# Patient Record
Sex: MALE | Race: WHITE | NOT HISPANIC OR LATINO | Employment: OTHER | URBAN - METROPOLITAN AREA
[De-identification: names, ages, dates, MRNs, and addresses within clinical notes are randomized per-mention and may not be internally consistent; named-entity substitution may affect disease eponyms.]

---

## 2017-03-13 ENCOUNTER — OFFICE VISIT (OUTPATIENT)
Dept: CARDIOLOGY | Facility: CLINIC | Age: 72
End: 2017-03-13

## 2017-03-13 VITALS
BODY MASS INDEX: 30.8 KG/M2 | HEART RATE: 58 BPM | HEIGHT: 74 IN | DIASTOLIC BLOOD PRESSURE: 80 MMHG | OXYGEN SATURATION: 99 % | WEIGHT: 240 LBS | SYSTOLIC BLOOD PRESSURE: 144 MMHG

## 2017-03-13 DIAGNOSIS — I48.0 PAROXYSMAL ATRIAL FIBRILLATION (HCC): Primary | ICD-10-CM

## 2017-03-13 DIAGNOSIS — I45.2 BIFASCICULAR BLOCK: ICD-10-CM

## 2017-03-13 PROCEDURE — 99213 OFFICE O/P EST LOW 20 MIN: CPT | Performed by: PHYSICIAN ASSISTANT

## 2017-03-13 PROCEDURE — 93000 ELECTROCARDIOGRAM COMPLETE: CPT | Performed by: PHYSICIAN ASSISTANT

## 2017-03-13 RX ORDER — VALSARTAN 320 MG/1
320 TABLET ORAL DAILY
Refills: 11 | COMMUNITY
Start: 2017-01-22 | End: 2020-06-22 | Stop reason: HOSPADM

## 2017-03-13 NOTE — PROGRESS NOTES
Date of Office Visit: 2017  Encounter Provider: ELISABET Tena  Place of Service: [facility] CARDIOLOGY  Patient Name: Mark Aguirre Jr.  :1945    Chief Complaint   Patient presents with   • Atrial Fibrillation     1 year follow up   :     HPI: Mark Aguirre Jr. is a 71 y.o. male , new to me, who presents today for follow-up.  Old records a been obtained and reviewed by me.  He is a patient of Dr. Michael's with a past medical history significant for paroxysmal atrial fibrillation, atrial flutter, and a bifascicular block.  He is status post atrial flutter and atrial fibrillation ablation.  He was last seen in our office on 3/2/2016 by Dr. Michael.  At that visit, he was doing well.  Dr. Michael noted that the patient had lost a significant amount of weight through the HMR program.  No changes were made to his medical regimen.  He remains on anticoagulation for stroke prevention.  He is here today for his annual visit.   Over the past year, he's been doing well.  He did gain back some weight, and he is going to be starting the R diet plan again soon.  He goes to [place] several times a year and climbs mountains and [place] without any problem whatsoever.  Over the past 3-4 weeks, he has had an increased amount of stress.  He has a grandson who is in the special forces with the [employer] and is about ready to be deployed for the second time.  He is worried about him, as well as some of his other grandchildren.  He has had increased palpitations and skipped heartbeats over the past 3 weeks.  According to the patient, he did have a 24-hour Holter monitor at his primary care doctor's office, which supposedly did not show any atrial fibrillation or atrial flutter.  According to the patient, he was having PVCs.  I am going to obtain these records.  He denies any chest pain, shortness of breath, edema, dizziness, or syncope.      Past Medical History   Diagnosis Date   • Atrial flutter    •  Bifascicular block    • Hyperlipidemia    • Hypertension    • PAF (paroxysmal atrial fibrillation)        Past Surgical History   Procedure Laterality Date   • Cardiac ablation     • Hernia repair         Social History     Social History   • Marital status:      Spouse name: N/A   • Number of children: N/A   • Years of education: N/A     Occupational History   • Not on file.     Social History Main Topics   • Smoking status: Never Smoker   • Smokeless tobacco: Current User      Comment: OCCASIONAL DIP   • Alcohol use No   • Drug use: No   • Sexual activity: Defer     Other Topics Concern   • Not on file     Social History Narrative       Family History   Problem Relation Age of Onset   • Breast cancer Mother    • Colon cancer Father    • Diabetes Brother    • No Known Problems Maternal Grandmother    • No Known Problems Maternal Grandfather    • No Known Problems Paternal Grandmother    • Stroke Paternal Grandfather    • Heart disease Brother        Review of Systems   Constitution: Negative for chills, fever, malaise/fatigue, weight gain and weight loss.   HENT: Negative for ear pain, headaches, hearing loss, nosebleeds and sore throat.    Eyes: Negative for double vision, pain and visual disturbance.   Cardiovascular: Positive for palpitations. Negative for chest pain, dyspnea on exertion, irregular heartbeat, leg swelling, near-syncope, orthopnea, paroxysmal nocturnal dyspnea and syncope.   Respiratory: Negative for cough, shortness of breath, sleep disturbances due to breathing, snoring and wheezing.    Endocrine: Negative for cold intolerance, heat intolerance and polyuria.   Skin: Negative for itching and rash.   Musculoskeletal: Negative for joint pain, joint swelling and myalgias.   Gastrointestinal: Negative for abdominal pain, diarrhea, melena, nausea and vomiting.   Genitourinary: Negative for frequency, hematuria and hesitancy.   Neurological: Negative for excessive daytime sleepiness,  "light-headedness, numbness, paresthesias and seizures.   Psychiatric/Behavioral: Negative for altered mental status and depression.   Allergic/Immunologic: Negative.    All other systems reviewed and are negative.      No Known Allergies      Current Outpatient Prescriptions:   •  metoprolol tartrate (LOPRESSOR) 25 MG tablet, TAKE 1/2 TABLET TWICE DAILY, Disp: 180 tablet, Rfl: 3  •  PRADAXA 150 MG capsu, TAKE 1 CAPSULE BY MOUTH TWICE DAILY, Disp: 60 capsule, Rfl: 5  •  valsartan (DIOVAN) 320 MG tablet, TK 1 T PO QD ., Disp: , Rfl: 11  •  zolpidem (AMBIEN) 5 MG tablet, Take 2.5 mg by mouth at night as needed., Disp: , Rfl:      Objective:     Vitals:    03/13/17 0910 03/13/17 0923   BP: 144/86 144/80   BP Location: Right arm Left arm   Pulse: 58    SpO2: 99%    Weight: 240 lb (109 kg)    Height: 74\" (188 cm)      Body mass index is 30.81 kg/(m^2).    PHYSICAL EXAM:    Physical Exam   Constitutional: He is oriented to person, place, and time. He appears well-developed and well-nourished. No distress.   HENT:   Head: Normocephalic and atraumatic.   Eyes: Pupils are equal, round, and reactive to light.   Neck: No JVD present. No thyromegaly present.   Cardiovascular: Normal rate, normal heart sounds and intact distal pulses.  An irregular rhythm present.   No murmur heard.  Pulmonary/Chest: Effort normal and breath sounds normal. No respiratory distress.   Abdominal: Soft. Bowel sounds are normal. He exhibits no distension. There is no splenomegaly or hepatomegaly. There is no tenderness.   Musculoskeletal: Normal range of motion. He exhibits no edema.   Neurological: He is alert and oriented to person, place, and time.   Skin: Skin is warm and dry. He is not diaphoretic. No erythema.   Psychiatric: He has a normal mood and affect. His behavior is normal. Judgment normal.         ECG 12 Lead  Date/Time: 3/13/2017 9:28 AM  Performed by: NARCISA WEBER.  Authorized by: NARCISA WEBER   Comparison: compared with " previous ECG from 3/2/2016  Similar to previous ECG  Rhythm: sinus rhythm  BPM: 58  Conduction: right bundle branch block and LAFB  Clinical impression: abnormal ECG  Comments: Indication: Paroxysmal atrial fibrillation.              Assessment:       Diagnosis Plan   1. Paroxysmal atrial fibrillation  ECG 12 Lead   2. Bifascicular block  ECG 12 Lead     Orders Placed This Encounter   Procedures   • ECG 12 Lead     This order was created via procedure documentation          Plan:       1.  Atrial Fibrillation and Atrial Flutter  Assessment  • The patient has paroxysmal atrial fibrillation  • This is non-valvular in etiology  • The patient's CHADS2-VASc score is 2  • A [id number]-VTVk score of 2 or more is considered a high risk for a thromboembolic event  • Dabigatran prescribed    Plan  • Attempt to maintain sinus rhythm  • Continue dabigatran for antithrombotic therapy, bleeding issues discussed  • Continue beta blocker for rhythm control  • Continue beta blocker for rate control    Subjective - Objective  • The patient had atrial fibrillation ablation   • Overall, he's doing well.  I think that he is probably having PVCs.  I did feel his pulse, and he does have some skipped beats.  His ECG today shows a sinus rhythm with an unchanged bifascicular block.  I think that the increased stress at home is probably causing him to have some palpitations.  He is anticoagulated for stroke prevention and rate controlled.  An echo to make any changes to his medical regimen today, I will obtain the Holter monitor results from his primary care physician's office.     2.  Bifascicular block.  This is unchanged.  Continue current medical regimen.    As always, it has been a pleasure to participate in your patient's care.      Sincerely,         Geeta Alas PA-C

## 2017-03-21 RX ORDER — ATENOLOL 100 MG/1
TABLET ORAL
Qty: 60 CAPSULE | Refills: 11 | Status: SHIPPED | OUTPATIENT
Start: 2017-03-21 | End: 2018-03-14 | Stop reason: SDUPTHER

## 2017-07-12 ENCOUNTER — HOSPITAL ENCOUNTER (OUTPATIENT)
Dept: SLEEP MEDICINE | Facility: HOSPITAL | Age: 72
Discharge: HOME | End: 2017-07-12
Attending: INTERNAL MEDICINE

## 2017-07-12 PROCEDURE — 99213 OFFICE O/P EST LOW 20 MIN: CPT | Performed by: INTERNAL MEDICINE

## 2017-07-23 PROBLEM — G47.33 OSA ON CPAP: Status: ACTIVE | Noted: 2017-07-23

## 2017-07-23 PROBLEM — Z99.89 OSA ON CPAP: Status: ACTIVE | Noted: 2017-07-23

## 2017-07-23 NOTE — PROGRESS NOTES
Follow Up Sleep Disorders Center Note       Patient Care Team:  Luis Staples MD as PCP - Internal Medicine  Rian Johns MD as Consulting Physician (Sleep Medicine)    Chief Complaint:  STEPHENIE     Interval History:   The patient was last seen by me in July 2016.  He is stable unchanged and he states he is doing amazing.  He goes to bed at 9:30 PM and awakens at 6 AM.  He sleeps all night.  R[place]ott Sleepiness Scale is normal at 1.    Review of Systems:  Recorded on the Sleep Questionnaire.  Unremarkable .    Social History:  He does not smoke cigarettes.  He chews tobacco.  No alcohol.  2 cups of coffee a day.    Allergies:  No known medical allergies.     Medication Review:  Reviewed.      Vital Signs:  Height 74.5 inches and weight 235 pounds and he is borderline obese with a body mass index of 30.    Physical Exam:    Constitutional:  Well developed white male and appears in no apparent distress.  Awake & oriented times 3.  Normal mood with normal recent and remote memory and normal judgement.  Eyes:  Conjunctivae normal.  Oropharynx:  moist mucous membranes without exudate and a large tongue with scalloped margins and a normal uvula and narrow posterior pharyngeal opening and class III MP airway.      Results Review:  DME is [employer] and he uses nasal pillows.  No up-to-date downloads available at this time.       Impression:   Obstructive sleep apnea previously adequately treated with CPAP +13 and previously demonstrated good compliance and usage and he has no complaints of hypersomnolence.      Plan:  Good sleep hygiene measures should be maintained.  Weight loss would be beneficial in this patient who is obese by BMI.    The patient will continue CPAP as he is doing.  If possible, he will bring in his SD card.  A new prescription will be sent to his DME for all needed supplies.    The patient will call for any problems and will follow up in one year.      Rian Johns MD  07/23/17  8:03  AM

## 2017-12-19 ENCOUNTER — PREP FOR SURGERY (OUTPATIENT)
Dept: OTHER | Facility: HOSPITAL | Age: 72
End: 2017-12-19

## 2017-12-19 DIAGNOSIS — Z80.0 FAMILY HISTORY OF COLON CANCER: ICD-10-CM

## 2017-12-19 DIAGNOSIS — Z86.010 HISTORY OF COLON POLYPS: Primary | ICD-10-CM

## 2017-12-20 PROBLEM — Z80.0 FAMILY HISTORY OF COLON CANCER: Status: ACTIVE | Noted: 2017-12-20

## 2017-12-20 PROBLEM — Z86.0100 HISTORY OF COLON POLYPS: Status: ACTIVE | Noted: 2017-12-20

## 2017-12-20 PROBLEM — Z86.010 HISTORY OF COLON POLYPS: Status: ACTIVE | Noted: 2017-12-20

## 2018-02-21 ENCOUNTER — ANESTHESIA EVENT (OUTPATIENT)
Dept: GASTROENTEROLOGY | Facility: HOSPITAL | Age: 73
End: 2018-02-21

## 2018-02-21 ENCOUNTER — ANESTHESIA (OUTPATIENT)
Dept: GASTROENTEROLOGY | Facility: HOSPITAL | Age: 73
End: 2018-02-21

## 2018-02-21 ENCOUNTER — HOSPITAL ENCOUNTER (OUTPATIENT)
Facility: HOSPITAL | Age: 73
Setting detail: HOSPITAL OUTPATIENT SURGERY
Discharge: HOME | End: 2018-02-21
Attending: SURGERY | Admitting: SURGERY

## 2018-02-21 VITALS
RESPIRATION RATE: 12 BRPM | HEIGHT: 73 IN | SYSTOLIC BLOOD PRESSURE: 126 MMHG | BODY MASS INDEX: 30.82 KG/M2 | TEMPERATURE: 97.8 F | WEIGHT: 232.56 LBS | HEART RATE: 58 BPM | DIASTOLIC BLOOD PRESSURE: 76 MMHG | OXYGEN SATURATION: 99 %

## 2018-02-21 DIAGNOSIS — Z86.010 HISTORY OF COLON POLYPS: ICD-10-CM

## 2018-02-21 DIAGNOSIS — Z80.0 FAMILY HISTORY OF COLON CANCER: ICD-10-CM

## 2018-02-21 PROCEDURE — 88305 TISSUE EXAM BY PATHOLOGIST: CPT | Performed by: SURGERY

## 2018-02-21 PROCEDURE — 25010000002 PROPOFOL 10 MG/ML EMULSION: Performed by: ANESTHESIOLOGY

## 2018-02-21 PROCEDURE — 45380 COLONOSCOPY AND BIOPSY: CPT | Performed by: SURGERY

## 2018-02-21 RX ORDER — PROPOFOL 10 MG/ML
VIAL (ML) INTRAVENOUS AS NEEDED
Status: DISCONTINUED | OUTPATIENT
Start: 2018-02-21 | End: 2018-02-21 | Stop reason: SURG

## 2018-02-21 RX ORDER — SODIUM CHLORIDE, SODIUM LACTATE, POTASSIUM CHLORIDE, CALCIUM CHLORIDE 600; 310; 30; 20 MG/100ML; MG/100ML; MG/100ML; MG/100ML
1000 INJECTION, SOLUTION INTRAVENOUS CONTINUOUS PRN
Status: DISCONTINUED | OUTPATIENT
Start: 2018-02-21 | End: 2018-02-21 | Stop reason: HOSPADM

## 2018-02-21 RX ORDER — HYDROCHLOROTHIAZIDE 25 MG/1
25 TABLET ORAL DAILY
COMMUNITY
End: 2019-08-28

## 2018-02-21 RX ORDER — LIDOCAINE HYDROCHLORIDE 20 MG/ML
INJECTION, SOLUTION INFILTRATION; PERINEURAL AS NEEDED
Status: DISCONTINUED | OUTPATIENT
Start: 2018-02-21 | End: 2018-02-21 | Stop reason: SURG

## 2018-02-21 RX ORDER — PROPOFOL 10 MG/ML
VIAL (ML) INTRAVENOUS CONTINUOUS PRN
Status: DISCONTINUED | OUTPATIENT
Start: 2018-02-21 | End: 2018-02-21 | Stop reason: SURG

## 2018-02-21 RX ADMIN — PROPOFOL 300 MCG/KG/MIN: 10 INJECTION, EMULSION INTRAVENOUS at 10:41

## 2018-02-21 RX ADMIN — SODIUM CHLORIDE, POTASSIUM CHLORIDE, SODIUM LACTATE AND CALCIUM CHLORIDE: 600; 310; 30; 20 INJECTION, SOLUTION INTRAVENOUS at 10:36

## 2018-02-21 RX ADMIN — SODIUM CHLORIDE, POTASSIUM CHLORIDE, SODIUM LACTATE AND CALCIUM CHLORIDE 1000 ML: 600; 310; 30; 20 INJECTION, SOLUTION INTRAVENOUS at 10:21

## 2018-02-21 RX ADMIN — LIDOCAINE HYDROCHLORIDE 60 MG: 20 INJECTION, SOLUTION INFILTRATION; PERINEURAL at 10:41

## 2018-02-21 RX ADMIN — PROPOFOL 150 MG: 10 INJECTION, EMULSION INTRAVENOUS at 10:41

## 2018-02-21 NOTE — ANESTHESIA POSTPROCEDURE EVALUATION
Patient: Mark Aguirre Jr.    Procedure Summary     Date Anesthesia Start Anesthesia Stop Room / Location    02/21/18 1036 1100  [facility] /  [facility]       Procedure Diagnosis Surgeon Provider    COLONOSCOPY INTO CECUM WITH COLD BX POLYPECTOMY  (N/A ) Family history of colon cancer; History of colon polyps  (Family history of colon cancer [Z80.0]; History of colon polyps [Z86.010]) MD Rodrigue Andrew MD          Anesthesia Type: MAC  Last vitals  BP   115/63 (02/21/18 1058)   Temp   37 °C (98.6 °F) (02/21/18 1011)   Pulse   70 (02/21/18 1058)   Resp   14 (02/21/18 1058)     SpO2   94 % (02/21/18 1058)     Post Anesthesia Care and Evaluation    Patient location during evaluation: PHASE II  Patient participation: complete - patient participated  Level of consciousness: awake and alert  Pain management: adequate  Airway patency: patent  Anesthetic complications: No anesthetic complications  PONV Status: none  Cardiovascular status: acceptable  Respiratory status: acceptable  Hydration status: acceptable

## 2018-02-21 NOTE — OP NOTE
PREOPERATIVE DIAGNOSIS:  High-risk screening secondary to personal history of colon polyps and family history of colon cancer in father    POSTOPERATIVE DIAGNOSIS AND FINDINGS:  Small ascending colon polyp    PROCEDURE:  Colonoscopy to cecum with cold biopsy removal of ascending colon polyp    SURGEON:  Ross Stearns MD    ANESTHESIA:  MAC    SPECIMEN(S):  Polyp (s)    DESCRIPTION:  In decubitus position digital rectal exam was normal. Colonoscope inserted under direct visualization of lumen to cecum confirmed by visualization of ileocecal valve and appendiceal orifice.    Scope slowly withdrawn circumferentially examining all mucosal surfaces.    Quality of bowel preparation good.    The only mucosal abnormality was a small ascending colon polyp removed completely with the cold biopsy forceps, good hemostasis at the site.    Tolerated well.    RECOMMENDATION FOR FUTURE SURVEILLANCE:  To be determined based on polyp pathology and issued as separate report    Ross Stearns M.D.

## 2018-02-21 NOTE — PLAN OF CARE
Problem: Patient Care Overview (Adult)  Goal: Plan of Care Review  Outcome: Ongoing (interventions implemented as appropriate)   02/21/18 1003   Coping/Psychosocial Response Interventions   Plan Of Care Reviewed With patient     Goal: Adult Individualization and Mutuality  Outcome: Ongoing (interventions implemented as appropriate)   02/21/18 1003   Individualization   Patient Specific Preferences none     Goal: Discharge Needs Assessment  Outcome: Ongoing (interventions implemented as appropriate)   02/21/18 1003   Discharge Needs Assessment   Concerns To Be Addressed no discharge needs identified   Living Environment   Transportation Available family or friend will provide       Problem: GI Endoscopy (Adult)  Goal: Signs and Symptoms of Listed Potential Problems Will be Absent or Manageable (GI Endoscopy)  Outcome: Ongoing (interventions implemented as appropriate)   02/21/18 1003   GI Endoscopy   Problems Assessed (GI Endoscopy) pain   Problems Present (GI Endoscopy) none

## 2018-02-21 NOTE — H&P
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

## 2018-02-21 NOTE — ANESTHESIA PREPROCEDURE EVALUATION
Anesthesia Evaluation     Patient summary reviewed and Nursing notes reviewed                Airway   Mallampati: II  TM distance: >3 FB  Neck ROM: full  no difficulty expected  Dental - normal exam     Pulmonary - normal exam   (+) sleep apnea on CPAP,   Cardiovascular     Rhythm: regular  Rate: abnormal    (+) hypertension, dysrhythmias Atrial Fib, hyperlipidemia    ROS comment: Hx of PAF, s/p ablation  PE comment: SB/rate 55    Neuro/Psych- negative ROS  GI/Hepatic/Renal/Endo    (+) obesity,      Musculoskeletal (-) negative ROS    Abdominal  - normal exam   Substance History - negative use     OB/GYN negative ob/gyn ROS         Other                      Anesthesia Plan    ASA 3     MAC     intravenous induction   Anesthetic plan and risks discussed with patient.

## 2018-02-22 LAB
CYTO UR: NORMAL
LAB AP CASE REPORT: NORMAL
Lab: NORMAL
PATH REPORT.FINAL DX SPEC: NORMAL
PATH REPORT.GROSS SPEC: NORMAL

## 2018-02-26 ENCOUNTER — DOCUMENTATION (OUTPATIENT)
Dept: SURGERY | Facility: CLINIC | Age: 73
End: 2018-02-26

## 2018-02-26 NOTE — PROGRESS NOTES
Colonoscopy performed secondary to personal history of colon polyps and family history of colon cancer in father on 2/21/2018 demonstrated a small ascending colon polyp.    Pathology: Tubulovillous adenoma    Recommendation: Based on type of polyp 3 year surveillance recommended

## 2018-03-02 ENCOUNTER — TELEPHONE (OUTPATIENT)
Dept: SURGERY | Facility: CLINIC | Age: 73
End: 2018-03-02

## 2018-03-02 NOTE — TELEPHONE ENCOUNTER
----- Message from Ross Stearns MD sent at 2/26/2018  7:31 AM EST -----  Please let him know that he had a single benign polyp.  Based on the type of polyp, his personal history of polyps, and his family history of colon cancer, 3 year surveillance recommended-put in computer for reminder

## 2018-03-15 RX ORDER — ATENOLOL 100 MG/1
TABLET ORAL
Qty: 60 CAPSULE | Refills: 0 | Status: SHIPPED | OUTPATIENT
Start: 2018-03-15 | End: 2018-04-16 | Stop reason: SDUPTHER

## 2018-04-16 RX ORDER — ATENOLOL 100 MG/1
TABLET ORAL
Qty: 60 CAPSULE | Refills: 0 | Status: SHIPPED | OUTPATIENT
Start: 2018-04-16 | End: 2018-05-15 | Stop reason: SDUPTHER

## 2018-04-23 ENCOUNTER — OFFICE VISIT (OUTPATIENT)
Dept: CARDIOLOGY | Facility: CLINIC | Age: 73
End: 2018-04-23

## 2018-04-23 VITALS
SYSTOLIC BLOOD PRESSURE: 142 MMHG | HEIGHT: 74 IN | DIASTOLIC BLOOD PRESSURE: 68 MMHG | BODY MASS INDEX: 31.18 KG/M2 | WEIGHT: 243 LBS | HEART RATE: 66 BPM

## 2018-04-23 DIAGNOSIS — I48.0 PAROXYSMAL ATRIAL FIBRILLATION (HCC): Primary | ICD-10-CM

## 2018-04-23 DIAGNOSIS — I45.2 BIFASCICULAR BLOCK: ICD-10-CM

## 2018-04-23 PROCEDURE — 99213 OFFICE O/P EST LOW 20 MIN: CPT | Performed by: INTERNAL MEDICINE

## 2018-04-23 PROCEDURE — 93000 ELECTROCARDIOGRAM COMPLETE: CPT | Performed by: INTERNAL MEDICINE

## 2018-04-23 NOTE — PROGRESS NOTES
Subjective:     Encounter Date:04/23/2018      Patient ID: Mark Aguirre Jr. is a 72 y.o. male.    Chief Complaint: PAF, bifascicular block    History of Present Illness    Dear Dr. Staples:    I had the pleasure of seeing the patient in cardiac follow-up today.  As you well know, he is a tess, 72-year-old man with history of intermittent atrial fibrillation and atrial flutter with bifascicular block.  He has done really well recently.  He had two ablations and, since that time, he has rare palpitations.  He remains on dabigatran for anticoagulation.      Over the past year, he reports no difficulties.  He has cut back on his travel.  [place]  He is very happy with how things are going.        Review of Systems   All other systems reviewed and are negative.        ECG 12 Lead  Date/Time: 4/23/2018 12:57 PM  Performed by: LESTER AGUILAR  Authorized by: LESTER AGUILAR   Comparison: compared with previous ECG   Similar to previous ECG  Rhythm: sinus rhythm  BPM: 66  Conduction: right bundle branch block and LAFB               Objective:     Physical Exam   Constitutional: He is oriented to person, place, and time. He appears well-developed and well-nourished.   HENT:   Head: Normocephalic and atraumatic.   Neck: Normal range of motion. Neck supple.   Cardiovascular: Normal rate, regular rhythm and normal heart sounds.    Pulmonary/Chest: Effort normal and breath sounds normal.   Abdominal: Soft. Bowel sounds are normal.   Musculoskeletal: Normal range of motion.   Neurological: He is alert and oriented to person, place, and time.   Skin: Skin is warm and dry.   Psychiatric: He has a normal mood and affect. His behavior is normal. Thought content normal.   Vitals reviewed.      Lab Review:       Assessment:          Diagnosis Plan   1. Paroxysmal atrial fibrillation     2. Bifascicular block            Plan:       It was a pleasure to see your patient in cardiac follow-up today.  He is  doing very well from the cardiac standpoint without any recurrent palpitations.  He remains anticoagulated for stroke prevention.  I have made no changes at this time.  He will see me again in one year or sooner if symptoms warrant.    Atrial Fibrillation and Atrial Flutter  Assessment  • The patient has paroxysmal atrial fibrillation and paroxysmal atrial flutter  • This is non-valvular in etiology  • The patient's CHADS2-VASc score is 2  • A TFD5LS2-WUKo score of 2 or more is considered a high risk for a thromboembolic event  • Dabigatran prescribed    Plan  • Attempt to maintain sinus rhythm  • Continue dabigatran for antithrombotic therapy, bleeding issues discussed  • Continue beta blocker for rate control    Subjective - Objective  • The patient had atrial fibrillation ablation

## 2018-05-16 RX ORDER — ATENOLOL 100 MG/1
TABLET ORAL
Qty: 180 CAPSULE | Refills: 3 | Status: SHIPPED | OUTPATIENT
Start: 2018-05-16 | End: 2019-05-09 | Stop reason: SDUPTHER

## 2018-08-02 ENCOUNTER — OFFICE VISIT (OUTPATIENT)
Dept: SLEEP MEDICINE | Facility: HOSPITAL | Age: 73
End: 2018-08-02
Attending: INTERNAL MEDICINE

## 2018-08-02 DIAGNOSIS — G47.33 OSA ON CPAP: Primary | ICD-10-CM

## 2018-08-02 DIAGNOSIS — G47.14 HYPERSOMNIA DUE TO MEDICAL CONDITION: ICD-10-CM

## 2018-08-02 DIAGNOSIS — Z99.89 OSA ON CPAP: Primary | ICD-10-CM

## 2018-08-02 PROCEDURE — G0463 HOSPITAL OUTPT CLINIC VISIT: HCPCS

## 2018-08-02 PROCEDURE — 99213 OFFICE O/P EST LOW 20 MIN: CPT | Performed by: INTERNAL MEDICINE

## 2018-08-02 NOTE — PROGRESS NOTES
Follow Up Sleep Disorders Center Note     Chief Complaint:  STEPHENIE     Primary Care Physician: Luis Staples MD    Interval History:   The patient was last seen by me in July 2017.  He is doing well without new complaints.  He goes to bed at 9:30 PM awakens at 6 AM.  Atlas Sleepiness Scale is normal.    Review of Systems:  Recorded on the Sleep Questionnaire.  Unremarkable .    Social History:  2 caffeinated beverages a day  Social History     Social History   • Marital status:      Social History Main Topics   • Smoking status: Never Smoker   • Smokeless tobacco: Current User      Comment: OCCASIONAL DIP   • Alcohol use No   • Drug use: No   • Sexual activity: Defer     Other Topics Concern   • Not on file       Allergies:  Patient has no known allergies.     Medication Review:  Reviewed.      Vital Signs:  Height 74.5 inches, weight 230 pounds and BMI obese at 30.    Physical Exam:    Constitutional:  Well developed white male and appears in no apparent distress.  Awake & oriented times 3.  Normal mood with normal recent and remote memory and normal judgement.  Eyes:  Conjunctivae normal.  Oropharynx:  moist mucous membranes without exudate and a large tongue with scalloped margins and a normal uvula and a narrow posterior pharyngeal opening and class III MP airway      Results Review:  DME is [employer] and he uses a nasal interface.  Downloads between February 1 and July 30, 2018 compliance 99%.  Average usage is 7 hours and 34 minutes.  Average AHI is normal with a borderline leak.  Average AutoCPAP pressure is 13.8 and his auto CPAP is 12-15.       Impression:   STEPHENIE adequately treated with auto titrating CPAP with good compliance and usage and no complaints of hypersomnolence.      Plan:  Good sleep hygiene measures should be maintained.  Weight loss would be beneficial in this patient who is obese by BMI.  The patient is benefiting from the treatment being provided.     Patient will continue auto  CPAP    The patient will call for any problems and will follow up in one year.      Rian Johns MD  Sleep Medicine  08/04/18  11:09 AM

## 2018-08-04 PROBLEM — G47.14 HYPERSOMNIA DUE TO MEDICAL CONDITION: Status: ACTIVE | Noted: 2018-08-04

## 2019-05-01 ENCOUNTER — OFFICE VISIT (OUTPATIENT)
Dept: CARDIOLOGY | Facility: CLINIC | Age: 74
End: 2019-05-01

## 2019-05-01 VITALS
HEART RATE: 55 BPM | SYSTOLIC BLOOD PRESSURE: 142 MMHG | WEIGHT: 234 LBS | HEIGHT: 74 IN | BODY MASS INDEX: 30.03 KG/M2 | DIASTOLIC BLOOD PRESSURE: 70 MMHG

## 2019-05-01 DIAGNOSIS — I45.2 BIFASCICULAR BLOCK: ICD-10-CM

## 2019-05-01 DIAGNOSIS — I48.0 PAROXYSMAL ATRIAL FIBRILLATION (HCC): Primary | ICD-10-CM

## 2019-05-01 PROCEDURE — 99213 OFFICE O/P EST LOW 20 MIN: CPT | Performed by: INTERNAL MEDICINE

## 2019-05-01 PROCEDURE — 93000 ELECTROCARDIOGRAM COMPLETE: CPT | Performed by: INTERNAL MEDICINE

## 2019-05-01 NOTE — PROGRESS NOTES
Subjective:     Encounter Date:05/01/2019      Patient ID: Mark Aguirre Jr. is a 73 y.o. male.    Chief Complaint: AF, bifascicular block    History of Present Illness    Dear Dr. Staples,    I had the pleasure of seeing the patient in cardiac followup today. As you well know, he is a tess 73-year-old man with history of intermittent atrial fibrillation and atrial flutter. He has bifascicular block. He has had 2 ablations and feels great. He no longer has palpitations. He remains on dabigatran for anticoagulation.    Since I have last seen him, he reports no complaints. He works on a farm. He has 10 grandchildren that he follows. He has mild hypertension that is well controlled on medication.         Review of Systems   All other systems reviewed and are negative.        ECG 12 Lead  Date/Time: 5/1/2019 8:46 AM  Performed by: Kevin Michael MD  Authorized by: Kevin Michael MD   Comparison: compared with previous ECG   Similar to previous ECG  Rhythm: sinus rhythm  BPM: 55  Conduction: right bundle branch block and left anterior fascicular block               Objective:     Physical Exam   Constitutional: He is oriented to person, place, and time. He appears well-developed and well-nourished.   HENT:   Head: Normocephalic and atraumatic.   Neck: Normal range of motion. Neck supple.   Cardiovascular: Normal rate, regular rhythm and normal heart sounds.   Pulmonary/Chest: Effort normal and breath sounds normal.   Abdominal: Soft. Bowel sounds are normal.   Musculoskeletal: Normal range of motion.   Neurological: He is alert and oriented to person, place, and time.   Skin: Skin is warm and dry.   Psychiatric: He has a normal mood and affect. His behavior is normal. Thought content normal.   Vitals reviewed.      Lab Review:       Assessment:          Diagnosis Plan   1. Paroxysmal atrial fibrillation (CMS/HCC)     2. Bifascicular block            Plan:           It was a pleasure to see your patient in cardiac  followup today. He is doing very well from the cardiac standpoint without any complaints. His palpitations have resolved after ablation. I have recommended that he continue his current medical regimen. He will follow up with Dr. Armando in 3 months or sooner if symptoms warrant.     Atrial Fibrillation and Atrial Flutter  Assessment  • The patient has paroxysmal atrial fibrillation and paroxysmal atrial flutter  • This is non-valvular in etiology  • The patient's CHADS2-VASc score is 2  • A DTD5IK2-PGUz score of 2 or more is considered a high risk for a thromboembolic event  • Dabigatran prescribed    Plan  • Attempt to maintain sinus rhythm  • Continue dabigatran for antithrombotic therapy, bleeding issues discussed  • Continue beta blocker for rate control    Subjective - Objective  • The patient had atrial fibrillation ablation

## 2019-05-10 RX ORDER — ATENOLOL 100 MG/1
TABLET ORAL
Qty: 180 CAPSULE | Refills: 0 | Status: SHIPPED | OUTPATIENT
Start: 2019-05-10 | End: 2019-07-05 | Stop reason: SDUPTHER

## 2019-06-04 ENCOUNTER — TELEPHONE (OUTPATIENT)
Dept: CARDIOLOGY | Facility: CLINIC | Age: 74
End: 2019-06-04

## 2019-06-04 NOTE — TELEPHONE ENCOUNTER
06/04/19  4:10 PM  Mark Aguirre Jr.  1945  Home Phone [phone]   Mobile [phone]       Mark Aguirre Jr. is a patient of Dr. Michael. Dr. Wilber Carrillo is calling wanting to s/w Dr. Michael re: this pt.    C: [phone]    Tomeka Patino RN

## 2019-06-07 NOTE — TELEPHONE ENCOUNTER
Dr. Carrillo' office called back and want to know if this pt can be off the Pradaxa 3 days prior to surgery and 2 days after. Surgery is Thursday, 6/13/19.    Please advise. Their office number is [phone].    Tomeka Patino RN

## 2019-06-10 NOTE — TELEPHONE ENCOUNTER
6/10/19  I called Dr. Low's ofc spoke with Elayne and gave her these instructions.  She repeated them and will forward to the surgery scheduler, Sandhya./milton    Addendum:  I also called patient to give him these instructions...he states he already took this medication today and will be calling the surgeon to cancel this because he is going out of town instead.  So patient will not be stopping the pradaxa nor will he be having this surgery at this time.  /milton

## 2019-07-08 RX ORDER — ATENOLOL 100 MG/1
TABLET ORAL
Qty: 180 CAPSULE | Refills: 0 | Status: SHIPPED | OUTPATIENT
Start: 2019-07-08 | End: 2019-11-05 | Stop reason: SDUPTHER

## 2019-07-31 ENCOUNTER — OFFICE VISIT (OUTPATIENT)
Dept: SLEEP MEDICINE | Facility: HOSPITAL | Age: 74
End: 2019-07-31
Attending: INTERNAL MEDICINE

## 2019-07-31 VITALS — HEIGHT: 74 IN | BODY MASS INDEX: 30.62 KG/M2 | WEIGHT: 238.6 LBS

## 2019-07-31 DIAGNOSIS — G47.33 OSA ON CPAP: Primary | ICD-10-CM

## 2019-07-31 DIAGNOSIS — Z99.89 OSA ON CPAP: Primary | ICD-10-CM

## 2019-07-31 PROCEDURE — 99213 OFFICE O/P EST LOW 20 MIN: CPT | Performed by: INTERNAL MEDICINE

## 2019-07-31 PROCEDURE — G0463 HOSPITAL OUTPT CLINIC VISIT: HCPCS

## 2019-08-07 ENCOUNTER — TELEPHONE (OUTPATIENT)
Dept: CARDIOLOGY | Facility: CLINIC | Age: 74
End: 2019-08-07

## 2019-08-07 NOTE — TELEPHONE ENCOUNTER
Please call this patient for an appt. With Dr. Lindquist  You can add him on for 8/28/19 at 9:20  Let patient no we are working him in per Dr. Lindquist

## 2019-08-28 ENCOUNTER — OFFICE VISIT (OUTPATIENT)
Dept: CARDIOLOGY | Facility: CLINIC | Age: 74
End: 2019-08-28

## 2019-08-28 VITALS
HEIGHT: 74 IN | HEART RATE: 57 BPM | BODY MASS INDEX: 30.16 KG/M2 | WEIGHT: 235 LBS | DIASTOLIC BLOOD PRESSURE: 92 MMHG | SYSTOLIC BLOOD PRESSURE: 152 MMHG

## 2019-08-28 DIAGNOSIS — I45.2 BIFASCICULAR BLOCK: Primary | ICD-10-CM

## 2019-08-28 DIAGNOSIS — Z99.89 OSA ON CPAP: ICD-10-CM

## 2019-08-28 DIAGNOSIS — G47.33 OSA ON CPAP: ICD-10-CM

## 2019-08-28 DIAGNOSIS — I10 ESSENTIAL HYPERTENSION: ICD-10-CM

## 2019-08-28 DIAGNOSIS — I48.0 PAROXYSMAL ATRIAL FIBRILLATION (HCC): ICD-10-CM

## 2019-08-28 PROCEDURE — 93000 ELECTROCARDIOGRAM COMPLETE: CPT | Performed by: INTERNAL MEDICINE

## 2019-08-28 PROCEDURE — 99214 OFFICE O/P EST MOD 30 MIN: CPT | Performed by: INTERNAL MEDICINE

## 2019-08-28 RX ORDER — CHLORTHALIDONE 25 MG/1
25 TABLET ORAL DAILY
Qty: 90 TABLET | Refills: 3 | Status: SHIPPED | OUTPATIENT
Start: 2019-08-28 | End: 2020-08-31

## 2019-08-28 NOTE — PROGRESS NOTES
Date of Office Visit: 2019  Encounter Provider: Claus Lindquist MD  Place of Service: [facility] CARDIOLOGY  Patient Name: Mark Aguirre Jr.  :1945  [id number]    Chief Complaint   Patient presents with   • Atrial Fibrillation   :     HPI: Mark Aguirre Jr. is a 73 y.o. male this is a former patient of my partners [employer] to see us.  He has had a history of paroxysmal A. fib and has had 2 A. fib ablations in the past with Dr. Hemant Armando.  He has been doing well ever since then but has been maintained on chronic anticoagulation.  This summer couple times in the heat he had some palpitations did not quite feel good was not sure if he was having A. fib or not but otherwise has been doing well [place] he is a very active param goes elk hunting and goose hunting and shoots anything he says.  He otherwise does not have chest pain PND orthopnea edema syncope.  He does not have diabetes he is never had a stroke he is never had vascular disease he does have sleep apnea that he wears a mask for    Past Medical History:   Diagnosis Date   • Atrial flutter (CMS/HCC)    • Bifascicular block    • Hyperlipidemia    • Hypertension    • PAF (paroxysmal atrial fibrillation) (CMS/HCC)        Past Surgical History:   Procedure Laterality Date   • CARDIAC ABLATION     • COLONOSCOPY N/A 2018    Procedure: COLONOSCOPY INTO CECUM WITH COLD BX POLYPECTOMY ;  Surgeon: Ross Stearns MD;  Location: [facility];  Service:    • HERNIA REPAIR         Social History     Socioeconomic History   • Marital status:      Spouse name: Not on file   • Number of children: Not on file   • Years of education: Not on file   • Highest education level: Not on file   Tobacco Use   • Smoking status: Never Smoker   • Smokeless tobacco: Current User   • Tobacco comment: OCCASIONAL DIP   Substance and Sexual Activity   • Alcohol use: No   • Drug use: No   • Sexual activity: Defer  "      Family History   Problem Relation Age of Onset   • Breast cancer Mother    • Colon cancer Father    • Diabetes Brother    • No Known Problems Maternal Grandmother    • No Known Problems Maternal Grandfather    • No Known Problems Paternal Grandmother    • Stroke Paternal Grandfather    • Heart disease Brother        Review of Systems   Constitution: Negative for decreased appetite, fever, malaise/fatigue and weight loss.   HENT: Negative for nosebleeds.    Eyes: Negative for double vision.   Cardiovascular: Negative for chest pain, claudication, cyanosis, dyspnea on exertion, irregular heartbeat, leg swelling, near-syncope, orthopnea, palpitations, paroxysmal nocturnal dyspnea and syncope.   Respiratory: Negative for cough, hemoptysis and shortness of breath.    Hematologic/Lymphatic: Negative for bleeding problem.   Skin: Negative for rash.   Musculoskeletal: Negative for falls and myalgias.   Gastrointestinal: Negative for hematochezia, jaundice, melena, nausea and vomiting.   Genitourinary: Negative for hematuria.   Neurological: Negative for dizziness and seizures.   Psychiatric/Behavioral: Negative for altered mental status and memory loss.       No Known Allergies      Current Outpatient Medications:   •  metoprolol tartrate (LOPRESSOR) 25 MG tablet, TAKE 1/2 TABLET BY MOUTH TWICE DAILY, Disp: 180 tablet, Rfl: 2  •  PRADAXA 150 MG capsu, TAKE 1 CAPSULE BY MOUTH TWICE DAILY, Disp: 180 capsule, Rfl: 0  •  valsartan (DIOVAN) 320 MG tablet, TK 1 T PO QD ., Disp: , Rfl: 11  •  zolpidem (AMBIEN) 5 MG tablet, Take 2.5 mg by mouth at night as needed., Disp: , Rfl:   •  chlorthalidone (HYGROTON) 25 MG tablet, Take 1 tablet by mouth Daily., Disp: 90 tablet, Rfl: 3      Objective:     Vitals:    08/28/19 0857   BP: 152/92   Pulse: 57   Weight: 107 kg (235 lb)   Height: 188 cm (74\")     Body mass index is 30.17 kg/m².    Physical Exam   Constitutional: He is oriented to person, place, and time. He appears " well-developed and well-nourished.   HENT:   Head: Normocephalic.   Eyes: No scleral icterus.   Neck: No JVD present. No thyromegaly present.   Cardiovascular: Normal rate, regular rhythm and normal heart sounds. Exam reveals no gallop and no friction rub.   No murmur heard.  Pulmonary/Chest: Effort normal and breath sounds normal. He has no wheezes. He has no rales.   Abdominal: Soft. There is no hepatosplenomegaly. There is no tenderness.   Musculoskeletal: Normal range of motion. He exhibits no edema.   Lymphadenopathy:     He has no cervical adenopathy.   Neurological: He is alert and oriented to person, place, and time.   Skin: Skin is warm and dry. No rash noted.   Psychiatric: He has a normal mood and affect.         ECG 12 Lead  Date/Time: 8/28/2019 9:35 AM  Performed by: Claus Lindquist MD  Authorized by: Claus Lindquist MD   Comparison: compared with previous ECG   Similar to previous ECG  Rhythm: sinus rhythm  Conduction: right bundle branch block and left anterior fascicular block    Clinical impression: abnormal EKG             Assessment:       Diagnosis Plan   1. Bifascicular block  Adult Transthoracic Echo Complete W/ Cont if Necessary Per Protocol    Holter Monitor - 24 Hour   2. Paroxysmal atrial fibrillation (CMS/HCC)  Adult Transthoracic Echo Complete W/ Cont if Necessary Per Protocol    Holter Monitor - 24 Hour   3. STEPHENIE on auto CPAP  Adult Transthoracic Echo Complete W/ Cont if Necessary Per Protocol    Holter Monitor - 24 Hour   4. Essential hypertension  Adult Transthoracic Echo Complete W/ Cont if Necessary Per Protocol    Holter Monitor - 24 Hour          Plan:       In general I think he probably is doing okay he has an abnormal ECG with bifascicular block which is unchanged he does have a history of paroxysmal A. fib he has been anticoagulated he has a chads 2 Vascor of 2.  I think that I would like to get a Holter on him and make sure he is not having periods of A. fib and I also think  an echo is warranted with his abnormal ECG hypertension and history of A. fib I do not see that there is been any testing done since the beginning of the electronic medical record.  I am also going to change his hydrochlorothiazide to chlorthalidone which I think is a better antihypertensive for him if although that is okay I will have him come back and see us in a year I discussed this with his primary care physician also    As always, it has been a pleasure to participate in your patient's care.      Sincerely,       Claus Lindquist MD

## 2019-09-17 ENCOUNTER — HOSPITAL ENCOUNTER (OUTPATIENT)
Dept: CARDIOLOGY | Facility: HOSPITAL | Age: 74
Discharge: HOME | End: 2019-09-17
Admitting: INTERNAL MEDICINE

## 2019-09-17 VITALS
SYSTOLIC BLOOD PRESSURE: 136 MMHG | BODY MASS INDEX: 30.16 KG/M2 | HEIGHT: 74 IN | DIASTOLIC BLOOD PRESSURE: 68 MMHG | HEART RATE: 63 BPM | WEIGHT: 235 LBS

## 2019-09-17 DIAGNOSIS — I10 ESSENTIAL HYPERTENSION: ICD-10-CM

## 2019-09-17 DIAGNOSIS — I45.2 BIFASCICULAR BLOCK: ICD-10-CM

## 2019-09-17 DIAGNOSIS — G47.33 OSA ON CPAP: ICD-10-CM

## 2019-09-17 DIAGNOSIS — Z99.89 OSA ON CPAP: ICD-10-CM

## 2019-09-17 DIAGNOSIS — I48.0 PAROXYSMAL ATRIAL FIBRILLATION (HCC): ICD-10-CM

## 2019-09-17 LAB
ASCENDING AORTA: 3.1 CM
BH CV ECHO MEAS - ACS: 2.3 CM
BH CV ECHO MEAS - AI DEC SLOPE: 241.5 CM/SEC^2
BH CV ECHO MEAS - AI MAX PG: 77 MMHG
BH CV ECHO MEAS - AI MAX VEL: 438.7 CM/SEC
BH CV ECHO MEAS - AI P1/2T: 532 MSEC
BH CV ECHO MEAS - AO MAX PG (FULL): 22.6 MMHG
BH CV ECHO MEAS - AO MAX PG: 27.2 MMHG
BH CV ECHO MEAS - AO MEAN PG (FULL): 11.6 MMHG
BH CV ECHO MEAS - AO MEAN PG: 14.3 MMHG
BH CV ECHO MEAS - AO ROOT AREA (BSA CORRECTED): 1.5
BH CV ECHO MEAS - AO ROOT AREA: 9.7 CM^2
BH CV ECHO MEAS - AO ROOT DIAM: 3.5 CM
BH CV ECHO MEAS - AO V2 MAX: 260.6 CM/SEC
BH CV ECHO MEAS - AO V2 MEAN: 172.4 CM/SEC
BH CV ECHO MEAS - AO V2 VTI: 49.2 CM
BH CV ECHO MEAS - AVA(I,A): 2 CM^2
BH CV ECHO MEAS - AVA(I,D): 2 CM^2
BH CV ECHO MEAS - AVA(V,A): 1.7 CM^2
BH CV ECHO MEAS - AVA(V,D): 1.7 CM^2
BH CV ECHO MEAS - BSA(HAYCOCK): 2.4 M^2
BH CV ECHO MEAS - BSA: 2.3 M^2
BH CV ECHO MEAS - BZI_BMI: 29.9 KILOGRAMS/M^2
BH CV ECHO MEAS - BZI_METRIC_HEIGHT: 188 CM
BH CV ECHO MEAS - BZI_METRIC_WEIGHT: 105.7 KG
BH CV ECHO MEAS - EDV(MOD-SP2): 136 ML
BH CV ECHO MEAS - EDV(MOD-SP4): 144 ML
BH CV ECHO MEAS - EDV(TEICH): 162.4 ML
BH CV ECHO MEAS - EF(CUBED): 83.1 %
BH CV ECHO MEAS - EF(MOD-BP): 66 %
BH CV ECHO MEAS - EF(MOD-SP2): 64.7 %
BH CV ECHO MEAS - EF(MOD-SP4): 67.4 %
BH CV ECHO MEAS - EF(TEICH): 75.3 %
BH CV ECHO MEAS - ESV(MOD-SP2): 48 ML
BH CV ECHO MEAS - ESV(MOD-SP4): 47 ML
BH CV ECHO MEAS - ESV(TEICH): 40.1 ML
BH CV ECHO MEAS - FS: 44.7 %
BH CV ECHO MEAS - IVS/LVPW: 1.1
BH CV ECHO MEAS - IVSD: 1.1 CM
BH CV ECHO MEAS - LAT PEAK E' VEL: 10 CM/SEC
BH CV ECHO MEAS - LV DIASTOLIC VOL/BSA (35-75): 62.1 ML/M^2
BH CV ECHO MEAS - LV MASS(C)D: 259 GRAMS
BH CV ECHO MEAS - LV MASS(C)DI: 111.7 GRAMS/M^2
BH CV ECHO MEAS - LV MAX PG: 4.6 MMHG
BH CV ECHO MEAS - LV MEAN PG: 2.7 MMHG
BH CV ECHO MEAS - LV SYSTOLIC VOL/BSA (12-30): 20.3 ML/M^2
BH CV ECHO MEAS - LV V1 MAX: 107.2 CM/SEC
BH CV ECHO MEAS - LV V1 MEAN: 77 CM/SEC
BH CV ECHO MEAS - LV V1 VTI: 23.9 CM
BH CV ECHO MEAS - LVIDD: 5.7 CM
BH CV ECHO MEAS - LVIDS: 3.2 CM
BH CV ECHO MEAS - LVLD AP2: 8.1 CM
BH CV ECHO MEAS - LVLD AP4: 8.6 CM
BH CV ECHO MEAS - LVLS AP2: 6.7 CM
BH CV ECHO MEAS - LVLS AP4: 6.9 CM
BH CV ECHO MEAS - LVOT AREA (M): 4.2 CM^2
BH CV ECHO MEAS - LVOT AREA: 4 CM^2
BH CV ECHO MEAS - LVOT DIAM: 2.3 CM
BH CV ECHO MEAS - LVPWD: 1.1 CM
BH CV ECHO MEAS - MED PEAK E' VEL: 8 CM/SEC
BH CV ECHO MEAS - MV A DUR: 0.11 SEC
BH CV ECHO MEAS - MV A MAX VEL: 79.9 CM/SEC
BH CV ECHO MEAS - MV DEC SLOPE: 405.4 CM/SEC^2
BH CV ECHO MEAS - MV DEC TIME: 0.21 SEC
BH CV ECHO MEAS - MV E MAX VEL: 89.2 CM/SEC
BH CV ECHO MEAS - MV E/A: 1.1
BH CV ECHO MEAS - MV MAX PG: 3.8 MMHG
BH CV ECHO MEAS - MV MEAN PG: 1.9 MMHG
BH CV ECHO MEAS - MV P1/2T MAX VEL: 102.8 CM/SEC
BH CV ECHO MEAS - MV P1/2T: 74.3 MSEC
BH CV ECHO MEAS - MV V2 MAX: 97.9 CM/SEC
BH CV ECHO MEAS - MV V2 MEAN: 64.7 CM/SEC
BH CV ECHO MEAS - MV V2 VTI: 38.9 CM
BH CV ECHO MEAS - MVA P1/2T LCG: 2.1 CM^2
BH CV ECHO MEAS - MVA(P1/2T): 3 CM^2
BH CV ECHO MEAS - MVA(VTI): 2.5 CM^2
BH CV ECHO MEAS - PA MAX PG (FULL): 4.6 MMHG
BH CV ECHO MEAS - PA MAX PG: 7.5 MMHG
BH CV ECHO MEAS - PA V2 MAX: 137.1 CM/SEC
BH CV ECHO MEAS - PULM A REVS DUR: 0.11 SEC
BH CV ECHO MEAS - PULM A REVS VEL: 31.7 CM/SEC
BH CV ECHO MEAS - PULM DIAS VEL: 66.3 CM/SEC
BH CV ECHO MEAS - PULM S/D: 0.68
BH CV ECHO MEAS - PULM SYS VEL: 45 CM/SEC
BH CV ECHO MEAS - PVA(V,A): 3.9 CM^2
BH CV ECHO MEAS - PVA(V,D): 3.9 CM^2
BH CV ECHO MEAS - QP/QS: 1.1
BH CV ECHO MEAS - RV MAX PG: 2.9 MMHG
BH CV ECHO MEAS - RV MEAN PG: 1.5 MMHG
BH CV ECHO MEAS - RV V1 MAX: 85.9 CM/SEC
BH CV ECHO MEAS - RV V1 MEAN: 53.6 CM/SEC
BH CV ECHO MEAS - RV V1 VTI: 16.2 CM
BH CV ECHO MEAS - RVOT AREA: 6.3 CM^2
BH CV ECHO MEAS - RVOT DIAM: 2.8 CM
BH CV ECHO MEAS - SI(AO): 205.2 ML/M^2
BH CV ECHO MEAS - SI(CUBED): 67.6 ML/M^2
BH CV ECHO MEAS - SI(LVOT): 41.4 ML/M^2
BH CV ECHO MEAS - SI(MOD-SP2): 37.9 ML/M^2
BH CV ECHO MEAS - SI(MOD-SP4): 41.8 ML/M^2
BH CV ECHO MEAS - SI(TEICH): 52.7 ML/M^2
BH CV ECHO MEAS - SV(AO): 475.8 ML
BH CV ECHO MEAS - SV(CUBED): 156.8 ML
BH CV ECHO MEAS - SV(LVOT): 95.9 ML
BH CV ECHO MEAS - SV(MOD-SP2): 88 ML
BH CV ECHO MEAS - SV(MOD-SP4): 97 ML
BH CV ECHO MEAS - SV(RVOT): 102.1 ML
BH CV ECHO MEAS - SV(TEICH): 122.3 ML
BH CV ECHO MEAS - TAPSE (>1.6): 2.3 CM2
BH CV ECHO MEAS - TR MAX VEL: 260.6 CM/SEC
BH CV ECHO MEASUREMENTS AVERAGE E/E' RATIO: 9.91
BH CV XLRA - RV BASE: 5.6 CM
BH CV XLRA - TDI S': 19 CM/SEC
LEFT ATRIUM VOLUME INDEX: 37 ML/M2
MAXIMAL PREDICTED HEART RATE: 144 BPM
SINUS: 3 CM
STJ: 3.2 CM
STRESS TARGET HR: 122 BPM

## 2019-09-17 PROCEDURE — 25010000002 PERFLUTREN (DEFINITY) 8.476 MG IN SODIUM CHLORIDE 0.9 % 10 ML INJECTION: Performed by: INTERNAL MEDICINE

## 2019-09-17 PROCEDURE — 93306 TTE W/DOPPLER COMPLETE: CPT | Performed by: INTERNAL MEDICINE

## 2019-09-17 PROCEDURE — 93306 TTE W/DOPPLER COMPLETE: CPT

## 2019-09-17 RX ADMIN — PERFLUTREN 1.5 ML: 6.52 INJECTION, SUSPENSION INTRAVENOUS at 13:46

## 2019-09-18 ENCOUNTER — TELEPHONE (OUTPATIENT)
Dept: CARDIOLOGY | Facility: CLINIC | Age: 74
End: 2019-09-18

## 2019-09-18 NOTE — TELEPHONE ENCOUNTER
I left a message for the patient to call me.  Dr. Lindquist checked an echocardiogram because of a history of atrial fibrillation.  He has moderate AI and has been asymptomatic.  We will just watch this for now.

## 2019-09-23 NOTE — TELEPHONE ENCOUNTER
I finally spoke with the patient and gave him the report of his echocardiogram.  His 24-hour Holter monitor is still pending.  I asked him to call the office by the end of the week if he has not heard from us about the results of the Holter.

## 2019-11-05 RX ORDER — ATENOLOL 100 MG/1
TABLET ORAL
Qty: 180 CAPSULE | Refills: 3 | Status: SHIPPED | OUTPATIENT
Start: 2019-11-05 | End: 2020-06-22 | Stop reason: HOSPADM

## 2020-01-16 ENCOUNTER — OFFICE VISIT (OUTPATIENT)
Dept: ORTHOPEDIC SURGERY | Facility: CLINIC | Age: 75
End: 2020-01-16

## 2020-01-16 VITALS — HEIGHT: 74 IN | BODY MASS INDEX: 29.77 KG/M2 | WEIGHT: 232 LBS

## 2020-01-16 DIAGNOSIS — M25.551 HIP PAIN, RIGHT: Primary | ICD-10-CM

## 2020-01-16 PROCEDURE — 73502 X-RAY EXAM HIP UNI 2-3 VIEWS: CPT | Performed by: ORTHOPAEDIC SURGERY

## 2020-01-16 PROCEDURE — 99204 OFFICE O/P NEW MOD 45 MIN: CPT | Performed by: ORTHOPAEDIC SURGERY

## 2020-01-16 RX ORDER — CEFAZOLIN SODIUM 2 G/100ML
2 INJECTION, SOLUTION INTRAVENOUS ONCE
Status: CANCELLED | OUTPATIENT
Start: 2020-04-10 | End: 2020-01-16

## 2020-01-16 RX ORDER — CITALOPRAM 20 MG/1
20 TABLET ORAL EVERY MORNING
COMMUNITY
Start: 2020-01-15

## 2020-01-16 RX ORDER — PREGABALIN 75 MG/1
150 CAPSULE ORAL ONCE
Status: CANCELLED | OUTPATIENT
Start: 2020-04-10 | End: 2020-01-16

## 2020-01-16 RX ORDER — MELOXICAM 15 MG/1
15 TABLET ORAL ONCE
Status: CANCELLED | OUTPATIENT
Start: 2020-04-10 | End: 2020-01-16

## 2020-01-16 NOTE — PROGRESS NOTES
"Patient: Mark Aguirre Jr.  YOB: 1945 74 y.o. male  Medical Record Number: [id number]    Chief Complaints:   Chief Complaint   Patient presents with   • Right Hip - Establish Care, Pain       History of Present Illness:Mark Aguirre Jr. is a 74 y.o. male who presents with right hip pain he has a stabbing aching pain within the groin and buttock it has worsened progressively over the last 4 months.  Pain now limits his basic activities of daily living.  He takes Pradaxa so he cannot take anti-inflammatories.    Allergies: No Known Allergies    Medications:   Current Outpatient Medications   Medication Sig Dispense Refill   • chlorthalidone (HYGROTON) 25 MG tablet Take 1 tablet by mouth Daily. 90 tablet 3   • metoprolol tartrate (LOPRESSOR) 25 MG tablet TAKE 1/2 TABLET BY MOUTH TWICE DAILY 180 tablet 2   • PRADAXA 150 MG capsu TAKE 1 CAPSULE BY MOUTH TWICE DAILY 180 capsule 3   • valsartan (DIOVAN) 320 MG tablet TK 1 T PO QD .  11   • zolpidem (AMBIEN) 5 MG tablet Take 2.5 mg by mouth at night as needed.     • citalopram (CeleXA) 20 MG tablet        No current facility-administered medications for this visit.          The following portions of the patient's history were reviewed and updated as appropriate: allergies, current medications, past family history, past medical history, past social history, past surgical history and problem list.    Review of Systems:   A 14 point review of systems was performed. All systems negative except pertinent positives/negative listed in HPI above    Physical Exam:   Vitals:    01/16/20 0828   Weight: 105 kg (232 lb)   Height: 188 cm (74\")       General: A and O x 3, ASA, NAD    SCLERA:    Normal    DENTITION:   Normal  Hip:  right    LEG ALIGNMENT:     Neutral        LEG LENGTH DISCREPANCY   :    none    GAIT:     Antalgic    SKIN:     No abnormality    RANGE OF MOTION:     Limited by joint irritability    STRENGTH:     Limited by joint irratibility    DISTAL PULSES:  "   Paplable    DISTAL SENSATION :   Intact    LYMPHATICS:     No   lymphadenopathy    OTHER:          +   Stinchfeld test      -    log roll      -   Tenderness to palpation trochanteric bursa        Radiology:  Xrays 2views (AP bilateral hips, and lateral of the hip) ordered and reviewed for evaluation of hip pain  demonstrating  advanced, end-satge osteoarthritis with bone on bone articluation, periarticular osteophytes, and subchondral cysts. There are no previous films for comparision.    Assessment/Plan: right hip advanced OA.  Continuation of conservative management vs. ARACELY discussed.  The patient wishes to proceed with total hip replacement.  At this point the patient has failed the full gamut of conservative treatment and stating complete understanding of the risks/benefits/ anternatives wishes to proceed with surgical treatment.    Risk and benefits of surgery were reviewed.  Including, but not limited to, blood clots, anesthesia risk, infection, leg length discrepancy, fracture, skin/leg numbness, failure of the implant, need for future surgeries, continued pain, hematoma, need for transfusion, and death, among others.  The patient understands and wishes to proceed.     The spectrum of treatment options were discussed with the patient in detail including both the nonoperative and operative treatment modalities and their respective risks and benefits.  After thorough discussion, the patient has elected to undergo surgical treatment.  The details of the surgical procedure were explained including the location of probable incisions and a description of the likely implants to be used.  Models and diagrams were used as educational resources. The patient understands the likely convalescence after surgery, as well as the rehabilitation required.  We thoroughly discussed the risks, benefits, and alternatives to surgery.  The risks include but are not limited to the risk of infection, joint stiffness, blood clots  (including DVT and/or pulmonary embolus along with the risk of death), neurologic and/or vascular injury, fracture, dislocation, nonunion, malunion, need for further surgery including hardware failure requiring revision, and continued pain.  It was explained that if tissue has been repaired or reconstructed, there is also a chance of failure which may require further management.  Following the completion of the discussion, the patient expressed understanding of this planned course of care, all their questions were answered and consent will be obtained preoperatively.    Operative Plan: Posterior approach Total Hip Replacement an overnight staywith home health rehab          Travis Hamlin MD  1/16/2020

## 2020-01-23 PROBLEM — M25.551 HIP PAIN, RIGHT: Status: ACTIVE | Noted: 2020-01-23

## 2020-03-10 ENCOUNTER — TELEPHONE (OUTPATIENT)
Dept: ORTHOPEDIC SURGERY | Facility: CLINIC | Age: 75
End: 2020-03-10

## 2020-03-10 ENCOUNTER — APPOINTMENT (OUTPATIENT)
Dept: PREADMISSION TESTING | Facility: HOSPITAL | Age: 75
End: 2020-03-10

## 2020-03-10 DIAGNOSIS — M25.551 HIP PAIN, RIGHT: ICD-10-CM

## 2020-03-10 LAB
ANION GAP SERPL CALCULATED.3IONS-SCNC: 12.7 MMOL/L (ref 5–15)
BACTERIA UR QL AUTO: NORMAL /HPF
BILIRUB UR QL STRIP: NEGATIVE
BUN BLD-MCNC: 15 MG/DL (ref 8–23)
BUN/CREAT SERPL: 15.6 (ref 7–25)
CALCIUM SPEC-SCNC: 8.9 MG/DL (ref 8.6–10.5)
CHLORIDE SERPL-SCNC: 100 MMOL/L (ref 98–107)
CLARITY UR: CLEAR
CO2 SERPL-SCNC: 27.3 MMOL/L (ref 22–29)
COLOR UR: YELLOW
CREAT BLD-MCNC: 0.96 MG/DL (ref 0.76–1.27)
DEPRECATED RDW RBC AUTO: 42.3 FL (ref 37–54)
ERYTHROCYTE [DISTWIDTH] IN BLOOD BY AUTOMATED COUNT: 13.1 % (ref 12.3–15.4)
GFR SERPL CREATININE-BSD FRML MDRD: 76 ML/MIN/1.73
GLUCOSE BLD-MCNC: 97 MG/DL (ref 65–99)
GLUCOSE UR STRIP-MCNC: NEGATIVE MG/DL
HCT VFR BLD AUTO: 42.2 % (ref 37.5–51)
HGB BLD-MCNC: 14.3 G/DL (ref 13–17.7)
HGB UR QL STRIP.AUTO: NEGATIVE
HYALINE CASTS UR QL AUTO: NORMAL /LPF
KETONES UR QL STRIP: NEGATIVE
LEUKOCYTE ESTERASE UR QL STRIP.AUTO: ABNORMAL
MCH RBC QN AUTO: 30 PG (ref 26.6–33)
MCHC RBC AUTO-ENTMCNC: 33.9 G/DL (ref 31.5–35.7)
MCV RBC AUTO: 88.5 FL (ref 79–97)
NITRITE UR QL STRIP: NEGATIVE
PH UR STRIP.AUTO: 7 [PH] (ref 5–8)
PLATELET # BLD AUTO: 148 10*3/MM3 (ref 140–450)
PMV BLD AUTO: 10.2 FL (ref 6–12)
POTASSIUM BLD-SCNC: 4.2 MMOL/L (ref 3.5–5.2)
PROT UR QL STRIP: NEGATIVE
RBC # BLD AUTO: 4.77 10*6/MM3 (ref 4.14–5.8)
RBC # UR: NORMAL /HPF
REF LAB TEST METHOD: NORMAL
SODIUM BLD-SCNC: 140 MMOL/L (ref 136–145)
SP GR UR STRIP: 1.02 (ref 1–1.03)
SQUAMOUS #/AREA URNS HPF: NORMAL /HPF
UROBILINOGEN UR QL STRIP: ABNORMAL
WBC NRBC COR # BLD: 7.86 10*3/MM3 (ref 3.4–10.8)
WBC UR QL AUTO: NORMAL /HPF

## 2020-03-10 PROCEDURE — 85027 COMPLETE CBC AUTOMATED: CPT | Performed by: ORTHOPAEDIC SURGERY

## 2020-03-10 PROCEDURE — 81001 URINALYSIS AUTO W/SCOPE: CPT | Performed by: ORTHOPAEDIC SURGERY

## 2020-03-10 PROCEDURE — 93010 ELECTROCARDIOGRAM REPORT: CPT | Performed by: INTERNAL MEDICINE

## 2020-03-10 PROCEDURE — 36415 COLL VENOUS BLD VENIPUNCTURE: CPT

## 2020-03-10 PROCEDURE — 93005 ELECTROCARDIOGRAM TRACING: CPT

## 2020-03-10 PROCEDURE — 80048 BASIC METABOLIC PNL TOTAL CA: CPT | Performed by: ORTHOPAEDIC SURGERY

## 2020-03-10 ASSESSMENT — HOOS JR
HOOS JR SCORE: 9
HOOS JR SCORE: 61.815

## 2020-03-10 NOTE — DISCHARGE INSTRUCTIONS
Take the following medications the morning of surgery:    metoprolol    General Instructions:  • Do not eat solid food after midnight the night before surgery.  • You may drink clear liquids day of surgery but must stop at least one hour before your hospital arrival time.  • It is beneficial for you to have a clear drink that contains carbohydrates the day of surgery.  We suggest a 12 to 20 ounce bottle of Gatorade or Powerade for non-diabetic patients or a 12 to 20 ounce bottle of G2 or Powerade Zero for diabetic patients. (Pediatric patients, are not advised to drink a 12 to 20 ounce carbohydrate drink)    Clear liquids are liquids you can see through.  Nothing red in color.     Plain water                               Sports drinks  Sodas                                   Gelatin (Jell-O)  Fruit juices without pulp such as white grape juice and apple juice  Popsicles that contain no fruit or yogurt  Tea or coffee (no cream or milk added)  Gatorade / Powerade  G2 / Powerade Zero    • Infants may have breast milk up to four hours before surgery.  • Infants drinking formula may drink formula up to six hours before surgery.   • Patients who avoid smoking, chewing tobacco and alcohol for 4 weeks prior to surgery have a reduced risk of post-operative complications.  Quit smoking as many days before surgery as you can.  • Do not smoke, use chewing tobacco or drink alcohol the day of surgery.   • If applicable bring your C-PAP/ BI-PAP machine.  • Bring any papers given to you in the doctor’s office.  • Wear clean comfortable clothes.  • Do not wear contact lenses, false eyelashes or make-up.  Bring a case for your glasses.   • Bring crutches or walker if applicable.  • Remove all piercings.  Leave jewelry and any other valuables at home.  • Hair extensions with metal clips must be removed prior to surgery.  • The Pre-Admission Testing nurse will instruct you to bring medications if unable to obtain an accurate list in  Pre-Admission Testing.        If you were given a blood bank ID arm band remember to bring it with you the day of surgery.    Preventing a Surgical Site Infection:  • For 2 to 3 days before surgery, avoid shaving with a razor because the razor can irritate skin and make it easier to develop an infection.    • Any areas of open skin can increase the risk of a post-operative wound infection by allowing bacteria to enter and travel throughout the body.  Notify your surgeon if you have any skin wounds / rashes even if it is not near the expected surgical site.  The area will need assessed to determine if surgery should be delayed until it is healed.  • The night prior to surgery shower using a fresh bar of anti-bacterial soap (such as Dial) and clean washcloth.  Sleep in a clean bed with clean clothing.  Do not allow pets to sleep with you.  • Shower on the morning of surgery using a fresh bar of anti-bacterial soap (such as Dial) and clean washcloth.  Dry with a clean towel and dress in clean clothing.  • Ask your surgeon if you will be receiving antibiotics prior to surgery.  • Make sure you, your family, and all healthcare providers clean their hands with soap and water or an alcohol based hand  before caring for you or your wound.    Day of surgery:4/10/2020   MD office to tell pt when to arrive for surgery  Your arrival time is approximately two hours before your scheduled surgery time.  Upon arrival, a Pre-op nurse and Anesthesiologist will review your health history, obtain vital signs, and answer questions you may have.  The only belongings needed at this time will be a list of your home medications and if applicable your C-PAP/BI-PAP machine.  If you are staying overnight your family can leave the rest of your belongings in the car and bring them to your room later.  A Pre-op nurse will start an IV and you may receive medication in preparation for surgery, including something to help you relax.  Your  family will be able to see you in the Pre-op area.  Two visitors at a time will be allowed in the Pre-op room.  While you are in surgery your family should notify the waiting room  if they leave the waiting room area and provide a contact phone number.    Please be aware that surgery does come with discomfort.  We want to make every effort to control your discomfort so please discuss any uncontrolled symptoms with your nurse.   Your doctor will most likely have prescribed pain medications.      If you are going home after surgery you will receive individualized written care instructions before being discharged.  A responsible adult must drive you to and from the hospital on the day of your surgery and stay with you for 24 hours.    If you are staying overnight following surgery, you will be transported to your hospital room following the recovery period.  [facility] has all private rooms.    If you have any questions please call Pre-Admission Testing at [phone].  Deductibles and co-payments are collected on the day of service. Please be prepared to pay the required co-pay, deductible or deposit on the day of service as defined by your plan.CHLORHEXIDINE CLOTH INSTRUCTIONS  The morning of surgery follow these instructions using the Chlorhexidine cloths you've been given.  These steps reduce bacteria on the body.  Do not use the cloths near your eyes, ears mouth, genitalia or on open wounds.  Throw the cloths away after use but do not try to flush them down a toilet.      • Open and remove one cloth at a time from the package.    • Leave the cloth unfolded and begin the bathing.  • Massage the skin with the cloths using gentle pressure to remove bacteria.  Do not scrub harshly.   • Follow the steps below with one 2% CHG cloth per area (6 total cloths).  • One cloth for neck, shoulders and chest.  • One cloth for both arms, hands, fingers and underarms (do underarms last).  • One cloth  for the abdomen followed by groin.  • One cloth for right leg and foot including between the toes.  • One cloth for left leg and foot including between the toes.  • The last cloth is to be used for the back of the neck, back and buttocks.    Allow the CHG to air dry 3 minutes on the skin which will give it time to work and decrease the chance of irritation.  The skin may feel sticky until it is dry.  Do not rinse with water or any other liquid or you will lose the beneficial effects of the CHG.  If mild skin irritation occurs, do rinse the skin to remove the CHG.  Report this to the nurse at time of admission.  Do not apply lotions, creams, ointments, deodorants or perfumes after using the clothes. Dress in clean clothes before coming to the hospital.    BACTROBAN NASAL OINTMENT  There are many germs normally in your nose. Bactroban is an ointment that will help reduce these germs. Please follow these instructions for Bactroban use:      __1__The day before surgery in the morning  Date_4/9/2020_______    __2__The day before surgery in the evening              Date_4/9/2020_______    _3___The day of surgery in the morning    Date_4/10/2020_______    **Squirt ½ package of Bactroban Ointment onto a cotton applicator and apply to inside of 1st nostril.  Squirt the remaining Bactroban and apply to the inside of the other nostril.    PERIDEX- ORAL:  Use only if your surgeon has ordered  Use the night before and morning of surgery - Swish, gargle, and spit - do not swallow.

## 2020-03-10 NOTE — TELEPHONE ENCOUNTER
I spoke with the patient, he is attempted to contact Dr. Lindquist about his Pradaxa stop date.  He will try to call them again tomorrow and also go to their office to see if he can get an answer.  If not he will let me know and we can try having AMB call

## 2020-03-11 ENCOUNTER — TELEPHONE (OUTPATIENT)
Dept: CARDIOLOGY | Facility: CLINIC | Age: 75
End: 2020-03-11

## 2020-03-11 NOTE — TELEPHONE ENCOUNTER
The pt left a voice mail on 3/10 wanting to know when he should stop his pradaxa he is having surgery on 4/10/20 the surgeon is dr michael [email or web address]. [email or web address]

## 2020-05-13 ENCOUNTER — PREP FOR SURGERY (OUTPATIENT)
Dept: OTHER | Facility: HOSPITAL | Age: 75
End: 2020-05-13

## 2020-05-26 ENCOUNTER — TELEPHONE (OUTPATIENT)
Dept: ORTHOPEDIC SURGERY | Facility: CLINIC | Age: 75
End: 2020-05-26

## 2020-05-26 ENCOUNTER — PREP FOR SURGERY (OUTPATIENT)
Dept: OTHER | Facility: HOSPITAL | Age: 75
End: 2020-05-26

## 2020-06-03 ENCOUNTER — APPOINTMENT (OUTPATIENT)
Dept: PREADMISSION TESTING | Facility: HOSPITAL | Age: 75
End: 2020-06-03

## 2020-06-03 VITALS
WEIGHT: 236 LBS | TEMPERATURE: 98.4 F | DIASTOLIC BLOOD PRESSURE: 70 MMHG | HEIGHT: 74 IN | SYSTOLIC BLOOD PRESSURE: 122 MMHG | BODY MASS INDEX: 30.29 KG/M2 | RESPIRATION RATE: 16 BRPM | OXYGEN SATURATION: 97 %

## 2020-06-03 LAB
ANION GAP SERPL CALCULATED.3IONS-SCNC: 10 MMOL/L (ref 5–15)
BUN BLD-MCNC: 21 MG/DL (ref 8–23)
BUN/CREAT SERPL: 24.4 (ref 7–25)
CALCIUM SPEC-SCNC: 8.7 MG/DL (ref 8.6–10.5)
CHLORIDE SERPL-SCNC: 101 MMOL/L (ref 98–107)
CO2 SERPL-SCNC: 28 MMOL/L (ref 22–29)
CREAT BLD-MCNC: 0.86 MG/DL (ref 0.76–1.27)
DEPRECATED RDW RBC AUTO: 41.9 FL (ref 37–54)
ERYTHROCYTE [DISTWIDTH] IN BLOOD BY AUTOMATED COUNT: 12.8 % (ref 12.3–15.4)
GFR SERPL CREATININE-BSD FRML MDRD: 86 ML/MIN/1.73
GLUCOSE BLD-MCNC: 91 MG/DL (ref 65–99)
HCT VFR BLD AUTO: 41.3 % (ref 37.5–51)
HGB BLD-MCNC: 14 G/DL (ref 13–17.7)
MCH RBC QN AUTO: 30.2 PG (ref 26.6–33)
MCHC RBC AUTO-ENTMCNC: 33.9 G/DL (ref 31.5–35.7)
MCV RBC AUTO: 89.2 FL (ref 79–97)
PLATELET # BLD AUTO: 153 10*3/MM3 (ref 140–450)
PMV BLD AUTO: 10.2 FL (ref 6–12)
POTASSIUM BLD-SCNC: 3.6 MMOL/L (ref 3.5–5.2)
RBC # BLD AUTO: 4.63 10*6/MM3 (ref 4.14–5.8)
SODIUM BLD-SCNC: 139 MMOL/L (ref 136–145)
WBC NRBC COR # BLD: 6.22 10*3/MM3 (ref 3.4–10.8)

## 2020-06-03 PROCEDURE — U0004 COV-19 TEST NON-CDC HGH THRU: HCPCS | Performed by: NURSE PRACTITIONER

## 2020-06-03 PROCEDURE — 80048 BASIC METABOLIC PNL TOTAL CA: CPT | Performed by: ORTHOPAEDIC SURGERY

## 2020-06-03 PROCEDURE — C9803 HOPD COVID-19 SPEC COLLECT: HCPCS

## 2020-06-03 PROCEDURE — 36415 COLL VENOUS BLD VENIPUNCTURE: CPT

## 2020-06-03 PROCEDURE — 85027 COMPLETE CBC AUTOMATED: CPT | Performed by: ORTHOPAEDIC SURGERY

## 2020-06-03 ASSESSMENT — HOOS JR
HOOS JR SCORE: 52.965
HOOS JR SCORE: 12

## 2020-06-03 NOTE — DISCHARGE INSTRUCTIONS
Take the following medications the morning of surgery: METOPROLOL, CITALOPRAM        General Instructions:  • Do not eat solid food after midnight the night before surgery.  • You may drink clear liquids day of surgery but must stop at least one hour before your hospital arrival time.  • It is beneficial for you to have a clear drink that contains carbohydrates the day of surgery.  We suggest a 12 to 20 ounce bottle of Gatorade or Powerade for non-diabetic patients or a 12 to 20 ounce bottle of G2 or Powerade Zero for diabetic patients.    Clear liquids are liquids you can see through.  Nothing red in color.     Plain water                               Sports drinks  Sodas                                   Gelatin (Jell-O)  Fruit juices without pulp such as white grape juice and apple juice  Popsicles that contain no fruit or yogurt  Tea or coffee (no cream or milk added)  Gatorade / Powerade  G2 / Powerade Zero    • If applicable bring your C-PAP/ BI-PAP machine.  • Bring any papers given to you in the doctor’s office.  • Wear clean comfortable clothes.  • Do not wear contact lenses, false eyelashes or make-up.  Bring a case for your glasses.   • Bring crutches or walker if applicable.  • Remove all piercings.  Leave jewelry and any other valuables at home.  • The Pre-Admission Testing nurse will instruct you to bring medications if unable to obtain an accurate list in Pre-Admission Testing.            Preventing a Surgical Site Infection:  • For 2 to 3 days before surgery, avoid shaving with a razor because the razor can irritate skin and make it easier to develop an infection.    • Any areas of open skin can increase the risk of a post-operative wound infection by allowing bacteria to enter and travel throughout the body.  Notify your surgeon if you have any skin wounds / rashes even if it is not near the expected surgical site.  The area will need assessed to determine if surgery should be delayed until it is  healed.  • The night prior to surgery shower using a fresh bar of anti-bacterial soap (such as Dial) and clean washcloth.  Sleep in a clean bed with clean clothing.  Do not allow pets to sleep with you.  • Shower on the morning of surgery using a fresh bar of anti-bacterial soap (such as Dial) and clean washcloth.  Dry with a clean towel and dress in clean clothing.  • Ask your surgeon if you will be receiving antibiotics prior to surgery.  • Make sure you, your family, and all healthcare providers clean their hands with soap and water or an alcohol based hand  before caring for you or your wound.    Day of surgery: 6/5/2020. MAIN OR. ARRIVAL TIME 8 AM  Your arrival time is approximately two hours before your scheduled surgery time.  Upon arrival, a Pre-op nurse and Anesthesiologist will review your health history, obtain vital signs, and answer questions you may have.  The only belongings needed at this time will be a list of your home medications and if applicable your C-PAP/BI-PAP machine.  If you are staying overnight your family can leave the rest of your belongings in the car and bring them to your room later.  A Pre-op nurse will start an IV and you may receive medication in preparation for surgery, including something to help you relax.  Your family will be able to see you in the Pre-op area.  Two visitors at a time will be allowed in the Pre-op room.  While you are in surgery your family should notify the waiting room  if they leave the waiting room area and provide a contact phone number.    Please be aware that surgery does come with discomfort.  We want to make every effort to control your discomfort so please discuss any uncontrolled symptoms with your nurse.   Your doctor will most likely have prescribed pain medications.          If you are staying overnight following surgery, you will be transported to your hospital room following the recovery period.  [facility]  has all private rooms.    If you have any questions please call Pre-Admission Testing at [phone].  Deductibles and co-payments are collected on the day of service. Please be prepared to pay the required co-pay, deductible or deposit on the day of service as defined by your plan.    CHLORHEXIDINE CLOTH INSTRUCTIONS  The morning of surgery follow these instructions using the Chlorhexidine cloths you've been given.  These steps reduce bacteria on the body.  Do not use the cloths near your eyes, ears mouth, genitalia or on open wounds.  Throw the cloths away after use but do not try to flush them down a toilet.      • Open and remove one cloth at a time from the package.    • Leave the cloth unfolded and begin the bathing.  • Massage the skin with the cloths using gentle pressure to remove bacteria.  Do not scrub harshly.   • Follow the steps below with one 2% CHG cloth per area (6 total cloths).  • One cloth for neck, shoulders and chest.  • One cloth for both arms, hands, fingers and underarms (do underarms last).  • One cloth for the abdomen followed by groin.  • One cloth for right leg and foot including between the toes.  • One cloth for left leg and foot including between the toes.  • The last cloth is to be used for the back of the neck, back and buttocks.    Allow the CHG to air dry 3 minutes on the skin which will give it time to work and decrease the chance of irritation.  The skin may feel sticky until it is dry.  Do not rinse with water or any other liquid or you will lose the beneficial effects of the CHG.  If mild skin irritation occurs, do rinse the skin to remove the CHG.  Report this to the nurse at time of admission.  Do not apply lotions, creams, ointments, deodorants or perfumes after using the clothes. Dress in clean clothes before coming to the hospital.    BACTROBAN NASAL OINTMENT  There are many germs normally in your nose. Bactroban is an ointment that will help reduce these germs. Please  follow these instructions for Bactroban use:      _1___The day before surgery in the morning  Date__6/4______    _2___The day before surgery in the evening              Date__6/4______    _3___The day of surgery in the morning    Date__6/5______    **Squirt ½ package of Bactroban Ointment onto a cotton applicator and apply to inside of 1st nostril.  Squirt the remaining Bactroban and apply to the inside of the other nostril.        Self-Quarantine Prior to Surgery    • Stay at home. Do not leave your home after you have been given the Covid test for your upcoming surgery.   • Wash your hands often with soap and water for at least 20 seconds especially after you have been in a public place, or after blowing your nose, coughing, or sneezing.  • If soap and water are not readily available, use a hand  that contains at least 60% alcohol. Cover all surfaces of your hands and rub them together until they feel dry.  • Avoid touching your eyes, nose, and mouth with unwashed hands.  • Take everyday precautions to keep space between yourself and others (stay 6 feet away, which is about two arm lengths).  Remember that some people without symptoms may be able to spread virus.  • You should stay in a specific room away from others if possible.  Stay at least 6 feet away from others in the home if you cannot have a dedicated room to yourself. Do not have visitors.   • Wear a mask around others in your home if you are unable to stay in a separate room or 6 feet apart. If you are unable to wear a mask, have your family member wear a mask if they must be within 6 feet of you.   • Do not snuggle with your pet. While the CDC says there is no evidence that pets can spread COVID-19 or be infected from humans, it is probably best to avoid “petting, snuggling, being kissed or licked and sharing food (during self-quarantine)”, according to the CDC.   • Do not share anything - Have your own utensils, drinking glass, dishes, towels  and bedding.   • Do not share a bathroom with others, if possible.   • Clean and disinfect frequently touched services daily. This includes tables, doorknobs, light switches, countertops, handles, desks, phones, keyboards, toilets, faucets, and sinks.  • Do not travel prior to surgery.    Call your surgeon immediately if you experience any of the following symptoms:  • Sore Throat      • Shortness of Breath or difficulty breathing  • Cough  • Chills  • Body soreness or muscle pain  • Headache  • Fever  • New loss of taste or smell  • Do not arrive for your surgery ill.  Your procedure will need to be rescheduled to another time.  You will need to call your physician before the day of surgery to avoid any unnecessary exposure to hospital staff as well as other patients.

## 2020-06-04 ENCOUNTER — OFFICE VISIT (OUTPATIENT)
Dept: ORTHOPEDIC SURGERY | Facility: CLINIC | Age: 75
End: 2020-06-04

## 2020-06-04 VITALS — WEIGHT: 235.8 LBS | BODY MASS INDEX: 30.26 KG/M2 | HEIGHT: 74 IN | TEMPERATURE: 98 F

## 2020-06-04 DIAGNOSIS — M25.551 HIP PAIN, RIGHT: Primary | ICD-10-CM

## 2020-06-04 LAB
REF LAB TEST METHOD: NORMAL
SARS-COV-2 RNA RESP QL NAA+PROBE: NOT DETECTED

## 2020-06-04 PROCEDURE — 99024 POSTOP FOLLOW-UP VISIT: CPT | Performed by: NURSE PRACTITIONER

## 2020-06-04 NOTE — H&P (VIEW-ONLY)
Patient: Mark Aguirre Jr.    Date of Admission: 6/5/2020    YOB: 1945    Medical Record Number: [id number]    Admitting Physician: Dr. Travis Hamlin    Reason for Admission: End Stage Right Hip OA    History of Present Illness: 74 y.o. male presents with severe end stage hip osteoarthritis which has not been responsive to the full compliment of conservative measures. Despite conservative attempts, there is still severe, constant activity limiting hip pain. Given the severity of the pain, the patient has elected to proceed with hip replacement.    Allergies: No Known Allergies      Current Medications:  Home Medications:    Current Outpatient Medications on File Prior to Visit   Medication Sig   • CHLORHEXIDINE GLUCONATE CLOTH EX Apply  topically. AS DIRECTED   • chlorthalidone (HYGROTON) 25 MG tablet Take 1 tablet by mouth Daily.   • citalopram (CeleXA) 20 MG tablet Take 20 mg by mouth Every Morning.   • metoprolol tartrate (LOPRESSOR) 25 MG tablet Take 0.5 tablets by mouth 2 (Two) Times a Day.   • mupirocin (BACTROBAN) 2 % nasal ointment 1 application into the nostril(s) as directed by provider 2 (Two) Times a Day. USE AS DIRECTED PREOP    • PRADAXA 150 MG capsu TAKE 1 CAPSULE BY MOUTH TWICE DAILY (Patient taking differently: Take 150 mg by mouth 2 (Two) Times a Day. TO HOLD 3 DAYS PRIOR TO SURGERY PER DR MCCOLLUM)   • valsartan (DIOVAN) 320 MG tablet Take 320 mg by mouth Daily.   • zolpidem (AMBIEN) 5 MG tablet Take 5-10 mg by mouth At Night As Needed for Sleep.     No current facility-administered medications on file prior to visit.      PRN Meds:.    PMH:  Past Medical History:   Diagnosis Date   • Anticoagulated     PRADAXA FOR A FIB TO HELD 3 DAYS PRIOR   • Arthritis     OSTEO. RIGHT HIP   • Atrial flutter (CMS/HCC)    • Bifascicular block    • Cataract     LEFT AND RIGHT   • Hypertension    • Insomnia    • Knee pain    • PAF (paroxysmal atrial fibrillation) (CMS/HCC)    • Right hip pain    • Sleep  "apnea     cpap        PSURGH:  Past Surgical History:   Procedure Laterality Date   • CARDIAC ABLATION     • CARDIAC CATHETERIZATION     • COLONOSCOPY N/A 2/21/2018    Procedure: COLONOSCOPY INTO CECUM WITH COLD BX POLYPECTOMY ;  Surgeon: Ross Stearns MD;  Location: [facility] ENDOSCOPY;  Service:    • HERNIA REPAIR      INGUINAL HERNIA? SIDE       SocialHx:  Social History     Occupational History   • Not on file   Tobacco Use   • Smoking status: Never Smoker   • Smokeless tobacco: Current User     Types: Chew   • Tobacco comment: 2 weekly chew   Substance and Sexual Activity   • Alcohol use: Never     Frequency: Never   • Drug use: Never   • Sexual activity: Not on file      Social History     Social History Narrative   • Not on file       FamHx:  Family History   Problem Relation Age of Onset   • Breast cancer Mother    • Colon cancer Father    • Diabetes Brother    • No Known Problems Maternal Grandmother    • No Known Problems Maternal Grandfather    • No Known Problems Paternal Grandmother    • Stroke Paternal Grandfather    • Heart disease Brother    • Malig Hyperthermia Neg Hx          Review of Systems:   A 14 point review of systems was performed, pertinent positives discussed above, all other systems are negative    Physical Exam: 74 y.o. male  Vital Signs :   Vitals:    06/04/20 1103   Temp: 98 °F (36.7 °C)   Weight: 107 kg (235 lb 12.8 oz)   Height: 186.7 cm (73.5\")   PainSc:   6     General: Alert and Oriented x 3, No acute distress.  Psych: mood and affect appropriate; recent and remote memory intact  Eyes: conjunctiva clear; pupils equally round and reactive, sclera nonicteric  CV: no peripheral edema  Resp: normal respiratory effort  Skin: no rashes or wounds; normal turgor  Musculosketetal; pain with hip range of motion. Positive stinchfeld test. No trochanteric  Tenderness.  Vascular: palpable distal pulses    Labs:    Appointment on 06/03/2020   Component Date Value Ref Range Status   • Glucose " 06/03/2020 91  65 - 99 mg/dL Final   • BUN 06/03/2020 21  8 - 23 mg/dL Final   • Creatinine 06/03/2020 0.86  0.76 - 1.27 mg/dL Final   • Sodium 06/03/2020 139  136 - 145 mmol/L Final   • Potassium 06/03/2020 3.6  3.5 - 5.2 mmol/L Final   • Chloride 06/03/2020 101  98 - 107 mmol/L Final   • CO2 06/03/2020 28.0  22.0 - 29.0 mmol/L Final   • Calcium 06/03/2020 8.7  8.6 - 10.5 mg/dL Final   • eGFR Non African Amer 06/03/2020 86  >60 mL/min/1.73 Final   • BUN/Creatinine Ratio 06/03/2020 24.4  7.0 - 25.0 Final   • Anion Gap 06/03/2020 10.0  5.0 - 15.0 mmol/L Final   • WBC 06/03/2020 6.22  3.40 - 10.80 10*3/mm3 Final   • RBC 06/03/2020 4.63  4.14 - 5.80 10*6/mm3 Final   • Hemoglobin 06/03/2020 14.0  13.0 - 17.7 g/dL Final   • Hematocrit 06/03/2020 41.3  37.5 - 51.0 % Final   • MCV 06/03/2020 89.2  79.0 - 97.0 fL Final   • MCH 06/03/2020 30.2  26.6 - 33.0 pg Final   • MCHC 06/03/2020 33.9  31.5 - 35.7 g/dL Final   • RDW 06/03/2020 12.8  12.3 - 15.4 % Final   • RDW-SD 06/03/2020 41.9  37.0 - 54.0 fl Final   • MPV 06/03/2020 10.2  6.0 - 12.0 fL Final   • Platelets 06/03/2020 153  140 - 450 10*3/mm3 Final       Xrays:  Xrays AP pelvis and a lateral of the Right hip were reviewed demonstrating  End stage hip OA with bone on bone articulation, subchondral cysts and periarticular osteophytes.    Assessment:  End-stage Right hip osteoarthritis. Conservative measures have failed.      Plan:  The plan is to proceed with Right Total Hip Replacement. The patient voiced understanding of the risks, benefits, and alternative forms of treatment that were discussed with Dr Hamlin at the time of scheduling.  Patient is planning on going home with home health next day.  We will resume his Pradaxa postoperatively    Bety Savage, APRN  6/4/2020

## 2020-06-04 NOTE — H&P
Patient: Mark Aguirre Jr.    Date of Admission: 6/5/2020    YOB: 1945    Medical Record Number: [id number]    Admitting Physician: Dr. Travis Hamlin    Reason for Admission: End Stage Right Hip OA    History of Present Illness: 74 y.o. male presents with severe end stage hip osteoarthritis which has not been responsive to the full compliment of conservative measures. Despite conservative attempts, there is still severe, constant activity limiting hip pain. Given the severity of the pain, the patient has elected to proceed with hip replacement.    Allergies: No Known Allergies      Current Medications:  Home Medications:    Current Outpatient Medications on File Prior to Visit   Medication Sig   • CHLORHEXIDINE GLUCONATE CLOTH EX Apply  topically. AS DIRECTED   • chlorthalidone (HYGROTON) 25 MG tablet Take 1 tablet by mouth Daily.   • citalopram (CeleXA) 20 MG tablet Take 20 mg by mouth Every Morning.   • metoprolol tartrate (LOPRESSOR) 25 MG tablet Take 0.5 tablets by mouth 2 (Two) Times a Day.   • mupirocin (BACTROBAN) 2 % nasal ointment 1 application into the nostril(s) as directed by provider 2 (Two) Times a Day. USE AS DIRECTED PREOP    • PRADAXA 150 MG capsu TAKE 1 CAPSULE BY MOUTH TWICE DAILY (Patient taking differently: Take 150 mg by mouth 2 (Two) Times a Day. TO HOLD 3 DAYS PRIOR TO SURGERY PER DR MCCOLLUM)   • valsartan (DIOVAN) 320 MG tablet Take 320 mg by mouth Daily.   • zolpidem (AMBIEN) 5 MG tablet Take 5-10 mg by mouth At Night As Needed for Sleep.     No current facility-administered medications on file prior to visit.      PRN Meds:.    PMH:  Past Medical History:   Diagnosis Date   • Anticoagulated     PRADAXA FOR A FIB TO HELD 3 DAYS PRIOR   • Arthritis     OSTEO. RIGHT HIP   • Atrial flutter (CMS/HCC)    • Bifascicular block    • Cataract     LEFT AND RIGHT   • Hypertension    • Insomnia    • Knee pain    • PAF (paroxysmal atrial fibrillation) (CMS/HCC)    • Right hip pain    • Sleep  "apnea     cpap        PSURGH:  Past Surgical History:   Procedure Laterality Date   • CARDIAC ABLATION     • CARDIAC CATHETERIZATION     • COLONOSCOPY N/A 2/21/2018    Procedure: COLONOSCOPY INTO CECUM WITH COLD BX POLYPECTOMY ;  Surgeon: Ross Stearns MD;  Location: [facility] ENDOSCOPY;  Service:    • HERNIA REPAIR      INGUINAL HERNIA? SIDE       SocialHx:  Social History     Occupational History   • Not on file   Tobacco Use   • Smoking status: Never Smoker   • Smokeless tobacco: Current User     Types: Chew   • Tobacco comment: 2 weekly chew   Substance and Sexual Activity   • Alcohol use: Never     Frequency: Never   • Drug use: Never   • Sexual activity: Not on file      Social History     Social History Narrative   • Not on file       FamHx:  Family History   Problem Relation Age of Onset   • Breast cancer Mother    • Colon cancer Father    • Diabetes Brother    • No Known Problems Maternal Grandmother    • No Known Problems Maternal Grandfather    • No Known Problems Paternal Grandmother    • Stroke Paternal Grandfather    • Heart disease Brother    • Malig Hyperthermia Neg Hx          Review of Systems:   A 14 point review of systems was performed, pertinent positives discussed above, all other systems are negative    Physical Exam: 74 y.o. male  Vital Signs :   Vitals:    06/04/20 1103   Temp: 98 °F (36.7 °C)   Weight: 107 kg (235 lb 12.8 oz)   Height: 186.7 cm (73.5\")   PainSc:   6     General: Alert and Oriented x 3, No acute distress.  Psych: mood and affect appropriate; recent and remote memory intact  Eyes: conjunctiva clear; pupils equally round and reactive, sclera nonicteric  CV: no peripheral edema  Resp: normal respiratory effort  Skin: no rashes or wounds; normal turgor  Musculosketetal; pain with hip range of motion. Positive stinchfeld test. No trochanteric  Tenderness.  Vascular: palpable distal pulses    Labs:    Appointment on 06/03/2020   Component Date Value Ref Range Status   • Glucose " 06/03/2020 91  65 - 99 mg/dL Final   • BUN 06/03/2020 21  8 - 23 mg/dL Final   • Creatinine 06/03/2020 0.86  0.76 - 1.27 mg/dL Final   • Sodium 06/03/2020 139  136 - 145 mmol/L Final   • Potassium 06/03/2020 3.6  3.5 - 5.2 mmol/L Final   • Chloride 06/03/2020 101  98 - 107 mmol/L Final   • CO2 06/03/2020 28.0  22.0 - 29.0 mmol/L Final   • Calcium 06/03/2020 8.7  8.6 - 10.5 mg/dL Final   • eGFR Non African Amer 06/03/2020 86  >60 mL/min/1.73 Final   • BUN/Creatinine Ratio 06/03/2020 24.4  7.0 - 25.0 Final   • Anion Gap 06/03/2020 10.0  5.0 - 15.0 mmol/L Final   • WBC 06/03/2020 6.22  3.40 - 10.80 10*3/mm3 Final   • RBC 06/03/2020 4.63  4.14 - 5.80 10*6/mm3 Final   • Hemoglobin 06/03/2020 14.0  13.0 - 17.7 g/dL Final   • Hematocrit 06/03/2020 41.3  37.5 - 51.0 % Final   • MCV 06/03/2020 89.2  79.0 - 97.0 fL Final   • MCH 06/03/2020 30.2  26.6 - 33.0 pg Final   • MCHC 06/03/2020 33.9  31.5 - 35.7 g/dL Final   • RDW 06/03/2020 12.8  12.3 - 15.4 % Final   • RDW-SD 06/03/2020 41.9  37.0 - 54.0 fl Final   • MPV 06/03/2020 10.2  6.0 - 12.0 fL Final   • Platelets 06/03/2020 153  140 - 450 10*3/mm3 Final       Xrays:  Xrays AP pelvis and a lateral of the Right hip were reviewed demonstrating  End stage hip OA with bone on bone articulation, subchondral cysts and periarticular osteophytes.    Assessment:  End-stage Right hip osteoarthritis. Conservative measures have failed.      Plan:  The plan is to proceed with Right Total Hip Replacement. The patient voiced understanding of the risks, benefits, and alternative forms of treatment that were discussed with Dr Hamlin at the time of scheduling.  Patient is planning on going home with home health next day.  We will resume his Pradaxa postoperatively    Bety Savage, APRN  6/4/2020

## 2020-06-05 ENCOUNTER — HOSPITAL ENCOUNTER (OUTPATIENT)
Facility: HOSPITAL | Age: 75
Discharge: HOME HEALTH | End: 2020-06-06
Attending: ORTHOPAEDIC SURGERY | Admitting: ORTHOPAEDIC SURGERY

## 2020-06-05 ENCOUNTER — ANESTHESIA EVENT (OUTPATIENT)
Dept: PERIOP | Facility: HOSPITAL | Age: 75
End: 2020-06-05

## 2020-06-05 ENCOUNTER — APPOINTMENT (OUTPATIENT)
Dept: GENERAL RADIOLOGY | Facility: HOSPITAL | Age: 75
End: 2020-06-05

## 2020-06-05 ENCOUNTER — ANESTHESIA (OUTPATIENT)
Dept: PERIOP | Facility: HOSPITAL | Age: 75
End: 2020-06-05

## 2020-06-05 DIAGNOSIS — M25.551 HIP PAIN, RIGHT: ICD-10-CM

## 2020-06-05 DIAGNOSIS — M16.11 PRIMARY OSTEOARTHRITIS OF RIGHT HIP: Primary | ICD-10-CM

## 2020-06-05 PROBLEM — M16.9 OA (OSTEOARTHRITIS) OF HIP: Status: ACTIVE | Noted: 2020-06-05

## 2020-06-05 LAB
ABO GROUP BLD: NORMAL
BLD GP AB SCN SERPL QL: NEGATIVE
RH BLD: NEGATIVE
T&S EXPIRATION DATE: NORMAL

## 2020-06-05 PROCEDURE — 25010000003 CEFAZOLIN IN DEXTROSE 2-4 GM/100ML-% SOLUTION: Performed by: NURSE PRACTITIONER

## 2020-06-05 PROCEDURE — C1776 JOINT DEVICE (IMPLANTABLE): HCPCS | Performed by: ORTHOPAEDIC SURGERY

## 2020-06-05 PROCEDURE — 86900 BLOOD TYPING SEROLOGIC ABO: CPT | Performed by: ORTHOPAEDIC SURGERY

## 2020-06-05 PROCEDURE — 25010000002 ONDANSETRON PER 1 MG: Performed by: REGISTERED NURSE

## 2020-06-05 PROCEDURE — 25010000002 VANCOMYCIN 10 G RECONSTITUTED SOLUTION: Performed by: ORTHOPAEDIC SURGERY

## 2020-06-05 PROCEDURE — 27130 TOTAL HIP ARTHROPLASTY: CPT | Performed by: ORTHOPAEDIC SURGERY

## 2020-06-05 PROCEDURE — 63710000001 POLYETHYLENE GLYCOL PACK: Performed by: NURSE PRACTITIONER

## 2020-06-05 PROCEDURE — A9270 NON-COVERED ITEM OR SERVICE: HCPCS | Performed by: NURSE PRACTITIONER

## 2020-06-05 PROCEDURE — G0378 HOSPITAL OBSERVATION PER HR: HCPCS

## 2020-06-05 PROCEDURE — 93005 ELECTROCARDIOGRAM TRACING: CPT | Performed by: REGISTERED NURSE

## 2020-06-05 PROCEDURE — 25010000002 ROPIVACAINE PER 1 MG: Performed by: ORTHOPAEDIC SURGERY

## 2020-06-05 PROCEDURE — 63710000001 MASTISOL LIQUID: Performed by: ORTHOPAEDIC SURGERY

## 2020-06-05 PROCEDURE — 25010000002 MORPHINE (PF) 10 MG/ML SOLUTION 1 ML CARTRIDGE: Performed by: ORTHOPAEDIC SURGERY

## 2020-06-05 PROCEDURE — 25010000002 FENTANYL CITRATE (PF) 100 MCG/2ML SOLUTION: Performed by: REGISTERED NURSE

## 2020-06-05 PROCEDURE — 86901 BLOOD TYPING SEROLOGIC RH(D): CPT | Performed by: ORTHOPAEDIC SURGERY

## 2020-06-05 PROCEDURE — 25010000002 DEXAMETHASONE PER 1 MG: Performed by: REGISTERED NURSE

## 2020-06-05 PROCEDURE — 73501 X-RAY EXAM HIP UNI 1 VIEW: CPT

## 2020-06-05 PROCEDURE — 25010000002 PROMETHAZINE PER 50 MG: Performed by: REGISTERED NURSE

## 2020-06-05 PROCEDURE — 97110 THERAPEUTIC EXERCISES: CPT

## 2020-06-05 PROCEDURE — 25010000002 PROPOFOL 10 MG/ML EMULSION: Performed by: REGISTERED NURSE

## 2020-06-05 PROCEDURE — 25010000002 PHENYLEPHRINE PER 1 ML: Performed by: REGISTERED NURSE

## 2020-06-05 PROCEDURE — 27130 TOTAL HIP ARTHROPLASTY: CPT | Performed by: NURSE PRACTITIONER

## 2020-06-05 PROCEDURE — 25010000002 HYDROMORPHONE PER 4 MG: Performed by: REGISTERED NURSE

## 2020-06-05 PROCEDURE — 25010000003 EPINEPHRINE 30 MG/30ML SOLUTION 30 ML VIAL: Performed by: ORTHOPAEDIC SURGERY

## 2020-06-05 PROCEDURE — 86850 RBC ANTIBODY SCREEN: CPT | Performed by: ORTHOPAEDIC SURGERY

## 2020-06-05 PROCEDURE — 25010000002 KETOROLAC TROMETHAMINE PER 15 MG: Performed by: ORTHOPAEDIC SURGERY

## 2020-06-05 PROCEDURE — 93010 ELECTROCARDIOGRAM REPORT: CPT | Performed by: INTERNAL MEDICINE

## 2020-06-05 PROCEDURE — 25010000002 NEOSTIGMINE PER 0.5 MG: Performed by: REGISTERED NURSE

## 2020-06-05 PROCEDURE — 97161 PT EVAL LOW COMPLEX 20 MIN: CPT

## 2020-06-05 PROCEDURE — 63710000001 HYDROCODONE-ACETAMINOPHEN 7.5-325 MG TABLET: Performed by: NURSE PRACTITIONER

## 2020-06-05 PROCEDURE — 63710000001 VALSARTAN 320 MG TABLET: Performed by: NURSE PRACTITIONER

## 2020-06-05 PROCEDURE — 63710000001 CHLORTHALIDONE 25 MG TABLET: Performed by: NURSE PRACTITIONER

## 2020-06-05 PROCEDURE — 63710000001 MUPIROCIN 2 % OINTMENT: Performed by: NURSE PRACTITIONER

## 2020-06-05 PROCEDURE — A9270 NON-COVERED ITEM OR SERVICE: HCPCS | Performed by: ORTHOPAEDIC SURGERY

## 2020-06-05 PROCEDURE — 63710000001 METOPROLOL TARTRATE 25 MG TABLET: Performed by: NURSE PRACTITIONER

## 2020-06-05 PROCEDURE — 25010000003 CEFAZOLIN IN DEXTROSE 2-4 GM/100ML-% SOLUTION: Performed by: ORTHOPAEDIC SURGERY

## 2020-06-05 DEVICE — REFLECTION SPHERICAL HEAD SCREW 35MM
Type: IMPLANTABLE DEVICE | Site: ACETABULUM | Status: FUNCTIONAL
Brand: REFLECTION

## 2020-06-05 DEVICE — POLARSTEM LATERAL NON-CEMENTED                                    WITH TI/HA 6
Type: IMPLANTABLE DEVICE | Site: HIP | Status: FUNCTIONAL
Brand: POLARSTEM

## 2020-06-05 DEVICE — DEV CONTRL TISS STRATAFIX SYMM PDS PLUS VIL CT-1 60CM: Type: IMPLANTABLE DEVICE | Site: HIP | Status: FUNCTIONAL

## 2020-06-05 DEVICE — IMPLANTABLE DEVICE: Type: IMPLANTABLE DEVICE | Site: ACETABULUM | Status: FUNCTIONAL

## 2020-06-05 DEVICE — DEV CONTRL TISS STRATAFIXSPIRALMNCRYL PLSPS2 REV3/0 45CM: Type: IMPLANTABLE DEVICE | Site: HIP | Status: FUNCTIONAL

## 2020-06-05 DEVICE — R3 3 HOLE ACETABULAR SHELL 62MM
Type: IMPLANTABLE DEVICE | Site: ACETABULUM | Status: FUNCTIONAL
Brand: R3 ACETABULAR

## 2020-06-05 DEVICE — REFLECTION SPHERICAL HEAD SCREW 30MM
Type: IMPLANTABLE DEVICE | Site: ACETABULUM | Status: FUNCTIONAL
Brand: REFLECTION

## 2020-06-05 DEVICE — OXINIUM FEMORAL HEAD 12/14 TAPER                                    36 MM +0
Type: IMPLANTABLE DEVICE | Site: HIP | Status: FUNCTIONAL
Brand: OXINIUM

## 2020-06-05 DEVICE — R3 20 DEGREE XLPE ACETABULAR LINER                                    36MM ID X OD 62MM
Type: IMPLANTABLE DEVICE | Site: ACETABULUM | Status: FUNCTIONAL
Brand: R3

## 2020-06-05 RX ORDER — OXYCODONE AND ACETAMINOPHEN 7.5; 325 MG/1; MG/1
1 TABLET ORAL ONCE AS NEEDED
Status: DISCONTINUED | OUTPATIENT
Start: 2020-06-05 | End: 2020-06-05 | Stop reason: HOSPADM

## 2020-06-05 RX ORDER — SODIUM CHLORIDE 0.9 % (FLUSH) 0.9 %
3-10 SYRINGE (ML) INJECTION AS NEEDED
Status: DISCONTINUED | OUTPATIENT
Start: 2020-06-05 | End: 2020-06-05 | Stop reason: HOSPADM

## 2020-06-05 RX ORDER — MAGNESIUM HYDROXIDE 1200 MG/15ML
LIQUID ORAL AS NEEDED
Status: DISCONTINUED | OUTPATIENT
Start: 2020-06-05 | End: 2020-06-05 | Stop reason: HOSPADM

## 2020-06-05 RX ORDER — PROMETHAZINE HYDROCHLORIDE 25 MG/1
25 SUPPOSITORY RECTAL ONCE AS NEEDED
Status: DISCONTINUED | OUTPATIENT
Start: 2020-06-05 | End: 2020-06-05 | Stop reason: HOSPADM

## 2020-06-05 RX ORDER — MIDAZOLAM HYDROCHLORIDE 1 MG/ML
1 INJECTION INTRAMUSCULAR; INTRAVENOUS
Status: DISCONTINUED | OUTPATIENT
Start: 2020-06-05 | End: 2020-06-05 | Stop reason: HOSPADM

## 2020-06-05 RX ORDER — LIDOCAINE HYDROCHLORIDE 20 MG/ML
INJECTION, SOLUTION INFILTRATION; PERINEURAL AS NEEDED
Status: DISCONTINUED | OUTPATIENT
Start: 2020-06-05 | End: 2020-06-05 | Stop reason: SURG

## 2020-06-05 RX ORDER — ONDANSETRON 4 MG/1
4 TABLET, FILM COATED ORAL EVERY 6 HOURS PRN
Status: DISCONTINUED | OUTPATIENT
Start: 2020-06-05 | End: 2020-06-06 | Stop reason: HOSPADM

## 2020-06-05 RX ORDER — GLYCOPYRROLATE 0.2 MG/ML
INJECTION INTRAMUSCULAR; INTRAVENOUS AS NEEDED
Status: DISCONTINUED | OUTPATIENT
Start: 2020-06-05 | End: 2020-06-05 | Stop reason: SURG

## 2020-06-05 RX ORDER — ONDANSETRON 2 MG/ML
4 INJECTION INTRAMUSCULAR; INTRAVENOUS ONCE AS NEEDED
Status: COMPLETED | OUTPATIENT
Start: 2020-06-05 | End: 2020-06-05

## 2020-06-05 RX ORDER — PREGABALIN 75 MG/1
150 CAPSULE ORAL ONCE
Status: DISCONTINUED | OUTPATIENT
Start: 2020-06-05 | End: 2020-06-05 | Stop reason: HOSPADM

## 2020-06-05 RX ORDER — CEFAZOLIN SODIUM 2 G/100ML
2 INJECTION, SOLUTION INTRAVENOUS ONCE
Status: COMPLETED | OUTPATIENT
Start: 2020-06-05 | End: 2020-06-05

## 2020-06-05 RX ORDER — CHLORTHALIDONE 25 MG/1
25 TABLET ORAL DAILY
Status: DISCONTINUED | OUTPATIENT
Start: 2020-06-05 | End: 2020-06-06 | Stop reason: HOSPADM

## 2020-06-05 RX ORDER — ACETAMINOPHEN 325 MG/1
650 TABLET ORAL ONCE AS NEEDED
Status: DISCONTINUED | OUTPATIENT
Start: 2020-06-05 | End: 2020-06-05 | Stop reason: HOSPADM

## 2020-06-05 RX ORDER — CITALOPRAM 20 MG/1
20 TABLET ORAL EVERY MORNING
Status: DISCONTINUED | OUTPATIENT
Start: 2020-06-06 | End: 2020-06-06 | Stop reason: HOSPADM

## 2020-06-05 RX ORDER — ACETAMINOPHEN 10 MG/ML
1000 INJECTION, SOLUTION INTRAVENOUS ONCE
Status: COMPLETED | OUTPATIENT
Start: 2020-06-05 | End: 2020-06-05

## 2020-06-05 RX ORDER — FENTANYL CITRATE 50 UG/ML
INJECTION, SOLUTION INTRAMUSCULAR; INTRAVENOUS AS NEEDED
Status: DISCONTINUED | OUTPATIENT
Start: 2020-06-05 | End: 2020-06-05 | Stop reason: SURG

## 2020-06-05 RX ORDER — ONDANSETRON 2 MG/ML
INJECTION INTRAMUSCULAR; INTRAVENOUS AS NEEDED
Status: DISCONTINUED | OUTPATIENT
Start: 2020-06-05 | End: 2020-06-05 | Stop reason: SURG

## 2020-06-05 RX ORDER — HYDROCODONE BITARTRATE AND ACETAMINOPHEN 7.5; 325 MG/1; MG/1
1 TABLET ORAL EVERY 4 HOURS PRN
Status: DISCONTINUED | OUTPATIENT
Start: 2020-06-05 | End: 2020-06-06 | Stop reason: HOSPADM

## 2020-06-05 RX ORDER — SODIUM CHLORIDE 0.9 % (FLUSH) 0.9 %
3 SYRINGE (ML) INJECTION EVERY 12 HOURS SCHEDULED
Status: DISCONTINUED | OUTPATIENT
Start: 2020-06-05 | End: 2020-06-05 | Stop reason: HOSPADM

## 2020-06-05 RX ORDER — CEFAZOLIN SODIUM 2 G/100ML
2 INJECTION, SOLUTION INTRAVENOUS EVERY 8 HOURS
Status: COMPLETED | OUTPATIENT
Start: 2020-06-05 | End: 2020-06-06

## 2020-06-05 RX ORDER — PROMETHAZINE HYDROCHLORIDE 25 MG/1
25 TABLET ORAL ONCE AS NEEDED
Status: DISCONTINUED | OUTPATIENT
Start: 2020-06-05 | End: 2020-06-05 | Stop reason: HOSPADM

## 2020-06-05 RX ORDER — PROPOFOL 10 MG/ML
VIAL (ML) INTRAVENOUS AS NEEDED
Status: DISCONTINUED | OUTPATIENT
Start: 2020-06-05 | End: 2020-06-05 | Stop reason: SURG

## 2020-06-05 RX ORDER — ONDANSETRON 2 MG/ML
4 INJECTION INTRAMUSCULAR; INTRAVENOUS EVERY 6 HOURS PRN
Status: DISCONTINUED | OUTPATIENT
Start: 2020-06-05 | End: 2020-06-06 | Stop reason: HOSPADM

## 2020-06-05 RX ORDER — WOUND DRESSING ADHESIVE - LIQUID
LIQUID MISCELLANEOUS AS NEEDED
Status: DISCONTINUED | OUTPATIENT
Start: 2020-06-05 | End: 2020-06-05 | Stop reason: HOSPADM

## 2020-06-05 RX ORDER — DEXAMETHASONE SODIUM PHOSPHATE 10 MG/ML
INJECTION INTRAMUSCULAR; INTRAVENOUS AS NEEDED
Status: DISCONTINUED | OUTPATIENT
Start: 2020-06-05 | End: 2020-06-05 | Stop reason: SURG

## 2020-06-05 RX ORDER — HYDROCODONE BITARTRATE AND ACETAMINOPHEN 7.5; 325 MG/1; MG/1
1 TABLET ORAL ONCE AS NEEDED
Status: DISCONTINUED | OUTPATIENT
Start: 2020-06-05 | End: 2020-06-05 | Stop reason: HOSPADM

## 2020-06-05 RX ORDER — ROCURONIUM BROMIDE 10 MG/ML
INJECTION, SOLUTION INTRAVENOUS AS NEEDED
Status: DISCONTINUED | OUTPATIENT
Start: 2020-06-05 | End: 2020-06-05 | Stop reason: SURG

## 2020-06-05 RX ORDER — EPHEDRINE SULFATE 50 MG/ML
5 INJECTION, SOLUTION INTRAVENOUS ONCE AS NEEDED
Status: DISCONTINUED | OUTPATIENT
Start: 2020-06-05 | End: 2020-06-05 | Stop reason: HOSPADM

## 2020-06-05 RX ORDER — DABIGATRAN ETEXILATE 150 MG/1
150 CAPSULE ORAL 2 TIMES DAILY
Status: DISCONTINUED | OUTPATIENT
Start: 2020-06-05 | End: 2020-06-06 | Stop reason: HOSPADM

## 2020-06-05 RX ORDER — PROMETHAZINE HYDROCHLORIDE 25 MG/ML
12.5 INJECTION, SOLUTION INTRAMUSCULAR; INTRAVENOUS ONCE AS NEEDED
Status: DISCONTINUED | OUTPATIENT
Start: 2020-06-05 | End: 2020-06-05 | Stop reason: HOSPADM

## 2020-06-05 RX ORDER — NALOXONE HCL 0.4 MG/ML
0.2 VIAL (ML) INJECTION AS NEEDED
Status: DISCONTINUED | OUTPATIENT
Start: 2020-06-05 | End: 2020-06-05 | Stop reason: HOSPADM

## 2020-06-05 RX ORDER — HYDROMORPHONE HYDROCHLORIDE 1 MG/ML
0.5 INJECTION, SOLUTION INTRAMUSCULAR; INTRAVENOUS; SUBCUTANEOUS
Status: DISCONTINUED | OUTPATIENT
Start: 2020-06-05 | End: 2020-06-05 | Stop reason: HOSPADM

## 2020-06-05 RX ORDER — MELOXICAM 15 MG/1
15 TABLET ORAL ONCE
Status: DISCONTINUED | OUTPATIENT
Start: 2020-06-05 | End: 2020-06-05 | Stop reason: HOSPADM

## 2020-06-05 RX ORDER — DIPHENHYDRAMINE HCL 25 MG
25 CAPSULE ORAL
Status: DISCONTINUED | OUTPATIENT
Start: 2020-06-05 | End: 2020-06-05 | Stop reason: HOSPADM

## 2020-06-05 RX ORDER — ACETAMINOPHEN 325 MG/1
325 TABLET ORAL EVERY 4 HOURS PRN
Status: DISCONTINUED | OUTPATIENT
Start: 2020-06-05 | End: 2020-06-06 | Stop reason: HOSPADM

## 2020-06-05 RX ORDER — FLUMAZENIL 0.1 MG/ML
0.2 INJECTION INTRAVENOUS AS NEEDED
Status: DISCONTINUED | OUTPATIENT
Start: 2020-06-05 | End: 2020-06-05 | Stop reason: HOSPADM

## 2020-06-05 RX ORDER — LABETALOL HYDROCHLORIDE 5 MG/ML
5 INJECTION, SOLUTION INTRAVENOUS
Status: DISCONTINUED | OUTPATIENT
Start: 2020-06-05 | End: 2020-06-05 | Stop reason: HOSPADM

## 2020-06-05 RX ORDER — HYDRALAZINE HYDROCHLORIDE 20 MG/ML
5 INJECTION INTRAMUSCULAR; INTRAVENOUS
Status: DISCONTINUED | OUTPATIENT
Start: 2020-06-05 | End: 2020-06-05 | Stop reason: HOSPADM

## 2020-06-05 RX ORDER — FAMOTIDINE 10 MG/ML
20 INJECTION, SOLUTION INTRAVENOUS ONCE
Status: DISCONTINUED | OUTPATIENT
Start: 2020-06-05 | End: 2020-06-05 | Stop reason: HOSPADM

## 2020-06-05 RX ORDER — HYDROCODONE BITARTRATE AND ACETAMINOPHEN 7.5; 325 MG/1; MG/1
2 TABLET ORAL EVERY 4 HOURS PRN
Status: DISCONTINUED | OUTPATIENT
Start: 2020-06-05 | End: 2020-06-06 | Stop reason: HOSPADM

## 2020-06-05 RX ORDER — MIDAZOLAM HYDROCHLORIDE 1 MG/ML
2 INJECTION INTRAMUSCULAR; INTRAVENOUS
Status: DISCONTINUED | OUTPATIENT
Start: 2020-06-05 | End: 2020-06-05 | Stop reason: HOSPADM

## 2020-06-05 RX ORDER — DIPHENHYDRAMINE HYDROCHLORIDE 50 MG/ML
12.5 INJECTION INTRAMUSCULAR; INTRAVENOUS
Status: DISCONTINUED | OUTPATIENT
Start: 2020-06-05 | End: 2020-06-05 | Stop reason: HOSPADM

## 2020-06-05 RX ORDER — VALSARTAN 320 MG/1
320 TABLET ORAL DAILY
Status: DISCONTINUED | OUTPATIENT
Start: 2020-06-05 | End: 2020-06-06 | Stop reason: HOSPADM

## 2020-06-05 RX ORDER — SODIUM CHLORIDE, SODIUM LACTATE, POTASSIUM CHLORIDE, CALCIUM CHLORIDE 600; 310; 30; 20 MG/100ML; MG/100ML; MG/100ML; MG/100ML
9 INJECTION, SOLUTION INTRAVENOUS CONTINUOUS
Status: DISCONTINUED | OUTPATIENT
Start: 2020-06-05 | End: 2020-06-05 | Stop reason: HOSPADM

## 2020-06-05 RX ORDER — TRANEXAMIC ACID 100 MG/ML
INJECTION, SOLUTION INTRAVENOUS AS NEEDED
Status: DISCONTINUED | OUTPATIENT
Start: 2020-06-05 | End: 2020-06-05 | Stop reason: SURG

## 2020-06-05 RX ORDER — HYDROMORPHONE HCL 110MG/55ML
PATIENT CONTROLLED ANALGESIA SYRINGE INTRAVENOUS AS NEEDED
Status: DISCONTINUED | OUTPATIENT
Start: 2020-06-05 | End: 2020-06-05 | Stop reason: SURG

## 2020-06-05 RX ORDER — PANTOPRAZOLE SODIUM 40 MG/1
40 TABLET, DELAYED RELEASE ORAL EVERY MORNING
Status: DISCONTINUED | OUTPATIENT
Start: 2020-06-06 | End: 2020-06-06 | Stop reason: HOSPADM

## 2020-06-05 RX ORDER — PROMETHAZINE HYDROCHLORIDE 25 MG/ML
6.25 INJECTION, SOLUTION INTRAMUSCULAR; INTRAVENOUS
Status: DISCONTINUED | OUTPATIENT
Start: 2020-06-05 | End: 2020-06-05 | Stop reason: HOSPADM

## 2020-06-05 RX ORDER — FENTANYL CITRATE 50 UG/ML
50 INJECTION, SOLUTION INTRAMUSCULAR; INTRAVENOUS
Status: DISCONTINUED | OUTPATIENT
Start: 2020-06-05 | End: 2020-06-05 | Stop reason: HOSPADM

## 2020-06-05 RX ADMIN — FENTANYL CITRATE 50 MCG: 50 INJECTION INTRAMUSCULAR; INTRAVENOUS at 10:25

## 2020-06-05 RX ADMIN — GLYCOPYRROLATE 0.5 MG: 0.2 INJECTION INTRAMUSCULAR; INTRAVENOUS at 11:45

## 2020-06-05 RX ADMIN — CHLORTHALIDONE 25 MG: 25 TABLET ORAL at 18:13

## 2020-06-05 RX ADMIN — HYDROMORPHONE HYDROCHLORIDE 0.5 MG: 2 INJECTION, SOLUTION INTRAMUSCULAR; INTRAVENOUS; SUBCUTANEOUS at 10:49

## 2020-06-05 RX ADMIN — ONDANSETRON 4 MG: 2 INJECTION INTRAMUSCULAR; INTRAVENOUS at 12:10

## 2020-06-05 RX ADMIN — VANCOMYCIN HYDROCHLORIDE 1500 MG: 10 INJECTION, POWDER, LYOPHILIZED, FOR SOLUTION INTRAVENOUS at 09:24

## 2020-06-05 RX ADMIN — CEFAZOLIN SODIUM 2 G: 2 INJECTION, SOLUTION INTRAVENOUS at 10:10

## 2020-06-05 RX ADMIN — HYDROCODONE BITARTRATE AND ACETAMINOPHEN 2 TABLET: 7.5; 325 TABLET ORAL at 18:22

## 2020-06-05 RX ADMIN — VALSARTAN 320 MG: 320 TABLET ORAL at 18:13

## 2020-06-05 RX ADMIN — LIDOCAINE HYDROCHLORIDE 100 MG: 20 INJECTION, SOLUTION INFILTRATION; PERINEURAL at 10:13

## 2020-06-05 RX ADMIN — SODIUM CHLORIDE, POTASSIUM CHLORIDE, SODIUM LACTATE AND CALCIUM CHLORIDE 9 ML/HR: 600; 310; 30; 20 INJECTION, SOLUTION INTRAVENOUS at 09:24

## 2020-06-05 RX ADMIN — PROPOFOL 180 MG: 10 INJECTION, EMULSION INTRAVENOUS at 10:13

## 2020-06-05 RX ADMIN — METOPROLOL TARTRATE 12.5 MG: 25 TABLET ORAL at 20:48

## 2020-06-05 RX ADMIN — ONDANSETRON HYDROCHLORIDE 4 MG: 2 SOLUTION INTRAMUSCULAR; INTRAVENOUS at 11:45

## 2020-06-05 RX ADMIN — ROCURONIUM BROMIDE 50 MG: 10 INJECTION, SOLUTION INTRAVENOUS at 10:13

## 2020-06-05 RX ADMIN — TRANEXAMIC ACID 1000 MG: 100 INJECTION, SOLUTION INTRAVENOUS at 10:25

## 2020-06-05 RX ADMIN — ACETAMINOPHEN 1000 MG: 10 INJECTION, SOLUTION INTRAVENOUS at 10:22

## 2020-06-05 RX ADMIN — PHENYLEPHRINE HYDROCHLORIDE 150 MCG: 10 INJECTION INTRAVENOUS at 10:58

## 2020-06-05 RX ADMIN — NEOSTIGMINE METHYLSULFATE 3 MG: 1 INJECTION INTRAMUSCULAR; INTRAVENOUS; SUBCUTANEOUS at 11:45

## 2020-06-05 RX ADMIN — DEXAMETHASONE SODIUM PHOSPHATE 10 MG: 10 INJECTION INTRAMUSCULAR; INTRAVENOUS at 10:30

## 2020-06-05 RX ADMIN — FENTANYL CITRATE 50 MCG: 50 INJECTION INTRAMUSCULAR; INTRAVENOUS at 10:13

## 2020-06-05 RX ADMIN — MUPIROCIN 1 APPLICATION: 20 OINTMENT TOPICAL at 20:48

## 2020-06-05 RX ADMIN — CEFAZOLIN SODIUM 2 G: 2 INJECTION, SOLUTION INTRAVENOUS at 18:14

## 2020-06-05 RX ADMIN — PROMETHAZINE HYDROCHLORIDE 12.5 MG: 25 INJECTION INTRAMUSCULAR; INTRAVENOUS at 12:26

## 2020-06-05 RX ADMIN — POLYETHYLENE GLYCOL 3350 17 G: 17 POWDER, FOR SOLUTION ORAL at 20:48

## 2020-06-05 NOTE — PLAN OF CARE
Problem: Patient Care Overview  Goal: Plan of Care Review  Flowsheets (Taken 6/5/2020 1017)  Plan of Care Reviewed With: patient  Outcome Summary: Pt. presents with typical post op impairments related to THR surgery that include decreased ROM, strength, and overall balance.  Pt. will benefit from skilled inpt. P.T. to address his functional deficits and to assist pt. in regaining his maximum level of independence with functional mobility.   Patient was wearing a face mask during this therapy encounter. Therapist used appropriate personal protective equipment including mask and gloves.  Mask used was standard procedure mask. Appropriate PPE was worn during the entire therapy session. Hand hygiene was completed before and after therapy session. Patient is not in enhanced droplet precautions.

## 2020-06-05 NOTE — ANESTHESIA PREPROCEDURE EVALUATION
Anesthesia Evaluation     no history of anesthetic complications:  NPO Solid Status: > 8 hours  NPO Liquid Status: > 2 hours           Airway   Mallampati: I  Neck ROM: full  no difficulty expected  Dental - normal exam     Pulmonary - normal exam   (+) sleep apnea on CPAP,   (-) COPD, asthmaSmoker: smokeless tobacco only.    PE comment: nonlabored  Cardiovascular - normal exam    Rhythm: regular  Rate: normal    (+) hypertension, dysrhythmias (s/p ablation for a-fib; Bifasicular Block) Atrial Fib, Atrial Flutter,   (-) valvular problems/murmurs, past MI, CAD, angina      Neuro/Psych- negative ROS  (-) seizures, TIA, CVA  GI/Hepatic/Renal/Endo    (+) obesity,     (-) GERD, liver disease, no renal disease, diabetes, no thyroid disorder    Musculoskeletal     (+) arthralgias,   Abdominal    Substance History      OB/GYN          Other   arthritis, blood dyscrasia (on pradaxa--stopped 3days),                     Anesthesia Plan    ASA 3     general     intravenous induction     Anesthetic plan, all risks, benefits, and alternatives have been provided, discussed and informed consent has been obtained with: patient.

## 2020-06-05 NOTE — THERAPY EVALUATION
Patient Name: Mark Aguirre Jr.  : 1945    MRN: [id number]                              Today's Date: 2020       Admit Date: 2020    Visit Dx:     ICD-10-CM ICD-9-CM   1. Primary osteoarthritis of right hip M16.11 715.15   2. Hip pain, right M25.551 719.45     Patient Active Problem List   Diagnosis   • Atrial fibrillation (CMS/HCC)   • Bifascicular block   • STEPHENIE on auto CPAP   • Family history of colon cancer   • History of colon polyps   • Essential hypertension   • Hip pain, right   • OA (osteoarthritis) of hip     Past Medical History:   Diagnosis Date   • Anticoagulated     PRADAXA FOR A FIB TO HELD 3 DAYS PRIOR   • Arthritis     OSTEO. RIGHT HIP   • Atrial flutter (CMS/HCC)    • Bifascicular block    • Cataract     LEFT AND RIGHT   • Hypertension    • Insomnia    • Knee pain    • PAF (paroxysmal atrial fibrillation) (CMS/HCC)    • Right hip pain    • Sleep apnea     cpap     Past Surgical History:   Procedure Laterality Date   • CARDIAC ABLATION     • CARDIAC CATHETERIZATION     • COLONOSCOPY N/A 2018    Procedure: COLONOSCOPY INTO CECUM WITH COLD BX POLYPECTOMY ;  Surgeon: Ross Stearns MD;  Location: [facility] ENDOSCOPY;  Service:    • HERNIA REPAIR      INGUINAL HERNIA? SIDE     General Information     Row Name 20 1626          PT Evaluation Time/Intention    Document Type  evaluation Pt is s/p Right THR  -MS     Mode of Treatment  physical therapy;individual therapy  -MS     Row Name 20 1624          General Information    Patient Profile Reviewed?  yes  -MS     Prior Level of Function  independent:  -MS     Existing Precautions/Restrictions  (S) fall Right THR posterior precautions; Exit alarm   -MS     Barriers to Rehab  none identified  -MS     Row Name 20 1622          Cognitive Assessment/Intervention- PT/OT    Orientation Status (Cognition)  oriented x 3  -MS     Row Name 20 1627          Safety Issues, Functional Mobility    Comment, Safety  Issues/Impairments (Mobility)  Gait belt used for safety.   -MS       User Key  (r) = Recorded By, (t) = Taken By, (c) = Cosigned By    Initials Name Provider Type    Everett Heredia PT Physical Therapist        Mobility     Row Name 06/05/20 1627          Bed Mobility Assessment/Treatment    Bed Mobility Assessment/Treatment  supine-sit;sit-supine  -MS     Supine-Sit [place] (Bed Mobility)  minimum assist (75% patient effort);2 person assist  -MS     Row Name 06/05/20 1627          Sit-Stand Transfer    Sit-Stand [place] (Transfers)  minimum assist (75% patient effort);2 person assist  -MS     Assistive Device (Sit-Stand Transfers)  walker, front-wheeled  -MS     Row Name 06/05/20 1627          Gait/Stairs Assessment/Training    [place] Level (Gait)  contact guard;2 person assist;1 person to manage equipment  -MS     Assistive Device (Gait)  walker, front-wheeled  -MS     Distance in Feet (Gait)  6 feet  -MS     Pattern (Gait)  step-to  -MS     Deviations/Abnormal Patterns (Gait)  miki decreased;antalgic  -MS     Bilateral Gait Deviations  forward flexed posture  -MS     Comment (Gait/Stairs)  Pt. limited by grogginess and fatigue this PM.  Verbal/tactile cues for posture correction and for proper gait sequencing with use of the Rwx.   -MS       User Key  (r) = Recorded By, (t) = Taken By, (c) = Cosigned By    Initials Name Provider Type    Everett Heredia PT Physical Therapist        Obj/Interventions     Row Name 06/05/20 1628          General ROM    GENERAL ROM COMMENTS  BUE/LLE (WFL's)  -MS     Row Name 06/05/20 1628          MMT (Manual Muscle Testing)    General MMT Comments  BUE/LLE (4-/5)  -MS     Row Name 06/05/20 1628          Therapeutic Exercise    Comment (Therapeutic Exercise)  Right THR ther. ex. program x 10 reps completed with assist.   -MS       User Key  (r) = Recorded By, (t) = Taken By, (c) = Cosigned By    Initials Name Provider Type    Everett Heredia, PT  Physical Therapist        Goals/Plan     Row Name 06/05/20 1629          Bed Mobility Goal 1 (PT)    Activity/Assistive Device (Bed Mobility Goal 1, PT)  bed mobility activities, all  -MS     [place] Level/Cues Needed (Bed Mobility Goal 1, PT)  independent  -MS     Time Frame (Bed Mobility Goal 1, PT)  long term goal (LTG);3 days  -MS     Row Name 06/05/20 1629          Transfer Goal 1 (PT)    Activity/Assistive Device (Transfer Goal 1, PT)  transfers, all;walker, rolling  -MS     [place] Level/Cues Needed (Transfer Goal 1, PT)  independent  -MS     Time Frame (Transfer Goal 1, PT)  long term goal (LTG);2 - 3 days  -MS     Row Name 06/05/20 1629          Gait Training Goal 1 (PT)    Activity/Assistive Device (Gait Training Goal 1, PT)  gait (walking locomotion);walker, rolling  -MS     [place] Level (Gait Training Goal 1, PT)  independent  -MS     Distance (Gait Goal 1, PT)  100 feet  -MS     Time Frame (Gait Training Goal 1, PT)  long term goal (LTG);2 - 3 days  -MS     Row Name 06/05/20 1629          Stairs Goal 1 (PT)    Activity/Assistive Device (Stairs Goal 1, PT)  stairs, all skills  -MS     [place] Level/Cues Needed (Stairs Goal 1, PT)  contact guard assist  -MS     Number of Stairs (Stairs Goal 1, PT)  3, with handrail  -MS     Time Frame (Stairs Goal 1, PT)  long term goal (LTG);2 days  -MS       User Key  (r) = Recorded By, (t) = Taken By, (c) = Cosigned By    Initials Name Provider Type    MS Everett Vidal L, PT Physical Therapist        Clinical Impression     Row Name 06/05/20 1628          Pain Assessment    Additional Documentation  Pain Scale: Numbers Pre/Post-Treatment (Group)  -MS     Row Name 06/05/20 1628          Pain Scale: Numbers Pre/Post-Treatment    Pain Scale: Numbers, Pretreatment  5/10  -MS     Pain Scale: Numbers, Post-Treatment  4/10  -MS     Pain Location - Side  Right  -MS     Pain Location  hip  -MS     Pain Intervention(s)  Medication (See  MAR);Elevated;Rest;Repositioned;Cold applied  -MS     Row Name 06/05/20 1628          Plan of Care Review    Plan of Care Reviewed With  patient  -MS     Row Name 06/05/20 1628          Physical Therapy Clinical Impression    Criteria for Skilled Interventions Met (PT Clinical Impression)  treatment indicated  -MS     Rehab Potential (PT Clinical Summary)  good, to achieve stated therapy goals  -MS     Row Name 06/05/20 1628          Positioning and Restraints    Pre-Treatment Position  in bed  -MS     Post Treatment Position  chair  -MS     In Chair  notified nsg;reclined;sitting;call light within reach;encouraged to call for assist;exit alarm on;with family/caregiver All lines intact. Ice pack to Right hip.   -MS       User Key  (r) = Recorded By, (t) = Taken By, (c) = Cosigned By    Initials Name Provider Type    Everett Heredia, PT Physical Therapist        Outcome Measures     Row Name 06/05/20 1631          How much help from another person do you currently need...    Turning from your back to your side while in flat bed without using bedrails?  3  -MS     Moving from lying on back to sitting on the side of a flat bed without bedrails?  2  -MS     Moving to and from a bed to a chair (including a wheelchair)?  3  -MS     Standing up from a chair using your arms (e.g., wheelchair, bedside chair)?  3  -MS     Climbing 3-5 steps with a railing?  2  -MS     To walk in hospital room?  3  -MS     AM-PAC 6 Clicks Score (PT)  16  -MS     Row Name 06/05/20 1631          Functional Assessment    Outcome Measure Options  AM-PAC 6 Clicks Basic Mobility (PT)  -MS       User Key  (r) = Recorded By, (t) = Taken By, (c) = Cosigned By    Initials Name Provider Type    Everett Heredia, PT Physical Therapist        Physical Therapy Education                 Title: PT OT SLP Therapies (Done)     Topic: Physical Therapy (Done)     Point: Mobility training (Done)     Description:   Instruct learner(s) on safety and  technique for assisting patient out of bed, chair or wheelchair.  Instruct in the proper use of assistive devices, such as walker, crutches, cane or brace.              Patient Friendly Description:   It's important to get you on your feet again, but we need to do so in a way that is safe for you. Falling has serious consequences, and your personal safety is the most important thing of all.        When it's time to get out of bed, one of us or a family member will sit next to you on the bed to give you support.     If your doctor or nurse tells you to use a walker, crutches, a cane, or a brace, be sure you use it every time you get out of bed, even if you think you don't need it.    Learning Progress Summary           Patient Acceptance, E,D, VU,NR by MS at 6/5/2020 1631                   Point: Home exercise program (Done)     Description:   Instruct learner(s) on appropriate technique for monitoring, assisting and/or progressing patient with therapeutic exercises and activities.              Learning Progress Summary           Patient Acceptance, E,D, VU,NR by MS at 6/5/2020 1631                   Point: Body mechanics (Done)     Description:   Instruct learner(s) on proper positioning and spine alignment for patient and/or caregiver during mobility tasks and/or exercises.              Learning Progress Summary           Patient Acceptance, E,D, VU,NR by MS at 6/5/2020 1631                   Point: Precautions (Done)     Description:   Instruct learner(s) on prescribed precautions during mobility and gait tasks              Learning Progress Summary           Patient Acceptance, E,D, VU,NR by MS at 6/5/2020 1631                               User Key     Initials Effective Dates Name Provider Type Discipline    MS 04/03/18 -  Everett Vidal, PT Physical Therapist PT              PT Recommendation and Plan  Planned Therapy Interventions (PT Eval): balance training, bed mobility training, gait training, home  exercise program, patient/family education, postural re-education, transfer training, strengthening, ROM (range of motion), stair training  Outcome Summary/Treatment Plan (PT)  Anticipated Equipment Needs at Discharge (PT): (Ordered pt. a Rwx for home use. )  Anticipated Discharge Disposition (PT): home with assist, home with home health  Plan of Care Reviewed With: patient  Outcome Summary: Pt. presents with typical post op impairments related to THR surgery that include decreased ROM, strength, and overall balance.  Pt. will benefit from skilled inpt. P.T. to address his functional deficits and to assist pt. in regaining his maximum level of independence with functional mobility.     Time Calculation:   PT Charges     Row Name 06/05/20 1633             Time Calculation    Start Time  1512  -MS      Stop Time  1530  -MS      Time Calculation (min)  18 min  -MS      PT Received On  06/05/20  -MS      PT - Next Appointment  06/06/20  -MS      PT Goal Re-Cert Due Date  06/07/20  -MS         Time Calculation- PT    Total Timed Code Minutes- PT  15 minute(s)  -MS        User Key  (r) = Recorded By, (t) = Taken By, (c) = Cosigned By    Initials Name Provider Type    Everett Heredia, PT Physical Therapist        Therapy Charges for Today     Code Description Service Date Service Provider Modifiers Qty    82712270874 HC PT EVAL LOW COMPLEXITY 1 6/5/2020 Everett Vidal, PT GP 1    66431121296 HC PT THER PROC EA 15 MIN 6/5/2020 Everett Vidal, PT GP 1    22394292478 HC PT THER SUPP EA 15 MIN 6/5/2020 Everett Vidal, PT GP 1          PT G-Codes  Outcome Measure Options: AM-PAC 6 Clicks Basic Mobility (PT)  AM-PAC 6 Clicks Score (PT): 16    Everett Vidal PT  6/5/2020

## 2020-06-05 NOTE — PROGRESS NOTES
Discharge Planning Assessment  [facility]     Patient Name: Mark Aguirre Jr.  MRN: [id number]  Today's Date: 6/5/2020    Admit Date: 6/5/2020    Discharge Needs Assessment     Row Name 06/05/20 1525       Living Environment    Lives With  spouse    Name(s) of Who Lives With Patient  wife, Alexandra    Current Living Arrangements  home/apartment/condo    Primary Care Provided by  self    Provides Primary Care For  no one    Family Caregiver if Needed  spouse    Family Caregiver Names  wife, Alexandra    Quality of Family Relationships  helpful;involved;supportive       Resource/Environmental Concerns    Resource/Environmental Concerns  none        Discharge Plan     Row Name 06/05/20 1522       Plan    Provided Post Acute Provider List?  N/A    N/A Provider List Comment  would like to use [facility]     Patient/Family in Agreement with Plan  yes    Plan Comments  Attempted to speak with patient, but he was sleeping soundly.  Spoke with wife at bedside.  Facesheet, PCP and pharmacy verified.  Anson lives in a 2 story house with 3 steps to enter, but everything needed is on the main level.  He does not use any DME and is normally IADLS.  Discussed HH with wife and she would like [facility] .  Referral sent via EPIC.  CCP will follow. Gabrielle Venegas RN    Row Name 06/05/20 1513       Plan    Plan  home with [facility]         Destination      Coordination has not been started for this encounter.      Durable Medical Equipment      Coordination has not been started for this encounter.      Dialysis/Infusion      Coordination has not been started for this encounter.      Home Medical Care      Service Provider Request Status Selected Services Address Phone Number Fax Number    [facility] [place] Pending - Request Sent N/A [phone] [address] [phone]      Therapy      Coordination has not been started for this encounter.      Community Resources       Coordination has not been started for this encounter.          Demographic Summary     Row Name 06/05/20 1525       General Information    Admission Type  inpatient    Arrived From  emergency department    Referral Source  admission list    Reason for Consult  discharge planning    Preferred Language  English        Functional Status     Row Name 06/05/20 1525       Functional Status    Usual Activity Tolerance  good    Current Activity Tolerance  moderate       Functional Status, IADL    Medications  independent    Meal Preparation  independent    Housekeeping  independent    Laundry  independent    Shopping  independent       Mental Status    General Appearance WDL  WDL       Mental Status Summary    Recent Changes in Mental Status/Cognitive Functioning  no changes        Psychosocial    No documentation.       Abuse/Neglect    No documentation.       Legal    No documentation.       Substance Abuse    No documentation.       Patient Forms    No documentation.           Gabrielle Venegas RN

## 2020-06-05 NOTE — PROGRESS NOTES
Discharge Planning Assessment  [facility]     Patient Name: Mark Aguirre Jr.  MRN: [id number]  Today's Date: 6/5/2020    Admit Date: 6/5/2020    Discharge Needs Assessment    No documentation.       Discharge Plan     Row Name 06/05/20 1522       Plan    Provided Post Acute Provider List?  N/A    N/A Provider List Comment  would like to use [facility]     Patient/Family in Agreement with Plan  yes    Plan Comments  Attempted to speak with patient, but he was sleeping soundly.  Spoke with wife at bedside.  Facesheet, PCP and pharmacy verified.  Anson lives in a 2 story house with 3 steps to enter, but everything needed is on the main level.  He does not use any DME and is normally IADLS.  Discussed HH with wife and she would like [facility] .  Referral sent via EPIC.  CCP will follow. Gabrielle Venegas RN    Row Name 06/05/20 4153       Plan    Plan  home with [facility]         Destination      Coordination has not been started for this encounter.      Durable Medical Equipment      Coordination has not been started for this encounter.      Dialysis/Infusion      Coordination has not been started for this encounter.      Home Medical Care      Service Provider Request Status Selected Services Address Phone Number Fax Number    [facility] Pending - Request Sent N/A [id number] [address] [phone]      Therapy      Coordination has not been started for this encounter.      Community Resources      Coordination has not been started for this encounter.          Demographic Summary    No documentation.       Functional Status    No documentation.       Psychosocial    No documentation.       Abuse/Neglect    No documentation.       Legal    No documentation.       Substance Abuse    No documentation.       Patient Forms    No documentation.           Gabrielle Venegas RN

## 2020-06-05 NOTE — OP NOTE
Name: Mark Aguirre Jr.  YOB: 1945    DATE OF SURGERY: 6/5/2020    PREOPERATIVE DIAGNOSIS: Right hip end-stage osteoarthritis    POSTOPERATIVE DIAGNOSIS: Right hip end-stage osteoarthritis    PROCEDURE PERFORMED: Right  total hip replacement    SURGEON: Travis Hamlin M.D.    ASSISTANT: LIN ALTAMIRANO    IMPLANTS:  Implant Name Type Inv. Item Serial No.  Lot No. LRB No. Used   SUT CONTRL TISS STRATAFIX SYMM PDS PLUS CORINA CT-1 60CM - [id number] Implant SUT CONTRL TISS STRATAFIX SYMM PDS PLUS CORINA CT-1 60CM  [employer]  DIV OF  AND J [id number] Right 1   SUT CONTRL TISS STRATAFIXSPIRALMNCRYL PLSPS2 REV3/0 45CM - [id number] Implant SUT CONTRL TISS STRATAFIXSPIRALMNCRYL PLSPS2 REV3/0 45CM  [employer]  DIV OF  AND  [id number] Right 1   SHLL ACET R3 3HL STD 62MM - [id number] Implant SHLL ACET R3 3HL STD 62MM  [employer] [id number] Right 1   LINER ACET R3 XLPE 20D [id number] - [id number] Implant LINER ACET R3 XLPE 20D [id number]  [employer] [id number] Right 1   SCRW SPH HD REFLECTION 6.5X30MM - [id number] Implant SCRW SPH HD REFLECTION 6.5X30MM  [employer] [id number] Right 1   SCRW SPH HD REFLECTION 6.5X35MM - [id number] Implant SCRW SPH HD REFLECTION 6.5X35MM  [employer] [id number] Right 1   STEM FEM/HIP POLARSTEM CMTLESS LAT CCD 126D SZ6 - [id number] Implant STEM FEM/HIP POLARSTEM CMTLESS LAT CCD 126D SZ6  [employer] [id number] Right 1   HD FEM OXINIUM TPR 12 14 36MM PLS0 - [id number] Implant HD FEM OXINIUM TPR 12 14 36MM PLS0  [employer] [id number] Right 1       Estimated Blood Loss: 200cc  Specimens : none  Complications: none    DESCRIPTION OF PROCEDURE:    The patient was taken to the operating room and placed in the supine position. A sequential compression device was carefully placed on the non-operative leg. Preoperative antibiotics were administered. Surgical time out was performed. After adequate induction of anesthesia the patient was then transferred onto the  table and  positioned appropriately in the lateral decubitus position. The hip was then prepped and draped in the usual sterile fashion.    A posterior lateral surgical incision was then made.  The gluteus morales fascia was then divided the gluteus morales muscle was then bluntly dissected.  A Charnley self-retaining retractor was then placed.  A posterior capsulotomy was then performed.  The superior limb was divided in line with the piriformis tendon.  The hip was then dislocated.  There were end-stage arthritic findings.  The femoral neck osteotomy was performed according to the level dictated by the template.  The acetabulum was exposed with standard retractors.  The labrum and pulvinar were then excised.  The cup was then medialized with the starting acetabular reamer to the medial wall.  I then progressively reamed up to the appropriate size and 45° of abduction and 20° of anteversion. Line to line reaming was performed. At this point the bone was nicely prepared there was excellent bleeding bone. We then impacted the acetabular component in 45 of abduction and 20 of anteversion the cup was stable.  Per routine we augmented the fixation with 2 screws in the posterior and superior quadrant  The final liner was then placed and it locked in nicely.  At this point we injected the hip with anesthetic cocktail solution.  We then turned our attention to the femur.   Standard retractors were placed to expose the femur.  The box osteotome was used to create a starting point.  The canal finder was then used to sound the canal.  The lateralizing reamer was then used to lateralize into the greater trochanter.  We progressively reamed and broached until the broach was very solid to axial and rotational stresses.  At this point we placed the trial neck and head hip was very stable to flexion and internal rotation as well as extension and external rotation.  The leg lengths were measured to be equal.  We then removed the trial  components,copiously irrigated the hip, and then impacted the final stem.  At this point we then trialed and chose the final head. The head was placed on a clean dry taper.  The leg lengths were again measured to be equal.  At this point the hip was copiously irrigated with pulse lavage and the capsule was then reapproximated with #1 PDS suture, and the remainder of the hip was closed in multiple layers in standard fashion..  There was excellent hemostasis. We placed a one-eighth inch Hemovac drain.  Sterile dressing were applied. At the end of the case, the sponge and needle counts were reported as being correct. There were no known complications. The patient was then transported to the recovery room.      Travis Hamlin M.D.  6/5/2020

## 2020-06-05 NOTE — ANESTHESIA POSTPROCEDURE EVALUATION
"Patient: Mark Aguirre Jr.    Procedure Summary     Date:  06/05/20 Room / Location:  [facility] OR [address] [place] [address] MAIN OR    Anesthesia Start:  1005 Anesthesia Stop:  1206    Procedure:  TOTAL HIP ARTHROPLASTY (Right Hip) Diagnosis:       Hip pain, right      (Hip pain, right [M25.551])    Surgeon:  Travis Hamlin MD Provider:  Kurtis Sanabria MD    Anesthesia Type:  general ASA Status:  3          Anesthesia Type: general    Vitals  Vitals Value Taken Time   /65 6/5/2020  1:00 PM   Temp 36.6 °C (97.8 °F) 6/5/2020 12:03 PM   Pulse 52 6/5/2020  1:13 PM   Resp 12 6/5/2020  1:00 PM   SpO2 100 % 6/5/2020  1:13 PM   Vitals shown include unvalidated device data.        Post Anesthesia Care and Evaluation    Patient location during evaluation: bedside  Patient participation: complete - patient participated  Level of consciousness: awake and alert  Pain score: 0  Pain management: adequate  Airway patency: patent  Anesthetic complications: No anesthetic complications    Cardiovascular status: acceptable  Respiratory status: acceptable  Hydration status: acceptable    Comments: /65   Pulse 53   Temp 36.6 °C (97.8 °F) (Oral)   Resp 12   Ht 188 cm (74\")   Wt 108 kg (238 lb 8.6 oz)   SpO2 99%   BMI 30.63 kg/m²       "

## 2020-06-05 NOTE — PLAN OF CARE
Problem: Patient Care Overview  Goal: Plan of Care Review  Outcome: Ongoing (interventions implemented as appropriate)  Flowsheets (Taken 6/5/2020 8270)  Outcome Summary: PT is POD0 Posterior Right total knee with AVELINA, VSS, afebrile, CPAP at bedside, pain controlled with po pain medication,WBAT, up to chair and ambulating to BR, voiding well, anticipate DC home tomorrow.

## 2020-06-05 NOTE — ANESTHESIA PROCEDURE NOTES
Airway  Urgency: elective    Date/Time: 6/5/2020 10:17 AM  Airway not difficult    General Information and Staff    Patient location during procedure: OR  CRNA: Annette Tatum CRNA    Indications and Patient Condition  Indications for airway management: airway protection    Preoxygenated: yes  MILS maintained throughout  Mask difficulty assessment: 2 - vent by mask + OA or adjuvant +/- NMBA    Final Airway Details  Final airway type: endotracheal airway      Successful airway: ETT  Cuffed: yes   Successful intubation technique: video laryngoscopy  Facilitating devices/methods: cricoid pressure  Endotracheal tube insertion site: oral  Blade: CMAC  Blade size: D  ETT size (mm): 7.5  Cormack-Lehane Classification: grade IIa - partial view of glottis  Placement verified by: chest auscultation and capnometry   Measured from: lips  ETT/EBT  to lips (cm): 22  Number of attempts at approach: 1  Assessment: lips, teeth, and gum same as pre-op and atraumatic intubation    Additional Comments  Atraumatic insertion

## 2020-06-06 VITALS
OXYGEN SATURATION: 98 % | TEMPERATURE: 97.7 F | HEIGHT: 74 IN | RESPIRATION RATE: 16 BRPM | WEIGHT: 238.54 LBS | BODY MASS INDEX: 30.61 KG/M2 | SYSTOLIC BLOOD PRESSURE: 123 MMHG | HEART RATE: 61 BPM | DIASTOLIC BLOOD PRESSURE: 58 MMHG

## 2020-06-06 LAB
HCT VFR BLD AUTO: 35.1 % (ref 37.5–51)
HGB BLD-MCNC: 12 G/DL (ref 13–17.7)

## 2020-06-06 PROCEDURE — 63710000001 POLYETHYLENE GLYCOL PACK: Performed by: NURSE PRACTITIONER

## 2020-06-06 PROCEDURE — 85018 HEMOGLOBIN: CPT | Performed by: NURSE PRACTITIONER

## 2020-06-06 PROCEDURE — A9270 NON-COVERED ITEM OR SERVICE: HCPCS | Performed by: NURSE PRACTITIONER

## 2020-06-06 PROCEDURE — 97116 GAIT TRAINING THERAPY: CPT | Performed by: PHYSICAL THERAPIST

## 2020-06-06 PROCEDURE — 63710000001 METOPROLOL TARTRATE 25 MG TABLET: Performed by: NURSE PRACTITIONER

## 2020-06-06 PROCEDURE — 63710000001 PANTOPRAZOLE 40 MG TABLET DELAYED-RELEASE: Performed by: NURSE PRACTITIONER

## 2020-06-06 PROCEDURE — G0378 HOSPITAL OBSERVATION PER HR: HCPCS

## 2020-06-06 PROCEDURE — 63710000001 HYDROCODONE-ACETAMINOPHEN 7.5-325 MG TABLET: Performed by: NURSE PRACTITIONER

## 2020-06-06 PROCEDURE — 63710000001 VALSARTAN 320 MG TABLET: Performed by: NURSE PRACTITIONER

## 2020-06-06 PROCEDURE — 63710000001 CHLORTHALIDONE 25 MG TABLET: Performed by: NURSE PRACTITIONER

## 2020-06-06 PROCEDURE — 25010000003 CEFAZOLIN IN DEXTROSE 2-4 GM/100ML-% SOLUTION: Performed by: NURSE PRACTITIONER

## 2020-06-06 PROCEDURE — 63710000001 CITALOPRAM 20 MG TABLET: Performed by: NURSE PRACTITIONER

## 2020-06-06 PROCEDURE — 63710000001 MUPIROCIN 2 % OINTMENT: Performed by: NURSE PRACTITIONER

## 2020-06-06 PROCEDURE — 97530 THERAPEUTIC ACTIVITIES: CPT | Performed by: PHYSICAL THERAPIST

## 2020-06-06 PROCEDURE — 85014 HEMATOCRIT: CPT | Performed by: NURSE PRACTITIONER

## 2020-06-06 RX ORDER — PANTOPRAZOLE SODIUM 40 MG/1
40 TABLET, DELAYED RELEASE ORAL EVERY MORNING
Qty: 14 TABLET | Refills: 0 | Status: ON HOLD | OUTPATIENT
Start: 2020-06-07 | End: 2020-06-17

## 2020-06-06 RX ORDER — POLYETHYLENE GLYCOL 3350 17 G/17G
17 POWDER, FOR SOLUTION ORAL 2 TIMES DAILY
Qty: 27 PACKET | Refills: 0 | Status: ON HOLD | OUTPATIENT
Start: 2020-06-06 | End: 2020-06-17

## 2020-06-06 RX ORDER — HYDROCODONE BITARTRATE AND ACETAMINOPHEN 7.5; 325 MG/1; MG/1
TABLET ORAL
Qty: 40 TABLET | Refills: 0 | Status: ON HOLD | OUTPATIENT
Start: 2020-06-06 | End: 2020-06-17

## 2020-06-06 RX ADMIN — PANTOPRAZOLE SODIUM 40 MG: 40 TABLET, DELAYED RELEASE ORAL at 06:35

## 2020-06-06 RX ADMIN — METOPROLOL TARTRATE 12.5 MG: 25 TABLET ORAL at 08:43

## 2020-06-06 RX ADMIN — CEFAZOLIN SODIUM 2 G: 2 INJECTION, SOLUTION INTRAVENOUS at 02:36

## 2020-06-06 RX ADMIN — CITALOPRAM 20 MG: 20 TABLET, FILM COATED ORAL at 06:35

## 2020-06-06 RX ADMIN — MUPIROCIN 1 APPLICATION: 20 OINTMENT TOPICAL at 08:44

## 2020-06-06 RX ADMIN — CHLORTHALIDONE 25 MG: 25 TABLET ORAL at 08:43

## 2020-06-06 RX ADMIN — VALSARTAN 320 MG: 320 TABLET ORAL at 08:43

## 2020-06-06 RX ADMIN — POLYETHYLENE GLYCOL 3350 17 G: 17 POWDER, FOR SOLUTION ORAL at 08:44

## 2020-06-06 RX ADMIN — HYDROCODONE BITARTRATE AND ACETAMINOPHEN 1 TABLET: 7.5; 325 TABLET ORAL at 04:02

## 2020-06-06 NOTE — PLAN OF CARE
Problem: Patient Care Overview  Goal: Plan of Care Review  Outcome: Ongoing (interventions implemented as appropriate)  Flowsheets  Taken 6/6/2020 1182  Progress: improving  Outcome Summary: Pt is POD#1 of Right total hip with AVELINA (indicator light flashing green). VSS. CPAP in room, pain managed with PO meds. Voiding function intact. Ambulates with assist x 1 and walker. Plan is to d/c home today if stable. Will continue to monitor.  Taken 6/5/2020 2048  Plan of Care Reviewed With: patient

## 2020-06-06 NOTE — THERAPY DISCHARGE NOTE
Patient Name: Mark Aguirre Jr.  : 1945    MRN: [id number]                              Today's Date: 2020       Admit Date: 2020    Visit Dx:     ICD-10-CM ICD-9-CM   1. Primary osteoarthritis of right hip M16.11 715.15   2. Hip pain, right M25.551 719.45     Patient Active Problem List   Diagnosis   • Atrial fibrillation (CMS/HCC)   • Bifascicular block   • STEPHENIE on auto CPAP   • Family history of colon cancer   • History of colon polyps   • Essential hypertension   • Hip pain, right   • OA (osteoarthritis) of hip     Past Medical History:   Diagnosis Date   • Anticoagulated     PRADAXA FOR A FIB TO HELD 3 DAYS PRIOR   • Arthritis     OSTEO. RIGHT HIP   • Atrial flutter (CMS/HCC)    • Bifascicular block    • Cataract     LEFT AND RIGHT   • Hypertension    • Insomnia    • Knee pain    • PAF (paroxysmal atrial fibrillation) (CMS/HCC)    • Right hip pain    • Sleep apnea     cpap     Past Surgical History:   Procedure Laterality Date   • CARDIAC ABLATION     • CARDIAC CATHETERIZATION     • COLONOSCOPY N/A 2018    Procedure: COLONOSCOPY INTO CECUM WITH COLD BX POLYPECTOMY ;  Surgeon: Ross Stearns MD;  Location: [facility] ENDOSCOPY;  Service:    • HERNIA REPAIR      INGUINAL HERNIA? SIDE     General Information     Row Name 20 1330          PT Evaluation Time/Intention    Document Type  discharge treatment;therapy note (daily note)  -CF     Mode of Treatment  physical therapy  -CF     Row Name 20 133          General Information    Patient Profile Reviewed?  yes  -CF     Row Name 20 1331          Relationship/Environment    Lives With  spouse  -CF     Row Name 20 9435          Resource/Environmental Concerns    Current Living Arrangements  home/apartment/condo  -CF     Row Name 20 1335          Home Main Entrance    Number of Stairs, Main Entrance  three  -CF     Stair Railings, Main Entrance  railings safe and in good condition  -CF     Row Name 20 2252           Cognitive Assessment/Intervention- PT/OT    Orientation Status (Cognition)  oriented x 4  -CF     Row Name 06/06/20 1335          Safety Issues, Functional Mobility    Impairments Affecting Function (Mobility)  pain;range of motion (ROM);strength;endurance/activity tolerance  -CF       User Key  (r) = Recorded By, (t) = Taken By, (c) = Cosigned By    Initials Name Provider Type    CF John Nash, PT Physical Therapist        Mobility     Row Name 06/06/20 1335          Sit-Stand Transfer    Sit-Stand [place] (Transfers)  conditional independence  -CF     Assistive Device (Sit-Stand Transfers)  walker, front-wheeled  -CF     Row Name 06/06/20 1335          Gait/Stairs Assessment/Training    Gait/Stairs Assessment/Training  gait/ambulation assistive device  -CF     [place] Level (Gait)  conditional independence  -CF     Assistive Device (Gait)  walker, front-wheeled  -CF     Distance in Feet (Gait)  500  -CF     Deviations/Abnormal Patterns (Gait)  right sided deviations  -CF     Right Sided Gait Deviations  weight shift ability decreased;heel strike decreased  -CF     Negotiation (Stairs)  stairs assistive device  -CF     [place] Level (Stairs)  supervision  -CF     Handrail Location (Stairs)  left side (ascending);right side (ascending)  -CF     Number of Steps (Stairs)  4  -CF     Ascending Technique (Stairs)  step-to-step  -CF     Descending Technique (Stairs)  step-to-step  -CF     Row Name 06/06/20 0551          Mobility Assessment/Intervention    Extremity Weight-bearing Status  right lower extremity  -CF     Right Lower Extremity (Weight-bearing Status)  weight-bearing as tolerated (WBAT)  -CF       User Key  (r) = Recorded By, (t) = Taken By, (c) = Cosigned By    Initials Name Provider Type    CF John Nash PT Physical Therapist        Obj/Interventions     Row Name 06/06/20 133          Therapeutic Exercise    Lower Extremity (Therapeutic Exercise)  gluteal sets;quad sets,  right;heel slides, right  -CF     Lower Extremity Range of Motion (Therapeutic Exercise)  hip abduction/adduction, right;ankle dorsiflexion/plantar flexion, right  -CF     Exercise Type (Therapeutic Exercise)  AAROM (active assistive range of motion);AROM (active range of motion)  -     Position (Therapeutic Exercise)  seated  -     Sets/Reps (Therapeutic Exercise)  10  -     Row Name 06/06/20 1336          Static Sitting Balance    Level of [place] (Unsupported Sitting, Static Balance)  independent  -     Row Name 06/06/20 1336          Dynamic Sitting Balance    Level of [place], Reaches Outside Midline (Sitting, Dynamic Balance)  independent  -[facility] Name 06/06/20 1336          Static Standing Balance    Level of [place] (Supported Standing, Static Balance)  conditional independence  -     Assistive Device Utilized (Supported Standing, Static Balance)  walker, rolling  -[facility] Name 06/06/20 1336          Dynamic Standing Balance    Level of [place], Reaches Outside Midline (Standing, Dynamic Balance)  conditional independence  -     Assistive Device Utilized (Supported Standing, Dynamic Balance)  walker, rolling  -[facility] Name 06/06/20 1336          Sensory Assessment/Intervention    Sensory General Assessment  no sensation deficits identified  -       User Key  (r) = Recorded By, (t) = Taken By, (c) = Cosigned By    Initials Name Provider Type     John Nash, PT Physical Therapist        Goals/Plan     [facility] Name 06/06/20 1338          Transfer Goal 1 (PT)    Activity/Assistive Device (Transfer Goal 1, PT)  sit-to-stand/stand-to-sit  -     [place] Level/Cues Needed (Transfer Goal 1, PT)  conditional independence  -CF     Progress/Outcome (Transfer Goal 1, PT)  goal met  -[facility] Name 06/06/20 1338          Gait Training Goal 1 (PT)    Activity/Assistive Device (Gait Training Goal 1, PT)  assistive device use;gait (walking locomotion);walker, rolling   -CF     [place] Level (Gait Training Goal 1, PT)  conditional independence  -CF     Distance (Gait Goal 1, PT)  500  -CF     Progress/Outcome (Gait Training Goal 1, PT)  goal met  -CF     Row Name 06/06/20 8398          Stairs Goal 1 (PT)    Activity/Assistive Device (Stairs Goal 1, PT)  ascending stairs;descending stairs  -CF     [place] Level/Cues Needed (Stairs Goal 1, PT)  supervision required  -CF     Number of Stairs (Stairs Goal 1, PT)  4  -CF     Progress/Outcome (Stairs Goal 1, PT)  goal met  -CF       User Key  (r) = Recorded By, (t) = Taken By, (c) = Cosigned By    Initials Name Provider Type    CF John Nash, PT Physical Therapist        Clinical Impression     Row Name 06/06/20 5707          Pain Assessment    Additional Documentation  Pain Scale: Numbers Pre/Post-Treatment (Group)  -CF     Row Name 06/06/20 1757          Pain Scale: Numbers Pre/Post-Treatment    Pain Scale: Numbers, Pretreatment  4/10  -CF     Pain Scale: Numbers, Post-Treatment  4/10  -CF     Pain Location - Side  Right  -CF     Pain Location  hip  -CF     Pain Intervention(s)  Elevated;Repositioned  -CF     Row Name 06/06/20 9799          Plan of Care Review    Plan of Care Reviewed With  patient;spouse  -CF     Progress  improving  -CF     Outcome Summary  Patient performing all mobility well with minimal to no assist, using rolling walker for safety in gait and tolerated stair climbing with appropriate handrail positioning. Appears safe for discharge home.  -CF     Row Name 06/06/20 6819          Physical Therapy Clinical Impression    Rehab Potential (PT Clinical Summary)  good, to achieve stated therapy goals  -CF     Row Name 06/06/20 1155          Positioning and Restraints    Pre-Treatment Position  sitting in chair/recliner  -CF     Post Treatment Position  chair  -CF     In Chair  sitting;call light within reach;encouraged to call for assist;with family/caregiver  -CF       User Key  (r) = Recorded By, (t)  = Taken By, (c) = Cosigned By    Initials Name Provider Type    CF John Nash, PT Physical Therapist        Outcome Measures     Row Name 06/06/20 1338          How much help from another person do you currently need...    Turning from your back to your side while in flat bed without using bedrails?  3  -CF     Moving from lying on back to sitting on the side of a flat bed without bedrails?  3  -CF     Moving to and from a bed to a chair (including a wheelchair)?  3  -CF     Standing up from a chair using your arms (e.g., wheelchair, bedside chair)?  4  -CF     Climbing 3-5 steps with a railing?  3  -CF     To walk in hospital room?  3  -CF     AM-PAC 6 Clicks Score (PT)  19  -CF     Row Name 06/06/20 8791          Functional Assessment    Outcome Measure Options  AM-PAC 6 Clicks Basic Mobility (PT)  -CF       User Key  (r) = Recorded By, (t) = Taken By, (c) = Cosigned By    Initials Name Provider Type     John Nash, PT Physical Therapist        Physical Therapy Education                 Title: PT OT SLP Therapies (Done)     Topic: Physical Therapy (Done)     Point: Mobility training (Done)     Description:   Instruct learner(s) on safety and technique for assisting patient out of bed, chair or wheelchair.  Instruct in the proper use of assistive devices, such as walker, crutches, cane or brace.              Patient Friendly Description:   It's important to get you on your feet again, but we need to do so in a way that is safe for you. Falling has serious consequences, and your personal safety is the most important thing of all.        When it's time to get out of bed, one of us or a family member will sit next to you on the bed to give you support.     If your doctor or nurse tells you to use a walker, crutches, a cane, or a brace, be sure you use it every time you get out of bed, even if you think you don't need it.    Learning Progress Summary           Patient PRESLEY Ferreira,LEOLA, TRACI,DIMPLE by  at  6/6/2020 1339    Acceptance, E,D, VU,NR by MS at 6/5/2020 1631   Significant Other Eager, E,TB, DU,VU by CF at 6/6/2020 1339                   Point: Home exercise program (Done)     Description:   Instruct learner(s) on appropriate technique for monitoring, assisting and/or progressing patient with therapeutic exercises and activities.              Learning Progress Summary           Patient Eager, E,TB, DU,VU by CF at 6/6/2020 1339    Acceptance, E,D, VU,NR by MS at 6/5/2020 1631   Significant Other Eager, E,TB, DU,VU by CF at 6/6/2020 1339                   Point: Body mechanics (Done)     Description:   Instruct learner(s) on proper positioning and spine alignment for patient and/or caregiver during mobility tasks and/or exercises.              Learning Progress Summary           Patient Eager, E,TB, DU,VU by  at 6/6/2020 1339    Acceptance, E,D, VU,NR by MS at 6/5/2020 1631   Significant Other Eager, E,TB, DU,VU by CF at 6/6/2020 1339                   Point: Precautions (Done)     Description:   Instruct learner(s) on prescribed precautions during mobility and gait tasks              Learning Progress Summary           Patient Eager, E,TB, DU,VU by  at 6/6/2020 1339    Acceptance, E,D, VU,NR by MS at 6/5/2020 1631   Significant Other Eager, E,TB, DU,VU by CF at 6/6/2020 1339                               User Key     Initials Effective Dates Name Provider Type Discipline     12/05/19 -  John Nash, PT Physical Therapist PT    MS 04/03/18 -  Everett Vidal PT Physical Therapist PT              PT Recommendation and Plan     Outcome Summary/Treatment Plan (PT)  Anticipated Discharge Disposition (PT): home with assist, home with home health  Plan of Care Reviewed With: patient, spouse  Progress: improving  Outcome Summary: Patient performing all mobility well with minimal to no assist, using rolling walker for safety in gait and tolerated stair climbing with appropriate handrail positioning.  Appears safe for discharge home.     Time Calculation:   PT Charges     Row Name 06/06/20 1340             Time Calculation    Start Time  1055  -CF      Stop Time  1115  -CF      Time Calculation (min)  20 min  -CF         Time Calculation- PT    Total Timed Code Minutes- PT  20 minute(s)  -CF        User Key  (r) = Recorded By, (t) = Taken By, (c) = Cosigned By    Initials Name Provider Type    CF John Nash, NINA Physical Therapist        Therapy Charges for Today     Code Description Service Date Service Provider Modifiers Qty    57498894380 HC GAIT TRAINING EA 15 MIN 6/6/2020 John Nash, PT GP 1    09107214654 HC PT THERAPEUTIC ACT EA 15 MIN 6/6/2020 John Nash, PT GP 1          PT G-Codes  Outcome Measure Options: AM-PAC 6 Clicks Basic Mobility (PT)  AM-PAC 6 Clicks Score (PT): 19    PT Discharge Summary  Anticipated Discharge Disposition (PT): home with assist, home with home health  Reason for Discharge: All goals achieved, Discharge from facility, Per MD order  Discharge Destination: Home with assist, Home with home health    John Nash, PT  6/6/2020

## 2020-06-06 NOTE — PLAN OF CARE
Patient performed all therapy with minimal to no assist. Requires rolling walker for safe ambulation, used handrails for safe stairs training. Appears safe to return home.

## 2020-06-06 NOTE — DISCHARGE SUMMARY
Patient Name: Mark Aguirre Jr.  Patient YOB: 1945    Date of Admission:  6/5/2020  Date of Discharge:  6/6/2020  Discharge Diagnosis: TOTAL HIP ARTHROPLASTY  Presenting Problem/History of Present Illness: Hip pain, right [M25.551]  OA (osteoarthritis) of hip [M16.9]  Hip pain, right [M25.551]  Admitting Physician: Dr Travis Hamlin  Consults:   Consults     No orders found for last 30 day(s).          DETAILS OF HOSPITAL STAY:  Patient is a 74 y.o. male was admitted to the floor following the above procedure and underwent an uncomplicated hospital stay.  Patient did well with physical therapy and was ambulating well without problems.  On the day of discharge the wound was clean, dry and intact and calf was soft and non tender and Homans sign was negative.  Patient was tolerating  without problems.  Patient will be discharged home.    Condition on Discharge:  Stable    Vital Signs  Temp:  [96 °F (35.6 °C)-98.6 °F (37 °C)] 97.8 °F (36.6 °C)  Heart Rate:  [52-68] 59  Resp:  [12-18] 16  BP: (102-147)/(55-79) 115/61    LABS:   Admission on 06/05/2020   Component Date Value Ref Range Status   • ABO Type 06/05/2020 O   Final   • RH type 06/05/2020 Negative   Final   • Antibody Screen 06/05/2020 Negative   Final   • T&S Expiration Date 06/05/2020 6/8/2020 11:59:59 PM   Final   • Hemoglobin 06/06/2020 12.0* 13.0 - 17.7 g/dL Final   • Hematocrit 06/06/2020 35.1* 37.5 - 51.0 % Final       No results found.        Discharge Medications     Discharge Medications      New Medications      Instructions Start Date   HYDROcodone-acetaminophen 7.5-325 MG per tablet  Commonly known as:  NORCO   1-2 po q 4-6 hr prn pain      pantoprazole 40 MG EC tablet  Commonly known as:  PROTONIX   40 mg, Oral, Every Morning   Start Date:  June 7, 2020     polyethylene glycol 17 g packet  Commonly known as:  MIRALAX   17 g, Oral, 2 Times Daily         Changes to Medications      Instructions Start Date   Pradaxa 150 MG capsu  Generic  drug:  dabigatran etexilate  What changed:    · how much to take  · additional instructions   TAKE 1 CAPSULE BY MOUTH TWICE DAILY         Continue These Medications      Instructions Start Date   chlorthalidone 25 MG tablet  Commonly known as:  HYGROTON   25 mg, Oral, Daily      citalopram 20 MG tablet  Commonly known as:  CeleXA   20 mg, Oral, Every Morning      metoprolol tartrate 25 MG tablet  Commonly known as:  LOPRESSOR   12.5 mg, Oral, 2 Times Daily      valsartan 320 MG tablet  Commonly known as:  DIOVAN   320 mg, Oral, Daily      zolpidem 5 MG tablet  Commonly known as:  AMBIEN   5-10 mg, Oral, Nightly PRN         Stop These Medications    CHLORHEXIDINE GLUCONATE CLOTH EX     mupirocin 2 % nasal ointment  Commonly known as:  BACTROBAN            Activity at Discharge:     Discharge Instructions:   1)  Patient is to continue with physical therapy exercises daily and continue working with the physical therapist as ordered.  2)  Follow NO hip precautions.   3)  Patient may weight bear as tolerated.   4)  Apply ice regularly. You may ice for long periods of time as long as ice is not directly on the skin. Patient instructed on frequent calf pumping exercises.  Patient also instructed on incentive spirometer during hospitalization and encouraged to continue to use at home regularly.   5)  The dressing should be left in place. If waterproof dressing is intact the patient may shower immediately following discharge. If the dressing becomes disloged or saturated it should be changed. Please refer to the AVELINA information sheet if you have any questions about the dressing.  If you have a home health nurse or therapist they can be contacted to assist with dressing change or repair. You may also call the [facility] dressing hotline for questions related to the dressing ([phone]). If there still other problems or questions related to the dressing despite these measures then you can contact Noreen at our office  [phone].   6)  Follow up appointment in 2 weeks - patient to call the office at [phone] to schedule. 7)  Patient will be discharged on Pradaxa to start Sunday.    Complete Discharge Diagnosis:    Patient Active Problem List   Diagnosis   • Atrial fibrillation (CMS/HCC)   • Bifascicular block   • STEPHENIE on auto CPAP   • Family history of colon cancer   • History of colon polyps   • Essential hypertension   • Hip pain, right   • OA (osteoarthritis) of hip           Follow-up Appointments  Future Appointments   Date Time Provider Department Center   7/30/2020  8:30 AM Rian Johns MD [facility] None   9/2/2020 10:20 AM Claus Lindquist MD [facility] None     Additional Instructions for the Follow-ups that You Need to Schedule     Referral to home health   As directed      Face to Face Visit Date:  6/6/2020    Follow-up provider for Plan of Care?:  I will be treating the patient on an ongoing basis.  Please send me the Plan of Care for signature.    Follow-up provider:  ROSY HAMLIN [[id number]]    Reason/Clinical Findings:  post op total hip    Describe mobility limitations that make leaving home difficult:  pain, swelling, decreased strength an mobility, gait abnormality, difficulty ambualting without assistive devices    Nursing/Therapeutic Services Requested:  Physicial Therapy                    Rosy Hamlin MD  06/06/20  08:23

## 2020-06-08 ENCOUNTER — TELEPHONE (OUTPATIENT)
Dept: ORTHOPEDIC SURGERY | Facility: CLINIC | Age: 75
End: 2020-06-08

## 2020-06-08 NOTE — TELEPHONE ENCOUNTER
Call returned to the patient's wife.  His aaron dressing continues to burns.  More than likely he does not have a good seal.  I have given her instructions on the check all the connections to make sure there are good and tight but also have to try taping the outer edge of the dressing to see if that could seal the bandage if it is due to an air leak.  If the battery pack continues to buzz have recommended that they go ahead and just turn it off and remove it.  Have advised her that as long as his dressing remains dry and intact he is to leave that on until he is seen in the office for follow-up.  Have left it open for them to call if they have any other questions or concerns

## 2020-06-08 NOTE — TELEPHONE ENCOUNTER
S/P RIGHT ARACELY BY RBB 6/5/20  Wife called stating the Maria Dolores dressing is buzzing. Please advise.

## 2020-06-15 ENCOUNTER — HOSPITAL ENCOUNTER (INPATIENT)
Facility: HOSPITAL | Age: 75
LOS: 6 days | Discharge: HOME HEALTH | End: 2020-06-22
Attending: EMERGENCY MEDICINE | Admitting: INTERNAL MEDICINE

## 2020-06-15 DIAGNOSIS — D62 ACUTE BLOOD LOSS ANEMIA: ICD-10-CM

## 2020-06-15 DIAGNOSIS — N17.9 ACUTE RENAL FAILURE, UNSPECIFIED ACUTE RENAL FAILURE TYPE (HCC): Primary | ICD-10-CM

## 2020-06-15 DIAGNOSIS — E87.1 HYPONATREMIA: ICD-10-CM

## 2020-06-15 DIAGNOSIS — R53.1 GENERALIZED WEAKNESS: ICD-10-CM

## 2020-06-15 DIAGNOSIS — E87.5 HYPERKALEMIA: ICD-10-CM

## 2020-06-15 LAB
ABO GROUP BLD: NORMAL
ALBUMIN SERPL-MCNC: 3.5 G/DL (ref 3.5–5.2)
ALBUMIN/GLOB SERPL: 1.7 G/DL
ALP SERPL-CCNC: 53 U/L (ref 39–117)
ALT SERPL W P-5'-P-CCNC: 7 U/L (ref 1–41)
ANION GAP SERPL CALCULATED.3IONS-SCNC: 15 MMOL/L (ref 5–15)
APTT PPP: 72.3 SECONDS (ref 22.7–35.4)
AST SERPL-CCNC: 12 U/L (ref 1–40)
BACTERIA UR QL AUTO: ABNORMAL /HPF
BASOPHILS # BLD AUTO: 0.03 10*3/MM3 (ref 0–0.2)
BASOPHILS NFR BLD AUTO: 0.3 % (ref 0–1.5)
BILIRUB SERPL-MCNC: 0.7 MG/DL (ref 0.2–1.2)
BILIRUB UR QL STRIP: NEGATIVE
BLD GP AB SCN SERPL QL: NEGATIVE
BUN BLD-MCNC: 101 MG/DL (ref 8–23)
BUN/CREAT SERPL: 8.3 (ref 7–25)
CALCIUM SPEC-SCNC: 7.9 MG/DL (ref 8.6–10.5)
CHLORIDE SERPL-SCNC: 86 MMOL/L (ref 98–107)
CLARITY UR: CLEAR
CO2 SERPL-SCNC: 22 MMOL/L (ref 22–29)
COLOR UR: YELLOW
CREAT BLD-MCNC: 12.22 MG/DL (ref 0.76–1.27)
DEPRECATED RDW RBC AUTO: 43.3 FL (ref 37–54)
EOSINOPHIL # BLD AUTO: 0.05 10*3/MM3 (ref 0–0.4)
EOSINOPHIL NFR BLD AUTO: 0.5 % (ref 0.3–6.2)
ERYTHROCYTE [DISTWIDTH] IN BLOOD BY AUTOMATED COUNT: 13.5 % (ref 12.3–15.4)
GFR SERPL CREATININE-BSD FRML MDRD: 4 ML/MIN/1.73
GFR SERPL CREATININE-BSD FRML MDRD: ABNORMAL ML/MIN/{1.73_M2}
GLOBULIN UR ELPH-MCNC: 2.1 GM/DL
GLUCOSE BLD-MCNC: 106 MG/DL (ref 65–99)
GLUCOSE UR STRIP-MCNC: NEGATIVE MG/DL
HCT VFR BLD AUTO: 29.8 % (ref 37.5–51)
HGB BLD-MCNC: 10.1 G/DL (ref 13–17.7)
HGB UR QL STRIP.AUTO: NEGATIVE
HYALINE CASTS UR QL AUTO: ABNORMAL /LPF
IMM GRANULOCYTES # BLD AUTO: 0.1 10*3/MM3 (ref 0–0.05)
IMM GRANULOCYTES NFR BLD AUTO: 1 % (ref 0–0.5)
INR PPP: 1.74 (ref 0.9–1.1)
KETONES UR QL STRIP: NEGATIVE
LEUKOCYTE ESTERASE UR QL STRIP.AUTO: ABNORMAL
LYMPHOCYTES # BLD AUTO: 0.68 10*3/MM3 (ref 0.7–3.1)
LYMPHOCYTES NFR BLD AUTO: 6.9 % (ref 19.6–45.3)
MCH RBC QN AUTO: 30 PG (ref 26.6–33)
MCHC RBC AUTO-ENTMCNC: 33.9 G/DL (ref 31.5–35.7)
MCV RBC AUTO: 88.4 FL (ref 79–97)
MONOCYTES # BLD AUTO: 0.87 10*3/MM3 (ref 0.1–0.9)
MONOCYTES NFR BLD AUTO: 8.8 % (ref 5–12)
NEUTROPHILS # BLD AUTO: 8.14 10*3/MM3 (ref 1.7–7)
NEUTROPHILS NFR BLD AUTO: 82.5 % (ref 42.7–76)
NITRITE UR QL STRIP: NEGATIVE
NRBC BLD AUTO-RTO: 0 /100 WBC (ref 0–0.2)
PH UR STRIP.AUTO: 5.5 [PH] (ref 5–8)
PLATELET # BLD AUTO: 236 10*3/MM3 (ref 140–450)
PMV BLD AUTO: 9.4 FL (ref 6–12)
POTASSIUM BLD-SCNC: 6.3 MMOL/L (ref 3.5–5.2)
PROT SERPL-MCNC: 5.6 G/DL (ref 6–8.5)
PROT UR QL STRIP: NEGATIVE
PROTHROMBIN TIME: 20 SECONDS (ref 11.7–14.2)
RBC # BLD AUTO: 3.37 10*6/MM3 (ref 4.14–5.8)
RBC # UR: ABNORMAL /HPF
REF LAB TEST METHOD: ABNORMAL
RH BLD: NEGATIVE
SODIUM BLD-SCNC: 123 MMOL/L (ref 136–145)
SP GR UR STRIP: 1.01 (ref 1–1.03)
SQUAMOUS #/AREA URNS HPF: ABNORMAL /HPF
T&S EXPIRATION DATE: NORMAL
TROPONIN T SERPL-MCNC: <0.01 NG/ML (ref 0–0.03)
UROBILINOGEN UR QL STRIP: ABNORMAL
WBC NRBC COR # BLD: 9.87 10*3/MM3 (ref 3.4–10.8)
WBC UR QL AUTO: ABNORMAL /HPF

## 2020-06-15 PROCEDURE — 85730 THROMBOPLASTIN TIME PARTIAL: CPT | Performed by: EMERGENCY MEDICINE

## 2020-06-15 PROCEDURE — 86901 BLOOD TYPING SEROLOGIC RH(D): CPT | Performed by: EMERGENCY MEDICINE

## 2020-06-15 PROCEDURE — 93010 ELECTROCARDIOGRAM REPORT: CPT | Performed by: INTERNAL MEDICINE

## 2020-06-15 PROCEDURE — 86900 BLOOD TYPING SEROLOGIC ABO: CPT | Performed by: EMERGENCY MEDICINE

## 2020-06-15 PROCEDURE — 85610 PROTHROMBIN TIME: CPT | Performed by: EMERGENCY MEDICINE

## 2020-06-15 PROCEDURE — 81001 URINALYSIS AUTO W/SCOPE: CPT | Performed by: EMERGENCY MEDICINE

## 2020-06-15 PROCEDURE — 85025 COMPLETE CBC W/AUTO DIFF WBC: CPT | Performed by: EMERGENCY MEDICINE

## 2020-06-15 PROCEDURE — 99285 EMERGENCY DEPT VISIT HI MDM: CPT

## 2020-06-15 PROCEDURE — 94799 UNLISTED PULMONARY SVC/PX: CPT

## 2020-06-15 PROCEDURE — 84484 ASSAY OF TROPONIN QUANT: CPT | Performed by: EMERGENCY MEDICINE

## 2020-06-15 PROCEDURE — 86850 RBC ANTIBODY SCREEN: CPT | Performed by: EMERGENCY MEDICINE

## 2020-06-15 PROCEDURE — 94640 AIRWAY INHALATION TREATMENT: CPT

## 2020-06-15 PROCEDURE — 80053 COMPREHEN METABOLIC PANEL: CPT | Performed by: EMERGENCY MEDICINE

## 2020-06-15 PROCEDURE — 93005 ELECTROCARDIOGRAM TRACING: CPT | Performed by: EMERGENCY MEDICINE

## 2020-06-15 PROCEDURE — 51702 INSERT TEMP BLADDER CATH: CPT

## 2020-06-15 PROCEDURE — 63710000001 INSULIN REGULAR HUMAN PER 5 UNITS: Performed by: EMERGENCY MEDICINE

## 2020-06-15 RX ORDER — SODIUM POLYSTYRENE SULFONATE 15 G/60ML
15 SUSPENSION ORAL; RECTAL ONCE
Status: COMPLETED | OUTPATIENT
Start: 2020-06-15 | End: 2020-06-15

## 2020-06-15 RX ORDER — SODIUM CHLORIDE 0.9 % (FLUSH) 0.9 %
10 SYRINGE (ML) INJECTION AS NEEDED
Status: DISCONTINUED | OUTPATIENT
Start: 2020-06-15 | End: 2020-06-22 | Stop reason: HOSPADM

## 2020-06-15 RX ORDER — DEXTROSE MONOHYDRATE 25 G/50ML
25 INJECTION, SOLUTION INTRAVENOUS ONCE
Status: COMPLETED | OUTPATIENT
Start: 2020-06-15 | End: 2020-06-15

## 2020-06-15 RX ADMIN — INSULIN HUMAN 10 UNITS: 100 INJECTION, SOLUTION PARENTERAL at 21:40

## 2020-06-15 RX ADMIN — DEXTROSE MONOHYDRATE 25 G: 25 INJECTION, SOLUTION INTRAVENOUS at 21:38

## 2020-06-15 RX ADMIN — ALBUTEROL SULFATE 5 MG: 2.5 SOLUTION RESPIRATORY (INHALATION) at 22:09

## 2020-06-15 RX ADMIN — SODIUM POLYSTYRENE SULFONATE 15 G: 15 SUSPENSION ORAL; RECTAL at 21:41

## 2020-06-15 RX ADMIN — SODIUM CHLORIDE 1000 ML: 9 INJECTION, SOLUTION INTRAVENOUS at 21:41

## 2020-06-16 ENCOUNTER — APPOINTMENT (OUTPATIENT)
Dept: ULTRASOUND IMAGING | Facility: HOSPITAL | Age: 75
End: 2020-06-16

## 2020-06-16 LAB
ANION GAP SERPL CALCULATED.3IONS-SCNC: 14.2 MMOL/L (ref 5–15)
BASOPHILS # BLD MANUAL: 0.08 10*3/MM3 (ref 0–0.2)
BASOPHILS NFR BLD AUTO: 1 % (ref 0–1.5)
BUN BLD-MCNC: 77 MG/DL (ref 8–23)
BUN/CREAT SERPL: 10.8 (ref 7–25)
CALCIUM SPEC-SCNC: 8.7 MG/DL (ref 8.6–10.5)
CHLORIDE SERPL-SCNC: 98 MMOL/L (ref 98–107)
CO2 SERPL-SCNC: 23.8 MMOL/L (ref 22–29)
CREAT BLD-MCNC: 7.1 MG/DL (ref 0.76–1.27)
CREAT UR-MCNC: 54 MG/DL
DEPRECATED RDW RBC AUTO: 46.3 FL (ref 37–54)
EOSINOPHIL SPEC QL MICRO: 0 % EOS/100 CELLS (ref 0–0)
ERYTHROCYTE [DISTWIDTH] IN BLOOD BY AUTOMATED COUNT: 13.7 % (ref 12.3–15.4)
GFR SERPL CREATININE-BSD FRML MDRD: 8 ML/MIN/1.73
GFR SERPL CREATININE-BSD FRML MDRD: ABNORMAL ML/MIN/{1.73_M2}
GLUCOSE BLD-MCNC: 113 MG/DL (ref 65–99)
GLUCOSE BLDC GLUCOMTR-MCNC: 108 MG/DL (ref 70–130)
HCT VFR BLD AUTO: 31.1 % (ref 37.5–51)
HGB BLD-MCNC: 10.4 G/DL (ref 13–17.7)
LYMPHOCYTES # BLD MANUAL: 0.4 10*3/MM3 (ref 0.7–3.1)
LYMPHOCYTES NFR BLD MANUAL: 4.1 % (ref 5–12)
LYMPHOCYTES NFR BLD MANUAL: 5.2 % (ref 19.6–45.3)
MAGNESIUM SERPL-MCNC: 2.7 MG/DL (ref 1.6–2.4)
MCH RBC QN AUTO: 30.3 PG (ref 26.6–33)
MCHC RBC AUTO-ENTMCNC: 33.4 G/DL (ref 31.5–35.7)
MCV RBC AUTO: 90.7 FL (ref 79–97)
MONOCYTES # BLD AUTO: 0.31 10*3/MM3 (ref 0.1–0.9)
NEUTROPHILS # BLD AUTO: 6.82 10*3/MM3 (ref 1.7–7)
NEUTROPHILS NFR BLD MANUAL: 89.7 % (ref 42.7–76)
OSMOLALITY UR: 265 MOSM/KG
PHOSPHATE SERPL-MCNC: 5.6 MG/DL (ref 2.5–4.5)
PLAT MORPH BLD: NORMAL
PLATELET # BLD AUTO: 232 10*3/MM3 (ref 140–450)
PMV BLD AUTO: 9.4 FL (ref 6–12)
POTASSIUM BLD-SCNC: 4.7 MMOL/L (ref 3.5–5.2)
PROT UR-MCNC: 5 MG/DL
RBC # BLD AUTO: 3.43 10*6/MM3 (ref 4.14–5.8)
RBC MORPH BLD: NORMAL
SODIUM BLD-SCNC: 136 MMOL/L (ref 136–145)
SODIUM UR-SCNC: 45 MMOL/L
URATE SERPL-MCNC: 10.4 MG/DL (ref 3.4–7)
WBC MORPH BLD: NORMAL
WBC NRBC COR # BLD: 7.6 10*3/MM3 (ref 3.4–10.8)

## 2020-06-16 PROCEDURE — 85007 BL SMEAR W/DIFF WBC COUNT: CPT | Performed by: INTERNAL MEDICINE

## 2020-06-16 PROCEDURE — 97161 PT EVAL LOW COMPLEX 20 MIN: CPT

## 2020-06-16 PROCEDURE — 85027 COMPLETE CBC AUTOMATED: CPT | Performed by: INTERNAL MEDICINE

## 2020-06-16 PROCEDURE — 84300 ASSAY OF URINE SODIUM: CPT | Performed by: INTERNAL MEDICINE

## 2020-06-16 PROCEDURE — 80048 BASIC METABOLIC PNL TOTAL CA: CPT | Performed by: INTERNAL MEDICINE

## 2020-06-16 PROCEDURE — 82962 GLUCOSE BLOOD TEST: CPT

## 2020-06-16 PROCEDURE — 83735 ASSAY OF MAGNESIUM: CPT | Performed by: INTERNAL MEDICINE

## 2020-06-16 PROCEDURE — 25010000002 ENOXAPARIN PER 10 MG: Performed by: INTERNAL MEDICINE

## 2020-06-16 PROCEDURE — 83935 ASSAY OF URINE OSMOLALITY: CPT | Performed by: INTERNAL MEDICINE

## 2020-06-16 PROCEDURE — 82570 ASSAY OF URINE CREATININE: CPT | Performed by: INTERNAL MEDICINE

## 2020-06-16 PROCEDURE — 76775 US EXAM ABDO BACK WALL LIM: CPT

## 2020-06-16 PROCEDURE — 84156 ASSAY OF PROTEIN URINE: CPT | Performed by: INTERNAL MEDICINE

## 2020-06-16 PROCEDURE — 84100 ASSAY OF PHOSPHORUS: CPT | Performed by: INTERNAL MEDICINE

## 2020-06-16 PROCEDURE — 87205 SMEAR GRAM STAIN: CPT | Performed by: INTERNAL MEDICINE

## 2020-06-16 PROCEDURE — 97110 THERAPEUTIC EXERCISES: CPT

## 2020-06-16 PROCEDURE — 84550 ASSAY OF BLOOD/URIC ACID: CPT | Performed by: INTERNAL MEDICINE

## 2020-06-16 RX ORDER — ONDANSETRON 2 MG/ML
4 INJECTION INTRAMUSCULAR; INTRAVENOUS EVERY 6 HOURS PRN
Status: DISCONTINUED | OUTPATIENT
Start: 2020-06-16 | End: 2020-06-22 | Stop reason: HOSPADM

## 2020-06-16 RX ORDER — SODIUM CHLORIDE 0.9 % (FLUSH) 0.9 %
10 SYRINGE (ML) INJECTION EVERY 12 HOURS SCHEDULED
Status: DISCONTINUED | OUTPATIENT
Start: 2020-06-16 | End: 2020-06-22 | Stop reason: HOSPADM

## 2020-06-16 RX ORDER — SODIUM CHLORIDE 0.9 % (FLUSH) 0.9 %
10 SYRINGE (ML) INJECTION AS NEEDED
Status: DISCONTINUED | OUTPATIENT
Start: 2020-06-16 | End: 2020-06-22 | Stop reason: HOSPADM

## 2020-06-16 RX ORDER — PANTOPRAZOLE SODIUM 40 MG/1
40 TABLET, DELAYED RELEASE ORAL EVERY MORNING
Status: DISCONTINUED | OUTPATIENT
Start: 2020-06-16 | End: 2020-06-22 | Stop reason: HOSPADM

## 2020-06-16 RX ORDER — DEXTROSE AND SODIUM CHLORIDE 5; .45 G/100ML; G/100ML
75 INJECTION, SOLUTION INTRAVENOUS CONTINUOUS
Status: DISCONTINUED | OUTPATIENT
Start: 2020-06-16 | End: 2020-06-20

## 2020-06-16 RX ADMIN — DEXTROSE AND SODIUM CHLORIDE 125 ML/HR: 5; 450 INJECTION, SOLUTION INTRAVENOUS at 23:40

## 2020-06-16 RX ADMIN — METOPROLOL TARTRATE 12.5 MG: 25 TABLET ORAL at 20:16

## 2020-06-16 RX ADMIN — PANTOPRAZOLE SODIUM 40 MG: 40 TABLET, DELAYED RELEASE ORAL at 07:04

## 2020-06-16 RX ADMIN — DEXTROSE AND SODIUM CHLORIDE 125 ML/HR: 5; 450 INJECTION, SOLUTION INTRAVENOUS at 14:21

## 2020-06-16 RX ADMIN — METOPROLOL TARTRATE 12.5 MG: 25 TABLET ORAL at 09:41

## 2020-06-16 RX ADMIN — ENOXAPARIN SODIUM 30 MG: 30 INJECTION SUBCUTANEOUS at 09:41

## 2020-06-16 RX ADMIN — SODIUM CHLORIDE, PRESERVATIVE FREE 10 ML: 5 INJECTION INTRAVENOUS at 20:04

## 2020-06-16 RX ADMIN — SODIUM CHLORIDE, PRESERVATIVE FREE 10 ML: 5 INJECTION INTRAVENOUS at 09:42

## 2020-06-17 ENCOUNTER — APPOINTMENT (OUTPATIENT)
Dept: CARDIOLOGY | Facility: HOSPITAL | Age: 75
End: 2020-06-17

## 2020-06-17 ENCOUNTER — APPOINTMENT (OUTPATIENT)
Dept: CT IMAGING | Facility: HOSPITAL | Age: 75
End: 2020-06-17

## 2020-06-17 PROBLEM — F23 BRIEF PSYCHOTIC DISORDER (HCC): Status: ACTIVE | Noted: 2020-06-17

## 2020-06-17 LAB
ALBUMIN SERPL-MCNC: 3.2 G/DL (ref 3.5–5.2)
ANION GAP SERPL CALCULATED.3IONS-SCNC: 11 MMOL/L (ref 5–15)
ANION GAP SERPL CALCULATED.3IONS-SCNC: 6.9 MMOL/L (ref 5–15)
BASOPHILS # BLD AUTO: 0.04 10*3/MM3 (ref 0–0.2)
BASOPHILS NFR BLD AUTO: 0.4 % (ref 0–1.5)
BH CV LOWER VASCULAR LEFT COMMON FEMORAL AUGMENT: NORMAL
BH CV LOWER VASCULAR LEFT COMMON FEMORAL COMPETENT: NORMAL
BH CV LOWER VASCULAR LEFT COMMON FEMORAL COMPRESS: NORMAL
BH CV LOWER VASCULAR LEFT COMMON FEMORAL PHASIC: NORMAL
BH CV LOWER VASCULAR LEFT COMMON FEMORAL SPONT: NORMAL
BH CV LOWER VASCULAR RIGHT COMMON FEMORAL AUGMENT: NORMAL
BH CV LOWER VASCULAR RIGHT COMMON FEMORAL COMPETENT: NORMAL
BH CV LOWER VASCULAR RIGHT COMMON FEMORAL COMPRESS: NORMAL
BH CV LOWER VASCULAR RIGHT COMMON FEMORAL PHASIC: NORMAL
BH CV LOWER VASCULAR RIGHT COMMON FEMORAL SPONT: NORMAL
BH CV LOWER VASCULAR RIGHT DISTAL FEMORAL COMPRESS: NORMAL
BH CV LOWER VASCULAR RIGHT GASTRONEMIUS COMPRESS: NORMAL
BH CV LOWER VASCULAR RIGHT GREATER SAPH AK COMPRESS: NORMAL
BH CV LOWER VASCULAR RIGHT GREATER SAPH BK COMPRESS: NORMAL
BH CV LOWER VASCULAR RIGHT MID FEMORAL AUGMENT: NORMAL
BH CV LOWER VASCULAR RIGHT MID FEMORAL COMPETENT: NORMAL
BH CV LOWER VASCULAR RIGHT MID FEMORAL COMPRESS: NORMAL
BH CV LOWER VASCULAR RIGHT MID FEMORAL PHASIC: NORMAL
BH CV LOWER VASCULAR RIGHT MID FEMORAL SPONT: NORMAL
BH CV LOWER VASCULAR RIGHT PERONEAL COMPRESS: NORMAL
BH CV LOWER VASCULAR RIGHT POPLITEAL AUGMENT: NORMAL
BH CV LOWER VASCULAR RIGHT POPLITEAL COMPETENT: NORMAL
BH CV LOWER VASCULAR RIGHT POPLITEAL COMPRESS: NORMAL
BH CV LOWER VASCULAR RIGHT POPLITEAL PHASIC: NORMAL
BH CV LOWER VASCULAR RIGHT POPLITEAL SPONT: NORMAL
BH CV LOWER VASCULAR RIGHT POSTERIOR TIBIAL COMPRESS: NORMAL
BH CV LOWER VASCULAR RIGHT PROFUNDA FEMORAL COMPRESS: NORMAL
BH CV LOWER VASCULAR RIGHT PROXIMAL FEMORAL COMPRESS: NORMAL
BH CV LOWER VASCULAR RIGHT SAPHENOFEMORAL JUNCTION COMPRESS: NORMAL
BUN BLD-MCNC: 21 MG/DL (ref 8–23)
BUN BLD-MCNC: 30 MG/DL (ref 8–23)
BUN/CREAT SERPL: 17.6 (ref 7–25)
BUN/CREAT SERPL: 19.2 (ref 7–25)
CALCIUM SPEC-SCNC: 8.8 MG/DL (ref 8.6–10.5)
CALCIUM SPEC-SCNC: 9 MG/DL (ref 8.6–10.5)
CHLORIDE SERPL-SCNC: 101 MMOL/L (ref 98–107)
CHLORIDE SERPL-SCNC: 102 MMOL/L (ref 98–107)
CO2 SERPL-SCNC: 28 MMOL/L (ref 22–29)
CO2 SERPL-SCNC: 29.1 MMOL/L (ref 22–29)
CREAT BLD-MCNC: 1.19 MG/DL (ref 0.76–1.27)
CREAT BLD-MCNC: 1.56 MG/DL (ref 0.76–1.27)
DEPRECATED RDW RBC AUTO: 43.6 FL (ref 37–54)
EOSINOPHIL # BLD AUTO: 0.07 10*3/MM3 (ref 0–0.4)
EOSINOPHIL NFR BLD AUTO: 0.8 % (ref 0.3–6.2)
ERYTHROCYTE [DISTWIDTH] IN BLOOD BY AUTOMATED COUNT: 13.2 % (ref 12.3–15.4)
GFR SERPL CREATININE-BSD FRML MDRD: 43 ML/MIN/1.73
GFR SERPL CREATININE-BSD FRML MDRD: 59 ML/MIN/1.73
GLUCOSE BLD-MCNC: 119 MG/DL (ref 65–99)
GLUCOSE BLD-MCNC: 123 MG/DL (ref 65–99)
HCT VFR BLD AUTO: 31.6 % (ref 37.5–51)
HGB BLD-MCNC: 10.5 G/DL (ref 13–17.7)
IMM GRANULOCYTES # BLD AUTO: 0.1 10*3/MM3 (ref 0–0.05)
IMM GRANULOCYTES NFR BLD AUTO: 1.1 % (ref 0–0.5)
LYMPHOCYTES # BLD AUTO: 1.2 10*3/MM3 (ref 0.7–3.1)
LYMPHOCYTES NFR BLD AUTO: 12.9 % (ref 19.6–45.3)
MAGNESIUM SERPL-MCNC: 2.1 MG/DL (ref 1.6–2.4)
MCH RBC QN AUTO: 29.8 PG (ref 26.6–33)
MCHC RBC AUTO-ENTMCNC: 33.2 G/DL (ref 31.5–35.7)
MCV RBC AUTO: 89.8 FL (ref 79–97)
MONOCYTES # BLD AUTO: 0.94 10*3/MM3 (ref 0.1–0.9)
MONOCYTES NFR BLD AUTO: 10.1 % (ref 5–12)
NEUTROPHILS # BLD AUTO: 6.95 10*3/MM3 (ref 1.7–7)
NEUTROPHILS NFR BLD AUTO: 74.7 % (ref 42.7–76)
NRBC BLD AUTO-RTO: 0 /100 WBC (ref 0–0.2)
PHOSPHATE SERPL-MCNC: 2.9 MG/DL (ref 2.5–4.5)
PLATELET # BLD AUTO: 247 10*3/MM3 (ref 140–450)
PMV BLD AUTO: 9.4 FL (ref 6–12)
POTASSIUM BLD-SCNC: 3.9 MMOL/L (ref 3.5–5.2)
POTASSIUM BLD-SCNC: 3.9 MMOL/L (ref 3.5–5.2)
RBC # BLD AUTO: 3.52 10*6/MM3 (ref 4.14–5.8)
SODIUM BLD-SCNC: 138 MMOL/L (ref 136–145)
SODIUM BLD-SCNC: 140 MMOL/L (ref 136–145)
URATE SERPL-MCNC: 7.3 MG/DL (ref 3.4–7)
WBC NRBC COR # BLD: 9.3 10*3/MM3 (ref 3.4–10.8)

## 2020-06-17 PROCEDURE — 93971 EXTREMITY STUDY: CPT

## 2020-06-17 PROCEDURE — 25010000002 LORAZEPAM PER 2 MG

## 2020-06-17 PROCEDURE — 70450 CT HEAD/BRAIN W/O DYE: CPT

## 2020-06-17 PROCEDURE — 80069 RENAL FUNCTION PANEL: CPT | Performed by: INTERNAL MEDICINE

## 2020-06-17 PROCEDURE — 25010000002 ENOXAPARIN PER 10 MG: Performed by: INTERNAL MEDICINE

## 2020-06-17 PROCEDURE — 25010000002 LORAZEPAM PER 2 MG: Performed by: INTERNAL MEDICINE

## 2020-06-17 PROCEDURE — 85025 COMPLETE CBC W/AUTO DIFF WBC: CPT | Performed by: INTERNAL MEDICINE

## 2020-06-17 PROCEDURE — 84550 ASSAY OF BLOOD/URIC ACID: CPT | Performed by: INTERNAL MEDICINE

## 2020-06-17 PROCEDURE — 83735 ASSAY OF MAGNESIUM: CPT | Performed by: INTERNAL MEDICINE

## 2020-06-17 PROCEDURE — 25010000002 HALOPERIDOL LACTATE PER 5 MG: Performed by: INTERNAL MEDICINE

## 2020-06-17 PROCEDURE — 99222 1ST HOSP IP/OBS MODERATE 55: CPT | Performed by: PSYCHIATRY & NEUROLOGY

## 2020-06-17 RX ORDER — LORAZEPAM 2 MG/ML
INJECTION INTRAMUSCULAR
Status: COMPLETED
Start: 2020-06-17 | End: 2020-06-17

## 2020-06-17 RX ORDER — LORAZEPAM 2 MG/ML
0.5 INJECTION INTRAMUSCULAR ONCE
Status: COMPLETED | OUTPATIENT
Start: 2020-06-17 | End: 2020-06-17

## 2020-06-17 RX ORDER — HALOPERIDOL 5 MG/ML
2 INJECTION INTRAMUSCULAR
Status: DISCONTINUED | OUTPATIENT
Start: 2020-06-17 | End: 2020-06-22 | Stop reason: HOSPADM

## 2020-06-17 RX ORDER — HALOPERIDOL 5 MG/ML
5 INJECTION INTRAMUSCULAR ONCE
Status: COMPLETED | OUTPATIENT
Start: 2020-06-17 | End: 2020-06-17

## 2020-06-17 RX ORDER — LORAZEPAM 2 MG/ML
INJECTION INTRAMUSCULAR
Status: DISPENSED
Start: 2020-06-17 | End: 2020-06-17

## 2020-06-17 RX ORDER — OLANZAPINE 5 MG/1
5 TABLET ORAL
Status: DISCONTINUED | OUTPATIENT
Start: 2020-06-17 | End: 2020-06-19

## 2020-06-17 RX ADMIN — DEXTROSE AND SODIUM CHLORIDE 125 ML/HR: 5; 450 INJECTION, SOLUTION INTRAVENOUS at 08:21

## 2020-06-17 RX ADMIN — HALOPERIDOL LACTATE 5 MG: 5 INJECTION, SOLUTION INTRAMUSCULAR at 04:39

## 2020-06-17 RX ADMIN — LORAZEPAM 0.5 MG: 2 INJECTION INTRAMUSCULAR at 03:02

## 2020-06-17 RX ADMIN — OLANZAPINE 5 MG: 5 TABLET ORAL at 17:11

## 2020-06-17 RX ADMIN — LORAZEPAM 0.5 MG: 2 INJECTION INTRAMUSCULAR; INTRAVENOUS at 04:06

## 2020-06-17 RX ADMIN — LORAZEPAM 0.5 MG: 2 INJECTION INTRAMUSCULAR; INTRAVENOUS at 03:02

## 2020-06-17 RX ADMIN — ENOXAPARIN SODIUM 30 MG: 30 INJECTION SUBCUTANEOUS at 09:24

## 2020-06-17 RX ADMIN — METOPROLOL TARTRATE 12.5 MG: 25 TABLET ORAL at 09:24

## 2020-06-17 RX ADMIN — PANTOPRAZOLE SODIUM 40 MG: 40 TABLET, DELAYED RELEASE ORAL at 09:24

## 2020-06-17 RX ADMIN — DEXTROSE AND SODIUM CHLORIDE 125 ML/HR: 5; 450 INJECTION, SOLUTION INTRAVENOUS at 20:36

## 2020-06-17 RX ADMIN — SODIUM CHLORIDE, PRESERVATIVE FREE 10 ML: 5 INJECTION INTRAVENOUS at 09:24

## 2020-06-18 LAB
ANION GAP SERPL CALCULATED.3IONS-SCNC: 10.1 MMOL/L (ref 5–15)
BASOPHILS # BLD AUTO: 0.03 10*3/MM3 (ref 0–0.2)
BASOPHILS NFR BLD AUTO: 0.3 % (ref 0–1.5)
BUN BLD-MCNC: 10 MG/DL (ref 8–23)
BUN/CREAT SERPL: 12 (ref 7–25)
CALCIUM SPEC-SCNC: 9.4 MG/DL (ref 8.6–10.5)
CHLORIDE SERPL-SCNC: 101 MMOL/L (ref 98–107)
CO2 SERPL-SCNC: 26.9 MMOL/L (ref 22–29)
CREAT BLD-MCNC: 0.83 MG/DL (ref 0.76–1.27)
CRP SERPL-MCNC: 4.28 MG/DL (ref 0–0.5)
DEPRECATED RDW RBC AUTO: 43.8 FL (ref 37–54)
EOSINOPHIL # BLD AUTO: 0.26 10*3/MM3 (ref 0–0.4)
EOSINOPHIL NFR BLD AUTO: 2.8 % (ref 0.3–6.2)
ERYTHROCYTE [DISTWIDTH] IN BLOOD BY AUTOMATED COUNT: 13.3 % (ref 12.3–15.4)
GFR SERPL CREATININE-BSD FRML MDRD: 90 ML/MIN/1.73
GLUCOSE BLD-MCNC: 117 MG/DL (ref 65–99)
HCT VFR BLD AUTO: 32.9 % (ref 37.5–51)
HGB BLD-MCNC: 11.1 G/DL (ref 13–17.7)
IMM GRANULOCYTES # BLD AUTO: 0.08 10*3/MM3 (ref 0–0.05)
IMM GRANULOCYTES NFR BLD AUTO: 0.9 % (ref 0–0.5)
LYMPHOCYTES # BLD AUTO: 1.11 10*3/MM3 (ref 0.7–3.1)
LYMPHOCYTES NFR BLD AUTO: 12.2 % (ref 19.6–45.3)
MCH RBC QN AUTO: 30.3 PG (ref 26.6–33)
MCHC RBC AUTO-ENTMCNC: 33.7 G/DL (ref 31.5–35.7)
MCV RBC AUTO: 89.9 FL (ref 79–97)
MONOCYTES # BLD AUTO: 0.6 10*3/MM3 (ref 0.1–0.9)
MONOCYTES NFR BLD AUTO: 6.6 % (ref 5–12)
NEUTROPHILS # BLD AUTO: 7.05 10*3/MM3 (ref 1.7–7)
NEUTROPHILS NFR BLD AUTO: 77.2 % (ref 42.7–76)
NRBC BLD AUTO-RTO: 0 /100 WBC (ref 0–0.2)
PLATELET # BLD AUTO: 274 10*3/MM3 (ref 140–450)
PMV BLD AUTO: 9.4 FL (ref 6–12)
POTASSIUM BLD-SCNC: 3.7 MMOL/L (ref 3.5–5.2)
RBC # BLD AUTO: 3.66 10*6/MM3 (ref 4.14–5.8)
SODIUM BLD-SCNC: 138 MMOL/L (ref 136–145)
WBC NRBC COR # BLD: 9.13 10*3/MM3 (ref 3.4–10.8)

## 2020-06-18 PROCEDURE — 25010000002 ENOXAPARIN PER 10 MG: Performed by: INTERNAL MEDICINE

## 2020-06-18 PROCEDURE — 97110 THERAPEUTIC EXERCISES: CPT

## 2020-06-18 PROCEDURE — 99231 SBSQ HOSP IP/OBS SF/LOW 25: CPT | Performed by: PSYCHIATRY & NEUROLOGY

## 2020-06-18 PROCEDURE — 25010000002 HALOPERIDOL LACTATE PER 5 MG: Performed by: PSYCHIATRY & NEUROLOGY

## 2020-06-18 PROCEDURE — 84443 ASSAY THYROID STIM HORMONE: CPT | Performed by: INTERNAL MEDICINE

## 2020-06-18 PROCEDURE — 99024 POSTOP FOLLOW-UP VISIT: CPT | Performed by: ORTHOPAEDIC SURGERY

## 2020-06-18 PROCEDURE — 97535 SELF CARE MNGMENT TRAINING: CPT

## 2020-06-18 PROCEDURE — 85025 COMPLETE CBC W/AUTO DIFF WBC: CPT | Performed by: INTERNAL MEDICINE

## 2020-06-18 PROCEDURE — 97166 OT EVAL MOD COMPLEX 45 MIN: CPT

## 2020-06-18 PROCEDURE — 80048 BASIC METABOLIC PNL TOTAL CA: CPT | Performed by: INTERNAL MEDICINE

## 2020-06-18 PROCEDURE — 86140 C-REACTIVE PROTEIN: CPT | Performed by: ORTHOPAEDIC SURGERY

## 2020-06-18 RX ORDER — ACETAMINOPHEN 325 MG/1
650 TABLET ORAL EVERY 4 HOURS PRN
Status: DISCONTINUED | OUTPATIENT
Start: 2020-06-18 | End: 2020-06-22 | Stop reason: HOSPADM

## 2020-06-18 RX ADMIN — DEXTROSE AND SODIUM CHLORIDE 125 ML/HR: 5; 450 INJECTION, SOLUTION INTRAVENOUS at 04:10

## 2020-06-18 RX ADMIN — SODIUM CHLORIDE, PRESERVATIVE FREE 10 ML: 5 INJECTION INTRAVENOUS at 09:56

## 2020-06-18 RX ADMIN — HALOPERIDOL LACTATE 2 MG: 5 INJECTION INTRAMUSCULAR at 02:04

## 2020-06-18 RX ADMIN — SODIUM CHLORIDE, PRESERVATIVE FREE 10 ML: 5 INJECTION INTRAVENOUS at 21:04

## 2020-06-18 RX ADMIN — OLANZAPINE 5 MG: 5 TABLET ORAL at 18:09

## 2020-06-18 RX ADMIN — METOPROLOL TARTRATE 12.5 MG: 25 TABLET ORAL at 00:00

## 2020-06-18 RX ADMIN — ENOXAPARIN SODIUM 40 MG: 40 INJECTION SUBCUTANEOUS at 09:55

## 2020-06-18 RX ADMIN — HALOPERIDOL LACTATE 2 MG: 5 INJECTION INTRAMUSCULAR at 04:04

## 2020-06-18 RX ADMIN — METOPROLOL TARTRATE 12.5 MG: 25 TABLET ORAL at 21:03

## 2020-06-18 RX ADMIN — PANTOPRAZOLE SODIUM 40 MG: 40 TABLET, DELAYED RELEASE ORAL at 08:00

## 2020-06-18 RX ADMIN — ACETAMINOPHEN 650 MG: 325 TABLET, FILM COATED ORAL at 10:05

## 2020-06-18 RX ADMIN — METOPROLOL TARTRATE 12.5 MG: 25 TABLET ORAL at 09:55

## 2020-06-19 LAB
ALBUMIN SERPL-MCNC: 3.5 G/DL (ref 3.5–5.2)
ANION GAP SERPL CALCULATED.3IONS-SCNC: 10.2 MMOL/L (ref 5–15)
BUN BLD-MCNC: 12 MG/DL (ref 8–23)
BUN/CREAT SERPL: 16.4 (ref 7–25)
CALCIUM SPEC-SCNC: 9.3 MG/DL (ref 8.6–10.5)
CHLORIDE SERPL-SCNC: 103 MMOL/L (ref 98–107)
CO2 SERPL-SCNC: 24.8 MMOL/L (ref 22–29)
CREAT BLD-MCNC: 0.73 MG/DL (ref 0.76–1.27)
GFR SERPL CREATININE-BSD FRML MDRD: 104 ML/MIN/1.73
GLUCOSE BLD-MCNC: 117 MG/DL (ref 65–99)
MAGNESIUM SERPL-MCNC: 1.7 MG/DL (ref 1.6–2.4)
PHOSPHATE SERPL-MCNC: 2.3 MG/DL (ref 2.5–4.5)
POTASSIUM BLD-SCNC: 3.6 MMOL/L (ref 3.5–5.2)
SODIUM BLD-SCNC: 138 MMOL/L (ref 136–145)
TSH SERPL DL<=0.05 MIU/L-ACNC: 3.45 UIU/ML (ref 0.27–4.2)
URATE SERPL-MCNC: 6 MG/DL (ref 3.4–7)
VIT B12 BLD-MCNC: 300 PG/ML (ref 211–946)

## 2020-06-19 PROCEDURE — 25010000002 ENOXAPARIN PER 10 MG: Performed by: INTERNAL MEDICINE

## 2020-06-19 PROCEDURE — 80069 RENAL FUNCTION PANEL: CPT | Performed by: INTERNAL MEDICINE

## 2020-06-19 PROCEDURE — 83735 ASSAY OF MAGNESIUM: CPT | Performed by: INTERNAL MEDICINE

## 2020-06-19 PROCEDURE — 99231 SBSQ HOSP IP/OBS SF/LOW 25: CPT | Performed by: PSYCHIATRY & NEUROLOGY

## 2020-06-19 PROCEDURE — 82607 VITAMIN B-12: CPT | Performed by: INTERNAL MEDICINE

## 2020-06-19 PROCEDURE — 25010000002 HALOPERIDOL LACTATE PER 5 MG: Performed by: PSYCHIATRY & NEUROLOGY

## 2020-06-19 PROCEDURE — 84550 ASSAY OF BLOOD/URIC ACID: CPT | Performed by: INTERNAL MEDICINE

## 2020-06-19 PROCEDURE — 97110 THERAPEUTIC EXERCISES: CPT

## 2020-06-19 PROCEDURE — 97116 GAIT TRAINING THERAPY: CPT

## 2020-06-19 RX ORDER — AMLODIPINE BESYLATE 5 MG/1
5 TABLET ORAL
Status: DISCONTINUED | OUTPATIENT
Start: 2020-06-19 | End: 2020-06-22 | Stop reason: HOSPADM

## 2020-06-19 RX ORDER — CHLORPROMAZINE HYDROCHLORIDE 25 MG/ML
50 INJECTION INTRAMUSCULAR EVERY 4 HOURS PRN
Status: DISCONTINUED | OUTPATIENT
Start: 2020-06-19 | End: 2020-06-22 | Stop reason: HOSPADM

## 2020-06-19 RX ADMIN — METOPROLOL TARTRATE 12.5 MG: 25 TABLET ORAL at 08:17

## 2020-06-19 RX ADMIN — PANTOPRAZOLE SODIUM 40 MG: 40 TABLET, DELAYED RELEASE ORAL at 08:16

## 2020-06-19 RX ADMIN — HALOPERIDOL LACTATE 2 MG: 5 INJECTION INTRAMUSCULAR at 04:44

## 2020-06-19 RX ADMIN — ENOXAPARIN SODIUM 40 MG: 40 INJECTION SUBCUTANEOUS at 08:16

## 2020-06-19 RX ADMIN — METOPROLOL TARTRATE 12.5 MG: 25 TABLET ORAL at 22:06

## 2020-06-19 RX ADMIN — DEXTROSE AND SODIUM CHLORIDE 75 ML/HR: 5; 450 INJECTION, SOLUTION INTRAVENOUS at 04:58

## 2020-06-19 RX ADMIN — DEXTROSE AND SODIUM CHLORIDE 75 ML/HR: 5; 450 INJECTION, SOLUTION INTRAVENOUS at 22:06

## 2020-06-20 LAB
ANION GAP SERPL CALCULATED.3IONS-SCNC: 10 MMOL/L (ref 5–15)
BASOPHILS # BLD AUTO: 0.06 10*3/MM3 (ref 0–0.2)
BASOPHILS NFR BLD AUTO: 0.5 % (ref 0–1.5)
BUN BLD-MCNC: 15 MG/DL (ref 8–23)
BUN/CREAT SERPL: 17.6 (ref 7–25)
CALCIUM SPEC-SCNC: 9.2 MG/DL (ref 8.6–10.5)
CHLORIDE SERPL-SCNC: 101 MMOL/L (ref 98–107)
CO2 SERPL-SCNC: 27 MMOL/L (ref 22–29)
CREAT BLD-MCNC: 0.85 MG/DL (ref 0.76–1.27)
DEPRECATED RDW RBC AUTO: 42.7 FL (ref 37–54)
EOSINOPHIL # BLD AUTO: 0.28 10*3/MM3 (ref 0–0.4)
EOSINOPHIL NFR BLD AUTO: 2.1 % (ref 0.3–6.2)
ERYTHROCYTE [DISTWIDTH] IN BLOOD BY AUTOMATED COUNT: 13 % (ref 12.3–15.4)
GFR SERPL CREATININE-BSD FRML MDRD: 88 ML/MIN/1.73
GLUCOSE BLD-MCNC: 106 MG/DL (ref 65–99)
HCT VFR BLD AUTO: 33.4 % (ref 37.5–51)
HGB BLD-MCNC: 11.3 G/DL (ref 13–17.7)
IMM GRANULOCYTES # BLD AUTO: 0.14 10*3/MM3 (ref 0–0.05)
IMM GRANULOCYTES NFR BLD AUTO: 1.1 % (ref 0–0.5)
LYMPHOCYTES # BLD AUTO: 1.18 10*3/MM3 (ref 0.7–3.1)
LYMPHOCYTES NFR BLD AUTO: 9 % (ref 19.6–45.3)
MCH RBC QN AUTO: 30.5 PG (ref 26.6–33)
MCHC RBC AUTO-ENTMCNC: 33.8 G/DL (ref 31.5–35.7)
MCV RBC AUTO: 90.3 FL (ref 79–97)
MONOCYTES # BLD AUTO: 0.98 10*3/MM3 (ref 0.1–0.9)
MONOCYTES NFR BLD AUTO: 7.5 % (ref 5–12)
NEUTROPHILS # BLD AUTO: 10.45 10*3/MM3 (ref 1.7–7)
NEUTROPHILS NFR BLD AUTO: 79.8 % (ref 42.7–76)
NRBC BLD AUTO-RTO: 0 /100 WBC (ref 0–0.2)
PLATELET # BLD AUTO: 252 10*3/MM3 (ref 140–450)
PMV BLD AUTO: 9.1 FL (ref 6–12)
POTASSIUM BLD-SCNC: 3.8 MMOL/L (ref 3.5–5.2)
RBC # BLD AUTO: 3.7 10*6/MM3 (ref 4.14–5.8)
SODIUM BLD-SCNC: 138 MMOL/L (ref 136–145)
WBC NRBC COR # BLD: 13.09 10*3/MM3 (ref 3.4–10.8)

## 2020-06-20 PROCEDURE — 99231 SBSQ HOSP IP/OBS SF/LOW 25: CPT | Performed by: PSYCHIATRY & NEUROLOGY

## 2020-06-20 PROCEDURE — 25010000002 ENOXAPARIN PER 10 MG: Performed by: INTERNAL MEDICINE

## 2020-06-20 PROCEDURE — 85025 COMPLETE CBC W/AUTO DIFF WBC: CPT | Performed by: INTERNAL MEDICINE

## 2020-06-20 PROCEDURE — 80048 BASIC METABOLIC PNL TOTAL CA: CPT | Performed by: INTERNAL MEDICINE

## 2020-06-20 PROCEDURE — 97110 THERAPEUTIC EXERCISES: CPT

## 2020-06-20 RX ORDER — TAMSULOSIN HYDROCHLORIDE 0.4 MG/1
0.4 CAPSULE ORAL DAILY
Status: DISCONTINUED | OUTPATIENT
Start: 2020-06-20 | End: 2020-06-22 | Stop reason: HOSPADM

## 2020-06-20 RX ADMIN — METOPROLOL TARTRATE 12.5 MG: 25 TABLET ORAL at 11:27

## 2020-06-20 RX ADMIN — SODIUM CHLORIDE, PRESERVATIVE FREE 10 ML: 5 INJECTION INTRAVENOUS at 21:41

## 2020-06-20 RX ADMIN — AMLODIPINE BESYLATE 5 MG: 5 TABLET ORAL at 11:27

## 2020-06-20 RX ADMIN — METOPROLOL TARTRATE 12.5 MG: 25 TABLET ORAL at 21:38

## 2020-06-20 RX ADMIN — TAMSULOSIN HYDROCHLORIDE 0.4 MG: 0.4 CAPSULE ORAL at 13:17

## 2020-06-20 RX ADMIN — SODIUM CHLORIDE, PRESERVATIVE FREE 10 ML: 5 INJECTION INTRAVENOUS at 11:27

## 2020-06-20 RX ADMIN — PANTOPRAZOLE SODIUM 40 MG: 40 TABLET, DELAYED RELEASE ORAL at 11:27

## 2020-06-20 RX ADMIN — ENOXAPARIN SODIUM 40 MG: 40 INJECTION SUBCUTANEOUS at 11:26

## 2020-06-21 VITALS
HEIGHT: 74 IN | DIASTOLIC BLOOD PRESSURE: 78 MMHG | WEIGHT: 243.7 LBS | BODY MASS INDEX: 31.28 KG/M2 | TEMPERATURE: 97.4 F | HEART RATE: 114 BPM | RESPIRATION RATE: 18 BRPM | SYSTOLIC BLOOD PRESSURE: 164 MMHG | OXYGEN SATURATION: 97 %

## 2020-06-21 LAB
ANION GAP SERPL CALCULATED.3IONS-SCNC: 12 MMOL/L (ref 5–15)
BASOPHILS # BLD AUTO: 0.05 10*3/MM3 (ref 0–0.2)
BASOPHILS NFR BLD AUTO: 0.4 % (ref 0–1.5)
BUN BLD-MCNC: 17 MG/DL (ref 8–23)
BUN/CREAT SERPL: 20 (ref 7–25)
CALCIUM SPEC-SCNC: 8.9 MG/DL (ref 8.6–10.5)
CHLORIDE SERPL-SCNC: 102 MMOL/L (ref 98–107)
CO2 SERPL-SCNC: 25 MMOL/L (ref 22–29)
CREAT BLD-MCNC: 0.85 MG/DL (ref 0.76–1.27)
DEPRECATED RDW RBC AUTO: 42.8 FL (ref 37–54)
EOSINOPHIL # BLD AUTO: 0.28 10*3/MM3 (ref 0–0.4)
EOSINOPHIL NFR BLD AUTO: 2.5 % (ref 0.3–6.2)
ERYTHROCYTE [DISTWIDTH] IN BLOOD BY AUTOMATED COUNT: 13.1 % (ref 12.3–15.4)
GFR SERPL CREATININE-BSD FRML MDRD: 88 ML/MIN/1.73
GLUCOSE BLD-MCNC: 110 MG/DL (ref 65–99)
HCT VFR BLD AUTO: 35.1 % (ref 37.5–51)
HGB BLD-MCNC: 11.9 G/DL (ref 13–17.7)
IMM GRANULOCYTES # BLD AUTO: 0.1 10*3/MM3 (ref 0–0.05)
IMM GRANULOCYTES NFR BLD AUTO: 0.9 % (ref 0–0.5)
LYMPHOCYTES # BLD AUTO: 1.42 10*3/MM3 (ref 0.7–3.1)
LYMPHOCYTES NFR BLD AUTO: 12.7 % (ref 19.6–45.3)
MCH RBC QN AUTO: 30.1 PG (ref 26.6–33)
MCHC RBC AUTO-ENTMCNC: 33.9 G/DL (ref 31.5–35.7)
MCV RBC AUTO: 88.6 FL (ref 79–97)
MONOCYTES # BLD AUTO: 0.75 10*3/MM3 (ref 0.1–0.9)
MONOCYTES NFR BLD AUTO: 6.7 % (ref 5–12)
NEUTROPHILS # BLD AUTO: 8.57 10*3/MM3 (ref 1.7–7)
NEUTROPHILS NFR BLD AUTO: 76.8 % (ref 42.7–76)
NRBC BLD AUTO-RTO: 0 /100 WBC (ref 0–0.2)
PLATELET # BLD AUTO: 268 10*3/MM3 (ref 140–450)
PMV BLD AUTO: 9 FL (ref 6–12)
POTASSIUM BLD-SCNC: 3.4 MMOL/L (ref 3.5–5.2)
RBC # BLD AUTO: 3.96 10*6/MM3 (ref 4.14–5.8)
SODIUM BLD-SCNC: 139 MMOL/L (ref 136–145)
WBC NRBC COR # BLD: 11.17 10*3/MM3 (ref 3.4–10.8)

## 2020-06-21 PROCEDURE — 80048 BASIC METABOLIC PNL TOTAL CA: CPT | Performed by: INTERNAL MEDICINE

## 2020-06-21 PROCEDURE — 25010000002 ENOXAPARIN PER 10 MG: Performed by: INTERNAL MEDICINE

## 2020-06-21 PROCEDURE — 85025 COMPLETE CBC W/AUTO DIFF WBC: CPT | Performed by: INTERNAL MEDICINE

## 2020-06-21 PROCEDURE — 99231 SBSQ HOSP IP/OBS SF/LOW 25: CPT | Performed by: PSYCHIATRY & NEUROLOGY

## 2020-06-21 PROCEDURE — 97110 THERAPEUTIC EXERCISES: CPT

## 2020-06-21 RX ORDER — POTASSIUM CHLORIDE 750 MG/1
40 CAPSULE, EXTENDED RELEASE ORAL ONCE
Status: COMPLETED | OUTPATIENT
Start: 2020-06-21 | End: 2020-06-21

## 2020-06-21 RX ORDER — DABIGATRAN ETEXILATE 150 MG/1
150 CAPSULE ORAL EVERY 12 HOURS SCHEDULED
Status: DISCONTINUED | OUTPATIENT
Start: 2020-06-21 | End: 2020-06-22

## 2020-06-21 RX ADMIN — SODIUM CHLORIDE, PRESERVATIVE FREE 10 ML: 5 INJECTION INTRAVENOUS at 23:28

## 2020-06-21 RX ADMIN — DABIGATRAN ETEXILATE MESYLATE 150 MG: 150 CAPSULE ORAL at 13:21

## 2020-06-21 RX ADMIN — POTASSIUM CHLORIDE 40 MEQ: 10 CAPSULE, COATED, EXTENDED RELEASE ORAL at 10:10

## 2020-06-21 RX ADMIN — ENOXAPARIN SODIUM 40 MG: 40 INJECTION SUBCUTANEOUS at 08:02

## 2020-06-21 RX ADMIN — ACETAMINOPHEN 650 MG: 325 TABLET, FILM COATED ORAL at 11:17

## 2020-06-21 RX ADMIN — SODIUM CHLORIDE, PRESERVATIVE FREE 10 ML: 5 INJECTION INTRAVENOUS at 08:02

## 2020-06-21 RX ADMIN — PANTOPRAZOLE SODIUM 40 MG: 40 TABLET, DELAYED RELEASE ORAL at 06:58

## 2020-06-21 RX ADMIN — AMLODIPINE BESYLATE 5 MG: 5 TABLET ORAL at 08:02

## 2020-06-21 RX ADMIN — TAMSULOSIN HYDROCHLORIDE 0.4 MG: 0.4 CAPSULE ORAL at 08:02

## 2020-06-21 RX ADMIN — METOPROLOL TARTRATE 12.5 MG: 25 TABLET ORAL at 23:27

## 2020-06-21 RX ADMIN — METOPROLOL TARTRATE 12.5 MG: 25 TABLET ORAL at 08:02

## 2020-06-22 ENCOUNTER — TELEPHONE (OUTPATIENT)
Dept: ORTHOPEDIC SURGERY | Facility: CLINIC | Age: 75
End: 2020-06-22

## 2020-06-22 LAB
ANION GAP SERPL CALCULATED.3IONS-SCNC: 9.6 MMOL/L (ref 5–15)
BASOPHILS # BLD AUTO: 0.04 10*3/MM3 (ref 0–0.2)
BASOPHILS NFR BLD AUTO: 0.4 % (ref 0–1.5)
BUN BLD-MCNC: 20 MG/DL (ref 8–23)
BUN/CREAT SERPL: 24.7 (ref 7–25)
CALCIUM SPEC-SCNC: 8.7 MG/DL (ref 8.6–10.5)
CHLORIDE SERPL-SCNC: 102 MMOL/L (ref 98–107)
CO2 SERPL-SCNC: 26.4 MMOL/L (ref 22–29)
CREAT BLD-MCNC: 0.81 MG/DL (ref 0.76–1.27)
DEPRECATED RDW RBC AUTO: 44 FL (ref 37–54)
EOSINOPHIL # BLD AUTO: 0.21 10*3/MM3 (ref 0–0.4)
EOSINOPHIL NFR BLD AUTO: 2.3 % (ref 0.3–6.2)
ERYTHROCYTE [DISTWIDTH] IN BLOOD BY AUTOMATED COUNT: 13.2 % (ref 12.3–15.4)
GFR SERPL CREATININE-BSD FRML MDRD: 93 ML/MIN/1.73
GLUCOSE BLD-MCNC: 99 MG/DL (ref 65–99)
HCT VFR BLD AUTO: 33.4 % (ref 37.5–51)
HGB BLD-MCNC: 11.4 G/DL (ref 13–17.7)
IMM GRANULOCYTES # BLD AUTO: 0.07 10*3/MM3 (ref 0–0.05)
IMM GRANULOCYTES NFR BLD AUTO: 0.8 % (ref 0–0.5)
LYMPHOCYTES # BLD AUTO: 1.13 10*3/MM3 (ref 0.7–3.1)
LYMPHOCYTES NFR BLD AUTO: 12.4 % (ref 19.6–45.3)
MCH RBC QN AUTO: 30.6 PG (ref 26.6–33)
MCHC RBC AUTO-ENTMCNC: 34.1 G/DL (ref 31.5–35.7)
MCV RBC AUTO: 89.5 FL (ref 79–97)
MONOCYTES # BLD AUTO: 0.61 10*3/MM3 (ref 0.1–0.9)
MONOCYTES NFR BLD AUTO: 6.7 % (ref 5–12)
NEUTROPHILS # BLD AUTO: 7.04 10*3/MM3 (ref 1.7–7)
NEUTROPHILS NFR BLD AUTO: 77.4 % (ref 42.7–76)
NRBC BLD AUTO-RTO: 0 /100 WBC (ref 0–0.2)
PLATELET # BLD AUTO: 275 10*3/MM3 (ref 140–450)
PMV BLD AUTO: 9.3 FL (ref 6–12)
POTASSIUM BLD-SCNC: 3.3 MMOL/L (ref 3.5–5.2)
RBC # BLD AUTO: 3.73 10*6/MM3 (ref 4.14–5.8)
SODIUM BLD-SCNC: 138 MMOL/L (ref 136–145)
WBC NRBC COR # BLD: 9.1 10*3/MM3 (ref 3.4–10.8)

## 2020-06-22 PROCEDURE — 85025 COMPLETE CBC W/AUTO DIFF WBC: CPT | Performed by: INTERNAL MEDICINE

## 2020-06-22 PROCEDURE — 97116 GAIT TRAINING THERAPY: CPT

## 2020-06-22 PROCEDURE — 80048 BASIC METABOLIC PNL TOTAL CA: CPT | Performed by: INTERNAL MEDICINE

## 2020-06-22 RX ORDER — PANTOPRAZOLE SODIUM 40 MG/1
40 TABLET, DELAYED RELEASE ORAL EVERY MORNING
Qty: 14 TABLET | Refills: 0 | Status: SHIPPED | OUTPATIENT
Start: 2020-06-22 | End: 2020-07-06

## 2020-06-22 RX ORDER — ACETAMINOPHEN 325 MG/1
650 TABLET ORAL EVERY 4 HOURS PRN
Qty: 30 TABLET | Refills: 0 | Status: SHIPPED | OUTPATIENT
Start: 2020-06-22

## 2020-06-22 RX ORDER — TAMSULOSIN HYDROCHLORIDE 0.4 MG/1
0.4 CAPSULE ORAL DAILY
Qty: 30 CAPSULE | Refills: 3 | Status: SHIPPED | OUTPATIENT
Start: 2020-06-22 | End: 2021-10-06

## 2020-06-22 RX ORDER — AMLODIPINE BESYLATE 5 MG/1
5 TABLET ORAL
Qty: 30 TABLET | Refills: 3 | Status: SHIPPED | OUTPATIENT
Start: 2020-06-22

## 2020-06-22 RX ORDER — CHLORTHALIDONE 25 MG/1
25 TABLET ORAL DAILY
Status: DISCONTINUED | OUTPATIENT
Start: 2020-06-22 | End: 2020-06-22 | Stop reason: HOSPADM

## 2020-06-22 RX ORDER — ASPIRIN 325 MG
325 TABLET, DELAYED RELEASE (ENTERIC COATED) ORAL DAILY
Status: DISCONTINUED | OUTPATIENT
Start: 2020-06-22 | End: 2020-06-22 | Stop reason: HOSPADM

## 2020-06-22 NOTE — TELEPHONE ENCOUNTER
Asking for VERBAL order to resume home health PT and to add nursing for right hip drainage and daily dressing.

## 2020-06-23 ENCOUNTER — READMISSION MANAGEMENT (OUTPATIENT)
Dept: CALL CENTER | Facility: HOSPITAL | Age: 75
End: 2020-06-23

## 2020-06-23 ENCOUNTER — TELEPHONE (OUTPATIENT)
Dept: ORTHOPEDIC SURGERY | Facility: CLINIC | Age: 75
End: 2020-06-23

## 2020-06-23 NOTE — TELEPHONE ENCOUNTER
Patient had RIGHT ARACELY 6/05/20. Vahe - Clinical Manager with [facility] called to get verbal extension orders for skilled nursing to resume care. Vahe can be reached at [phone]. Thanks /srh

## 2020-06-23 NOTE — OUTREACH NOTE
Prep Survey      Responses   [facility] facility patient discharged from?  [place]   Is LACE score < 7 ?  No   Eligibility  Readm Mgmt   Discharge diagnosis  ARF,  urinary retention,  metabolic encephalopathy,  HTN   COVID-19 Test Status  Not tested   Does the patient have one of the following disease processes/diagnoses(primary or secondary)?  Other   Does the patient have Home health ordered?  Yes   [facility]   Is there a DME ordered?  No   Comments regarding appointments  call for apmt   Medication alerts for this patient  see AVS for changes   Prep survey completed?  Yes          Yumi Velasquez RN

## 2020-06-24 ENCOUNTER — READMISSION MANAGEMENT (OUTPATIENT)
Dept: CALL CENTER | Facility: HOSPITAL | Age: 75
End: 2020-06-24

## 2020-06-24 NOTE — OUTREACH NOTE
Medical Week 1 Survey      Responses   [facility] patient discharged from?  [place]   COVID-19 Test Status  Not tested   Does the patient have one of the following disease processes/diagnoses(primary or secondary)?  Other   Is there a successful TCM telephone encounter documented?  No   Week 1 attempt successful?  Yes   Call start time  1007   Call end time  1019   Discharge diagnosis  ARF,  urinary retention,  metabolic encephalopathy,  HTN   Is patient permission given to speak with other caregiver?  Yes   Person spoke with today (if not patient) and relationship  Alexandra, wife   Meds reviewed with patient/caregiver?  Yes   Is the patient having any side effects they believe may be caused by any medication additions or changes?  No   Does the patient have all medications ordered at discharge?  Yes   Is the patient taking all medications as directed (includes completed medication regime)?  Yes   Does the patient have a primary care provider?   Yes   Does the patient have an appointment with their PCP within 7 days of discharge?  Yes   Has the patient kept scheduled appointments due by today?  Yes   Comments  Surgeon 07/07    Urologist 07/09     [facility] PCP, phone visit today   What is the Home health agency?   [facility]   Has [facility] visited the patient within 72 hours of discharge?  Yes   Home health comments  PT came yeaterday. She is not certain if the nurse will be visiting but would like for the nurse to visit. Advised to call HH agency to inquire about nursing services.     What DME was ordered?  Walker, Shower chair, Commode railing   Has all DME been delivered?  Yes   Pulse Ox monitoring  None   Psychosocial issues?  No   Comments  Had hip surgery on June 5.  Has an indwellling catheter.   Did the patient receive a copy of their discharge instructions?  Yes   Nursing interventions  Reviewed instructions with patient   What is the patient's perception of their health status since discharge?  Improving    Is the patient/caregiver able to teach back signs and symptoms related to disease process for when to call PCP?  Yes   Is the patient/caregiver able to teach back the hierarchy of who to call/visit for symptoms/problems? PCP, Specialist, Home health nurse, Urgent Care, ED, 911  Yes   Week 1 call completed?  Yes          Lexie Roberson RN

## 2020-07-07 ENCOUNTER — OFFICE VISIT (OUTPATIENT)
Dept: ORTHOPEDIC SURGERY | Facility: CLINIC | Age: 75
End: 2020-07-07

## 2020-07-07 VITALS — BODY MASS INDEX: 29 KG/M2 | HEIGHT: 74 IN | WEIGHT: 226 LBS

## 2020-07-07 DIAGNOSIS — Z96.641 STATUS POST RIGHT HIP REPLACEMENT: ICD-10-CM

## 2020-07-07 DIAGNOSIS — M25.551 HIP PAIN, RIGHT: Primary | ICD-10-CM

## 2020-07-07 PROCEDURE — 99024 POSTOP FOLLOW-UP VISIT: CPT | Performed by: ORTHOPAEDIC SURGERY

## 2020-07-07 PROCEDURE — 73502 X-RAY EXAM HIP UNI 2-3 VIEWS: CPT | Performed by: ORTHOPAEDIC SURGERY

## 2020-07-08 ENCOUNTER — TELEPHONE (OUTPATIENT)
Dept: ORTHOPEDIC SURGERY | Facility: CLINIC | Age: 75
End: 2020-07-08

## 2020-07-08 NOTE — TELEPHONE ENCOUNTER
Patient had RIGHT ARACELY 6/05/20. Radha called to request verbal orders for one more in-home RIGHT Hip PT visit next week. FYI: patient's low back was sore today 7/08 following not using cane after PO appt with RBB yesterday. Radha would like to do re-assessment at one visit next week. Radha can be reached at [phone]. Thanks /srh

## 2020-07-30 ENCOUNTER — OFFICE VISIT (OUTPATIENT)
Dept: SLEEP MEDICINE | Facility: HOSPITAL | Age: 75
End: 2020-07-30

## 2020-07-30 VITALS — HEIGHT: 74 IN | WEIGHT: 235 LBS | BODY MASS INDEX: 30.16 KG/M2

## 2020-07-30 DIAGNOSIS — Z99.89 OSA ON CPAP: Primary | ICD-10-CM

## 2020-07-30 DIAGNOSIS — G47.33 OSA ON CPAP: Primary | ICD-10-CM

## 2020-07-30 PROCEDURE — G0463 HOSPITAL OUTPT CLINIC VISIT: HCPCS

## 2020-07-30 PROCEDURE — 99213 OFFICE O/P EST LOW 20 MIN: CPT | Performed by: INTERNAL MEDICINE

## 2020-08-21 ENCOUNTER — OFFICE VISIT (OUTPATIENT)
Dept: ORTHOPEDIC SURGERY | Facility: CLINIC | Age: 75
End: 2020-08-21

## 2020-08-21 VITALS — BODY MASS INDEX: 30.16 KG/M2 | WEIGHT: 235 LBS | TEMPERATURE: 98.1 F | HEIGHT: 74 IN

## 2020-08-21 DIAGNOSIS — M25.551 HIP PAIN, RIGHT: Primary | ICD-10-CM

## 2020-08-21 DIAGNOSIS — Z96.641 STATUS POST RIGHT HIP REPLACEMENT: ICD-10-CM

## 2020-08-21 PROCEDURE — 99024 POSTOP FOLLOW-UP VISIT: CPT | Performed by: NURSE PRACTITIONER

## 2020-08-21 PROCEDURE — 73502 X-RAY EXAM HIP UNI 2-3 VIEWS: CPT | Performed by: NURSE PRACTITIONER

## 2020-08-21 RX ORDER — DABIGATRAN ETEXILATE 150 MG/1
150 CAPSULE ORAL 2 TIMES DAILY
COMMUNITY
End: 2020-12-24

## 2020-08-21 NOTE — PROGRESS NOTES
Mark Aguirre Jr. : 1945 MRN: [id number] DATE: 2020    DIAGNOSIS: 8 week follow up right total hip     SUBJECTIVE:Patient returns today for 8 week follow up of right total hip replacement. Patient reports doing well with no unusual complaints. Appears to be progressing appropriately and is off a cane    OBJECTIVE:   Exam:. The incision is healed. No sign of infection. Range of motion is progressing as expected. The calf is soft and nontender with a negative Homans sign. Strength progressing    DIAGNOSTIC STUDIES  Xrays: 2 views of the right hip (AP pelvis and lateral right hip) were ordered and reviewed for evaluation of recent hip replacement. They demonstrate a well positioned, well aligned hip replacement without complicating factors noted. In comparison with previous films there has been interval implant placement.    ASSESSMENT: 8 week status post right hip replacement.    PLAN: 1) Activity as tolerated   2) Continue hip strengthening exercises    3) Follow up 1 year post-op with repeat Xrays of right hip (2views AP Pelvis      and lateral left hip)    Bety Savage, APRN  2020

## 2020-08-31 RX ORDER — CHLORTHALIDONE 25 MG/1
25 TABLET ORAL DAILY
Qty: 90 TABLET | Refills: 1 | Status: SHIPPED | OUTPATIENT
Start: 2020-08-31 | End: 2021-02-22

## 2020-09-02 ENCOUNTER — OFFICE VISIT (OUTPATIENT)
Dept: CARDIOLOGY | Facility: CLINIC | Age: 75
End: 2020-09-02

## 2020-09-02 VITALS
SYSTOLIC BLOOD PRESSURE: 144 MMHG | HEIGHT: 74 IN | HEART RATE: 61 BPM | BODY MASS INDEX: 29.98 KG/M2 | WEIGHT: 233.6 LBS | DIASTOLIC BLOOD PRESSURE: 66 MMHG

## 2020-09-02 DIAGNOSIS — I10 ESSENTIAL HYPERTENSION: Primary | ICD-10-CM

## 2020-09-02 DIAGNOSIS — I45.2 BIFASCICULAR BLOCK: ICD-10-CM

## 2020-09-02 DIAGNOSIS — I48.0 PAROXYSMAL ATRIAL FIBRILLATION (HCC): ICD-10-CM

## 2020-09-02 PROCEDURE — 99214 OFFICE O/P EST MOD 30 MIN: CPT | Performed by: INTERNAL MEDICINE

## 2020-09-02 PROCEDURE — 93000 ELECTROCARDIOGRAM COMPLETE: CPT | Performed by: INTERNAL MEDICINE

## 2020-09-02 RX ORDER — ZOLPIDEM TARTRATE 10 MG/1
TABLET ORAL
COMMUNITY
Start: 2020-07-12

## 2020-09-02 NOTE — PROGRESS NOTES
Date of Office Visit: 20  Encounter Provider: Claus Lindquist MD  Place of Service: [facility] CARDIOLOGY  Patient Name: Mark Aguirre Jr.  :1945  [id number]    Chief Complaint   Patient presents with   • BIFASCICULAR BLOCK   • Atrial Fibrillation   :     HPI: Mark Aguirre Jr. is a 75 y.o. male this is a former patient of my partners [employer] to see us.  He has had a history of paroxysmal A. fib and has had 2 A. fib ablations in the past with Dr. Hemant Armando.  He has been doing well ever since then but has been maintained on chronic anticoagulation.  He is a full-time param essentially and hunts anything that is most important thing in his life that he really enjoys.  He is not had any palpitations no PND no orthopnea no edema no syncope.  No chest pain he is never had coronary disease his only real cardiac issue is been A. fib and a little bit of hypertension.  He had hip replaced this summer and it was complicated by profound acute renal failure that then subsequently resolved    Past Medical History:   Diagnosis Date   • Anticoagulated     PRADAXA FOR A FIB TO HELD 3 DAYS PRIOR   • Arthritis     OSTEO. RIGHT HIP   • Atrial flutter (CMS/HCC)    • Bifascicular block    • Cataract     LEFT AND RIGHT   • Hypertension    • Insomnia    • Knee pain    • PAF (paroxysmal atrial fibrillation) (CMS/HCC)    • Right hip pain    • Sleep apnea     cpap       Past Surgical History:   Procedure Laterality Date   • CARDIAC ABLATION     • CARDIAC CATHETERIZATION     • COLONOSCOPY N/A 2018    Procedure: COLONOSCOPY INTO CECUM WITH COLD BX POLYPECTOMY ;  Surgeon: Ross Stearns MD;  Location: [facility] ENDOSCOPY;  Service:    • HERNIA REPAIR      INGUINAL HERNIA? SIDE   • TOTAL HIP ARTHROPLASTY Right 2020    Procedure: TOTAL HIP ARTHROPLASTY;  Surgeon: Travis Hamlin MD;  Location: [facility] OR;  Service: Orthopedics;  Laterality: Right;       Social History     Socioeconomic  History   • Marital status:      Spouse name: Not on file   • Number of children: Not on file   • Years of education: Not on file   • Highest education level: Not on file   Tobacco Use   • Smoking status: Never Smoker   • Smokeless tobacco: Current User     Types: Chew   • Tobacco comment: 2 weekly chew   Substance and Sexual Activity   • Alcohol use: Never     Frequency: Never   • Drug use: Never   • Sexual activity: Defer       Family History   Problem Relation Age of Onset   • Breast cancer Mother    • Colon cancer Father    • Diabetes Brother    • No Known Problems Maternal Grandmother    • No Known Problems Maternal Grandfather    • No Known Problems Paternal Grandmother    • Stroke Paternal Grandfather    • Heart disease Brother    • Malig Hyperthermia Neg Hx        Review of Systems   Constitution: Negative for decreased appetite, fever, malaise/fatigue and weight loss.   HENT: Negative for nosebleeds.    Eyes: Negative for double vision.   Cardiovascular: Negative for chest pain, claudication, cyanosis, dyspnea on exertion, irregular heartbeat, leg swelling, near-syncope, orthopnea, palpitations, paroxysmal nocturnal dyspnea and syncope.   Respiratory: Negative for cough, hemoptysis and shortness of breath.    Hematologic/Lymphatic: Negative for bleeding problem.   Skin: Negative for rash.   Musculoskeletal: Negative for falls and myalgias.   Gastrointestinal: Negative for hematochezia, jaundice, melena, nausea and vomiting.   Genitourinary: Negative for hematuria.   Neurological: Negative for dizziness and seizures.   Psychiatric/Behavioral: Negative for altered mental status and memory loss.       Allergies   Allergen Reactions   • Ativan [Lorazepam] Delirium         Current Outpatient Medications:   •  acetaminophen (TYLENOL) 325 MG tablet, Take 2 tablets by mouth Every 4 (Four) Hours As Needed for Mild Pain ., Disp: 30 tablet, Rfl: 0  •  amLODIPine (NORVASC) 5 MG tablet, Take 1 tablet by mouth  "Daily., Disp: 30 tablet, Rfl: 3  •  chlorthalidone (HYGROTON) 25 MG tablet, TAKE 1 TABLET BY MOUTH DAILY, Disp: 90 tablet, Rfl: 1  •  citalopram (CeleXA) 20 MG tablet, Take 20 mg by mouth Every Morning., Disp: , Rfl:   •  dabigatran etexilate (Pradaxa) 150 MG capsu, Take 150 mg by mouth 2 (Two) Times a Day., Disp: , Rfl:   •  metoprolol tartrate (LOPRESSOR) 25 MG tablet, Take 0.5 tablets by mouth 2 (Two) Times a Day., Disp: 90 tablet, Rfl: 3  •  tamsulosin (FLOMAX) 0.4 MG capsule 24 hr capsule, Take 1 capsule by mouth Daily., Disp: 30 capsule, Rfl: 3  •  zolpidem (AMBIEN) 10 MG tablet, TK 1/2 TO 1 T PO HS, Disp: , Rfl:       Objective:     Vitals:    09/02/20 1413   BP: 144/66   BP Location: Left arm   Pulse: 61   Weight: 106 kg (233 lb 9.6 oz)   Height: 188 cm (74\")     Body mass index is 29.99 kg/m².    Physical Exam   Constitutional: He is oriented to person, place, and time. He appears well-developed and well-nourished.   HENT:   Head: Normocephalic.   Eyes: No scleral icterus.   Neck: No JVD present. No thyromegaly present.   Cardiovascular: Normal rate, regular rhythm and normal heart sounds. Exam reveals no gallop and no friction rub.   No murmur heard.  Pulmonary/Chest: Effort normal and breath sounds normal. He has no wheezes. He has no rales.   Abdominal: Soft. There is no hepatosplenomegaly. There is no tenderness.   Musculoskeletal: Normal range of motion. He exhibits no edema.   Lymphadenopathy:     He has no cervical adenopathy.   Neurological: He is alert and oriented to person, place, and time.   Skin: Skin is warm and dry. No rash noted.   Psychiatric: He has a normal mood and affect.         ECG 12 Lead  Date/Time: 9/2/2020 2:56 PM  Performed by: Claus Lindquist MD  Authorized by: Claus Lindquist MD   Comparison: compared with previous ECG   Similar to previous ECG  Rhythm: sinus rhythm  Conduction: right bundle branch block and left anterior fascicular block    Clinical impression: abnormal " EKG             Assessment:       Diagnosis Plan   1. Essential hypertension     2. Paroxysmal atrial fibrillation (CMS/HCC)     3. Bifascicular block            Plan:       I think in general he is doing well I am not seeing any big changes his ECG is abnormal but unchanged his functioning at a high level without symptoms back in a change to them today him to skin him come back and see us in a year sooner if he has trouble    As always, it has been a pleasure to participate in your patient's care.      Sincerely,       Claus Lindquist MD

## 2020-09-23 ENCOUNTER — TRANSCRIBE ORDERS (OUTPATIENT)
Dept: ADMINISTRATIVE | Facility: HOSPITAL | Age: 75
End: 2020-09-23

## 2020-09-23 ENCOUNTER — LAB REQUISITION (OUTPATIENT)
Dept: LAB | Facility: HOSPITAL | Age: 75
End: 2020-09-23

## 2020-09-23 DIAGNOSIS — Z00.00 ENCOUNTER FOR GENERAL ADULT MEDICAL EXAMINATION WITHOUT ABNORMAL FINDINGS: ICD-10-CM

## 2020-09-23 DIAGNOSIS — N28.1 RENAL CYST: Primary | ICD-10-CM

## 2020-09-23 PROCEDURE — U0004 COV-19 TEST NON-CDC HGH THRU: HCPCS | Performed by: OPHTHALMOLOGY

## 2020-09-24 LAB — SARS-COV-2 RNA RESP QL NAA+PROBE: NOT DETECTED

## 2020-10-14 ENCOUNTER — TELEPHONE (OUTPATIENT)
Dept: ORTHOPEDIC SURGERY | Facility: CLINIC | Age: 75
End: 2020-10-14

## 2020-10-14 NOTE — TELEPHONE ENCOUNTER
Caller: PEG MONTE    Relationship:     Best call back number: [phone]    What form or medical record are you requesting: POC    How would you like to receive the form or medical records (pick-up, mail, fax): FAX  If fax, what is the fax number: [phone]

## 2020-11-09 ENCOUNTER — HOSPITAL ENCOUNTER (OUTPATIENT)
Dept: ULTRASOUND IMAGING | Facility: HOSPITAL | Age: 75
Discharge: HOME | End: 2020-11-09
Admitting: INTERNAL MEDICINE

## 2020-11-09 DIAGNOSIS — N28.1 RENAL CYST: ICD-10-CM

## 2020-11-09 PROCEDURE — 76775 US EXAM ABDO BACK WALL LIM: CPT

## 2020-11-25 ENCOUNTER — TELEPHONE (OUTPATIENT)
Dept: ORTHOPEDIC SURGERY | Facility: CLINIC | Age: 75
End: 2020-11-25

## 2020-11-25 RX ORDER — CEPHALEXIN 500 MG/1
2000 CAPSULE ORAL ONCE
Qty: 20 CAPSULE | Refills: 5 | Status: SHIPPED | OUTPATIENT
Start: 2020-11-25 | End: 2020-11-25

## 2020-11-25 NOTE — TELEPHONE ENCOUNTER
Patient had a total right hip 6/5/20 and has a dental appointment Monday 11/23. Needs medication sent to the pharmacy.

## 2020-12-24 RX ORDER — ATENOLOL 100 MG/1
TABLET ORAL
Qty: 180 CAPSULE | Refills: 2 | Status: SHIPPED | OUTPATIENT
Start: 2020-12-24 | End: 2021-10-13

## 2021-01-07 ENCOUNTER — PREP FOR SURGERY (OUTPATIENT)
Dept: OTHER | Facility: HOSPITAL | Age: 76
End: 2021-01-07

## 2021-01-07 DIAGNOSIS — Z80.0 FAMILY HISTORY OF COLON CANCER: ICD-10-CM

## 2021-01-07 DIAGNOSIS — Z86.010 HISTORY OF COLON POLYPS: ICD-10-CM

## 2021-01-07 DIAGNOSIS — Z83.71 FAMILY HISTORY OF COLONIC POLYPS: Primary | ICD-10-CM

## 2021-01-12 PROBLEM — Z83.719 FAMILY HISTORY OF COLONIC POLYPS: Status: ACTIVE | Noted: 2021-01-12

## 2021-01-12 PROBLEM — Z83.71 FAMILY HISTORY OF COLONIC POLYPS: Status: ACTIVE | Noted: 2021-01-12

## 2021-01-21 ENCOUNTER — TRANSCRIBE ORDERS (OUTPATIENT)
Dept: SLEEP MEDICINE | Facility: HOSPITAL | Age: 76
End: 2021-01-21

## 2021-01-21 DIAGNOSIS — Z01.818 OTHER SPECIFIED PRE-OPERATIVE EXAMINATION: Primary | ICD-10-CM

## 2021-02-01 ENCOUNTER — LAB (OUTPATIENT)
Dept: LAB | Facility: HOSPITAL | Age: 76
End: 2021-02-01

## 2021-02-01 DIAGNOSIS — Z01.818 OTHER SPECIFIED PRE-OPERATIVE EXAMINATION: ICD-10-CM

## 2021-02-01 PROCEDURE — U0004 COV-19 TEST NON-CDC HGH THRU: HCPCS

## 2021-02-01 PROCEDURE — C9803 HOPD COVID-19 SPEC COLLECT: HCPCS

## 2021-02-02 LAB — SARS-COV-2 RNA RESP QL NAA+PROBE: NOT DETECTED

## 2021-02-03 ENCOUNTER — ANESTHESIA EVENT (OUTPATIENT)
Dept: GASTROENTEROLOGY | Facility: HOSPITAL | Age: 76
End: 2021-02-03

## 2021-02-03 ENCOUNTER — HOSPITAL ENCOUNTER (OUTPATIENT)
Facility: HOSPITAL | Age: 76
Setting detail: HOSPITAL OUTPATIENT SURGERY
Discharge: HOME | End: 2021-02-03
Attending: SURGERY | Admitting: SURGERY

## 2021-02-03 ENCOUNTER — ANESTHESIA (OUTPATIENT)
Dept: GASTROENTEROLOGY | Facility: HOSPITAL | Age: 76
End: 2021-02-03

## 2021-02-03 VITALS
HEART RATE: 54 BPM | DIASTOLIC BLOOD PRESSURE: 78 MMHG | WEIGHT: 224 LBS | HEIGHT: 73 IN | BODY MASS INDEX: 29.69 KG/M2 | OXYGEN SATURATION: 97 % | RESPIRATION RATE: 16 BRPM | SYSTOLIC BLOOD PRESSURE: 150 MMHG

## 2021-02-03 DIAGNOSIS — Z86.010 HISTORY OF COLON POLYPS: ICD-10-CM

## 2021-02-03 DIAGNOSIS — Z80.0 FAMILY HISTORY OF COLON CANCER: ICD-10-CM

## 2021-02-03 DIAGNOSIS — Z83.71 FAMILY HISTORY OF COLONIC POLYPS: ICD-10-CM

## 2021-02-03 PROCEDURE — 45385 COLONOSCOPY W/LESION REMOVAL: CPT | Performed by: SURGERY

## 2021-02-03 PROCEDURE — S0260 H&P FOR SURGERY: HCPCS | Performed by: SURGERY

## 2021-02-03 PROCEDURE — 88305 TISSUE EXAM BY PATHOLOGIST: CPT | Performed by: SURGERY

## 2021-02-03 PROCEDURE — 45380 COLONOSCOPY AND BIOPSY: CPT | Performed by: SURGERY

## 2021-02-03 PROCEDURE — 25010000002 PROPOFOL 10 MG/ML EMULSION: Performed by: ANESTHESIOLOGY

## 2021-02-03 PROCEDURE — 25010000002 GLUCAGON (RDNA) PER 1 MG: Performed by: SURGERY

## 2021-02-03 RX ORDER — SODIUM CHLORIDE, SODIUM LACTATE, POTASSIUM CHLORIDE, CALCIUM CHLORIDE 600; 310; 30; 20 MG/100ML; MG/100ML; MG/100ML; MG/100ML
1000 INJECTION, SOLUTION INTRAVENOUS CONTINUOUS
Status: DISCONTINUED | OUTPATIENT
Start: 2021-02-03 | End: 2021-02-03 | Stop reason: HOSPADM

## 2021-02-03 RX ORDER — LIDOCAINE HYDROCHLORIDE 10 MG/ML
0.5 INJECTION, SOLUTION INFILTRATION; PERINEURAL ONCE AS NEEDED
Status: DISCONTINUED | OUTPATIENT
Start: 2021-02-03 | End: 2021-02-03 | Stop reason: HOSPADM

## 2021-02-03 RX ORDER — PROPOFOL 10 MG/ML
VIAL (ML) INTRAVENOUS AS NEEDED
Status: DISCONTINUED | OUTPATIENT
Start: 2021-02-03 | End: 2021-02-03 | Stop reason: SURG

## 2021-02-03 RX ORDER — SODIUM CHLORIDE 0.9 % (FLUSH) 0.9 %
10 SYRINGE (ML) INJECTION AS NEEDED
Status: DISCONTINUED | OUTPATIENT
Start: 2021-02-03 | End: 2021-02-03 | Stop reason: HOSPADM

## 2021-02-03 RX ORDER — LIDOCAINE HYDROCHLORIDE 20 MG/ML
INJECTION, SOLUTION INFILTRATION; PERINEURAL AS NEEDED
Status: DISCONTINUED | OUTPATIENT
Start: 2021-02-03 | End: 2021-02-03 | Stop reason: SURG

## 2021-02-03 RX ADMIN — PROPOFOL 290 MG: 10 INJECTION, EMULSION INTRAVENOUS at 09:13

## 2021-02-03 RX ADMIN — SODIUM CHLORIDE, POTASSIUM CHLORIDE, SODIUM LACTATE AND CALCIUM CHLORIDE 1000 ML: 600; 310; 30; 20 INJECTION, SOLUTION INTRAVENOUS at 08:13

## 2021-02-03 RX ADMIN — LIDOCAINE HYDROCHLORIDE 100 MG: 20 INJECTION, SOLUTION INFILTRATION; PERINEURAL at 09:12

## 2021-02-03 NOTE — OP NOTE
PREOPERATIVE DIAGNOSIS:  · High risk screening secondary to personal history of polyps (tubulovillous adenoma 3 years ago)  · Family history of colon cancer (father)    POSTOPERATIVE DIAGNOSIS AND FINDINGS:  · Small ascending colon polyp  · Subcentimeter transverse colon polyp  · Small descending colon polyp  · Mild sigmoid diverticulosis    CEDURE:  Colonoscopy to cecum with:  · Cold biopsy removal of ascending colon polyp  · Snare polypectomy of transverse colon polyp  · Cold biopsy removal of descending colon polyp    SURGEON:  Ross Stearns MD    ANESTHESIA:  MAC    SPECIMEN(S):  Polyps    DESCRIPTION:  In decubitus position digital rectal exam was normal. Colonoscope inserted under direct visualization of lumen to cecum confirmed by visualization of ileocecal valve and appendiceal orifice.    Scope slowly withdrawn circumferentially examining all mucosal surfaces.    Quality of bowel preparation good.    Small ascending colon polyp was identified and removed with a cold biopsy forceps, good hemostasis at the site.  Subcentimeter transverse colon polyp identified and removed completely with a hot snare and retrieved, good hemostasis at the site.  Small descending colon polyp removed with cold biopsy forceps, good hemostasis at the site.  No other mucosal abnormalities noted.    Mild sigmoid diverticulosis seen.    Tolerated well.    RECOMMENDATION FOR FUTURE SURVEILLANCE:  To be determined based on polyp pathology and issued as separate report    Ross Stearns M.D.

## 2021-02-03 NOTE — ANESTHESIA PREPROCEDURE EVALUATION
Anesthesia Evaluation     Patient summary reviewed and Nursing notes reviewed   no history of anesthetic complications:  NPO Solid Status: > 8 hours  NPO Liquid Status: > 2 hours           Airway   Mallampati: I  Neck ROM: full  no difficulty expected  Dental - normal exam     Pulmonary - normal exam   (+) sleep apnea on CPAP,   (-) COPD, asthmaSmoker: smokeless tobacco only.    PE comment: nonlabored  Cardiovascular - normal exam    Rhythm: regular  Rate: normal    (+) hypertension, dysrhythmias (s/p ablation for a-fib; Bifasicular Block) Atrial Fib, Atrial Flutter,   (-) valvular problems/murmurs, past MI, CAD, angina      Neuro/Psych- negative ROS  (-) seizures, TIA, CVA  GI/Hepatic/Renal/Endo    (+) obesity,     (-) GERD, liver disease, no renal disease, diabetes, no thyroid disorder    Musculoskeletal     (+) arthralgias,   Abdominal    Substance History      OB/GYN          Other   arthritis, blood dyscrasia (on pradaxa--stopped 3days),                       Anesthesia Plan    ASA 3     MAC     intravenous induction     Anesthetic plan, all risks, benefits, and alternatives have been provided, discussed and informed consent has been obtained with: patient.

## 2021-02-03 NOTE — H&P
HPI: Tubulovillous adenoma on last colonoscopy in February 2018.  Also has family history of colon cancer in his father.    PMH, PSH, MEDS AND ALLERGIES reviewed and reconciled with EPIC    PHYSICAL EXAM:  -  Constitutional:  no acute distress  -  Respiratory:  normal inspiratory effort  -  Cardiovascular: regular rate  -  Gastrointestinal: Soft    ASSESSMENT/PLAN:    Colonoscopy    Ross Stearns M.D.

## 2021-02-03 NOTE — ANESTHESIA POSTPROCEDURE EVALUATION
Patient: Mark Aguirre Jr.    Procedure Summary     Date: 02/03/21 [facility] / [facility]    Anesthesia Start: 0910 Anesthesia Stop: 0932    Procedure: COLONOSCOPY TOCECUM WITH COLD BX POLYPECTOMY AND HOT SNARE POLYPECTOMY (N/A ) Diagnosis:       Family history of colonic polyps      Family history of colon cancer      History of colon polyps      (Family history of colonic polyps [Z83.71])      (Family history of colon cancer [Z80.0])      (History of colon polyps [Z86.010])    Surgeon: Ross Stearns MD Provider: Hemant Curry MD    Anesthesia Type: MAC ASA Status: 3          Anesthesia Type: MAC    Vitals  Vitals Value Taken Time   /78 02/03/21 0951   Temp     Pulse 54 02/03/21 0951   Resp 16 02/03/21 0951   SpO2 97 % 02/03/21 0951           Post Anesthesia Care and Evaluation      Comments: Pt. Discharged prior to being evaluated by anesthesia.  Chart is reviewed and no complications are noted.  THIS CASE IS NOT MEDICALLY DIRECTED

## 2021-02-03 NOTE — DISCHARGE INSTRUCTIONS

## 2021-02-04 ENCOUNTER — TELEPHONE (OUTPATIENT)
Dept: SURGERY | Facility: CLINIC | Age: 76
End: 2021-02-04

## 2021-02-04 ENCOUNTER — DOCUMENTATION (OUTPATIENT)
Dept: SURGERY | Facility: CLINIC | Age: 76
End: 2021-02-04

## 2021-02-04 LAB
CYTO UR: NORMAL
LAB AP CASE REPORT: NORMAL
PATH REPORT.FINAL DX SPEC: NORMAL
PATH REPORT.GROSS SPEC: NORMAL

## 2021-02-04 NOTE — TELEPHONE ENCOUNTER
Patient notified of benign polyps and to have colonoscopy in 3 years. I have placed in Epic for Recall. Patient very thankful and voiced understanding.

## 2021-02-04 NOTE — PROGRESS NOTES
ENDOSCOPY FOLLOW UP NOTE    Colonoscopy 2/3/2021    Indication:  Personal history of polyps and family history of colon cancer (father)    Findings (Pathology):  · Small ascending colon polyp (tubular adenoma)  · Subcentimeter transverse colon polyp (tubular adenoma)  · Small descending colon polyp (mixed hyperplastic and tubular adenoma)  · Mild sigmoid diverticulosis    Recommendations:  3-year surveillance    Ross Stearns M.D.

## 2021-02-04 NOTE — TELEPHONE ENCOUNTER
----- Message from Ross Stearns MD sent at 2/4/2021 11:24 AM EST -----  Please let him know that he had 3 benign polyps.  Based on the number and type of polyps, 3-year surveillance is again recommended-put in computer for reminder

## 2021-02-22 RX ORDER — CHLORTHALIDONE 25 MG/1
25 TABLET ORAL DAILY
Qty: 90 TABLET | Refills: 2 | Status: SHIPPED | OUTPATIENT
Start: 2021-02-22 | End: 2021-11-24

## 2021-03-14 NOTE — DISCHARGE PLACEMENT REQUEST
"Harjeet Aguirre JrDieudonne (74 y.o. Male)     Date of Birth Social Security Number Address Home Phone MRN    1945  [address]   [facility] [phone] [id number]    Orthodox Marital Status          Christianity        Admission Date Admission Type Admitting Provider Attending Provider Department, Room/Bed    6/5/20 Elective Travis Hamlin MD Brown, Reid B, MD [address], P784/1    Discharge Date Discharge Disposition Discharge Destination                       Attending Provider:  Travis Hamlin MD    Allergies:  No Known Allergies    Isolation:  None   Infection:  None   Code Status:  CPR    Ht:  188 cm (74\")   Wt:  108 kg (238 lb 8.6 oz)    Admission Cmt:  None   Principal Problem:  Hip pain, right [M25.551] More...                 Active Insurance as of 6/5/2020     Primary Coverage     Payor Plan Insurance Group Employer/Plan Group    MEDICARE MEDICARE A & B      Payor Plan Address Payor Plan Phone Number Payor Plan Fax Number Effective Dates    [address] [phone]  8/1/2008 - None Entered    [facility] [id number]       Subscriber Name Subscriber Birth Date Member ID       HARJEET AGUIRRE JR. 1945 [id number]           Secondary Coverage     Payor Plan Insurance Group Employer/Plan Group    [facility] SUPP [id number]     Payor Plan Address Payor Plan Phone Number Payor Plan Fax Number Effective Dates    [address]   8/1/2008 - None Entered    [facility] [id number]       Subscriber Name Subscriber Birth Date Member ID       HARJEET AGUIRRE JRDieudonne 1945 [id number]                 Emergency Contacts      (Rel.) Home Phone Work Phone Mobile Phone    Alexandra Aguirre (Spouse) [phone] -- [phone]              " Pt admitted for chronic leg pain, swelling and weakness leading to multiple falls in past   Pt had her recent most fall day prior to admission and suffered trauma to her head  Pt has chronic venous ulcers which appear infected with foul smelling discharge  Symptoms likely due to Venous stasis and less likely to be Cardiac in origin      Blood cx NGTD  PT: BRIGIDO  Fall precaution   Venous doppler negative for DVT  On Cefazolin, will dc on Keflex  ID- Dr. Eaton

## 2021-03-19 ENCOUNTER — TRANSCRIBE ORDERS (OUTPATIENT)
Dept: CARDIOLOGY | Facility: CLINIC | Age: 76
End: 2021-03-19

## 2021-03-19 DIAGNOSIS — R00.2 PALPITATIONS: Primary | ICD-10-CM

## 2021-06-24 ENCOUNTER — OFFICE VISIT (OUTPATIENT)
Dept: ORTHOPEDIC SURGERY | Facility: CLINIC | Age: 76
End: 2021-06-24

## 2021-06-24 VITALS — WEIGHT: 231 LBS | RESPIRATION RATE: 18 BRPM | TEMPERATURE: 96.9 F | BODY MASS INDEX: 29.65 KG/M2 | HEIGHT: 74 IN

## 2021-06-24 DIAGNOSIS — M25.551 HIP PAIN, RIGHT: Primary | ICD-10-CM

## 2021-06-24 DIAGNOSIS — Z96.642 STATUS POST LEFT HIP REPLACEMENT: ICD-10-CM

## 2021-06-24 PROCEDURE — 73502 X-RAY EXAM HIP UNI 2-3 VIEWS: CPT | Performed by: ORTHOPAEDIC SURGERY

## 2021-06-24 PROCEDURE — 99212 OFFICE O/P EST SF 10 MIN: CPT | Performed by: ORTHOPAEDIC SURGERY

## 2021-06-24 NOTE — PROGRESS NOTES
"Mark Aguirre Jr. : 1945 MRN: [id number] DATE: 2021    CHIEF COMPLAINT:  Follow up left total hip      SUBJECTIVE:Patient returns today for 1 year follow up of left total hip replacement. Patient reports doing well with no unusual complaints. Denies any limitations due to the hip.    OBJECTIVE:    Temp 96.9 °F (36.1 °C)   Resp 18   Ht 188 cm (74\")   Wt 105 kg (231 lb)   BMI 29.66 kg/m²   Family History   Problem Relation Age of Onset   • Breast cancer Mother    • Colon cancer Father    • Diabetes Brother    • No Known Problems Maternal Grandmother    • No Known Problems Maternal Grandfather    • No Known Problems Paternal Grandmother    • Stroke Paternal Grandfather    • Heart disease Brother    • Malig Hyperthermia Neg Hx      Past Medical History:   Diagnosis Date   • Acute kidney failure (CMS/HCC)     POST-OP TOTAL HIP    • Anticoagulated     PRADAXA FOR A FIB TO HELD 3 DAYS PRIOR   • Arthritis     OSTEO. RIGHT HIP   • Atrial flutter (CMS/HCC)    • Bifascicular block    • Cataract     LEFT AND RIGHT   • History of colon polyps    • Hypertension    • Insomnia    • Knee pain    • PAF (paroxysmal atrial fibrillation) (CMS/HCC)    • Right hip pain    • Sleep apnea     cpap     Past Surgical History:   Procedure Laterality Date   • CARDIAC ABLATION     • CARDIAC CATHETERIZATION     • COLONOSCOPY N/A 2018    Procedure: COLONOSCOPY INTO CECUM WITH COLD BX POLYPECTOMY ;  Surgeon: Ross Stearns MD;  Location: [facility] ENDOSCOPY;  Service:    • COLONOSCOPY N/A 2/3/2021    Procedure: COLONOSCOPY TOCECUM WITH COLD BX POLYPECTOMY AND HOT SNARE POLYPECTOMY;  Surgeon: Ross Stearns MD;  Location: [facility] ENDOSCOPY;  Service: General;  Laterality: N/A;  PERSONAL HX OF POLYPS, FAMILY HX OF COLON CANCER  --POLYPS (X3), DIVERTICULOSIS   • HERNIA REPAIR      INGUINAL HERNIA? SIDE   • TOTAL HIP ARTHROPLASTY Right 2020    Procedure: TOTAL HIP ARTHROPLASTY;  Surgeon: Travis Hamlin MD;  Location[facility];  Service: Orthopedics;  Laterality: Right;     Social History     Socioeconomic History   • Marital status:      Spouse name: Not on file   • Number of children: Not on file   • Years of education: Not on file   • Highest education level: Not on file   Tobacco Use   • Smoking status: Never Smoker   • Smokeless tobacco: Current User     Types: Chew   • Tobacco comment: 2 weekly chew   Vaping Use   • Vaping Use: Never assessed   Substance and Sexual Activity   • Alcohol use: Never   • Drug use: Never   • Sexual activity: Defer       Review of Systems: 14 point review of systems performed pertinent positives and negatives discussed above, all other systems are negative    Exam:. The incision is well healed. Range of motion is good without irritability. The calf is soft and nontender with a negative Homans sign. Alignment is neutral. Leg lengths are equal. Good hip flexion and abduction strength.Walks with nonantalgic gait. Intact to light touch with palpable distal pulses.     DIAGNOSTIC STUDIES  Xrays:Xrays 2 view left hip AP and lateral were ordered and reviewed for follow up of total joint which demonstrate a well positioned ARACELY without complicating factors.  In comparison to previous films there are no changes.    ASSESSMENT:   Follow up left hip replacement. doing well       PLAN:    Continue activities as tolerated  Follow up TRIP Hamlin MD  6/24/2021

## 2021-09-01 ENCOUNTER — OFFICE VISIT (OUTPATIENT)
Dept: SLEEP MEDICINE | Facility: HOSPITAL | Age: 76
End: 2021-09-01

## 2021-09-01 VITALS — WEIGHT: 232 LBS | HEIGHT: 74 IN | BODY MASS INDEX: 29.77 KG/M2

## 2021-09-01 DIAGNOSIS — G47.33 OSA ON CPAP: Primary | ICD-10-CM

## 2021-09-01 DIAGNOSIS — Z99.89 OSA ON CPAP: Primary | ICD-10-CM

## 2021-09-01 PROCEDURE — 99213 OFFICE O/P EST LOW 20 MIN: CPT | Performed by: INTERNAL MEDICINE

## 2021-09-01 PROCEDURE — G0463 HOSPITAL OUTPT CLINIC VISIT: HCPCS

## 2021-09-01 NOTE — PROGRESS NOTES
"Follow Up [facility] Disorders [facility] Note     Chief Complaint:  STEPHENIE     Primary Care Physician: Luis Staples MD    Interval History:   The patient is a 76 y.o. male  who I last saw 7/30/2020 and that note was reviewed.  The patient reports he is stable without new complaints.  He goes to bed at 9:30 PM and awakens at 5:30 AM.    Self-administered [place] Sleepiness Scale test results: 1  0-5 Lower normal daytime sleepiness  6-10 Higher normal daytime sleepiness  11-12 Mild, 13-15 Moderate, & 16-24 Severe excessive daytime sleepiness    Review of Systems:    A complete review of systems was done and all were negative with the exception of the above    Social History:    Social History     Socioeconomic History   • Marital status:      Spouse name: Not on file   • Number of children: Not on file   • Years of education: Not on file   • Highest education level: Not on file   Tobacco Use   • Smoking status: Never Smoker   • Smokeless tobacco: Current User     Types: Chew   • Tobacco comment: 2 weekly chew   Vaping Use   • Vaping Use: Never assessed   Substance and Sexual Activity   • Alcohol use: Never   • Drug use: Never   • Sexual activity: Defer       Allergies:  Ativan [lorazepam]     Medication Review:  Reviewed.      Vital Signs:    Vitals:    09/01/21 0700   Weight: 105 kg (232 lb)   Height: 188 cm (74.02\")     Body mass index is 29.77 kg/m².    Physical Exam:    Constitutional:  Well developed 76 y.o. male that appears in no apparent distress.  Awake & oriented times 3.  Normal mood with normal recent and remote memory and normal judgement.  Eyes:  Conjunctivae normal.  Oropharynx: Previously, moist mucous membranes without exudate and a large tongue with scalloped margins and a normal uvula and a narrowed posterior pharyngeal opening and class III Mallampati airway, patient is wearing a facemask.     Downloaded PAP Data Reviewed For Compliance:  DME is [facility] and he uses a nasal interface.  Downloads " between 6/1 and 8/29/2021 compliance 97%.  Average usage is 7 hours and 20 minutes.  Average AHI is mildly abnormal 11.5 and all subsets are normal and he does have a significant leak.  Average auto CPAP pressure is 14.1 and his auto CPAP is 12-15.    I have reviewed the above results and compared them with the patient's last downloads and reviewed with the patient.    Impression:   Obstructive sleep apnea adequately treated with auto CPAP. The patient appears to be at goal with good compliance and usage. The patient has no complaints of hypersomnolence.    Plan:  Good sleep hygiene measures should be maintained.  Weight loss would be beneficial in this patient who is nearly obese by BMI.      After evaluating the patient and assessing results available, the patient is benefiting from the treatment being provided.     The patient will continue auto CPAP.  After clinical evaluation and review of downloads, I recommend no changes to the patient's pressures.  A new prescription will be sent to the patient's DME.    I answered all of the patient's questions.  The patient will call for any problems and will follow up in 1 year.      Rian Johns MD  Sleep Medicine  09/01/21  08:48 EDT

## 2021-09-24 ENCOUNTER — TRANSCRIBE ORDERS (OUTPATIENT)
Dept: ADMINISTRATIVE | Facility: HOSPITAL | Age: 76
End: 2021-09-24

## 2021-09-24 DIAGNOSIS — R41.3 MEMORY LOSS: Primary | ICD-10-CM

## 2021-09-28 ENCOUNTER — HOSPITAL ENCOUNTER (OUTPATIENT)
Dept: MRI IMAGING | Facility: HOSPITAL | Age: 76
Discharge: HOME | End: 2021-09-28
Admitting: INTERNAL MEDICINE

## 2021-09-28 DIAGNOSIS — R41.3 MEMORY LOSS: ICD-10-CM

## 2021-09-28 PROCEDURE — 70551 MRI BRAIN STEM W/O DYE: CPT

## 2021-10-06 ENCOUNTER — OFFICE VISIT (OUTPATIENT)
Dept: CARDIOLOGY | Facility: CLINIC | Age: 76
End: 2021-10-06

## 2021-10-06 VITALS
HEIGHT: 74 IN | SYSTOLIC BLOOD PRESSURE: 116 MMHG | BODY MASS INDEX: 29.57 KG/M2 | DIASTOLIC BLOOD PRESSURE: 72 MMHG | HEART RATE: 48 BPM | WEIGHT: 230.4 LBS

## 2021-10-06 DIAGNOSIS — I48.0 PAROXYSMAL ATRIAL FIBRILLATION (HCC): Primary | ICD-10-CM

## 2021-10-06 DIAGNOSIS — I10 ESSENTIAL HYPERTENSION: ICD-10-CM

## 2021-10-06 PROCEDURE — 99214 OFFICE O/P EST MOD 30 MIN: CPT | Performed by: NURSE PRACTITIONER

## 2021-10-06 PROCEDURE — 93000 ELECTROCARDIOGRAM COMPLETE: CPT | Performed by: NURSE PRACTITIONER

## 2021-10-06 NOTE — PROGRESS NOTES
Date of Office Visit: 10/06/2021  Encounter Provider: KEISHA Barcenas  Place of Service: [facility] CARDIOLOGY  Patient Name: Mark Aguirre Jr.  :1945    Chief Complaint   Patient presents with   • Hypertension   :     HPI: Mark Aguirre Jr. is a 76 y.o. male who presents today for follow-up.  Old records have been obtained and reviewed by me.  He is a patient of Dr. Lindquist's with a past cardiac history significant for hypertension, RBBB and LAFB, and paroxysmal atrial fibrillation.  He has undergone 2 atrial fibrillation ablations in the past.  He was last seen in the office by Dr. Lindquist in 2020 at which time he was doing well with no complaints of angina or heart failure.  No changes were made to his medical regimen, and he was advised to follow-up in 1 year.   Overall he has been feeling well.  He denies any chest pain, shortness of breath, edema, syncope, bleeding difficulties or melena.  He reports infrequent palpitations that are very short in duration.  He does report lightheadedness on occasion which he attributes to his blood pressure.  [place] in [place] and enjoys hunting game.  He has 12 grandchildren.  He is scheduled for a 2-day physical next week with Dr. Staples.    Past Medical History:   Diagnosis Date   • Acute kidney failure (HCC)     POST-OP TOTAL HIP    • Anticoagulated     PRADAXA FOR A FIB TO HELD 3 DAYS PRIOR   • Arthritis     OSTEO. RIGHT HIP   • Atrial flutter (HCC)    • Bifascicular block    • Cataract     LEFT AND RIGHT   • History of colon polyps    • Hypertension    • Insomnia    • Knee pain    • PAF (paroxysmal atrial fibrillation) (HCC)    • Right hip pain    • Sleep apnea     cpap       Past Surgical History:   Procedure Laterality Date   • CARDIAC ABLATION     • CARDIAC CATHETERIZATION     • COLONOSCOPY N/A 2018    Procedure: COLONOSCOPY INTO CECUM WITH COLD BX POLYPECTOMY ;  Surgeon: Ross Stearns,  MD;  Location: [facility] ENDOSCOPY;  Service:    • COLONOSCOPY N/A 2/3/2021    Procedure: COLONOSCOPY TOCECUM WITH COLD BX POLYPECTOMY AND HOT SNARE POLYPECTOMY;  Surgeon: Ross Stearns MD;  Location: [facility] ENDOSCOPY;  Service: General;  Laterality: N/A;  PERSONAL HX OF POLYPS, FAMILY HX OF COLON CANCER  --POLYPS (X3), DIVERTICULOSIS   • HERNIA REPAIR      INGUINAL HERNIA? SIDE   • TOTAL HIP ARTHROPLASTY Right 6/5/2020    Procedure: TOTAL HIP ARTHROPLASTY;  Surgeon: Travis Hamlin MD;  Location: [facility] MAIN OR;  Service: Orthopedics;  Laterality: Right;       Social History     Socioeconomic History   • Marital status:      Spouse name: Not on file   • Number of children: Not on file   • Years of education: Not on file   • Highest education level: Not on file   Tobacco Use   • Smoking status: Never Smoker   • Smokeless tobacco: Current User     Types: Chew   • Tobacco comment: 2 weekly chew   Vaping Use   • Vaping Use: Never assessed   Substance and Sexual Activity   • Alcohol use: Never   • Drug use: Never   • Sexual activity: Defer       Family History   Problem Relation Age of Onset   • Breast cancer Mother    • Colon cancer Father    • Diabetes Brother    • No Known Problems Maternal Grandmother    • No Known Problems Maternal Grandfather    • No Known Problems Paternal Grandmother    • Stroke Paternal Grandfather    • Heart disease Brother    • Malig Hyperthermia Neg Hx        Review of Systems   Constitutional: Negative.   Cardiovascular: Negative.  Negative for chest pain, dyspnea on exertion, leg swelling, orthopnea, paroxysmal nocturnal dyspnea and syncope.   Respiratory: Negative.    Hematologic/Lymphatic: Negative for bleeding problem.   Musculoskeletal: Negative for falls.   Gastrointestinal: Negative for melena.   Neurological: Positive for light-headedness. Negative for dizziness.       Allergies   Allergen Reactions   • Ativan [Lorazepam] Delirium         Current Outpatient Medications:   •   "acetaminophen (TYLENOL) 325 MG tablet, Take 2 tablets by mouth Every 4 (Four) Hours As Needed for Mild Pain ., Disp: 30 tablet, Rfl: 0  •  amLODIPine (NORVASC) 5 MG tablet, Take 1 tablet by mouth Daily., Disp: 30 tablet, Rfl: 3  •  chlorthalidone (HYGROTON) 25 MG tablet, TAKE 1 TABLET BY MOUTH DAILY, Disp: 90 tablet, Rfl: 2  •  citalopram (CeleXA) 20 MG tablet, Take 20 mg by mouth Every Morning., Disp: , Rfl:   •  metoprolol tartrate (LOPRESSOR) 25 MG tablet, TAKE 1/2 TABLET BY MOUTH TWICE DAILY, Disp: 90 tablet, Rfl: 3  •  Pradaxa 150 MG capsu, TAKE 1 CAPSULE BY MOUTH TWICE DAILY (Patient taking differently: HOLDING FOR SCOPE), Disp: 180 capsule, Rfl: 2  •  zolpidem (AMBIEN) 10 MG tablet, TK 1/2 TO 1 T PO HS, Disp: , Rfl:       Objective:     Vitals:    10/06/21 0850   BP: 116/72   Pulse: (!) 48   Weight: 105 kg (230 lb 6.4 oz)   Height: 188 cm (74\")     Body mass index is 29.58 kg/m².    PHYSICAL EXAM:    Neck:      Vascular: No JVD.   Pulmonary:      Effort: Pulmonary effort is normal.      Breath sounds: Normal breath sounds.   Cardiovascular:      Bradycardia present. Regular rhythm.      Murmurs: There is no murmur.      No gallop. No click. No rub.   Pulses:     Intact distal pulses.           ECG 12 Lead    Date/Time: 10/6/2021 8:59 AM  Performed by: Radha Vidal APRN  Authorized by: Radha Vidal APRN   Comparison: compared with previous ECG from 9/2/2020  Similar to previous ECG  Rhythm: sinus bradycardia  Rate: bradycardic  BPM: 48  Conduction: right bundle branch block and left anterior fascicular block    Clinical impression: abnormal EKG  Comments: Indication: Atrial fibrillation              Assessment:       Diagnosis Plan   1. Paroxysmal atrial fibrillation (HCC)  ECG 12 Lead   2. Essential hypertension       Orders Placed This Encounter   Procedures   • ECG 12 Lead     This order was created via procedure documentation     Order Specific Question:   Release to patient     Answer:   " Immediate          Plan:       1.  Paroxysmal atrial fibrillation.  Status post ablation x 2.  He is anticoagulated on Pradaxa.      2.  Hypertension.  His blood pressure is stable.  Continue current regimen with amlodipine and chlorthalidone.      3.  RBBB/LAFB.  He is rather bradycardic today.  Additionally, he is having intermittent lightheadedness.  Overall, he feels there has not been a significant change in his symptoms.  I feel he may no longer need the metoprolol.  He would like to further discuss this with Dr. Staples.      I think he is doing well overall.  He denies any symptoms of angina or heart failure.  He will follow-up with Dr. Lindquist in 1 year.      As always, it has been a pleasure to participate in your patient's care.      Sincerely,         KEISHA Chopra

## 2021-10-13 RX ORDER — ATENOLOL 100 MG/1
TABLET ORAL
Qty: 180 CAPSULE | Refills: 2 | Status: SHIPPED | OUTPATIENT
Start: 2021-10-13 | End: 2022-07-25

## 2021-10-14 ENCOUNTER — TELEPHONE (OUTPATIENT)
Dept: NEUROLOGY | Facility: CLINIC | Age: 76
End: 2021-10-14

## 2021-10-14 NOTE — TELEPHONE ENCOUNTER
Dr. Staples ([facility]) called wanting to talk to you regarding this patient. This patient sees you on 1/18/22.

## 2021-10-14 NOTE — TELEPHONE ENCOUNTER
Scheduled this patient with Dr. Javier due to a request from Dr. Cabrera.  He is now scheduled for 10/20/2021 at 1:00.  Told patient to arrive at 12:30.  Gave him office information.

## 2021-10-20 ENCOUNTER — OFFICE VISIT (OUTPATIENT)
Dept: NEUROLOGY | Facility: CLINIC | Age: 76
End: 2021-10-20

## 2021-10-20 VITALS
BODY MASS INDEX: 29.71 KG/M2 | DIASTOLIC BLOOD PRESSURE: 70 MMHG | HEART RATE: 88 BPM | SYSTOLIC BLOOD PRESSURE: 116 MMHG | WEIGHT: 231.48 LBS | HEIGHT: 74 IN | RESPIRATION RATE: 18 BRPM

## 2021-10-20 DIAGNOSIS — F07.81 POSTCONCUSSIVE SYNDROME: Primary | ICD-10-CM

## 2021-10-20 DIAGNOSIS — G25.0 ESSENTIAL TREMOR: ICD-10-CM

## 2021-10-20 PROCEDURE — 99204 OFFICE O/P NEW MOD 45 MIN: CPT | Performed by: STUDENT IN AN ORGANIZED HEALTH CARE EDUCATION/TRAINING PROGRAM

## 2021-10-20 NOTE — PROGRESS NOTES
Chief Complaint   Patient presents with   • Memory Loss     Couple of month ago it started    • Headache      Pt states he got hurt a couple of month got his head stuck in a vice    • Tremors     resting tremor ,shuffeling gait        Patient ID: Mark Aguirre Jr. is a 76 y.o. male.    HPI:    The following portions of the patient's history were reviewed and updated as appropriate: allergies, current medications, past family history, past medical history, past social history, past surgical history and problem list.    Review of Systems   Constitutional: Positive for activity change and fatigue. Negative for appetite change and chills.   HENT: Positive for hearing loss. Negative for ear pain, sinus pressure, sinus pain, sneezing, tinnitus and voice change.    Eyes: Negative for photophobia and visual disturbance.   Respiratory: Negative for choking, chest tightness, shortness of breath and wheezing.    Cardiovascular: Positive for palpitations. Negative for chest pain and leg swelling.   Gastrointestinal: Negative for abdominal pain, blood in stool, constipation, diarrhea, nausea and vomiting.   Endocrine: Negative for cold intolerance and heat intolerance.   Genitourinary: Negative for decreased urine volume, difficulty urinating, flank pain, hematuria, scrotal swelling, testicular pain and urgency.   Musculoskeletal: Positive for back pain and gait problem. Negative for joint swelling, neck pain and neck stiffness.   Allergic/Immunologic: Negative for food allergies.   Neurological: Positive for tremors, facial asymmetry, speech difficulty and light-headedness. Negative for dizziness, seizures, syncope, weakness, numbness and headaches.   Psychiatric/Behavioral: Positive for confusion and decreased concentration. Negative for agitation, hallucinations and sleep disturbance. The patient is not nervous/anxious.       Mr. Aguirre is a 76-year-old male with history of tremor and atrial fibrillation who presents today for  evaluation of tremor and memory.  The patient was duck hunting and someone did not see that his head was out of the blind and hit his head part of the hunting blind.  He did not lose consciousness.  But since then he has occasional dizziness, and awareness of the feeling in his head on the top of his head as well the left side of his head.  And finds himself forgetting things at times.  He also notes increased fatigue though this is improved since being taken off of metoprolol.  He notes that he has had a tremor for 2 to 3 months noticing it in both hands worse when he is doing things like reaching for his CPAP mask or doing tasks.  He has had 1 fall related to a running jump onto stairs.  He denies anosmia, and REM sleep behavior.  His brother and his mother had tremor but he is unaware of what their diagnoses may be.  He does not have rigidity.  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  Vitals:    10/20/21 1248   BP: 116/70   Pulse: 88   Resp: 18       Neurologic Exam     Mental Status   Oriented to person, place, and time.   Attention: normal. Concentration: normal.   Speech: speech is normal   Level of consciousness: alert    Cranial Nerves     CN II   Visual fields full to confrontation.   Visual acuity: normal    CN III, IV, VI   Pupils are equal, round, and reactive to light.  Extraocular motions are normal.     CN V   Facial sensation intact.     CN VII   Facial expression full, symmetric.     CN VIII   Hearing: intact    CN IX, X   Palate: symmetric    CN XI   Right trapezius strength: normal  Left trapezius strength: normal    CN XII   Tongue: not atrophic  Fasciculations: absent  Tongue deviation: none    Motor Exam   Muscle bulk: normal  Right arm tone: normal  Left arm tone: normal  Right leg tone: normal  Left leg tone: normal    Strength   Right deltoid: 5/5  Left deltoid: 5/5  Right biceps: 5/5  Left biceps: 5/5  Right  triceps: 5/5  Left triceps: 5/5  Right wrist flexion: 5/5  Left wrist flexion: 5/5  Right wrist extension: 5/5  Left wrist extension: 5/5  Right interossei: 5/5  Left interossei: 5/5  Right iliopsoas: 5/5  Left iliopsoas: 5/5  Right quadriceps: 5/5  Left quadriceps: 5/5  Right hamstrin/5  Left hamstrin/5  Right anterior tibial: 5/5  Left anterior tibial: 5/5  Right gastroc: 5/5  Left gastroc: 5/5    Sensory Exam   Right arm light touch: normal  Left arm light touch: normal  Right leg light touch: normal  Left leg light touch: normal    Gait, Coordination, and Reflexes     Gait  Gait: normal    Coordination   Romberg: negative  Finger to nose coordination: normal  Heel to shin coordination: normal  Tandem walking coordination: normal    Tremor   Intention tremor: present  Action tremor: absent    Reflexes   Right brachioradialis: 2+  Left brachioradialis: 2+  Right biceps: 2+  Left biceps: 2+  Right patellar: 2+  Left patellar: 2+Intention tremor seen in left upper extremity.       Physical Exam  Eyes:      Extraocular Movements: EOM normal.      Pupils: Pupils are equal, round, and reactive to light.   Neurological:      Mental Status: He is oriented to person, place, and time.      Coordination: Finger-Nose-Finger Test, Heel to Shin Test and Romberg Test normal.      Gait: Gait is intact. Tandem walk normal.      Deep Tendon Reflexes:      Reflex Scores:       Bicep reflexes are 2+ on the right side and 2+ on the left side.       Brachioradialis reflexes are 2+ on the right side and 2+ on the left side.       Patellar reflexes are 2+ on the right side and 2+ on the left side.  Psychiatric:         Speech: Speech normal.         Procedures    Assessment/Plan:    The patient appears to have essential tremor along with some symptoms of a postconcussive syndrome, including signs of atypical headache.  I discussed the prognosis with him.  Overall it can progress but it is not a fatal disease in the realm of ALS  for example.    Essential tremor  -Placed Occupational Therapy consult for weighted bracelets  -Noted that there are several treatments for essential tremor but propranolol works in a similar way to metoprolol which patient had recent side effects to and does not want to take.  I also noted primidone as an option though this medication could interact with chlorthalidone, zolpidem, amlodipine and/or citalopram.  Explained that we would start low with 1 of these medications and go up slowly to monitor for side effects.  The patient would prefer to not take additional medications at this point after further discussion but understands that there are several medical options that we could implement for symptomatic control of essential tremor.    Postconcussive syndrome  -Vestibular therapy as patient feels occasional dizziness at times afterwards.  -Noted to patient that a 2-month course of amantadine has been shown in a clinical research study to improve headache in patients that are postconcussion.  I discussed side effects including livedo reticularis, insomnia, and anxiety.  Also noted that there are other options for preventing headaches, but they would be taken long-term and patient does not want to take additional medications at this time.  As such, no medications were prescribed at the end of this visit.    MDM  I evaluated the patient's tremor which was previously undiagnosed and diagnosed it as essential tremor.  I discussed various options for prescription drug management and the decision was made after discussion of benefits and risks to not start any medications.    I spent at least 45 minutes caring for this patient on this date of service. This time includes time spent by me in the following activities as necessary: preparing for the visit, reviewing tests, medical records and previous visits, obtaining and/or reviewing a separately obtained history, performing a medically appropriate exam and/or  evaluation, counseling and educating the patient, family, caregiver, referring and/or communicating with other healthcare professionals, documenting information in the medical record, independently interpreting results and communicating that information with the patient, family, caregiver, and developing a medically appropriate treatment plan with consideration of other conditions, medications, and treatments.     Diagnoses and all orders for this visit:    1. Postconcussive syndrome (Primary)  -     Basic Vestibular Evaluation; Future    2. Essential tremor           Kosta Javier MD

## 2021-11-24 RX ORDER — CHLORTHALIDONE 25 MG/1
25 TABLET ORAL DAILY
Qty: 90 TABLET | Refills: 2 | Status: SHIPPED | OUTPATIENT
Start: 2021-11-24

## 2022-02-17 ENCOUNTER — TELEPHONE (OUTPATIENT)
Dept: ORTHOPEDIC SURGERY | Facility: CLINIC | Age: 77
End: 2022-02-17

## 2022-02-17 NOTE — TELEPHONE ENCOUNTER
Provider: GIANA  Caller: ANA PAULA   Phone Number: [phone]  Reason for Call: PT IS ASKING FOR A MEDICAL CARD/HANDYCAP SO HE CAN GET ACCOMODATION AT THE [facility].  WILL BE IN TOWN TODAY TO

## 2022-02-17 NOTE — TELEPHONE ENCOUNTER
The handicap placard's are done to the department of transportation.  We only fill out the sheets for temporary placard's of 3 months intervals.  This gentleman had surgery over a year and a half ago in June 2020 and his last progress report states that he was doing well.  Should this patient need a long-term placard he will need to get it from his PCP not our office.

## 2022-02-23 ENCOUNTER — OFFICE VISIT (OUTPATIENT)
Dept: NEUROLOGY | Facility: CLINIC | Age: 77
End: 2022-02-23

## 2022-02-23 VITALS
RESPIRATION RATE: 16 BRPM | BODY MASS INDEX: 29.65 KG/M2 | DIASTOLIC BLOOD PRESSURE: 72 MMHG | HEIGHT: 74 IN | WEIGHT: 231 LBS | HEART RATE: 68 BPM | SYSTOLIC BLOOD PRESSURE: 118 MMHG

## 2022-02-23 DIAGNOSIS — G25.0 ESSENTIAL TREMOR: ICD-10-CM

## 2022-02-23 DIAGNOSIS — F07.81 POSTCONCUSSIVE SYNDROME: Primary | ICD-10-CM

## 2022-02-23 PROCEDURE — 99214 OFFICE O/P EST MOD 30 MIN: CPT | Performed by: STUDENT IN AN ORGANIZED HEALTH CARE EDUCATION/TRAINING PROGRAM

## 2022-02-23 RX ORDER — ATORVASTATIN CALCIUM 10 MG/1
10 TABLET, FILM COATED ORAL DAILY
COMMUNITY
Start: 2022-01-04

## 2022-02-23 RX ORDER — TAMSULOSIN HYDROCHLORIDE 0.4 MG/1
1 CAPSULE ORAL DAILY
COMMUNITY
Start: 2021-12-28

## 2022-02-23 RX ORDER — LORAZEPAM 0.5 MG/1
0.5 TABLET ORAL EVERY 6 HOURS PRN
COMMUNITY
Start: 2022-01-17

## 2022-02-23 NOTE — PROGRESS NOTES
Chief Complaint   Patient presents with   • Concussion     Patient is following up fo postconcussion        Patient ID: Mark Aguirre Jr. is a 76 y.o. male.    HPI:    The following portions of the patient's history were reviewed and updated as appropriate: allergies, current medications, past family history, past medical history, past social history, past surgical history and problem list.    Interval history:    Review of Systems   Constitutional: Positive for activity change and fatigue. Negative for chills.   HENT: Negative for ear discharge, nosebleeds, sinus pressure and trouble swallowing.    Eyes: Negative for photophobia and visual disturbance.   Respiratory: Negative for choking and shortness of breath.    Cardiovascular: Negative for leg swelling.   Gastrointestinal: Negative for anal bleeding, constipation and nausea.   Endocrine: Negative for cold intolerance and heat intolerance.   Genitourinary: Negative for difficulty urinating, genital sores, penile swelling and urgency.   Musculoskeletal: Negative for back pain, myalgias and neck stiffness.   Allergic/Immunologic: Negative for food allergies.   Neurological: Positive for tremors and headaches. Negative for dizziness and light-headedness.   Hematological: Bruises/bleeds easily.   Psychiatric/Behavioral: Negative for behavioral problems, confusion and suicidal ideas. The patient is not nervous/anxious.     Patient presents back for evaluation of tremor and post concussive syndrome. Doing well. No dizzy spell since last met. Wake up in the morning hands are tremulous can come back for a few minutes and stop. Head is sore on right occipital area but not interested in prescription medication. Does not drink alcohol. Does not drink much caffeine. Takes lorazepam as needed, refilled once in 3 years.    Vitals:    02/23/22 0941   BP: 118/72   Pulse: 68   Resp: 16       Neurologic Exam     Mental Status   Oriented to person, place, and time.   Attention:  normal. Concentration: normal.   Speech: speech is normal   Level of consciousness: alert    Cranial Nerves     CN II   Visual fields full to confrontation.   Visual acuity: normal    CN III, IV, VI   Pupils are equal, round, and reactive to light.  Extraocular motions are normal.     CN V   Facial sensation intact.     CN VII   Facial expression full, symmetric.     CN VIII   Hearing: intact    CN IX, X   Palate: symmetric    CN XI   Right trapezius strength: normal  Left trapezius strength: normal    CN XII   Tongue: not atrophic  Fasciculations: absent  Tongue deviation: none    Motor Exam   Muscle bulk: normal  Right arm tone: normal  Left arm tone: normal    Strength   Right deltoid: 5/5  Left deltoid: 5/5  Right biceps: 5/5  Left biceps: 5/5  Right triceps: 5/5  Left triceps: 5/5  Right iliopsoas: 5/5  Left iliopsoas: 5/5  Right quadriceps: 5/5  Left quadriceps: 5/5  Right hamstrin/5  Left hamstrin/5  Right anterior tibial: 5/5  Left anterior tibial: 5/5  Right gastroc: 5/5  Left gastroc: 5/5Grip 5/5     Sensory Exam   Right arm light touch: normal  Left arm light touch: normal  Right leg light touch: normal  Left leg light touch: normal    Gait, Coordination, and Reflexes     Gait  Gait: normal    Coordination   Romberg: negative  Finger to nose coordination: normal  Heel to shin coordination: normal  Tandem walking coordination: normal    Tremor   Intention tremor: present  Action tremor: left arm and right arm    Reflexes   Right brachioradialis: 2+  Left brachioradialis: 2+  Right biceps: 2+  Left biceps: 2+  Right patellar: 2+  Left patellar: 2+  Right achilles: 1+  Left achilles: 1+  Right plantar: equivocal  Left plantar: equivocal      Procedures    Assessment/Plan:    Patient with bilateral action tremor and postconcussive syndrome that continues to improve.  He is not requesting any medication and appears stable to improving.  Parkinson's disease is seen in a higher percentage of patients  with essential tremor so will monitor in a year for this.  -discussed importance of gun safety when having action tremor and to not aim near people. Pt endorsed understanding.  -no medications requested by patient.  I spent 30 minutes caring for this patient on this date of service. This time includes time spent by me in the following activities as necessary: preparing for the visit, reviewing tests, medical records and previous visits, obtaining and/or reviewing a separately obtained history, performing a medically appropriate exam and/or evaluation, counseling and educating the patient, referring and/or communicating with other healthcare professionals, documenting information in the medical record, independently interpreting results and communicating that information with the patient, and developing a medically appropriate treatment plan with consideration of other conditions, medications, and treatments.    Return in about 1 year (around 2/23/2023).       There are no diagnoses linked to this encounter.       Kathya Ortiz MA

## 2022-06-21 ENCOUNTER — TELEPHONE (OUTPATIENT)
Dept: NEUROLOGY | Facility: CLINIC | Age: 77
End: 2022-06-21

## 2022-07-25 RX ORDER — ATENOLOL 100 MG/1
TABLET ORAL
Qty: 180 CAPSULE | Refills: 2 | Status: SHIPPED | OUTPATIENT
Start: 2022-07-25

## 2022-08-31 ENCOUNTER — OFFICE VISIT (OUTPATIENT)
Dept: SLEEP MEDICINE | Facility: HOSPITAL | Age: 77
End: 2022-08-31

## 2022-08-31 VITALS — HEIGHT: 74 IN | WEIGHT: 226.4 LBS | OXYGEN SATURATION: 98 % | BODY MASS INDEX: 29.05 KG/M2

## 2022-08-31 DIAGNOSIS — G47.33 OSA ON CPAP: Primary | ICD-10-CM

## 2022-08-31 DIAGNOSIS — Z99.89 OSA ON CPAP: Primary | ICD-10-CM

## 2022-08-31 PROCEDURE — 99213 OFFICE O/P EST LOW 20 MIN: CPT | Performed by: INTERNAL MEDICINE

## 2022-08-31 PROCEDURE — G0463 HOSPITAL OUTPT CLINIC VISIT: HCPCS

## 2022-11-30 ENCOUNTER — OFFICE VISIT (OUTPATIENT)
Dept: SLEEP MEDICINE | Facility: HOSPITAL | Age: 77
End: 2022-11-30

## 2022-11-30 VITALS — OXYGEN SATURATION: 98 % | BODY MASS INDEX: 29.13 KG/M2 | WEIGHT: 227 LBS | HEART RATE: 43 BPM | HEIGHT: 74 IN

## 2022-11-30 DIAGNOSIS — G47.33 OSA ON CPAP: Primary | ICD-10-CM

## 2022-11-30 DIAGNOSIS — Z99.89 OSA ON CPAP: Primary | ICD-10-CM

## 2022-11-30 PROCEDURE — 99213 OFFICE O/P EST LOW 20 MIN: CPT | Performed by: INTERNAL MEDICINE

## 2022-11-30 PROCEDURE — G0463 HOSPITAL OUTPT CLINIC VISIT: HCPCS

## 2022-12-02 ENCOUNTER — OFFICE VISIT (OUTPATIENT)
Dept: CARDIOLOGY | Facility: CLINIC | Age: 77
End: 2022-12-02

## 2022-12-02 VITALS
BODY MASS INDEX: 29.95 KG/M2 | DIASTOLIC BLOOD PRESSURE: 70 MMHG | WEIGHT: 226 LBS | HEART RATE: 56 BPM | SYSTOLIC BLOOD PRESSURE: 140 MMHG | HEIGHT: 73 IN

## 2022-12-02 DIAGNOSIS — I48.0 PAROXYSMAL ATRIAL FIBRILLATION: ICD-10-CM

## 2022-12-02 DIAGNOSIS — I49.3 PVC (PREMATURE VENTRICULAR CONTRACTION): ICD-10-CM

## 2022-12-02 DIAGNOSIS — I35.0 NONRHEUMATIC AORTIC VALVE STENOSIS: ICD-10-CM

## 2022-12-02 DIAGNOSIS — I10 ESSENTIAL HYPERTENSION: Primary | ICD-10-CM

## 2022-12-02 PROCEDURE — 93000 ELECTROCARDIOGRAM COMPLETE: CPT | Performed by: INTERNAL MEDICINE

## 2022-12-02 PROCEDURE — 99214 OFFICE O/P EST MOD 30 MIN: CPT | Performed by: INTERNAL MEDICINE

## 2022-12-02 NOTE — PROGRESS NOTES
Date of Office Visit: 22  Encounter Provider: Claus Lindquist MD  Place of Service: [facility] CARDIOLOGY  Patient Name: Mark Aguirre Jr.  :1945  [id number]    Chief Complaint   Patient presents with   • Atrial Fibrillation   • Hypertension   :     HPI: Mark Aguirre Jr. is a 77 y.o. male is got a history of paroxysmal A. fib he has had an ablation in the past with Dr. Hemant Armando and he is got a little history of aortic sclerosis last echo with Dr. Bre Vernon showed mild to moderate AS.  He has had chronic history of ventricular ectopy that we have looked into a couple of times seems to be benign.  He is a big param hunting elk deer dove and ducks.  He has been doing well hunting wise did really well this year he has not had any chest pain palpitations PND orthopnea edema syncope and in general is getting along well    Past Medical History:   Diagnosis Date   • Acute kidney failure (HCC)     POST-OP TOTAL HIP 2020   • Anticoagulated     PRADAXA FOR A FIB TO HELD 3 DAYS PRIOR   • Arthritis     OSTEO. RIGHT HIP   • Atrial flutter (HCC)    • Bifascicular block    • Cataract     LEFT AND RIGHT   • History of colon polyps    • Hypertension    • Insomnia    • Knee pain    • PAF (paroxysmal atrial fibrillation) (HCC)    • Right hip pain    • Sleep apnea     cpap       Past Surgical History:   Procedure Laterality Date   • CARDIAC ABLATION     • CARDIAC CATHETERIZATION     • COLONOSCOPY N/A 2018    Procedure: COLONOSCOPY INTO CECUM WITH COLD BX POLYPECTOMY ;  Surgeon: Ross Stearns MD;  Location: [facility] ENDOSCOPY;  Service:    • COLONOSCOPY N/A 2/3/2021    Procedure: COLONOSCOPY TOCECUM WITH COLD BX POLYPECTOMY AND HOT SNARE POLYPECTOMY;  Surgeon: Ross Stearns MD;  Location: [facility] ENDOSCOPY;  Service: General;  Laterality: N/A;  PERSONAL HX OF POLYPS, FAMILY HX OF COLON CANCER  --POLYPS (X3), DIVERTICULOSIS   • HERNIA REPAIR      INGUINAL HERNIA? SIDE   • TOTAL  HIP ARTHROPLASTY Right 6/5/2020    Procedure: TOTAL HIP ARTHROPLASTY;  Surgeon: Travis Hamlin MD;  Location: [facility] MAIN OR;  Service: Orthopedics;  Laterality: Right;       Social History     Socioeconomic History   • Marital status:    Tobacco Use   • Smoking status: Never   • Smokeless tobacco: Current     Types: Chew   • Tobacco comments:     2 weekly chew   Substance and Sexual Activity   • Alcohol use: Never   • Drug use: Never   • Sexual activity: Defer       Family History   Problem Relation Age of Onset   • Breast cancer Mother    • Colon cancer Father    • Diabetes Brother    • No Known Problems Maternal Grandmother    • No Known Problems Maternal Grandfather    • No Known Problems Paternal Grandmother    • Stroke Paternal Grandfather    • Heart disease Brother    • Malig Hyperthermia Neg Hx        Review of Systems   Constitutional: Negative for decreased appetite, fever, malaise/fatigue and weight loss.   HENT: Negative for nosebleeds.    Eyes: Negative for double vision.   Cardiovascular: Negative for chest pain, claudication, cyanosis, dyspnea on exertion, irregular heartbeat, leg swelling, near-syncope, orthopnea, palpitations, paroxysmal nocturnal dyspnea and syncope.   Respiratory: Negative for cough, hemoptysis and shortness of breath.    Hematologic/Lymphatic: Negative for bleeding problem.   Skin: Negative for rash.   Musculoskeletal: Negative for falls and myalgias.   Gastrointestinal: Negative for hematochezia, jaundice, melena, nausea and vomiting.   Genitourinary: Negative for hematuria.   Neurological: Negative for dizziness and seizures.   Psychiatric/Behavioral: Negative for altered mental status and memory loss.       Allergies   Allergen Reactions   • Ativan [Lorazepam] Delirium         Current Outpatient Medications:   •  acetaminophen (TYLENOL) 325 MG tablet, Take 2 tablets by mouth Every 4 (Four) Hours As Needed for Mild Pain ., Disp: 30 tablet, Rfl: 0  •  amLODIPine (NORVASC)  "5 MG tablet, Take 1 tablet by mouth Daily., Disp: 30 tablet, Rfl: 3  •  atorvastatin (LIPITOR) 10 MG tablet, Take 10 mg by mouth Daily., Disp: , Rfl:   •  chlorthalidone (HYGROTON) 25 MG tablet, TAKE 1 TABLET BY MOUTH DAILY, Disp: 90 tablet, Rfl: 2  •  citalopram (CeleXA) 20 MG tablet, Take 20 mg by mouth Every Morning., Disp: , Rfl:   •  Cyanocobalamin (VITAMIN B 12 PO), Take  by mouth., Disp: , Rfl:   •  Pradaxa 150 MG capsu, TAKE 1 CAPSULE BY MOUTH TWICE DAILY, Disp: 180 capsule, Rfl: 2  •  tamsulosin (FLOMAX) 0.4 MG capsule 24 hr capsule, Take 1 capsule by mouth Daily., Disp: , Rfl:   •  LORazepam (ATIVAN) 0.5 MG tablet, Take 0.5 mg by mouth Every 6 (Six) Hours As Needed. for anxiety, Disp: , Rfl:   •  zolpidem (AMBIEN) 10 MG tablet, TK 1/2 TO 1 T PO HS, Disp: , Rfl:       Objective:     Vitals:    12/02/22 1003   BP: 140/70   Pulse: 56   Weight: 103 kg (226 lb)   Height: 185.4 cm (73\")     Body mass index is 29.82 kg/m².    Constitutional:       Appearance: Well-developed.   Eyes:      General: No scleral icterus.  HENT:      Head: Normocephalic.   Neck:      Thyroid: No thyromegaly.      Vascular: No JVD.      Lymphadenopathy: No cervical adenopathy.   Pulmonary:      Effort: Pulmonary effort is normal.      Breath sounds: Normal breath sounds. No wheezing. No rales.   Cardiovascular:      Normal rate. Regular rhythm.      Murmurs: There is a grade 1/6 harsh crescendo-decrescendo, early systolic murmur at the URSB, radiating to the neck.      No gallop.   Edema:     Peripheral edema absent.   Abdominal:      Palpations: Abdomen is soft.      Tenderness: There is no abdominal tenderness.   Musculoskeletal: Normal range of motion. Skin:     General: Skin is warm and dry.      Findings: No rash.   Neurological:      Mental Status: Alert and oriented to person, place, and time.           ECG 12 Lead    Date/Time: 12/2/2022 11:11 AM  Performed by: Claus Lindquist MD  Authorized by: Claus Lindquist MD   Rhythm: " sinus rhythm  Ectopy: unifocal PVCs  Conduction: right bundle branch block and left anterior fascicular block    Clinical impression: abnormal EKG             Assessment:       Diagnosis Plan   1. Essential hypertension        2. Paroxysmal atrial fibrillation (HCC)        3. Nonrheumatic aortic valve stenosis        4. PVC (premature ventricular contraction)               Plan:       I feel like he is doing pretty well right now and there is not been a major change so we got a couple of issues were watching with him he has had a history of PVCs in the past we felt that been benign we have not found anything more concerning about that.  He has had paroxysmal A. fib but he has had ablation in the past.  He also probably has early aortic stenosis I called Dr. Bre Vernon and indeed on his last echo that he had there was mild to moderate so I told him we have to kind to start watching that a little bit more closely and when he gets to moderate AS had like to start doing his echoes here in our office    No follow-ups on file.     As always, it has been a pleasure to participate in your patient's care.      Sincerely,       Claus Lindquist MD

## 2023-02-23 ENCOUNTER — OFFICE VISIT (OUTPATIENT)
Dept: NEUROLOGY | Facility: CLINIC | Age: 78
End: 2023-02-23
Payer: MEDICARE

## 2023-02-23 VITALS
WEIGHT: 219 LBS | SYSTOLIC BLOOD PRESSURE: 128 MMHG | OXYGEN SATURATION: 97 % | HEART RATE: 40 BPM | BODY MASS INDEX: 28.11 KG/M2 | DIASTOLIC BLOOD PRESSURE: 72 MMHG | HEIGHT: 74 IN

## 2023-02-23 DIAGNOSIS — G25.0 ESSENTIAL TREMOR: ICD-10-CM

## 2023-02-23 DIAGNOSIS — F07.81 POSTCONCUSSIVE SYNDROME: Primary | ICD-10-CM

## 2023-02-23 PROCEDURE — 99214 OFFICE O/P EST MOD 30 MIN: CPT | Performed by: STUDENT IN AN ORGANIZED HEALTH CARE EDUCATION/TRAINING PROGRAM

## 2023-02-23 NOTE — PROGRESS NOTES
"Chief Complaint   Patient presents with   • postconcussive syndrome       Patient ID: Mark Aguirre Jr. is a 77 y.o. male.    HPI:    The following portions of the patient's history were reviewed and updated as appropriate: allergies, current medications, past family history, past medical history, past social history, past surgical history and problem list.    Interval history:    Review of Systems   Neurological: Negative for dizziness, tremors, seizures, syncope, speech difficulty, weakness, light-headedness, numbness and headaches.   Hematological: Does not bruise/bleed easily.   Psychiatric/Behavioral: Positive for confusion and sleep disturbance. Negative for agitation, behavioral problems, decreased concentration, hallucinations, self-injury and suicidal ideas. The patient is not nervous/anxious.       Patient presents back for evaluation of tremor and post concussive syndrome. Doing well. No dizzy spell since last met. Still has pain sensitivity at top of the head. And mild \"not full blown\" headaches. Has not had a full blown headache. Not like a migraine. Once in a while will get lightheaded but not dizziness. Not interested in prescription medication for headaches. In terms of memory he is doing well but can get names mixed up or forget last name but improves. Living independently without difficulty.  He continues to have a tremor for 2 to 3 minutes in the morning that he does not notice during the day though I see a fairly continuous action tremor.  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  He is not interested in an additional medicine for this at this time.  .Getting off Ambien, not on Ativan.        Vitals:    02/23/23 0746   BP: 128/72   Pulse: (!) 40   SpO2: 97%       Neurologic Exam     Mental Status   Oriented to person, place, and time.   Attention: normal. Concentration: normal.   Speech: speech is normal   Level of consciousness: alert    Cranial Nerves     CN II   Visual fields full to " confrontation.   Visual acuity: normal    CN III, IV, VI   Pupils are equal, round, and reactive to light.  Extraocular motions are normal.     CN V   Facial sensation intact.     CN VII   Facial expression full, symmetric.     CN IX, X   Palate: symmetric    CN XI   Right trapezius strength: normal  Left trapezius strength: normal    CN XII   Tongue: not atrophic  Fasciculations: absent  Tongue deviation: none  Hearing aids in place     Motor Exam   Muscle bulk: normal  Right arm tone: normal  Left arm tone: normal    Strength   Right deltoid: 5/5  Left deltoid: 5/5  Right biceps: 5/5  Left biceps: 5/5  Right triceps: 5/5  Left triceps: 5/5  Right iliopsoas: 5/5  Left iliopsoas: 5/5  Right quadriceps: 5/5  Left quadriceps: 5/5  Right hamstrin/5  Left hamstrin/5  Right anterior tibial: 5/5  Left anterior tibial: 5/5  Right gastroc: 5/5  Left gastroc: 5/5Grip 5/5     Sensory Exam   Right arm light touch: normal  Left arm light touch: normal  Right leg light touch: normal  Left leg light touch: normal    Gait, Coordination, and Reflexes     Gait  Gait: normal    Coordination   Finger to nose coordination: normal  Heel to shin coordination: normal    Tremor   Action tremor: left arm and right arm    Reflexes   Right biceps: 2+  Left biceps: 2+  Right patellar: 2+  Left patellar: 2+      Physical Exam  Eyes:      Extraocular Movements: EOM normal.      Pupils: Pupils are equal, round, and reactive to light.   Neurological:      Mental Status: He is oriented to person, place, and time.      Coordination: Finger-Nose-Finger Test and Heel to Shin Test normal.      Gait: Gait is intact.      Deep Tendon Reflexes:      Reflex Scores:       Bicep reflexes are 2+ on the right side and 2+ on the left side.       Patellar reflexes are 2+ on the right side and 2+ on the left side.  Psychiatric:         Speech: Speech normal.         Procedures    Assessment/Plan:    The patient has essential tremor and postconcussive  syndrome.  In terms of postconcussive syndrome he has no longer any dizziness but he does have this mild headache like sensation that is not a migraine per him and sensitivity on his head.  We discussed a trial of medication for this but he is not interested at this time.  He has an essential tremor which is not bothersome to him and we discussed that there are a few medicines though 1 propranolol medication would not be indicated for him right now as he is having bradycardia.  The patient was in agreement with this plan and we will plan to follow-up in 1 year to look for interval changes and tremor as well as continue to monitor for recovery from postconcussive syndrome.  The patient has tried to get off of controlled substances and this is admirable.  Gabapentin may be an option for tremor, hypersensitivity to pain if there is a nerve pain aspect to his headaches, and the potential for headache prevention though it is not a powerful headache preventative.  However this is a controlled substance and the patient is not interested in additional medicine at this time.  I will continue to monitor.  He nuno an Archimedean spiral today which will be scanned into the chart.    -Pt is not in any discomfort today but he has had several bradycardia vitals with 1 in November and today in February.  He states he will talk to PCP re: bradycardia  Return in about 1 year (around 2/23/2024).       Diagnoses and all orders for this visit:    1. Postconcussive syndrome (Primary)    2. Essential tremor           Kosta Javier MD

## 2023-03-07 DIAGNOSIS — R00.1 BRADYCARDIA, SINUS: Primary | ICD-10-CM

## 2023-03-07 DIAGNOSIS — R53.83 OTHER FATIGUE: ICD-10-CM

## 2023-03-22 LAB
MAXIMAL PREDICTED HEART RATE: 141 BPM
STRESS TARGET HR: 120 BPM

## 2023-04-27 RX ORDER — ATENOLOL 100 MG/1
TABLET ORAL
Qty: 180 CAPSULE | Refills: 2 | Status: SHIPPED | OUTPATIENT
Start: 2023-04-27

## 2023-07-04 PROBLEM — R55 SYNCOPE AND COLLAPSE: Status: ACTIVE | Noted: 2023-07-04

## 2023-07-10 ENCOUNTER — TELEPHONE (OUTPATIENT)
Dept: CARDIOLOGY | Facility: CLINIC | Age: 78
End: 2023-07-10

## 2023-07-10 NOTE — TELEPHONE ENCOUNTER
"  Caller: Mark Aguirre Jr. \"Ernst\"    Relationship: Self    Best call back number: [phone]      PATIENT NEEDS A 1 MONTH HOSPITAL FOLLOW UP (8.6.23) WITH DR. MCCOLLUM/PA  PATIENT IS REQUESTING EARLY MORNING, OKAY TO LEAVE VOICEMAIL WITH APPT INFO  "

## 2023-07-31 ENCOUNTER — OFFICE VISIT (OUTPATIENT)
Dept: CARDIOLOGY | Facility: CLINIC | Age: 78
End: 2023-07-31
Payer: MEDICARE

## 2023-07-31 VITALS
DIASTOLIC BLOOD PRESSURE: 70 MMHG | OXYGEN SATURATION: 98 % | BODY MASS INDEX: 30 KG/M2 | SYSTOLIC BLOOD PRESSURE: 124 MMHG | WEIGHT: 226.4 LBS | HEIGHT: 73 IN | HEART RATE: 66 BPM

## 2023-07-31 DIAGNOSIS — I48.0 PAROXYSMAL ATRIAL FIBRILLATION: Primary | ICD-10-CM

## 2023-07-31 DIAGNOSIS — I10 ESSENTIAL HYPERTENSION: ICD-10-CM

## 2023-07-31 DIAGNOSIS — I35.0 NONRHEUMATIC AORTIC VALVE STENOSIS: ICD-10-CM

## 2023-07-31 PROCEDURE — 99214 OFFICE O/P EST MOD 30 MIN: CPT | Performed by: NURSE PRACTITIONER

## 2023-07-31 PROCEDURE — 1160F RVW MEDS BY RX/DR IN RCRD: CPT | Performed by: NURSE PRACTITIONER

## 2023-07-31 PROCEDURE — 3078F DIAST BP <80 MM HG: CPT | Performed by: NURSE PRACTITIONER

## 2023-07-31 PROCEDURE — 93000 ELECTROCARDIOGRAM COMPLETE: CPT | Performed by: NURSE PRACTITIONER

## 2023-07-31 PROCEDURE — 3074F SYST BP LT 130 MM HG: CPT | Performed by: NURSE PRACTITIONER

## 2023-07-31 PROCEDURE — 1159F MED LIST DOCD IN RCRD: CPT | Performed by: NURSE PRACTITIONER

## 2023-08-11 ENCOUNTER — PATIENT ROUNDING (BHMG ONLY) (OUTPATIENT)
Dept: CARDIOLOGY | Facility: CLINIC | Age: 78
End: 2023-08-11
Payer: MEDICARE

## 2023-08-11 NOTE — PROGRESS NOTES
A My Chart message has been sent to the patient for [place] [facility] [place]

## 2023-11-29 ENCOUNTER — TELEPHONE (OUTPATIENT)
Dept: SLEEP MEDICINE | Facility: HOSPITAL | Age: 78
End: 2023-11-29
Payer: MEDICARE

## 2023-11-29 ENCOUNTER — OFFICE VISIT (OUTPATIENT)
Dept: SLEEP MEDICINE | Facility: HOSPITAL | Age: 78
End: 2023-11-29
Payer: MEDICARE

## 2023-11-29 VITALS — HEART RATE: 59 BPM | WEIGHT: 224.8 LBS | OXYGEN SATURATION: 99 % | HEIGHT: 73 IN | BODY MASS INDEX: 29.79 KG/M2

## 2023-11-29 DIAGNOSIS — G47.33 OSA ON CPAP: Primary | ICD-10-CM

## 2023-11-29 PROCEDURE — 99213 OFFICE O/P EST LOW 20 MIN: CPT | Performed by: INTERNAL MEDICINE

## 2023-11-29 PROCEDURE — 1160F RVW MEDS BY RX/DR IN RCRD: CPT | Performed by: INTERNAL MEDICINE

## 2023-11-29 PROCEDURE — G0463 HOSPITAL OUTPT CLINIC VISIT: HCPCS

## 2023-11-29 PROCEDURE — 1159F MED LIST DOCD IN RCRD: CPT | Performed by: INTERNAL MEDICINE

## 2023-11-29 NOTE — PROGRESS NOTES
"Follow Up Sleep Disorders Center Note     Chief Complaint:  STEPHENIE     Primary Care Physician: Luis Staples MD    Interval History:   The patient is a 78 y.o. male  who I last saw 11/30/2022 and that note was reviewed.  Patient reports he is doing well without new complaints.  Occasionally he does wake up with a dry mouth.  He goes to bed at 8:45 PM gets out of bed at 6 AM.    Review of Systems:    A complete review of systems was done and all were negative with the exception of the above    Social History:    Social History     Socioeconomic History    Marital status:    Tobacco Use    Smoking status: Never    Smokeless tobacco: Current     Types: Chew    Tobacco comments:     2 weekly chew   Substance and Sexual Activity    Alcohol use: Never    Drug use: Never    Sexual activity: Defer       Allergies:  Ativan [lorazepam]     Medication Review: His list was reviewed.      Vital Signs:    Vitals:    11/29/23 0831   Pulse: 59   SpO2: 99%   Weight: 102 kg (224 lb 12.8 oz)   Height: 185.4 cm (73\")     Body mass index is 29.66 kg/m².    Physical Exam:    Constitutional:  Well developed 78 y.o. male that appears in no apparent distress.  Awake & oriented times 3.  Normal mood with normal recent and remote memory and normal judgement.  Eyes:  Conjunctivae normal.  Oropharynx: Previously, moist mucous membranes without exudate and a large tongue with scalloped margins and a normal uvula and a narrow posterior pharyngeal opening and class III Mallampati airway.    Self-administered [place] Sleepiness Scale test results: 0  0-5 Lower normal daytime sleepiness  6-10 Higher normal daytime sleepiness  11-12 Mild, 13-15 Moderate, & 16-24 Severe excessive daytime sleepiness     Downloaded PAP Data Evaluated For Therapeutic Response and Compliance:  DME is [employer] and he uses nasal pillows.  Downloads between 8/30 and 11/27/2023 compliance greater than 95%.  Average usage is 8 hours and 12 minutes.  Average AHI is " normal with a leak.  Average auto CPAP pressure is 12.8 and his ResMed auto CPAP is 12-15    I have reviewed the above results and compared them with the patient's last downloads and reviewed with the patient.    Impression:   Obstructive sleep apnea adequately treated with ResMed auto CPAP. The patient appears to be at goal with good compliance and usage. The patient has no complaints of hypersomnolence.    Plan:  Good sleep hygiene measures should be maintained.  Weight loss would be beneficial in this patient who is overweight by Body mass index is 29.66 kg/m²..      After evaluating the patient and assessing results available, the patient is benefiting from the treatment being provided.     The patient will continue ResMed auto CPAP.  Potential side effects of CPAP therapy reviewed and addressed as needed.  After clinical evaluation and review of downloads, I recommend changes to the patient's pressures.  Based on downloads, auto CPAP will be changed to 12-14.  A new prescription will be sent to the patient's DME.    I answered all of the patient's questions.  The patient will call the [facility] for any problems with side effects of CPAP therapy and will follow up in 1 year.      Rian Johns MD  Sleep Medicine  11/29/23  08:33 EST

## 2023-12-04 ENCOUNTER — OFFICE VISIT (OUTPATIENT)
Dept: CARDIOLOGY | Facility: CLINIC | Age: 78
End: 2023-12-04
Payer: MEDICARE

## 2023-12-04 VITALS
HEIGHT: 73 IN | DIASTOLIC BLOOD PRESSURE: 72 MMHG | BODY MASS INDEX: 29.55 KG/M2 | WEIGHT: 223 LBS | HEART RATE: 84 BPM | SYSTOLIC BLOOD PRESSURE: 128 MMHG

## 2023-12-04 DIAGNOSIS — I10 ESSENTIAL HYPERTENSION: ICD-10-CM

## 2023-12-04 DIAGNOSIS — I35.0 NONRHEUMATIC AORTIC VALVE STENOSIS: ICD-10-CM

## 2023-12-04 DIAGNOSIS — I48.0 PAROXYSMAL ATRIAL FIBRILLATION: Primary | ICD-10-CM

## 2023-12-04 PROCEDURE — 1160F RVW MEDS BY RX/DR IN RCRD: CPT | Performed by: NURSE PRACTITIONER

## 2023-12-04 PROCEDURE — 93000 ELECTROCARDIOGRAM COMPLETE: CPT | Performed by: NURSE PRACTITIONER

## 2023-12-04 PROCEDURE — 3078F DIAST BP <80 MM HG: CPT | Performed by: NURSE PRACTITIONER

## 2023-12-04 PROCEDURE — 99214 OFFICE O/P EST MOD 30 MIN: CPT | Performed by: NURSE PRACTITIONER

## 2023-12-04 PROCEDURE — 1159F MED LIST DOCD IN RCRD: CPT | Performed by: NURSE PRACTITIONER

## 2023-12-04 PROCEDURE — 3074F SYST BP LT 130 MM HG: CPT | Performed by: NURSE PRACTITIONER

## 2023-12-04 NOTE — PROGRESS NOTES
Date of Office Visit: 2023  Encounter Provider: KEISHA Barcenas  Place of Service: [facility] CARDIOLOGY  Patient Name: Mark Aguirre Jr.  :1945    Chief Complaint   Patient presents with    Atrial Fibrillation   :     HPI: Mark Aguirre Jr. is a 78 y.o. male patient of Dr. Lindquist's with hypertension, aortic stenosis, and paroxysmal atrial fibrillation (history of ablation x2).  He has a known right bundle branch block and left anterior fascicular block.     In July, he presented to the ED following a syncopal episode.   Work-up demonstrated atrial fibrillation RVR.  Troponins were mildly elevated but flat.  Echocardiogram demonstrated normal LV function and mild aortic stenosis.  Ultimately, he underwent a successful cardioversion    I last saw him following that hospitalization at which time he was doing well.  He denied any recurrent syncope.  No changes were made to his regimen, and he was advised to follow-up as scheduled.    He has been doing well.  He denies any issues with atrial fibrillation.  He has not had any recurrent syncope.  He denies any chest pain, shortness of breath, edema, dizziness, syncope, bleeding difficulties or melena.  He is struggling with a lot of back and sciatica pain for which he plans undergo dry needling.    Past Medical History:   Diagnosis Date    Acute kidney failure     POST-OP TOTAL HIP 2020    Anticoagulated     PRADAXA FOR A FIB TO HELD 3 DAYS PRIOR    Arthritis     OSTEO. RIGHT HIP    Atrial flutter     Bifascicular block     Cataract     LEFT AND RIGHT    History of colon polyps     Hypertension     Insomnia     Knee pain     PAF (paroxysmal atrial fibrillation)     Right hip pain     Sleep apnea     cpap       Past Surgical History:   Procedure Laterality Date    CARDIAC ABLATION      CARDIAC CATHETERIZATION      COLONOSCOPY N/A 2018    Procedure: COLONOSCOPY INTO CECUM WITH COLD BX POLYPECTOMY ;  Surgeon: Ross  MD Jinny;  Location: [facility] ENDOSCOPY;  Service:     COLONOSCOPY N/A 2/3/2021    Procedure: COLONOSCOPY TOCECUM WITH COLD BX POLYPECTOMY AND HOT SNARE POLYPECTOMY;  Surgeon: Ross Stearns MD;  Location: [facility] ENDOSCOPY;  Service: General;  Laterality: N/A;  PERSONAL HX OF POLYPS, FAMILY HX OF COLON CANCER  --POLYPS (X3), DIVERTICULOSIS    HERNIA REPAIR      INGUINAL HERNIA? SIDE    TOTAL HIP ARTHROPLASTY Right 6/5/2020    Procedure: TOTAL HIP ARTHROPLASTY;  Surgeon: Travis Hamlin MD;  Location: [facility] MAIN OR;  Service: Orthopedics;  Laterality: Right;       Social History     Socioeconomic History    Marital status:    Tobacco Use    Smoking status: Never    Smokeless tobacco: Current     Types: Chew    Tobacco comments:     2 weekly chew   Vaping Use    Vaping Use: Never used   Substance and Sexual Activity    Alcohol use: Never    Drug use: Never    Sexual activity: Defer       Family History   Problem Relation Age of Onset    Breast cancer Mother     Colon cancer Father     Diabetes Brother     No Known Problems Maternal Grandmother     No Known Problems Maternal Grandfather     No Known Problems Paternal Grandmother     Stroke Paternal Grandfather     Heart disease Brother     Malig Hyperthermia Neg Hx        Review of Systems   Constitutional: Negative.   Cardiovascular: Negative.  Negative for chest pain, dyspnea on exertion, leg swelling, orthopnea, paroxysmal nocturnal dyspnea and syncope.   Respiratory: Negative.     Hematologic/Lymphatic: Negative for bleeding problem.   Musculoskeletal:  Positive for back pain. Negative for falls.   Gastrointestinal:  Negative for melena.   Neurological:  Negative for dizziness and light-headedness.       Allergies   Allergen Reactions    Ativan [Lorazepam] Delirium         Current Outpatient Medications:     acetaminophen (TYLENOL) 325 MG tablet, Take 2 tablets by mouth Every 4 (Four) Hours As Needed for Mild Pain ., Disp: 30 tablet, Rfl: 0    amLODIPine  "(NORVASC) 5 MG tablet, Take 1 tablet by mouth Daily., Disp: 30 tablet, Rfl: 3    atorvastatin (LIPITOR) 10 MG tablet, Take 1 tablet by mouth Daily., Disp: , Rfl:     chlorthalidone (HYGROTON) 25 MG tablet, TAKE 1 TABLET BY MOUTH DAILY, Disp: 90 tablet, Rfl: 2    citalopram (CeleXA) 20 MG tablet, Take 1 tablet by mouth Every Morning., Disp: , Rfl:     Cyanocobalamin (VITAMIN B 12 PO), Take  by mouth., Disp: , Rfl:     Pradaxa 150 MG capsu, TAKE 1 CAPSULE BY MOUTH TWICE DAILY, Disp: 180 capsule, Rfl: 2    tamsulosin (FLOMAX) 0.4 MG capsule 24 hr capsule, Take 1 capsule by mouth Daily., Disp: , Rfl:       Objective:     Vitals:    12/04/23 1030   BP: 128/72   Pulse: 84   Weight: 101 kg (223 lb)   Height: 185.4 cm (73\")     Body mass index is 29.42 kg/m².    PHYSICAL EXAM:    Neck:      Vascular: No JVD.   Pulmonary:      Effort: Pulmonary effort is normal.      Breath sounds: Normal breath sounds.   Cardiovascular:      Normal rate. Occasional ectopic beats. Regular rhythm.      Murmurs: There is a harsh midsystolic murmur at the URSB, radiating to the neck.      No gallop.  No click. No rub.   Pulses:     Intact distal pulses.           ECG 12 Lead    Date/Time: 12/4/2023 10:37 AM  Performed by: Radha Vidal APRN    Authorized by: Radha Vidal APRN  Comparison: compared with previous ECG from 7/31/2023  Similar to previous ECG  Rhythm: sinus rhythm  Ectopy: atrial premature contractions  Rate: normal  BPM: 84  Conduction: right bundle branch block and left anterior fascicular block            Assessment:       Diagnosis Plan   1. Paroxysmal atrial fibrillation  ECG 12 Lead      2. Nonrheumatic aortic valve stenosis        3. Essential hypertension          Orders Placed This Encounter   Procedures    ECG 12 Lead     This order was created via procedure documentation     Order Specific Question:   Release to patient     Answer:   Routine Release [[id number]]          Plan:       1.  Paroxysmal atrial " fibrillation.  Status post cardioversion.  He is maintaining sinus rhythm.  He is anticoagulated with Pradaxa.        2.  Aortic stenosis.  Echocardiogram from July demonstrated mild aortic stenosis.        3.  Hypertension.  His blood pressure looks great.  Continue amlodipine and chlorthalidone.      I think he is doing well.  I am not recommending any changes, and he will follow-up with Dr. Lindquist in 6 months.      As always, it has been a pleasure to participate in your patient's care.      Sincerely,         KEISHA Chopra

## 2024-01-08 ENCOUNTER — PREP FOR SURGERY (OUTPATIENT)
Dept: OTHER | Facility: HOSPITAL | Age: 79
End: 2024-01-08
Payer: MEDICARE

## 2024-01-08 DIAGNOSIS — Z86.010 HISTORY OF ADENOMATOUS POLYP OF COLON: ICD-10-CM

## 2024-01-08 DIAGNOSIS — Z80.0 FAMILY HISTORY OF COLON CANCER: Primary | ICD-10-CM

## 2024-01-11 NOTE — TELEPHONE ENCOUNTER
Patient would like for MLL to return his call, would like to speak with her regarding his blood thinner medication.   Diagnostic Uncertainty

## 2024-01-19 PROBLEM — Z86.010 HISTORY OF ADENOMATOUS POLYP OF COLON: Status: ACTIVE | Noted: 2024-01-08

## 2024-01-19 PROBLEM — Z86.0101 HISTORY OF ADENOMATOUS POLYP OF COLON: Status: ACTIVE | Noted: 2024-01-08

## 2024-02-06 RX ORDER — DABIGATRAN ETEXILATE 150 MG/1
150 CAPSULE ORAL 2 TIMES DAILY
Qty: 180 CAPSULE | Refills: 2 | Status: SHIPPED | OUTPATIENT
Start: 2024-02-06

## 2024-02-09 ENCOUNTER — TELEPHONE (OUTPATIENT)
Dept: CARDIOLOGY | Facility: CLINIC | Age: 79
End: 2024-02-09
Payer: MEDICARE

## 2024-02-09 NOTE — TELEPHONE ENCOUNTER
I spoke with him.  He has elected to call his insurance company.  He will let me know what he finds out.  Of note, he has enough Pradaxa for at least the next month.

## 2024-02-09 NOTE — TELEPHONE ENCOUNTER
It says its not under formulary, could be an insurance plan change since its a new year. Needs to try xarelto or warfarin or you can try to do a peer to peer appeal

## 2024-02-09 NOTE — TELEPHONE ENCOUNTER
I left him a voicemail regarding this information. I asked for him to call me back to discuss his options which include either switching him to Xarelto or proceeding with the appeal.

## 2024-02-27 ENCOUNTER — OFFICE VISIT (OUTPATIENT)
Dept: NEUROLOGY | Facility: CLINIC | Age: 79
End: 2024-02-27
Payer: MEDICARE

## 2024-02-27 VITALS
SYSTOLIC BLOOD PRESSURE: 138 MMHG | WEIGHT: 225 LBS | DIASTOLIC BLOOD PRESSURE: 68 MMHG | BODY MASS INDEX: 29.82 KG/M2 | HEIGHT: 73 IN | OXYGEN SATURATION: 99 % | HEART RATE: 76 BPM

## 2024-02-27 DIAGNOSIS — F07.81 POSTCONCUSSIVE SYNDROME: Primary | ICD-10-CM

## 2024-02-27 DIAGNOSIS — G25.0 ESSENTIAL TREMOR: ICD-10-CM

## 2024-02-27 PROCEDURE — 3078F DIAST BP <80 MM HG: CPT | Performed by: STUDENT IN AN ORGANIZED HEALTH CARE EDUCATION/TRAINING PROGRAM

## 2024-02-27 PROCEDURE — 99215 OFFICE O/P EST HI 40 MIN: CPT | Performed by: STUDENT IN AN ORGANIZED HEALTH CARE EDUCATION/TRAINING PROGRAM

## 2024-02-27 PROCEDURE — 3075F SYST BP GE 130 - 139MM HG: CPT | Performed by: STUDENT IN AN ORGANIZED HEALTH CARE EDUCATION/TRAINING PROGRAM

## 2024-02-27 NOTE — PROGRESS NOTES
Chief Complaint   Patient presents with    Tremors       Patient ID: Mark Aguirre Jr. is a 78 y.o. male.    HPI:    The following portions of the patient's history were reviewed and updated as appropriate: allergies, current medications, past family history, past medical history, past social history, past surgical history and problem list.    Interval history:  Ernst presents for follow-up of postconcussive syndrome as well as essential tremor. He states he is doing well but notes headaches are worse. They can start in th right forehead and spread to the left forehead and top of head can be tender. Takes Tylenol which helps.  He is not interested in a medication for him pain or tremor at this time after discussion.  He is migraine prevention switched from Pradaxa to Eliquis.  There are     Tremor is more noticeable. Was able to build a new dock. Most noticed in the right hand and is bothersome when eating; wears camo to try to mask food that spills. No falls. Neg dizziness on ROS.     Review of Systems   Neurological:  Positive for tremors. Negative for dizziness, seizures, syncope, facial asymmetry, speech difficulty, weakness, light-headedness, numbness and headaches.          Vitals:    02/27/24 0957   BP: 138/68   Pulse: 76   SpO2: 99%       Neurologic Exam     Mental Status   Oriented to person, place, and time.   Attention: normal. Concentration: normal.   Speech: speech is normal     Cranial Nerves     CN II   Visual fields full to confrontation.     CN III, IV, VI   Pupils are equal, round, and reactive to light.  Extraocular motions are normal.     CN V   Facial sensation intact.     CN VII   Facial expression full, symmetric.     CN IX, X   Palate: symmetric    CN XI   Right trapezius strength: normal  Left trapezius strength: normal    CN XII   Tongue: not atrophic  Fasciculations: absent  Tongue deviation: none  Hearing aids in place     Motor Exam   Muscle bulk: normal    Strength   Right deltoid:  5/5  Left deltoid: 5/5  Right biceps: 5/5  Left biceps: 5/5  Right triceps: 5/5  Left triceps: 5/5  Right iliopsoas: 5/5  Left iliopsoas: 5/5  Right quadriceps: 5/5  Left quadriceps: 5/5  Right hamstrin/5  Left hamstrin/5  Right anterior tibial: 5/5  Left anterior tibial: 5/5  Right gastroc: 5/5  Left gastroc: 5/5Grip 5/5     Sensory Exam   Right arm light touch: normal  Left arm light touch: normal  Right leg light touch: normal  Left leg light touch: normal    Gait, Coordination, and Reflexes     Gait  Gait: normal    Coordination   Finger to nose coordination: normal  Heel to shin coordination: normal    Tremor   Action tremor: left arm and right arm    Reflexes   Right biceps: 2+  Left biceps: 2+  Right patellar: 2+  Left patellar: 2+      Physical Exam  Eyes:      Extraocular Movements: EOM normal.      Pupils: Pupils are equal, round, and reactive to light.   Neurological:      Mental Status: He is oriented to person, place, and time.      Coordination: Finger-Nose-Finger Test and Heel to Shin Test normal.      Gait: Gait is intact.      Deep Tendon Reflexes:      Reflex Scores:       Bicep reflexes are 2+ on the right side and 2+ on the left side.       Patellar reflexes are 2+ on the right side and 2+ on the left side.  Psychiatric:         Speech: Speech normal.       Procedures    Assessment/Plan:    The patient has essential tremor and postconcussive syndrome.  In terms of postconcussive syndrome he has no longer any dizziness but he does have this mild headache like sensation that is not a migraine per him and sensitivity on his head.  We discussed a trial of medication for this but he is not interested at this time - gabapentin vs transition to venlafaxine from citalopram could happen though latter can increase HR and BP.  He has an essential tremor which is not overly bothersome to him and we discussed that there are a few medicines though 1 propranolol medication would not be indicated for him  right now as he is HR between 50 and 60 at baseline. A PRN dose could be entertained but he does not want new Rx.  He is agreeable to try vitamin B2 400 mg daily to see if that helps with his headaches.  The patient was in agreement with this plan and we will plan to follow-up in 1 year to look for interval changes and tremor as well as continue to monitor for recovery from postconcussive syndrome.  Gabapentin may be an option for tremor, hypersensitivity to pain if there is a nerve pain aspect to his headaches, and the potential for headache prevention though it is not a powerful headache preventative.  However this is a controlled substance and the patient is not interested in additional medicine at this time especially controlled substances.  I will continue to monitor.          Diagnoses and all orders for this visit:    1. Postconcussive syndrome (Primary)    2. Essential tremor         I spent 40 minutes caring for this patient on this date of service. This time includes time spent by me in the following activities as necessary: preparing for the visit, reviewing tests, medical records and previous visits, obtaining and/or reviewing a separately obtained history, performing a medically appropriate exam and/or evaluation, counseling and educating the patient, and/or communicating with other healthcare professionals, documenting information in the medical record, independently interpreting results and communicating that information with the patient, and developing a medically appropriate treatment plan with consideration of other conditions, medications, and treatments.    Kosta Javier MD

## 2024-02-27 NOTE — LETTER
February 27, 2024       No Recipients    Patient: Mark Aguirre Jr.   YOB: 1945   Date of Visit: 2/27/2024     Dear Luis Staples MD:       Thank you for referring Mark Aguirre to me for evaluation. Below are the relevant portions of my assessment and plan of care.    If you have questions, please do not hesitate to call me. I look forward to following Mark along with you.         Sincerely,        Kosta Javier MD        CC:   No Recipients    Kosta Javier MD  02/27/24 1143  Sign when Signing Visit  Chief Complaint   Patient presents with   • Tremors       Patient ID: Mark Aguirre Jr. is a 78 y.o. male.    HPI:    The following portions of the patient's history were reviewed and updated as appropriate: allergies, current medications, past family history, past medical history, past social history, past surgical history and problem list.    Interval history:  Ernst presents for follow-up of postconcussive syndrome as well as essential tremor. He states he is doing well but notes headaches are worse. They can start in th right forehead and spread to the left forehead and top of head can be tender. Takes Tylenol which helps.  He is not interested in a medication for him pain or tremor at this time after discussion.  He is migraine prevention switched from Pradaxa to Eliquis.  There are     Tremor is more noticeable. Was able to build a new dock. Most noticed in the right hand and is bothersome when eating; wears camo to try to mask food that spills. No falls. Neg dizziness on ROS.     Review of Systems   Neurological:  Positive for tremors. Negative for dizziness, seizures, syncope, facial asymmetry, speech difficulty, weakness, light-headedness, numbness and headaches.          Vitals:    02/27/24 0957   BP: 138/68   Pulse: 76   SpO2: 99%       Neurologic Exam     Mental Status   Oriented to person, place, and time.   Attention: normal. Concentration: normal.   Speech: speech is normal     Cranial  Nerves     CN II   Visual fields full to confrontation.     CN III, IV, VI   Pupils are equal, round, and reactive to light.  Extraocular motions are normal.     CN V   Facial sensation intact.     CN VII   Facial expression full, symmetric.     CN IX, X   Palate: symmetric    CN XI   Right trapezius strength: normal  Left trapezius strength: normal    CN XII   Tongue: not atrophic  Fasciculations: absent  Tongue deviation: none  Hearing aids in place     Motor Exam   Muscle bulk: normal    Strength   Right deltoid: 5/5  Left deltoid: 5/5  Right biceps: 5/5  Left biceps: 5/5  Right triceps: 5/5  Left triceps: 5/5  Right iliopsoas: 5/5  Left iliopsoas: 5/5  Right quadriceps: 5/5  Left quadriceps: 5/5  Right hamstrin/5  Left hamstrin/5  Right anterior tibial: 5/5  Left anterior tibial: 5/5  Right gastroc: 5/5  Left gastroc: 5/5Grip 5/5     Sensory Exam   Right arm light touch: normal  Left arm light touch: normal  Right leg light touch: normal  Left leg light touch: normal    Gait, Coordination, and Reflexes     Gait  Gait: normal    Coordination   Finger to nose coordination: normal  Heel to shin coordination: normal    Tremor   Action tremor: left arm and right arm    Reflexes   Right biceps: 2+  Left biceps: 2+  Right patellar: 2+  Left patellar: 2+      Physical Exam  Eyes:      Extraocular Movements: EOM normal.      Pupils: Pupils are equal, round, and reactive to light.   Neurological:      Mental Status: He is oriented to person, place, and time.      Coordination: Finger-Nose-Finger Test and Heel to Shin Test normal.      Gait: Gait is intact.      Deep Tendon Reflexes:      Reflex Scores:       Bicep reflexes are 2+ on the right side and 2+ on the left side.       Patellar reflexes are 2+ on the right side and 2+ on the left side.  Psychiatric:         Speech: Speech normal.       Procedures    Assessment/Plan:    The patient has essential tremor and postconcussive syndrome.  In terms of  postconcussive syndrome he has no longer any dizziness but he does have this mild headache like sensation that is not a migraine per him and sensitivity on his head.  We discussed a trial of medication for this but he is not interested at this time - gabapentin vs transition to venlafaxine from citalopram could happen though latter can increase HR and BP.  He has an essential tremor which is not overly bothersome to him and we discussed that there are a few medicines though 1 propranolol medication would not be indicated for him right now as he is HR between 50 and 60 at baseline. A PRN dose could be entertained but he does not want new Rx.  He is agreeable to try vitamin B2 400 mg daily to see if that helps with his headaches.  The patient was in agreement with this plan and we will plan to follow-up in 1 year to look for interval changes and tremor as well as continue to monitor for recovery from postconcussive syndrome.  Gabapentin may be an option for tremor, hypersensitivity to pain if there is a nerve pain aspect to his headaches, and the potential for headache prevention though it is not a powerful headache preventative.  However this is a controlled substance and the patient is not interested in additional medicine at this time especially controlled substances.  I will continue to monitor.          Diagnoses and all orders for this visit:    1. Postconcussive syndrome (Primary)    2. Essential tremor         I spent 40 minutes caring for this patient on this date of service. This time includes time spent by me in the following activities as necessary: preparing for the visit, reviewing tests, medical records and previous visits, obtaining and/or reviewing a separately obtained history, performing a medically appropriate exam and/or evaluation, counseling and educating the patient, and/or communicating with other healthcare professionals, documenting information in the medical record, independently  interpreting results and communicating that information with the patient, and developing a medically appropriate treatment plan with consideration of other conditions, medications, and treatments.    Kosta Javier MD

## 2024-03-11 ENCOUNTER — TELEPHONE (OUTPATIENT)
Dept: SURGERY | Facility: CLINIC | Age: 79
End: 2024-03-11
Payer: MEDICARE

## 2024-03-18 ENCOUNTER — TELEPHONE (OUTPATIENT)
Dept: SURGERY | Facility: CLINIC | Age: 79
End: 2024-03-18
Payer: MEDICARE

## 2024-03-18 NOTE — TELEPHONE ENCOUNTER
Voicemail left with updated arrival time for Colonoscopy on 3/20. Patient to arrive at 9:00 for 10:30 Colonoscopy. Caterna message also sent.

## 2024-03-20 ENCOUNTER — ANESTHESIA (OUTPATIENT)
Dept: GASTROENTEROLOGY | Facility: HOSPITAL | Age: 79
End: 2024-03-20
Payer: MEDICARE

## 2024-03-20 ENCOUNTER — HOSPITAL ENCOUNTER (OUTPATIENT)
Facility: HOSPITAL | Age: 79
Setting detail: HOSPITAL OUTPATIENT SURGERY
Discharge: HOME | End: 2024-03-20
Attending: SURGERY | Admitting: SURGERY
Payer: MEDICARE

## 2024-03-20 ENCOUNTER — ANESTHESIA EVENT (OUTPATIENT)
Dept: GASTROENTEROLOGY | Facility: HOSPITAL | Age: 79
End: 2024-03-20
Payer: MEDICARE

## 2024-03-20 VITALS
DIASTOLIC BLOOD PRESSURE: 68 MMHG | HEART RATE: 58 BPM | HEIGHT: 73 IN | SYSTOLIC BLOOD PRESSURE: 124 MMHG | BODY MASS INDEX: 29.2 KG/M2 | WEIGHT: 220.3 LBS | RESPIRATION RATE: 16 BRPM | OXYGEN SATURATION: 98 %

## 2024-03-20 DIAGNOSIS — Z86.010 HISTORY OF ADENOMATOUS POLYP OF COLON: ICD-10-CM

## 2024-03-20 DIAGNOSIS — Z80.0 FAMILY HISTORY OF COLON CANCER: ICD-10-CM

## 2024-03-20 PROCEDURE — 88305 TISSUE EXAM BY PATHOLOGIST: CPT | Performed by: SURGERY

## 2024-03-20 PROCEDURE — 45380 COLONOSCOPY AND BIOPSY: CPT | Performed by: SURGERY

## 2024-03-20 PROCEDURE — 25810000003 LACTATED RINGERS PER 1000 ML: Performed by: SURGERY

## 2024-03-20 PROCEDURE — 25010000002 PROPOFOL 10 MG/ML EMULSION

## 2024-03-20 RX ORDER — SODIUM CHLORIDE, SODIUM LACTATE, POTASSIUM CHLORIDE, CALCIUM CHLORIDE 600; 310; 30; 20 MG/100ML; MG/100ML; MG/100ML; MG/100ML
1000 INJECTION, SOLUTION INTRAVENOUS CONTINUOUS
Status: DISCONTINUED | OUTPATIENT
Start: 2024-03-20 | End: 2024-03-20 | Stop reason: HOSPADM

## 2024-03-20 RX ORDER — PROPOFOL 10 MG/ML
VIAL (ML) INTRAVENOUS CONTINUOUS PRN
Status: DISCONTINUED | OUTPATIENT
Start: 2024-03-20 | End: 2024-03-20 | Stop reason: SURG

## 2024-03-20 RX ORDER — LIDOCAINE HYDROCHLORIDE 20 MG/ML
INJECTION, SOLUTION INFILTRATION; PERINEURAL AS NEEDED
Status: DISCONTINUED | OUTPATIENT
Start: 2024-03-20 | End: 2024-03-20 | Stop reason: SURG

## 2024-03-20 RX ADMIN — PROPOFOL 100 MG: 10 INJECTION, EMULSION INTRAVENOUS at 11:02

## 2024-03-20 RX ADMIN — SODIUM CHLORIDE, POTASSIUM CHLORIDE, SODIUM LACTATE AND CALCIUM CHLORIDE 1000 ML: 600; 310; 30; 20 INJECTION, SOLUTION INTRAVENOUS at 10:37

## 2024-03-20 RX ADMIN — LIDOCAINE HYDROCHLORIDE 60 MG: 20 INJECTION, SOLUTION INFILTRATION; PERINEURAL at 11:02

## 2024-03-20 RX ADMIN — PROPOFOL 160 MCG/KG/MIN: 10 INJECTION, EMULSION INTRAVENOUS at 11:03

## 2024-03-20 NOTE — ANESTHESIA POSTPROCEDURE EVALUATION
Patient: Mark Aguirre Jr.    Procedure Summary       Date: 03/20/24 Room / Location: [facility] ENDOSCOPY 1 / [facility] ENDOSCOPY    Anesthesia Start: 1101 Anesthesia Stop: 1124    Procedure: COLONOSCOPY TO CECUM WITH COLD BX POLYPECTOMIES Diagnosis:       Family history of colon cancer      History of adenomatous polyp of colon      (Family history of colon cancer [Z80.0])      (History of adenomatous polyp of colon [Z86.010])    Surgeons: Ross Stearns MD Provider: Quintin Mcfarlane MD    Anesthesia Type: MAC ASA Status: 3            Anesthesia Type: MAC    Vitals  Vitals Value Taken Time   /64 03/20/24 1124   Temp     Pulse 66 03/20/24 1127   Resp 16 03/20/24 1123   SpO2 90 % 03/20/24 1127   Vitals shown include unfiled device data.        Post Anesthesia Care and Evaluation    Patient location during evaluation: PACU  Patient participation: complete - patient participated  Level of consciousness: awake and alert  Pain management: adequate    Airway patency: patent  Anesthetic complications: No anesthetic complications    Cardiovascular status: acceptable  Respiratory status: acceptable  Hydration status: acceptable    Comments: --------------------            03/20/24               1123     --------------------   BP:       116/64     Pulse:      67       Resp:       16       SpO2:      94%      --------------------

## 2024-03-20 NOTE — OP NOTE
PREOPERATIVE DIAGNOSIS:  High risk screening secondary to personal history of adenomatous polyps and family history of colon cancer-father    POSTOPERATIVE DIAGNOSIS AND FINDINGS:  3 small ascending colon polyps with cold biopsy removal of polyps    PROCEDURE:  Colonoscopy to cecum     SURGEON:  Ross Stearns MD    ANESTHESIA:  MAC    SPECIMEN(S):  Polyps    DESCRIPTION:  In decubitus position digital rectal exam was normal. Colonoscope inserted under direct visualization of lumen to cecum confirmed by visualization of ileocecal valve and appendiceal orifice.  Scope was slowly withdrawn circumferentially examining all mucosal surfaces.  Bowel preparation was good.  Mucosal abnormalities were 3 small ascending colon polyps all of which were removed completely with cold biopsy forceps, good hemostasis at each site.  No other mucosal abnormalities were noted.  Tolerated well.    RECOMMENDATION FOR FUTURE SURVEILLANCE:  To be determined based on polyp pathology and issued as separate report    Ross Stearns M.D.

## 2024-03-21 LAB
LAB AP CASE REPORT: NORMAL
PATH REPORT.FINAL DX SPEC: NORMAL
PATH REPORT.GROSS SPEC: NORMAL

## 2024-03-22 ENCOUNTER — DOCUMENTATION (OUTPATIENT)
Dept: SURGERY | Facility: CLINIC | Age: 79
End: 2024-03-22
Payer: MEDICARE

## 2024-03-22 NOTE — PROGRESS NOTES
ENDOSCOPY FOLLOW UP NOTE    Colonoscopy 3/20/2024     Indication:  Personal history of adenomatous polyps and family history of colon cancer-father    Findings:  3 small ascending colon polyps with cold biopsy removal of polyps:  Sessile serrated lesion and tubular adenoma    Recommendations:  3-year surveillance    Ross Stearns M.D.

## 2024-03-22 NOTE — PROGRESS NOTES
Please let him know that he had 3 benign polyps.  Based on the number and type of polyps, 3-year surveillance is advised-put in computer for reminder

## 2024-03-25 ENCOUNTER — TELEPHONE (OUTPATIENT)
Dept: SURGERY | Facility: CLINIC | Age: 79
End: 2024-03-25
Payer: MEDICARE

## 2024-03-25 NOTE — TELEPHONE ENCOUNTER
----- Message from Ross Stearns MD sent at 3/22/2024  9:28 AM EDT -----  Please let him know that he had 3 benign polyps.  Based on the number and type of polyps, 3-year surveillance is advised-put in computer for reminder

## 2024-05-18 NOTE — PROGRESS NOTES
Mark Aguirre Jr. : 1945 MRN: [id number] DATE: 2020    DIAGNOSIS: 4 week follow up right total hip     SUBJECTIVE:Patient returns today for 4 week follow up of right total hip replacement. Patient reports doing well with no unusual complaints. Appears to be progressing appropriately.    OBJECTIVE:   Exam:. The incision is healing appropriately. No sign of infection. Range of motion is progressing as expected. The calf is soft and nontender with a negative Homans sign.    DIAGNOSTIC STUDIES  Xrays: 2 views of the right hip (AP pelvis and lateral right hip) were ordered and reviewed for evaluation of recent hip replacement. They demonstrate a well positioned, well aligned hip replacement without complicating factors noted. In comparison with previous films there has been interval implant placement.    ASSESSMENT: 4 week status post right hip replacement.    PLAN:   1) PT exercises   2) Continue ice PRN   3) WBAT   4) Follow up in 6 weeks with repeat Xrays of right hip (2views)    Travis Hamlin MD  2020   1 Principal Discharge DX:	Nausea and vomiting

## 2024-08-21 ENCOUNTER — OFFICE VISIT (OUTPATIENT)
Dept: CARDIOLOGY | Facility: CLINIC | Age: 79
End: 2024-08-21
Payer: MEDICARE

## 2024-08-21 VITALS
HEIGHT: 73 IN | SYSTOLIC BLOOD PRESSURE: 142 MMHG | WEIGHT: 224 LBS | DIASTOLIC BLOOD PRESSURE: 76 MMHG | HEART RATE: 56 BPM | BODY MASS INDEX: 29.69 KG/M2

## 2024-08-21 DIAGNOSIS — I45.2 BIFASCICULAR BLOCK: ICD-10-CM

## 2024-08-21 DIAGNOSIS — I49.3 PVC (PREMATURE VENTRICULAR CONTRACTION): ICD-10-CM

## 2024-08-21 DIAGNOSIS — I48.0 PAROXYSMAL ATRIAL FIBRILLATION: Primary | ICD-10-CM

## 2024-08-21 PROCEDURE — 93000 ELECTROCARDIOGRAM COMPLETE: CPT | Performed by: INTERNAL MEDICINE

## 2024-08-21 PROCEDURE — 3077F SYST BP >= 140 MM HG: CPT | Performed by: INTERNAL MEDICINE

## 2024-08-21 PROCEDURE — 99214 OFFICE O/P EST MOD 30 MIN: CPT | Performed by: INTERNAL MEDICINE

## 2024-08-21 PROCEDURE — 3078F DIAST BP <80 MM HG: CPT | Performed by: INTERNAL MEDICINE

## 2024-08-21 NOTE — PROGRESS NOTES
Date of Office Visit: 24  Encounter Provider: Claus Lindquist MD  Place of Service: [facility] CARDIOLOGY  Patient Name: Mark Aguirre Jr.  :1945  [id number]    Chief Complaint   Patient presents with    Atrial Fibrillation   :     HPI: Mark Aguirre Jr. is a 78 y.o. male is got a history of paroxysmal A. fib he has had an ablation in the past with Dr. Hemant Armando and he is got a little history of aortic sclerosis last echo with Dr. Bre Vernon showed mild to moderate AS.  He has had chronic history of ventricular ectopy that we have looked into a couple of times seems to be benign.  He is a big param hunting elk deer dove and ducks.  He had an echocardiogram in 2023 with mild aortic stenosis normal LV function.    He is here for follow-up.  He has been doing pretty well has not had any palpitations no bleeding difficulty no PND orthopnea edema no syncope he is not wanting as much as he used to because he just is a little bit more tired than he used to be.        Past Medical History:   Diagnosis Date    Acute kidney failure     POST-OP TOTAL HIP 2020    Anticoagulated     PRADAXA FOR A FIB TO HELD 3 DAYS PRIOR    Arthritis     OSTEO. RIGHT HIP    Atrial flutter     Bifascicular block     Cataract     LEFT AND RIGHT    Depression     History of colon polyps     Hypertension     Insomnia     Knee pain     PAF (paroxysmal atrial fibrillation)     Right hip pain     Sleep apnea     cpap    Slow to wake up after anesthesia        Past Surgical History:   Procedure Laterality Date    CARDIAC ABLATION      CARDIAC CATHETERIZATION      COLONOSCOPY N/A 2018    Procedure: COLONOSCOPY INTO CECUM WITH COLD BX POLYPECTOMY ;  Surgeon: Ross Stearns MD;  Location: [facility] ENDOSCOPY;  Service:     COLONOSCOPY N/A 2/3/2021    Procedure: COLONOSCOPY TOCECUM WITH COLD BX POLYPECTOMY AND HOT SNARE POLYPECTOMY;  Surgeon: Ross Stearns MD;  Location: [facility] ENDOSCOPY;   Service: General;  Laterality: N/A;  PERSONAL HX OF POLYPS, FAMILY HX OF COLON CANCER  --POLYPS (X3), DIVERTICULOSIS    COLONOSCOPY N/A 3/20/2024    Procedure: COLONOSCOPY TO CECUM WITH COLD BX POLYPECTOMIES;  Surgeon: Ross Stearns MD;  Location: [facility] ENDOSCOPY;  Service: General;  Laterality: N/A;  HX POLYPS/POLYPS (3)    HERNIA REPAIR      INGUINAL HERNIA? SIDE    TOTAL HIP ARTHROPLASTY Right 6/5/2020    Procedure: TOTAL HIP ARTHROPLASTY;  Surgeon: Travis Hamlin MD;  Location: [facility] MAIN OR;  Service: Orthopedics;  Laterality: Right;       Social History     Socioeconomic History    Marital status:    Tobacco Use    Smoking status: Never    Smokeless tobacco: Current     Types: Snuff    Tobacco comments:     2 weekly chew   Vaping Use    Vaping status: Never Used   Substance and Sexual Activity    Alcohol use: Never    Drug use: Never    Sexual activity: Defer       Family History   Problem Relation Age of Onset    Breast cancer Mother     Colon cancer Father     Diabetes Brother     No Known Problems Maternal Grandmother     No Known Problems Maternal Grandfather     No Known Problems Paternal Grandmother     Stroke Paternal Grandfather     Heart disease Brother     Malig Hyperthermia Neg Hx        Review of Systems   Constitutional: Negative for decreased appetite, fever, malaise/fatigue and weight loss.   HENT:  Negative for nosebleeds.    Eyes:  Negative for double vision.   Cardiovascular:  Negative for chest pain, claudication, cyanosis, dyspnea on exertion, irregular heartbeat, leg swelling, near-syncope, orthopnea, palpitations, paroxysmal nocturnal dyspnea and syncope.   Respiratory:  Negative for cough, hemoptysis and shortness of breath.    Hematologic/Lymphatic: Negative for bleeding problem.   Skin:  Negative for rash.   Musculoskeletal:  Negative for falls and myalgias.   Gastrointestinal:  Negative for hematochezia, jaundice, melena, nausea and vomiting.   Genitourinary:  Negative  "for hematuria.   Neurological:  Negative for dizziness and seizures.   Psychiatric/Behavioral:  Negative for altered mental status and memory loss.        Allergies   Allergen Reactions    Ativan [Lorazepam] Delirium         Current Outpatient Medications:     amLODIPine (NORVASC) 5 MG tablet, Take 1 tablet by mouth Daily., Disp: 30 tablet, Rfl: 3    apixaban (ELIQUIS) 5 MG tablet tablet, Take 1 tablet by mouth Every 12 (Twelve) Hours., Disp: 180 tablet, Rfl: 3    atorvastatin (LIPITOR) 10 MG tablet, Take 1 tablet by mouth Daily., Disp: , Rfl:     chlorthalidone (HYGROTON) 25 MG tablet, TAKE 1 TABLET BY MOUTH DAILY, Disp: 90 tablet, Rfl: 2    citalopram (CeleXA) 20 MG tablet, Take 1 tablet by mouth Every Morning., Disp: , Rfl:     Cyanocobalamin (VITAMIN B 12 PO), Take  by mouth., Disp: , Rfl:     tamsulosin (FLOMAX) 0.4 MG capsule 24 hr capsule, Take 1 capsule by mouth Daily., Disp: , Rfl:     acetaminophen (TYLENOL) 325 MG tablet, Take 2 tablets by mouth Every 4 (Four) Hours As Needed for Mild Pain . (Patient not taking: Reported on 8/21/2024), Disp: 30 tablet, Rfl: 0      Objective:     Vitals:    08/21/24 1104   BP: 142/76   Pulse: 56   Weight: 102 kg (224 lb)   Height: 185.4 cm (73\")     Body mass index is 29.55 kg/m².    Constitutional:       Appearance: Well-developed.   Eyes:      General: No scleral icterus.  HENT:      Head: Normocephalic.   Neck:      Thyroid: No thyromegaly.      Vascular: No JVD.      Lymphadenopathy: No cervical adenopathy.   Pulmonary:      Effort: Pulmonary effort is normal.      Breath sounds: Normal breath sounds. No wheezing. No rales.   Cardiovascular:      Normal rate. Regular rhythm.      Murmurs: There is a grade 1to 3/6 harsh crescendo-decrescendo midsystolic murmur at the URSB, radiating to the neck.      No gallop.    Edema:     Peripheral edema absent.   Abdominal:      Palpations: Abdomen is soft.      Tenderness: There is no abdominal tenderness.   Musculoskeletal: " Normal range of motion. Skin:     General: Skin is warm and dry.      Findings: No rash.   Neurological:      Mental Status: Alert and oriented to person, place, and time.           ECG 12 Lead    Date/Time: 8/21/2024 11:51 AM  Performed by: Claus Lindquist MD    Authorized by: Claus Lindquist MD  Rhythm: sinus rhythm  Ectopy: atrial premature contractions  Conduction: right bundle branch block and left anterior fascicular block    Clinical impression: abnormal EKG           Assessment:       Diagnosis Plan   1. Paroxysmal atrial fibrillation        2. Bifascicular block        3. PVC (premature ventricular contraction)               Plan:       I think he probably is okay but with his fatigue and his murmur sounds a little bit worse I am going to chest check an echo and make sure he is okay if it gets bad enough he is going to need a TAVR evaluation.  Right now his A-fib is quiescent and he is doing well with that.  Of course he has bifascicular block and so will be at high risk for a pacer after a TAVR if that is what ends up happening.  If his echo is okay I will see him back in a year with an echo    No follow-ups on file.     As always, it has been a pleasure to participate in your patient's care.      Sincerely,       Claus Lindquist MD

## 2024-09-05 ENCOUNTER — HOSPITAL ENCOUNTER (OUTPATIENT)
Dept: CARDIOLOGY | Facility: HOSPITAL | Age: 79
Discharge: HOME | End: 2024-09-05
Admitting: INTERNAL MEDICINE
Payer: MEDICARE

## 2024-09-05 VITALS
HEART RATE: 55 BPM | BODY MASS INDEX: 30.39 KG/M2 | WEIGHT: 229.28 LBS | SYSTOLIC BLOOD PRESSURE: 143 MMHG | DIASTOLIC BLOOD PRESSURE: 62 MMHG | HEIGHT: 73 IN

## 2024-09-05 DIAGNOSIS — I48.0 PAROXYSMAL ATRIAL FIBRILLATION: ICD-10-CM

## 2024-09-05 DIAGNOSIS — I45.2 BIFASCICULAR BLOCK: ICD-10-CM

## 2024-09-05 DIAGNOSIS — I49.3 PVC (PREMATURE VENTRICULAR CONTRACTION): ICD-10-CM

## 2024-09-05 LAB
AORTIC DIMENSIONLESS INDEX: 0.4 (DI)
BH CV ECHO MEAS - AI P1/2T: 616.1 MSEC
BH CV ECHO MEAS - AO MAX PG: 43 MMHG
BH CV ECHO MEAS - AO MEAN PG: 24 MMHG
BH CV ECHO MEAS - AO V2 MAX: 328 CM/SEC
BH CV ECHO MEAS - AO V2 VTI: 73.2 CM
BH CV ECHO MEAS - AVA(I,D): 1.84 CM2
BH CV ECHO MEAS - EDV(CUBED): 185.2 ML
BH CV ECHO MEAS - EDV(MOD-SP2): 191 ML
BH CV ECHO MEAS - EDV(MOD-SP4): 211 ML
BH CV ECHO MEAS - EF(MOD-BP): 48.8 %
BH CV ECHO MEAS - EF(MOD-SP2): 35.6 %
BH CV ECHO MEAS - EF(MOD-SP4): 58.3 %
BH CV ECHO MEAS - ESV(CUBED): 52.5 ML
BH CV ECHO MEAS - ESV(MOD-SP2): 123 ML
BH CV ECHO MEAS - ESV(MOD-SP4): 88 ML
BH CV ECHO MEAS - FS: 34.3 %
BH CV ECHO MEAS - IVS/LVPW: 0.78 CM
BH CV ECHO MEAS - IVSD: 0.7 CM
BH CV ECHO MEAS - LAT PEAK E' VEL: 8.2 CM/SEC
BH CV ECHO MEAS - LV MASS(C)D: 170.2 GRAMS
BH CV ECHO MEAS - LV MAX PG: 5.4 MMHG
BH CV ECHO MEAS - LV MEAN PG: 3 MMHG
BH CV ECHO MEAS - LV V1 MAX: 116 CM/SEC
BH CV ECHO MEAS - LV V1 VTI: 28 CM
BH CV ECHO MEAS - LVIDD: 5.7 CM
BH CV ECHO MEAS - LVIDS: 3.7 CM
BH CV ECHO MEAS - LVOT AREA: 4.8 CM2
BH CV ECHO MEAS - LVOT DIAM: 2.48 CM
BH CV ECHO MEAS - LVPWD: 0.9 CM
BH CV ECHO MEAS - MED PEAK E' VEL: 7.4 CM/SEC
BH CV ECHO MEAS - MR MAX PG: 114 MMHG
BH CV ECHO MEAS - MR MAX VEL: 533.7 CM/SEC
BH CV ECHO MEAS - MR MEAN PG: 65.2 MMHG
BH CV ECHO MEAS - MR MEAN VEL: 372.1 CM/SEC
BH CV ECHO MEAS - MR VTI: 170.3 CM
BH CV ECHO MEAS - MV A MAX VEL: 77 CM/SEC
BH CV ECHO MEAS - MV DEC TIME: 0.19 SEC
BH CV ECHO MEAS - MV E MAX VEL: 116 CM/SEC
BH CV ECHO MEAS - MV E/A: 1.51
BH CV ECHO MEAS - MV MAX PG: 7.3 MMHG
BH CV ECHO MEAS - MV MEAN PG: 2.6 MMHG
BH CV ECHO MEAS - MV V2 VTI: 34.7 CM
BH CV ECHO MEAS - MVA(VTI): 3.9 CM2
BH CV ECHO MEAS - PA ACC TIME: 0.07 SEC
BH CV ECHO MEAS - PA V2 MAX: 84.8 CM/SEC
BH CV ECHO MEAS - QP/QS: 1.07
BH CV ECHO MEAS - RAP SYSTOLE: 15 MMHG
BH CV ECHO MEAS - RV MAX PG: 2.6 MMHG
BH CV ECHO MEAS - RV V1 MAX: 80.2 CM/SEC
BH CV ECHO MEAS - RV V1 VTI: 21.2 CM
BH CV ECHO MEAS - RVOT DIAM: 3 CM
BH CV ECHO MEAS - RVSP: 48 MMHG
BH CV ECHO MEAS - SV(LVOT): 135 ML
BH CV ECHO MEAS - SV(MOD-SP2): 68 ML
BH CV ECHO MEAS - SV(MOD-SP4): 123 ML
BH CV ECHO MEAS - SV(RVOT): 145.1 ML
BH CV ECHO MEAS - TR MAX PG: 33.8 MMHG
BH CV ECHO MEAS - TR MAX VEL: 290.5 CM/SEC
BH CV ECHO MEASUREMENTS AVERAGE E/E' RATIO: 14.87
BH CV XLRA - RV BASE: 4.5 CM
BH CV XLRA - RV LENGTH: 10 CM
BH CV XLRA - RV MID: 3.5 CM
BH CV XLRA - TDI S': 13.6 CM/SEC
LEFT ATRIUM VOLUME INDEX: 39.3 ML/M2
SINUS: 2.8 CM

## 2024-09-05 PROCEDURE — 93306 TTE W/DOPPLER COMPLETE: CPT

## 2024-09-05 PROCEDURE — 25510000001 PERFLUTREN 6.52 MG/ML SUSPENSION 2 ML VIAL: Performed by: INTERNAL MEDICINE

## 2024-09-05 RX ADMIN — SODIUM CHLORIDE 2 ML: 9 INJECTION INTRAMUSCULAR; INTRAVENOUS; SUBCUTANEOUS at 08:07

## 2024-10-07 ENCOUNTER — OFFICE VISIT (OUTPATIENT)
Age: 79
End: 2024-10-07
Payer: MEDICARE

## 2024-10-07 VITALS
HEART RATE: 97 BPM | HEIGHT: 73 IN | BODY MASS INDEX: 30.35 KG/M2 | WEIGHT: 229 LBS | DIASTOLIC BLOOD PRESSURE: 84 MMHG | SYSTOLIC BLOOD PRESSURE: 130 MMHG

## 2024-10-07 DIAGNOSIS — I35.0 NONRHEUMATIC AORTIC VALVE STENOSIS: ICD-10-CM

## 2024-10-07 DIAGNOSIS — I10 ESSENTIAL HYPERTENSION: ICD-10-CM

## 2024-10-07 DIAGNOSIS — I48.19 PERSISTENT ATRIAL FIBRILLATION: Primary | ICD-10-CM

## 2024-10-07 DIAGNOSIS — I45.2 BIFASCICULAR BLOCK: ICD-10-CM

## 2024-10-07 RX ORDER — METOPROLOL TARTRATE 50 MG
50 TABLET ORAL 2 TIMES DAILY
COMMUNITY
Start: 2024-10-01

## 2024-10-07 RX ORDER — ZOLPIDEM TARTRATE 5 MG/1
2.5-5 TABLET ORAL NIGHTLY PRN
COMMUNITY
Start: 2024-08-23

## 2024-10-07 NOTE — PROGRESS NOTES
Date of Office Visit: 10/07/2024  Encounter Provider: Hemant Armando MD  Place of Service: [facility] CARDIOLOGY  Patient Name: Mark Aguirre Jr.  : 1945    Subjective:     Encounter Date:10/07/2024      Patient ID: Mark Aguirre Jr. is a 79 y.o. male who has a cc of  recently persistent AF and hurtado and decreased energy.     The chart says he has had two AF ablations in the past -- no details. It must be before .     He was fine until recently.     No anginal chest pain,     No fainting,  No orthostasis.   No edema.   Exercise tolerance: decreased.     There have been no hospital admission since the last visit.     There have been no bleeding events.       Past Medical History:   Diagnosis Date    Acute kidney failure     POST-OP TOTAL HIP 2020    Anticoagulated     PRADAXA FOR A FIB TO HELD 3 DAYS PRIOR    Arthritis     OSTEO. RIGHT HIP    Atrial flutter     Bifascicular block     Cataract     LEFT AND RIGHT    Depression     History of colon polyps     Hypertension     Insomnia     Knee pain     PAF (paroxysmal atrial fibrillation)     Right hip pain     Sleep apnea     cpap    Slow to wake up after anesthesia        Social History     Socioeconomic History    Marital status:    Tobacco Use    Smoking status: Never    Smokeless tobacco: Current     Types: Snuff    Tobacco comments:     2 weekly chew   Vaping Use    Vaping status: Never Used   Substance and Sexual Activity    Alcohol use: Never    Drug use: Never    Sexual activity: Defer       Family History   Problem Relation Age of Onset    Breast cancer Mother     Colon cancer Father     Diabetes Brother     No Known Problems Maternal Grandmother     No Known Problems Maternal Grandfather     No Known Problems Paternal Grandmother     Stroke Paternal Grandfather     Heart disease Brother     Malig Hyperthermia Neg Hx        Review of Systems   Constitutional: Negative for fever and night sweats.   HENT:  Negative for ear pain  "and stridor.    Eyes:  Negative for discharge and visual halos.   Cardiovascular:  Negative for cyanosis.   Respiratory:  Negative for hemoptysis and sputum production.    Hematologic/Lymphatic: Negative for adenopathy.   Skin:  Negative for nail changes and unusual hair distribution.   Musculoskeletal:  Negative for gout and joint swelling.   Gastrointestinal:  Negative for bowel incontinence and flatus.   Genitourinary:  Negative for dysuria and flank pain.   Neurological:  Negative for seizures and tremors.   Psychiatric/Behavioral:  Negative for altered mental status. The patient is not nervous/anxious.             Objective:     Vitals:    10/07/24 1123   BP: 130/84   Pulse: 97   Weight: 104 kg (229 lb)   Height: 185 cm (72.84\")         Eyes:      General:         Right eye: No discharge.         Left eye: No discharge.   HENT:      Head: Normocephalic and atraumatic.   Neck:      Thyroid: No thyromegaly.      Vascular: No JVD.   Pulmonary:      Effort: Pulmonary effort is normal.      Breath sounds: Normal breath sounds. No rales.   Cardiovascular:      Normal rate. Irregularly irregular rhythm.      No gallop.    Edema:     Peripheral edema absent.   Abdominal:      General: Bowel sounds are normal.      Palpations: Abdomen is soft.      Tenderness: There is no abdominal tenderness.   Musculoskeletal: Normal range of motion.         General: No deformity. Skin:     General: Skin is warm and dry.      Findings: No erythema.   Neurological:      Mental Status: Alert and oriented to person, place, and time.      Motor: Normal muscle tone.   Psychiatric:         Behavior: Behavior normal.         Thought Content: Thought content normal.           ECG 12 Lead    Date/Time: 10/7/2024 11:56 AM  Performed by: Hemant Armando MD    Authorized by: Hemant Armando MD  Comparison: compared with previous ECG   Similar to previous ECG  Rhythm: atrial fibrillation  Conduction: right bundle branch block and left anterior " fascicular block          Lab Review:       Assessment:          Diagnosis Plan   1. Persistent atrial fibrillation        2. Bifascicular block        3. Nonrheumatic aortic valve stenosis        4. Essential hypertension               Plan:     I think he the AF has caused his deterioration in function.     The question is how best to care for it. Two previous PVI procedures. There is probably not much to ablate.     He had CV last summer so CV alone seems like it won't likely hold.     He does have moderate AS     I think we should do dofetilide plus CV. Creat is normal. QT is ok.

## 2024-11-11 ENCOUNTER — HOSPITAL ENCOUNTER (INPATIENT)
Facility: HOSPITAL | Age: 79
LOS: 2 days | Discharge: HOME | End: 2024-11-13
Attending: INTERNAL MEDICINE | Admitting: INTERNAL MEDICINE
Payer: MEDICARE

## 2024-11-11 DIAGNOSIS — I48.19 PERSISTENT ATRIAL FIBRILLATION: Primary | ICD-10-CM

## 2024-11-11 LAB
ALBUMIN SERPL-MCNC: 4 G/DL (ref 3.5–5.2)
ALBUMIN/GLOB SERPL: 1.5 G/DL
ALP SERPL-CCNC: 75 U/L (ref 39–117)
ALT SERPL W P-5'-P-CCNC: 11 U/L (ref 1–41)
ANION GAP SERPL CALCULATED.3IONS-SCNC: 8.8 MMOL/L (ref 5–15)
AST SERPL-CCNC: 11 U/L (ref 1–40)
BILIRUB SERPL-MCNC: 1.6 MG/DL (ref 0–1.2)
BUN SERPL-MCNC: 22 MG/DL (ref 8–23)
BUN/CREAT SERPL: 18.8 (ref 7–25)
CALCIUM SPEC-SCNC: 9.2 MG/DL (ref 8.6–10.5)
CHLORIDE SERPL-SCNC: 106 MMOL/L (ref 98–107)
CO2 SERPL-SCNC: 26.2 MMOL/L (ref 22–29)
CREAT SERPL-MCNC: 1.17 MG/DL (ref 0.76–1.27)
DEPRECATED RDW RBC AUTO: 39.8 FL (ref 37–54)
EGFRCR SERPLBLD CKD-EPI 2021: 62.6 ML/MIN/1.73
ERYTHROCYTE [DISTWIDTH] IN BLOOD BY AUTOMATED COUNT: 12.4 % (ref 12.3–15.4)
GLOBULIN UR ELPH-MCNC: 2.6 GM/DL
GLUCOSE SERPL-MCNC: 143 MG/DL (ref 65–99)
HCT VFR BLD AUTO: 44.9 % (ref 37.5–51)
HGB BLD-MCNC: 14.7 G/DL (ref 13–17.7)
MAGNESIUM SERPL-MCNC: 2.1 MG/DL (ref 1.6–2.4)
MCH RBC QN AUTO: 28.8 PG (ref 26.6–33)
MCHC RBC AUTO-ENTMCNC: 32.7 G/DL (ref 31.5–35.7)
MCV RBC AUTO: 87.9 FL (ref 79–97)
PLATELET # BLD AUTO: 149 10*3/MM3 (ref 140–450)
PMV BLD AUTO: 10.9 FL (ref 6–12)
POTASSIUM SERPL-SCNC: 4.1 MMOL/L (ref 3.5–5.2)
PROT SERPL-MCNC: 6.6 G/DL (ref 6–8.5)
QT INTERVAL: 413 MS
QTC INTERVAL: 559 MS
RBC # BLD AUTO: 5.11 10*6/MM3 (ref 4.14–5.8)
SODIUM SERPL-SCNC: 141 MMOL/L (ref 136–145)
WBC NRBC COR # BLD AUTO: 7.56 10*3/MM3 (ref 3.4–10.8)

## 2024-11-11 PROCEDURE — 85027 COMPLETE CBC AUTOMATED: CPT | Performed by: PHYSICIAN ASSISTANT

## 2024-11-11 PROCEDURE — 83735 ASSAY OF MAGNESIUM: CPT | Performed by: PHYSICIAN ASSISTANT

## 2024-11-11 PROCEDURE — 93005 ELECTROCARDIOGRAM TRACING: CPT | Performed by: PHYSICIAN ASSISTANT

## 2024-11-11 PROCEDURE — 99222 1ST HOSP IP/OBS MODERATE 55: CPT | Performed by: PHYSICIAN ASSISTANT

## 2024-11-11 PROCEDURE — 80053 COMPREHEN METABOLIC PANEL: CPT | Performed by: PHYSICIAN ASSISTANT

## 2024-11-11 PROCEDURE — 93010 ELECTROCARDIOGRAM REPORT: CPT | Performed by: INTERNAL MEDICINE

## 2024-11-11 PROCEDURE — 93005 ELECTROCARDIOGRAM TRACING: CPT | Performed by: INTERNAL MEDICINE

## 2024-11-11 RX ORDER — NITROGLYCERIN 0.4 MG/1
0.4 TABLET SUBLINGUAL
Status: DISCONTINUED | OUTPATIENT
Start: 2024-11-11 | End: 2024-11-13 | Stop reason: HOSPADM

## 2024-11-11 RX ORDER — ACETAMINOPHEN 325 MG/1
650 TABLET ORAL EVERY 4 HOURS PRN
Status: DISCONTINUED | OUTPATIENT
Start: 2024-11-11 | End: 2024-11-13 | Stop reason: HOSPADM

## 2024-11-11 RX ORDER — CHLORTHALIDONE 25 MG/1
25 TABLET ORAL DAILY
Status: DISCONTINUED | OUTPATIENT
Start: 2024-11-12 | End: 2024-11-11

## 2024-11-11 RX ORDER — TAMSULOSIN HYDROCHLORIDE 0.4 MG/1
0.4 CAPSULE ORAL DAILY
Status: DISCONTINUED | OUTPATIENT
Start: 2024-11-12 | End: 2024-11-13 | Stop reason: HOSPADM

## 2024-11-11 RX ORDER — DOFETILIDE 0.5 MG/1
500 CAPSULE ORAL EVERY 12 HOURS
Status: DISCONTINUED | OUTPATIENT
Start: 2024-11-11 | End: 2024-11-12

## 2024-11-11 RX ORDER — ATORVASTATIN CALCIUM 20 MG/1
10 TABLET, FILM COATED ORAL DAILY
Status: DISCONTINUED | OUTPATIENT
Start: 2024-11-12 | End: 2024-11-13 | Stop reason: HOSPADM

## 2024-11-11 RX ORDER — CITALOPRAM HYDROBROMIDE 20 MG/1
20 TABLET ORAL EVERY MORNING
Status: DISCONTINUED | OUTPATIENT
Start: 2024-11-12 | End: 2024-11-13 | Stop reason: HOSPADM

## 2024-11-11 RX ORDER — ZOLPIDEM TARTRATE 5 MG/1
2.5 TABLET ORAL NIGHTLY PRN
Status: DISCONTINUED | OUTPATIENT
Start: 2024-11-11 | End: 2024-11-13 | Stop reason: HOSPADM

## 2024-11-11 RX ORDER — METOPROLOL TARTRATE 25 MG/1
25 TABLET, FILM COATED ORAL EVERY 12 HOURS SCHEDULED
Status: DISCONTINUED | OUTPATIENT
Start: 2024-11-11 | End: 2024-11-12

## 2024-11-11 RX ADMIN — DOFETILIDE 500 MCG: 0.5 CAPSULE ORAL at 17:46

## 2024-11-11 RX ADMIN — APIXABAN 5 MG: 5 TABLET, FILM COATED ORAL at 20:37

## 2024-11-11 RX ADMIN — METOPROLOL TARTRATE 25 MG: 25 TABLET, FILM COATED ORAL at 20:37

## 2024-11-11 NOTE — H&P
Electrophysiology Hospital Consult            Patient Name: Mark Aguirre Jr.  Age/Sex: 79 y.o. male  : 1945  MRN: [id number]    Date of Admission: 2024  Date of Encounter Visit: 24  Encounter Provider: Cameron Denny PA-C  Referring Provider: Hemant Armando MD  Place of Service: [facility] CARDIOLOGY  Electrophysiologist: Dr. Armando    Subjective:   EP Consultation for: Persistent atrial fibrillation    Chief Complaint: A-fib    History of Present Illness:  Mark Aguirre Jr. is a 79 y.o. male who is presenting to the hospital for a routine admission for initiation of dofetilide.    He has been in persistent atrial fibrillation for the last 2 months with the previous cardioversion last year.  He is also had 2 previous PVI's.    He has symptomatic fatigue and decreased exercise tolerance.    On arrival he was seen to be in a rate controlled atrial fibrillation with intermittent aberrant beats.    Past Medical History:  Past Medical History:   Diagnosis Date    Acute kidney failure     POST-OP TOTAL HIP 2020    Anticoagulated     PRADAXA FOR A FIB TO HELD 3 DAYS PRIOR    Arthritis     OSTEO. RIGHT HIP    Atrial flutter     Bifascicular block     Cataract     LEFT AND RIGHT    Depression     History of colon polyps     Hypertension     Insomnia     Knee pain     PAF (paroxysmal atrial fibrillation)     Right hip pain     Sleep apnea     cpap    Slow to wake up after anesthesia        Past Surgical History:   Procedure Laterality Date    CARDIAC ABLATION      CARDIAC CATHETERIZATION      COLONOSCOPY N/A 2018    Procedure: COLONOSCOPY INTO CECUM WITH COLD BX POLYPECTOMY ;  Surgeon: Ross Stearns MD;  Location: [facility] ENDOSCOPY;  Service:     COLONOSCOPY N/A 2/3/2021    Procedure: COLONOSCOPY TOCECUM WITH COLD BX POLYPECTOMY AND HOT SNARE POLYPECTOMY;  Surgeon: Ross Stearns MD;  Location: [facility] ENDOSCOPY;  Service: General;  Laterality: N/A;   PERSONAL HX OF POLYPS, FAMILY HX OF COLON CANCER  --POLYPS (X3), DIVERTICULOSIS    COLONOSCOPY N/A 3/20/2024    Procedure: COLONOSCOPY TO CECUM WITH COLD BX POLYPECTOMIES;  Surgeon: Ross Stearns MD;  Location: [facility] ENDOSCOPY;  Service: General;  Laterality: N/A;  HX POLYPS/POLYPS (3)    HERNIA REPAIR      INGUINAL HERNIA? SIDE    TOTAL HIP ARTHROPLASTY Right 6/5/2020    Procedure: TOTAL HIP ARTHROPLASTY;  Surgeon: Travis Hamlin MD;  Location: [facility] MAIN OR;  Service: Orthopedics;  Laterality: Right;       Home Medications:   Medications Prior to Admission   Medication Sig Dispense Refill Last Dose/Taking    acetaminophen (TYLENOL) 325 MG tablet Take 2 tablets by mouth Every 4 (Four) Hours As Needed for Mild Pain . 30 tablet 0 Past Month    apixaban (ELIQUIS) 5 MG tablet tablet Take 1 tablet by mouth Every 12 (Twelve) Hours. 180 tablet 3 11/11/2024    atorvastatin (LIPITOR) 10 MG tablet Take 1 tablet by mouth Daily.   11/11/2024    chlorthalidone (HYGROTON) 25 MG tablet TAKE 1 TABLET BY MOUTH DAILY 90 tablet 2 11/11/2024    citalopram (CeleXA) 20 MG tablet Take 1 tablet by mouth Every Morning.   11/11/2024    Cyanocobalamin (VITAMIN B 12 PO) Take  by mouth.   Past Month    metoprolol tartrate (LOPRESSOR) 50 MG tablet Take 1 tablet by mouth 2 (Two) Times a Day.   11/11/2024    tamsulosin (FLOMAX) 0.4 MG capsule 24 hr capsule Take 1 capsule by mouth Daily.   11/11/2024    zolpidem (AMBIEN) 5 MG tablet Take 0.5-1 tablets by mouth At Night As Needed. (Patient taking differently: Take 0.5 tablets by mouth At Night As Needed for Sleep.)   11/10/2024       Allergies:  Allergies   Allergen Reactions    Ativan [Lorazepam] Delirium       Past Social History:  Social History     Socioeconomic History    Marital status:    Tobacco Use    Smoking status: Never    Smokeless tobacco: Current     Types: Snuff    Tobacco comments:     2 weekly chew   Vaping Use    Vaping status: Never Used   Substance and Sexual  Activity    Alcohol use: Never    Drug use: Never    Sexual activity: Defer       Past Family History:  Family History   Problem Relation Age of Onset    Breast cancer Mother     Colon cancer Father     Diabetes Brother     No Known Problems Maternal Grandmother     No Known Problems Maternal Grandfather     No Known Problems Paternal Grandmother     Stroke Paternal Grandfather     Heart disease Brother     Mere Hyperthermia Neg Hx        14 point ROS was performed and is negative except as outlined in HPI.     Objective:     Objective:  Vital Signs (last 24 hours)         11/10 0700  11/11 0659 11/11 0700  11/11 1154   Most Recent      Temp (°F)     97.9     97.9 (36.6) 11/11 1053    Heart Rate     101     101 11/11 1053    Resp     16     16 11/11 1053    BP     127/92     127/92 11/11 1053    SpO2 (%)     98     98 11/11 1053          Temp:  [97.9 °F (36.6 °C)] 97.9 °F (36.6 °C)  Heart Rate:  [101] 101  Resp:  [16] 16  BP: (127)/(92) 127/92  There is no height or weight on file to calculate BMI.        Physical Exam:     General Appearance: No acute distress, well developed and well nourished.   Eyes: Conjunctiva and lids: No erythema, swelling, or discharge. Sclera non-icteric.   HENT: Atraumatic, normocephalic. External eyes, ears, and nose normal.   Respiratory: No signs of respiratory distress. Respiration rhythm and depth normal.   Clear to auscultation. No rales, crackles, rhonchi, or wheezing auscultated.   Cardiovascular:  Jugular Venous Pressure: Normal  Heart Rate and Rhythm: Irregular rate and rhythm  Murmurs: No murmurs noted. No rubs, thrills, or gallops.   Arterial Pulses: Posterior tibialis and dorsalis pedis pulses normal.   Lower Extremities: No edema noted.  Gastrointestinal:  Abdomen soft, non-distended, non-tender.  Musculoskeletal: Normal movement of extremities  Skin: Warm and dry.   Psychiatric: Patient alert and oriented to person, place, and time. Speech and behavior appropriate. Normal  mood and affect.    Labs:   Lab Review:       Imaging:   Echo 9/5/2024: EF of 48.8% with grade 2 diastolic dysfunction.  Borderline dilated left ventricle.  Mild to moderate dilated left atrium.  Moderate aortic valve stenosis with aortic valve area of 1.8 cm and mean pressure gradient of 24 mmHg.  RVSP 48 mmHg.  Mild to moderate AR and MR      EKG/Tele: Atrial fibrillation with ventricular rates into the 90s to 100s        I personally viewed and interpreted the patient's EKG/Telemetry tracings.    Assessment/Plan:     Persistent atrial fibrillation with history of 2 previous ablations in the past.  He had a cardioversion last year but is now back in persistent A-fib.  Will plan to initiate dofetilide and have a subsequent cardioversion on 11/12/2024 if he does not convert to sinus rhythm.  Keep n.p.o. after midnight.  Will consult pharmacy to help monitor QT intervals.  Continue apixaban 5 mg twice daily without interruption.      Thank you for allowing me to participate in the care of Mark Aguirre Jr.. Feel free to contact me directly with any further questions or concerns.    Cameron Denny PA-C  [facility]  11/11/24  11:54 EST

## 2024-11-12 ENCOUNTER — APPOINTMENT (OUTPATIENT)
Dept: CARDIOLOGY | Facility: HOSPITAL | Age: 79
End: 2024-11-12
Payer: MEDICARE

## 2024-11-12 LAB
ANION GAP SERPL CALCULATED.3IONS-SCNC: 9 MMOL/L (ref 5–15)
BUN SERPL-MCNC: 20 MG/DL (ref 8–23)
BUN/CREAT SERPL: 21.3 (ref 7–25)
CALCIUM SPEC-SCNC: 8.8 MG/DL (ref 8.6–10.5)
CHLORIDE SERPL-SCNC: 104 MMOL/L (ref 98–107)
CO2 SERPL-SCNC: 27 MMOL/L (ref 22–29)
CREAT SERPL-MCNC: 0.94 MG/DL (ref 0.76–1.27)
EGFRCR SERPLBLD CKD-EPI 2021: 81.4 ML/MIN/1.73
GLUCOSE SERPL-MCNC: 86 MG/DL (ref 65–99)
MAGNESIUM SERPL-MCNC: 2.1 MG/DL (ref 1.6–2.4)
POTASSIUM SERPL-SCNC: 3.6 MMOL/L (ref 3.5–5.2)
POTASSIUM SERPL-SCNC: 4.4 MMOL/L (ref 3.5–5.2)
QT INTERVAL: 426 MS
QTC INTERVAL: 571 MS
SODIUM SERPL-SCNC: 140 MMOL/L (ref 136–145)

## 2024-11-12 PROCEDURE — 93005 ELECTROCARDIOGRAM TRACING: CPT | Performed by: NURSE PRACTITIONER

## 2024-11-12 PROCEDURE — 5A2204Z RESTORATION OF CARDIAC RHYTHM, SINGLE: ICD-10-PCS | Performed by: INTERNAL MEDICINE

## 2024-11-12 PROCEDURE — 80048 BASIC METABOLIC PNL TOTAL CA: CPT | Performed by: INTERNAL MEDICINE

## 2024-11-12 PROCEDURE — 93005 ELECTROCARDIOGRAM TRACING: CPT | Performed by: INTERNAL MEDICINE

## 2024-11-12 PROCEDURE — 92960 CARDIOVERSION ELECTRIC EXT: CPT | Performed by: INTERNAL MEDICINE

## 2024-11-12 PROCEDURE — 92960 CARDIOVERSION ELECTRIC EXT: CPT

## 2024-11-12 PROCEDURE — 63710000001 ONDANSETRON ODT 4 MG TABLET DISPERSIBLE: Performed by: PHYSICIAN ASSISTANT

## 2024-11-12 PROCEDURE — 93010 ELECTROCARDIOGRAM REPORT: CPT | Performed by: INTERNAL MEDICINE

## 2024-11-12 PROCEDURE — 84132 ASSAY OF SERUM POTASSIUM: CPT | Performed by: INTERNAL MEDICINE

## 2024-11-12 PROCEDURE — 83735 ASSAY OF MAGNESIUM: CPT | Performed by: INTERNAL MEDICINE

## 2024-11-12 RX ORDER — POTASSIUM CHLORIDE 750 MG/1
20 TABLET, FILM COATED, EXTENDED RELEASE ORAL ONCE
Status: COMPLETED | OUTPATIENT
Start: 2024-11-12 | End: 2024-11-12

## 2024-11-12 RX ORDER — DOFETILIDE 0.25 MG/1
250 CAPSULE ORAL EVERY 12 HOURS
Status: DISCONTINUED | OUTPATIENT
Start: 2024-11-12 | End: 2024-11-13 | Stop reason: HOSPADM

## 2024-11-12 RX ORDER — ONDANSETRON 4 MG/1
4 TABLET, ORALLY DISINTEGRATING ORAL ONCE
Status: COMPLETED | OUTPATIENT
Start: 2024-11-12 | End: 2024-11-12

## 2024-11-12 RX ADMIN — TAMSULOSIN HYDROCHLORIDE 0.4 MG: 0.4 CAPSULE ORAL at 10:00

## 2024-11-12 RX ADMIN — ATORVASTATIN CALCIUM 10 MG: 20 TABLET, FILM COATED ORAL at 10:00

## 2024-11-12 RX ADMIN — APIXABAN 5 MG: 5 TABLET, FILM COATED ORAL at 21:54

## 2024-11-12 RX ADMIN — DOFETILIDE 250 MCG: 0.25 CAPSULE ORAL at 21:54

## 2024-11-12 RX ADMIN — METOPROLOL TARTRATE 25 MG: 25 TABLET, FILM COATED ORAL at 10:00

## 2024-11-12 RX ADMIN — ONDANSETRON 4 MG: 4 TABLET, ORALLY DISINTEGRATING ORAL at 08:03

## 2024-11-12 RX ADMIN — APIXABAN 5 MG: 5 TABLET, FILM COATED ORAL at 10:00

## 2024-11-12 RX ADMIN — METHOHEXITAL SODIUM 90 MG: 500 INJECTION, POWDER, LYOPHILIZED, FOR SOLUTION INTRAMUSCULAR; INTRAVENOUS; RECTAL at 13:16

## 2024-11-12 RX ADMIN — DOFETILIDE 500 MCG: 0.5 CAPSULE ORAL at 08:03

## 2024-11-12 RX ADMIN — POTASSIUM CHLORIDE 20 MEQ: 750 TABLET, EXTENDED RELEASE ORAL at 08:03

## 2024-11-12 NOTE — CASE MANAGEMENT/SOCIAL WORK
Discharge Planning Assessment  [facility]     Patient Name: Mark Aguirre Jr.  MRN: [id number]  Today's Date: 11/12/2024    Admit Date: 11/11/2024    Plan: Home; F/U with pharmacist to let Pt know the cost of tikosyn at d/c.   Discharge Needs Assessment       Row Name 11/12/24 1623       Living Environment    People in Home spouse    Name(s) of People in Home Alexandra Ensor/wife    Current Living Arrangements home    Potentially Unsafe Housing Conditions none    In the past 12 months has the electric, gas, oil, or water company threatened to shut off services in your home? No    Primary Care Provided by self    Provides Primary Care For pet(s)  2 outside dogs    Family Caregiver if Needed spouse    Family Caregiver Names Wife/Alexandra; 5 adult children and 12 grandchildren    Quality of Family Relationships helpful;involved;supportive    Able to Return to Prior Arrangements yes       Resource/Environmental Concerns    Resource/Environmental Concerns none    Transportation Concerns none       Transportation Needs    In the past 12 months, has lack of transportation kept you from medical appointments or from getting medications? no    In the past 12 months, has lack of transportation kept you from meetings, work, or from getting things needed for daily living? No       Food Insecurity    Within the past 12 months, you worried that your food would run out before you got the money to buy more. Never true       Transition Planning    Patient/Family Anticipates Transition to home with family    Patient/Family Anticipated Services at Transition none    Transportation Anticipated family or friend will provide       Discharge Needs Assessment    Readmission Within the Last 30 Days no previous admission in last 30 days    Equipment Currently Used at Home cpap;scales;bp cuff;pulse ox    Concerns to be Addressed medication    Concerns Comments Admitted for Tikosyn start.  Need to follow up with [facility] Pharmacist to see if Tikosyn  requires a prior authorization from insurance and to confirm patient can afford the cost.    Anticipated Changes Related to Illness none    Equipment Needed After Discharge none    Provided Post Acute Provider List? N/A    Provided Post Acute Provider Quality & Resource List? N/A                   Discharge Plan       Row Name 11/12/24 8546       Plan    Plan Home; F/U with pharmacist to let Pt know the cost of tikosyn at d/c.    Plan Comments CCP spoke with Pt and spouse/Alexandra Aguirre at bedside and introduced self and role.  Pt confirmed information on his face sheet.  Pt stated she is IADL's, retired and drives.  [place] in two story home with three steps to enter.  Pt reports PCP is Luis Staples.  Pt confirmed pharmacy is [facility] [place], [place].  Pt is enrolled in MEDS to BEDS.  Pt has used [facility] in past.  Pt has CPAP (Apparo), BP cuff, scale, pulse oximeter and shower chair if needed.  Pt plans to return home at discharge.  Pt admitted for Tikosyn initiation.  CCP will follow up with the unit Pharmacist to see if Pt insurance will require a prior authorization for medication and to confirm Pt can afford the cost…….Casi OGDEN /.                  Continued Care and Services - Admitted Since 11/11/2024    No active coordination exists for this encounter.          Demographic Summary       Row Name 11/12/24 1622       General Information    Admission Type inpatient    Arrived From home    Required Notices Provided Important Message from Medicare    Referral Source admission list    Reason for Consult discharge planning;medication concerns  cost of tikosyn.  [facility] to follow    Preferred Language English       Contact Information    Permission Granted to Share Info With family/designee                   Functional Status       Row Name 11/12/24 9690       Functional Status    Usual Activity Tolerance good    Current Activity Tolerance good       Assessment of Health Literacy     How often do you have someone help you read hospital materials? Never    Health Literacy Good       Functional Status, IADL    Medications independent    Meal Preparation independent    Housekeeping independent    Laundry independent    Shopping independent       Mental Status    General Appearance WDL WDL       Mental Status Summary    Recent Changes in Mental Status/Cognitive Functioning no changes       Employment/    Employment Status retired                   Psychosocial    No documentation.                  Abuse/Neglect    No documentation.                  Legal    No documentation.                  Substance Abuse    No documentation.                  Patient Forms    No documentation.                     Casi Santos RN

## 2024-11-12 NOTE — PLAN OF CARE
Goal Outcome Evaluation: direct admit, admission complete, med req complete.  Contacted cardiology on-call Johanna Bo to advise of runs of V-Tach, she approved order of Tikosyn, administered per order.  Pt refused non-skid socks, has shoes at bedside.  Wife at bedside, went home for the evening.

## 2024-11-12 NOTE — PLAN OF CARE
Remains in rate controlled atrial fibrillation today.  Will plan on a cardioversion this midday with Dr. Armando.    EKG was reviewed with baseline wide qrs, when corrected qtc interval normal.     Keep NPO

## 2024-11-12 NOTE — PLAN OF CARE
Problem: Adult Inpatient Plan of Care  Goal: Plan of Care Review  Outcome: Progressing  Flowsheets (Taken 11/12/2024 0438)  Progress: no change  Outcome Evaluation: First night of the pt's stay. QTC greatertahn expected and medication on hold.  Plan of Care Reviewed With: patient  Goal: Patient-Specific Goal (Individualized)  Outcome: Progressing  Goal: Absence of Hospital-Acquired Illness or Injury  Outcome: Progressing  Intervention: Identify and Manage Fall Risk  Recent Flowsheet Documentation  Taken 11/12/2024 0400 by uLis Chin RN  Safety Promotion/Fall Prevention:   clutter free environment maintained   lighting adjusted   nonskid shoes/slippers when out of bed   room organization consistent   safety round/check completed  Taken 11/12/2024 0200 by Luis Chin RN  Safety Promotion/Fall Prevention:   clutter free environment maintained   lighting adjusted   nonskid shoes/slippers when out of bed   room organization consistent   safety round/check completed  Taken 11/12/2024 0000 by Luis Chin RN  Safety Promotion/Fall Prevention:   clutter free environment maintained   lighting adjusted   nonskid shoes/slippers when out of bed   room organization consistent   safety round/check completed  Taken 11/11/2024 2200 by Luis Chin RN  Safety Promotion/Fall Prevention:   clutter free environment maintained   lighting adjusted   nonskid shoes/slippers when out of bed   room organization consistent   safety round/check completed  Taken 11/11/2024 2000 by Luis Chin RN  Safety Promotion/Fall Prevention:   clutter free environment maintained   lighting adjusted   nonskid shoes/slippers when out of bed   room organization consistent   safety round/check completed  Goal: Optimal Comfort and Wellbeing  Outcome: Progressing  Goal: Readiness for Transition of Care  Outcome: Progressing     Problem: Comorbidity Management  Goal: Maintenance of Asthma Control  Outcome: Progressing  Goal: Maintenance of Behavioral  There are no preventive care reminders to display for this patient.    Patient is up to date, no discussion needed.             Health Symptom Control  Outcome: Progressing  Goal: Maintenance of COPD Symptom Control  Outcome: Progressing  Goal: Blood Glucose Level Within Target Range  Outcome: Progressing  Goal: Maintenance of Heart Failure Symptom Control  Outcome: Progressing  Goal: Blood Pressure in Desired Range  Outcome: Progressing  Goal: Maintenance of Osteoarthritis Symptom Control  Outcome: Progressing  Intervention: Maintain Osteoarthritis Symptom Control  Recent Flowsheet Documentation  Taken 11/12/2024 0400 by Luis Chin RN  Activity Management: up ad santosh  Taken 11/12/2024 0200 by Luis Chin RN  Activity Management: up ad santosh  Taken 11/12/2024 0000 by Luis Chin RN  Activity Management: up ad santosh  Taken 11/11/2024 2200 by Luis Chin RN  Activity Management: up ad santosh  Taken 11/11/2024 2000 by Luis Chin RN  Activity Management: up ad santosh  Goal: Bariatric Home Regimen Maintained  Outcome: Progressing  Goal: Maintenance of Seizure Control  Outcome: Progressing   Goal Outcome Evaluation:  Plan of Care Reviewed With: patient        Progress: no change  Outcome Evaluation: First night of the pt's stay. QTC greatertahn expected and medication on hold.     No note to add

## 2024-11-13 ENCOUNTER — TELEPHONE (OUTPATIENT)
Dept: CARDIOLOGY | Facility: CLINIC | Age: 79
End: 2024-11-13

## 2024-11-13 VITALS
OXYGEN SATURATION: 98 % | RESPIRATION RATE: 18 BRPM | HEART RATE: 65 BPM | TEMPERATURE: 98 F | DIASTOLIC BLOOD PRESSURE: 67 MMHG | SYSTOLIC BLOOD PRESSURE: 138 MMHG

## 2024-11-13 LAB
QT INTERVAL: 483 MS
QT INTERVAL: 494 MS
QT INTERVAL: 532 MS
QT INTERVAL: 536 MS
QT INTERVAL: 545 MS
QTC INTERVAL: 461 MS
QTC INTERVAL: 525 MS
QTC INTERVAL: 526 MS
QTC INTERVAL: 536 MS
QTC INTERVAL: 641 MS

## 2024-11-13 PROCEDURE — 93005 ELECTROCARDIOGRAM TRACING: CPT | Performed by: NURSE PRACTITIONER

## 2024-11-13 PROCEDURE — 93010 ELECTROCARDIOGRAM REPORT: CPT | Performed by: INTERNAL MEDICINE

## 2024-11-13 PROCEDURE — 99238 HOSP IP/OBS DSCHRG MGMT 30/<: CPT | Performed by: NURSE PRACTITIONER

## 2024-11-13 RX ORDER — ZOLPIDEM TARTRATE 5 MG/1
2.5 TABLET ORAL NIGHTLY PRN
Start: 2024-11-13

## 2024-11-13 RX ORDER — DOFETILIDE 0.25 MG/1
250 CAPSULE ORAL EVERY 12 HOURS
Qty: 30 CAPSULE | Refills: 5 | Status: SHIPPED | OUTPATIENT
Start: 2024-11-13 | End: 2024-11-15

## 2024-11-13 RX ADMIN — CITALOPRAM 20 MG: 20 TABLET, FILM COATED ORAL at 09:00

## 2024-11-13 RX ADMIN — DOFETILIDE 250 MCG: 0.25 CAPSULE ORAL at 09:00

## 2024-11-13 RX ADMIN — TAMSULOSIN HYDROCHLORIDE 0.4 MG: 0.4 CAPSULE ORAL at 09:01

## 2024-11-13 RX ADMIN — ATORVASTATIN CALCIUM 10 MG: 20 TABLET, FILM COATED ORAL at 09:01

## 2024-11-13 RX ADMIN — APIXABAN 5 MG: 5 TABLET, FILM COATED ORAL at 09:01

## 2024-11-13 NOTE — PLAN OF CARE
Goal Outcome Evaluation: Tikosyn given per order from Cameron/Dr. Armando.  Gave one time dose of Zofran per order for nausea, nausea resolved per pt.  Pt taken for cardioversion.  Pt returned to floor in SB w/ frequent PVS's.  Diet advanced.  Wife at bedside post procedure.

## 2024-11-13 NOTE — PLAN OF CARE
Problem: Adult Inpatient Plan of Care  Goal: Plan of Care Review  Outcome: Progressing  Flowsheets  Taken 11/13/2024 0229  Progress: improving  Taken 11/12/2024 0438  Plan of Care Reviewed With: patient  Goal: Patient-Specific Goal (Individualized)  Outcome: Progressing  Goal: Absence of Hospital-Acquired Illness or Injury  Outcome: Progressing  Intervention: Identify and Manage Fall Risk  Recent Flowsheet Documentation  Taken 11/13/2024 0200 by Luis Chin RN  Safety Promotion/Fall Prevention:   clutter free environment maintained   lighting adjusted   nonskid shoes/slippers when out of bed   room organization consistent   safety round/check completed  Taken 11/13/2024 0000 by Luis Chin RN  Safety Promotion/Fall Prevention:   clutter free environment maintained   lighting adjusted   nonskid shoes/slippers when out of bed   room organization consistent   safety round/check completed  Taken 11/12/2024 2200 by Luis Chin RN  Safety Promotion/Fall Prevention:   clutter free environment maintained   lighting adjusted   nonskid shoes/slippers when out of bed   room organization consistent   safety round/check completed  Taken 11/12/2024 2000 by Luis Chin RN  Safety Promotion/Fall Prevention:   clutter free environment maintained   lighting adjusted   nonskid shoes/slippers when out of bed   room organization consistent   safety round/check completed  Goal: Optimal Comfort and Wellbeing  Outcome: Progressing  Goal: Readiness for Transition of Care  Outcome: Progressing     Problem: Comorbidity Management  Goal: Maintenance of Asthma Control  Outcome: Progressing  Goal: Maintenance of Behavioral Health Symptom Control  Outcome: Progressing  Goal: Maintenance of COPD Symptom Control  Outcome: Progressing  Goal: Blood Glucose Level Within Target Range  Outcome: Progressing  Goal: Maintenance of Heart Failure Symptom Control  Outcome: Progressing  Goal: Blood Pressure in Desired Range  Outcome:  Progressing  Goal: Maintenance of Osteoarthritis Symptom Control  Outcome: Progressing  Intervention: Maintain Osteoarthritis Symptom Control  Recent Flowsheet Documentation  Taken 11/13/2024 0200 by Luis hCin RN  Activity Management: up ad santosh  Taken 11/13/2024 0000 by Luis Chin RN  Activity Management: up ad santosh  Taken 11/12/2024 2200 by Luis Chin RN  Activity Management: up ad santosh  Taken 11/12/2024 2000 by Luis Chin RN  Activity Management: up ad santosh  Goal: Bariatric Home Regimen Maintained  Outcome: Progressing  Goal: Maintenance of Seizure Control  Outcome: Progressing     Problem: Fall Injury Risk  Goal: Absence of Fall and Fall-Related Injury  Outcome: Progressing  Intervention: Promote Injury-Free Environment  Recent Flowsheet Documentation  Taken 11/13/2024 0200 by Luis Chin RN  Safety Promotion/Fall Prevention:   clutter free environment maintained   lighting adjusted   nonskid shoes/slippers when out of bed   room organization consistent   safety round/check completed  Taken 11/13/2024 0000 by Luis Chin RN  Safety Promotion/Fall Prevention:   clutter free environment maintained   lighting adjusted   nonskid shoes/slippers when out of bed   room organization consistent   safety round/check completed  Taken 11/12/2024 2200 by Luis Chin RN  Safety Promotion/Fall Prevention:   clutter free environment maintained   lighting adjusted   nonskid shoes/slippers when out of bed   room organization consistent   safety round/check completed  Taken 11/12/2024 2000 by Luis Chin RN  Safety Promotion/Fall Prevention:   clutter free environment maintained   lighting adjusted   nonskid shoes/slippers when out of bed   room organization consistent   safety round/check completed   Goal Outcome Evaluation:  Plan of Care Reviewed With: patient        Progress: improving  Outcome Evaluation: First night of the pt's stay. QTC greatertahn expected and medication on hold.     Heart rhythm can  charitably be called erratic.

## 2024-11-13 NOTE — PROGRESS NOTES
[facility] Clinical Pharmacy Services: Tikosyn (Dofetilide) Education    Mark Aguirre Jr. was initiated on tikosyn for atrial fibrillation. Counseling points included the followin) Explained indication and need for tikosyn for atrial fibrillation, and the testing and labs needed during the initiation phase (EKGs, potassium, magnesium, renal function).  2) Went over dosing and frequency of this medication, stressing importance of taking medication every 12 hours and not missing or doubling up on doses.  3) Discussed any administration, storage, and monitoring instructions with tikosyn.  4) Discussed all important drug interactions, including antibiotics (Bactrim, Levaquin, Cipro, azithromycin), antiemetics (Compazine, Phenergan, Zofran), over-the-counter medications (Benadryl).  5) Explained possible side effects for tikosyn.  6) There is an interaction with citalopram, but he has been monitored in the hospital with this medication. Counseled the patient on not increasing dose unless Dr. Armando is aware.  7) Instructed the patient not to begin or discontinue any medications without informing his physician.    Patient expressed understanding and had no further questions.      Nancy Penaloza, Pharm.D., [facility]   Clinical Staff Pharmacist   Phone Extension #[phone]

## 2024-11-13 NOTE — TELEPHONE ENCOUNTER
"Caller: Mark Aguirre Jr. \"Ernst\"    Relationship to patient: Self    Best call back number: [phone]     Patient is needing: PT RETURNING DR MCCOLLUM'S CALL, NOTHING IN CHART IN REGARDS TO ANYONE REACHING OUT. PLS CALL PT BACK IF ANYONE REACHED OUT.           "

## 2024-11-13 NOTE — DISCHARGE SUMMARY
DISCHARGE NOTE    Patient Name: Mark Aguirre Jr.  Age/Sex: 79 y.o. male  : 1945  MRN: [id number]    Date of Discharge:  2024   Date of Admit: 2024  Encounter Provider: KEISHA Hinojosa  Place of Service: [facility] CARDIOLOGY  Patient Care Team:  Luis Staples MD as PCP - General (Internal Medicine)  Luis Staples MD as PCP - Internal Medicine  Rian Johns MD as Consulting Physician (Sleep Medicine)    Subjective:     Discharge Diagnosis:    Persistent atrial fibrillation      Hospital Course:     79 y.o. male here for routine admission for initiation of dofetilide.     He has been in persistent atrial fibrillation for the last 2 months with the previous cardioversion last year.  He is also had 2 previous PVI's.     He has symptomatic fatigue and decreased exercise tolerance.     On arrival he was seen to be in a rate controlled atrial fibrillation with intermittent aberrant beats.    He was started on dofetilide and underwent elective CV --successful, post his HR was low so metoprolol dc'd and QTc borderline with some ventricular ectopy. Decreased to 250 mcg BID and his QTc is okay, still some ventricular ectopy but had it before and is stable and unchanged.     He is on citalopram which can interact but we have monitored him on the drugs and he is doing fine. I did tell him that his dose could not be increased without talking with us first.     He was also on chlorthalidone, which was stopped on admission, he has done fine off of it, will continue to monitor as outpatient.     DC home today, EKG on Friday about 2 hours after his am dose and appt with us in 1 month.     Vital Signs    Intake/Output Summary (Last 24 hours) at 2024 1451  Last data filed at 2024 1204  Gross per 24 hour   Intake 480 ml   Output --   Net 480 ml        Physical Exam:    General Appearance: No acute distress, well developed and well nourished.   Respiratory: No signs of respiratory distress. Respiration rhythm and depth normal.   Cardiovascular:  Heart Rate and Rhythm: Normal   Lower Extremities: No edema noted.  Musculoskeletal: Normal movement of extremities  Skin: Warm and dry.   Psychiatric: Patient alert and oriented to person, place, and time. Speech and behavior appropriate. Normal mood and affect.    Labs:   Results from last 7 days   Lab Units 11/12/24  1157 11/12/24  0336 11/11/24  1306   SODIUM mmol/L  --  140 141   POTASSIUM mmol/L 4.4 3.6 4.1   CHLORIDE mmol/L  --  104 106   CO2 mmol/L  --  27.0 26.2   BUN mg/dL  --  20 22   CREATININE mg/dL  --  0.94 1.17   GLUCOSE mg/dL  --  86 143*   CALCIUM mg/dL  --  8.8 9.2   AST (SGOT) U/L  --   --  11   ALT (SGPT) U/L  --   --  11         Results from last 7 days   Lab Units 11/11/24  1306   WBC 10*3/mm3 7.56   HEMOGLOBIN g/dL 14.7   HEMATOCRIT % 44.9   PLATELETS 10*3/mm3 149         Results from last 7 days   Lab Units 11/12/24  0336 11/11/24  1306   MAGNESIUM mg/dL 2.1 2.1         Discharge Diet:    Dietary Orders (From admission, onward)       Start     Ordered    11/12/24 1408  Diet: Regular/House; Fluid Consistency: Thin (IDDSI 0)  Diet Effective Now        References:    Diet Order Crosswalk   Question Answer Comment   Diets: Regular/House    Fluid Consistency: Thin (IDDSI 0)        11/12/24 1407                    Activity at Discharge:  as tolerated    Discharge Medications     Discharge Medications        New Medications        Instructions Start Date   dofetilide 250 MCG capsule  Commonly known as: TIKOSYN   250 mcg, Oral, Every 12 Hours             Continue These Medications        Instructions Start Date   acetaminophen 325 MG tablet  Commonly known as: TYLENOL   650 mg, Oral, Every 4 Hours PRN      apixaban 5 MG tablet tablet  Commonly known as: ELIQUIS   5 mg, Oral, Every 12 Hours  Scheduled      atorvastatin 10 MG tablet  Commonly known as: LIPITOR   10 mg, Daily      citalopram 20 MG tablet  Commonly known as: CeleXA   20 mg, Every Morning      tamsulosin 0.4 MG capsule 24 hr capsule  Commonly known as: FLOMAX   1 capsule, Daily      VITAMIN B 12 PO   Take  by mouth.      zolpidem 5 MG tablet  Commonly known as: AMBIEN   2.5 mg, Oral, Nightly PRN             Stop These Medications      chlorthalidone 25 MG tablet  Commonly known as: HYGROTON     metoprolol tartrate 50 MG tablet  Commonly known as: LOPRESSOR              Discharge disposition: home    Follow-up Appointments   Follow-up Information       Cameron Stroud PA-C Follow up in 1 month(s).    Specialties: Physician Assistant, Cardiology  Contact information:  [address] [place] [address]  [phone]               EKG--[facility] Follow up on 11/15/2024.    Why: EKG at around 10 am Friday 11/15  Contact information:  [address] [id number]             Luis Staples MD .    Specialty: Internal Medicine  Why: Appointment made for November 21st at 1:00pm.  Contact information:  [address] [place] [id number]  [phone]                           Future Appointments   Date Time Provider Department Center   11/15/2024 10:00 AM SARA PACK [place] NURSE/ARNOLDO [facility]   11/20/2024  8:45 AM Rian Johns MD [facility] None   2/27/2025 10:00 AM Kosta Javier MD [facility]   8/27/2025 10:20 AM Claus Lindquist MD [facility]         KEISHA Hinojosa  11/13/24  14:51 EST

## 2024-11-15 ENCOUNTER — CLINICAL SUPPORT (OUTPATIENT)
Dept: CARDIOLOGY | Facility: CLINIC | Age: 79
End: 2024-11-15
Payer: MEDICARE

## 2024-11-15 DIAGNOSIS — I48.19 PERSISTENT ATRIAL FIBRILLATION: Primary | ICD-10-CM

## 2024-11-15 PROCEDURE — 93000 ELECTROCARDIOGRAM COMPLETE: CPT | Performed by: NURSE PRACTITIONER

## 2024-11-15 RX ORDER — DOFETILIDE 0.12 MG/1
125 CAPSULE ORAL 2 TIMES DAILY
COMMUNITY

## 2024-11-15 RX ORDER — DOFETILIDE 0.12 MG/1
250 CAPSULE ORAL EVERY 12 HOURS
Qty: 60 CAPSULE | Refills: 1 | Status: SHIPPED | OUTPATIENT
Start: 2024-11-15 | End: 2024-11-15

## 2024-11-15 NOTE — PROGRESS NOTES
Procedure     ECG 12 Lead    Date/Time: 11/15/2024 9:41 AM  Performed by: Kell Blevins APRN    Authorized by: Kell Blevins APRN  Comparison: compared with previous ECG from 11/13/2024  Rhythm: sinus rhythm  Ectopy: unifocal PVCs  Rate: normal  BPM: 78  QRS axis: left  Comments: SR with frequent ectopy, Qtc interval prolonged          PT came to get an EKG check, He has a little SOB, he is felling  little tightness in his chest, no palpitations. He is on dofetilide 250 mcg/d  and eliquis 5 mg twice/d, BP ,80 mmHg  Hr 78        I reviewed EKG and also showed it to Dr. Armando.  We will decrease his dofetilide dosing to 125 mcg twice daily due to ongoing prolonged QTc interval.  Will plan to get another EKG in the office next week.  I sent a new prescription of this to his pharmacy

## 2024-11-20 ENCOUNTER — CLINICAL SUPPORT (OUTPATIENT)
Dept: CARDIOLOGY | Facility: CLINIC | Age: 79
End: 2024-11-20
Payer: MEDICARE

## 2024-11-20 DIAGNOSIS — I48.19 PERSISTENT ATRIAL FIBRILLATION: Primary | ICD-10-CM

## 2024-11-20 PROCEDURE — 93000 ELECTROCARDIOGRAM COMPLETE: CPT | Performed by: PHYSICIAN ASSISTANT

## 2024-11-20 RX ORDER — DIGOXIN 125 MCG
125 TABLET ORAL DAILY
Qty: 30 TABLET | Refills: 11 | Status: SHIPPED | OUTPATIENT
Start: 2024-11-20

## 2024-11-20 NOTE — PROGRESS NOTES
Procedure     ECG 12 Lead    Date/Time: 11/20/2024 10:25 AM  Performed by: Cameron Stroud PA-C    Authorized by: Cameron Stroud PA-C  Comparison: compared with previous ECG from 11/14/2024  Rhythm: atrial fibrillation  Ectopy: unifocal PVCs  Rate: tachycardic  BPM: 119  QRS axis: left    Clinical impression: abnormal EKG  Comments: One PVC, corrected qtc interval with bazetts is 563      The Pt is here to get a EKG, He is felling ok today , no chest pain,  positive palpitation , He feels more tired than usually . He is on dofetilide 125 mg/d , Eliquis 5mg/d    Hr 119      >> Sounds like over weekend he converted back in to AF, Hr was all the sudden in the 100-110 range and he started getting short of air.     Prior to this HR was in the 50s.     He only lasted maybe 5-6 days post CV without going back in to AF.     He took a does of metoprolol last night due to HR being over 150 at one point.     He was accompained by his wife today.     >> EKG on lower dose of dofetilide shows ongoing Qtc prolongation. Will discontinue.     Start back on rate control with digoxin 125 mcg daily. Able to take extra metoprolol if HR over 110 while getting on this medication. I chose this because he had fatigue as his main symptoms prior to this and it could be from the BB.     Will have him call next week with update. If improved we will then check dig levels.

## 2024-12-02 DIAGNOSIS — I48.19 PERSISTENT ATRIAL FIBRILLATION: Primary | ICD-10-CM

## 2024-12-02 RX ORDER — METOPROLOL SUCCINATE 25 MG/1
25 TABLET, EXTENDED RELEASE ORAL DAILY
Qty: 30 TABLET | Refills: 11 | Status: SHIPPED | OUTPATIENT
Start: 2024-12-02

## 2024-12-02 NOTE — PROGRESS NOTES
Called pt, reviewed.     Renal function is stable.       HR has been much better controlled. His dig level is only 0.4 L.       His blood pressure has been high, over 150 will start toprol 25 mg daily.

## 2024-12-13 ENCOUNTER — OFFICE VISIT (OUTPATIENT)
Age: 79
End: 2024-12-13
Payer: MEDICARE

## 2024-12-13 VITALS
DIASTOLIC BLOOD PRESSURE: 82 MMHG | HEIGHT: 73 IN | SYSTOLIC BLOOD PRESSURE: 140 MMHG | WEIGHT: 228 LBS | BODY MASS INDEX: 30.22 KG/M2 | HEART RATE: 106 BPM

## 2024-12-13 DIAGNOSIS — I10 ESSENTIAL HYPERTENSION: ICD-10-CM

## 2024-12-13 DIAGNOSIS — I48.19 PERSISTENT ATRIAL FIBRILLATION: Primary | ICD-10-CM

## 2024-12-13 DIAGNOSIS — G47.33 OSA ON CPAP: ICD-10-CM

## 2024-12-13 DIAGNOSIS — I49.3 PVC (PREMATURE VENTRICULAR CONTRACTION): ICD-10-CM

## 2024-12-13 RX ORDER — CHLORTHALIDONE 25 MG/1
1 TABLET ORAL DAILY
COMMUNITY
Start: 2024-12-04

## 2024-12-13 NOTE — Clinical Note
Patient is going to call your office to get your input on treatment plan for atrial fibrillation. We are recommending a pacemaker and AVN ablation. He failed dofetilide, has long qtc and still symptomatic with rate control. Not candidate for another PVI per Dr. Armando. Thanks!

## 2024-12-13 NOTE — PROGRESS NOTES
ELECTROPHYSIOLOGY   Date of Office Visit: 2024  Patient Name: Mark Aguirre Jr.  : 1945  Encounter Provider: Cameron Denny PA-C  Primary Cardiologist: Claus Lindquist MD  Electrophysiologist: Dr. Armando  CHIEF COMPLAINT / REASON FOR OFFICE VISIT     persistent AFIB and dofetilide therapy  Hospital follow up     HISTORY OF PRESENT ILLNESS     This is a 79 y.o. year old male who presents to [facility] CARDIOLOGY for a for  follow up from their recent inpatient hospitalization.    He has a history of PAF s/p PVI ablation x2. Unknown when the PVI ablations were, over 8 years ago.     He was seen in office on 10/07 by Dr. Armando. The patient had been in AFIB for the past few months and became more fatigued and had decreased exercise tolerance.     He was admitted on 24 for initiation of dofetilide and CV. His metoprolol was stopped due to low HR after the CV. Dofetilide was decreased to 250mcg BID due to borderline QTC prolongation.     ECG on 11/15/2024 showed prolong QTC. His dofetilide was decreased again to 125mcg BID.      He called the office 2024 for an ECG. He was in AFIB and QTC at that time was prolonged 563. Dofetilide was stopped. Digoxin 125mcg qd was started for rate control.     He still had high heart rate and metoprolol was added.      Today the patient reports continued SOA and decrease exercise tolerance.     He has remained SOA since he converted back into AFIB 5-6 days after his CV. He has decrease exercise tolerance. He can only walk 30-45 yards before he becomes more SOA. He also reports lightheadedness. He denies near syncope, syncope, chest pain, palpitations, BLE edema, or orthopnea.     His wife has been checking his BP and HR. His HR has been in the upper 80s-90s. His home BP has been in the 150s, but his PCP recently restarted him on chlorthalidone.     AC on apixaban.     PMHx:  Paroxysmal AFIB s/p ablation x2, aortic stenosis, HTN,  "STEPHENIE on CPAP, PVCs,    PHYSICAL EXAMINATION     Vital Signs:  /82 (BP Location: Left arm, Patient Position: Sitting)   Pulse 106   Ht 185 cm (72.84\")   Wt 103 kg (228 lb)   BMI 30.21 kg/m²   Estimated body mass index is 30.21 kg/m² as calculated from the following:    Height as of this encounter: 185 cm (72.84\").    Weight as of this encounter: 103 kg (228 lb).             Physical Exam  HENT:      Head: Normocephalic and atraumatic.      Right Ear: External ear normal.      Left Ear: External ear normal.      Nose: Nose normal.      Mouth/Throat:      Pharynx: Oropharynx is clear.   Eyes:      Extraocular Movements: Extraocular movements intact.      Pupils: Pupils are equal, round, and reactive to light.   Cardiovascular:      Rate and Rhythm: Tachycardia present. Rhythm irregular.      Pulses: Normal pulses.      Heart sounds: Normal heart sounds.   Pulmonary:      Effort: Pulmonary effort is normal.      Breath sounds: Normal breath sounds.   Abdominal:      Palpations: Abdomen is soft.   Musculoskeletal:         General: Normal range of motion.      Cervical back: Normal range of motion.      Right lower leg: No edema.      Left lower leg: No edema.   Skin:     General: Skin is warm and dry.   Neurological:      Mental Status: He is alert and oriented to person, place, and time. Mental status is at baseline.          Cardiac Testing/Results     Cardiac Testing:   - Echo 09/05/2024: LVEF 48.8%. LA moderate dilation. Moderate AS aortic valve area 1.8cm2. RSVP 48mmHg.     Result Review :  The following data was reviewed by: Cameron Denny PA-C on 12/13/2024:       Lab Results   Component Value Date     11/12/2024     11/11/2024    K 4.4 11/12/2024    K 3.6 11/12/2024     11/12/2024     11/11/2024    CO2 27.0 11/12/2024    CO2 26.2 11/11/2024    BUN 20 11/12/2024    BUN 22 11/11/2024    CREATININE 0.94 11/12/2024    CREATININE 1.17 11/11/2024    EGFRIFNONA 93 06/22/2020    " "EGFRIFNONA 88 06/21/2020    EGFRIFAFRI  06/16/2020      Comment:      <15 Indicative of kidney failure.    EGFRIFAFRI  06/15/2020      Comment:      <15 Indicative of kidney failure.    GLUCOSE 86 11/12/2024    GLUCOSE 143 (H) 11/11/2024    CALCIUM 8.8 11/12/2024    CALCIUM 9.2 11/11/2024    ALBUMIN 4.0 11/11/2024    ALBUMIN 3.8 07/05/2023    AST 11 11/11/2024    AST 10 07/05/2023    ALT 11 11/11/2024    ALT 8 07/05/2023     Lab Results   Component Value Date    WBC 7.56 11/11/2024    WBC 9.12 07/05/2023    HGB 14.7 11/11/2024    HGB 14.5 07/05/2023    HCT 44.9 11/11/2024    HCT 43.7 07/05/2023    MCV 87.9 11/11/2024    MCV 87.9 07/05/2023     11/11/2024     07/05/2023     No results found for: \"PROBNP\", \"BNP\"  Lab Results   Component Value Date    TROPONINT 18 (H) 07/05/2023     Lab Results   Component Value Date    TSH 0.883 07/05/2023    TSH 3.450 06/18/2020                 ECG 12 Lead    Date/Time: 12/13/2024 10:22 AM  Performed by: Cameron Stroud PA-C    Authorized by: Cameron Stroud PA-C  Comparison: compared with previous ECG from 11/20/2024  Similar to previous ECG  Rhythm: atrial fibrillation  Ectopy: multifocal PVCs  Rate: tachycardic  BPM: 106  QRS axis: left  Comments: .                ASSESSMENT & PLAN       Diagnoses and all orders for this visit:    1. Persistent atrial fibrillation s/p PVI ablation x2 in 2013  -ECG in office shows AFIB w/ RVR . He is symptomatic with SOA, lightheadedness, and decrease in exercise tolerance.   -He was tried on dofetilide, but his QTC remained prolonged. Suspect this will be the case on other AAD.   -Not a candidate for another PVI.   -His HR has been better controlled on digoxin and metoprolol, but he remains symptomatic.   -At this point, single chamber PPM and AVN ablation is the best treatment choice for his AFIB. Discussed this with the patient, but he wants to speak to his PCP before he makes a final decision. The " patient will call us at a later date to inform us of his decision.   >> he wants to discuss with with Dr. Staples and we will send today's not for them to review     2. Essential hypertension  -BP today 140/82. Per wife, his BP at home has been running in the 140s-150s. He has been taking metoprolol for this. His PCP restarted him on chlorthalidone last week.  -Continue BP monitoring.     3. PVC (premature ventricular contraction)  -Multifocal PVCs seen on ECG today. He has a history of this. On metoprolol.     4. STEPHENIE on auto CPAP  -Compliant with CPAP mask.          Follow Up:  Return in about 3 months (around 3/13/2025) for Dr. Armando- Routine.  Patient was given instructions and counseling regarding his condition or for health maintenance advice. Please contact office if worsening symptoms or proceed to ER when appropriate.      Cameron Denny PA-C  12/13/24  10:31 EST    MEDICATIONS         Discharge Medications            Accurate as of December 13, 2024 10:31 AM. If you have any questions, ask your nurse or doctor.                Continue These Medications        Instructions Start Date   acetaminophen 325 MG tablet  Commonly known as: TYLENOL   650 mg, Oral, Every 4 Hours PRN      apixaban 5 MG tablet tablet  Commonly known as: ELIQUIS   5 mg, Oral, Every 12 Hours Scheduled      atorvastatin 10 MG tablet  Commonly known as: LIPITOR   10 mg, Daily      chlorthalidone 25 MG tablet  Commonly known as: HYGROTON   1 tablet, Daily      citalopram 20 MG tablet  Commonly known as: CeleXA   20 mg, Every Morning      digoxin 125 MCG tablet  Commonly known as: LANOXIN   125 mcg, Oral, Daily      metoprolol succinate XL 25 MG 24 hr tablet  Commonly known as: TOPROL-XL   25 mg, Oral, Daily      tamsulosin 0.4 MG capsule 24 hr capsule  Commonly known as: FLOMAX   1 capsule, Daily      VITAMIN B 12 PO   Take  by mouth.      zolpidem 5 MG tablet  Commonly known as: AMBIEN   2.5 mg, Oral, Nightly PRN                    **Grisel Disclaimer: This note was dictated using an electronic transcription. The electronic translation of spoken language may permit erroneous, or at times, nonsensical words or phrases to be inadvertently transcribed. Although I have reviewed the note for such errors, some may still exist.

## 2024-12-17 ENCOUNTER — TELEPHONE (OUTPATIENT)
Age: 79
End: 2024-12-17
Payer: MEDICARE

## 2024-12-17 ENCOUNTER — OFFICE VISIT (OUTPATIENT)
Dept: SLEEP MEDICINE | Facility: HOSPITAL | Age: 79
End: 2024-12-17
Payer: MEDICARE

## 2024-12-17 VITALS — OXYGEN SATURATION: 97 % | HEART RATE: 76 BPM | WEIGHT: 226 LBS | BODY MASS INDEX: 29.95 KG/M2 | HEIGHT: 73 IN

## 2024-12-17 DIAGNOSIS — I48.19 PERSISTENT ATRIAL FIBRILLATION: Primary | ICD-10-CM

## 2024-12-17 DIAGNOSIS — G47.33 OSA ON CPAP: Primary | ICD-10-CM

## 2024-12-17 PROCEDURE — G0463 HOSPITAL OUTPT CLINIC VISIT: HCPCS

## 2024-12-17 NOTE — TELEPHONE ENCOUNTER
Patient called back today and would like to go ahead and move forward with device placement.    If agreeable, please place orders.

## 2024-12-17 NOTE — TELEPHONE ENCOUNTER
Sounds good.  I will put in the order for pacemaker and then will need a AV node ablation 2 to 3 weeks later.

## 2024-12-17 NOTE — PROGRESS NOTES
"[facility] Note     Chief Complaint:  STEPHENIE     Primary Care Physician: Luis Staples MD    Interval History:   The patient is a 79 y.o. male who I last saw 11/29/2023 and that note was reviewed.  For the most part, the patient states he is doing well.  He was admitted between 11/11 and 11/13/2024.  He has persistent atrial fibrillation.    He goes to bed at 9 PM and gets out of bed at 5 AM.  He reports problems with his ResMed 11 device making too much noises.  Therefore, he switched back to his ResMed 10 and states he is doing well with that.    Review of Systems:    A complete review of systems was done and all were negative with the exception of some shortness of breath which she relates to his atrial fibrillation    Social History:    Social History     Socioeconomic History    Marital status:    Tobacco Use    Smoking status: Never    Smokeless tobacco: Current     Types: Snuff    Tobacco comments:     2 weekly chew   Vaping Use    Vaping status: Never Used   Substance and Sexual Activity    Alcohol use: Never    Drug use: Never    Sexual activity: Defer       Allergies:  Ativan [lorazepam]     Medication Review:  Reviewed.      Vital Signs:    Vitals:    12/17/24 0739   Pulse: 76   SpO2: 97%   Weight: 103 kg (226 lb)   Height: 185 cm (72.85\")     Body mass index is 29.94 kg/m².    Physical Exam:    Constitutional:  Well developed 79 y.o. male that appears in no apparent distress.  Awake & oriented times 3.  Normal mood with normal recent and remote memory and normal judgement.  Eyes:  Conjunctivae normal.  Oropharynx: Previously, moist mucous membranes without exudate and a narrowed posterior pharyngeal opening and class III Mallampati airway.    Self-administered [place] Sleepiness Scale test results: 6  0-5 Lower normal daytime sleepiness  6-10 Higher normal daytime sleepiness  11-12 Mild, 13-15 Moderate, & 16-24 Severe excessive daytime sleepiness     Downloaded PAP Data " Evaluated For Therapeutic Response and Compliance:  DME is [employer] and the patient uses a nasal pillows.  Downloads between 10/17 and 12/16/2024 compliance greater than 95%.  Average usage is 7 hours and 26 minutes.  Average AHI is is abnormal at 23 and his central apnea index and his obstructive apnea index are normal.  His unknown apnea index is abnormal at 15.9 because he does have a significant leak..  Average auto CPAP pressure is 14.4 and his ResMed auto CPAP is set at 12-15    Downloads between 7/19 and 10/16/2024 compliance greater than 94%.  Average usage is 7 hours and 19 minutes.  Average AHI is is abnormal at 7.6 and his central apnea index and his obstructive apnea index are normal.  His unknown apnea index is borderline abnormal at 5.4 because he does have a significant leak..  Average auto CPAP pressure is 13.2 and his ResMed auto CPAP is set at 12-14    I have reviewed the above results and compared them with the patient's last downloads and reviewed with the patient.    Impression:   Severe obstructive sleep apnea with sleep-related hypoxia by overnight polysomnogram 5/27/2010, weight 163 pounds, adequately treated with ResMed auto CPAP. The patient appears to be at goal with good compliance and usage.  The patient does have a significant leak the patient has no complaints of hypersomnolence.     Plan:  Good sleep hygiene measures should be maintained.  Weight loss would be beneficial in this patient who is nearly obese by Body mass index is 29.94 kg/m².      After evaluating the patient and assessing results available, the patient is benefiting from the treatment being provided.     The patient will continue ResMed auto CPAP.  Potential side effects of not using PAP therapy reviewed and addressed as needed.  After clinical evaluation and review of downloads, I recommend no changes to the patient's pressures.  A new prescription will be sent to the patient's DME.    I answered all of the patient's  questions.  The patient will call the [facility] for any problems.  The patient will be having a procedure related to his atrial fibrillation.  Presently, I am unsure if the abnormalities noted on the patient's downloads are due to his heart or due to the leak.  Once his procedure is completed, I have asked the patient to follow-up with me 1 month later.        Rian Johns MD  Sleep Medicine  12/17/24  07:45 EST

## 2024-12-28 ENCOUNTER — HOSPITAL ENCOUNTER (INPATIENT)
Facility: HOSPITAL | Age: 79
LOS: 24 days | Discharge: PSYCHIATRIC OR SUBSTANCE USE FACILITY | End: 2025-01-23
Attending: STUDENT IN AN ORGANIZED HEALTH CARE EDUCATION/TRAINING PROGRAM | Admitting: INTERNAL MEDICINE
Payer: MEDICARE

## 2024-12-28 ENCOUNTER — APPOINTMENT (OUTPATIENT)
Dept: CT IMAGING | Facility: HOSPITAL | Age: 79
End: 2024-12-28
Payer: MEDICARE

## 2024-12-28 ENCOUNTER — APPOINTMENT (OUTPATIENT)
Dept: GENERAL RADIOLOGY | Facility: HOSPITAL | Age: 79
End: 2024-12-28
Payer: MEDICARE

## 2024-12-28 DIAGNOSIS — R55 SYNCOPE, UNSPECIFIED SYNCOPE TYPE: Primary | ICD-10-CM

## 2024-12-28 DIAGNOSIS — I48.91 ATRIAL FIBRILLATION, UNSPECIFIED TYPE: ICD-10-CM

## 2024-12-28 DIAGNOSIS — R94.31 PROLONGED Q-T INTERVAL ON ECG: ICD-10-CM

## 2024-12-28 DIAGNOSIS — S01.311A LACERATION OF RIGHT EAR, INITIAL ENCOUNTER: ICD-10-CM

## 2024-12-28 LAB
ALBUMIN SERPL-MCNC: 4 G/DL (ref 3.5–5.2)
ALBUMIN/GLOB SERPL: 1.6 G/DL
ALP SERPL-CCNC: 70 U/L (ref 39–117)
ALT SERPL W P-5'-P-CCNC: 12 U/L (ref 1–41)
ANION GAP SERPL CALCULATED.3IONS-SCNC: 9 MMOL/L (ref 5–15)
AST SERPL-CCNC: 14 U/L (ref 1–40)
BACTERIA UR QL AUTO: ABNORMAL /HPF
BASOPHILS # BLD AUTO: 0.03 10*3/MM3 (ref 0–0.2)
BASOPHILS NFR BLD AUTO: 0.3 % (ref 0–1.5)
BILIRUB SERPL-MCNC: 3.1 MG/DL (ref 0–1.2)
BILIRUB UR QL STRIP: NEGATIVE
BUN SERPL-MCNC: 16 MG/DL (ref 8–23)
BUN/CREAT SERPL: 16 (ref 7–25)
CALCIUM SPEC-SCNC: 9.3 MG/DL (ref 8.6–10.5)
CHLORIDE SERPL-SCNC: 101 MMOL/L (ref 98–107)
CLARITY UR: CLEAR
CO2 SERPL-SCNC: 28 MMOL/L (ref 22–29)
COLOR UR: YELLOW
CREAT SERPL-MCNC: 1 MG/DL (ref 0.76–1.27)
DEPRECATED RDW RBC AUTO: 42.1 FL (ref 37–54)
EGFRCR SERPLBLD CKD-EPI 2021: 75.6 ML/MIN/1.73
EOSINOPHIL # BLD AUTO: 0.01 10*3/MM3 (ref 0–0.4)
EOSINOPHIL NFR BLD AUTO: 0.1 % (ref 0.3–6.2)
ERYTHROCYTE [DISTWIDTH] IN BLOOD BY AUTOMATED COUNT: 13 % (ref 12.3–15.4)
GLOBULIN UR ELPH-MCNC: 2.5 GM/DL
GLUCOSE SERPL-MCNC: 109 MG/DL (ref 65–99)
GLUCOSE UR STRIP-MCNC: NEGATIVE MG/DL
HCT VFR BLD AUTO: 45.9 % (ref 37.5–51)
HGB BLD-MCNC: 14.9 G/DL (ref 13–17.7)
HGB UR QL STRIP.AUTO: ABNORMAL
HOLD SPECIMEN: NORMAL
HYALINE CASTS UR QL AUTO: ABNORMAL /LPF
IMM GRANULOCYTES # BLD AUTO: 0.06 10*3/MM3 (ref 0–0.05)
IMM GRANULOCYTES NFR BLD AUTO: 0.5 % (ref 0–0.5)
KETONES UR QL STRIP: NEGATIVE
LEUKOCYTE ESTERASE UR QL STRIP.AUTO: NEGATIVE
LYMPHOCYTES # BLD AUTO: 0.79 10*3/MM3 (ref 0.7–3.1)
LYMPHOCYTES NFR BLD AUTO: 6.8 % (ref 19.6–45.3)
MAGNESIUM SERPL-MCNC: 2.1 MG/DL (ref 1.6–2.4)
MCH RBC QN AUTO: 28.7 PG (ref 26.6–33)
MCHC RBC AUTO-ENTMCNC: 32.5 G/DL (ref 31.5–35.7)
MCV RBC AUTO: 88.3 FL (ref 79–97)
MONOCYTES # BLD AUTO: 1.4 10*3/MM3 (ref 0.1–0.9)
MONOCYTES NFR BLD AUTO: 12 % (ref 5–12)
NEUTROPHILS NFR BLD AUTO: 80.3 % (ref 42.7–76)
NEUTROPHILS NFR BLD AUTO: 9.36 10*3/MM3 (ref 1.7–7)
NITRITE UR QL STRIP: NEGATIVE
NRBC BLD AUTO-RTO: 0 /100 WBC (ref 0–0.2)
PH UR STRIP.AUTO: 6.5 [PH] (ref 5–8)
PLATELET # BLD AUTO: 150 10*3/MM3 (ref 140–450)
PMV BLD AUTO: 11.1 FL (ref 6–12)
POTASSIUM SERPL-SCNC: 3.7 MMOL/L (ref 3.5–5.2)
PROT SERPL-MCNC: 6.5 G/DL (ref 6–8.5)
PROT UR QL STRIP: NEGATIVE
RBC # BLD AUTO: 5.2 10*6/MM3 (ref 4.14–5.8)
RBC # UR STRIP: ABNORMAL /HPF
REF LAB TEST METHOD: ABNORMAL
SODIUM SERPL-SCNC: 138 MMOL/L (ref 136–145)
SP GR UR STRIP: 1.01 (ref 1–1.03)
SQUAMOUS #/AREA URNS HPF: ABNORMAL /HPF
TROPONIN T SERPL HS-MCNC: 18 NG/L
UROBILINOGEN UR QL STRIP: ABNORMAL
WBC # UR STRIP: ABNORMAL /HPF
WBC NRBC COR # BLD AUTO: 11.65 10*3/MM3 (ref 3.4–10.8)
WHOLE BLOOD HOLD COAG: NORMAL

## 2024-12-28 PROCEDURE — 72125 CT NECK SPINE W/O DYE: CPT

## 2024-12-28 PROCEDURE — 85025 COMPLETE CBC W/AUTO DIFF WBC: CPT | Performed by: PHYSICIAN ASSISTANT

## 2024-12-28 PROCEDURE — 83735 ASSAY OF MAGNESIUM: CPT | Performed by: STUDENT IN AN ORGANIZED HEALTH CARE EDUCATION/TRAINING PROGRAM

## 2024-12-28 PROCEDURE — 81001 URINALYSIS AUTO W/SCOPE: CPT | Performed by: PHYSICIAN ASSISTANT

## 2024-12-28 PROCEDURE — 80053 COMPREHEN METABOLIC PANEL: CPT | Performed by: PHYSICIAN ASSISTANT

## 2024-12-28 PROCEDURE — 70450 CT HEAD/BRAIN W/O DYE: CPT

## 2024-12-28 PROCEDURE — 99285 EMERGENCY DEPT VISIT HI MDM: CPT

## 2024-12-28 PROCEDURE — 84484 ASSAY OF TROPONIN QUANT: CPT | Performed by: PHYSICIAN ASSISTANT

## 2024-12-28 PROCEDURE — 0202U NFCT DS 22 TRGT SARS-COV-2: CPT | Performed by: PHYSICIAN ASSISTANT

## 2024-12-28 PROCEDURE — 93010 ELECTROCARDIOGRAM REPORT: CPT | Performed by: INTERNAL MEDICINE

## 2024-12-28 PROCEDURE — 71045 X-RAY EXAM CHEST 1 VIEW: CPT

## 2024-12-28 PROCEDURE — 93005 ELECTROCARDIOGRAM TRACING: CPT | Performed by: PHYSICIAN ASSISTANT

## 2024-12-28 RX ORDER — LIDOCAINE HYDROCHLORIDE AND EPINEPHRINE 10; 10 MG/ML; UG/ML
10 INJECTION, SOLUTION INFILTRATION; PERINEURAL ONCE
Status: COMPLETED | OUTPATIENT
Start: 2024-12-28 | End: 2024-12-29

## 2024-12-28 RX ORDER — SODIUM CHLORIDE 0.9 % (FLUSH) 0.9 %
10 SYRINGE (ML) INJECTION AS NEEDED
Status: DISCONTINUED | OUTPATIENT
Start: 2024-12-28 | End: 2025-01-23 | Stop reason: HOSPADM

## 2024-12-29 ENCOUNTER — APPOINTMENT (OUTPATIENT)
Dept: ULTRASOUND IMAGING | Facility: HOSPITAL | Age: 79
End: 2024-12-29
Payer: MEDICARE

## 2024-12-29 LAB
ALBUMIN SERPL-MCNC: 3.9 G/DL (ref 3.5–5.2)
ALBUMIN/GLOB SERPL: 1.6 G/DL
ALP SERPL-CCNC: 66 U/L (ref 39–117)
ALT SERPL W P-5'-P-CCNC: 11 U/L (ref 1–41)
ANION GAP SERPL CALCULATED.3IONS-SCNC: 7 MMOL/L (ref 5–15)
AST SERPL-CCNC: 12 U/L (ref 1–40)
B PARAPERT DNA SPEC QL NAA+PROBE: NOT DETECTED
B PERT DNA SPEC QL NAA+PROBE: NOT DETECTED
BASOPHILS # BLD AUTO: 0.04 10*3/MM3 (ref 0–0.2)
BASOPHILS NFR BLD AUTO: 0.4 % (ref 0–1.5)
BILIRUB CONJ SERPL-MCNC: 0.3 MG/DL (ref 0–0.3)
BILIRUB INDIRECT SERPL-MCNC: 3.1 MG/DL
BILIRUB SERPL-MCNC: 3.4 MG/DL (ref 0–1.2)
BILIRUB SERPL-MCNC: 3.5 MG/DL (ref 0–1.2)
BUN SERPL-MCNC: 14 MG/DL (ref 8–23)
BUN/CREAT SERPL: 17.3 (ref 7–25)
C PNEUM DNA NPH QL NAA+NON-PROBE: NOT DETECTED
CALCIUM SPEC-SCNC: 8.9 MG/DL (ref 8.6–10.5)
CHLORIDE SERPL-SCNC: 103 MMOL/L (ref 98–107)
CO2 SERPL-SCNC: 26 MMOL/L (ref 22–29)
CREAT SERPL-MCNC: 0.81 MG/DL (ref 0.76–1.27)
DEPRECATED RDW RBC AUTO: 42.9 FL (ref 37–54)
DIGOXIN SERPL-MCNC: 0.5 NG/ML (ref 0.6–1.2)
EGFRCR SERPLBLD CKD-EPI 2021: 88.6 ML/MIN/1.73
EOSINOPHIL # BLD AUTO: 0.03 10*3/MM3 (ref 0–0.4)
EOSINOPHIL NFR BLD AUTO: 0.3 % (ref 0.3–6.2)
ERYTHROCYTE [DISTWIDTH] IN BLOOD BY AUTOMATED COUNT: 13.2 % (ref 12.3–15.4)
FLUAV SUBTYP SPEC NAA+PROBE: NOT DETECTED
FLUBV RNA ISLT QL NAA+PROBE: NOT DETECTED
GEN 5 1HR TROPONIN T REFLEX: 20 NG/L
GLOBULIN UR ELPH-MCNC: 2.4 GM/DL
GLUCOSE SERPL-MCNC: 89 MG/DL (ref 65–99)
HADV DNA SPEC NAA+PROBE: NOT DETECTED
HCOV 229E RNA SPEC QL NAA+PROBE: NOT DETECTED
HCOV HKU1 RNA SPEC QL NAA+PROBE: NOT DETECTED
HCOV NL63 RNA SPEC QL NAA+PROBE: NOT DETECTED
HCOV OC43 RNA SPEC QL NAA+PROBE: NOT DETECTED
HCT VFR BLD AUTO: 45.3 % (ref 37.5–51)
HGB BLD-MCNC: 14.3 G/DL (ref 13–17.7)
HMPV RNA NPH QL NAA+NON-PROBE: NOT DETECTED
HPIV1 RNA ISLT QL NAA+PROBE: NOT DETECTED
HPIV2 RNA SPEC QL NAA+PROBE: NOT DETECTED
HPIV3 RNA NPH QL NAA+PROBE: NOT DETECTED
HPIV4 P GENE NPH QL NAA+PROBE: NOT DETECTED
IMM GRANULOCYTES # BLD AUTO: 0.05 10*3/MM3 (ref 0–0.05)
IMM GRANULOCYTES NFR BLD AUTO: 0.5 % (ref 0–0.5)
LYMPHOCYTES # BLD AUTO: 1.3 10*3/MM3 (ref 0.7–3.1)
LYMPHOCYTES NFR BLD AUTO: 13.3 % (ref 19.6–45.3)
M PNEUMO IGG SER IA-ACNC: NOT DETECTED
MCH RBC QN AUTO: 27.8 PG (ref 26.6–33)
MCHC RBC AUTO-ENTMCNC: 31.6 G/DL (ref 31.5–35.7)
MCV RBC AUTO: 88.1 FL (ref 79–97)
MONOCYTES # BLD AUTO: 1.21 10*3/MM3 (ref 0.1–0.9)
MONOCYTES NFR BLD AUTO: 12.3 % (ref 5–12)
NEUTROPHILS NFR BLD AUTO: 7.18 10*3/MM3 (ref 1.7–7)
NEUTROPHILS NFR BLD AUTO: 73.2 % (ref 42.7–76)
PLATELET # BLD AUTO: 145 10*3/MM3 (ref 140–450)
PMV BLD AUTO: 11.4 FL (ref 6–12)
POTASSIUM SERPL-SCNC: 3.5 MMOL/L (ref 3.5–5.2)
PROT SERPL-MCNC: 6.3 G/DL (ref 6–8.5)
QT INTERVAL: 421 MS
QT INTERVAL: 433 MS
QTC INTERVAL: 504 MS
QTC INTERVAL: 509 MS
RBC # BLD AUTO: 5.14 10*6/MM3 (ref 4.14–5.8)
RHINOVIRUS RNA SPEC NAA+PROBE: NOT DETECTED
RSV RNA NPH QL NAA+NON-PROBE: NOT DETECTED
SARS-COV-2 RNA NPH QL NAA+NON-PROBE: NOT DETECTED
SODIUM SERPL-SCNC: 136 MMOL/L (ref 136–145)
TROPONIN T NUMERIC DELTA: 2 NG/L
WBC NRBC COR # BLD AUTO: 9.81 10*3/MM3 (ref 3.4–10.8)

## 2024-12-29 PROCEDURE — 85025 COMPLETE CBC W/AUTO DIFF WBC: CPT | Performed by: INTERNAL MEDICINE

## 2024-12-29 PROCEDURE — 25010000002 LIDOCAINE 1% - EPINEPHRINE 1:100000 1 %-1:100000 SOLUTION: Performed by: INTERNAL MEDICINE

## 2024-12-29 PROCEDURE — G0378 HOSPITAL OBSERVATION PER HR: HCPCS

## 2024-12-29 PROCEDURE — 99214 OFFICE O/P EST MOD 30 MIN: CPT | Performed by: INTERNAL MEDICINE

## 2024-12-29 PROCEDURE — 76705 ECHO EXAM OF ABDOMEN: CPT

## 2024-12-29 PROCEDURE — 93005 ELECTROCARDIOGRAM TRACING: CPT | Performed by: INTERNAL MEDICINE

## 2024-12-29 PROCEDURE — 80162 ASSAY OF DIGOXIN TOTAL: CPT | Performed by: INTERNAL MEDICINE

## 2024-12-29 PROCEDURE — 25010000002 OLANZAPINE 10 MG RECONSTITUTED SOLUTION: Performed by: INTERNAL MEDICINE

## 2024-12-29 PROCEDURE — 36415 COLL VENOUS BLD VENIPUNCTURE: CPT

## 2024-12-29 PROCEDURE — 84484 ASSAY OF TROPONIN QUANT: CPT | Performed by: INTERNAL MEDICINE

## 2024-12-29 PROCEDURE — 82248 BILIRUBIN DIRECT: CPT | Performed by: INTERNAL MEDICINE

## 2024-12-29 PROCEDURE — 25010000002 SODIUM CHLORIDE 0.9 % WITH KCL 20 MEQ 20-0.9 MEQ/L-% SOLUTION: Performed by: INTERNAL MEDICINE

## 2024-12-29 PROCEDURE — 93010 ELECTROCARDIOGRAM REPORT: CPT | Performed by: INTERNAL MEDICINE

## 2024-12-29 PROCEDURE — 80053 COMPREHEN METABOLIC PANEL: CPT | Performed by: INTERNAL MEDICINE

## 2024-12-29 PROCEDURE — 82247 BILIRUBIN TOTAL: CPT | Performed by: INTERNAL MEDICINE

## 2024-12-29 RX ORDER — BISACODYL 5 MG/1
5 TABLET, DELAYED RELEASE ORAL DAILY PRN
Status: DISCONTINUED | OUTPATIENT
Start: 2024-12-29 | End: 2025-01-23 | Stop reason: HOSPADM

## 2024-12-29 RX ORDER — SODIUM CHLORIDE 9 MG/ML
40 INJECTION, SOLUTION INTRAVENOUS AS NEEDED
Status: DISCONTINUED | OUTPATIENT
Start: 2024-12-29 | End: 2025-01-23 | Stop reason: HOSPADM

## 2024-12-29 RX ORDER — ZOLPIDEM TARTRATE 5 MG/1
2.5 TABLET ORAL NIGHTLY PRN
Status: DISCONTINUED | OUTPATIENT
Start: 2024-12-29 | End: 2025-01-03

## 2024-12-29 RX ORDER — NITROGLYCERIN 0.4 MG/1
0.4 TABLET SUBLINGUAL
Status: DISCONTINUED | OUTPATIENT
Start: 2024-12-29 | End: 2025-01-23 | Stop reason: HOSPADM

## 2024-12-29 RX ORDER — BISACODYL 10 MG
10 SUPPOSITORY, RECTAL RECTAL DAILY PRN
Status: DISCONTINUED | OUTPATIENT
Start: 2024-12-29 | End: 2025-01-23 | Stop reason: HOSPADM

## 2024-12-29 RX ORDER — ATORVASTATIN CALCIUM 10 MG/1
10 TABLET, FILM COATED ORAL DAILY
Status: DISCONTINUED | OUTPATIENT
Start: 2024-12-29 | End: 2025-01-23 | Stop reason: HOSPADM

## 2024-12-29 RX ORDER — TAMSULOSIN HYDROCHLORIDE 0.4 MG/1
0.4 CAPSULE ORAL DAILY
Status: DISCONTINUED | OUTPATIENT
Start: 2024-12-29 | End: 2025-01-23 | Stop reason: HOSPADM

## 2024-12-29 RX ORDER — DIGOXIN 125 MCG
125 TABLET ORAL DAILY
Status: DISCONTINUED | OUTPATIENT
Start: 2024-12-29 | End: 2025-01-23 | Stop reason: HOSPADM

## 2024-12-29 RX ORDER — AMOXICILLIN 250 MG
2 CAPSULE ORAL 2 TIMES DAILY PRN
Status: DISCONTINUED | OUTPATIENT
Start: 2024-12-29 | End: 2025-01-23 | Stop reason: HOSPADM

## 2024-12-29 RX ORDER — POTASSIUM CHLORIDE 750 MG/1
40 TABLET, FILM COATED, EXTENDED RELEASE ORAL EVERY 4 HOURS
Status: COMPLETED | OUTPATIENT
Start: 2024-12-29 | End: 2024-12-29

## 2024-12-29 RX ORDER — SODIUM CHLORIDE 0.9 % (FLUSH) 0.9 %
10 SYRINGE (ML) INJECTION EVERY 12 HOURS SCHEDULED
Status: DISCONTINUED | OUTPATIENT
Start: 2024-12-29 | End: 2025-01-23 | Stop reason: HOSPADM

## 2024-12-29 RX ORDER — CITALOPRAM HYDROBROMIDE 20 MG/1
20 TABLET ORAL EVERY MORNING
Status: DISCONTINUED | OUTPATIENT
Start: 2024-12-29 | End: 2024-12-30

## 2024-12-29 RX ORDER — METOPROLOL SUCCINATE 25 MG/1
25 TABLET, EXTENDED RELEASE ORAL ONCE
Status: COMPLETED | OUTPATIENT
Start: 2024-12-29 | End: 2024-12-29

## 2024-12-29 RX ORDER — MULTIVITAMIN WITH IRON
1000 TABLET ORAL DAILY
Status: DISCONTINUED | OUTPATIENT
Start: 2024-12-29 | End: 2025-01-23 | Stop reason: HOSPADM

## 2024-12-29 RX ORDER — SODIUM CHLORIDE AND POTASSIUM CHLORIDE 150; 900 MG/100ML; MG/100ML
100 INJECTION, SOLUTION INTRAVENOUS CONTINUOUS
Status: DISPENSED | OUTPATIENT
Start: 2024-12-29 | End: 2024-12-29

## 2024-12-29 RX ORDER — SODIUM CHLORIDE AND POTASSIUM CHLORIDE 150; 900 MG/100ML; MG/100ML
75 INJECTION, SOLUTION INTRAVENOUS CONTINUOUS
Status: DISCONTINUED | OUTPATIENT
Start: 2024-12-29 | End: 2024-12-31

## 2024-12-29 RX ORDER — ONDANSETRON 2 MG/ML
4 INJECTION INTRAMUSCULAR; INTRAVENOUS EVERY 6 HOURS PRN
Status: DISCONTINUED | OUTPATIENT
Start: 2024-12-29 | End: 2024-12-30

## 2024-12-29 RX ORDER — POLYETHYLENE GLYCOL 3350 17 G/17G
17 POWDER, FOR SOLUTION ORAL DAILY PRN
Status: DISCONTINUED | OUTPATIENT
Start: 2024-12-29 | End: 2025-01-23 | Stop reason: HOSPADM

## 2024-12-29 RX ORDER — SODIUM CHLORIDE 0.9 % (FLUSH) 0.9 %
10 SYRINGE (ML) INJECTION AS NEEDED
Status: DISCONTINUED | OUTPATIENT
Start: 2024-12-29 | End: 2025-01-23 | Stop reason: HOSPADM

## 2024-12-29 RX ORDER — OLANZAPINE 10 MG/2ML
2.5 INJECTION, POWDER, FOR SOLUTION INTRAMUSCULAR ONCE
Status: COMPLETED | OUTPATIENT
Start: 2024-12-29 | End: 2024-12-29

## 2024-12-29 RX ORDER — METOPROLOL SUCCINATE 25 MG/1
25 TABLET, EXTENDED RELEASE ORAL DAILY
Status: DISCONTINUED | OUTPATIENT
Start: 2024-12-29 | End: 2025-01-09

## 2024-12-29 RX ORDER — ACETAMINOPHEN 325 MG/1
650 TABLET ORAL EVERY 4 HOURS PRN
Status: DISCONTINUED | OUTPATIENT
Start: 2024-12-29 | End: 2025-01-23 | Stop reason: HOSPADM

## 2024-12-29 RX ADMIN — Medication 10 ML: at 01:45

## 2024-12-29 RX ADMIN — LIDOCAINE HYDROCHLORIDE AND EPINEPHRINE 10 ML: 10; 10 INJECTION, SOLUTION INFILTRATION; PERINEURAL at 02:30

## 2024-12-29 RX ADMIN — METOPROLOL SUCCINATE 25 MG: 25 TABLET, EXTENDED RELEASE ORAL at 22:06

## 2024-12-29 RX ADMIN — Medication 1000 MCG: at 08:23

## 2024-12-29 RX ADMIN — DIGOXIN 125 MCG: 0.12 TABLET ORAL at 08:23

## 2024-12-29 RX ADMIN — POTASSIUM CHLORIDE AND SODIUM CHLORIDE 100 ML/HR: 900; 150 INJECTION, SOLUTION INTRAVENOUS at 15:49

## 2024-12-29 RX ADMIN — CITALOPRAM 20 MG: 20 TABLET, FILM COATED ORAL at 06:14

## 2024-12-29 RX ADMIN — Medication 10 ML: at 09:33

## 2024-12-29 RX ADMIN — APIXABAN 5 MG: 5 TABLET, FILM COATED ORAL at 03:31

## 2024-12-29 RX ADMIN — OLANZAPINE 2.5 MG: 10 INJECTION, POWDER, LYOPHILIZED, FOR SOLUTION INTRAMUSCULAR at 21:30

## 2024-12-29 RX ADMIN — ATORVASTATIN CALCIUM 10 MG: 10 TABLET, FILM COATED ORAL at 08:23

## 2024-12-29 RX ADMIN — POTASSIUM CHLORIDE AND SODIUM CHLORIDE 100 ML/HR: 900; 150 INJECTION, SOLUTION INTRAVENOUS at 03:47

## 2024-12-29 RX ADMIN — POTASSIUM CHLORIDE 40 MEQ: 750 TABLET, EXTENDED RELEASE ORAL at 14:33

## 2024-12-29 RX ADMIN — APIXABAN 5 MG: 5 TABLET, FILM COATED ORAL at 22:08

## 2024-12-29 RX ADMIN — POTASSIUM CHLORIDE 40 MEQ: 750 TABLET, EXTENDED RELEASE ORAL at 17:16

## 2024-12-29 RX ADMIN — METOPROLOL SUCCINATE 25 MG: 25 TABLET, EXTENDED RELEASE ORAL at 08:23

## 2024-12-29 RX ADMIN — TAMSULOSIN HYDROCHLORIDE 0.4 MG: 0.4 CAPSULE ORAL at 08:23

## 2024-12-29 NOTE — ED NOTES
"Nursing report ED to floor  Mark Aguirre Jr.  79 y.o.  male    HPI :  HPI  Stated Reason for Visit: Mechanical fall, laceration to right ear. Denies LOC, anticoagulants  History Obtained From: EMS    Chief Complaint  Chief Complaint   Patient presents with    Fall    Ear Laceration       Admitting doctor:   Luis Staples MD    Admitting diagnosis:   The primary encounter diagnosis was Syncope, unspecified syncope type. Diagnoses of Laceration of right ear, initial encounter, Prolonged Q-T interval on ECG, and Atrial fibrillation, unspecified type were also pertinent to this visit.    Code status:   Current Code Status       Date Active Code Status Order ID Comments User Context       12/28/2024 2356 CPR (Attempt to Resuscitate) [id number]  Luis Staples MD ED        Question Answer    Code Status (Patient has no pulse and is not breathing) CPR (Attempt to Resuscitate)    Medical Interventions (Patient has pulse or is breathing) Full Support                    Allergies:   Ativan [lorazepam]    Isolation:   No active isolations    Intake and Output  No intake or output data in the 24 hours ending 12/29/24 1152    Weight:       12/29/24  0818   Weight: 110 kg (241 lb 8 oz)       Most recent vitals:   Vitals:    12/29/24 0302 12/29/24 0401 12/29/24 0506 12/29/24 0818   BP: 145/92 115/77  132/86   BP Location:    Right arm   Patient Position:    Lying   Pulse: 89 82 82 79   Resp:    16   Temp:    98.2 °F (36.8 °C)   TempSrc:    Oral   SpO2: 96% 94% 95% 96%   Weight:    110 kg (241 lb 8 oz)   Height:    185.4 cm (73\")       Active LDAs/IV Access:   Lines, Drains & Airways       Active LDAs       Name Placement date Placement time Site Days    Peripheral IV 12/28/24 2254 Left Antecubital 12/28/24 2254  Antecubital  less than 1                    Labs (abnormal labs have a star):   Labs Reviewed   COMPREHENSIVE METABOLIC PANEL - Abnormal; Notable for the following components:       Result Value    Glucose 109 (*)  "    Total Bilirubin 3.1 (*)     All other components within normal limits    Narrative:     GFR Categories in Chronic Kidney Disease (CKD)      GFR Category          GFR (mL/min/1.73)    Interpretation  G1                     90 or greater         Normal or high (1)  G2                      60-89                Mild decrease (1)  G3a                   45-59                Mild to moderate decrease  G3b                   30-44                Moderate to severe decrease  G4                    15-29                Severe decrease  G5                    14 or less           Kidney failure          (1)In the absence of evidence of kidney disease, neither GFR category G1 or G2 fulfill the criteria for CKD.    eGFR calculation 2021 CKD-EPI creatinine equation, which does not include race as a factor   URINALYSIS W/ MICROSCOPIC IF INDICATED (NO CULTURE) - Abnormal; Notable for the following components:    Blood, UA Moderate (2+) (*)     All other components within normal limits   CBC WITH AUTO DIFFERENTIAL - Abnormal; Notable for the following components:    WBC 11.65 (*)     Neutrophil % 80.3 (*)     Lymphocyte % 6.8 (*)     Eosinophil % 0.1 (*)     Neutrophils, Absolute 9.36 (*)     Monocytes, Absolute 1.40 (*)     Immature Grans, Absolute 0.06 (*)     All other components within normal limits   URINALYSIS, MICROSCOPIC ONLY - Abnormal; Notable for the following components:    RBC, UA 11-20 (*)     All other components within normal limits   CBC WITH AUTO DIFFERENTIAL - Abnormal; Notable for the following components:    Lymphocyte % 13.3 (*)     Monocyte % 12.3 (*)     Neutrophils, Absolute 7.18 (*)     Monocytes, Absolute 1.21 (*)     All other components within normal limits   COMPREHENSIVE METABOLIC PANEL - Abnormal; Notable for the following components:    Total Bilirubin 3.5 (*)     All other components within normal limits    Narrative:     GFR Categories in Chronic Kidney Disease (CKD)      GFR Category          GFR  (mL/min/1.73)    Interpretation  G1                     90 or greater         Normal or high (1)  G2                      60-89                Mild decrease (1)  G3a                   45-59                Mild to moderate decrease  G3b                   30-44                Moderate to severe decrease  G4                    15-29                Severe decrease  G5                    14 or less           Kidney failure          (1)In the absence of evidence of kidney disease, neither GFR category G1 or G2 fulfill the criteria for CKD.    eGFR calculation 2021 CKD-EPI creatinine equation, which does not include race as a factor   DIGOXIN LEVEL - Abnormal; Notable for the following components:    Digoxin 0.50 (*)     All other components within normal limits   BILIRUBIN, TOTAL AND DIRECT - Abnormal; Notable for the following components:    Total Bilirubin 3.4 (*)     All other components within normal limits   RESPIRATORY PANEL PCR W/ COVID-19 (SARS-COV-2), NP SWAB IN UTM/VTP, 2 HR TAT - Normal    Narrative:     In the setting of a positive respiratory panel with a viral infection PLUS a negative procalcitonin without other underlying concern for bacterial infection, consider observing off antibiotics or discontinuation of antibiotics and continue supportive care. If the respiratory panel is positive for atypical bacterial infection (Bordetella pertussis, Chlamydophila pneumoniae, or Mycoplasma pneumoniae), consider antibiotic de-escalation to target atypical bacterial infection.   TROPONIN - Normal    Narrative:     High Sensitive Troponin T Reference Range:  <14.0 ng/L- Negative Female for AMI  <22.0 ng/L- Negative Male for AMI  >=14 - Abnormal Female indicating possible myocardial injury.  >=22 - Abnormal Male indicating possible myocardial injury.   Clinicians would have to utilize clinical acumen, EKG, Troponin, and serial changes to determine if it is an Acute Myocardial Infarction or myocardial injury due to an  underlying chronic condition.        MAGNESIUM - Normal   HIGH SENSITIVITIY TROPONIN T 1HR - Normal    Narrative:     High Sensitive Troponin T Reference Range:  <14.0 ng/L- Negative Female for AMI  <22.0 ng/L- Negative Male for AMI  >=14 - Abnormal Female indicating possible myocardial injury.  >=22 - Abnormal Male indicating possible myocardial injury.   Clinicians would have to utilize clinical acumen, EKG, Troponin, and serial changes to determine if it is an Acute Myocardial Infarction or myocardial injury due to an underlying chronic condition.        RAINBOW DRAW    Narrative:     The following orders were created for panel order Dante Draw.  Procedure                               Abnormality         Status                     ---------                               -----------         ------                     Frias Top[[id number]]                                         Final result               Light Blue Top[[id number]]                                   Final result                 Please view results for these tests on the individual orders.   CBC AND DIFFERENTIAL    Narrative:     The following orders were created for panel order CBC & Differential.  Procedure                               Abnormality         Status                     ---------                               -----------         ------                     CBC Auto Differential[[id number]]        Abnormal            Final result                 Please view results for these tests on the individual orders.   GRAY TOP   LIGHT BLUE TOP       EKG:   ECG 12 Lead Syncope   Final Result   HEART RATE=83  bpm   RR Ziehvsnx=821  ms   MA Interval=  ms   P Horizontal Axis=  deg   P Front Axis=  deg   QRSD Myvimzbl=022  ms   QT Ecuchwgo=312  ms   KFmW=585  ms   QRS Axis=-81  deg   T Wave Axis=70  deg   - ABNORMAL ECG -   Atrial fibrillation   RBBB and LAFB   No change from previous tracing   Electronically Signed By: Claus Lindquist ([facility]) ([facility])  2024-12-29 08:58:31   Date and Time of Study:2024-12-29 05:06:48      ECG 12 Lead Syncope   Final Result   HEART RATE=86  bpm   RR Zyvpgzyk=802  ms   ID Interval=  ms   P Horizontal Axis=  deg   P Front Axis=  deg   QRSD Xitkzgnl=312  ms   QT Sbkgqgqm=251  ms   ANiZ=945  ms   QRS Axis=-76  deg   T Wave Axis=80  deg   - ABNORMAL ECG -   Atrial fibrillation   Ventricular premature complex   RBBB and LAFB   now in af   Electronically Signed By: Claus LindquistPRESLEY) ([facility]) 2024-12-29 08:58:36   Date and Time of Study:2024-12-28 22:44:13      Telemetry Scan   Final Result          Meds given in ED:   Medications   sodium chloride 0.9 % flush 10 mL (has no administration in time range)   acetaminophen (TYLENOL) tablet 650 mg (has no administration in time range)   apixaban (ELIQUIS) tablet 5 mg (0 mg Oral Hold 12/29/24 0827)   atorvastatin (LIPITOR) tablet 10 mg (10 mg Oral Given 12/29/24 0823)   citalopram (CeleXA) tablet 20 mg (20 mg Oral Given 12/29/24 0614)   vitamin B-12 (CYANOCOBALAMIN) tablet 1,000 mcg (1,000 mcg Oral Given 12/29/24 0823)   digoxin (LANOXIN) tablet 125 mcg (125 mcg Oral Given 12/29/24 0823)   metoprolol succinate XL (TOPROL-XL) 24 hr tablet 25 mg (25 mg Oral Given 12/29/24 0823)   tamsulosin (FLOMAX) 24 hr capsule 0.4 mg (0.4 mg Oral Given 12/29/24 0823)   zolpidem (AMBIEN) tablet 2.5 mg (has no administration in time range)   sodium chloride 0.9 % flush 10 mL (10 mL Intravenous Given 12/29/24 0933)   sodium chloride 0.9 % flush 10 mL (has no administration in time range)   sodium chloride 0.9 % infusion 40 mL (has no administration in time range)   nitroglycerin (NITROSTAT) SL tablet 0.4 mg (has no administration in time range)   sennosides-docusate (PERICOLACE) 8.6-50 MG per tablet 2 tablet (has no administration in time range)     And   polyethylene glycol (MIRALAX) packet 17 g (has no administration in time range)     And   bisacodyl (DULCOLAX) EC tablet 5 mg (has no administration in time  range)     And   bisacodyl (DULCOLAX) suppository 10 mg (has no administration in time range)   ondansetron (ZOFRAN) injection 4 mg (has no administration in time range)   sodium chloride 0.9 % with KCl 20 mEq/L infusion (0 mL/hr Intravenous Stopped 12/29/24 0905)   Potassium Replacement - Follow Nurse / BPA Driven Protocol (has no administration in time range)   Magnesium Cardiology Dose Replacement - Follow Nurse / BPA Driven Protocol (has no administration in time range)   Phosphorus Replacement - Follow Nurse / BPA Driven Protocol (has no administration in time range)   Calcium Replacement - Follow Nurse / BPA Driven Protocol (has no administration in time range)   sodium chloride 0.9 % with KCl 20 mEq/L infusion (100 mL/hr Intravenous Currently Infusing 12/29/24 0905)   lidocaine 1% - EPINEPHrine [id number] (XYLOCAINE W/EPI) 1 %-[id number] injection 10 mL (10 mL Injection Given by Other 12/29/24 0230)       Imaging results:  CT Cervical Spine Without Contrast    Result Date: 12/29/2024  Electronically signed by Hemant Manzo MD on 12-29-24 at 0006    CT Head Without Contrast    Result Date: 12/29/2024  Electronically signed by Hemant Manzo MD on 12-29-24 at 0005    XR Chest 1 View    Result Date: 12/28/2024  Cardiomegaly. No evidence for active disease in the chest.  This report was finalized on 12/28/2024 11:09 PM by Mir Munoz M.D on Workstation: [id number]       Ambulatory status:   - up with assist    Social issues:   Social History     Socioeconomic History    Marital status:    Tobacco Use    Smoking status: Never    Smokeless tobacco: Current     Types: Snuff    Tobacco comments:     2 weekly chew   Vaping Use    Vaping status: Never Used   Substance and Sexual Activity    Alcohol use: Never    Drug use: Never    Sexual activity: Defer       Peripheral Neurovascular  Peripheral Neurovascular (Adult)  Peripheral Neurovascular WDL: WDL    Neuro Cognitive  Neuro Cognitive  (Adult)  Cognitive/Neuro/Behavioral WDL: WDL  Pupils  Pupil PERRLA: yes    Learning  Learning Assessment  Learning Readiness and Ability: no barriers identified    Respiratory  Respiratory WDL  Respiratory WDL: WDL    Abdominal Pain       Pain Assessments  Pain (Adult)  (0-10) Pain Rating: Rest: 1  Pain Location: ear  Pain Side/Orientation: right    NIH Stroke Scale       Samaria Aguilar RN  12/29/24 11:52 EST

## 2024-12-29 NOTE — H&P
[facility]   HISTORY AND PHYSICAL    Patient Name: Mark Aguirre Jr.  : 1945  MRN: [id number]  Primary Care Physician:  Luis Staples MD  Date of admission: 2024    Subjective   Subjective     Chief Complaint: Syncope, Ear laceration    History of Present Illness  79-year-old well-known to me.  He has a history of atrial fibrillation and is actually planned for AV carlos a ablation with pacemaker placement in 2025 because of difficulty with rate control.  His wife called me this evening telling me he fell because she believes he passed out and suffered a laceration to his right ear.  He was still lying on the floor when they called me and I advised the emergency room visit.      He describes that he does not believe that he passed out but wife said when she heard him fall and first arrived he seemed to be just coming to consciousness.  He did apparently feel like he was getting sweaty and lightheaded when he got up out of a chair.  He had no palpitations.  He has had some dull chest pressure the last couple of days but no increased shortness of air, orthopnea or swelling in the legs.  Wife notes he had been feeling puny for the last 2 days with complaints of increased fatigue, difficulty sleep at night and headache.  He also notes that he had an episode of nausea this morning.  He was near his daughter on  who believes she probably has COVID or another respiratory virus.  He denies any significant cough or congestion but does have some sinus congestion.  Review of Systems     Personal History     Past Medical History:   Diagnosis Date    Acute kidney failure     POST-OP TOTAL HIP 2020    Anticoagulated     PRADAXA FOR A FIB TO HELD 3 DAYS PRIOR    Arthritis     OSTEO. RIGHT HIP    Atrial flutter     Bifascicular block     Cataract     LEFT AND RIGHT    Depression     History of colon polyps     Hypertension     Hypoxemia associated with sleep     Insomnia     Knee pain     STEPHENIE on CPAP  05/27/2010    Overnight polysomnogram.  Weight 163 pounds.  Severe STEPHENIE with AHI of 30.9 events per hour.  Low oxygen saturation 72% and sleep-related hypoxia present for 30 minutes    PAF (paroxysmal atrial fibrillation)     Right hip pain     Slow to wake up after anesthesia        Past Surgical History:   Procedure Laterality Date    CARDIAC ABLATION      CARDIAC CATHETERIZATION      COLONOSCOPY N/A 2/21/2018    Procedure: COLONOSCOPY INTO CECUM WITH COLD BX POLYPECTOMY ;  Surgeon: Ross Stearns MD;  Location: [facility] ENDOSCOPY;  Service:     COLONOSCOPY N/A 2/3/2021    Procedure: COLONOSCOPY TOCECUM WITH COLD BX POLYPECTOMY AND HOT SNARE POLYPECTOMY;  Surgeon: Ross Stearns MD;  Location: [facility] ENDOSCOPY;  Service: General;  Laterality: N/A;  PERSONAL HX OF POLYPS, FAMILY HX OF COLON CANCER  --POLYPS (X3), DIVERTICULOSIS    COLONOSCOPY N/A 3/20/2024    Procedure: COLONOSCOPY TO CECUM WITH COLD BX POLYPECTOMIES;  Surgeon: Ross Stearns MD;  Location: [facility] ENDOSCOPY;  Service: General;  Laterality: N/A;  HX POLYPS/POLYPS (3)    HERNIA REPAIR      INGUINAL HERNIA? SIDE    TOTAL HIP ARTHROPLASTY Right 6/5/2020    Procedure: TOTAL HIP ARTHROPLASTY;  Surgeon: Travis Hamlin MD;  Location: [facility] MAIN OR;  Service: Orthopedics;  Laterality: Right;       Family History: family history includes Breast cancer in his mother; Colon cancer in his father; Diabetes in his brother; Heart disease in his brother; No Known Problems in his maternal grandfather, maternal grandmother, and paternal grandmother; Stroke in his paternal grandfather. Otherwise pertinent FHx was reviewed and not pertinent to current issue.    Social History:  reports that he has never smoked. His smokeless tobacco use includes snuff. He reports that he does not drink alcohol and does not use drugs.    Home Medications:  Cyanocobalamin, acetaminophen, apixaban, atorvastatin, chlorthalidone, citalopram, digoxin, metoprolol succinate XL,  tamsulosin, and zolpidem    Allergies:  Allergies   Allergen Reactions    Ativan [Lorazepam] Delirium       Objective    Objective     Vitals:   Temp:  [99.5 °F (37.5 °C)] 99.5 °F (37.5 °C)  Heart Rate:  [] 120  Resp:  [18] 18  BP: ()/(67-94) 129/70    Physical Exam    General: Awake alert, no acute distress  HEENT: Complex laceration to the right ear that is currently dressed with gauze.  No other notable head trauma, pupils are equally round and reactive to light, extraocular muscles are intact  Heart: Irregularly irregular  Lungs: Clear to auscultation  Abdomen: Soft nontender nondistended  Extremities: No clubbing or cyanosis, trace edema at the ankles  Neurologic: Alert and oriented x 3, cranial nerves grossly intact, no focal deficits, normal strength in all extremities    Result Review    Result Review:  I have personally reviewed the results from the time of this admission to 12/28/2024 23:22 EST and agree with these findings:  []  Laboratory list / accordion  []  Microbiology  []  Radiology  []  EKG/Telemetry   []  Cardiology/Vascular   []  Pathology  []  Old records  []  Other:  Most notable findings include:   Recent Results (from the past 24 hours)   Urinalysis With Microscopic If Indicated (No Culture) - Urine, Clean Catch    Collection Time: 12/28/24 10:39 PM    Specimen: Urine, Clean Catch   Result Value Ref Range    Color, UA Yellow Yellow, Straw    Appearance, UA Clear Clear    pH, UA 6.5 5.0 - 8.0    Specific Gravity, UA 1.014 1.005 - 1.030    Glucose, UA Negative Negative    Ketones, UA Negative Negative    Bilirubin, UA Negative Negative    Blood, UA Moderate (2+) (A) Negative    Protein, UA Negative Negative    Leuk Esterase, UA Negative Negative    Nitrite, UA Negative Negative    Urobilinogen, UA 1.0 E.U./dL 0.2 - 1.0 E.U./dL   Urinalysis, Microscopic Only - Urine, Clean Catch    Collection Time: 12/28/24 10:39 PM    Specimen: Urine, Clean Catch   Result Value Ref Range    RBC, UA  11-20 (A) None Seen, 0-2 /HPF    WBC, UA 0-2 None Seen, 0-2 /HPF    Bacteria, UA None Seen None Seen /HPF    Squamous Epithelial Cells, UA 0-2 None Seen, 0-2 /HPF    Hyaline Casts, UA 0-2 None Seen /LPF    Methodology Automated Microscopy    ECG 12 Lead Syncope    Collection Time: 12/28/24 10:44 PM   Result Value Ref Range    QT Interval 421 ms    QTC Interval 504 ms   Comprehensive Metabolic Panel    Collection Time: 12/28/24 10:53 PM    Specimen: Blood   Result Value Ref Range    Glucose 109 (H) 65 - 99 mg/dL    BUN 16 8 - 23 mg/dL    Creatinine 1.00 0.76 - 1.27 mg/dL    Sodium 138 136 - 145 mmol/L    Potassium 3.7 3.5 - 5.2 mmol/L    Chloride 101 98 - 107 mmol/L    CO2 28.0 22.0 - 29.0 mmol/L    Calcium 9.3 8.6 - 10.5 mg/dL    Total Protein 6.5 6.0 - 8.5 g/dL    Albumin 4.0 3.5 - 5.2 g/dL    ALT (SGPT) 12 1 - 41 U/L    AST (SGOT) 14 1 - 40 U/L    Alkaline Phosphatase 70 39 - 117 U/L    Total Bilirubin 3.1 (H) 0.0 - 1.2 mg/dL    Globulin 2.5 gm/dL    A/G Ratio 1.6 g/dL    BUN/Creatinine Ratio 16.0 7.0 - 25.0    Anion Gap 9.0 5.0 - 15.0 mmol/L    eGFR 75.6 >60.0 mL/min/1.73   High Sensitivity Troponin T    Collection Time: 12/28/24 10:53 PM    Specimen: Blood   Result Value Ref Range    HS Troponin T 18 <22 ng/L   CBC Auto Differential    Collection Time: 12/28/24 10:53 PM    Specimen: Blood   Result Value Ref Range    WBC 11.65 (H) 3.40 - 10.80 10*3/mm3    RBC 5.20 4.14 - 5.80 10*6/mm3    Hemoglobin 14.9 13.0 - 17.7 g/dL    Hematocrit 45.9 37.5 - 51.0 %    MCV 88.3 79.0 - 97.0 fL    MCH 28.7 26.6 - 33.0 pg    MCHC 32.5 31.5 - 35.7 g/dL    RDW 13.0 12.3 - 15.4 %    RDW-SD 42.1 37.0 - 54.0 fl    MPV 11.1 6.0 - 12.0 fL    Platelets 150 140 - 450 10*3/mm3    Neutrophil % 80.3 (H) 42.7 - 76.0 %    Lymphocyte % 6.8 (L) 19.6 - 45.3 %    Monocyte % 12.0 5.0 - 12.0 %    Eosinophil % 0.1 (L) 0.3 - 6.2 %    Basophil % 0.3 0.0 - 1.5 %    Immature Grans % 0.5 0.0 - 0.5 %    Neutrophils, Absolute 9.36 (H) 1.70 - 7.00 10*3/mm3     Lymphocytes, Absolute 0.79 0.70 - 3.10 10*3/mm3    Monocytes, Absolute 1.40 (H) 0.10 - 0.90 10*3/mm3    Eosinophils, Absolute 0.01 0.00 - 0.40 10*3/mm3    Basophils, Absolute 0.03 0.00 - 0.20 10*3/mm3    Immature Grans, Absolute 0.06 (H) 0.00 - 0.05 10*3/mm3    nRBC 0.0 0.0 - 0.2 /100 WBC   Gray Top    Collection Time: 12/28/24 10:53 PM   Result Value Ref Range    Extra Tube Hold for add-ons.    Light Blue Top    Collection Time: 12/28/24 10:53 PM   Result Value Ref Range    Extra Tube Hold for add-ons.    Magnesium    Collection Time: 12/28/24 10:53 PM    Specimen: Blood   Result Value Ref Range    Magnesium 2.1 1.6 - 2.4 mg/dL     Imaging Results (Last 24 Hours)       Procedure Component Value Units Date/Time    XR Chest 1 View [[id number]] Collected: 12/28/24 2246     Updated: 12/28/24 2312    Narrative:      CHEST SINGLE VIEW     HISTORY: 79 years of age, Male.  fever weakness     COMPARISON: AP chest 07/04/2023.     FINDINGS: Heart size is enlarged. Aortic vascular calcifications are  present. There is apical lordotic positioning. Lungs appear clear of  focal airspace disease and there is no evidence for pulmonary edema or  pleural effusion or infiltrate. Cardiac monitoring leads are present.       Impression:      Cardiomegaly. No evidence for active disease in the chest.     This report was finalized on 12/28/2024 11:09 PM by Mir Munoz M.D  on Workstation: [id number]       CT Head Without Contrast [[id number]] Resulted: 12/28/24 2220     Updated: 12/28/24 2220    CT Cervical Spine Without Contrast [[id number]] Resulted: 12/28/24 2220     Updated: 12/28/24 2220           EKG: Atrial fibrillation with controlled ventricular response, right bundle branch block with left anterior fascicular block  Assessment & Plan   Assessment / Plan     Brief Patient Summary:  Mark Aguirre Jr. is a 79 y.o. male who presents with syncope and laceration to the right ear        A/P  Syncope -he has known atrial  fibrillation but looks to have reasonable rate control at the moment.  Sounds like he has not been eating well the last couple of days and may have the start of a viral upper respiratory infection.  Respiratory panel is currently pending.  He exhibited signs and symptoms of a vasovagal type syncope getting out of the chair feeling diaphoretic.  He has been given some IV fluids in the emergency room and is currently feeling well.  Given his problem with rate control I am going to keep him overnight for observation to keep him on the monitor and will recheck him in the a.m.  Get a second troponin on him as well.  I am going to have him get up and ambulate again in the morning make sure he is doing okay prior to discharge assuming other labs and tests look okay  Right ear laceration-to be repaired in the emergency room  Hyperbilirubinemia-he has a history of Gilbert's disease.  Previously had imaging of the liver with no ductal dilatation etc.  Bilirubin is higher than his baseline which is usually between 1.3 and 1.7 but I suspect that is much worse because of the poor intake.  He is getting some hydration in the emergency room and I will recheck his bilirubin in the morning.  Atrial fibrillation-currently being followed by Dr. Armando and is planned for an AV carlos a ablation with pacemaker placement because of difficulty with rate control.  Hypertension-I had to resume chlorthalidone because his blood pressure was staying above 150 back on December 4.  Given current poor intake I am going to hold blood pressure medicine.    VTE Prophylaxis:  Patient is on anticoagulation        CODE STATUS:       Admission Status:  I believe this patient meets observation status.    Luis Staples MD

## 2024-12-29 NOTE — ED PROVIDER NOTES
EMERGENCY DEPARTMENT ENCOUNTER    Room Number:  30/30  Date of encounter:  12/29/2024  PCP: Luis Staples MD  Historian: Patient, family  Chronic or social conditions impacting care (social determinants of health): Full code from home    HPI:  Chief Complaint: Syncope, fall  A complete HPI/ROS/PMH/PSH/SH/FH are unobtainable due to: Nothing    Context: Mark Aguirre Jr. is a 79 y.o. male with a history of A-fib, who presents to the ED c/o acute syncopal episode prior to arrival.  Patient states that he had been feeling very well the past couple of days.   He has had some intermittent chest pain.  Today while walking in his house, he felt lightheaded and suddenly passed out.  Patient states he awoke as soon as he hit the ground.  He did injure his right ear.  At this time he states he feels improved.  Patient is on Eliquis.    Review of prior external notes (non-ED):   Reviewed cardiology office visit from 12/13/2024.  Patient being followed for A-fib.  Patient is being evaluated for pacemaker.    Review of prior external test results outside of this encounter:  I reviewed a BMP from 11/12/2024.  Creatinine 0.94, potassium 2.6    PAST MEDICAL HISTORY  Active Ambulatory Problems     Diagnosis Date Noted    Atrial fibrillation 03/02/2016    Bifascicular block 03/02/2016    STEPHENIE on auto CPAP 07/23/2017    Family history of colon cancer 12/20/2017    History of colon polyps 12/20/2017    Essential hypertension 08/28/2019    Hip pain, right 01/23/2020    OA (osteoarthritis) of hip 06/05/2020    Acute renal failure 06/15/2020    Brief psychotic disorder 06/17/2020    Family history of colonic polyps 01/12/2021    Nonrheumatic aortic valve stenosis 12/02/2022    PVC (premature ventricular contraction) 12/02/2022    Syncope and collapse 07/04/2023    History of adenomatous polyp of colon 01/08/2024    Persistent atrial fibrillation 11/11/2024    Hypoxemia associated with sleep      Resolved Ambulatory Problems      Diagnosis Date Noted    No Resolved Ambulatory Problems     Past Medical History:   Diagnosis Date    Acute kidney failure     Anticoagulated     Arthritis     Atrial flutter     Cataract     Depression     Hypertension     Insomnia     Knee pain     PAF (paroxysmal atrial fibrillation)     Right hip pain     Slow to wake up after anesthesia          PAST SURGICAL HISTORY  Past Surgical History:   Procedure Laterality Date    CARDIAC ABLATION      CARDIAC CATHETERIZATION      COLONOSCOPY N/A 2/21/2018    Procedure: COLONOSCOPY INTO CECUM WITH COLD BX POLYPECTOMY ;  Surgeon: Ross Stearns MD;  Location: [facility] ENDOSCOPY;  Service:     COLONOSCOPY N/A 2/3/2021    Procedure: COLONOSCOPY TOCECUM WITH COLD BX POLYPECTOMY AND HOT SNARE POLYPECTOMY;  Surgeon: Ross Stearns MD;  Location: [facility] ENDOSCOPY;  Service: General;  Laterality: N/A;  PERSONAL HX OF POLYPS, FAMILY HX OF COLON CANCER  --POLYPS (X3), DIVERTICULOSIS    COLONOSCOPY N/A 3/20/2024    Procedure: COLONOSCOPY TO CECUM WITH COLD BX POLYPECTOMIES;  Surgeon: Ross Stearns MD;  Location: [facility] ENDOSCOPY;  Service: General;  Laterality: N/A;  HX POLYPS/POLYPS (3)    HERNIA REPAIR      INGUINAL HERNIA? SIDE    TOTAL HIP ARTHROPLASTY Right 6/5/2020    Procedure: TOTAL HIP ARTHROPLASTY;  Surgeon: Travis Hamlin MD;  Location: [facility] MAIN OR;  Service: Orthopedics;  Laterality: Right;         FAMILY HISTORY  Family History   Problem Relation Age of Onset    Breast cancer Mother     Colon cancer Father     Diabetes Brother     No Known Problems Maternal Grandmother     No Known Problems Maternal Grandfather     No Known Problems Paternal Grandmother     Stroke Paternal Grandfather     Heart disease Brother     Malig Hyperthermia Neg Hx          SOCIAL HISTORY  Social History     Socioeconomic History    Marital status:    Tobacco Use    Smoking status: Never    Smokeless tobacco: Current     Types: Snuff    Tobacco comments:     2 weekly chew    Vaping Use    Vaping status: Never Used   Substance and Sexual Activity    Alcohol use: Never    Drug use: Never    Sexual activity: Defer         ALLERGIES  Ativan [lorazepam]        REVIEW OF SYSTEMS  All systems reviewed and negative except for those discussed in HPI.       PHYSICAL EXAM    I have reviewed the triage vital signs and nursing notes.    ED Triage Vitals [12/28/24 2036]   Temp Heart Rate Resp BP SpO2   99.5 °F (37.5 °C) 82 18 126/94 97 %      Temp src Heart Rate Source Patient Position BP Location FiO2 (%)   -- -- -- -- --       Physical Exam  GENERAL: Alert, oriented, not distressed  HENT: No calvarial trauma, no cervical tenderness.  Complex right ear laceration.  EYES: no scleral icterus, EOMI  CV: Irregular rhythm, regular rate, no murmur  RESPIRATORY: normal effort, CTA  ABDOMEN: soft, nontender  MUSCULOSKELETAL: no deformity, FROM, no calf swelling or tenderness  NEURO: alert, moves all extremities, follows commands  SKIN: warm, dry        LAB RESULTS  Recent Results (from the past 24 hours)   Urinalysis With Microscopic If Indicated (No Culture) - Urine, Clean Catch    Collection Time: 12/28/24 10:39 PM    Specimen: Urine, Clean Catch   Result Value Ref Range    Color, UA Yellow Yellow, Straw    Appearance, UA Clear Clear    pH, UA 6.5 5.0 - 8.0    Specific Gravity, UA 1.014 1.005 - 1.030    Glucose, UA Negative Negative    Ketones, UA Negative Negative    Bilirubin, UA Negative Negative    Blood, UA Moderate (2+) (A) Negative    Protein, UA Negative Negative    Leuk Esterase, UA Negative Negative    Nitrite, UA Negative Negative    Urobilinogen, UA 1.0 E.U./dL 0.2 - 1.0 E.U./dL   Urinalysis, Microscopic Only - Urine, Clean Catch    Collection Time: 12/28/24 10:39 PM    Specimen: Urine, Clean Catch   Result Value Ref Range    RBC, UA 11-20 (A) None Seen, 0-2 /HPF    WBC, UA 0-2 None Seen, 0-2 /HPF    Bacteria, UA None Seen None Seen /HPF    Squamous Epithelial Cells, UA 0-2 None Seen, 0-2  /HPF    Hyaline Casts, UA 0-2 None Seen /LPF    Methodology Automated Microscopy    ECG 12 Lead Syncope    Collection Time: 12/28/24 10:44 PM   Result Value Ref Range    QT Interval 421 ms    QTC Interval 504 ms   Comprehensive Metabolic Panel    Collection Time: 12/28/24 10:53 PM    Specimen: Blood   Result Value Ref Range    Glucose 109 (H) 65 - 99 mg/dL    BUN 16 8 - 23 mg/dL    Creatinine 1.00 0.76 - 1.27 mg/dL    Sodium 138 136 - 145 mmol/L    Potassium 3.7 3.5 - 5.2 mmol/L    Chloride 101 98 - 107 mmol/L    CO2 28.0 22.0 - 29.0 mmol/L    Calcium 9.3 8.6 - 10.5 mg/dL    Total Protein 6.5 6.0 - 8.5 g/dL    Albumin 4.0 3.5 - 5.2 g/dL    ALT (SGPT) 12 1 - 41 U/L    AST (SGOT) 14 1 - 40 U/L    Alkaline Phosphatase 70 39 - 117 U/L    Total Bilirubin 3.1 (H) 0.0 - 1.2 mg/dL    Globulin 2.5 gm/dL    A/G Ratio 1.6 g/dL    BUN/Creatinine Ratio 16.0 7.0 - 25.0    Anion Gap 9.0 5.0 - 15.0 mmol/L    eGFR 75.6 >60.0 mL/min/1.73   High Sensitivity Troponin T    Collection Time: 12/28/24 10:53 PM    Specimen: Blood   Result Value Ref Range    HS Troponin T 18 <22 ng/L   CBC Auto Differential    Collection Time: 12/28/24 10:53 PM    Specimen: Blood   Result Value Ref Range    WBC 11.65 (H) 3.40 - 10.80 10*3/mm3    RBC 5.20 4.14 - 5.80 10*6/mm3    Hemoglobin 14.9 13.0 - 17.7 g/dL    Hematocrit 45.9 37.5 - 51.0 %    MCV 88.3 79.0 - 97.0 fL    MCH 28.7 26.6 - 33.0 pg    MCHC 32.5 31.5 - 35.7 g/dL    RDW 13.0 12.3 - 15.4 %    RDW-SD 42.1 37.0 - 54.0 fl    MPV 11.1 6.0 - 12.0 fL    Platelets 150 140 - 450 10*3/mm3    Neutrophil % 80.3 (H) 42.7 - 76.0 %    Lymphocyte % 6.8 (L) 19.6 - 45.3 %    Monocyte % 12.0 5.0 - 12.0 %    Eosinophil % 0.1 (L) 0.3 - 6.2 %    Basophil % 0.3 0.0 - 1.5 %    Immature Grans % 0.5 0.0 - 0.5 %    Neutrophils, Absolute 9.36 (H) 1.70 - 7.00 10*3/mm3    Lymphocytes, Absolute 0.79 0.70 - 3.10 10*3/mm3    Monocytes, Absolute 1.40 (H) 0.10 - 0.90 10*3/mm3    Eosinophils, Absolute 0.01 0.00 - 0.40 10*3/mm3     Basophils, Absolute 0.03 0.00 - 0.20 10*3/mm3    Immature Grans, Absolute 0.06 (H) 0.00 - 0.05 10*3/mm3    nRBC 0.0 0.0 - 0.2 /100 WBC   Gray Top    Collection Time: 12/28/24 10:53 PM   Result Value Ref Range    Extra Tube Hold for add-ons.    Light Blue Top    Collection Time: 12/28/24 10:53 PM   Result Value Ref Range    Extra Tube Hold for add-ons.    Magnesium    Collection Time: 12/28/24 10:53 PM    Specimen: Blood   Result Value Ref Range    Magnesium 2.1 1.6 - 2.4 mg/dL   Respiratory Panel PCR w/COVID-19(SARS-CoV-2) BLESSING/YAHAIRA/CANDICE/PAD/COR/CAROLANN In-House, NP Swab in UTM/VTM, 2 HR TAT - Swab, Nasopharynx    Collection Time: 12/28/24 10:54 PM    Specimen: Nasopharynx; Swab   Result Value Ref Range    ADENOVIRUS, PCR Not Detected Not Detected    Coronavirus 229E Not Detected Not Detected    Coronavirus HKU1 Not Detected Not Detected    Coronavirus NL63 Not Detected Not Detected    Coronavirus OC43 Not Detected Not Detected    COVID19 Not Detected Not Detected - Ref. Range    Human Metapneumovirus Not Detected Not Detected    Human Rhinovirus/Enterovirus Not Detected Not Detected    Influenza A PCR Not Detected Not Detected    Influenza B PCR Not Detected Not Detected    Parainfluenza Virus 1 Not Detected Not Detected    Parainfluenza Virus 2 Not Detected Not Detected    Parainfluenza Virus 3 Not Detected Not Detected    Parainfluenza Virus 4 Not Detected Not Detected    RSV, PCR Not Detected Not Detected    Bordetella pertussis pcr Not Detected Not Detected    Bordetella parapertussis PCR Not Detected Not Detected    Chlamydophila pneumoniae PCR Not Detected Not Detected    Mycoplasma pneumo by PCR Not Detected Not Detected   High Sensitivity Troponin T 1Hr    Collection Time: 12/29/24  3:06 AM    Specimen: Blood   Result Value Ref Range    HS Troponin T 20 <22 ng/L    Troponin T Numeric Delta 2 Abnormal if >/=3 ng/L   ECG 12 Lead Syncope    Collection Time: 12/29/24  5:06 AM   Result Value Ref Range    QT Interval  433 ms    QTC Interval 509 ms       Ordered the above labs and independently reviewed the results.        RADIOLOGY  CT Cervical Spine Without Contrast    Result Date: 12/29/2024  Patient: HARJETE HAYNES  Time Out: 00:06 Exam(s): CT C SPINE EXAM:   CT Cervical Spine Without Intravenous Contrast CLINICAL HISTORY:    Reason for exam: fall, head injury. TECHNIQUE:   Axial computed tomography images of the cervical spine without intravenous contrastwith coronal and sagittal reformats.  CTDI is 17.02 mGy and DLP is 388 mGy-cm.  This CT exam was performed according to the principle of ALARA (As Low As Reasonably Achievable) by using one or more of the following dose reduction techniques: automated exposure control, adjustment of the mA and or kV according to patient size, and or use of iterative reconstruction technique. COMPARISON:   No relevant prior studies available. FINDINGS:   Vertebrae:  Cervical vertebrae intact.  Degenerative facet findings.   Discs spinal canal neural foramina:  Noncritical canal and foraminal stenosis.  Degenerative disc findings. IMPRESSION:       No acute cervical spine abnormality.     Electronically signed by Hemant Manzo MD on 12-29-24 at 0006    CT Head Without Contrast    Result Date: 12/29/2024  Patient: HARJEET HAYNES  Time Out: 00:05 Exam(s): CT HEAD Without Contrast EXAM:   CT Head Without Intravenous Contrast CLINICAL HISTORY:    Reason for exam: fall, head injury. TECHNIQUE:   Axial computed tomography images of the head brain without intravenous contrast.  CTDI is 56.23 mGy and DLP is 1069.4 mGy-cm.  This CT exam was performed according to the principle of ALARA (As Low As Reasonably Achievable) by using one or more of the following dose reduction techniques: automated exposure control, adjustment of the mA and or kV according to patient size, and or use of iterative reconstruction technique. COMPARISON:   6 15 2020 FINDINGS:   Brain:  Unremarkable.  No acute intracranial  hemorrhage or mass effect.   Ventricles:  No hydrocephalus.   Bones joints:  No acute abnormality.   Soft tissues:  Unremarkable.   Sinuses:  Unremarkable.   Mastoid air cells:  Unremarkable. IMPRESSION:       No acute intracranial abnormality.     Electronically signed by Hemant Manzo MD on 12-29-24 at 0005    XR Chest 1 View    Result Date: 12/28/2024  CHEST SINGLE VIEW  HISTORY: 79 years of age, Male.  fever weakness  COMPARISON: AP chest 07/04/2023.  FINDINGS: Heart size is enlarged. Aortic vascular calcifications are present. There is apical lordotic positioning. Lungs appear clear of focal airspace disease and there is no evidence for pulmonary edema or pleural effusion or infiltrate. Cardiac monitoring leads are present.      Cardiomegaly. No evidence for active disease in the chest.  This report was finalized on 12/28/2024 11:09 PM by Mir Munoz M.D on Workstation: [id number]       I ordered the above noted radiological studies. Reviewed by me and discussed with radiologist.  See dictation for official radiology interpretation.      MEDICATIONS GIVEN IN ER    Medications   sodium chloride 0.9 % flush 10 mL (has no administration in time range)   acetaminophen (TYLENOL) tablet 650 mg (has no administration in time range)   apixaban (ELIQUIS) tablet 5 mg (5 mg Oral Given 12/29/24 0331)   atorvastatin (LIPITOR) tablet 10 mg (has no administration in time range)   citalopram (CeleXA) tablet 20 mg (has no administration in time range)   vitamin B-12 (CYANOCOBALAMIN) tablet 1,000 mcg (has no administration in time range)   digoxin (LANOXIN) tablet 125 mcg (has no administration in time range)   metoprolol succinate XL (TOPROL-XL) 24 hr tablet 25 mg (has no administration in time range)   tamsulosin (FLOMAX) 24 hr capsule 0.4 mg (has no administration in time range)   zolpidem (AMBIEN) tablet 2.5 mg (has no administration in time range)   sodium chloride 0.9 % flush 10 mL (10 mL Intravenous Given  12/29/24 0145)   sodium chloride 0.9 % flush 10 mL (has no administration in time range)   sodium chloride 0.9 % infusion 40 mL (has no administration in time range)   nitroglycerin (NITROSTAT) SL tablet 0.4 mg (has no administration in time range)   sennosides-docusate (PERICOLACE) 8.6-50 MG per tablet 2 tablet (has no administration in time range)     And   polyethylene glycol (MIRALAX) packet 17 g (has no administration in time range)     And   bisacodyl (DULCOLAX) EC tablet 5 mg (has no administration in time range)     And   bisacodyl (DULCOLAX) suppository 10 mg (has no administration in time range)   ondansetron (ZOFRAN) injection 4 mg (has no administration in time range)   sodium chloride 0.9 % with KCl 20 mEq/L infusion (100 mL/hr Intravenous New Bag 12/29/24 9997)   lidocaine 1% - EPINEPHrine 1:[id number] (XYLOCAINE W/EPI) 1 %-1:753967 injection 10 mL (10 mL Injection Given by Other 12/29/24 0230)     Laceration Repair    Date/Time: 12/29/2024 5:14 AM    Performed by: Luis Frankel PA  Authorized by: Everett Lorenz MD    Consent:     Consent obtained:  Verbal    Consent given by:  Patient  Universal protocol:     Patient identity confirmed:  Verbally with patient  Anesthesia:     Anesthesia method:  Local infiltration    Local anesthetic:  Lidocaine 1% WITH epi  Laceration details:     Location:  Ear    Ear location:  R ear    Length (cm):  4.5  Exploration:     Hemostasis achieved with:  Epinephrine    Wound exploration: entire depth of wound visualized      Wound extent: no foreign bodies/material noted    Treatment:     Area cleansed with:  Chlorhexidine    Amount of cleaning:  Standard    Irrigation solution:  Sterile saline  Skin repair:     Repair method:  Sutures    Suture size:  5-0    Suture material:  Nylon    Suture technique:  Simple interrupted and running  Approximation:     Approximation:  Close  Repair type:     Repair type:  Intermediate  Post-procedure details:     Dressing:  Antibiotic  ointment    Procedure completion:  Tolerated well, no immediate complications  Comments:      Wound was closed as well as possible.  There is 1 small open area in a crevice of the ear which was unable to be closed.        ADDITIONAL ORDERS CONSIDERED BUT NOT ORDERED:  Nothing    PROGRESS, DATA ANALYSIS, CONSULTS, AND MEDICAL DECISION MAKING    All labs have been independently interpreted by myself.  All radiology studies have been independently interpreted by myself and discussed with radiologist dictating the report.   EKGs independently interpreted by myself.  Discussion below represents my analysis of pertinent findings related to patient's condition, differential diagnosis, treatment plan and final disposition.    I have discussed case with Dr. Lorenz, emergency room physician.  He has performed his own bedside examination and agrees with treatment plan.    ED Course as of 12/29/24 0516   Sat Dec 28, 2024   2230 Patient presents from home after syncopal episode prior to arrival.  Patient states he has not been feeling well for the past week.  Patient states he has had some intermittent chest pain however none at this point.  He denies any significant abdominal pain, fever. [EE]   2244 I discussed the case thus far with Dr. Staples, patient's primary care physician.  He will keep him updated. [EE]   2305 WBC(!): 11.65 [EE]   2305 Hemoglobin: 14.9 [EE]   2307 Nitrite, UA: Negative [EE]   2307 Leukocytes, UA: Negative [EE]   2318 Dr. Staples, at bedside.  Given patient's syncope would like to admit him pending his workup. [EE]   2351 Creatinine: 1.00 [EE]   2351 Potassium: 3.7 [EE]   Sun Dec 29, 2024   0008 EKG independently interpreted myself.  Time 2244.  A-fib, rate 86.  QRS shows right bundle branch block with left ventricular hypertrophy.  QT prolongation.  Similar to previous EKG from 12/13/2024. [EE]   0011 COVID19: Not Detected [EE]   0011 RSV, PCR: Not Detected [EE]   0011 CT imaging of the brain  independently interpreted myself shows no acute hemorrhage. [EE]   0130 The ear laceration has been closed as well as possible.  There is a small open area which is difficult to reach and we will let heal secondarily. [EE]      ED Course User Index  [EE] Luis Frankel PA       AS OF 05:16 EST VITALS:    BP - 115/77  HR - 82  TEMP - 99.5 °F (37.5 °C)  O2 SATS - 95%        DIAGNOSIS  Final diagnoses:   Syncope, unspecified syncope type   Laceration of right ear, initial encounter   Prolonged Q-T interval on ECG   Atrial fibrillation, unspecified type         DISPOSITION  Admitted      Dictated utilizing Dragon dictation     Luis Frankel PA  12/29/24 7647

## 2024-12-29 NOTE — CONSULTS
Date of Hospital Visit: 24  Encounter Provider: Claus Lindquist MD  Place of Service: [facility] CARDIOLOGY  Patient Name: Mark Aguirre Jr.  :1945  [id number]  Referral Provider: Everett Lorenz MD    Chief complaint: Syncope    History of Present Illness: 79-year-old gentleman history of A-fib.  He has recently been kind of ill sounds like with a viral illness had some nausea no vomiting not been eating really well yesterday got up out of his recliner to go to the restroom and walked a few feet did not make it hit his head on the door frame to the restroom.  No palpitations no warning really before this when he woke up was fine except he did cut his ear open was bleeding all over the place.  No shortness of breath no chest discomfort no PND orthopnea.      Past Medical History:   Diagnosis Date    Acute kidney failure     POST-OP TOTAL HIP 2020    Anticoagulated     PRADAXA FOR A FIB TO HELD 3 DAYS PRIOR    Arthritis     OSTEO. RIGHT HIP    Atrial flutter     Bifascicular block     Cataract     LEFT AND RIGHT    Depression     History of colon polyps     Hypertension     Hypoxemia associated with sleep     Insomnia     Knee pain     STEPHENIE on CPAP 2010    Overnight polysomnogram.  Weight 163 pounds.  Severe STEPHENIE with AHI of 30.9 events per hour.  Low oxygen saturation 72% and sleep-related hypoxia present for 30 minutes    PAF (paroxysmal atrial fibrillation)     Right hip pain     Slow to wake up after anesthesia        Past Surgical History:   Procedure Laterality Date    CARDIAC ABLATION      CARDIAC CATHETERIZATION      COLONOSCOPY N/A 2018    Procedure: COLONOSCOPY INTO CECUM WITH COLD BX POLYPECTOMY ;  Surgeon: Ross Stearns MD;  [place] [facility] [place];  Service:     COLONOSCOPY N/A 2/3/2021    Procedure: COLONOSCOPY TOCECUM WITH COLD BX POLYPECTOMY AND HOT SNARE POLYPECTOMY;  Surgeon: Ross Stearns MD;  [place] [facility];  Service:  General;  Laterality: N/A;  PERSONAL HX OF POLYPS, FAMILY HX OF COLON CANCER  --POLYPS (X3), DIVERTICULOSIS    COLONOSCOPY N/A 3/20/2024    Procedure: COLONOSCOPY TO CECUM WITH COLD BX POLYPECTOMIES;  Surgeon: Ross Stearns MD;  Location: [facility] ENDOSCOPY;  Service: General;  Laterality: N/A;  HX POLYPS/POLYPS (3)    HERNIA REPAIR      INGUINAL HERNIA? SIDE    TOTAL HIP ARTHROPLASTY Right 6/5/2020    Procedure: TOTAL HIP ARTHROPLASTY;  Surgeon: Travis Hamlin MD;  Location: [facility] MAIN OR;  Service: Orthopedics;  Laterality: Right;       (Not in a hospital admission)      Current Meds  Scheduled Meds:apixaban, 5 mg, Oral, Q12H  atorvastatin, 10 mg, Oral, Daily  citalopram, 20 mg, Oral, QAM  digoxin, 125 mcg, Oral, Daily  metoprolol succinate XL, 25 mg, Oral, Daily  sodium chloride, 10 mL, Intravenous, Q12H  tamsulosin, 0.4 mg, Oral, Daily  vitamin B-12, 1,000 mcg, Oral, Daily      Continuous Infusions:sodium chloride 0.9 % with KCl 20 mEq, 100 mL/hr, Last Rate: Stopped (12/29/24 0905)  sodium chloride 0.9 % with KCl 20 mEq, 100 mL/hr, Last Rate: 100 mL/hr (12/29/24 0905)      PRN Meds:.  acetaminophen    senna-docusate sodium **AND** polyethylene glycol **AND** bisacodyl **AND** bisacodyl    Calcium Replacement - Follow Nurse / BPA Driven Protocol    Magnesium Cardiology Dose Replacement - Follow Nurse / BPA Driven Protocol    nitroglycerin    ondansetron    Phosphorus Replacement - Follow Nurse / BPA Driven Protocol    Potassium Replacement - Follow Nurse / BPA Driven Protocol    [COMPLETED] Insert Peripheral IV **AND** sodium chloride    sodium chloride    sodium chloride    zolpidem    Allergies as of 12/28/2024 - Reviewed 12/28/2024   Allergen Reaction Noted    Ativan [lorazepam] Delirium 06/20/2020       Social History     Socioeconomic History    Marital status:    Tobacco Use    Smoking status: Never    Smokeless tobacco: Current     Types: Snuff    Tobacco comments:     2 weekly chew   Vaping Use  "   Vaping status: Never Used   Substance and Sexual Activity    Alcohol use: Never    Drug use: Never    Sexual activity: Defer       Family History   Problem Relation Age of Onset    Breast cancer Mother     Colon cancer Father     Diabetes Brother     No Known Problems Maternal Grandmother     No Known Problems Maternal Grandfather     No Known Problems Paternal Grandmother     Stroke Paternal Grandfather     Heart disease Brother     Malig Hyperthermia Neg Hx        REVIEW OF SYSTEMS:   ROS was performed and is negative except as outlined in HPI     REVIEW OF SYSTEMS:   CONSTITUTIONAL: No weight loss, fever, chills, weakness or fatigue.   HEENT: Eyes: No visual loss, blurred vision, double vision or yellow sclerae. Ears, Nose, Throat: No hearing loss, sneezing, congestion, runny nose or sore throat.   SKIN: No rash or itching.     RESPIRATORY: No shortness of breath, hemoptysis, cough or sputum.   GASTROINTESTINAL: No anorexia, nausea, vomiting or diarrhea. No abdominal pain, bright red blood per rectum or melena.  GENITOURINARY: No burning on urination, hematuria or increased frequency.  NEUROLOGICAL: No headache, dizziness, syncope, paralysis, ataxia, numbness or tingling in the extremities. No change in bowel or bladder control.   MUSCULOSKELETAL: No muscle, back pain, joint pain or stiffness.   HEMATOLOGIC: No anemia, bleeding or bruising.   LYMPHATICS: No enlarged nodes. No history of splenectomy.   PSYCHIATRIC: No history of depression, anxiety, hallucinations.   ENDOCRINOLOGIC: No reports of sweating, cold or heat intolerance. No polyuria or polydipsia.       Objective:   Temp:  [98.2 °F (36.8 °C)-99.5 °F (37.5 °C)] 98.2 °F (36.8 °C)  Heart Rate:  [] 79  Resp:  [16-18] 16  BP: ()/(67-94) 132/86  Body mass index is 31.86 kg/m².  Flowsheet Rows      Flowsheet Row First Filed Value   Admission Height 185.4 cm (73\") Documented at 12/29/2024 0818   Admission Weight 110 kg (241 lb 8 oz) Documented at " 12/29/2024 0818          Vitals:    12/29/24 0818   BP: 132/86   Pulse: 79   Resp: 16   Temp: 98.2 °F (36.8 °C)   SpO2: 96%       Head:    Normocephalic, without obvious abnormality, atraumatic   Eyes:            Lids and lashes normal, conjunctivae and sclerae normal, no   icterus, no pallor   Ears:    Ears appear intact with no abnormalities noted   Throat:   No oral lesions, dentition good   Neck:   No adenopathy, supple, trachea midline, no thyromegaly, no   carotid bruit, no JVD   Lungs:     Breath sounds are equal and clear to auscultation    Heart:    Normal S1 and S2, RRR, No M/G/R   Abdomen:     Normal bowel sounds, no masses, no organomegaly, soft        non-tender, non-distended, no guarding   Extremities:   Moves all extremities well, no edema, no cyanosis, no redness   Pulses:   Pulses palpable and equal bilaterally.    Skin:  Psychiatric:   No bleeding, bruising or rash    Awake, alert and oriented x 3, normal mood and affect             I personally viewed and interpreted the patient's EKG/Telemetry data    Assessment:  Active Hospital Problems    Diagnosis  POA    **Syncope [R55]  Yes      Resolved Hospital Problems   No resolved problems to display.       Plan: He has not felt well for a little while I feel like probably he is a little bit dehydrated may have had orthostasis as the cause of his syncopal episode he has A-fib and does not feel good with it and they have had some trouble in the past controlling it he was scheduled to have an AV node ablation in January and a pacemaker placement I will notify the arrhythmia team and see if they want to move that up at all I do not really feel like this is probably an arrhythmia genic cause of his syncope but he did hit his head and have john syncope.  I think we can hydrate him a little bit and observe overnight and see how he is doing in the morning    Claus Lindquist MD  12/29/24  11:52 EST.

## 2024-12-29 NOTE — PROGRESS NOTES
"Subjective: Your laceration primarily closed late last night.  Patient denies any chest pain this morning but does feel lightheaded.  Denies shortness of air or palpitations.  Has remained in A-fib with reasonable rate control overnight.  No vomiting and no nausea but still no appetite.    Objective:  Vitals:    12/29/24 0302 12/29/24 0401 12/29/24 0506 12/29/24 0818   BP: 145/92 115/77  132/86   BP Location:    Right arm   Patient Position:    Lying   Pulse: 89 82 82 79   Resp:    16   Temp:    98.2 °F (36.8 °C)   TempSrc:    Oral   SpO2: 96% 94% 95% 96%   Weight:    110 kg (241 lb 8 oz)   Height:    185.4 cm (73\")   General: No acute distress  HEENT: Right ear with sutures in place, oozing of blood  Heart: Irregularly irregular  Lungs: Clear to auscultation  Abdomen: Soft, nondistended, no masses, no significant right upper quadrant tenderness  Extremities: No edema    Recent Results (from the past 24 hours)   Urinalysis With Microscopic If Indicated (No Culture) - Urine, Clean Catch    Collection Time: 12/28/24 10:39 PM    Specimen: Urine, Clean Catch   Result Value Ref Range    Color, UA Yellow Yellow, Straw    Appearance, UA Clear Clear    pH, UA 6.5 5.0 - 8.0    Specific Gravity, UA 1.014 1.005 - 1.030    Glucose, UA Negative Negative    Ketones, UA Negative Negative    Bilirubin, UA Negative Negative    Blood, UA Moderate (2+) (A) Negative    Protein, UA Negative Negative    Leuk Esterase, UA Negative Negative    Nitrite, UA Negative Negative    Urobilinogen, UA 1.0 E.U./dL 0.2 - 1.0 E.U./dL   Urinalysis, Microscopic Only - Urine, Clean Catch    Collection Time: 12/28/24 10:39 PM    Specimen: Urine, Clean Catch   Result Value Ref Range    RBC, UA 11-20 (A) None Seen, 0-2 /HPF    WBC, UA 0-2 None Seen, 0-2 /HPF    Bacteria, UA None Seen None Seen /HPF    Squamous Epithelial Cells, UA 0-2 None Seen, 0-2 /HPF    Hyaline Casts, UA 0-2 None Seen /LPF    Methodology Automated Microscopy    ECG 12 Lead Syncope    " Collection Time: 12/28/24 10:44 PM   Result Value Ref Range    QT Interval 421 ms    QTC Interval 504 ms   Comprehensive Metabolic Panel    Collection Time: 12/28/24 10:53 PM    Specimen: Blood   Result Value Ref Range    Glucose 109 (H) 65 - 99 mg/dL    BUN 16 8 - 23 mg/dL    Creatinine 1.00 0.76 - 1.27 mg/dL    Sodium 138 136 - 145 mmol/L    Potassium 3.7 3.5 - 5.2 mmol/L    Chloride 101 98 - 107 mmol/L    CO2 28.0 22.0 - 29.0 mmol/L    Calcium 9.3 8.6 - 10.5 mg/dL    Total Protein 6.5 6.0 - 8.5 g/dL    Albumin 4.0 3.5 - 5.2 g/dL    ALT (SGPT) 12 1 - 41 U/L    AST (SGOT) 14 1 - 40 U/L    Alkaline Phosphatase 70 39 - 117 U/L    Total Bilirubin 3.1 (H) 0.0 - 1.2 mg/dL    Globulin 2.5 gm/dL    A/G Ratio 1.6 g/dL    BUN/Creatinine Ratio 16.0 7.0 - 25.0    Anion Gap 9.0 5.0 - 15.0 mmol/L    eGFR 75.6 >60.0 mL/min/1.73   High Sensitivity Troponin T    Collection Time: 12/28/24 10:53 PM    Specimen: Blood   Result Value Ref Range    HS Troponin T 18 <22 ng/L   CBC Auto Differential    Collection Time: 12/28/24 10:53 PM    Specimen: Blood   Result Value Ref Range    WBC 11.65 (H) 3.40 - 10.80 10*3/mm3    RBC 5.20 4.14 - 5.80 10*6/mm3    Hemoglobin 14.9 13.0 - 17.7 g/dL    Hematocrit 45.9 37.5 - 51.0 %    MCV 88.3 79.0 - 97.0 fL    MCH 28.7 26.6 - 33.0 pg    MCHC 32.5 31.5 - 35.7 g/dL    RDW 13.0 12.3 - 15.4 %    RDW-SD 42.1 37.0 - 54.0 fl    MPV 11.1 6.0 - 12.0 fL    Platelets 150 140 - 450 10*3/mm3    Neutrophil % 80.3 (H) 42.7 - 76.0 %    Lymphocyte % 6.8 (L) 19.6 - 45.3 %    Monocyte % 12.0 5.0 - 12.0 %    Eosinophil % 0.1 (L) 0.3 - 6.2 %    Basophil % 0.3 0.0 - 1.5 %    Immature Grans % 0.5 0.0 - 0.5 %    Neutrophils, Absolute 9.36 (H) 1.70 - 7.00 10*3/mm3    Lymphocytes, Absolute 0.79 0.70 - 3.10 10*3/mm3    Monocytes, Absolute 1.40 (H) 0.10 - 0.90 10*3/mm3    Eosinophils, Absolute 0.01 0.00 - 0.40 10*3/mm3    Basophils, Absolute 0.03 0.00 - 0.20 10*3/mm3    Immature Grans, Absolute 0.06 (H) 0.00 - 0.05 10*3/mm3     nRBC 0.0 0.0 - 0.2 /100 WBC   Gray Top    Collection Time: 12/28/24 10:53 PM   Result Value Ref Range    Extra Tube Hold for add-ons.    Light Blue Top    Collection Time: 12/28/24 10:53 PM   Result Value Ref Range    Extra Tube Hold for add-ons.    Magnesium    Collection Time: 12/28/24 10:53 PM    Specimen: Blood   Result Value Ref Range    Magnesium 2.1 1.6 - 2.4 mg/dL   Respiratory Panel PCR w/COVID-19(SARS-CoV-2) BLESSING/YAHAIRA/CANDICE/PAD/COR/CAROLANN In-House, NP Swab in UTM/VTM, 2 HR TAT - Swab, Nasopharynx    Collection Time: 12/28/24 10:54 PM    Specimen: Nasopharynx; Swab   Result Value Ref Range    ADENOVIRUS, PCR Not Detected Not Detected    Coronavirus 229E Not Detected Not Detected    Coronavirus HKU1 Not Detected Not Detected    Coronavirus NL63 Not Detected Not Detected    Coronavirus OC43 Not Detected Not Detected    COVID19 Not Detected Not Detected - Ref. Range    Human Metapneumovirus Not Detected Not Detected    Human Rhinovirus/Enterovirus Not Detected Not Detected    Influenza A PCR Not Detected Not Detected    Influenza B PCR Not Detected Not Detected    Parainfluenza Virus 1 Not Detected Not Detected    Parainfluenza Virus 2 Not Detected Not Detected    Parainfluenza Virus 3 Not Detected Not Detected    Parainfluenza Virus 4 Not Detected Not Detected    RSV, PCR Not Detected Not Detected    Bordetella pertussis pcr Not Detected Not Detected    Bordetella parapertussis PCR Not Detected Not Detected    Chlamydophila pneumoniae PCR Not Detected Not Detected    Mycoplasma pneumo by PCR Not Detected Not Detected   High Sensitivity Troponin T 1Hr    Collection Time: 12/29/24  3:06 AM    Specimen: Blood   Result Value Ref Range    HS Troponin T 20 <22 ng/L    Troponin T Numeric Delta 2 Abnormal if >/=3 ng/L   ECG 12 Lead Syncope    Collection Time: 12/29/24  5:06 AM   Result Value Ref Range    QT Interval 433 ms    QTC Interval 509 ms   CBC Auto Differential    Collection Time: 12/29/24  5:51 AM    Specimen:  Blood   Result Value Ref Range    WBC 9.81 3.40 - 10.80 10*3/mm3    RBC 5.14 4.14 - 5.80 10*6/mm3    Hemoglobin 14.3 13.0 - 17.7 g/dL    Hematocrit 45.3 37.5 - 51.0 %    MCV 88.1 79.0 - 97.0 fL    MCH 27.8 26.6 - 33.0 pg    MCHC 31.6 31.5 - 35.7 g/dL    RDW 13.2 12.3 - 15.4 %    RDW-SD 42.9 37.0 - 54.0 fl    MPV 11.4 6.0 - 12.0 fL    Platelets 145 140 - 450 10*3/mm3    Neutrophil % 73.2 42.7 - 76.0 %    Lymphocyte % 13.3 (L) 19.6 - 45.3 %    Monocyte % 12.3 (H) 5.0 - 12.0 %    Eosinophil % 0.3 0.3 - 6.2 %    Basophil % 0.4 0.0 - 1.5 %    Immature Grans % 0.5 0.0 - 0.5 %    Neutrophils, Absolute 7.18 (H) 1.70 - 7.00 10*3/mm3    Lymphocytes, Absolute 1.30 0.70 - 3.10 10*3/mm3    Monocytes, Absolute 1.21 (H) 0.10 - 0.90 10*3/mm3    Eosinophils, Absolute 0.03 0.00 - 0.40 10*3/mm3    Basophils, Absolute 0.04 0.00 - 0.20 10*3/mm3    Immature Grans, Absolute 0.05 0.00 - 0.05 10*3/mm3   Comprehensive Metabolic Panel    Collection Time: 12/29/24  5:51 AM    Specimen: Blood   Result Value Ref Range    Glucose 89 65 - 99 mg/dL    BUN 14 8 - 23 mg/dL    Creatinine 0.81 0.76 - 1.27 mg/dL    Sodium 136 136 - 145 mmol/L    Potassium 3.5 3.5 - 5.2 mmol/L    Chloride 103 98 - 107 mmol/L    CO2 26.0 22.0 - 29.0 mmol/L    Calcium 8.9 8.6 - 10.5 mg/dL    Total Protein 6.3 6.0 - 8.5 g/dL    Albumin 3.9 3.5 - 5.2 g/dL    ALT (SGPT) 11 1 - 41 U/L    AST (SGOT) 12 1 - 40 U/L    Alkaline Phosphatase 66 39 - 117 U/L    Total Bilirubin 3.5 (H) 0.0 - 1.2 mg/dL    Globulin 2.4 gm/dL    A/G Ratio 1.6 g/dL    BUN/Creatinine Ratio 17.3 7.0 - 25.0    Anion Gap 7.0 5.0 - 15.0 mmol/L    eGFR 88.6 >60.0 mL/min/1.73   Digoxin Level    Collection Time: 12/29/24  5:51 AM    Specimen: Blood   Result Value Ref Range    Digoxin 0.50 (L) 0.60 - 1.20 ng/mL   Bilirubin, Total & Direct    Collection Time: 12/29/24  5:51 AM    Specimen: Blood   Result Value Ref Range    Total Bilirubin 3.4 (H) 0.0 - 1.2 mg/dL    Bilirubin, Direct 0.3 0.0 - 0.3 mg/dL     Bilirubin, Indirect 3.1 mg/dL        A/p  Syncope -troponins negative.  Did describe some chest pressure yesterday and continues to feel lightheaded.  Has known atrial fibrillation and is planned for AV carlos a ablation with pacemaker the end of January.  Given his syncope and his dysrhythmia I am going to have cardiology see him today especially since he continues to not feel well.  I am continuing to hold his chlorthalidone and continuing IV hydration due to poor intake over the past couple of days.  Laceration to the right ear-primarily closed last night.  Nurse to clean up this morning.  Has an open area that is going to need to continue to have some bacitracin ointment applied to it.  CT of the head negative.  Atrial fibrillation-remains on anticoagulation and rate control medicines.  Planned for AV carlos a ablation and pacemaker.  Cardiology to see due to syncope  Hyperbilirubinemia-history of Gilbert syndrome.  Looks to be largely unconjugated.  I suspect this rise is due to the poor intake over the past few days but he is having ongoing nausea and had dry heaves for the last few days with poor intake.  I am going to get a right upper quadrant ultrasound to check his gallbladder and biliary ducts I am continuing hydration and will recheck.

## 2024-12-29 NOTE — ED PROVIDER NOTES
EMERGENCY DEPARTMENT MD ATTESTATION NOTE    Room Number:  06/06  PCP: Luis Staples MD  Independent Historians: Patient    HPI:    Context: Mark Aguirre Jr. is a 79 y.o. male with a medical history of A-fib on Eliquis who presents to the ED c/o acute syncopal event and head injury.  Patient began feeling lightheaded and suddenly passed out.  Patient injured his right ear.      PHYSICAL EXAM    I have reviewed the triage vital signs and nursing notes.    ED Triage Vitals   Temp Heart Rate Resp BP SpO2   12/28/24 2036 12/28/24 2036 12/28/24 2036 12/28/24 2036 12/28/24 2036   99.5 °F (37.5 °C) 82 18 126/94 97 %      Temp src Heart Rate Source Patient Position BP Location FiO2 (%)   -- -- 12/28/24 2253 -- --     Lying         Physical Exam  GENERAL: alert, no acute distress  SKIN: Warm, dry  HENT: Normocephalic, complex laceration to the right ear  EYES: no scleral icterus  CV: regular rhythm, regular rate  RESPIRATORY: normal effort, lungs clear  ABDOMEN: soft, nontender, nondistended  MUSCULOSKELETAL: no deformity  NEURO: alert, moves all extremities, follows commands            MEDICATIONS GIVEN IN ER  Medications   lidocaine 1% - EPINEPHrine [id number] (XYLOCAINE W/EPI) 1 %-1:[id number] injection 10 mL (has no administration in time range)   sodium chloride 0.9 % flush 10 mL (has no administration in time range)   acetaminophen (TYLENOL) tablet 650 mg (has no administration in time range)   apixaban (ELIQUIS) tablet 5 mg (has no administration in time range)   atorvastatin (LIPITOR) tablet 10 mg (has no administration in time range)   citalopram (CeleXA) tablet 20 mg (has no administration in time range)   NON FORMULARY (has no administration in time range)   digoxin (LANOXIN) tablet 125 mcg (has no administration in time range)   metoprolol succinate XL (TOPROL-XL) 24 hr tablet 25 mg (has no administration in time range)   tamsulosin (FLOMAX) 24 hr capsule 0.4 mg (has no administration in time range)   zolpidem  (AMBIEN) tablet 2.5 mg (has no administration in time range)   sodium chloride 0.9 % flush 10 mL (has no administration in time range)   sodium chloride 0.9 % flush 10 mL (has no administration in time range)   sodium chloride 0.9 % infusion 40 mL (has no administration in time range)   nitroglycerin (NITROSTAT) SL tablet 0.4 mg (has no administration in time range)   sennosides-docusate (PERICOLACE) 8.6-50 MG per tablet 2 tablet (has no administration in time range)     And   polyethylene glycol (MIRALAX) packet 17 g (has no administration in time range)     And   bisacodyl (DULCOLAX) EC tablet 5 mg (has no administration in time range)     And   bisacodyl (DULCOLAX) suppository 10 mg (has no administration in time range)   ondansetron (ZOFRAN) injection 4 mg (has no administration in time range)   sodium chloride 0.9 % with KCl 20 mEq/L infusion (has no administration in time range)         ORDERS PLACED DURING THIS VISIT:  Orders Placed This Encounter   Procedures    Respiratory Panel PCR w/COVID-19(SARS-CoV-2) BLESSING/YAHAIRA/CANDICE/PAD/COR/CAROLANN In-House, NP Swab in UTM/VTM, 2 HR TAT - Swab, Nasopharynx    CT Head Without Contrast    CT Cervical Spine Without Contrast    XR Chest 1 View    Comprehensive Metabolic Panel    Urinalysis With Microscopic If Indicated (No Culture) - Urine, Clean Catch    High Sensitivity Troponin T    CBC Auto Differential    Evans Draw    Magnesium    Urinalysis, Microscopic Only - Urine, Clean Catch    High Sensitivity Troponin T 1Hr    CBC Auto Differential    Comprehensive Metabolic Panel    Digoxin Level    Diet: Regular/House; Fluid Consistency: Thin (IDDSI 0)    Orthostatic Vitals (Blood Pressure & Heart Rate)    Vital Signs    Intake & Output    Weigh Patient    Oral Care    Maintain IV Access    Telemetry - Place Orders & Notify Provider of Results When Patient Experiences Acute Chest Pain, Dysrhythmia or Respiratory Distress    May Be Off Telemetry for Tests    Up With Assistance     Orthostatic Blood Pressure    Code Status and Medical Interventions: CPR (Attempt to Resuscitate); Full Support    ECG 12 Lead Syncope    ECG 12 Lead Syncope    Insert Peripheral IV    Insert Peripheral IV    Initiate Observation Status    CBC & Differential    Gray Top    Light Blue Top         PROCEDURES  Procedures            PROGRESS, DATA ANALYSIS, CONSULTS, AND MEDICAL DECISION MAKING  All labs have been independently interpreted by me.  All radiology studies have been reviewed by me. All EKG's have been independently viewed and interpreted by me.  Discussion below represents my analysis of pertinent findings related to patient's condition, differential diagnosis, treatment plan and final disposition.    Differential diagnosis includes but is not limited to head injury, syncope, intracranial hemorrhage, arrhythmia, laceration.    Clinical Scores:                                     ED Course as of 01/01/25 1129   Sat Dec 28, 2024   2230 Patient presents from home after syncopal episode prior to arrival.  Patient states he has not been feeling well for the past week.  Patient states he has had some intermittent chest pain however none at this point.  He denies any significant abdominal pain, fever. [EE]   2244 I discussed the case thus far with Dr. Staples, patient's primary care physician.  He will keep him updated. [EE]   2305 WBC(!): 11.65 [EE]   2305 Hemoglobin: 14.9 [EE]   2307 Nitrite, UA: Negative [EE]   2307 Leukocytes, UA: Negative [EE]   2318 Dr. Staples, at bedside.  Given patient's syncope would like to admit him pending his workup. [EE]   2351 Creatinine: 1.00 [EE]   2351 Potassium: 3.7 [EE]   Sun Dec 29, 2024   0008 EKG independently interpreted myself.  Time 2244.  A-fib, rate 86.  QRS shows right bundle branch block with left ventricular hypertrophy.  QT prolongation.  Similar to previous EKG from 12/13/2024. [EE]   0011 COVID19: Not Detected [EE]   0011 RSV, PCR: Not Detected [EE]   0011 CT  imaging of the brain independently interpreted myself shows no acute hemorrhage. [EE]   0130 The ear laceration has been closed as well as possible.  There is a small open area which is difficult to reach and we will let heal secondarily. [EE]      ED Course User Index  [EE] Luis Franekl PA       MDM: 79-year-old male presenting for evaluation of ear laceration and syncopal event.  Laceration repaired by ELISABET Patel with appropriate approximation.  Will plan on admission for further evaluation and management.      COMPLEXITY OF CARE  The patient requires admission.    Please note that portions of this document were completed with a voice recognition program.    Note Disclaimer: At [facility], we believe that sharing information builds trust and better relationships. You are receiving this note because you recently visited [facility]. It is possible you will see health information before a provider has talked with you about it. This kind of information can be easy to misunderstand. To help you fully understand what it means for your health, we urge you to discuss this note with your provider.         Everett Lorenz MD  01/01/25 3566

## 2024-12-30 PROBLEM — R41.0 ACUTE DELIRIUM: Status: ACTIVE | Noted: 2024-12-30

## 2024-12-30 LAB
ALBUMIN SERPL-MCNC: 3.8 G/DL (ref 3.5–5.2)
ALBUMIN/GLOB SERPL: 1.5 G/DL
ALP SERPL-CCNC: 62 U/L (ref 39–117)
ALT SERPL W P-5'-P-CCNC: 9 U/L (ref 1–41)
AMMONIA BLD-SCNC: 27 UMOL/L (ref 16–60)
ANION GAP SERPL CALCULATED.3IONS-SCNC: 13.8 MMOL/L (ref 5–15)
ARTERIAL PATENCY WRIST A: POSITIVE
AST SERPL-CCNC: 14 U/L (ref 1–40)
ATMOSPHERIC PRESS: 741.9 MMHG
BASE EXCESS BLDA CALC-SCNC: -1.5 MMOL/L (ref 0–2)
BDY SITE: ABNORMAL
BILIRUB SERPL-MCNC: 2.6 MG/DL (ref 0–1.2)
BUN SERPL-MCNC: 18 MG/DL (ref 8–23)
BUN/CREAT SERPL: 20.7 (ref 7–25)
CALCIUM SPEC-SCNC: 9 MG/DL (ref 8.6–10.5)
CHLORIDE SERPL-SCNC: 106 MMOL/L (ref 98–107)
CO2 BLDA-SCNC: 21.4 MMOL/L (ref 23–27)
CO2 SERPL-SCNC: 21.2 MMOL/L (ref 22–29)
CREAT SERPL-MCNC: 0.87 MG/DL (ref 0.76–1.27)
DEVICE COMMENT: ABNORMAL
EGFRCR SERPLBLD CKD-EPI 2021: 86.7 ML/MIN/1.73
FOLATE SERPL-MCNC: 9.48 NG/ML (ref 4.78–24.2)
GLOBULIN UR ELPH-MCNC: 2.6 GM/DL
GLUCOSE BLDC GLUCOMTR-MCNC: 118 MG/DL (ref 70–130)
GLUCOSE SERPL-MCNC: 112 MG/DL (ref 65–99)
HCO3 BLDA-SCNC: 20.6 MMOL/L (ref 22–28)
HEMODILUTION: NO
INHALED O2 CONCENTRATION: 21 %
MODALITY: ABNORMAL
O2 A-A PPRESDIFF RESPIRATORY: 0.8 MMHG
PCO2 BLDA: 27.7 MM HG (ref 35–45)
PH BLDA: 7.48 PH UNITS (ref 7.35–7.45)
PO2 BLD: 433 MM[HG] (ref 0–500)
PO2 BLDA: 91 MM HG (ref 80–100)
POTASSIUM SERPL-SCNC: 4.4 MMOL/L (ref 3.5–5.2)
POTASSIUM SERPL-SCNC: 4.4 MMOL/L (ref 3.5–5.2)
PROT SERPL-MCNC: 6.4 G/DL (ref 6–8.5)
SAO2 % BLDCOA: 97.8 % (ref 92–98.5)
SODIUM SERPL-SCNC: 141 MMOL/L (ref 136–145)
TOTAL RATE: 20 BREATHS/MINUTE
TSH SERPL DL<=0.05 MIU/L-ACNC: 2.62 UIU/ML (ref 0.27–4.2)
VIT B12 BLD-MCNC: 717 PG/ML (ref 211–946)

## 2024-12-30 PROCEDURE — 82948 REAGENT STRIP/BLOOD GLUCOSE: CPT

## 2024-12-30 PROCEDURE — 36600 WITHDRAWAL OF ARTERIAL BLOOD: CPT | Performed by: INTERNAL MEDICINE

## 2024-12-30 PROCEDURE — 82746 ASSAY OF FOLIC ACID SERUM: CPT | Performed by: STUDENT IN AN ORGANIZED HEALTH CARE EDUCATION/TRAINING PROGRAM

## 2024-12-30 PROCEDURE — 94799 UNLISTED PULMONARY SVC/PX: CPT

## 2024-12-30 PROCEDURE — 25010000002 DIGOXIN PER 500 MCG: Performed by: PHYSICIAN ASSISTANT

## 2024-12-30 PROCEDURE — 84443 ASSAY THYROID STIM HORMONE: CPT | Performed by: INTERNAL MEDICINE

## 2024-12-30 PROCEDURE — 84132 ASSAY OF SERUM POTASSIUM: CPT | Performed by: INTERNAL MEDICINE

## 2024-12-30 PROCEDURE — 25010000002 OLANZAPINE 10 MG RECONSTITUTED SOLUTION: Performed by: INTERNAL MEDICINE

## 2024-12-30 PROCEDURE — 93010 ELECTROCARDIOGRAM REPORT: CPT | Performed by: INTERNAL MEDICINE

## 2024-12-30 PROCEDURE — 82607 VITAMIN B-12: CPT | Performed by: STUDENT IN AN ORGANIZED HEALTH CARE EDUCATION/TRAINING PROGRAM

## 2024-12-30 PROCEDURE — 99222 1ST HOSP IP/OBS MODERATE 55: CPT | Performed by: STUDENT IN AN ORGANIZED HEALTH CARE EDUCATION/TRAINING PROGRAM

## 2024-12-30 PROCEDURE — 36415 COLL VENOUS BLD VENIPUNCTURE: CPT | Performed by: INTERNAL MEDICINE

## 2024-12-30 PROCEDURE — 80053 COMPREHEN METABOLIC PANEL: CPT | Performed by: INTERNAL MEDICINE

## 2024-12-30 PROCEDURE — 93005 ELECTROCARDIOGRAM TRACING: CPT | Performed by: INTERNAL MEDICINE

## 2024-12-30 PROCEDURE — 99232 SBSQ HOSP IP/OBS MODERATE 35: CPT | Performed by: PHYSICIAN ASSISTANT

## 2024-12-30 PROCEDURE — 82803 BLOOD GASES ANY COMBINATION: CPT | Performed by: INTERNAL MEDICINE

## 2024-12-30 PROCEDURE — 25010000002 LORAZEPAM PER 2 MG: Performed by: INTERNAL MEDICINE

## 2024-12-30 PROCEDURE — 25010000002 DIGOXIN PER 500 MCG: Performed by: INTERNAL MEDICINE

## 2024-12-30 PROCEDURE — 99232 SBSQ HOSP IP/OBS MODERATE 35: CPT | Performed by: INTERNAL MEDICINE

## 2024-12-30 PROCEDURE — 82140 ASSAY OF AMMONIA: CPT | Performed by: STUDENT IN AN ORGANIZED HEALTH CARE EDUCATION/TRAINING PROGRAM

## 2024-12-30 PROCEDURE — 25010000002 SODIUM CHLORIDE 0.9 % WITH KCL 20 MEQ 20-0.9 MEQ/L-% SOLUTION: Performed by: INTERNAL MEDICINE

## 2024-12-30 PROCEDURE — 25010000002 HALOPERIDOL LACTATE PER 5 MG: Performed by: INTERNAL MEDICINE

## 2024-12-30 RX ORDER — LORAZEPAM 2 MG/ML
0.5 INJECTION INTRAMUSCULAR
Status: DISCONTINUED | OUTPATIENT
Start: 2024-12-30 | End: 2024-12-30

## 2024-12-30 RX ORDER — QUETIAPINE FUMARATE 25 MG/1
25 TABLET, FILM COATED ORAL 3 TIMES DAILY PRN
Status: DISCONTINUED | OUTPATIENT
Start: 2024-12-30 | End: 2025-01-01

## 2024-12-30 RX ORDER — LORAZEPAM 2 MG/ML
0.25 INJECTION INTRAMUSCULAR
Status: DISPENSED | OUTPATIENT
Start: 2024-12-30 | End: 2025-01-04

## 2024-12-30 RX ORDER — QUETIAPINE FUMARATE 25 MG/1
25 TABLET, FILM COATED ORAL EVERY 12 HOURS SCHEDULED
Status: DISCONTINUED | OUTPATIENT
Start: 2024-12-30 | End: 2024-12-31

## 2024-12-30 RX ORDER — HALOPERIDOL 5 MG/ML
1 INJECTION INTRAMUSCULAR ONCE
Status: COMPLETED | OUTPATIENT
Start: 2024-12-30 | End: 2024-12-30

## 2024-12-30 RX ORDER — OLANZAPINE 10 MG/2ML
5 INJECTION, POWDER, FOR SOLUTION INTRAMUSCULAR EVERY 8 HOURS PRN
Status: DISCONTINUED | OUTPATIENT
Start: 2024-12-30 | End: 2024-12-30

## 2024-12-30 RX ORDER — DIGOXIN 0.25 MG/ML
250 INJECTION INTRAMUSCULAR; INTRAVENOUS ONCE
Status: COMPLETED | OUTPATIENT
Start: 2024-12-30 | End: 2024-12-30

## 2024-12-30 RX ORDER — DIGOXIN 0.25 MG/ML
125 INJECTION INTRAMUSCULAR; INTRAVENOUS ONCE
Status: COMPLETED | OUTPATIENT
Start: 2024-12-30 | End: 2024-12-30

## 2024-12-30 RX ORDER — OLANZAPINE 5 MG/1
5 TABLET ORAL NIGHTLY
Status: DISCONTINUED | OUTPATIENT
Start: 2024-12-30 | End: 2024-12-30

## 2024-12-30 RX ORDER — CHLORPROMAZINE HCI 25 MG/ML
50 INJECTION INTRAMUSCULAR 4 TIMES DAILY PRN
Status: DISCONTINUED | OUTPATIENT
Start: 2024-12-30 | End: 2024-12-30

## 2024-12-30 RX ADMIN — LORAZEPAM 0.25 MG: 2 INJECTION INTRAMUSCULAR; INTRAVENOUS at 22:35

## 2024-12-30 RX ADMIN — HALOPERIDOL LACTATE 1 MG: 5 INJECTION, SOLUTION INTRAMUSCULAR at 03:52

## 2024-12-30 RX ADMIN — ZOLPIDEM TARTRATE 2.5 MG: 5 TABLET, FILM COATED ORAL at 00:13

## 2024-12-30 RX ADMIN — QUETIAPINE FUMARATE 25 MG: 25 TABLET ORAL at 14:00

## 2024-12-30 RX ADMIN — DIGOXIN 250 MCG: 0.25 INJECTION INTRAMUSCULAR; INTRAVENOUS at 09:46

## 2024-12-30 RX ADMIN — ACETAMINOPHEN 650 MG: 325 TABLET ORAL at 13:59

## 2024-12-30 RX ADMIN — OLANZAPINE 5 MG: 5 TABLET, FILM COATED ORAL at 02:24

## 2024-12-30 RX ADMIN — DIGOXIN 125 MCG: 0.25 INJECTION INTRAMUSCULAR; INTRAVENOUS at 05:05

## 2024-12-30 RX ADMIN — LORAZEPAM 0.5 MG: 2 INJECTION INTRAMUSCULAR; INTRAVENOUS at 04:53

## 2024-12-30 RX ADMIN — POTASSIUM CHLORIDE AND SODIUM CHLORIDE 75 ML/HR: 900; 150 INJECTION, SOLUTION INTRAVENOUS at 22:45

## 2024-12-30 RX ADMIN — OLANZAPINE 5 MG: 10 INJECTION, POWDER, LYOPHILIZED, FOR SOLUTION INTRAMUSCULAR at 08:59

## 2024-12-30 RX ADMIN — Medication 10 ML: at 09:38

## 2024-12-30 RX ADMIN — Medication 10 ML: at 20:33

## 2024-12-30 NOTE — PLAN OF CARE
Goal Outcome Evaluation:  Plan of Care Reviewed With: patient, spouse        Progress: improving  Outcome Evaluation: PAtient was alert when conversating early on shift, on room air. Afib on the monitor. Wrapped Bleeding ear with Kurlex around the head. Patient became more confused overnight, pulling IV and walking in the hallway confused. Patient has not been sleeping overnight. Security called, Dr. Staples alerted. Bilateral Wrist Restraints initiated. Patient over the course of shift was given one time dose of 2.5 IM xyprexa, 5 mg Oral Xyprexa, 1mg IM haldol. Patient then received 0.5 IV Ativan IV. For agitation. Patient HEART RATE had been uncontrolled Afib, one time dose of MEtoprolol 25 mg PO given. PAtient HR uncontrolled due to delirium and agitation. 1X dose 125 mcg Digoxin IV given. HR improved to the 90s before shift change. Rapid called due to increased confusion and uncontrolled Heart Rate. ABG had been drawn prior to Rapid with normal results. Patient sleeping exhibiting signs of apnea, 4 Liters of 02 administered.

## 2024-12-30 NOTE — CODE DOCUMENTATION
RRT called for increasing agitation, tachycardia, and restlessness. On RRT presentation, pt able to respond to and answer some questions appropriately. Neuro assessment performed by charge RN; pt withdrawing on all extremeties and moving extremeites spontaneously. Pupils PERRLA. No gaze deviation or facial droop noted. Primary RN encouraged to call MD for updates and additional orders. Family updated at bedside. Primary RN to call RRT back with any changes or questions.

## 2024-12-30 NOTE — CONSULTS
Neurology Consult Note    Consult Date: 12/30/2024    Referring MD: Everett Lorenz MD    Reason for Consult I have been asked to see the patient in neurological consultation to render advice and opinion regarding altered mental status with agitation.    Mark Aguirre Jr. is a 79 y.o. white male with known diagnosis of paroxysmal atrial fibrillation on Eliquis, obstructive sleep apnea on CPAP, prior stroke with no residual deficit, hypertension, mixed hyperlipidemia, postconcussive syndrome since 2020 follows with Dr. Javier, essential tremor presented to the hospital after syncopal episode resulted in a fall and bleeding from the right ear.  History from the wife who stated that since Thursday the patient was not feeling well with nausea not eating or drinking enough, on Saturday she noticed generalized weakness there while the patient was walking to the restroom he had a syncope fell and hit his right ear resulted in bleeding, per the wife patient briefly lost consciousness for a few seconds no postictal confusion, no seizure-like activity.  During the hospitalization the patient got agitated with altered mental status and visual hallucinations for that reason neurology being consulted.  Patient was was getting Zyprexa for agitation currently held due to prolonged QT interval.    Past Medical History:   Diagnosis Date    Acute kidney failure     POST-OP TOTAL HIP 2020    Anticoagulated     PRADAXA FOR A FIB TO HELD 3 DAYS PRIOR    Arthritis     OSTEO. RIGHT HIP    Atrial flutter     Bifascicular block     Cataract     LEFT AND RIGHT    Depression     History of colon polyps     Hypertension     Hypoxemia associated with sleep     Insomnia     Knee pain     STEPHENIE on CPAP 05/27/2010    Overnight polysomnogram.  Weight 163 pounds.  Severe STEPHENIE with AHI of 30.9 events per hour.  Low oxygen saturation 72% and sleep-related hypoxia present for 30 minutes    PAF (paroxysmal atrial fibrillation)     Right hip pain     Slow  "to wake up after anesthesia        Exam  /64 (BP Location: Left arm, Patient Position: Lying)   Pulse 95   Temp 98.1 °F (36.7 °C) (Oral)   Resp 22   Ht 185.4 cm (73\")   Wt 110 kg (241 lb 8 oz)   SpO2 95%   BMI 31.86 kg/m²   Gen: Mild agitation, restrained to the bed, vitals reviewed  MS: oriented x2, recent/remote memory impaired, follows few simple commands, struggled with complex commands, poor attention/concentration, language intact, no neglect.  CN: visual acuity grossly normal, PERRL, EOMI, no facial droop, no dysarthria  Motor: Moves all extremities to commands, normal tone    DATA:    Lab Results   Component Value Date    GLUCOSE 112 (H) 12/30/2024    CALCIUM 9.0 12/30/2024     12/30/2024    K 4.4 12/30/2024    K 4.4 12/30/2024    CO2 21.2 (L) 12/30/2024     12/30/2024    BUN 18 12/30/2024    CREATININE 0.87 12/30/2024    EGFRIFAFRI  06/16/2020      Comment:      <15 Indicative of kidney failure.    EGFRIFNONA 93 06/22/2020    BCR 20.7 12/30/2024    ANIONGAP 13.8 12/30/2024     Lab Results   Component Value Date    WBC 9.81 12/29/2024    HGB 14.3 12/29/2024    HCT 45.3 12/29/2024    MCV 88.1 12/29/2024     12/29/2024       Lab review: TSH normal, digoxin level 0.5, above labs reviewed.    Imaging review: No recent brain imaging to review    Diagnoses:  -Altered mental status with agitation likely agitated delirium  -History of postconcussive syndrome  -Recent syncope  -Atrial fibrillation on Eliquis  -Prolonged QT interval      PLAN:   -Check MRI brain without contrast to rule out embolic acute ischemic strokes  -Follow delirium precautions as detailed below  -Use low-dose Seroquel as needed for agitation  -Check B12, folate, urinalysis, ammonia level  -Discussed with the hospitalist Dr. Staples via epic chat    Delirium precautions:    1. Frequent reorientation, reminding patient not questioning patient   2. Shades up during day/down at night   3. TV off except for soft music " "only   4. Familiar objects at bedside   5. Encourage friends/family to spend the night   6. Minimize anticholingeric medications   7. Minimize polypharmacy   8. Minimize restraints, includes lines and/or foleys and/or feeding tubes as able   9. Minimize overnight checks/vitals to encourage restful consistent sleep patterns   10. Out of bed during day as much as possible     Medical Decision Making for this neurology consultation consists of the following:  Review of previous chart, including H/P, provider and nursing notes as applicable.  Review of medications and vital signs.  Review of previous labs, neuroimaging, and additional relevant diagnostics.   Interpretation of laboratory, imaging, and other diagnostic results  Discussion with other providers and family   Total face-to-face/floor time: 30-61 minutes.     \"Dictated utilizing Dragon dictation\".     "

## 2024-12-30 NOTE — PROGRESS NOTES
Nurse notified me he awoke and became agitated again. He was given another dose of Zyprexa intially without much response.  HE had similar episode in 2020 when hospitalized and was seen by Dr. Roberson of neurology. HE was given thorazine at that time. I'm stopping zyprexa and will order thorazine prn.  I'm also going to ask neurology to see him.

## 2024-12-30 NOTE — PLAN OF CARE
Goal Outcome Evaluation:  Plan of Care Reviewed With: spouse        Progress: no change  Outcome Evaluation: Patient remains confused. PRN zyprexa given IM, order was discontinued for thorazine IM, medication was also d/c'd in favor of seroquel PO. Patient took dose of oral seroquel but remains in restraints, unable to removed due to agitation and pulling at cords/lines. IV fluids maintained. Wife got home cpap and brought to hospital. Bandage to right ear changed due to bleeding through.

## 2024-12-30 NOTE — PROGRESS NOTES
Access Center consult due to mental status changes; this writer reviewed chart and spoke with RN Luis. Pt is confused, in restraints, and unable to engage in clinical/behavioral health assessment at this time; hallucinations and possible delirium noted. Inpatient psychiatrist, Dr. Celestin, consult available; no further needs and/or concerns noted at present. Access Center consult can be completed once pt is able to participate in behavioral health evaluation.

## 2024-12-30 NOTE — PROGRESS NOTES
Subjective: Patient became acutely confused last night.  Pulled out his IV and became acutely agitated.  Heart rate jumped up to the 140s to 150s.  He required restraints.  He was given olanzapine IM and then p.o. with little result.  Then given Haldol again with no result and ultimately was given lorazepam earlier this morning.  Now he is sedated and a rapid was called.  Stat ABG was done.  He was having periods of apnea (patient has known sleep apnea).    Objective:  Vitals:    12/30/24 0530 12/30/24 0600 12/30/24 0622 12/30/24 0624   BP:    129/70   BP Location:       Patient Position:       Pulse: (!) 127 94  94   Resp:       Temp:       TempSrc:       SpO2:   92% 95%   Weight:       Height:         Gen: arouses to stimuli but not answering questions  Heent: Right ear dressed  Heart: Irregularly irregular  Lungs: CTA  Abd: soft ntnd  Ext: No edema    Recent Results (from the past 24 hours)   Potassium    Collection Time: 12/30/24 12:56 AM    Specimen: Blood   Result Value Ref Range    Potassium 4.4 3.5 - 5.2 mmol/L   Comprehensive Metabolic Panel    Collection Time: 12/30/24 12:56 AM    Specimen: Blood   Result Value Ref Range    Glucose 112 (H) 65 - 99 mg/dL    BUN 18 8 - 23 mg/dL    Creatinine 0.87 0.76 - 1.27 mg/dL    Sodium 141 136 - 145 mmol/L    Potassium 4.4 3.5 - 5.2 mmol/L    Chloride 106 98 - 107 mmol/L    CO2 21.2 (L) 22.0 - 29.0 mmol/L    Calcium 9.0 8.6 - 10.5 mg/dL    Total Protein 6.4 6.0 - 8.5 g/dL    Albumin 3.8 3.5 - 5.2 g/dL    ALT (SGPT) 9 1 - 41 U/L    AST (SGOT) 14 1 - 40 U/L    Alkaline Phosphatase 62 39 - 117 U/L    Total Bilirubin 2.6 (H) 0.0 - 1.2 mg/dL    Globulin 2.6 gm/dL    A/G Ratio 1.5 g/dL    BUN/Creatinine Ratio 20.7 7.0 - 25.0    Anion Gap 13.8 5.0 - 15.0 mmol/L    eGFR 86.7 >60.0 mL/min/1.73   Blood Gas, Arterial -    Collection Time: 12/30/24  4:53 AM    Specimen: Arterial Blood   Result Value Ref Range    Site Right Radial     Gabe's Test Positive     pH, Arterial 7.479 (H)  7.350 - 7.450 pH units    pCO2, Arterial 27.7 (L) 35.0 - 45.0 mm Hg    pO2, Arterial 91.0 80.0 - 100.0 mm Hg    HCO3, Arterial 20.6 (L) 22.0 - 28.0 mmol/L    Base Excess, Arterial -1.5 (L) 0.0 - 2.0 mmol/L    O2 Saturation, Arterial 97.8 92.0 - 98.5 %    A-a DO2 0.8 mmHg    CO2 Content 21.4 (L) 23 - 27 mmol/L    Barometric Pressure for Blood Gas 741.9000 mmHg    Modality Room Air     FIO2 21 %    Rate 20 Breaths/minute    Hemodilution No     Device Comment sats=97%     PO2/FIO2 433 0 - 500   POC Glucose Once    Collection Time: 12/30/24  5:14 AM    Specimen: Blood   Result Value Ref Range    Glucose 118 70 - 130 mg/dL       A/P  Delerium -he has developed an acute delirium.  He has not been on any pain medicine or sedating medicines prior to onset.  He does not drink alcohol at home.  He does have head injury with a CT scan done on admit and because of the sudden change I am going to repeat CT today.  I will repeat thyroid labs.  He is currently sedated I worry about his respiratory status so he is going to be n.p.o. until he arouses.  Wife is going to bring in his CPAP machine for his sleep apnea.  Continues on oxygen therapy for now.  Will try and limit sedating medicines  Syncope-likely due to poor intake at home resulting in some dehydration and drop in blood pressure.  Blood pressure medicine has been on hold.  He has been getting IV fluids.   Atrial fibrillation-heart rate spiked with agitation last night.  He is planned for an AV carlos a ablation with pacemaker for late January but with syncopal episode EP has been consulted.  Likely needs to wait for delirium to clear prior to pursuing further intervention.  Heart rate currently in the 90s.  Right ear last evaluation-has been primarily closed.  Continue with dressing changes.  Hyperbilirubinemia-unconjugated consistent with Gilbert's.  Right upper quadrant ultrasound appears normal.

## 2024-12-30 NOTE — CONSULTS
Nutrition Services    Patient Name:  Mark Aguirre Jr.  YOB: 1945  MRN: [id number]  Admit Date:  12/28/2024  Assessment Date:  12/30/24    Summary: Nutrition consult per nurse admission screen    79 y.o. male admitted with syncope, ear laceration.  Plans for AV carlos a ablation with pacemaker placement in January.  Confused, restraints, hallucinations, possible delirium.  Rapid called.    75% x 1 meal per chart PO data.  Now NPO due to mental status.      Does not appear to have lost weight per chart review.    RD to follow along for plans for nutrition.    CLINICAL NUTRITION ASSESSMENT      Reason for Assessment Nurse Admission Screen, Nurse or Nurse Practitioner Consult     Diagnosis/Problem   Syncope  Ear laceration     Medical/Surgical History Past Medical History:   Diagnosis Date    Acute kidney failure     POST-OP TOTAL HIP 2020    Anticoagulated     PRADAXA FOR A FIB TO HELD 3 DAYS PRIOR    Arthritis     OSTEO. RIGHT HIP    Atrial flutter     Bifascicular block     Cataract     LEFT AND RIGHT    Depression     History of colon polyps     Hypertension     Hypoxemia associated with sleep     Insomnia     Knee pain     STEPHENIE on CPAP 05/27/2010    Overnight polysomnogram.  Weight 163 pounds.  Severe STEPHENIE with AHI of 30.9 events per hour.  Low oxygen saturation 72% and sleep-related hypoxia present for 30 minutes    PAF (paroxysmal atrial fibrillation)     Right hip pain     Slow to wake up after anesthesia        Past Surgical History:   Procedure Laterality Date    CARDIAC ABLATION      CARDIAC CATHETERIZATION      COLONOSCOPY N/A 2/21/2018    Procedure: COLONOSCOPY INTO CECUM WITH COLD BX POLYPECTOMY ;  Surgeon: Ross Stearns MD;  Location: [facility] ENDOSCOPY;  Service:     COLONOSCOPY N/A 2/3/2021    Procedure: COLONOSCOPY TOCECUM WITH COLD BX POLYPECTOMY AND HOT SNARE POLYPECTOMY;  Surgeon: Ross Stearns MD;  Location: [facility] ENDOSCOPY;  Service: General;  Laterality: N/A;  PERSONAL HX OF  "POLYPS, FAMILY HX OF COLON CANCER  --POLYPS (X3), DIVERTICULOSIS    COLONOSCOPY N/A 3/20/2024    Procedure: COLONOSCOPY TO CECUM WITH COLD BX POLYPECTOMIES;  Surgeon: Ross Stearns MD;  Location: [facility] ENDOSCOPY;  Service: General;  Laterality: N/A;  HX POLYPS/POLYPS (3)    HERNIA REPAIR      INGUINAL HERNIA? SIDE    TOTAL HIP ARTHROPLASTY Right 6/5/2020    Procedure: TOTAL HIP ARTHROPLASTY;  Surgeon: Travis Hamlin MD;  Location: [facility] MAIN OR;  Service: Orthopedics;  Laterality: Right;        Anthropometrics        Current Height  Current Weight  BMI kg/m2 Height: 185.4 cm (73\")  Weight: 110 kg (241 lb 8 oz) (12/29/24 0818)  Body mass index is 31.86 kg/m².   Adjusted BMI (if applicable)    BMI Category Obese, Class I (30 - 34.9)   Ideal Body Weight (IBW) 184 lb (83.6 kg)   Usual Body Weight (UBW) 220-241 lb   Weight Trend Gain?   Weight History Wt Readings from Last 30 Encounters:   12/29/24 0818 110 kg (241 lb 8 oz)   12/17/24 0739 103 kg (226 lb)   12/13/24 0930 103 kg (228 lb)   10/07/24 1123 104 kg (229 lb)   09/05/24 0803 104 kg (229 lb 4.5 oz)   08/21/24 1104 102 kg (224 lb)   03/20/24 1019 99.9 kg (220 lb 4.8 oz)   03/19/24 0912 102 kg (224 lb)   02/27/24 0957 102 kg (225 lb)   12/04/23 1030 101 kg (223 lb)   11/29/23 0831 102 kg (224 lb 12.8 oz)   07/31/23 1000 103 kg (226 lb 6.4 oz)   07/05/23 1519 102 kg (224 lb)   07/04/23 1736 102 kg (224 lb)   07/04/23 1654 102 kg (224 lb)   02/23/23 0746 99.3 kg (219 lb)   12/02/22 1003 103 kg (226 lb)   11/30/22 0900 103 kg (227 lb)   08/31/22 0700 103 kg (226 lb 6.4 oz)   02/23/22 0941 105 kg (231 lb)   10/20/21 1248 105 kg (231 lb 7.7 oz)   10/06/21 0850 105 kg (230 lb 6.4 oz)   09/01/21 0700 105 kg (232 lb)   06/24/21 0943 105 kg (231 lb)   02/03/21 0802 102 kg (224 lb)   02/02/21 1218 103 kg (228 lb)   09/02/20 1413 106 kg (233 lb 9.6 oz)   08/21/20 1323 107 kg (235 lb)   07/30/20 0829 107 kg (235 lb)   07/07/20 1113 103 kg (226 lb)   06/16/20 0349 111 kg " "(243 lb 11.2 oz)   06/15/20 2026 121 kg (266 lb 8 oz)   06/05/20 0911 108 kg (238 lb 8.6 oz)   06/04/20 1103 107 kg (235 lb 12.8 oz)   06/03/20 0803 107 kg (236 lb)      --  Labs       Pertinent Labs    Results from last 7 days   Lab Units 12/30/24  0056 12/29/24  0551 12/28/24  2253   SODIUM mmol/L 141 136 138   POTASSIUM mmol/L 4.4  4.4 3.5 3.7   CHLORIDE mmol/L 106 103 101   CO2 mmol/L 21.2* 26.0 28.0   BUN mg/dL 18 14 16   CREATININE mg/dL 0.87 0.81 1.00   CALCIUM mg/dL 9.0 8.9 9.3   BILIRUBIN mg/dL 2.6* 3.4*  3.5* 3.1*   ALK PHOS U/L 62 66 70   ALT (SGPT) U/L 9 11 12   AST (SGOT) U/L 14 12 14   GLUCOSE mg/dL 112* 89 109*     Results from last 7 days   Lab Units 12/30/24  0056 12/29/24  0551 12/28/24  2253   MAGNESIUM mg/dL  --   --  2.1   HEMOGLOBIN g/dL  --  14.3 14.9   HEMATOCRIT %  --  45.3 45.9   WBC 10*3/mm3  --  9.81 11.65*   ALBUMIN g/dL 3.8 3.9 4.0     Results from last 7 days   Lab Units 12/29/24  0551 12/28/24  2253   PLATELETS 10*3/mm3 145 150     COVID19   Date Value Ref Range Status   12/28/2024 Not Detected Not Detected - Ref. Range Final     No results found for: \"HGBA1C\"       Medications           Scheduled Medications apixaban, 5 mg, Oral, Q12H  atorvastatin, 10 mg, Oral, Daily  digoxin, 125 mcg, Oral, Daily  metoprolol succinate XL, 25 mg, Oral, Daily  sodium chloride, 10 mL, Intravenous, Q12H  tamsulosin, 0.4 mg, Oral, Daily  vitamin B-12, 1,000 mcg, Oral, Daily       Infusions sodium chloride 0.9 % with KCl 20 mEq, 75 mL/hr, Last Rate: 75 mL/hr (12/30/24 1158)       PRN Medications   acetaminophen    senna-docusate sodium **AND** polyethylene glycol **AND** bisacodyl **AND** bisacodyl    Calcium Replacement - Follow Nurse / BPA Driven Protocol    LORazepam    Magnesium Cardiology Dose Replacement - Follow Nurse / BPA Driven Protocol    nitroglycerin    Phosphorus Replacement - Follow Nurse / BPA Driven Protocol    Potassium Replacement - Follow Nurse / BPA Driven Protocol    QUEtiapine    " [COMPLETED] Insert Peripheral IV **AND** sodium chloride    sodium chloride    sodium chloride    zolpidem     Physical Findings          General Findings confused, disoriented, obese, room air   Oral/Mouth Cavity WDL   Edema  no edema   Gastrointestinal last bowel movement: 12/29   Skin  other: R ear laceration   Tubes/Drains/Lines none   NFPE Not indicated at this time   --  Current Nutrition Orders & Evaluation of Intake       Oral Nutrition     Food Allergies NKFA   Current PO Diet NPO Diet NPO Type: Strict NPO   Supplement n/a   PO Evaluation     % PO Intake 75% x 1 meal prior to NPO status    Factors Affecting Intake: altered mental status   --  PES STATEMENT / NUTRITION DIAGNOSIS      Nutrition Dx Problem  Problem: Inadequate Oral Intake  Etiology: Medical Diagnosis - AMS    Signs/Symptoms: NPO and Report/Observation     NUTRITION INTERVENTION / PLAN OF CARE      Intervention Goal(s) Maintain nutrition status, Reduce/improve symptoms, Disease management/therapy, Initiate feeding/diet, and No significant weight loss         RD Intervention/Action Await initiation/advancement of PO diet, Continue to monitor, and Care plan reviewed   --      Prescription/Orders:       PO Diet       Supplements       Enteral Nutrition       Parenteral Nutrition    New Prescription Ordered? No changes at this time   --      Monitor/Evaluation Per protocol, I&O, Pertinent labs, Weight, Symptoms, POC/GOC, Swallow function   Discharge Plan/Needs Pending clinical course   --    RD to follow per protocol.      Electronically signed by:  Kate Piper RD  12/30/24 14:26 EST

## 2024-12-30 NOTE — CASE MANAGEMENT/SOCIAL WORK
Continued Stay Note  [facility]     Patient Name: Mark Aguirre Jr.  MRN: [id number]  Today's Date: 12/30/2024    Admit Date: 12/28/2024    Plan: TBD pending clinical course   Discharge Plan       Row Name 12/30/24 0852       Plan    Plan TBD pending clinical course    Patient/Family in Agreement with Plan yes    Plan Comments Clinicals reviewed. Pt remains restrained and acutely confused. CCP will continue to follow for medical readiness for DC planning. Full screen to follow. Gabriele MILTON/CCP                   Discharge Codes    No documentation.                       Gisele Guadarrama RN

## 2024-12-30 NOTE — PROGRESS NOTES
"Mark Aguirre Jr.  1945 79 y.o.  [id number]      Patient Care Team:  Luis Staples MD as PCP - General (Internal Medicine)  Luis Staples MD as PCP - Internal Medicine  Rian Johns MD as Consulting Physician (Sleep Medicine)    CC: Syncope.  A-fib    Interval History: He had a bad night with lots of confusion and agitation      Objective   Vital Signs  Temp:  [97.7 °F (36.5 °C)-98.1 °F (36.7 °C)] 98.1 °F (36.7 °C)  Heart Rate:  [] 112  Resp:  [16-22] 22  BP: ()/() 147/71    Intake/Output Summary (Last 24 hours) at 12/30/2024 0932  Last data filed at 12/30/2024 0915  Gross per 24 hour   Intake 2369.99 ml   Output 700 ml   Net 1669.99 ml     Flowsheet Rows      Flowsheet Row First Filed Value   Admission Height 185.4 cm (73\") Documented at 12/29/2024 0818   Admission Weight 110 kg (241 lb 8 oz) Documented at 12/29/2024 0818            Physical Exam:   General Appearance:    Alert,oriented, in no acute distress   Lungs:     Clear to auscultation,BS are equal    Heart:    Normal S1 and S2, RRR without murmur, gallop or rub   HEENT:    Sclerae are clear, no JVD or adenopathy   Abdomen:     Normal bowel sounds, soft nontender, nondistended, no HSM   Extremities:   Moves all extremities well, no edema, no cyanosis, no             Redness, no rash     Medication Review:      apixaban, 5 mg, Oral, Q12H  atorvastatin, 10 mg, Oral, Daily  citalopram, 20 mg, Oral, QAM  digoxin, 125 mcg, Oral, Daily  metoprolol succinate XL, 25 mg, Oral, Daily  OLANZapine, 5 mg, Oral, Nightly  sodium chloride, 10 mL, Intravenous, Q12H  tamsulosin, 0.4 mg, Oral, Daily  vitamin B-12, 1,000 mcg, Oral, Daily      sodium chloride 0.9 % with KCl 20 mEq, 75 mL/hr, Last Rate: 75 mL/hr (12/30/24 0859)          I reviewed the patient's new clinical results.  I personally viewed and interpreted the patient's EKG/Telemetry data    Assessment/Plan  Active Hospital Problems    Diagnosis  POA    **Syncope [R55]  Yes    "   Resolved Hospital Problems   No resolved problems to display.       I feel like probably his syncope is related to orthostasis and may be dehydration however he has A-fib which has been very difficult to control and now that he is agitated it is out of control he is well-known to the arrhythmia team and I will have them see him about that    Claus Lindquist MD  12/30/24  09:32 EST

## 2024-12-31 ENCOUNTER — APPOINTMENT (OUTPATIENT)
Dept: CT IMAGING | Facility: HOSPITAL | Age: 79
End: 2024-12-31
Payer: MEDICARE

## 2024-12-31 LAB
ALBUMIN SERPL-MCNC: 3.3 G/DL (ref 3.5–5.2)
ALBUMIN/GLOB SERPL: 1.6 G/DL
ALP SERPL-CCNC: 52 U/L (ref 39–117)
ALT SERPL W P-5'-P-CCNC: 7 U/L (ref 1–41)
ANION GAP SERPL CALCULATED.3IONS-SCNC: 10.2 MMOL/L (ref 5–15)
AST SERPL-CCNC: 22 U/L (ref 1–40)
BASOPHILS # BLD AUTO: 0.03 10*3/MM3 (ref 0–0.2)
BASOPHILS NFR BLD AUTO: 0.3 % (ref 0–1.5)
BILIRUB SERPL-MCNC: 2.4 MG/DL (ref 0–1.2)
BUN SERPL-MCNC: 23 MG/DL (ref 8–23)
BUN/CREAT SERPL: 28 (ref 7–25)
CALCIUM SPEC-SCNC: 8.5 MG/DL (ref 8.6–10.5)
CHLORIDE SERPL-SCNC: 110 MMOL/L (ref 98–107)
CO2 SERPL-SCNC: 20.8 MMOL/L (ref 22–29)
CREAT SERPL-MCNC: 0.82 MG/DL (ref 0.76–1.27)
DEPRECATED RDW RBC AUTO: 43.6 FL (ref 37–54)
EGFRCR SERPLBLD CKD-EPI 2021: 88.3 ML/MIN/1.73
EOSINOPHIL # BLD AUTO: 0.04 10*3/MM3 (ref 0–0.4)
EOSINOPHIL NFR BLD AUTO: 0.5 % (ref 0.3–6.2)
ERYTHROCYTE [DISTWIDTH] IN BLOOD BY AUTOMATED COUNT: 13.3 % (ref 12.3–15.4)
GLOBULIN UR ELPH-MCNC: 2.1 GM/DL
GLUCOSE SERPL-MCNC: 80 MG/DL (ref 65–99)
HCT VFR BLD AUTO: 41.5 % (ref 37.5–51)
HGB BLD-MCNC: 12.9 G/DL (ref 13–17.7)
IMM GRANULOCYTES # BLD AUTO: 0.02 10*3/MM3 (ref 0–0.05)
IMM GRANULOCYTES NFR BLD AUTO: 0.2 % (ref 0–0.5)
LYMPHOCYTES # BLD AUTO: 0.97 10*3/MM3 (ref 0.7–3.1)
LYMPHOCYTES NFR BLD AUTO: 11.1 % (ref 19.6–45.3)
MCH RBC QN AUTO: 27.8 PG (ref 26.6–33)
MCHC RBC AUTO-ENTMCNC: 31.1 G/DL (ref 31.5–35.7)
MCV RBC AUTO: 89.4 FL (ref 79–97)
MONOCYTES # BLD AUTO: 0.69 10*3/MM3 (ref 0.1–0.9)
MONOCYTES NFR BLD AUTO: 7.9 % (ref 5–12)
NEUTROPHILS NFR BLD AUTO: 7 10*3/MM3 (ref 1.7–7)
NEUTROPHILS NFR BLD AUTO: 80 % (ref 42.7–76)
PLATELET # BLD AUTO: 132 10*3/MM3 (ref 140–450)
PMV BLD AUTO: 11.4 FL (ref 6–12)
POTASSIUM SERPL-SCNC: 4.1 MMOL/L (ref 3.5–5.2)
PROT SERPL-MCNC: 5.4 G/DL (ref 6–8.5)
QT INTERVAL: 399 MS
QTC INTERVAL: 497 MS
RBC # BLD AUTO: 4.64 10*6/MM3 (ref 4.14–5.8)
SODIUM SERPL-SCNC: 141 MMOL/L (ref 136–145)
WBC NRBC COR # BLD AUTO: 8.75 10*3/MM3 (ref 3.4–10.8)

## 2024-12-31 PROCEDURE — 99232 SBSQ HOSP IP/OBS MODERATE 35: CPT | Performed by: NURSE PRACTITIONER

## 2024-12-31 PROCEDURE — 80053 COMPREHEN METABOLIC PANEL: CPT | Performed by: INTERNAL MEDICINE

## 2024-12-31 PROCEDURE — 70450 CT HEAD/BRAIN W/O DYE: CPT

## 2024-12-31 PROCEDURE — 25010000002 LORAZEPAM PER 2 MG: Performed by: INTERNAL MEDICINE

## 2024-12-31 PROCEDURE — 25010000002 OLANZAPINE 10 MG RECONSTITUTED SOLUTION: Performed by: INTERNAL MEDICINE

## 2024-12-31 PROCEDURE — 85025 COMPLETE CBC W/AUTO DIFF WBC: CPT | Performed by: INTERNAL MEDICINE

## 2024-12-31 PROCEDURE — 97161 PT EVAL LOW COMPLEX 20 MIN: CPT

## 2024-12-31 RX ORDER — QUETIAPINE FUMARATE 25 MG/1
25 TABLET, FILM COATED ORAL NIGHTLY
Status: DISCONTINUED | OUTPATIENT
Start: 2024-12-31 | End: 2025-01-01

## 2024-12-31 RX ORDER — OLANZAPINE 10 MG/2ML
5 INJECTION, POWDER, FOR SOLUTION INTRAMUSCULAR ONCE
Status: COMPLETED | OUTPATIENT
Start: 2024-12-31 | End: 2024-12-31

## 2024-12-31 RX ADMIN — QUETIAPINE FUMARATE 25 MG: 25 TABLET ORAL at 20:13

## 2024-12-31 RX ADMIN — METOPROLOL TARTRATE 5 MG: 1 INJECTION, SOLUTION INTRAVENOUS at 23:24

## 2024-12-31 RX ADMIN — APIXABAN 5 MG: 5 TABLET, FILM COATED ORAL at 09:06

## 2024-12-31 RX ADMIN — APIXABAN 5 MG: 5 TABLET, FILM COATED ORAL at 20:12

## 2024-12-31 RX ADMIN — Medication 10 ML: at 20:13

## 2024-12-31 RX ADMIN — LORAZEPAM 0.25 MG: 2 INJECTION INTRAMUSCULAR; INTRAVENOUS at 20:57

## 2024-12-31 RX ADMIN — Medication 10 ML: at 09:12

## 2024-12-31 RX ADMIN — TAMSULOSIN HYDROCHLORIDE 0.4 MG: 0.4 CAPSULE ORAL at 09:06

## 2024-12-31 RX ADMIN — ATORVASTATIN CALCIUM 10 MG: 10 TABLET, FILM COATED ORAL at 09:06

## 2024-12-31 RX ADMIN — Medication 1000 MCG: at 09:06

## 2024-12-31 RX ADMIN — ZOLPIDEM TARTRATE 2.5 MG: 5 TABLET, FILM COATED ORAL at 20:11

## 2024-12-31 RX ADMIN — DIGOXIN 125 MCG: 0.12 TABLET ORAL at 09:06

## 2024-12-31 RX ADMIN — OLANZAPINE 5 MG: 10 INJECTION, POWDER, LYOPHILIZED, FOR SOLUTION INTRAMUSCULAR at 23:41

## 2024-12-31 RX ADMIN — METOPROLOL SUCCINATE 25 MG: 25 TABLET, EXTENDED RELEASE ORAL at 09:06

## 2024-12-31 NOTE — THERAPY EVALUATION
Acute Care - Physical Therapy Initial Evaluation  [facility]     Patient Name: Mark Aguirre Jr.  : 1945  MRN: [id number]  Today's Date: 2024      Visit Dx:     ICD-10-CM ICD-9-CM   1. Syncope, unspecified syncope type  R55 780.2   2. Laceration of right ear, initial encounter  S01.311A 872.8   3. Prolonged Q-T interval on ECG  R94.31 794.31   4. Atrial fibrillation, unspecified type  I48.91 427.31     Patient Active Problem List   Diagnosis    Atrial fibrillation    Bifascicular block    STEPHENIE on auto CPAP    Family history of colon cancer    History of colon polyps    Essential hypertension    Hip pain, right    OA (osteoarthritis) of hip    Acute renal failure    Brief psychotic disorder    Family history of colonic polyps    Nonrheumatic aortic valve stenosis    PVC (premature ventricular contraction)    Syncope and collapse    History of adenomatous polyp of colon    Persistent atrial fibrillation    Hypoxemia associated with sleep    Syncope    Acute delirium     Past Medical History:   Diagnosis Date    Acute kidney failure     POST-OP TOTAL HIP 2020    Anticoagulated     PRADAXA FOR A FIB TO HELD 3 DAYS PRIOR    Arthritis     OSTEO. RIGHT HIP    Atrial flutter     Bifascicular block     Cataract     LEFT AND RIGHT    Depression     History of colon polyps     Hypertension     Hypoxemia associated with sleep     Insomnia     Knee pain     STEPHENIE on CPAP 2010    Overnight polysomnogram.  Weight 163 pounds.  Severe STEPHENIE with AHI of 30.9 events per hour.  Low oxygen saturation 72% and sleep-related hypoxia present for 30 minutes    PAF (paroxysmal atrial fibrillation)     Right hip pain     Slow to wake up after anesthesia      Past Surgical History:   Procedure Laterality Date    CARDIAC ABLATION      CARDIAC CATHETERIZATION      COLONOSCOPY N/A 2018    Procedure: COLONOSCOPY INTO CECUM WITH COLD BX POLYPECTOMY ;  Surgeon: Ross Stearns MD;  Location: [facility] ENDOSCOPY;  Service:      COLONOSCOPY N/A 2/3/2021    Procedure: COLONOSCOPY TOCECUM WITH COLD BX POLYPECTOMY AND HOT SNARE POLYPECTOMY;  Surgeon: Ross Stearns MD;  Location: [facility] ENDOSCOPY;  Service: General;  Laterality: N/A;  PERSONAL HX OF POLYPS, FAMILY HX OF COLON CANCER  --POLYPS (X3), DIVERTICULOSIS    COLONOSCOPY N/A 3/20/2024    Procedure: COLONOSCOPY TO CECUM WITH COLD BX POLYPECTOMIES;  Surgeon: Ross Stearns MD;  Location: [facility] ENDOSCOPY;  Service: General;  Laterality: N/A;  HX POLYPS/POLYPS (3)    HERNIA REPAIR      INGUINAL HERNIA? SIDE    TOTAL HIP ARTHROPLASTY Right 6/5/2020    Procedure: TOTAL HIP ARTHROPLASTY;  Surgeon: Travis Hamlin MD;  Location: [facility] MAIN OR;  Service: Orthopedics;  Laterality: Right;     PT Assessment (Last 12 Hours)       PT Evaluation and Treatment       Row Name 12/31/24 1500          Physical Therapy Time and Intention    Subjective Information no complaints  -     Document Type evaluation  -     Mode of Treatment individual therapy;physical therapy  -     Patient Effort good  -     Symptoms Noted During/After Treatment none  -       Row Name 12/31/24 1500          General Information    Patient Profile Reviewed yes  -     Patient Observations cooperative;agree to therapy  -     General Observations of Patient pt supine in bed no acute distress, spouse bedside  -     Prior Level of Function independent:  -     Equipment Currently Used at Home none  -     Existing Precautions/Restrictions fall  -     Benefits Reviewed patient and family:  -       Row Name 12/31/24 1500          Living Environment    Current Living Arrangements home  -     People in Home spouse  -       Row Name 12/31/24 1500          Pain    Pretreatment Pain Rating 0/10 - no pain  -     Posttreatment Pain Rating 0/10 - no pain  -       Row Name 12/31/24 1500          Cognition    Affect/Mental Status (Cognition) confused  -     Orientation Status (Cognition) oriented to;person  -      Follows Commands (Cognition) follows one-step commands;75-90% accuracy;initiation impaired  -[facility] Name 12/31/24 1500          Range of Motion Comprehensive    General Range of Motion bilateral lower extremity ROM WNL  -[facility] Name 12/31/24 1500          Strength (Manual Muscle Testing)    Strength (Manual Muscle Testing) strength is WFL;bilateral lower extremities  -[facility] Name 12/31/24 1500          Strength Comprehensive (MMT)    Comment, General Manual Muscle Testing (MMT) Assessment LEs grossly at least 3/5  -[facility] Name 12/31/24 1500          Bed Mobility    Bed Mobility supine-sit;sit-supine  -     Supine-Sit [place] (Bed Mobility) standby assist  -     Sit-Supine [place] (Bed Mobility) standby assist  -     Assistive Device (Bed Mobility) bed rails;head of bed elevated  -[facility] Name 12/31/24 1500          Transfers    Transfers stand-sit transfer;sit-stand transfer  -LH       Row Name 12/31/24 1500          Sit-Stand Transfer    Sit-Stand [place] (Transfers) contact guard  -     Assistive Device (Sit-Stand Transfers) walker, front-wheeled  -LH       Row Name 12/31/24 1500          Stand-Sit Transfer    Stand-Sit [place] (Transfers) contact guard  [facility]     Assistive Device (Stand-Sit Transfers) walker, front-wheeled  -[facility] Name 12/31/24 1500          Gait/Stairs (Locomotion)    [place] Level (Gait) contact guard  -     Assistive Device (Gait) walker, front-wheeled  -     Distance in Feet (Gait) 120  -     Pattern (Gait) step-to;step-through  -     Deviations/Abnormal Patterns (Gait) miki decreased;base of support, wide;weight shifting decreased;bilateral deviations  -     Bilateral Gait Deviations forward flexed posture;heel strike decreased  -[facility] Name 12/31/24 1500          Balance    Balance Assessment sitting static balance;standing static balance  -     Static Sitting Balance supervision  -     Position,  Sitting Balance unsupported;sitting edge of bed  -     Static Standing Balance contact guard  -     Position/Device Used, Standing Balance supported;walker, front-wheeled  -[facility] Name 12/31/24 1500          Plan of Care Review    Plan of Care Reviewed With patient  -     Outcome Evaluation Pt 78 yo male presented to ED w syncope episode, now w delirium apparently agitation yesterday. On PT exam pt pleasantly confused, ambulated 120ft CGA rwx mild gross tremoring and generalized weakness. Pt baseline apparently independent no DME has a walker at home if needed., spouse assist. Pt may benefit from skilled PT acuely for generalized strengthening/endurance. ANticipate home consider OP PT at [facility] as needed.  -[facility] Name 12/31/24 1500          Positioning and Restraints    Pre-Treatment Position in bed  -     Post Treatment Position bed  -     In Bed supine;call light within reach;encouraged to call for assist;exit alarm on  -[facility] Name 12/31/24 1500          Therapy Assessment/Plan (PT)    Rehab Potential (PT) good  -     Criteria for Skilled Interventions Met (PT) yes  -     Therapy Frequency (PT) 5 times/wk  -[facility] Name 12/31/24 1500          PT Evaluation Complexity    History, PT Evaluation Complexity 1-2 personal factors and/or comorbidities  -     Examination of Body Systems (PT Eval Complexity) total of 3 or more elements  -     Clinical Presentation (PT Evaluation Complexity) evolving  -     Clinical Decision Making (PT Evaluation Complexity) low complexity  -     Overall Complexity (PT Evaluation Complexity) low complexity  -[facility] Name 12/31/24 1500          Physical Therapy Goals    Gait Training Goal Selection (PT) gait training, PT goal 1  -[facility] Name 12/31/24 1500          Gait Training Goal 1 (PT)    Activity/Assistive Device (Gait Training Goal 1, PT) gait (walking locomotion)  -     [place] Level (Gait Training Goal 1, PT) independent   -     Distance (Gait Training Goal 1, PT) 300  -     Time Frame (Gait Training Goal 1, PT) 1 week  -               User Key  (r) = Recorded By, (t) = Taken By, (c) = Cosigned By      Initials Name Provider Type     Sima Anne, PT Physical Therapist                    Physical Therapy Education       Title: PT OT SLP Therapies (In Progress)       Topic: Physical Therapy (In Progress)       Point: Mobility training (In Progress)       Learning Progress Summary            Patient Acceptance, E, NR by  at 12/31/2024 1525   Family Acceptance, E, NR by  at 12/31/2024 1525                      Point: Home exercise program (In Progress)       Learning Progress Summary            Patient Acceptance, E, NR by  at 12/31/2024 1525   Family Acceptance, E, NR by  at 12/31/2024 1525                      Point: Body mechanics (In Progress)       Learning Progress Summary            Patient Acceptance, E, NR by  at 12/31/2024 1525   Family Acceptance, E, NR by  at 12/31/2024 1525                      Point: Precautions (In Progress)       Learning Progress Summary            Patient Acceptance, E, NR by  at 12/31/2024 1525   Family Acceptance, E, NR by  at 12/31/2024 1525                                      User Key       Initials Effective Dates Name Provider Type Discipline     06/16/21 -  Sima Anne, PT Physical Therapist PT                  PT Recommendation and Plan  Anticipated Discharge Disposition (PT): home with assist, home with outpatient therapy services  Planned Therapy Interventions (PT): balance training, bed mobility training, gait training, home exercise program, ROM (range of motion), stair training, strengthening, stretching, transfer training  Therapy Frequency (PT): 5 times/wk  Plan of Care Reviewed With: patient  Outcome Evaluation: Pt 78 yo male presented to ED w syncope episode, now w delirium apparently agitation yesterday. On PT exam pt pleasantly confused, ambulated 120ft  CGA rwx mild gross tremoring and generalized weakness. Pt baseline apparently independent no DME has a walker at home if needed., spouse assist. Pt may benefit from skilled PT acuely for generalized strengthening/endurance. ANticipate home consider OP PT at [facility] as needed.   Outcome Measures       Row Name 12/31/24 1500             How much help from another person do you currently need...    Turning from your back to your side while in flat bed without using bedrails? 3  -LH      Moving from lying on back to sitting on the side of a flat bed without bedrails? 3  -LH      Moving to and from a bed to a chair (including a wheelchair)? 3  -LH      Standing up from a chair using your arms (e.g., wheelchair, bedside chair)? 3  -LH      Climbing 3-5 steps with a railing? 3  -LH      To walk in hospital room? 3  -      AM-PAC 6 Clicks Score (PT) 18  -                User Key  (r) = Recorded By, (t) = Taken By, (c) = Cosigned By      Initials Name Provider Type     Sima Anne, PT Physical Therapist                     Time Calculation:    PT Charges       Row Name 12/31/24 1526             Time Calculation    Start Time 1500  -      Stop Time 1515  -      Time Calculation (min) 15 min  -      PT Received On 12/31/24  -      PT - Next Appointment 01/02/25  -      PT Goal Re-Cert Due Date 01/07/25  -                User Key  (r) = Recorded By, (t) = Taken By, (c) = Cosigned By      Initials Name Provider Type     Sima Anne, PT Physical Therapist                  Therapy Charges for Today       Code Description Service Date Service Provider Modifiers Qty    96871457886 HC PT EVAL LOW COMPLEXITY 3 12/31/2024 Sima Anne, PT GP 1            PT G-Codes  AM-PAC 6 Clicks Score (PT): 18    Sima Anne PT  12/31/2024

## 2024-12-31 NOTE — PROGRESS NOTES
Access Center reviewed chart which indicates pt w/ confusion, combative, and in non-violent restraints overnight but experiencing some improvement this AM w/ some disorientation. Spoke w/ primary RN via secure chat who reports pt's confusion is likely from hospital delirium and RN intends to continue monitoring whether Access Center involvement is appropriate or not. Restraints reportedly d/c this AM. Access will continue to monitor pt's chart as well.

## 2024-12-31 NOTE — CASE MANAGEMENT/SOCIAL WORK
Discharge Planning Assessment  [facility]     Patient Name: Mark Aguirre Jr.  MRN: [id number]  Today's Date: 12/31/2024    Admit Date: 12/28/2024    Plan: Home, spouse to transport.   Discharge Needs Assessment       Row Name 12/31/24 1444       Living Environment    People in Home spouse    Current Living Arrangements home    Family Caregiver if Needed spouse    Quality of Family Relationships helpful;involved;supportive    Able to Return to Prior Arrangements yes       Transition Planning    Patient/Family Anticipates Transition to home with family    Patient/Family Anticipated Services at Transition     Transportation Anticipated family or friend will provide       Discharge Needs Assessment    Equipment Currently Used at Home cpap    Concerns to be Addressed discharge planning    Equipment Needed After Discharge none                   Discharge Plan       Row Name 12/31/24 1444       Plan    Plan Home, spouse to transport.    Patient/Family in Agreement with Plan yes    Plan Comments CCP met with pt and spouse Alexandra at the bedside. Introduced self and role of CCP. Pt gave CCP permission to speak in front of Alexandra. Face sheet information and pharmacy verified. Pt lives with Alexandra in a 2-story home with 2STE. Pt still drives, IADL's and has a CPAP. Pt has a living will. Pt is enrolled in meds to beds and denies trouble affording or managing his medications. Pt has used HH in the past and denies SNF history. DC plan is to return home, spouse to transport. Gabriele MILTON/CCP                  Continued Care and Services - Admitted Since 12/28/2024    No active coordination exists for this encounter.       Expected Discharge Date and Time       Expected Discharge Date Expected Discharge Time    Star 3, 2025            Demographic Summary       Row Name 12/31/24 1443       General Information    Admission Type inpatient    Arrived From home    Required Notices Provided Important Message from Medicare     Referral Source case finding;admission list    Reason for Consult discharge planning    Preferred Language English       Contact Information    Permission Granted to Share Info With family/designee;                   Functional Status       Row Name 12/31/24 1182       Functional Status    Usual Activity Tolerance excellent    Current Activity Tolerance good       Assessment of Health Literacy    How often do you have someone help you read hospital materials? Never    How often do you have problems learning about your medical condition because of difficulty understanding written information? Never    How often do you have a problem understanding what is told to you about your medical condition? Never    How confident are you filling out medical forms by yourself? Extremely    Health Literacy Excellent       Functional Status, IADL    Medications independent    Meal Preparation independent    Housekeeping independent    Laundry independent    Shopping independent                               Gisele Guadarrama RN

## 2024-12-31 NOTE — PROGRESS NOTES
Subjective: Very aggravated again last night and spit out his pills at his wife.  Ultimately required a dose of lorazepam 0.25 mg about 10:30 PM and then actually slept most of the night.  Much more calm this morning and answering questions more appropriately.    Objective:  Vitals:    12/30/24 1940 12/30/24 2000 12/31/24 0000 12/31/24 0400   BP:  134/73 120/79    BP Location:  Left arm Left arm    Patient Position:  Lying Lying    Pulse: 78 82 81 77   Resp:  20 21 20   Temp: 97.7 °F (36.5 °C)  97.9 °F (36.6 °C) 98.1 °F (36.7 °C)   TempSrc: Oral  Oral Oral   SpO2:       Weight:       Height:         Gen: easily aroused.  Still confused but talking about familiar events and recognizes me and his wife  Heent: Wrap around head to protect right ear laceration  Heart; Irregular  Lungs: CTA  Abd: soft NTND  Ext; no edema  Neuro -moves all exremities    Recent Results (from the past 24 hours)   Vitamin B12    Collection Time: 12/30/24  1:10 PM    Specimen: Blood   Result Value Ref Range    Vitamin B-12 717 211 - 946 pg/mL   Folate    Collection Time: 12/30/24  1:10 PM    Specimen: Blood   Result Value Ref Range    Folate 9.48 4.78 - 24.20 ng/mL   ECG 12 Lead Rhythm Change    Collection Time: 12/30/24  6:35 PM   Result Value Ref Range    QT Interval 399 ms    QTC Interval 497 ms   Ammonia    Collection Time: 12/30/24  7:23 PM    Specimen: Blood   Result Value Ref Range    Ammonia 27 16 - 60 umol/L   Comprehensive Metabolic Panel    Collection Time: 12/31/24  2:51 AM    Specimen: Blood   Result Value Ref Range    Glucose 80 65 - 99 mg/dL    BUN 23 8 - 23 mg/dL    Creatinine 0.82 0.76 - 1.27 mg/dL    Sodium 141 136 - 145 mmol/L    Potassium 4.1 3.5 - 5.2 mmol/L    Chloride 110 (H) 98 - 107 mmol/L    CO2 20.8 (L) 22.0 - 29.0 mmol/L    Calcium 8.5 (L) 8.6 - 10.5 mg/dL    Total Protein 5.4 (L) 6.0 - 8.5 g/dL    Albumin 3.3 (L) 3.5 - 5.2 g/dL    ALT (SGPT) 7 1 - 41 U/L    AST (SGOT) 22 1 - 40 U/L    Alkaline Phosphatase 52 39 -  117 U/L    Total Bilirubin 2.4 (H) 0.0 - 1.2 mg/dL    Globulin 2.1 gm/dL    A/G Ratio 1.6 g/dL    BUN/Creatinine Ratio 28.0 (H) 7.0 - 25.0    Anion Gap 10.2 5.0 - 15.0 mmol/L    eGFR 88.3 >60.0 mL/min/1.73   CBC Auto Differential    Collection Time: 12/31/24  2:51 AM    Specimen: Blood   Result Value Ref Range    WBC 8.75 3.40 - 10.80 10*3/mm3    RBC 4.64 4.14 - 5.80 10*6/mm3    Hemoglobin 12.9 (L) 13.0 - 17.7 g/dL    Hematocrit 41.5 37.5 - 51.0 %    MCV 89.4 79.0 - 97.0 fL    MCH 27.8 26.6 - 33.0 pg    MCHC 31.1 (L) 31.5 - 35.7 g/dL    RDW 13.3 12.3 - 15.4 %    RDW-SD 43.6 37.0 - 54.0 fl    MPV 11.4 6.0 - 12.0 fL    Platelets 132 (L) 140 - 450 10*3/mm3    Neutrophil % 80.0 (H) 42.7 - 76.0 %    Lymphocyte % 11.1 (L) 19.6 - 45.3 %    Monocyte % 7.9 5.0 - 12.0 %    Eosinophil % 0.5 0.3 - 6.2 %    Basophil % 0.3 0.0 - 1.5 %    Immature Grans % 0.2 0.0 - 0.5 %    Neutrophils, Absolute 7.00 1.70 - 7.00 10*3/mm3    Lymphocytes, Absolute 0.97 0.70 - 3.10 10*3/mm3    Monocytes, Absolute 0.69 0.10 - 0.90 10*3/mm3    Eosinophils, Absolute 0.04 0.00 - 0.40 10*3/mm3    Basophils, Absolute 0.03 0.00 - 0.20 10*3/mm3    Immature Grans, Absolute 0.02 0.00 - 0.05 10*3/mm3     A/P  Delirium-this was almost certainly precipitated by disrupted sleep at home followed by his overnight in the emergency room causing him to become disoriented.  He looks to be showing signs that he is more oriented today and I think is because he had a reasonable night sleep last night.  I am going to try and get him out of the restraints, stop his IV, ask physical therapy to see him and get him out of bed and keep the curtains open his room.  Reviewed with wife the need to frequently reorient him.  I canceled the MRI for now because I do not want to sedate him in order to get it.  I would like to try and get the CT head without just to make sure it looks okay given the recent fall with head trauma given that he is on blood thinner.  Discussed with neurology  via chat yesterday.  Syncope-he was not eating well at home I think he was somewhat volume depleted and that in combination with his atrial fibrillation triggered the event.  He has now had plenty of IV fluids and I am going to try and get him back on p.o. diet today.  He is being followed by cardiology and is eventually planned for AV carlos a ablation and pacemaker for his atrial fibrillation.  I would like to see his delirium fairly clear prior to pursuing.  A-fib-he gets rapid A-fib when he gets really agitated but currently heart rates back down.  Remains on digoxin and metoprolol.  Has been anticoagulated with Eliquis but did miss his medicines last night.  Ear laceration-repaired and currently dressed with gauze.  Sleep apnea-has not been able to wear his CPAP due to the agitation and then the restraints.  Currently on oxygen but now as he gets more oriented like to get him back to wearing his CPAP.  Wife has brought it in

## 2024-12-31 NOTE — PLAN OF CARE
Goal Outcome Evaluation:  Plan of Care Reviewed With: patient           Outcome Evaluation: Pt 80 yo male presented to ED w syncope episode, now w delirium apparently agitation yesterday. On PT exam pt pleasantly confused, ambulated 120ft CGA rwx mild gross tremoring and generalized weakness. Pt baseline apparently independent no DME has a walker at home if needed., spouse assist. Pt may benefit from skilled PT acuely for generalized strengthening/endurance. ANticipate home consider OP PT at DC as needed.    Anticipated Discharge Disposition (PT): home with assist, home with outpatient therapy services

## 2024-12-31 NOTE — PROGRESS NOTES
" LOS: 1 day   Patient Care Team:  Luis Staples MD as PCP - General (Internal Medicine)  Luis Staples MD as PCP - Internal Medicine  Rian Johns MD as Consulting Physician (Sleep Medicine)      Chief Complaint: Follow-up atrial fibrillation, syncope       Interval History: Sounds like he had a rough night with agitation requiring lorazepam. He is better but still disoriented this morning. Wife is at the bedside and reports he is \"going in and out.\"      Objective   Vital Signs  Temp:  [97.7 °F (36.5 °C)-98.9 °F (37.2 °C)] 98.5 °F (36.9 °C)  Heart Rate:  [] 110  Resp:  [18-21] 18  BP: (120-148)/() 140/102    Intake/Output Summary (Last 24 hours) at 12/31/2024 0905  Last data filed at 12/30/2024 1158  Gross per 24 hour   Intake 0 ml   Output 100 ml   Net -100 ml         Physical Exam:  Constitutional: Well appearing, well developed, no acute distress   HENT: Oropharynx clear and membrane moist  Eyes: Normal conjunctiva, no sclera icterus.  Neck: Supple, no carotid bruit bilaterally.  Cardiovascular: Irregularly irregular rate and rhythm, No Murmur, No bilateral lower extremity edema.  Pulmonary: Normal respiratory effort, normal lung sounds, no wheezing.  Abdominal: Soft, nontender, no hepatosplenomegaly, liver is non-pulsatile.  Neurological: Alert and orient x 3.   Skin: Warm, dry, no ecchymosis, no rash.  Psych: Appropriate mood and affect. Normal judgment and insight.      Results Review:      Results from last 7 days   Lab Units 12/31/24  0251 12/30/24  0056 12/29/24  0551   SODIUM mmol/L 141 141 136   POTASSIUM mmol/L 4.1 4.4  4.4 3.5   CHLORIDE mmol/L 110* 106 103   CO2 mmol/L 20.8* 21.2* 26.0   BUN mg/dL 23 18 14   CREATININE mg/dL 0.82 0.87 0.81   GLUCOSE mg/dL 80 112* 89   CALCIUM mg/dL 8.5* 9.0 8.9     Results from last 7 days   Lab Units 12/29/24  0306 12/28/24  2253   HSTROP T ng/L 20 18     Results from last 7 days   Lab Units 12/31/24  0251 12/29/24  0551 12/28/24  2253 "   WBC 10*3/mm3 8.75 9.81 11.65*   HEMOGLOBIN g/dL 12.9* 14.3 14.9   HEMATOCRIT % 41.5 45.3 45.9   PLATELETS 10*3/mm3 132* 145 150             Results from last 7 days   Lab Units 12/28/24  2253   MAGNESIUM mg/dL 2.1           I reviewed the patient's new clinical results.  I personally viewed and interpreted the patient's EKG/Telemetry data        Medication Review:   apixaban, 5 mg, Oral, Q12H  atorvastatin, 10 mg, Oral, Daily  digoxin, 125 mcg, Oral, Daily  metoprolol succinate XL, 25 mg, Oral, Daily  QUEtiapine, 25 mg, Oral, Nightly  sodium chloride, 10 mL, Intravenous, Q12H  tamsulosin, 0.4 mg, Oral, Daily  vitamin B-12, 1,000 mcg, Oral, Daily             Assessment & Plan       Syncope    Acute delirium      1.  Syncope.  Likely related to orthostasis and dehydration.  2.  Delirium.  Admitting suspects this was precipitated by disrupted sleep and overnight stay in ED. They have ordered a head CT.  3.  Persistent atrial fibrillation.  History of PVI x 2.  Intolerant of dofetilide secondary to QTc prolongation.  EP evaluated yesterday and gave him a one-time dose of IV digoxin.  Rate control improved.  Currently scheduled for permanent pacemaker next month and AV node ablation in February.  EP to reevaluate plan once delirium has improved.    -Discussed with Dr. Lindquist. We are going to sign off. EP will continue to follow.  Please not hesitate to reach out with any questions or concerns.    Radha Vidal, KEISHA  12/31/24  09:05 EST

## 2024-12-31 NOTE — PROGRESS NOTES
DOS: 2024  NAME: Mark Aguirre Jr.   : 1945  PCP: Luis Staples MD    Chief Complaint   Patient presents with    Fall    Ear Laceration     Subjective: Pt seen in follow up, however the problem is new to me.  Wife at bedside and states patient is much better today.  He did not have his hearing aids and during the visit so did make some of the conversation difficult with him but he does feel much better overall.    Objective:  Vital signs:      Vitals:    24 2000 24 0000 24 0400 24 0800   BP: 134/73 120/79  (!) 140/102   BP Location: Left arm Left arm  Right arm   Patient Position: Lying Lying  Lying   Pulse: 82 81 77 110   Resp: 20  20 18   Temp:  97.9 °F (36.6 °C) 98.1 °F (36.7 °C) 98.5 °F (36.9 °C)   TempSrc:  Oral Oral Oral   SpO2:       Weight:       Height:           Current Facility-Administered Medications:     acetaminophen (TYLENOL) tablet 650 mg, 650 mg, Oral, Q4H PRN, Luis Staples MD, 650 mg at 24 1359    apixaban (ELIQUIS) tablet 5 mg, 5 mg, Oral, Q12H, Luis Staples MD, 5 mg at 24 0906    atorvastatin (LIPITOR) tablet 10 mg, 10 mg, Oral, Daily, Luis Stalpes MD, 10 mg at 24 0906    sennosides-docusate (PERICOLACE) 8.6-50 MG per tablet 2 tablet, 2 tablet, Oral, BID PRN **AND** polyethylene glycol (MIRALAX) packet 17 g, 17 g, Oral, Daily PRN **AND** bisacodyl (DULCOLAX) EC tablet 5 mg, 5 mg, Oral, Daily PRN **AND** bisacodyl (DULCOLAX) suppository 10 mg, 10 mg, Rectal, Daily PRN, Luis Staples MD    Calcium Replacement - Follow Nurse / BPA Driven Protocol, , Not Applicable, PRN, Luis Staples MD    digoxin (LANOXIN) tablet 125 mcg, 125 mcg, Oral, Daily, Luis Staples MD, 125 mcg at 24 0906    LORazepam (ATIVAN) injection 0.25 mg, 0.25 mg, Intravenous, Q2H PRN, Luis Staples MD, 0.25 mg at 24 1574    Magnesium Cardiology Dose Replacement - Follow Nurse / BPA Driven Protocol, , Not Applicable, PRN,  Luis Staples MD    metoprolol succinate XL (TOPROL-XL) 24 hr tablet 25 mg, 25 mg, Oral, Daily, Luis Staples MD, 25 mg at 12/31/24 0906    nitroglycerin (NITROSTAT) SL tablet 0.4 mg, 0.4 mg, Sublingual, Q5 Min PRN, Luis Staples MD    Phosphorus Replacement - Follow Nurse / BPA Driven Protocol, , Not Applicable, PRN, Luis Staples MD    Potassium Replacement - Follow Nurse / BPA Driven Protocol, , Not Applicable, PRN, Luis Staples MD    QUEtiapine (SEROquel) tablet 25 mg, 25 mg, Oral, TID PRN, Luis Staples MD, 25 mg at 12/30/24 1400    QUEtiapine (SEROquel) tablet 25 mg, 25 mg, Oral, Nightly, Luis Staples MD    [COMPLETED] Insert Peripheral IV, , , Once **AND** sodium chloride 0.9 % flush 10 mL, 10 mL, Intravenous, PRN, Luis Staples MD    sodium chloride 0.9 % flush 10 mL, 10 mL, Intravenous, Q12H, Luis Staples MD, 10 mL at 12/31/24 0912    sodium chloride 0.9 % flush 10 mL, 10 mL, Intravenous, PRN, Luis Staples MD    sodium chloride 0.9 % infusion 40 mL, 40 mL, Intravenous, PRN, Luis Staples MD    tamsulosin (FLOMAX) 24 hr capsule 0.4 mg, 0.4 mg, Oral, Daily, Luis Staples MD, 0.4 mg at 12/31/24 0906    vitamin B-12 (CYANOCOBALAMIN) tablet 1,000 mcg, 1,000 mcg, Oral, Daily, Luis Staples MD, 1,000 mcg at 12/31/24 0906    zolpidem (AMBIEN) tablet 2.5 mg, 2.5 mg, Oral, Nightly PRN, Luis Staples MD, 2.5 mg at 12/30/24 0013    PRN meds    acetaminophen    senna-docusate sodium **AND** polyethylene glycol **AND** bisacodyl **AND** bisacodyl    Calcium Replacement - Follow Nurse / BPA Driven Protocol    LORazepam    Magnesium Cardiology Dose Replacement - Follow Nurse / BPA Driven Protocol    nitroglycerin    Phosphorus Replacement - Follow Nurse / BPA Driven Protocol    Potassium Replacement - Follow Nurse / BPA Driven Protocol    QUEtiapine    [COMPLETED] Insert Peripheral IV **AND** sodium chloride    sodium chloride    sodium chloride    zolpidem    No  "current facility-administered medications on file prior to encounter.     Current Outpatient Medications on File Prior to Encounter   Medication Sig    apixaban (ELIQUIS) 5 MG tablet tablet Take 1 tablet by mouth Every 12 (Twelve) Hours.    atorvastatin (LIPITOR) 10 MG tablet Take 1 tablet by mouth Daily.    chlorthalidone (HYGROTON) 25 MG tablet Take 1 tablet by mouth Daily.    citalopram (CeleXA) 20 MG tablet Take 1 tablet by mouth Every Morning.    digoxin (LANOXIN) 125 MCG tablet Take 1 tablet by mouth Daily.    metoprolol succinate XL (TOPROL-XL) 25 MG 24 hr tablet Take 1 tablet by mouth Daily.    tamsulosin (FLOMAX) 0.4 MG capsule 24 hr capsule Take 1 capsule by mouth Daily.    zolpidem (AMBIEN) 5 MG tablet Take 0.5 tablets by mouth At Night As Needed for Sleep.    acetaminophen (TYLENOL) 325 MG tablet Take 2 tablets by mouth Every 4 (Four) Hours As Needed for Mild Pain .    Cyanocobalamin (VITAMIN B 12 PO) Take  by mouth.       General appearance: Elderly male, NAD, alert and cooperative  HEENT: Normocephalic, right ear laceration present with bandage covering    Neurological:   MS: oriented to person, states he is at the hospital but could not name it, stated year was \"23\" then stated \"24\" after cueing, normal attention/concentration, language intact, no neglect  CN: visual fields full, EOMI, facial sensation equal, no facial droop, [place] bilaterally, shoulder shrug equal, tongue midline  Motor: 5/5 in all 4 ext.  Sensory: light touch sensation intact in all 4 ext.  Diminished cold temperature sensation of both lower extremities  Coordination: Normal finger to nose test  Gait and station: Deferred  Rapid alternating movements: normal finger to thumb tap    Laboratory results:  Lab Results   Component Value Date    TSH 2.620 12/30/2024     No results found for: \"HGBA1C\"  Lab Results   Component Value Date    TRLIKZSD64 717 12/30/2024     No results found for: \"CHOL\", \"CHLPL\"  No results found for: \"TRIG\"  No " "results found for: \"HDL\"  No results found for: \"LDL\", \"LDLDIRECT\"  Lab Results   Component Value Date    WBC 8.75 12/31/2024    HGB 12.9 (L) 12/31/2024    HCT 41.5 12/31/2024    MCV 89.4 12/31/2024     (L) 12/31/2024     Lab Results   Component Value Date    GLUCOSE 80 12/31/2024    BUN 23 12/31/2024    CREATININE 0.82 12/31/2024    EGFRIFNONA 93 06/22/2020    EGFRIFAFRI  06/16/2020      Comment:      <15 Indicative of kidney failure.    BCR 28.0 (H) 12/31/2024    K 4.1 12/31/2024    CO2 20.8 (L) 12/31/2024    CALCIUM 8.5 (L) 12/31/2024    ALBUMIN 3.3 (L) 12/31/2024    AST 22 12/31/2024    ALT 7 12/31/2024     Lab Results   Component Value Date    PTT 72.3 (H) 06/15/2020     Lab Results   Component Value Date    INR 1.74 (H) 06/15/2020    PROTIME 20.0 (H) 06/15/2020     Brief Urine Lab Results  (Last result in the past 365 days)        Color   Clarity   Blood   Leuk Est   Nitrite   Protein   CREAT   Urine HCG        12/28/24 2239 Yellow   Clear   Moderate (2+)   Negative   Negative   Negative                 Ammonia  Folate 9.48    Review and interpretation of imaging:  CT Cervical Spine Without Contrast    Result Date: 12/29/2024  Electronically signed by Hemant Manzo MD on 12-29-24 at 0006    CT Head Without Contrast    Result Date: 12/29/2024  Electronically signed by Hemant Manzo MD on 12-29-24 at 0005    XR Chest 1 View    Result Date: 12/28/2024  Cardiomegaly. No evidence for active disease in the chest.  This report was finalized on 12/28/2024 11:09 PM by Mir Munoz M.D on Workstation: [id number]     Results for orders placed during the hospital encounter of 09/05/24    Adult Transthoracic Echo Complete W/ Cont if Necessary Per Protocol    Interpretation Summary    Left ventricular systolic function is low normal. Calculated left ventricular EF = 48.8%    The left ventricular cavity is borderline dilated.    Left ventricular diastolic function is consistent with (grade II w/high LAP) " pseudonormalization.    The left atrial cavity is mild to moderately dilated.    Moderate aortic valve stenosis is present. Aortic valve area is 1.8 cm2.    Peak velocity of the flow distal to the aortic valve is 328 cm/s. Aortic valve maximum pressure gradient is 43 mmHg. Aortic valve mean pressure gradient is 24 mmHg. Aortic valve dimensionless index is 0.4 .    Estimated right ventricular systolic pressure from tricuspid regurgitation is moderately elevated (45-55 mmHg). Calculated right ventricular systolic pressure from tricuspid regurgitation is 48 mmHg.    Mild to moderate aortic valve regurgitation is present.    Mild to moderate mitral valve regurgitation is present with an eccentric jet noted.      Impression/Assessment:  This is a 79-year-old male with past medical history of hypertension, atrial flutter/PAF on Eliquis PTA, obstructive sleep apnea on CPAP, prior stroke with reported no residual deficit, postconcussive syndrome, hearing loss who presented to the hospital on 12/28/2024 with complaints of suffering an acute syncopal episode causing him to fall and hit the right side of his head resulting in a right ear laceration.  Reportedly he had not been eating or drinking well due to feeling ill and had some nausea.  The day of admission he had gotten up to go use the bathroom and all of a sudden felt lightheaded and lost consciousness.  No reported seizure activity.  He was seen by cardiology who felt that his syncopal event was likely related to orthostasis and dehydration.  Our service was consulted due to him developing agitation and visual hallucinations after admission.  He had an initial noncontrast CT head that did not reveal any acute intracranial abnormalities.  He was treated with Haldol, Zyprexa briefly however this was discontinued after he was found to have a prolonged QT, lorazepam, and 1 dose of Seroquel.  He had a normal B12, folate, urinalysis, and ammonia levels.  Initially MRI was  recommended to rule out  potential of acute embolic strokes in the setting of his known A-fib however this was discontinued by primary as they felt that the patient had improved after the patient was able to get better sleep as he had experienced sleep deprivation at home due to not feeling well.    Diagnosis:  Acute delirium  Syncope and collapse  Paroxysmal atrial fibrillation  Obstructive sleep apnea on CPAP    Plan:  - He is much better today per the wife at bedside, more conversant appropriately and not acting impulsive or agitated.  Reportedly had a better night sleep last night after receiving lorazepam.  - Primary has canceled the MRI due to improvement and ordered repeat CTH instead which is pending.   - If he develops any focal deficits or starts to regress with his behavior would recommend pursuing MRI at that point.  - Continue delirium precautions.  - Noted CPAP has not been worn due to his agitation and restraints however since he has improved if he is staying another night when encourage to attempt to place this tonight.  We will follow peripherally to review his repeat CT head.    Evidence-based delirium precautions include:     1. Frequent reorientation, reminding patient not questioning patient   2. Shades up during day/down at night   3. TV off except for soft music only   4. Familiar objects at bedside   5. Encourage friends/family to spend the night   6. Minimize anticholingeric medications   7. Minimize polypharmacy   8. Minimize restraints, includes lines and/or foleys and/or feeding tubes as able   9. Minimize overnight checks/vitals to encourage restful consistent sleep patterns   10. Out of bed during day as much as possible     Case discussed with patient, wife at bedside, and Dr. Palumbo, and he agrees with plan above.     KEISHA Reese

## 2024-12-31 NOTE — PLAN OF CARE
Goal Outcome Evaluation:           Progress: no change  Outcome Evaluation: pt remains confused, combative and in non violent restraints. refused night time p.o. medicines( eliquis and seroquel). Spoke with Dr Staples, to give prn ativan if needed. prn IV Ativan 0.25mg given. IV fluids. vital signs checked. afib on the monitor with pvc's but asymptomatic. safety precautions maintained. spouse to remain at bedside.

## 2025-01-01 ENCOUNTER — HOSPITAL ENCOUNTER (INPATIENT)
Facility: HOSPITAL | Age: 80
LOS: 8 days | End: 2025-02-06
Attending: INTERNAL MEDICINE | Admitting: INTERNAL MEDICINE
Payer: MEDICARE

## 2025-01-01 ENCOUNTER — HOSPITAL ENCOUNTER (INPATIENT)
Facility: HOSPITAL | Age: 80
LOS: 2 days | DRG: 951 | End: 2025-02-08
Attending: HOSPITALIST | Admitting: HOSPITALIST
Payer: COMMERCIAL

## 2025-01-01 VITALS
SYSTOLIC BLOOD PRESSURE: 133 MMHG | TEMPERATURE: 96.1 F | HEART RATE: 76 BPM | OXYGEN SATURATION: 98 % | RESPIRATION RATE: 20 BRPM | DIASTOLIC BLOOD PRESSURE: 73 MMHG

## 2025-01-01 VITALS — TEMPERATURE: 101 F | DIASTOLIC BLOOD PRESSURE: 56 MMHG | OXYGEN SATURATION: 89 % | SYSTOLIC BLOOD PRESSURE: 117 MMHG

## 2025-01-01 DIAGNOSIS — Z00.6 ENCOUNTER FOR EXAMINATION FOR NORMAL COMPARISON AND CONTROL IN CLINICAL RESEARCH PROGRAM: ICD-10-CM

## 2025-01-01 DIAGNOSIS — I48.19 PERSISTENT ATRIAL FIBRILLATION: ICD-10-CM

## 2025-01-01 DIAGNOSIS — I48.11 LONGSTANDING PERSISTENT ATRIAL FIBRILLATION: ICD-10-CM

## 2025-01-01 DIAGNOSIS — I48.91 ATRIAL FIBRILLATION AND FLUTTER: Primary | ICD-10-CM

## 2025-01-01 DIAGNOSIS — I48.92 ATRIAL FIBRILLATION AND FLUTTER: Primary | ICD-10-CM

## 2025-01-01 LAB
ALBUMIN SERPL-MCNC: 2.8 G/DL (ref 3.5–5.2)
ALBUMIN SERPL-MCNC: 3.7 G/DL (ref 3.5–5.2)
ALBUMIN/GLOB SERPL: 1.1 G/DL
ALBUMIN/GLOB SERPL: 1.5 G/DL
ALP SERPL-CCNC: 56 U/L (ref 39–117)
ALP SERPL-CCNC: 61 U/L (ref 39–117)
ALT SERPL W P-5'-P-CCNC: 15 U/L (ref 1–41)
ALT SERPL W P-5'-P-CCNC: 15 U/L (ref 1–41)
ANION GAP SERPL CALCULATED.3IONS-SCNC: 15 MMOL/L (ref 5–15)
ANION GAP SERPL CALCULATED.3IONS-SCNC: 5.2 MMOL/L (ref 5–15)
ANION GAP SERPL CALCULATED.3IONS-SCNC: 7.4 MMOL/L (ref 5–15)
ARTERIAL PATENCY WRIST A: POSITIVE
AST SERPL-CCNC: 14 U/L (ref 1–40)
AST SERPL-CCNC: 34 U/L (ref 1–40)
ATMOSPHERIC PRESS: 746 MMHG
BACTERIA UR QL AUTO: ABNORMAL /HPF
BASE EXCESS BLDA CALC-SCNC: -2.8 MMOL/L (ref 0–2)
BASOPHILS # BLD AUTO: 0.03 10*3/MM3 (ref 0–0.2)
BASOPHILS # BLD AUTO: 0.04 10*3/MM3 (ref 0–0.2)
BASOPHILS NFR BLD AUTO: 0.4 % (ref 0–1.5)
BASOPHILS NFR BLD AUTO: 0.5 % (ref 0–1.5)
BDY SITE: ABNORMAL
BILIRUB SERPL-MCNC: 2 MG/DL (ref 0–1.2)
BILIRUB SERPL-MCNC: 3 MG/DL (ref 0–1.2)
BILIRUB UR QL STRIP: NEGATIVE
BUN SERPL-MCNC: 14 MG/DL (ref 8–23)
BUN SERPL-MCNC: 16 MG/DL (ref 8–23)
BUN SERPL-MCNC: 23 MG/DL (ref 8–23)
BUN/CREAT SERPL: 18.9 (ref 7–25)
BUN/CREAT SERPL: 21.6 (ref 7–25)
BUN/CREAT SERPL: 28.4 (ref 7–25)
CALCIUM SPEC-SCNC: 8.6 MG/DL (ref 8.6–10.5)
CALCIUM SPEC-SCNC: 8.6 MG/DL (ref 8.6–10.5)
CALCIUM SPEC-SCNC: 9 MG/DL (ref 8.6–10.5)
CHLORIDE SERPL-SCNC: 104 MMOL/L (ref 98–107)
CHLORIDE SERPL-SCNC: 108 MMOL/L (ref 98–107)
CHLORIDE SERPL-SCNC: 108 MMOL/L (ref 98–107)
CLARITY UR: ABNORMAL
CO2 BLDA-SCNC: 22.4 MMOL/L (ref 23–27)
CO2 SERPL-SCNC: 20 MMOL/L (ref 22–29)
CO2 SERPL-SCNC: 22.6 MMOL/L (ref 22–29)
CO2 SERPL-SCNC: 22.8 MMOL/L (ref 22–29)
COLOR UR: ABNORMAL
CREAT SERPL-MCNC: 0.74 MG/DL (ref 0.76–1.27)
CREAT SERPL-MCNC: 0.74 MG/DL (ref 0.76–1.27)
CREAT SERPL-MCNC: 0.81 MG/DL (ref 0.76–1.27)
DEPRECATED RDW RBC AUTO: 49 FL (ref 37–54)
DEPRECATED RDW RBC AUTO: 50.7 FL (ref 37–54)
DEVICE COMMENT: ABNORMAL
EGFRCR SERPLBLD CKD-EPI 2021: 88.6 ML/MIN/1.73
EGFRCR SERPLBLD CKD-EPI 2021: 91 ML/MIN/1.73
EGFRCR SERPLBLD CKD-EPI 2021: 91 ML/MIN/1.73
EOSINOPHIL # BLD AUTO: 0.09 10*3/MM3 (ref 0–0.4)
EOSINOPHIL # BLD AUTO: 0.13 10*3/MM3 (ref 0–0.4)
EOSINOPHIL NFR BLD AUTO: 1.2 % (ref 0.3–6.2)
EOSINOPHIL NFR BLD AUTO: 1.8 % (ref 0.3–6.2)
ERYTHROCYTE [DISTWIDTH] IN BLOOD BY AUTOMATED COUNT: 15.2 % (ref 12.3–15.4)
ERYTHROCYTE [DISTWIDTH] IN BLOOD BY AUTOMATED COUNT: 15.7 % (ref 12.3–15.4)
GAS FLOW AIRWAY: 4 LPM
GLOBULIN UR ELPH-MCNC: 2.5 GM/DL
GLOBULIN UR ELPH-MCNC: 2.6 GM/DL
GLUCOSE BLDC GLUCOMTR-MCNC: 81 MG/DL (ref 70–130)
GLUCOSE BLDC GLUCOMTR-MCNC: 92 MG/DL (ref 70–130)
GLUCOSE SERPL-MCNC: 103 MG/DL (ref 65–99)
GLUCOSE SERPL-MCNC: 88 MG/DL (ref 65–99)
GLUCOSE SERPL-MCNC: 92 MG/DL (ref 65–99)
GLUCOSE UR STRIP-MCNC: NEGATIVE MG/DL
HCO3 BLDA-SCNC: 21.3 MMOL/L (ref 22–28)
HCT VFR BLD AUTO: 41.1 % (ref 37.5–51)
HCT VFR BLD AUTO: 41.8 % (ref 37.5–51)
HEMODILUTION: NO
HGB BLD-MCNC: 13.4 G/DL (ref 13–17.7)
HGB BLD-MCNC: 14 G/DL (ref 13–17.7)
HGB UR QL STRIP.AUTO: ABNORMAL
HYALINE CASTS UR QL AUTO: ABNORMAL /LPF
IMM GRANULOCYTES # BLD AUTO: 0.03 10*3/MM3 (ref 0–0.05)
IMM GRANULOCYTES # BLD AUTO: 0.03 10*3/MM3 (ref 0–0.05)
IMM GRANULOCYTES NFR BLD AUTO: 0.4 % (ref 0–0.5)
IMM GRANULOCYTES NFR BLD AUTO: 0.4 % (ref 0–0.5)
KETONES UR QL STRIP: ABNORMAL
LEUKOCYTE ESTERASE UR QL STRIP.AUTO: ABNORMAL
LYMPHOCYTES # BLD AUTO: 0.94 10*3/MM3 (ref 0.7–3.1)
LYMPHOCYTES # BLD AUTO: 0.99 10*3/MM3 (ref 0.7–3.1)
LYMPHOCYTES NFR BLD AUTO: 12.2 % (ref 19.6–45.3)
LYMPHOCYTES NFR BLD AUTO: 13.7 % (ref 19.6–45.3)
MAGNESIUM SERPL-MCNC: 2.2 MG/DL (ref 1.6–2.4)
MCH RBC QN AUTO: 29.5 PG (ref 26.6–33)
MCH RBC QN AUTO: 29.8 PG (ref 26.6–33)
MCHC RBC AUTO-ENTMCNC: 32.6 G/DL (ref 31.5–35.7)
MCHC RBC AUTO-ENTMCNC: 33.5 G/DL (ref 31.5–35.7)
MCV RBC AUTO: 88.9 FL (ref 79–97)
MCV RBC AUTO: 90.5 FL (ref 79–97)
MODALITY: ABNORMAL
MONOCYTES # BLD AUTO: 0.64 10*3/MM3 (ref 0.1–0.9)
MONOCYTES # BLD AUTO: 0.66 10*3/MM3 (ref 0.1–0.9)
MONOCYTES NFR BLD AUTO: 8.6 % (ref 5–12)
MONOCYTES NFR BLD AUTO: 8.9 % (ref 5–12)
NEUTROPHILS NFR BLD AUTO: 5.4 10*3/MM3 (ref 1.7–7)
NEUTROPHILS NFR BLD AUTO: 5.92 10*3/MM3 (ref 1.7–7)
NEUTROPHILS NFR BLD AUTO: 74.8 % (ref 42.7–76)
NEUTROPHILS NFR BLD AUTO: 77.1 % (ref 42.7–76)
NITRITE UR QL STRIP: NEGATIVE
NRBC BLD AUTO-RTO: 0 /100 WBC (ref 0–0.2)
PCO2 BLDA: 34.4 MM HG (ref 35–45)
PH BLDA: 7.4 PH UNITS (ref 7.35–7.45)
PH UR STRIP.AUTO: 6.5 [PH] (ref 5–8)
PLATELET # BLD AUTO: 124 10*3/MM3 (ref 140–450)
PLATELET # BLD AUTO: 141 10*3/MM3 (ref 140–450)
PMV BLD AUTO: 11.1 FL (ref 6–12)
PMV BLD AUTO: 11.6 FL (ref 6–12)
PO2 BLDA: 104.8 MM HG (ref 80–100)
POTASSIUM SERPL-SCNC: 3.7 MMOL/L (ref 3.5–5.2)
POTASSIUM SERPL-SCNC: 3.9 MMOL/L (ref 3.5–5.2)
POTASSIUM SERPL-SCNC: 3.9 MMOL/L (ref 3.5–5.2)
POTASSIUM SERPL-SCNC: 4 MMOL/L (ref 3.5–5.2)
PROT SERPL-MCNC: 5.4 G/DL (ref 6–8.5)
PROT SERPL-MCNC: 6.2 G/DL (ref 6–8.5)
PROT UR QL STRIP: ABNORMAL
QT INTERVAL: 393 MS
QT INTERVAL: 415 MS
QT INTERVAL: 438 MS
QTC INTERVAL: 462 MS
QTC INTERVAL: 505 MS
QTC INTERVAL: 519 MS
RBC # BLD AUTO: 4.54 10*6/MM3 (ref 4.14–5.8)
RBC # BLD AUTO: 4.7 10*6/MM3 (ref 4.14–5.8)
RBC # UR STRIP: ABNORMAL /HPF
REF LAB TEST METHOD: ABNORMAL
SAO2 % BLDCOA: 98.1 % (ref 92–98.5)
SODIUM SERPL-SCNC: 136 MMOL/L (ref 136–145)
SODIUM SERPL-SCNC: 138 MMOL/L (ref 136–145)
SODIUM SERPL-SCNC: 139 MMOL/L (ref 136–145)
SP GR UR STRIP: 1.02 (ref 1–1.03)
SQUAMOUS #/AREA URNS HPF: ABNORMAL /HPF
TOTAL RATE: 17 BREATHS/MINUTE
UROBILINOGEN UR QL STRIP: ABNORMAL
VALPROATE SERPL-MCNC: 26 MCG/ML (ref 50–125)
WBC # UR STRIP: ABNORMAL /HPF
WBC NRBC COR # BLD AUTO: 7.22 10*3/MM3 (ref 3.4–10.8)
WBC NRBC COR # BLD AUTO: 7.68 10*3/MM3 (ref 3.4–10.8)

## 2025-01-01 PROCEDURE — 25010000002 GLYCOPYRROLATE 0.2 MG/ML SOLUTION: Performed by: HOSPITALIST

## 2025-01-01 PROCEDURE — 25010000002 LORAZEPAM PER 2 MG: Performed by: INTERNAL MEDICINE

## 2025-01-01 PROCEDURE — 93005 ELECTROCARDIOGRAM TRACING: CPT | Performed by: INTERNAL MEDICINE

## 2025-01-01 PROCEDURE — 93010 ELECTROCARDIOGRAM REPORT: CPT | Performed by: INTERNAL MEDICINE

## 2025-01-01 PROCEDURE — 83735 ASSAY OF MAGNESIUM: CPT | Performed by: INTERNAL MEDICINE

## 2025-01-01 PROCEDURE — 25010000002 OLANZAPINE 10 MG RECONSTITUTED SOLUTION: Performed by: SPECIALIST

## 2025-01-01 PROCEDURE — 33274 TCAT INSJ/RPL PERM LDLS PM: CPT | Performed by: INTERNAL MEDICINE

## 2025-01-01 PROCEDURE — 97530 THERAPEUTIC ACTIVITIES: CPT

## 2025-01-01 PROCEDURE — 97110 THERAPEUTIC EXERCISES: CPT

## 2025-01-01 PROCEDURE — C1894 INTRO/SHEATH, NON-LASER: HCPCS | Performed by: INTERNAL MEDICINE

## 2025-01-01 PROCEDURE — 25010000002 CEFTRIAXONE PER 250 MG: Performed by: INTERNAL MEDICINE

## 2025-01-01 PROCEDURE — C1786 PMKR, SINGLE, RATE-RESP: HCPCS | Performed by: INTERNAL MEDICINE

## 2025-01-01 PROCEDURE — 25010000002 OLANZAPINE 10 MG RECONSTITUTED SOLUTION: Performed by: INTERNAL MEDICINE

## 2025-01-01 PROCEDURE — 85025 COMPLETE CBC W/AUTO DIFF WBC: CPT | Performed by: INTERNAL MEDICINE

## 2025-01-01 PROCEDURE — 02583ZZ DESTRUCTION OF CONDUCTION MECHANISM, PERCUTANEOUS APPROACH: ICD-10-PCS | Performed by: INTERNAL MEDICINE

## 2025-01-01 PROCEDURE — 25010000002 DIAZEPAM PER 5 MG: Performed by: HOSPITALIST

## 2025-01-01 PROCEDURE — 25810000003 SODIUM CHLORIDE 0.9 % SOLUTION: Performed by: INTERNAL MEDICINE

## 2025-01-01 PROCEDURE — 02HK3NZ INSERTION OF INTRACARDIAC PACEMAKER INTO RIGHT VENTRICLE, PERCUTANEOUS APPROACH: ICD-10-PCS | Performed by: INTERNAL MEDICINE

## 2025-01-01 PROCEDURE — 99024 POSTOP FOLLOW-UP VISIT: CPT | Performed by: INTERNAL MEDICINE

## 2025-01-01 PROCEDURE — 97162 PT EVAL MOD COMPLEX 30 MIN: CPT

## 2025-01-01 PROCEDURE — 82803 BLOOD GASES ANY COMBINATION: CPT

## 2025-01-01 PROCEDURE — 25010000002 MORPHINE PER 10 MG: Performed by: HOSPITALIST

## 2025-01-01 PROCEDURE — 25010000002 DIAZEPAM PER 5 MG: Performed by: INTERNAL MEDICINE

## 2025-01-01 PROCEDURE — 99222 1ST HOSP IP/OBS MODERATE 55: CPT | Performed by: PHYSICIAN ASSISTANT

## 2025-01-01 PROCEDURE — 80048 BASIC METABOLIC PNL TOTAL CA: CPT | Performed by: INTERNAL MEDICINE

## 2025-01-01 PROCEDURE — 94799 UNLISTED PULMONARY SVC/PX: CPT

## 2025-01-01 PROCEDURE — 25010000002 HEPARIN (PORCINE) PER 1000 UNITS: Performed by: INTERNAL MEDICINE

## 2025-01-01 PROCEDURE — 25010000002 CEFAZOLIN PER 500 MG: Performed by: INTERNAL MEDICINE

## 2025-01-01 PROCEDURE — 80053 COMPREHEN METABOLIC PANEL: CPT | Performed by: INTERNAL MEDICINE

## 2025-01-01 PROCEDURE — C1769 GUIDE WIRE: HCPCS | Performed by: INTERNAL MEDICINE

## 2025-01-01 PROCEDURE — C1732 CATH, EP, DIAG/ABL, 3D/VECT: HCPCS | Performed by: INTERNAL MEDICINE

## 2025-01-01 PROCEDURE — 93650 ICAR CATH ABLTJ AV NODE FUNC: CPT | Performed by: INTERNAL MEDICINE

## 2025-01-01 PROCEDURE — 81001 URINALYSIS AUTO W/SCOPE: CPT | Performed by: INTERNAL MEDICINE

## 2025-01-01 PROCEDURE — 84132 ASSAY OF SERUM POTASSIUM: CPT | Performed by: INTERNAL MEDICINE

## 2025-01-01 PROCEDURE — 25010000002 MIDAZOLAM PER 1 MG: Performed by: INTERNAL MEDICINE

## 2025-01-01 PROCEDURE — 99232 SBSQ HOSP IP/OBS MODERATE 35: CPT | Performed by: INTERNAL MEDICINE

## 2025-01-01 PROCEDURE — 36600 WITHDRAWAL OF ARTERIAL BLOOD: CPT

## 2025-01-01 PROCEDURE — 82948 REAGENT STRIP/BLOOD GLUCOSE: CPT

## 2025-01-01 PROCEDURE — 25010000002 MORPHINE PER 10 MG: Performed by: INTERNAL MEDICINE

## 2025-01-01 PROCEDURE — 99222 1ST HOSP IP/OBS MODERATE 55: CPT | Performed by: INTERNAL MEDICINE

## 2025-01-01 PROCEDURE — 25010000002 LIDOCAINE-EPINEPHRINE (PF) 1 %-1:200000 SOLUTION: Performed by: INTERNAL MEDICINE

## 2025-01-01 PROCEDURE — 25010000002 FENTANYL CITRATE (PF) 50 MCG/ML SOLUTION: Performed by: INTERNAL MEDICINE

## 2025-01-01 PROCEDURE — 99232 SBSQ HOSP IP/OBS MODERATE 35: CPT | Performed by: NURSE PRACTITIONER

## 2025-01-01 PROCEDURE — 80164 ASSAY DIPROPYLACETIC ACD TOT: CPT | Performed by: INTERNAL MEDICINE

## 2025-01-01 PROCEDURE — 99232 SBSQ HOSP IP/OBS MODERATE 35: CPT | Performed by: PHYSICIAN ASSISTANT

## 2025-01-01 PROCEDURE — 25010000002 ENOXAPARIN PER 10 MG: Performed by: INTERNAL MEDICINE

## 2025-01-01 DEVICE — SYS PACE MICRA LD/LESS VR2: Type: IMPLANTABLE DEVICE | Status: FUNCTIONAL

## 2025-01-01 RX ORDER — GLYCOPYRROLATE 0.2 MG/ML
0.4 INJECTION INTRAMUSCULAR; INTRAVENOUS
Status: DISCONTINUED | OUTPATIENT
Start: 2025-01-01 | End: 2025-02-09 | Stop reason: HOSPADM

## 2025-01-01 RX ORDER — NITROGLYCERIN 0.4 MG/1
0.4 TABLET SUBLINGUAL
Status: DISCONTINUED | OUTPATIENT
Start: 2025-01-01 | End: 2025-01-01

## 2025-01-01 RX ORDER — DIAZEPAM 10 MG/2ML
5 INJECTION, SOLUTION INTRAMUSCULAR; INTRAVENOUS
Status: CANCELLED | OUTPATIENT
Start: 2025-01-01 | End: 2025-02-11

## 2025-01-01 RX ORDER — OLANZAPINE 10 MG/2ML
5 INJECTION, POWDER, FOR SOLUTION INTRAMUSCULAR EVERY 8 HOURS PRN
Status: CANCELLED | OUTPATIENT
Start: 2025-01-01

## 2025-01-01 RX ORDER — GLYCOPYRROLATE 0.2 MG/ML
0.4 INJECTION INTRAMUSCULAR; INTRAVENOUS
Status: CANCELLED | OUTPATIENT
Start: 2025-01-01

## 2025-01-01 RX ORDER — HYDROMORPHONE HYDROCHLORIDE 2 MG/ML
1.5 INJECTION, SOLUTION INTRAMUSCULAR; INTRAVENOUS; SUBCUTANEOUS
Status: CANCELLED | OUTPATIENT
Start: 2025-01-01 | End: 2025-02-13

## 2025-01-01 RX ORDER — ACETAMINOPHEN 160 MG/5ML
650 SOLUTION ORAL EVERY 4 HOURS PRN
Status: DISCONTINUED | OUTPATIENT
Start: 2025-01-01 | End: 2025-02-09 | Stop reason: HOSPADM

## 2025-01-01 RX ORDER — ACETAMINOPHEN 160 MG/5ML
650 SOLUTION ORAL EVERY 4 HOURS PRN
Status: DISCONTINUED | OUTPATIENT
Start: 2025-01-01 | End: 2025-01-01 | Stop reason: SDUPTHER

## 2025-01-01 RX ORDER — LORAZEPAM 2 MG/ML
0.5 CONCENTRATE ORAL
Status: DISCONTINUED | OUTPATIENT
Start: 2025-01-01 | End: 2025-01-01 | Stop reason: HOSPADM

## 2025-01-01 RX ORDER — DIAZEPAM 10 MG/2ML
10 INJECTION, SOLUTION INTRAMUSCULAR; INTRAVENOUS
Status: CANCELLED | OUTPATIENT
Start: 2025-01-01 | End: 2025-02-11

## 2025-01-01 RX ORDER — BISACODYL 10 MG
10 SUPPOSITORY, RECTAL RECTAL DAILY PRN
Status: CANCELLED | OUTPATIENT
Start: 2025-01-01

## 2025-01-01 RX ORDER — ACETAMINOPHEN 160 MG/5ML
650 SOLUTION ORAL EVERY 4 HOURS PRN
Status: CANCELLED | OUTPATIENT
Start: 2025-01-01

## 2025-01-01 RX ORDER — CLONAZEPAM 0.5 MG/1
0.5 TABLET ORAL
Status: DISCONTINUED | OUTPATIENT
Start: 2025-01-01 | End: 2025-01-01

## 2025-01-01 RX ORDER — AMOXICILLIN 250 MG
2 CAPSULE ORAL
Status: DISCONTINUED | OUTPATIENT
Start: 2025-01-01 | End: 2025-01-01

## 2025-01-01 RX ORDER — ACETAMINOPHEN 650 MG/1
650 SUPPOSITORY RECTAL EVERY 4 HOURS PRN
Status: CANCELLED | OUTPATIENT
Start: 2025-01-01

## 2025-01-01 RX ORDER — FENTANYL CITRATE 50 UG/ML
INJECTION, SOLUTION INTRAMUSCULAR; INTRAVENOUS
Status: DISCONTINUED | OUTPATIENT
Start: 2025-01-01 | End: 2025-01-01 | Stop reason: HOSPADM

## 2025-01-01 RX ORDER — MORPHINE SULFATE 4 MG/ML
4 INJECTION, SOLUTION INTRAMUSCULAR; INTRAVENOUS
Status: DISCONTINUED | OUTPATIENT
Start: 2025-01-01 | End: 2025-01-01 | Stop reason: HOSPADM

## 2025-01-01 RX ORDER — GLYCOPYRROLATE 0.2 MG/ML
0.2 INJECTION INTRAMUSCULAR; INTRAVENOUS
Status: DISCONTINUED | OUTPATIENT
Start: 2025-01-01 | End: 2025-02-09 | Stop reason: HOSPADM

## 2025-01-01 RX ORDER — SODIUM CHLORIDE 0.9 % (FLUSH) 0.9 %
10 SYRINGE (ML) INJECTION AS NEEDED
Status: DISCONTINUED | OUTPATIENT
Start: 2025-01-01 | End: 2025-01-01 | Stop reason: HOSPADM

## 2025-01-01 RX ORDER — ACETAMINOPHEN 325 MG/1
650 TABLET ORAL EVERY 4 HOURS PRN
Status: CANCELLED | OUTPATIENT
Start: 2025-01-01

## 2025-01-01 RX ORDER — DIAZEPAM 10 MG/2ML
10 INJECTION, SOLUTION INTRAMUSCULAR; INTRAVENOUS
Status: DISCONTINUED | OUTPATIENT
Start: 2025-01-01 | End: 2025-02-09 | Stop reason: HOSPADM

## 2025-01-01 RX ORDER — DIAZEPAM 10 MG/2ML
5 INJECTION, SOLUTION INTRAMUSCULAR; INTRAVENOUS
Status: DISCONTINUED | OUTPATIENT
Start: 2025-01-01 | End: 2025-02-09 | Stop reason: HOSPADM

## 2025-01-01 RX ORDER — MORPHINE SULFATE 20 MG/ML
20 SOLUTION ORAL
Status: CANCELLED | OUTPATIENT
Start: 2025-01-01 | End: 2025-02-13

## 2025-01-01 RX ORDER — POLYETHYLENE GLYCOL 3350 17 G/17G
17 POWDER, FOR SOLUTION ORAL DAILY PRN
Status: DISCONTINUED | OUTPATIENT
Start: 2025-01-01 | End: 2025-01-01

## 2025-01-01 RX ORDER — MORPHINE SULFATE 2 MG/ML
2 INJECTION, SOLUTION INTRAMUSCULAR; INTRAVENOUS
Status: DISCONTINUED | OUTPATIENT
Start: 2025-01-01 | End: 2025-02-09 | Stop reason: HOSPADM

## 2025-01-01 RX ORDER — GLYCOPYRROLATE 0.2 MG/ML
0.2 INJECTION INTRAMUSCULAR; INTRAVENOUS
Status: CANCELLED | OUTPATIENT
Start: 2025-01-01

## 2025-01-01 RX ORDER — ACETAMINOPHEN 160 MG/5ML
650 SOLUTION ORAL EVERY 4 HOURS PRN
Status: DISCONTINUED | OUTPATIENT
Start: 2025-01-01 | End: 2025-01-01 | Stop reason: HOSPADM

## 2025-01-01 RX ORDER — DIPHENOXYLATE HYDROCHLORIDE AND ATROPINE SULFATE 2.5; .025 MG/1; MG/1
1 TABLET ORAL
Status: CANCELLED | OUTPATIENT
Start: 2025-01-01

## 2025-01-01 RX ORDER — OLANZAPINE 5 MG/1
2.5 TABLET ORAL 2 TIMES DAILY
Status: DISCONTINUED | OUTPATIENT
Start: 2025-01-01 | End: 2025-01-03

## 2025-01-01 RX ORDER — KETOROLAC TROMETHAMINE 15 MG/ML
15 INJECTION, SOLUTION INTRAMUSCULAR; INTRAVENOUS EVERY 6 HOURS PRN
Status: CANCELLED | OUTPATIENT
Start: 2025-01-01 | End: 2025-02-11

## 2025-01-01 RX ORDER — MORPHINE SULFATE 20 MG/ML
20 SOLUTION ORAL
Status: DISCONTINUED | OUTPATIENT
Start: 2025-01-01 | End: 2025-02-09 | Stop reason: HOSPADM

## 2025-01-01 RX ORDER — ATORVASTATIN CALCIUM 20 MG/1
10 TABLET, FILM COATED ORAL DAILY
Status: DISCONTINUED | OUTPATIENT
Start: 2025-01-01 | End: 2025-01-01

## 2025-01-01 RX ORDER — MORPHINE SULFATE 20 MG/ML
10 SOLUTION ORAL
Status: DISCONTINUED | OUTPATIENT
Start: 2025-01-01 | End: 2025-02-09 | Stop reason: HOSPADM

## 2025-01-01 RX ORDER — MORPHINE SULFATE 10 MG/ML
6 INJECTION, SOLUTION INTRAMUSCULAR; INTRAVENOUS
Status: DISCONTINUED | OUTPATIENT
Start: 2025-01-01 | End: 2025-01-01 | Stop reason: HOSPADM

## 2025-01-01 RX ORDER — MULTIVITAMIN WITH IRON
1000 TABLET ORAL DAILY
Status: DISCONTINUED | OUTPATIENT
Start: 2025-01-01 | End: 2025-01-01

## 2025-01-01 RX ORDER — SODIUM CHLORIDE 9 MG/ML
40 INJECTION, SOLUTION INTRAVENOUS AS NEEDED
Status: DISCONTINUED | OUTPATIENT
Start: 2025-01-01 | End: 2025-02-09 | Stop reason: HOSPADM

## 2025-01-01 RX ORDER — MORPHINE SULFATE 20 MG/ML
5 SOLUTION ORAL
Status: CANCELLED | OUTPATIENT
Start: 2025-01-01 | End: 2025-02-13

## 2025-01-01 RX ORDER — DIPHENOXYLATE HYDROCHLORIDE AND ATROPINE SULFATE 2.5; .025 MG/1; MG/1
1 TABLET ORAL
Status: DISCONTINUED | OUTPATIENT
Start: 2025-01-01 | End: 2025-02-09 | Stop reason: HOSPADM

## 2025-01-01 RX ORDER — DIAZEPAM 10 MG/2ML
2.5 INJECTION, SOLUTION INTRAMUSCULAR; INTRAVENOUS
Status: CANCELLED | OUTPATIENT
Start: 2025-01-01 | End: 2025-02-11

## 2025-01-01 RX ORDER — MORPHINE SULFATE 2 MG/ML
2 INJECTION, SOLUTION INTRAMUSCULAR; INTRAVENOUS
Status: CANCELLED | OUTPATIENT
Start: 2025-01-01 | End: 2025-02-13

## 2025-01-01 RX ORDER — DIAZEPAM 10 MG/2ML
2.5 INJECTION, SOLUTION INTRAMUSCULAR; INTRAVENOUS
Status: DISCONTINUED | OUTPATIENT
Start: 2025-01-01 | End: 2025-01-01 | Stop reason: HOSPADM

## 2025-01-01 RX ORDER — ACETAMINOPHEN 325 MG/1
650 TABLET ORAL EVERY 4 HOURS PRN
Status: DISCONTINUED | OUTPATIENT
Start: 2025-01-01 | End: 2025-01-01 | Stop reason: HOSPADM

## 2025-01-01 RX ORDER — LORAZEPAM 2 MG/ML
1 CONCENTRATE ORAL
Status: CANCELLED | OUTPATIENT
Start: 2025-01-01 | End: 2025-02-11

## 2025-01-01 RX ORDER — LORAZEPAM 1 MG/1
2 TABLET ORAL
Status: DISCONTINUED | OUTPATIENT
Start: 2025-01-01 | End: 2025-01-01 | Stop reason: HOSPADM

## 2025-01-01 RX ORDER — CLONAZEPAM 0.5 MG/1
0.5 TABLET ORAL 2 TIMES DAILY
Status: DISCONTINUED | OUTPATIENT
Start: 2025-01-01 | End: 2025-01-01

## 2025-01-01 RX ORDER — AMOXICILLIN 250 MG
2 CAPSULE ORAL 2 TIMES DAILY
Status: DISCONTINUED | OUTPATIENT
Start: 2025-01-01 | End: 2025-01-01

## 2025-01-01 RX ORDER — MORPHINE SULFATE 20 MG/ML
10 SOLUTION ORAL
Status: CANCELLED | OUTPATIENT
Start: 2025-01-01 | End: 2025-02-13

## 2025-01-01 RX ORDER — GLYCOPYRROLATE 0.2 MG/ML
0.2 INJECTION INTRAMUSCULAR; INTRAVENOUS
Status: DISCONTINUED | OUTPATIENT
Start: 2025-01-01 | End: 2025-01-01 | Stop reason: HOSPADM

## 2025-01-01 RX ORDER — DIVALPROEX SODIUM 125 MG/1
250 CAPSULE, COATED PELLETS ORAL
Status: DISCONTINUED | OUTPATIENT
Start: 2025-01-01 | End: 2025-01-01

## 2025-01-01 RX ORDER — SODIUM CHLORIDE 0.9 % (FLUSH) 0.9 %
10 SYRINGE (ML) INJECTION AS NEEDED
Status: CANCELLED | OUTPATIENT
Start: 2025-01-01

## 2025-01-01 RX ORDER — MORPHINE SULFATE 2 MG/ML
2 INJECTION, SOLUTION INTRAMUSCULAR; INTRAVENOUS
Status: DISCONTINUED | OUTPATIENT
Start: 2025-01-01 | End: 2025-01-01 | Stop reason: HOSPADM

## 2025-01-01 RX ORDER — LORAZEPAM 2 MG/ML
0.5 CONCENTRATE ORAL
Status: CANCELLED | OUTPATIENT
Start: 2025-01-01 | End: 2025-02-11

## 2025-01-01 RX ORDER — KETOROLAC TROMETHAMINE 15 MG/ML
15 INJECTION, SOLUTION INTRAMUSCULAR; INTRAVENOUS EVERY 6 HOURS PRN
Status: DISCONTINUED | OUTPATIENT
Start: 2025-01-01 | End: 2025-01-01 | Stop reason: HOSPADM

## 2025-01-01 RX ORDER — LORAZEPAM 2 MG/ML
2 CONCENTRATE ORAL
Status: DISCONTINUED | OUTPATIENT
Start: 2025-01-01 | End: 2025-02-09 | Stop reason: HOSPADM

## 2025-01-01 RX ORDER — MORPHINE SULFATE 4 MG/ML
4 INJECTION, SOLUTION INTRAMUSCULAR; INTRAVENOUS
Status: CANCELLED | OUTPATIENT
Start: 2025-01-01 | End: 2025-02-13

## 2025-01-01 RX ORDER — MORPHINE SULFATE 20 MG/ML
5 SOLUTION ORAL
Status: DISCONTINUED | OUTPATIENT
Start: 2025-01-01 | End: 2025-01-01 | Stop reason: HOSPADM

## 2025-01-01 RX ORDER — SODIUM CHLORIDE 9 MG/ML
40 INJECTION, SOLUTION INTRAVENOUS AS NEEDED
Status: CANCELLED | OUTPATIENT
Start: 2025-01-01

## 2025-01-01 RX ORDER — DIVALPROEX SODIUM 125 MG/1
250 CAPSULE, COATED PELLETS ORAL 3 TIMES DAILY
Status: DISCONTINUED | OUTPATIENT
Start: 2025-01-01 | End: 2025-01-01

## 2025-01-01 RX ORDER — BISACODYL 10 MG
10 SUPPOSITORY, RECTAL RECTAL DAILY PRN
Status: DISCONTINUED | OUTPATIENT
Start: 2025-01-01 | End: 2025-01-01 | Stop reason: HOSPADM

## 2025-01-01 RX ORDER — LORAZEPAM 0.5 MG/1
0.5 TABLET ORAL
Status: DISCONTINUED | OUTPATIENT
Start: 2025-01-01 | End: 2025-01-01 | Stop reason: HOSPADM

## 2025-01-01 RX ORDER — MORPHINE SULFATE 10 MG/ML
6 INJECTION, SOLUTION INTRAMUSCULAR; INTRAVENOUS
Status: CANCELLED | OUTPATIENT
Start: 2025-01-01 | End: 2025-02-13

## 2025-01-01 RX ORDER — LORAZEPAM 0.5 MG/1
0.5 TABLET ORAL
Status: DISCONTINUED | OUTPATIENT
Start: 2025-01-01 | End: 2025-02-09 | Stop reason: HOSPADM

## 2025-01-01 RX ORDER — ACETAMINOPHEN 650 MG/1
650 SUPPOSITORY RECTAL EVERY 4 HOURS PRN
Status: DISCONTINUED | OUTPATIENT
Start: 2025-01-01 | End: 2025-02-09 | Stop reason: HOSPADM

## 2025-01-01 RX ORDER — KETOROLAC TROMETHAMINE 15 MG/ML
15 INJECTION, SOLUTION INTRAMUSCULAR; INTRAVENOUS EVERY 6 HOURS PRN
Status: DISCONTINUED | OUTPATIENT
Start: 2025-01-01 | End: 2025-02-09 | Stop reason: HOSPADM

## 2025-01-01 RX ORDER — MORPHINE SULFATE 20 MG/ML
10 SOLUTION ORAL
Status: DISCONTINUED | OUTPATIENT
Start: 2025-01-01 | End: 2025-01-01 | Stop reason: HOSPADM

## 2025-01-01 RX ORDER — SODIUM CHLORIDE 0.9 % (FLUSH) 0.9 %
10 SYRINGE (ML) INJECTION EVERY 12 HOURS SCHEDULED
Status: DISCONTINUED | OUTPATIENT
Start: 2025-01-01 | End: 2025-01-01

## 2025-01-01 RX ORDER — ACETAMINOPHEN 325 MG/1
650 TABLET ORAL EVERY 4 HOURS PRN
Status: DISCONTINUED | OUTPATIENT
Start: 2025-01-01 | End: 2025-01-01 | Stop reason: SDUPTHER

## 2025-01-01 RX ORDER — MIDAZOLAM HYDROCHLORIDE 1 MG/ML
INJECTION, SOLUTION INTRAMUSCULAR; INTRAVENOUS
Status: DISCONTINUED | OUTPATIENT
Start: 2025-01-01 | End: 2025-01-01 | Stop reason: HOSPADM

## 2025-01-01 RX ORDER — DIGOXIN 125 MCG
125 TABLET ORAL DAILY
Status: DISCONTINUED | OUTPATIENT
Start: 2025-01-01 | End: 2025-01-01

## 2025-01-01 RX ORDER — ACETAMINOPHEN 325 MG/1
650 TABLET ORAL EVERY 4 HOURS PRN
Status: DISCONTINUED | OUTPATIENT
Start: 2025-01-01 | End: 2025-02-09 | Stop reason: HOSPADM

## 2025-01-01 RX ORDER — MEGESTROL ACETATE 40 MG/ML
400 SUSPENSION ORAL DAILY
Status: DISCONTINUED | OUTPATIENT
Start: 2025-01-01 | End: 2025-01-01

## 2025-01-01 RX ORDER — SODIUM CHLORIDE 9 MG/ML
40 INJECTION, SOLUTION INTRAVENOUS AS NEEDED
Status: DISCONTINUED | OUTPATIENT
Start: 2025-01-01 | End: 2025-01-01 | Stop reason: HOSPADM

## 2025-01-01 RX ORDER — ENOXAPARIN SODIUM 150 MG/ML
1 INJECTION SUBCUTANEOUS EVERY 12 HOURS
Status: DISCONTINUED | OUTPATIENT
Start: 2025-01-01 | End: 2025-01-09

## 2025-01-01 RX ORDER — ACETAMINOPHEN 650 MG/1
650 SUPPOSITORY RECTAL EVERY 4 HOURS PRN
Status: DISCONTINUED | OUTPATIENT
Start: 2025-01-01 | End: 2025-01-01 | Stop reason: HOSPADM

## 2025-01-01 RX ORDER — LORAZEPAM 0.5 MG/1
0.5 TABLET ORAL
Status: CANCELLED | OUTPATIENT
Start: 2025-01-01 | End: 2025-02-11

## 2025-01-01 RX ORDER — TAMSULOSIN HYDROCHLORIDE 0.4 MG/1
0.4 CAPSULE ORAL DAILY
Status: DISCONTINUED | OUTPATIENT
Start: 2025-01-01 | End: 2025-01-01

## 2025-01-01 RX ORDER — MORPHINE SULFATE 20 MG/ML
20 SOLUTION ORAL
Status: DISCONTINUED | OUTPATIENT
Start: 2025-01-01 | End: 2025-01-01 | Stop reason: HOSPADM

## 2025-01-01 RX ORDER — BISACODYL 5 MG/1
5 TABLET, DELAYED RELEASE ORAL DAILY PRN
Status: DISCONTINUED | OUTPATIENT
Start: 2025-01-01 | End: 2025-01-01

## 2025-01-01 RX ORDER — GLYCOPYRROLATE 0.2 MG/ML
0.4 INJECTION INTRAMUSCULAR; INTRAVENOUS
Status: DISCONTINUED | OUTPATIENT
Start: 2025-01-01 | End: 2025-01-01 | Stop reason: HOSPADM

## 2025-01-01 RX ORDER — ACETAMINOPHEN 650 MG/1
650 SUPPOSITORY RECTAL EVERY 4 HOURS PRN
Status: DISCONTINUED | OUTPATIENT
Start: 2025-01-01 | End: 2025-01-01 | Stop reason: SDUPTHER

## 2025-01-01 RX ORDER — LORAZEPAM 2 MG/ML
1 CONCENTRATE ORAL
Status: DISCONTINUED | OUTPATIENT
Start: 2025-01-01 | End: 2025-02-09 | Stop reason: HOSPADM

## 2025-01-01 RX ORDER — LORAZEPAM 1 MG/1
1 TABLET ORAL
Status: DISCONTINUED | OUTPATIENT
Start: 2025-01-01 | End: 2025-01-01 | Stop reason: HOSPADM

## 2025-01-01 RX ORDER — HEPARIN SODIUM 1000 [USP'U]/ML
INJECTION, SOLUTION INTRAVENOUS; SUBCUTANEOUS
Status: DISCONTINUED | OUTPATIENT
Start: 2025-01-01 | End: 2025-01-01 | Stop reason: HOSPADM

## 2025-01-01 RX ORDER — METOPROLOL TARTRATE 50 MG
100 TABLET ORAL
Status: DISCONTINUED | OUTPATIENT
Start: 2025-01-01 | End: 2025-01-01

## 2025-01-01 RX ORDER — HYDROMORPHONE HYDROCHLORIDE 1 MG/ML
0.5 INJECTION, SOLUTION INTRAMUSCULAR; INTRAVENOUS; SUBCUTANEOUS
Status: CANCELLED | OUTPATIENT
Start: 2025-01-01 | End: 2025-02-13

## 2025-01-01 RX ORDER — LORAZEPAM 2 MG/ML
1 CONCENTRATE ORAL
Status: DISCONTINUED | OUTPATIENT
Start: 2025-01-01 | End: 2025-01-01 | Stop reason: HOSPADM

## 2025-01-01 RX ORDER — LORAZEPAM 2 MG/ML
2 CONCENTRATE ORAL
Status: DISCONTINUED | OUTPATIENT
Start: 2025-01-01 | End: 2025-01-01 | Stop reason: HOSPADM

## 2025-01-01 RX ORDER — LORAZEPAM 2 MG/ML
0.5 CONCENTRATE ORAL
Status: DISCONTINUED | OUTPATIENT
Start: 2025-01-01 | End: 2025-02-09 | Stop reason: HOSPADM

## 2025-01-01 RX ORDER — ENALAPRILAT 1.25 MG/ML
0.62 INJECTION INTRAVENOUS EVERY 6 HOURS PRN
Status: DISCONTINUED | OUTPATIENT
Start: 2025-01-01 | End: 2025-01-23 | Stop reason: HOSPADM

## 2025-01-01 RX ORDER — ATORVASTATIN CALCIUM 10 MG/1
10 TABLET, FILM COATED ORAL NIGHTLY
Status: DISCONTINUED | OUTPATIENT
Start: 2025-01-01 | End: 2025-01-01

## 2025-01-01 RX ORDER — DIAZEPAM 10 MG/2ML
5 INJECTION, SOLUTION INTRAMUSCULAR; INTRAVENOUS
Status: DISCONTINUED | OUTPATIENT
Start: 2025-01-01 | End: 2025-01-01 | Stop reason: HOSPADM

## 2025-01-01 RX ORDER — BISACODYL 10 MG
10 SUPPOSITORY, RECTAL RECTAL DAILY PRN
Status: DISCONTINUED | OUTPATIENT
Start: 2025-01-01 | End: 2025-01-01

## 2025-01-01 RX ORDER — DIAZEPAM 10 MG/2ML
2.5 INJECTION, SOLUTION INTRAMUSCULAR; INTRAVENOUS
Status: DISCONTINUED | OUTPATIENT
Start: 2025-01-01 | End: 2025-02-09 | Stop reason: HOSPADM

## 2025-01-01 RX ORDER — DIAZEPAM 10 MG/2ML
10 INJECTION, SOLUTION INTRAMUSCULAR; INTRAVENOUS
Status: DISCONTINUED | OUTPATIENT
Start: 2025-01-01 | End: 2025-01-01 | Stop reason: HOSPADM

## 2025-01-01 RX ORDER — LORAZEPAM 2 MG/ML
2 CONCENTRATE ORAL
Status: CANCELLED | OUTPATIENT
Start: 2025-01-01 | End: 2025-02-11

## 2025-01-01 RX ORDER — METHOHEXITAL IN WATER/PF 100MG/10ML
SYRINGE (ML) INTRAVENOUS
Status: DISCONTINUED | OUTPATIENT
Start: 2025-01-01 | End: 2025-01-01 | Stop reason: HOSPADM

## 2025-01-01 RX ORDER — DIPHENOXYLATE HYDROCHLORIDE AND ATROPINE SULFATE 2.5; .025 MG/1; MG/1
1 TABLET ORAL
Status: DISCONTINUED | OUTPATIENT
Start: 2025-01-01 | End: 2025-01-01 | Stop reason: HOSPADM

## 2025-01-01 RX ORDER — METOPROLOL TARTRATE 50 MG
100 TABLET ORAL EVERY 12 HOURS SCHEDULED
Status: DISCONTINUED | OUTPATIENT
Start: 2025-01-01 | End: 2025-01-01

## 2025-01-01 RX ORDER — SODIUM CHLORIDE 0.9 % (FLUSH) 0.9 %
10 SYRINGE (ML) INJECTION AS NEEDED
Status: DISCONTINUED | OUTPATIENT
Start: 2025-01-01 | End: 2025-02-09 | Stop reason: HOSPADM

## 2025-01-01 RX ORDER — LORAZEPAM 1 MG/1
1 TABLET ORAL
Status: CANCELLED | OUTPATIENT
Start: 2025-01-01 | End: 2025-02-11

## 2025-01-01 RX ORDER — LORAZEPAM 1 MG/1
2 TABLET ORAL
Status: DISCONTINUED | OUTPATIENT
Start: 2025-01-01 | End: 2025-02-09 | Stop reason: HOSPADM

## 2025-01-01 RX ORDER — HYDROMORPHONE HYDROCHLORIDE 1 MG/ML
0.5 INJECTION, SOLUTION INTRAMUSCULAR; INTRAVENOUS; SUBCUTANEOUS
Status: DISCONTINUED | OUTPATIENT
Start: 2025-01-01 | End: 2025-01-01 | Stop reason: HOSPADM

## 2025-01-01 RX ORDER — HYDROMORPHONE HYDROCHLORIDE 1 MG/ML
0.5 INJECTION, SOLUTION INTRAMUSCULAR; INTRAVENOUS; SUBCUTANEOUS
Status: DISCONTINUED | OUTPATIENT
Start: 2025-01-01 | End: 2025-02-09 | Stop reason: HOSPADM

## 2025-01-01 RX ORDER — LORAZEPAM 1 MG/1
1 TABLET ORAL
Status: DISCONTINUED | OUTPATIENT
Start: 2025-01-01 | End: 2025-02-09 | Stop reason: HOSPADM

## 2025-01-01 RX ORDER — OLANZAPINE 10 MG/2ML
5 INJECTION, POWDER, FOR SOLUTION INTRAMUSCULAR EVERY 8 HOURS PRN
Status: DISCONTINUED | OUTPATIENT
Start: 2025-01-01 | End: 2025-02-09 | Stop reason: HOSPADM

## 2025-01-01 RX ORDER — ATORVASTATIN CALCIUM 10 MG/1
10 TABLET, FILM COATED ORAL DAILY
Status: DISCONTINUED | OUTPATIENT
Start: 2025-01-01 | End: 2025-01-01

## 2025-01-01 RX ORDER — HYDROMORPHONE HYDROCHLORIDE 2 MG/ML
1.5 INJECTION, SOLUTION INTRAMUSCULAR; INTRAVENOUS; SUBCUTANEOUS
Status: DISCONTINUED | OUTPATIENT
Start: 2025-01-01 | End: 2025-02-09 | Stop reason: HOSPADM

## 2025-01-01 RX ORDER — HYDROMORPHONE HYDROCHLORIDE 2 MG/ML
1.5 INJECTION, SOLUTION INTRAMUSCULAR; INTRAVENOUS; SUBCUTANEOUS
Status: DISCONTINUED | OUTPATIENT
Start: 2025-01-01 | End: 2025-01-01 | Stop reason: HOSPADM

## 2025-01-01 RX ORDER — MORPHINE SULFATE 4 MG/ML
4 INJECTION, SOLUTION INTRAMUSCULAR; INTRAVENOUS
Status: DISCONTINUED | OUTPATIENT
Start: 2025-01-01 | End: 2025-02-09 | Stop reason: HOSPADM

## 2025-01-01 RX ORDER — LORAZEPAM 1 MG/1
2 TABLET ORAL
Status: CANCELLED | OUTPATIENT
Start: 2025-01-01 | End: 2025-02-11

## 2025-01-01 RX ORDER — OLANZAPINE 10 MG/2ML
5 INJECTION, POWDER, FOR SOLUTION INTRAMUSCULAR EVERY 8 HOURS PRN
Status: DISCONTINUED | OUTPATIENT
Start: 2025-01-01 | End: 2025-01-01 | Stop reason: HOSPADM

## 2025-01-01 RX ORDER — OLANZAPINE 10 MG/2ML
5 INJECTION, POWDER, FOR SOLUTION INTRAMUSCULAR EVERY 8 HOURS PRN
Status: DISCONTINUED | OUTPATIENT
Start: 2025-01-01 | End: 2025-01-23 | Stop reason: HOSPADM

## 2025-01-01 RX ORDER — MORPHINE SULFATE 10 MG/ML
6 INJECTION, SOLUTION INTRAMUSCULAR; INTRAVENOUS
Status: DISCONTINUED | OUTPATIENT
Start: 2025-01-01 | End: 2025-02-09 | Stop reason: HOSPADM

## 2025-01-01 RX ORDER — OLANZAPINE 5 MG/1
7.5 TABLET ORAL 2 TIMES DAILY
Status: DISCONTINUED | OUTPATIENT
Start: 2025-01-01 | End: 2025-01-01

## 2025-01-01 RX ORDER — BISACODYL 10 MG
10 SUPPOSITORY, RECTAL RECTAL DAILY PRN
Status: DISCONTINUED | OUTPATIENT
Start: 2025-01-01 | End: 2025-02-09 | Stop reason: HOSPADM

## 2025-01-01 RX ORDER — MORPHINE SULFATE 20 MG/ML
5 SOLUTION ORAL
Status: DISCONTINUED | OUTPATIENT
Start: 2025-01-01 | End: 2025-02-09 | Stop reason: HOSPADM

## 2025-01-01 RX ADMIN — Medication 10 ML: at 08:39

## 2025-01-01 RX ADMIN — CLONAZEPAM 0.5 MG: 0.5 TABLET ORAL at 08:00

## 2025-01-01 RX ADMIN — Medication 10 ML: at 21:25

## 2025-01-01 RX ADMIN — Medication 1000 MCG: at 08:38

## 2025-01-01 RX ADMIN — METOPROLOL TARTRATE 5 MG: 1 INJECTION, SOLUTION INTRAVENOUS at 22:17

## 2025-01-01 RX ADMIN — DIGOXIN 125 MCG: 0.12 TABLET ORAL at 21:37

## 2025-01-01 RX ADMIN — Medication 1000 MCG: at 21:36

## 2025-01-01 RX ADMIN — MEGESTROL ACETATE 400 MG: 40 SUSPENSION ORAL at 09:12

## 2025-01-01 RX ADMIN — CLONAZEPAM 0.5 MG: 0.5 TABLET ORAL at 08:30

## 2025-01-01 RX ADMIN — METOPROLOL TARTRATE 5 MG: 1 INJECTION, SOLUTION INTRAVENOUS at 18:27

## 2025-01-01 RX ADMIN — Medication 10 ML: at 22:16

## 2025-01-01 RX ADMIN — OLANZAPINE 5 MG: 10 INJECTION, POWDER, FOR SOLUTION INTRAMUSCULAR at 21:25

## 2025-01-01 RX ADMIN — OLANZAPINE 2.5 MG: 5 TABLET, FILM COATED ORAL at 20:16

## 2025-01-01 RX ADMIN — DIAZEPAM 10 MG: 5 INJECTION INTRAMUSCULAR; INTRAVENOUS at 10:26

## 2025-01-01 RX ADMIN — Medication 10 ML: at 08:30

## 2025-01-01 RX ADMIN — OLANZAPINE 7.5 MG: 2.5 TABLET, FILM COATED ORAL at 08:06

## 2025-01-01 RX ADMIN — ATORVASTATIN CALCIUM 10 MG: 10 TABLET, FILM COATED ORAL at 08:38

## 2025-01-01 RX ADMIN — DIVALPROEX SODIUM 250 MG: 125 CAPSULE, COATED PELLETS ORAL at 08:32

## 2025-01-01 RX ADMIN — DIAZEPAM 10 MG: 5 INJECTION INTRAMUSCULAR; INTRAVENOUS at 22:17

## 2025-01-01 RX ADMIN — OLANZAPINE 7.5 MG: 2.5 TABLET, FILM COATED ORAL at 10:35

## 2025-01-01 RX ADMIN — DIAZEPAM 5 MG: 5 INJECTION INTRAMUSCULAR; INTRAVENOUS at 11:39

## 2025-01-01 RX ADMIN — SENNOSIDES AND DOCUSATE SODIUM 2 TABLET: 50; 8.6 TABLET ORAL at 21:41

## 2025-01-01 RX ADMIN — LORAZEPAM 0.25 MG: 2 INJECTION INTRAMUSCULAR; INTRAVENOUS at 21:37

## 2025-01-01 RX ADMIN — METOPROLOL TARTRATE 100 MG: 50 TABLET, FILM COATED ORAL at 08:30

## 2025-01-01 RX ADMIN — DIVALPROEX SODIUM 250 MG: 125 CAPSULE, COATED PELLETS ORAL at 14:23

## 2025-01-01 RX ADMIN — TAMSULOSIN HYDROCHLORIDE 0.4 MG: 0.4 CAPSULE ORAL at 08:32

## 2025-01-01 RX ADMIN — ATORVASTATIN CALCIUM 10 MG: 10 TABLET, FILM COATED ORAL at 20:28

## 2025-01-01 RX ADMIN — DIVALPROEX SODIUM 250 MG: 125 CAPSULE, COATED PELLETS ORAL at 18:36

## 2025-01-01 RX ADMIN — Medication 1000 MCG: at 09:12

## 2025-01-01 RX ADMIN — CEFTRIAXONE SODIUM 1000 MG: 1 INJECTION, POWDER, FOR SOLUTION INTRAMUSCULAR; INTRAVENOUS at 04:31

## 2025-01-01 RX ADMIN — TAMSULOSIN HYDROCHLORIDE 0.4 MG: 0.4 CAPSULE ORAL at 08:38

## 2025-01-01 RX ADMIN — SODIUM CHLORIDE 250 ML: 9 INJECTION, SOLUTION INTRAVENOUS at 08:31

## 2025-01-01 RX ADMIN — Medication 10 ML: at 01:00

## 2025-01-01 RX ADMIN — DIVALPROEX SODIUM 250 MG: 125 CAPSULE, COATED PELLETS ORAL at 08:53

## 2025-01-01 RX ADMIN — DIVALPROEX SODIUM 250 MG: 125 CAPSULE, COATED PELLETS ORAL at 08:06

## 2025-01-01 RX ADMIN — MORPHINE SULFATE 4 MG: 4 INJECTION, SOLUTION INTRAMUSCULAR; INTRAVENOUS at 13:36

## 2025-01-01 RX ADMIN — CLONAZEPAM 0.5 MG: 0.5 TABLET ORAL at 16:35

## 2025-01-01 RX ADMIN — OLANZAPINE 7.5 MG: 5 TABLET, FILM COATED ORAL at 21:41

## 2025-01-01 RX ADMIN — Medication 10 ML: at 20:39

## 2025-01-01 RX ADMIN — OLANZAPINE 7.5 MG: 2.5 TABLET, FILM COATED ORAL at 17:09

## 2025-01-01 RX ADMIN — MORPHINE SULFATE 4 MG: 4 INJECTION, SOLUTION INTRAMUSCULAR; INTRAVENOUS at 22:17

## 2025-01-01 RX ADMIN — DIAZEPAM 10 MG: 5 INJECTION INTRAMUSCULAR; INTRAVENOUS at 08:04

## 2025-01-01 RX ADMIN — MORPHINE SULFATE 2 MG: 2 INJECTION, SOLUTION INTRAMUSCULAR; INTRAVENOUS at 13:55

## 2025-01-01 RX ADMIN — Medication 10 ML: at 09:00

## 2025-01-01 RX ADMIN — ACETAMINOPHEN 650 MG: 325 TABLET, FILM COATED ORAL at 23:19

## 2025-01-01 RX ADMIN — SENNOSIDES AND DOCUSATE SODIUM 2 TABLET: 50; 8.6 TABLET ORAL at 08:34

## 2025-01-01 RX ADMIN — ATORVASTATIN CALCIUM 10 MG: 10 TABLET, FILM COATED ORAL at 21:44

## 2025-01-01 RX ADMIN — Medication 10 ML: at 08:49

## 2025-01-01 RX ADMIN — OLANZAPINE 7.5 MG: 2.5 TABLET, FILM COATED ORAL at 08:52

## 2025-01-01 RX ADMIN — DIGOXIN 125 MCG: 0.12 TABLET ORAL at 17:44

## 2025-01-01 RX ADMIN — Medication 1000 MCG: at 10:35

## 2025-01-01 RX ADMIN — OLANZAPINE 7.5 MG: 2.5 TABLET, FILM COATED ORAL at 18:34

## 2025-01-01 RX ADMIN — MORPHINE SULFATE 4 MG: 4 INJECTION, SOLUTION INTRAMUSCULAR; INTRAVENOUS at 10:26

## 2025-01-01 RX ADMIN — Medication 10 ML: at 08:38

## 2025-01-01 RX ADMIN — CLONAZEPAM 0.5 MG: 0.5 TABLET ORAL at 08:06

## 2025-01-01 RX ADMIN — DIAZEPAM 10 MG: 5 INJECTION INTRAMUSCULAR; INTRAVENOUS at 17:24

## 2025-01-01 RX ADMIN — Medication 10 ML: at 10:00

## 2025-01-01 RX ADMIN — Medication 10 ML: at 09:48

## 2025-01-01 RX ADMIN — MORPHINE SULFATE 4 MG: 4 INJECTION, SOLUTION INTRAMUSCULAR; INTRAVENOUS at 05:06

## 2025-01-01 RX ADMIN — DIVALPROEX SODIUM 250 MG: 125 CAPSULE, COATED PELLETS ORAL at 17:09

## 2025-01-01 RX ADMIN — MORPHINE SULFATE 2 MG: 2 INJECTION, SOLUTION INTRAMUSCULAR; INTRAVENOUS at 08:04

## 2025-01-01 RX ADMIN — DIAZEPAM 5 MG: 5 INJECTION INTRAMUSCULAR; INTRAVENOUS at 13:56

## 2025-01-01 RX ADMIN — DIAZEPAM 10 MG: 5 INJECTION INTRAMUSCULAR; INTRAVENOUS at 08:59

## 2025-01-01 RX ADMIN — MEGESTROL ACETATE 400 MG: 40 SUSPENSION ORAL at 10:36

## 2025-01-01 RX ADMIN — DIAZEPAM 10 MG: 5 INJECTION INTRAMUSCULAR; INTRAVENOUS at 16:45

## 2025-01-01 RX ADMIN — MORPHINE SULFATE 4 MG: 4 INJECTION, SOLUTION INTRAMUSCULAR; INTRAVENOUS at 20:34

## 2025-01-01 RX ADMIN — TAMSULOSIN HYDROCHLORIDE 0.4 MG: 0.4 CAPSULE ORAL at 09:12

## 2025-01-01 RX ADMIN — Medication 10 ML: at 23:20

## 2025-01-01 RX ADMIN — Medication 10 ML: at 21:01

## 2025-01-01 RX ADMIN — GLYCOPYRROLATE 0.2 MG: 0.2 INJECTION INTRAMUSCULAR; INTRAVENOUS at 17:24

## 2025-01-01 RX ADMIN — TAMSULOSIN HYDROCHLORIDE 0.4 MG: 0.4 CAPSULE ORAL at 08:34

## 2025-01-01 RX ADMIN — DIAZEPAM 10 MG: 5 INJECTION INTRAMUSCULAR; INTRAVENOUS at 21:28

## 2025-01-01 RX ADMIN — TAMSULOSIN HYDROCHLORIDE 0.4 MG: 0.4 CAPSULE ORAL at 10:35

## 2025-01-01 RX ADMIN — ZOLPIDEM TARTRATE 2.5 MG: 5 TABLET, FILM COATED ORAL at 20:16

## 2025-01-01 RX ADMIN — OLANZAPINE 7.5 MG: 2.5 TABLET, FILM COATED ORAL at 17:31

## 2025-01-01 RX ADMIN — Medication 10 ML: at 08:48

## 2025-01-01 RX ADMIN — MEGESTROL ACETATE 400 MG: 40 SUSPENSION ORAL at 08:38

## 2025-01-01 RX ADMIN — APIXABAN 5 MG: 5 TABLET, FILM COATED ORAL at 21:25

## 2025-01-01 RX ADMIN — APIXABAN 5 MG: 5 TABLET, FILM COATED ORAL at 08:53

## 2025-01-01 RX ADMIN — DIAZEPAM 5 MG: 5 INJECTION INTRAMUSCULAR; INTRAVENOUS at 12:38

## 2025-01-01 RX ADMIN — Medication 10 ML: at 21:00

## 2025-01-01 RX ADMIN — OLANZAPINE 5 MG: 10 INJECTION, POWDER, LYOPHILIZED, FOR SOLUTION INTRAMUSCULAR at 22:52

## 2025-01-01 RX ADMIN — SENNOSIDES AND DOCUSATE SODIUM 2 TABLET: 50; 8.6 TABLET ORAL at 07:30

## 2025-01-01 RX ADMIN — OLANZAPINE 7.5 MG: 2.5 TABLET, FILM COATED ORAL at 16:54

## 2025-01-01 RX ADMIN — DIVALPROEX SODIUM 250 MG: 125 CAPSULE, COATED PELLETS ORAL at 08:00

## 2025-01-01 RX ADMIN — Medication 1000 MCG: at 08:34

## 2025-01-01 RX ADMIN — OLANZAPINE 7.5 MG: 2.5 TABLET, FILM COATED ORAL at 08:35

## 2025-01-01 RX ADMIN — METOPROLOL TARTRATE 5 MG: 1 INJECTION, SOLUTION INTRAVENOUS at 19:17

## 2025-01-01 RX ADMIN — MORPHINE SULFATE 4 MG: 4 INJECTION, SOLUTION INTRAMUSCULAR; INTRAVENOUS at 08:58

## 2025-01-01 RX ADMIN — Medication 10 ML: at 20:30

## 2025-01-01 RX ADMIN — SENNOSIDES AND DOCUSATE SODIUM 2 TABLET: 50; 8.6 TABLET ORAL at 17:31

## 2025-01-01 RX ADMIN — SENNOSIDES AND DOCUSATE SODIUM 2 TABLET: 50; 8.6 TABLET ORAL at 08:00

## 2025-01-01 RX ADMIN — DIVALPROEX SODIUM 250 MG: 125 CAPSULE, COATED PELLETS ORAL at 08:48

## 2025-01-01 RX ADMIN — CLONAZEPAM 0.5 MG: 0.5 TABLET ORAL at 21:44

## 2025-01-01 RX ADMIN — Medication 10 ML: at 23:19

## 2025-01-01 RX ADMIN — OLANZAPINE 7.5 MG: 2.5 TABLET, FILM COATED ORAL at 08:01

## 2025-01-01 RX ADMIN — OLANZAPINE 7.5 MG: 2.5 TABLET, FILM COATED ORAL at 07:30

## 2025-01-01 RX ADMIN — DIVALPROEX SODIUM 250 MG: 125 CAPSULE, COATED PELLETS ORAL at 15:10

## 2025-01-01 RX ADMIN — DIVALPROEX SODIUM 250 MG: 125 CAPSULE, COATED PELLETS ORAL at 10:35

## 2025-01-01 RX ADMIN — DIVALPROEX SODIUM 250 MG: 125 CAPSULE, COATED PELLETS ORAL at 17:31

## 2025-01-01 RX ADMIN — ATORVASTATIN CALCIUM 10 MG: 10 TABLET, FILM COATED ORAL at 10:35

## 2025-01-01 RX ADMIN — APIXABAN 5 MG: 5 TABLET, FILM COATED ORAL at 08:48

## 2025-01-01 RX ADMIN — DIVALPROEX SODIUM 250 MG: 125 CAPSULE, COATED PELLETS ORAL at 18:18

## 2025-01-01 RX ADMIN — TAMSULOSIN HYDROCHLORIDE 0.4 MG: 0.4 CAPSULE ORAL at 17:47

## 2025-01-01 RX ADMIN — MORPHINE SULFATE 4 MG: 4 INJECTION, SOLUTION INTRAMUSCULAR; INTRAVENOUS at 17:23

## 2025-01-01 RX ADMIN — OLANZAPINE 7.5 MG: 2.5 TABLET, FILM COATED ORAL at 16:35

## 2025-01-01 RX ADMIN — SENNOSIDES AND DOCUSATE SODIUM 2 TABLET: 50; 8.6 TABLET ORAL at 10:36

## 2025-01-01 RX ADMIN — APIXABAN 5 MG: 5 TABLET, FILM COATED ORAL at 08:32

## 2025-01-01 RX ADMIN — Medication 1000 MCG: at 08:48

## 2025-01-01 RX ADMIN — METOPROLOL TARTRATE 100 MG: 50 TABLET, FILM COATED ORAL at 21:47

## 2025-01-01 RX ADMIN — Medication 10 ML: at 08:29

## 2025-01-01 RX ADMIN — ATORVASTATIN CALCIUM 10 MG: 10 TABLET, FILM COATED ORAL at 08:53

## 2025-01-01 RX ADMIN — GLYCOPYRROLATE 0.4 MG: 0.2 INJECTION INTRAMUSCULAR; INTRAVENOUS at 20:35

## 2025-01-01 RX ADMIN — TAMSULOSIN HYDROCHLORIDE 0.4 MG: 0.4 CAPSULE ORAL at 08:48

## 2025-01-01 RX ADMIN — DIGOXIN 125 MCG: 0.12 TABLET ORAL at 08:33

## 2025-01-01 RX ADMIN — APIXABAN 5 MG: 5 TABLET, FILM COATED ORAL at 23:19

## 2025-01-01 RX ADMIN — Medication 10 ML: at 20:25

## 2025-01-01 RX ADMIN — ATORVASTATIN CALCIUM 10 MG: 10 TABLET, FILM COATED ORAL at 08:34

## 2025-01-01 RX ADMIN — Medication 1000 MCG: at 08:53

## 2025-01-01 RX ADMIN — LORAZEPAM 0.25 MG: 2 INJECTION INTRAMUSCULAR; INTRAVENOUS at 03:15

## 2025-01-01 RX ADMIN — Medication 10 ML: at 11:11

## 2025-01-01 RX ADMIN — DIVALPROEX SODIUM 250 MG: 125 CAPSULE, COATED PELLETS ORAL at 11:54

## 2025-01-01 RX ADMIN — TAMSULOSIN HYDROCHLORIDE 0.4 MG: 0.4 CAPSULE ORAL at 08:52

## 2025-01-01 RX ADMIN — DIVALPROEX SODIUM 250 MG: 125 CAPSULE, COATED PELLETS ORAL at 21:48

## 2025-01-01 RX ADMIN — OLANZAPINE 5 MG: 10 INJECTION, POWDER, FOR SOLUTION INTRAMUSCULAR at 21:23

## 2025-01-01 RX ADMIN — Medication 10 ML: at 20:57

## 2025-01-01 RX ADMIN — SENNOSIDES AND DOCUSATE SODIUM 2 TABLET: 50; 8.6 TABLET ORAL at 17:09

## 2025-01-01 RX ADMIN — TAMSULOSIN HYDROCHLORIDE 0.4 MG: 0.4 CAPSULE ORAL at 21:41

## 2025-01-01 RX ADMIN — DIAZEPAM 10 MG: 5 INJECTION INTRAMUSCULAR; INTRAVENOUS at 20:35

## 2025-01-01 RX ADMIN — DIAZEPAM 10 MG: 5 INJECTION INTRAMUSCULAR; INTRAVENOUS at 10:45

## 2025-01-01 RX ADMIN — ACETAMINOPHEN 650 MG: 325 TABLET, FILM COATED ORAL at 21:25

## 2025-01-01 RX ADMIN — MORPHINE SULFATE 2 MG: 2 INJECTION, SOLUTION INTRAMUSCULAR; INTRAVENOUS at 21:29

## 2025-01-01 RX ADMIN — ENOXAPARIN SODIUM 105 MG: 150 INJECTION SUBCUTANEOUS at 20:21

## 2025-01-01 RX ADMIN — DIVALPROEX SODIUM 250 MG: 125 CAPSULE, COATED PELLETS ORAL at 14:10

## 2025-01-01 RX ADMIN — MORPHINE SULFATE 4 MG: 4 INJECTION, SOLUTION INTRAMUSCULAR; INTRAVENOUS at 16:45

## 2025-01-01 RX ADMIN — DIAZEPAM 10 MG: 5 INJECTION INTRAMUSCULAR; INTRAVENOUS at 13:37

## 2025-01-01 RX ADMIN — MEGESTROL ACETATE 400 MG: 40 SUSPENSION ORAL at 22:17

## 2025-01-01 RX ADMIN — DIVALPROEX SODIUM 250 MG: 125 CAPSULE, COATED PELLETS ORAL at 08:34

## 2025-01-01 RX ADMIN — MEGESTROL ACETATE 400 MG: 40 SUSPENSION ORAL at 08:33

## 2025-01-01 RX ADMIN — Medication 1000 MCG: at 17:44

## 2025-01-01 RX ADMIN — CLONAZEPAM 0.5 MG: 0.5 TABLET ORAL at 16:54

## 2025-01-01 RX ADMIN — DIVALPROEX SODIUM 250 MG: 125 CAPSULE, COATED PELLETS ORAL at 16:54

## 2025-01-01 RX ADMIN — METOPROLOL TARTRATE 100 MG: 50 TABLET, FILM COATED ORAL at 17:45

## 2025-01-01 RX ADMIN — SENNOSIDES AND DOCUSATE SODIUM 2 TABLET: 50; 8.6 TABLET ORAL at 08:53

## 2025-01-01 RX ADMIN — ATORVASTATIN CALCIUM 10 MG: 10 TABLET, FILM COATED ORAL at 09:12

## 2025-01-01 RX ADMIN — MEGESTROL ACETATE 400 MG: 40 SUSPENSION ORAL at 08:48

## 2025-01-01 RX ADMIN — DIAZEPAM 10 MG: 5 INJECTION INTRAMUSCULAR; INTRAVENOUS at 05:06

## 2025-01-01 RX ADMIN — Medication 10 ML: at 09:15

## 2025-01-01 RX ADMIN — SENNOSIDES AND DOCUSATE SODIUM 2 TABLET: 50; 8.6 TABLET ORAL at 16:55

## 2025-01-01 NOTE — NURSING NOTE
Pt extremely agitated and combative, attempting to leave the hospital and trying to hit staff with room phone. PRN Ativan and nightly seroquel administered with no effects. Call placed to MD for new orders.

## 2025-01-01 NOTE — PROGRESS NOTES
Electrophysiology Follow-Up Note      Patient Name: aMrk Aguirre Jr.  Age/Sex: 79 y.o. male  : 1945  MRN: [id number]    Date of Admission: 2024  Day of Service: 25       Chief Complaint: Atrial fibrillation     HPI:   Mark Aguirre Jr. is a 79 y.o. male  who presented to the ER on 2024 with increasing nausea, anorexia and vomiting.  He was getting up to walk to the restroom and fell and hit his head on a door frame.  Family then brought him into the ER.     He was seen to be in atrial fibrillation with RVR while here inpatient and in the ER.  EP has been asked to evaluate him sooner as we previously discussed plan for a RV lead pacemaker at the end of January with subsequent AV node ablation in early February.     I just saw him In the office for AF on 2024     He has a history of PAF s/p PVI ablation x2. Unknown when the PVI ablations were, over 8 years ago.      He was seen in office on 10/07 by Dr. Armando. The patient had been in AFIB for the past few months and became more fatigued and had decreased exercise tolerance.      He was admitted on 24 for initiation of dofetilide and CV. His metoprolol was stopped due to low HR after the CV. Dofetilide was decreased to 250mcg BID due to borderline QTC prolongation.      ECG on 11/15/2024 showed prolong QTC. His dofetilide was decreased again to 125mcg BID.       He called the office 2024 for an ECG. He was in AFIB and QTC at that time was prolonged 563. Dofetilide was stopped. Digoxin 125mcg qd was started for rate control.      He still had high heart rate and metoprolol was added.  We discussed an AV node ablation with pacemaker implantation and he was agreeable after talking to his PCP.     PMH: Persistent AFIB s/p ablation x2, aortic stenosis, HTN, STEPHENIE on CPAP, PVCs      Follow up:  24 patient combative and irritated this morning.  He is having confusion and agitation.  He is unable to take oral  medications and heart rate getting into the 160s when he is agitated.  Given an additional dose of IV digoxin as he is unable to get his oral medications this morning.  Heart rate remains in A-fib with intermittent episodes into the 130s to 140s    25 Last night he had severe agitation again and is back in restrains. His heart rate was sustaining into the 160's for over 30 minutes and he was given a one time does of IV lopressor. He is very confused this morning and crying out. Wife at bedside and case was discussed.     Temp:  [97.2 °F (36.2 °C)-99.1 °F (37.3 °C)] 99.1 °F (37.3 °C)  Heart Rate:  [] 106  Resp:  [18] 18  BP: (123-154)/() 149/98     PHYSICAL EXAM    General: 79 y.o. male Mild distress, in restrains, un oriented   Neck: No JVD or carotid bruit  Lungs: Clear to ausculation bilaterally, symmetric  Heart: irregular and rhythm with no overt murmurs, rubs or gallops. Normal S1 and S2.   Abdomen: soft, non-tender  Extremities: No lower extremity edema or cyanosis.   Neuo: no lateralizing defects.        Telemetry/EK25: Atrial fibrillation with episodes of RVR into the 90-110s, will go to the 130-160s with agitation              I personally viewed and interpreted the patient's EKG/Telemetry data.    Previous testing:   - Echo 2024: LVEF 48.8%. LA moderate dilation. Moderate AS aortic valve area 1.8cm2. RSVP 48mmHg.       Lab Review:   Results from last 7 days   Lab Units 25  0231 24  0251 24  0056   SODIUM mmol/L 139 141 141   POTASSIUM mmol/L 3.7 4.1 4.4  4.4   CHLORIDE mmol/L 104 110* 106   CO2 mmol/L 20.0* 20.8* 21.2*   BUN mg/dL 23 23 18   CREATININE mg/dL 0.81 0.82 0.87   GLUCOSE mg/dL 103* 80 112*   CALCIUM mg/dL 9.0 8.5* 9.0     Results from last 7 days   Lab Units 24  0251 24  0551 24  2253   WBC 10*3/mm3 8.75 9.81 11.65*   HEMOGLOBIN g/dL 12.9* 14.3 14.9   HEMATOCRIT % 41.5 45.3 45.9   PLATELETS 10*3/mm3 132* 145 150          Results from last 7 days   Lab Units 12/29/24  0306 12/28/24  2253   HSTROP T ng/L 20 18     Results from last 7 days   Lab Units 12/30/24  0056   TSH uIU/mL 2.620           Current Medications:   Scheduled Meds:apixaban, 5 mg, Oral, Q12H  atorvastatin, 10 mg, Oral, Daily  digoxin, 125 mcg, Oral, Daily  metoprolol succinate XL, 25 mg, Oral, Daily  QUEtiapine, 25 mg, Oral, Nightly  sodium chloride, 10 mL, Intravenous, Q12H  tamsulosin, 0.4 mg, Oral, Daily  vitamin B-12, 1,000 mcg, Oral, Daily          Assessment /Plan:  Persistent atrial fibrillation with episodes of RVR during agitation and delirium- HR staying elevated into the 160s.   He has previously failed AAD tx due to qtc prolongation  Plan is for an upcoming AVN ablation and pacemaker implantation.   With his current delirium that is ongoing, doing this as an inpatient seems unlikely. I am going to try to move his pacemaker implantation to 1/7/2024 as an OP and then an AVN ablation following after this  We would also like to avoid any kind of sedation while he is in an altered state.   If his agitation improves his HR improves and he can take oral medications  When not taking oral meds will give Iv lopressor  and then resume digoxin and toprol as he is able to tolerate  Continue eliquis 5 mg twice daily  PVCs- ongoing, asymptomatic, no long runs. Continue BB as tolerated  Delirium in setting of early on set dementia- Dr. Staples following closely as well as neuro  Not using CPAP at night  Repeat CT of head was normal   Psych to come and see today  Syncope- likely due to orthostatic changes, he has been very bed bound while in pt but no episodes of bradycardia or pausing noted on tele.         Cameron Denny PA-C  01/01/25  07:41 EST

## 2025-01-01 NOTE — NURSING NOTE
Call placed to Cardiology regarding pt HR elevated in 160's. One time dose of Metoprolol 5mg IV. Will continue to monitor.

## 2025-01-01 NOTE — PLAN OF CARE
Goal Outcome Evaluation:      Pt remains confused and agitated. Sleeps for short amount of time and back to yanking on restraints and kicking legs out of bed. Psych consult pending. HR has decreased significantly with x1 dose of IV Lopressor but continues to be elevated and inconsistent. Incontinent of bladder, bed bath given this shift and brief applied. Bed alarm set and frequent rounds performed.

## 2025-01-01 NOTE — CONSULTS
IDENTIFYING INFORMATION: The patient is an 79-year-old white male admitted following a syncopal episode which resulted in a laceration to the right ear.  He is seen by psychiatry related to some episodes of agitation    CHIEF COMPLAINT: None given    INFORMANT: Patient, wife and chart    RELIABILITY: Good    HISTORY OF PRESENT ILLNESS: The patient is an 79-year-old white male with no reported psychiatric history apart from being prescribed citalopram for mild depression by his primary care physician.  He was admitted to the hospital on 12/20/2024 after he had suffered a syncopal episode sustaining a head injury as well as an ear laceration.  Charting indicates that the patient may have been exhibiting signs of a vasovagal type syndrome was not eating well and having symptoms of his upper respiratory infection.  When seen today the patient is in soft restraints but is generally pleasant cooperative though he exhibits some clouding of consciousness consistent with delirium.  Wife reports that at baseline the patient is very functional, handling finances, driving and being fully oriented.  There is no history of a dementia diagnosis.  The patient has no prior history of inpatient psychiatric hospitalization, suicide attempts or gestures, or substance abuse.  Charting indicates that he has been having significant issues with sundowning.  It appears as though he did do well last evening with an IM dose of olanzapine.  He has also been giving oral Seroquel and Ativan, but without positive response.    PAST PSYCHIATRIC HISTORY: As noted previously the patient is prescribed citalopram by his primary care physician Field significant for anticoagulation secondary to atrial flutter, bifascicular block, cataracts, history of colon polyps, hypertension, insomnia, knee pain, obstructive sleep apnea, paroxysmal atrial fibrillation    PAST MEDICAL HISTORY:    Current Facility-Administered Medications   Medication Dose Route  Frequency Provider Last Rate Last Admin    acetaminophen (TYLENOL) tablet 650 mg  650 mg Oral Q4H PRN Luis Staples MD   650 mg at 12/30/24 1359    apixaban (ELIQUIS) tablet 5 mg  5 mg Oral Q12H Luis Staples MD   5 mg at 12/31/24 2012    atorvastatin (LIPITOR) tablet 10 mg  10 mg Oral Daily Luis Staples MD   10 mg at 12/31/24 0906    sennosides-docusate (PERICOLACE) 8.6-50 MG per tablet 2 tablet  2 tablet Oral BID PRN Luis Staples MD        And    polyethylene glycol (MIRALAX) packet 17 g  17 g Oral Daily PRN Luis Staples MD        And    bisacodyl (DULCOLAX) EC tablet 5 mg  5 mg Oral Daily PRN Luis Staples MD        And    bisacodyl (DULCOLAX) suppository 10 mg  10 mg Rectal Daily PRN Luis Staples MD        Calcium Replacement - Follow Nurse / BPA Driven Protocol   Not Applicable PRN Luis Staples MD        digoxin (LANOXIN) tablet 125 mcg  125 mcg Oral Daily Luis Staples MD   125 mcg at 12/31/24 0906    LORazepam (ATIVAN) injection 0.25 mg  0.25 mg Intravenous Q2H PRN Luis Staples MD   0.25 mg at 01/01/25 0315    Magnesium Cardiology Dose Replacement - Follow Nurse / BPA Driven Protocol   Not Applicable PRN Luis Staples MD        metoprolol succinate XL (TOPROL-XL) 24 hr tablet 25 mg  25 mg Oral Daily Luis Staples MD   25 mg at 12/31/24 0906    metoprolol tartrate (LOPRESSOR) injection 5 mg  5 mg Intravenous Q4H PRN Cameron Stroud PA-C        nitroglycerin (NITROSTAT) SL tablet 0.4 mg  0.4 mg Sublingual Q5 Min PRN Luis Staples MD        OLANZapine (zyPREXA) injection 5 mg  5 mg Intramuscular Q8H PRN Jevon Celestin III, MD        OLANZapine (zyPREXA) tablet 2.5 mg  2.5 mg Oral BID Jevon Celestin III, MD        Phosphorus Replacement - Follow Nurse / BPA Driven Protocol   Not Applicable Luis Rodriguez MD        Potassium Replacement - Follow Nurse / BPA Driven Protocol   Not Applicable Luis Rodriguez MD         sodium chloride 0.9 % flush 10 mL  10 mL Intravenous PRN Luis Staples MD        sodium chloride 0.9 % flush 10 mL  10 mL Intravenous Q12H Luis Staples MD   10 mL at 01/01/25 0900    sodium chloride 0.9 % flush 10 mL  10 mL Intravenous PRN Luis Staples MD        sodium chloride 0.9 % infusion 40 mL  40 mL Intravenous PRN Luis Staples MD        tamsulosin (FLOMAX) 24 hr capsule 0.4 mg  0.4 mg Oral Daily Luis Staples MD   0.4 mg at 12/31/24 0906    vitamin B-12 (CYANOCOBALAMIN) tablet 1,000 mcg  1,000 mcg Oral Daily Luis Staples MD   1,000 mcg at 12/31/24 0906    zolpidem (AMBIEN) tablet 2.5 mg  2.5 mg Oral Nightly PRN Luis Staples MD   2.5 mg at 12/31/24 2011       ALLERGIES: None    FAMILY HISTORY: Noncontributory    SOCIAL HISTORY: The patient is retired and lives with his wife.  There is no reported use of alcohol or street drug    MENTAL STATUS EXAM: The patient is an elderly white male who is in soft wrist restraints.  He is awake and alert and oriented to person and knows that he is in the hospital.  He cannot identify the date but is able to identify the [place] president.  His mood is calm his affect blunted and he appears to have some clouding of consciousness.  Speech is slurred and occasionally tangential.  He does not appear to be responding to internal stimuli at this time.    ASSETS/LIABILITIES: Supportive family, high level of functioning/health issues    DIAGNOSTIC IMPRESSION: Metabolic encephalopathy secondary to head trauma, medical problems as noted previously    PLAN: To simplify the patient's pharmacologic regimen, I have discontinued Seroquel and started scheduled and as needed Zyprexa.  Wife reports that the patient is, at baseline, highly functional.  I would suspect that his mentation should return to baseline with resolution of current medical issues.

## 2025-01-01 NOTE — PROGRESS NOTES
Subjective: Patient was much better during the day yesterday.  Much more oriented and felt well.  However, last evening about 7:30 PM he again became acutely confused and agitated.  Became combative with the staff.  Wife had gone home about 5 PM.  He received his oral Seroquel without benefit.  He was given a dose of lorazepam without benefit and ultimately was given an IM dose of Zyprexa and placed back in restraints.  Remained restless throughout the night.  Develops uncontrolled heart rate when he becomes agitated with his atrial fibrillation and was given metoprolol by cardiology.    Objective:  Vitals:    01/01/25 0009 01/01/25 0040 01/01/25 0317 01/01/25 0422   BP: (!) 154/137  149/98    BP Location: Right arm      Patient Position: Lying      Pulse: (!) 156 98 (!) 147 106   Resp: 18      Temp: 99.1 °F (37.3 °C)      TempSrc: Axillary      SpO2: 96% 95% 95% 97%   Weight:       Height:         General alert, confused, but recognizes me and follows commands  Heart tacky irregular  Lungs clear  Abdomen soft nontender nondistended  Extremities without edema    Recent Results (from the past 24 hours)   Comprehensive Metabolic Panel    Collection Time: 01/01/25  2:31 AM    Specimen: Blood   Result Value Ref Range    Glucose 103 (H) 65 - 99 mg/dL    BUN 23 8 - 23 mg/dL    Creatinine 0.81 0.76 - 1.27 mg/dL    Sodium 139 136 - 145 mmol/L    Potassium 3.7 3.5 - 5.2 mmol/L    Chloride 104 98 - 107 mmol/L    CO2 20.0 (L) 22.0 - 29.0 mmol/L    Calcium 9.0 8.6 - 10.5 mg/dL    Total Protein 6.2 6.0 - 8.5 g/dL    Albumin 3.7 3.5 - 5.2 g/dL    ALT (SGPT) 15 1 - 41 U/L    AST (SGOT) 34 1 - 40 U/L    Alkaline Phosphatase 61 39 - 117 U/L    Total Bilirubin 3.0 (H) 0.0 - 1.2 mg/dL    Globulin 2.5 gm/dL    A/G Ratio 1.5 g/dL    BUN/Creatinine Ratio 28.4 (H) 7.0 - 25.0    Anion Gap 15.0 5.0 - 15.0 mmol/L    eGFR 88.6 >60.0 mL/min/1.73     A/P  Delirium -was much better during the day yesterday but then send down last night.   Neurology had ordered MRI originally but has been able to be be done due to patient's agitation and inability to lie still.  Neurology noted yesterday he was doing better with no focal deficits but suggested if he had deterioration that they would get the MRI done.  I will try and get MRI reordered if patient becomes reoriented today.  Psychiatry has been consulted on recommendations regarding management of the agitation.  Again reviewed with nurses now that he is becoming a little bit more oriented that we need to try and get him out of restraints, continue to verbally reorient him during the day, keep the curtains open, hopefully get him up to chair with physical therapy  Atrial fibrillation-gets rapid A-fib with his agitation.  Remains on oral digoxin and metoprolol for rate control.  Has been requiring IV metoprolol at times.  Eventually is planned for AV carlos a ablation with pacemaker.  Electrophysiology is to be following  Syncope-appears to have been due to dehydration on top of his rapid A-fib.  He ate better yesterday and IV fluids were discontinued.  I would  like to keep him off of an IV just so he is not tied down with his delirium  Sleep apnea-has not been wearing his CPAP due to his agitation.  As we get him ready oriented I had like to settle him into a bedtime routine and get him on CPAP prior to sundowning.

## 2025-01-02 LAB
ALBUMIN SERPL-MCNC: 3.4 G/DL (ref 3.5–5.2)
ALBUMIN/GLOB SERPL: 1.5 G/DL
ALP SERPL-CCNC: 57 U/L (ref 39–117)
ALT SERPL W P-5'-P-CCNC: 14 U/L (ref 1–41)
ANION GAP SERPL CALCULATED.3IONS-SCNC: 12 MMOL/L (ref 5–15)
AST SERPL-CCNC: 26 U/L (ref 1–40)
BACTERIA UR QL AUTO: ABNORMAL /HPF
BASOPHILS # BLD AUTO: 0.05 10*3/MM3 (ref 0–0.2)
BASOPHILS NFR BLD AUTO: 0.5 % (ref 0–1.5)
BILIRUB SERPL-MCNC: 2.8 MG/DL (ref 0–1.2)
BILIRUB UR QL STRIP: NEGATIVE
BUN SERPL-MCNC: 19 MG/DL (ref 8–23)
BUN/CREAT SERPL: 26.8 (ref 7–25)
CALCIUM SPEC-SCNC: 8.6 MG/DL (ref 8.6–10.5)
CHLORIDE SERPL-SCNC: 104 MMOL/L (ref 98–107)
CLARITY UR: CLEAR
CO2 SERPL-SCNC: 23 MMOL/L (ref 22–29)
COLOR UR: ABNORMAL
CREAT SERPL-MCNC: 0.71 MG/DL (ref 0.76–1.27)
DEPRECATED RDW RBC AUTO: 41.5 FL (ref 37–54)
EGFRCR SERPLBLD CKD-EPI 2021: 92.2 ML/MIN/1.73
EOSINOPHIL # BLD AUTO: 0.06 10*3/MM3 (ref 0–0.4)
EOSINOPHIL NFR BLD AUTO: 0.6 % (ref 0.3–6.2)
ERYTHROCYTE [DISTWIDTH] IN BLOOD BY AUTOMATED COUNT: 13.2 % (ref 12.3–15.4)
GLOBULIN UR ELPH-MCNC: 2.3 GM/DL
GLUCOSE SERPL-MCNC: 98 MG/DL (ref 65–99)
GLUCOSE UR STRIP-MCNC: NEGATIVE MG/DL
HCT VFR BLD AUTO: 42.7 % (ref 37.5–51)
HGB BLD-MCNC: 14.3 G/DL (ref 13–17.7)
HGB UR QL STRIP.AUTO: ABNORMAL
HYALINE CASTS UR QL AUTO: ABNORMAL /LPF
IMM GRANULOCYTES # BLD AUTO: 0.04 10*3/MM3 (ref 0–0.05)
IMM GRANULOCYTES NFR BLD AUTO: 0.4 % (ref 0–0.5)
KETONES UR QL STRIP: ABNORMAL
LEUKOCYTE ESTERASE UR QL STRIP.AUTO: NEGATIVE
LYMPHOCYTES # BLD AUTO: 0.93 10*3/MM3 (ref 0.7–3.1)
LYMPHOCYTES NFR BLD AUTO: 9.7 % (ref 19.6–45.3)
MCH RBC QN AUTO: 29.1 PG (ref 26.6–33)
MCHC RBC AUTO-ENTMCNC: 33.5 G/DL (ref 31.5–35.7)
MCV RBC AUTO: 86.8 FL (ref 79–97)
MONOCYTES # BLD AUTO: 0.92 10*3/MM3 (ref 0.1–0.9)
MONOCYTES NFR BLD AUTO: 9.6 % (ref 5–12)
NEUTROPHILS NFR BLD AUTO: 7.59 10*3/MM3 (ref 1.7–7)
NEUTROPHILS NFR BLD AUTO: 79.2 % (ref 42.7–76)
NITRITE UR QL STRIP: NEGATIVE
NRBC BLD AUTO-RTO: 0 /100 WBC (ref 0–0.2)
PH UR STRIP.AUTO: 6 [PH] (ref 5–8)
PLATELET # BLD AUTO: 164 10*3/MM3 (ref 140–450)
PMV BLD AUTO: 11.1 FL (ref 6–12)
POTASSIUM SERPL-SCNC: 3.5 MMOL/L (ref 3.5–5.2)
PROT SERPL-MCNC: 5.7 G/DL (ref 6–8.5)
PROT UR QL STRIP: ABNORMAL
RBC # BLD AUTO: 4.92 10*6/MM3 (ref 4.14–5.8)
RBC # UR STRIP: ABNORMAL /HPF
REF LAB TEST METHOD: ABNORMAL
SODIUM SERPL-SCNC: 139 MMOL/L (ref 136–145)
SP GR UR STRIP: 1.02 (ref 1–1.03)
SQUAMOUS #/AREA URNS HPF: ABNORMAL /HPF
UROBILINOGEN UR QL STRIP: ABNORMAL
WBC # UR STRIP: ABNORMAL /HPF
WBC NRBC COR # BLD AUTO: 9.59 10*3/MM3 (ref 3.4–10.8)

## 2025-01-02 PROCEDURE — 80053 COMPREHEN METABOLIC PANEL: CPT | Performed by: INTERNAL MEDICINE

## 2025-01-02 PROCEDURE — 81001 URINALYSIS AUTO W/SCOPE: CPT | Performed by: STUDENT IN AN ORGANIZED HEALTH CARE EDUCATION/TRAINING PROGRAM

## 2025-01-02 PROCEDURE — 85025 COMPLETE CBC W/AUTO DIFF WBC: CPT | Performed by: INTERNAL MEDICINE

## 2025-01-02 PROCEDURE — 25010000002 OLANZAPINE 10 MG RECONSTITUTED SOLUTION: Performed by: SPECIALIST

## 2025-01-02 PROCEDURE — 99232 SBSQ HOSP IP/OBS MODERATE 35: CPT

## 2025-01-02 PROCEDURE — 25010000002 ENOXAPARIN PER 10 MG: Performed by: INTERNAL MEDICINE

## 2025-01-02 PROCEDURE — 25010000002 LORAZEPAM PER 2 MG: Performed by: INTERNAL MEDICINE

## 2025-01-02 RX ORDER — POTASSIUM CHLORIDE 1.5 G/1.58G
40 POWDER, FOR SOLUTION ORAL EVERY 4 HOURS
Status: DISPENSED | OUTPATIENT
Start: 2025-01-02 | End: 2025-01-03

## 2025-01-02 RX ADMIN — OLANZAPINE 5 MG: 10 INJECTION, POWDER, LYOPHILIZED, FOR SOLUTION INTRAMUSCULAR at 22:21

## 2025-01-02 RX ADMIN — ZOLPIDEM TARTRATE 2.5 MG: 5 TABLET, FILM COATED ORAL at 21:08

## 2025-01-02 RX ADMIN — METOPROLOL TARTRATE 5 MG: 1 INJECTION, SOLUTION INTRAVENOUS at 21:50

## 2025-01-02 RX ADMIN — METOPROLOL TARTRATE 5 MG: 1 INJECTION, SOLUTION INTRAVENOUS at 02:21

## 2025-01-02 RX ADMIN — ENOXAPARIN SODIUM 105 MG: 150 INJECTION SUBCUTANEOUS at 08:41

## 2025-01-02 RX ADMIN — POTASSIUM CHLORIDE 40 MEQ: 1.5 POWDER, FOR SOLUTION ORAL at 20:15

## 2025-01-02 RX ADMIN — Medication 10 ML: at 09:06

## 2025-01-02 RX ADMIN — OLANZAPINE 2.5 MG: 5 TABLET, FILM COATED ORAL at 08:41

## 2025-01-02 RX ADMIN — TAMSULOSIN HYDROCHLORIDE 0.4 MG: 0.4 CAPSULE ORAL at 08:41

## 2025-01-02 RX ADMIN — DIGOXIN 125 MCG: 0.12 TABLET ORAL at 08:41

## 2025-01-02 RX ADMIN — METOPROLOL SUCCINATE 25 MG: 25 TABLET, EXTENDED RELEASE ORAL at 13:56

## 2025-01-02 RX ADMIN — LORAZEPAM 0.25 MG: 2 INJECTION INTRAMUSCULAR; INTRAVENOUS at 01:08

## 2025-01-02 RX ADMIN — METOPROLOL TARTRATE 5 MG: 1 INJECTION, SOLUTION INTRAVENOUS at 07:00

## 2025-01-02 RX ADMIN — ACETAMINOPHEN 650 MG: 325 TABLET ORAL at 13:55

## 2025-01-02 RX ADMIN — ENOXAPARIN SODIUM 105 MG: 150 INJECTION SUBCUTANEOUS at 17:04

## 2025-01-02 RX ADMIN — Medication 10 ML: at 20:16

## 2025-01-02 RX ADMIN — ATORVASTATIN CALCIUM 10 MG: 10 TABLET, FILM COATED ORAL at 08:41

## 2025-01-02 RX ADMIN — Medication 1000 MCG: at 08:41

## 2025-01-02 RX ADMIN — OLANZAPINE 2.5 MG: 5 TABLET, FILM COATED ORAL at 20:15

## 2025-01-02 RX ADMIN — BISACODYL 10 MG: 10 SUPPOSITORY RECTAL at 17:05

## 2025-01-02 RX ADMIN — LORAZEPAM 0.25 MG: 2 INJECTION INTRAMUSCULAR; INTRAVENOUS at 21:50

## 2025-01-02 NOTE — PROGRESS NOTES
Electrophysiology Follow-Up Note      Patient Name: Mark Aguirre Jr.  Age/Sex: 79 y.o. male  : 1945  MRN: [id number]      Day of Service: 25       Chief Complaint/Follow-up: Delirium, permanent atrial fibrillation    S: He is able to take oral medications today and his heart rate has improved into the low 100s.  Continues to have ongoing delirium and confusion      Temp:  [97.5 °F (36.4 °C)] 97.5 °F (36.4 °C)  Heart Rate:  [] 65  Resp:  [20-24] 20  BP: (129-172)/() 134/101     PHYSICAL EXAM:    General Appearance: No acute distress, well developed and well nourished.   Eyes: Conjunctiva and lids: No erythema, swelling, or discharge. Sclera non-icteric.   HENT: Atraumatic, normocephalic. External eyes, ears, and nose normal.   Respiratory: No signs of respiratory distress. Respiration rhythm and depth normal.   Clear to auscultation. No rales, crackles, rhonchi, or wheezing auscultated.   Cardiovascular:  Heart Rate and Rhythm: irregularly irregular, Heart Sounds: Normal S1 and S2. No S3 or S4 noted.  Gastrointestinal:  Abdomen soft, non-distended, non-tender.  Musculoskeletal: Normal movement of extremities  Skin: Warm and dry.   Psychiatric: Patient alert and oriented to person, place, and time. Speech and behavior appropriate. Normal mood and affect.         Results from last 7 days   Lab Units 25  1118 25  0231 24  0251   SODIUM mmol/L 139 139 141   POTASSIUM mmol/L 3.5 3.7 4.1   CHLORIDE mmol/L 104 104 110*   CO2 mmol/L 23.0 20.0* 20.8*   BUN mg/dL 19 23 23   CREATININE mg/dL 0.71* 0.81 0.82   GLUCOSE mg/dL 98 103* 80   CALCIUM mg/dL 8.6 9.0 8.5*     Results from last 7 days   Lab Units 25  1118 24  0251 24  0551   WBC 10*3/mm3 9.59 8.75 9.81   HEMOGLOBIN g/dL 14.3 12.9* 14.3   HEMATOCRIT % 42.7 41.5 45.3   PLATELETS 10*3/mm3 164 132* 145         Results from last 7 days   Lab Units 24  0306 24  2253   HSTROP T ng/L 20 18      Results from last 7 days   Lab Units 12/30/24  0056   TSH uIU/mL 2.620           Current Medications:   Scheduled Meds:atorvastatin, 10 mg, Oral, Daily  digoxin, 125 mcg, Oral, Daily  enoxaparin, 1 mg/kg, Subcutaneous, Q12H  metoprolol succinate XL, 25 mg, Oral, Daily  OLANZapine, 2.5 mg, Oral, BID  sodium chloride, 10 mL, Intravenous, Q12H  tamsulosin, 0.4 mg, Oral, Daily  vitamin B-12, 1,000 mcg, Oral, Daily            Syncope    Acute delirium       Plan:     Delirum--- he continues to have delirium and confusion.  He was refusing oral medications yesterday and started on IV medications.  He was seen by psychiatry and changed back to Zyprexa.  Discussion of possible MRI of the brain.    2.  Persistent atrial fibrillation----was discussion of possibly pacemaker and AV node ablation.  However given his ongoing delirium and mental status changes would not recommend at this time.  I discussed with Dr. Armando and for now we will recommend lenient rate control.  Continue digoxin and metoprolol for rate control and can use IV as needed if he is unable to take oral medications.        We can continue to follow along and focus on rate control.  Will consider pace and ablate strategy as outpatient.  I reviewed and discussed case with Dr. Armando.          KEISHA Awad  01/02/25  15:08 EST

## 2025-01-02 NOTE — PROGRESS NOTES
The patient is greatly improved today.  His speech is more lucid and he is oriented.  Wife reports that he has improved greatly though she does note some decline in mentation as the day progresses.  Continuing to follow.

## 2025-01-02 NOTE — PLAN OF CARE
Goal Outcome Evaluation:  Plan of Care Reviewed With: spouse        Progress: no change  Outcome Evaluation: No significant changes overnight. Remains confused, agitated, and restless. HR sustaining 140-160's at the beginning of the shift, IV Lopressor given and notified Cardiology on call, no new orders, continue to monitor. PRN IV Ativan x 2 and IM Zyprexa x 1 given. Remains on bilateral wrist restraints. Patient slept well afterwards, -120s.

## 2025-01-02 NOTE — PROGRESS NOTES
Subjective: Patient remains fairly confused throughout the day yesterday.  Refusing oral medicines.  Was changed from Eliquis to Lovenox for anticoagulation.  Not eating much and just tells wife that he is not hungry.  Seen by psychiatry yesterday.  Changed back to Zyprexa at low-dose with as needed dosing in between.  Seen by electrophysiology as well.  Heart rate gets elevated because he is not taking his oral medicines and because of his agitation.  He has been getting metoprolol as needed IV.    Objective:  Vitals:    01/02/25 0221 01/02/25 0222 01/02/25 0400 01/02/25 0658   BP:   144/94 (!) 172/112   BP Location:   Left arm Left arm   Patient Position:   Lying Lying   Pulse: (!) 142 (!) 139 112 (!) 149   Resp:   20 20   Temp:       TempSrc:       SpO2:   96%    Weight:       Height:          Gen Alert- confused  Heart: tachy irregular  Lungs: CTA  Abd: soft NTD, BS+  Ext; No edema    No results found for this or any previous visit (from the past 24 hours).    A/P  1.  Delirium-not clearing.  Has been seen by neurology and psychiatry.  Metabolic studies unremarkable but repeat urinalysis is ordered.  Currently getting Zyprexa ordered twice daily with as needed dosing for agitation.  Family working at keeping him oriented.  Did sleep about 4 hours last night.  Wife notes that he was not sleeping at home for several nights prior to admit which I think contributed.  Neurology has suggested MRI which has been unable to be performed due to his agitation.  In the short-term is not going to change his management as he remains on anticoagulation already for A-fib.  If we can get him improved enough I would like to get the MRI however.    2.  Atrial fibrillation-rapid rate.  Being followed by electrophysiology.  Currently has metoprolol IV ordered.  Had some mild thrombocytopenia yesterday on his labs.  I did not draw a.m. labs because I did not want them drawn in the middle the night so I will repeat CBC this  morning  3.  Hypertension-blood pressure has gotten elevated as he is gotten agitated.  Vasotec ordered as needed.  4.  Loss of appetite-does have hyperbilirubinemia but has a history of Gilbert's syndrome and bilirubin was unconjugated.  Right upper quadrant ultrasound did not show evidence of cholecystitis or dilated biliary ducts.  Patient did have episode of nausea with dry heaves prior to admission.  Bilirubin had come down with hydration but had bumped back up again yesterday.  I am going to repeat labs today.  I am going to try and get his bowels moving today.

## 2025-01-02 NOTE — PLAN OF CARE
Goal Outcome Evaluation:              Outcome Evaluation: Patient HR sustaining in 140's-150's IV lopressor given. Patient able to tolerate 1 soft restraint off for about 45 mins at a time. Patient still disoriented. Patient refusing food and water. NELSON wrist restraint back in place.

## 2025-01-03 LAB
ALBUMIN SERPL-MCNC: 3.3 G/DL (ref 3.5–5.2)
ALBUMIN/GLOB SERPL: 1.2 G/DL
ALP SERPL-CCNC: 57 U/L (ref 39–117)
ALT SERPL W P-5'-P-CCNC: 12 U/L (ref 1–41)
ANION GAP SERPL CALCULATED.3IONS-SCNC: 12.5 MMOL/L (ref 5–15)
AST SERPL-CCNC: 20 U/L (ref 1–40)
BILIRUB SERPL-MCNC: 2.6 MG/DL (ref 0–1.2)
BUN SERPL-MCNC: 20 MG/DL (ref 8–23)
BUN/CREAT SERPL: 26.7 (ref 7–25)
CALCIUM SPEC-SCNC: 8.7 MG/DL (ref 8.6–10.5)
CHLORIDE SERPL-SCNC: 106 MMOL/L (ref 98–107)
CO2 SERPL-SCNC: 22.5 MMOL/L (ref 22–29)
CREAT SERPL-MCNC: 0.75 MG/DL (ref 0.76–1.27)
EGFRCR SERPLBLD CKD-EPI 2021: 90.7 ML/MIN/1.73
GLOBULIN UR ELPH-MCNC: 2.7 GM/DL
GLUCOSE SERPL-MCNC: 107 MG/DL (ref 65–99)
MAGNESIUM SERPL-MCNC: 2 MG/DL (ref 1.6–2.4)
POTASSIUM SERPL-SCNC: 3.6 MMOL/L (ref 3.5–5.2)
POTASSIUM SERPL-SCNC: 3.7 MMOL/L (ref 3.5–5.2)
PROT SERPL-MCNC: 6 G/DL (ref 6–8.5)
SODIUM SERPL-SCNC: 141 MMOL/L (ref 136–145)

## 2025-01-03 PROCEDURE — 25010000002 ENOXAPARIN PER 10 MG: Performed by: INTERNAL MEDICINE

## 2025-01-03 PROCEDURE — 25010000002 POTASSIUM CHLORIDE 10 MEQ/100ML SOLUTION: Performed by: INTERNAL MEDICINE

## 2025-01-03 PROCEDURE — 84132 ASSAY OF SERUM POTASSIUM: CPT | Performed by: INTERNAL MEDICINE

## 2025-01-03 PROCEDURE — 99232 SBSQ HOSP IP/OBS MODERATE 35: CPT | Performed by: PHYSICIAN ASSISTANT

## 2025-01-03 PROCEDURE — 80053 COMPREHEN METABOLIC PANEL: CPT | Performed by: INTERNAL MEDICINE

## 2025-01-03 PROCEDURE — 25010000002 OLANZAPINE 10 MG RECONSTITUTED SOLUTION: Performed by: SPECIALIST

## 2025-01-03 PROCEDURE — 25010000002 LORAZEPAM PER 2 MG: Performed by: INTERNAL MEDICINE

## 2025-01-03 PROCEDURE — 83735 ASSAY OF MAGNESIUM: CPT | Performed by: INTERNAL MEDICINE

## 2025-01-03 RX ORDER — OLANZAPINE 5 MG/1
5 TABLET ORAL NIGHTLY
Status: DISCONTINUED | OUTPATIENT
Start: 2025-01-03 | End: 2025-01-16

## 2025-01-03 RX ORDER — POTASSIUM CHLORIDE 1.5 G/1.58G
40 POWDER, FOR SOLUTION ORAL EVERY 4 HOURS
Status: DISCONTINUED | OUTPATIENT
Start: 2025-01-03 | End: 2025-01-03

## 2025-01-03 RX ORDER — OLANZAPINE 5 MG/1
2.5 TABLET ORAL
Status: DISCONTINUED | OUTPATIENT
Start: 2025-01-03 | End: 2025-01-04

## 2025-01-03 RX ORDER — POTASSIUM CHLORIDE 7.45 MG/ML
10 INJECTION INTRAVENOUS
Status: COMPLETED | OUTPATIENT
Start: 2025-01-03 | End: 2025-01-03

## 2025-01-03 RX ADMIN — Medication 10 ML: at 20:59

## 2025-01-03 RX ADMIN — Medication 10 ML: at 11:21

## 2025-01-03 RX ADMIN — ENOXAPARIN SODIUM 105 MG: 150 INJECTION SUBCUTANEOUS at 06:32

## 2025-01-03 RX ADMIN — OLANZAPINE 5 MG: 5 TABLET, FILM COATED ORAL at 20:49

## 2025-01-03 RX ADMIN — POTASSIUM CHLORIDE 10 MEQ: 7.46 INJECTION, SOLUTION INTRAVENOUS at 13:06

## 2025-01-03 RX ADMIN — LORAZEPAM 0.25 MG: 2 INJECTION INTRAMUSCULAR; INTRAVENOUS at 00:19

## 2025-01-03 RX ADMIN — ATORVASTATIN CALCIUM 10 MG: 10 TABLET, FILM COATED ORAL at 17:07

## 2025-01-03 RX ADMIN — OLANZAPINE 2.5 MG: 5 TABLET, FILM COATED ORAL at 17:06

## 2025-01-03 RX ADMIN — OLANZAPINE 5 MG: 10 INJECTION, POWDER, LYOPHILIZED, FOR SOLUTION INTRAMUSCULAR at 21:49

## 2025-01-03 RX ADMIN — DIGOXIN 125 MCG: 0.12 TABLET ORAL at 17:06

## 2025-01-03 RX ADMIN — METOPROLOL TARTRATE 5 MG: 1 INJECTION, SOLUTION INTRAVENOUS at 18:11

## 2025-01-03 RX ADMIN — BISACODYL 5 MG: 5 TABLET, COATED ORAL at 17:06

## 2025-01-03 RX ADMIN — METOPROLOL TARTRATE 5 MG: 1 INJECTION, SOLUTION INTRAVENOUS at 07:28

## 2025-01-03 RX ADMIN — POTASSIUM CHLORIDE 10 MEQ: 7.46 INJECTION, SOLUTION INTRAVENOUS at 17:05

## 2025-01-03 RX ADMIN — POTASSIUM CHLORIDE 10 MEQ: 7.46 INJECTION, SOLUTION INTRAVENOUS at 14:59

## 2025-01-03 RX ADMIN — LORAZEPAM 0.25 MG: 2 INJECTION INTRAMUSCULAR; INTRAVENOUS at 23:06

## 2025-01-03 RX ADMIN — TAMSULOSIN HYDROCHLORIDE 0.4 MG: 0.4 CAPSULE ORAL at 17:06

## 2025-01-03 RX ADMIN — ENOXAPARIN SODIUM 105 MG: 150 INJECTION SUBCUTANEOUS at 17:07

## 2025-01-03 RX ADMIN — Medication 1000 MCG: at 17:05

## 2025-01-03 RX ADMIN — POTASSIUM CHLORIDE 10 MEQ: 7.46 INJECTION, SOLUTION INTRAVENOUS at 18:10

## 2025-01-03 NOTE — SIGNIFICANT NOTE
01/03/25 1647   OTHER   Discipline physical therapy assistant   Rehab Time/Intention   Session Not Performed other (see comments)  (pt was checked on in AM, though resting at this time; PT will f/u tomorrow)   Recommendation   PT - Next Appointment 01/04/25

## 2025-01-03 NOTE — CASE MANAGEMENT/SOCIAL WORK
Continued Stay Note  [facility]     Patient Name: Mark Aguirre Jr.  MRN: [id number]  Today's Date: 1/3/2025    Admit Date: 12/28/2024    Plan: TBD   Discharge Plan       Row Name 01/03/25 0805       Plan    Plan TBD    Patient/Family in Agreement with Plan yes    Plan Comments Clinicals reviewed. Pt remains restrained and periods of confusion. CCP continues to follow for medical readiness for DC planning. Gabriele RN/CCP                   Discharge Codes    No documentation.                 Expected Discharge Date and Time       Expected Discharge Date Expected Discharge Time    Jan 5, 2025               Gisele Guadarrama RN

## 2025-01-03 NOTE — PROGRESS NOTES
Electrophysiology Follow-Up Note      Patient Name: Mark Aguirre Jr.  Age/Sex: 79 y.o. male  : 1945  MRN: [id number]    Date of Admission: 2024  Day of Service: 25       Chief Complaint: Afib, confusion     HPI:   Mark Aguirre Jr. is a 79 y.o. male who presented to the ER on 2024 with increasing nausea, anorexia and vomiting.  He was getting up to walk to the restroom and fell and hit his head on a door frame.  Family then brought him into the ER.     He was seen to be in atrial fibrillation with RVR while here inpatient and in the ER.  EP has been asked to evaluate him sooner as we previously discussed plan for a RV lead pacemaker at the end of January with subsequent AV node ablation in early February.     I just saw him In the office for AF on 2024     He has a history of PAF s/p PVI ablation x2. Unknown when the PVI ablations were, over 8 years ago.      He was seen in office on 10/07 by Dr. Armando. The patient had been in AFIB for the past few months and became more fatigued and had decreased exercise tolerance.      He was admitted on 24 for initiation of dofetilide and CV. His metoprolol was stopped due to low HR after the CV. Dofetilide was decreased to 250mcg BID due to borderline QTC prolongation.      ECG on 11/15/2024 showed prolong QTC. His dofetilide was decreased again to 125mcg BID.       He called the office 2024 for an ECG. He was in AFIB and QTC at that time was prolonged 563. Dofetilide was stopped. Digoxin 125mcg qd was started for rate control.      He still had high heart rate and metoprolol was added.  We discussed an AV node ablation with pacemaker implantation and he was agreeable after talking to his PCP.     PMH: Persistent AFIB s/p ablation x2, aortic stenosis, HTN, STEPHENIE on CPAP, PVCs      Follow up:  24 patient combative and irritated this morning.  He is having confusion and agitation.  He is unable to take oral medications  and heart rate getting into the 160s when he is agitated.  Given an additional dose of IV digoxin as he is unable to get his oral medications this morning.  Heart rate remains in A-fib with intermittent episodes into the 130s to 140s     25 Last night he had severe agitation again and is back in restrains. His heart rate was sustaining into the 160's for over 30 minutes and he was given a one time does of IV lopressor. He is very confused this morning and crying out. Wife at bedside and case was discussed.       25 Patient seen at bedside this morning.  He was sleeping and in restraints but able to wake up and respond to some questions appropriately by his wife.  She reports that starting around 9:00 last night is when things got bad and followed when he took some Ativan.  They are wondering if maybe this is the cause for some of the ongoing delirium.  Heart rates have been better controlled in the 80s to 100s with some PVCs.      Temp:  [97.5 °F (36.4 °C)-97.6 °F (36.4 °C)] 97.5 °F (36.4 °C)  Heart Rate:  [] 159  Resp:  [20-22] 20  BP: (117-151)/() 119/87     PHYSICAL EXAM    General: 79 y.o. male No acute distress, laying in bed.  Confused male in bed with restraints  Neck: No JVD or carotid bruit  Lungs: Clear to ausculation bilaterally, symmetric  Heart: Irregular rhythm with no overt murmurs, rubs or gallops. Normal S1 and S2.   Abdomen: soft, non-tender  Extremities: No lower extremity edema or cyanosis.   Neuo: no lateralizing defects.        Telemetry/EK25: Atrial fibrillation with some PVCs.  There was one 3 beat run that I saw overnight            I personally viewed and interpreted the patient's EKG/Telemetry data.    Previous testing:   - Echo 2024: LVEF 48.8%. LA moderate dilation. Moderate AS aortic valve area 1.8cm2. RSVP 48mmHg.       Lab Review:   Results from last 7 days   Lab Units 25  1118 25  0231 24  0251   SODIUM mmol/L 139 139 141    POTASSIUM mmol/L 3.5 3.7 4.1   CHLORIDE mmol/L 104 104 110*   CO2 mmol/L 23.0 20.0* 20.8*   BUN mg/dL 19 23 23   CREATININE mg/dL 0.71* 0.81 0.82   GLUCOSE mg/dL 98 103* 80   CALCIUM mg/dL 8.6 9.0 8.5*     Results from last 7 days   Lab Units 01/02/25  1118 12/31/24  0251 12/29/24  0551   WBC 10*3/mm3 9.59 8.75 9.81   HEMOGLOBIN g/dL 14.3 12.9* 14.3   HEMATOCRIT % 42.7 41.5 45.3   PLATELETS 10*3/mm3 164 132* 145         Results from last 7 days   Lab Units 12/29/24  0306 12/28/24  2253   HSTROP T ng/L 20 18     Results from last 7 days   Lab Units 12/30/24  0056   TSH uIU/mL 2.620           Current Medications:   Scheduled Meds:atorvastatin, 10 mg, Oral, Daily  digoxin, 125 mcg, Oral, Daily  enoxaparin, 1 mg/kg, Subcutaneous, Q12H  metoprolol succinate XL, 25 mg, Oral, Daily  OLANZapine, 2.5 mg, Oral, BID  sodium chloride, 10 mL, Intravenous, Q12H  tamsulosin, 0.4 mg, Oral, Daily  vitamin B-12, 1,000 mcg, Oral, Daily          Assessment /Plan:  Persistent atrial fibrillation- rates are better controlled in the 90-100s   He has previously failed AAD tx due to qtc prolongation  Plan is for an upcoming AVN ablation and pacemaker implantation. Current plan to maybe complete on 1/7/2025 as an OP then to follow with ANV ablation in a few weeks.   He is on Iv metoprolol 5 mg q 4 hours PRN and oral Toprol 25 mg daily when he can take oral meds.   Continue digoxin 125 mcg daily for rate control.   He is on enoxaparin now for AC and Eliquis is held  If his agitation improves his HR improves and he can take oral medications  When not taking oral meds will give Iv lopressor  and then resume digoxin and toprol as he is able to tolerate  Continue eliquis 5 mg twice daily  PVCs- ongoing, asymptomatic, no long runs. Continue BB as tolerated  Delirium in setting of early on set dementia- Dr. Loheide following closely as well as neuro  Repeat CT of head was normal   Plans for an MRI if he is stable  Possibly due to ativan, this is  now held.  Syncope- likely due to orthostatic changes, he has been very bed bound while in pt but no episodes of bradycardia or pausing noted on tele.     We will keep monitoring his telemetry remotely over the weekend and plan to see him again on Monday if he is still inpatient.    Cameron Denny PA-C  01/03/25  08:26 EST

## 2025-01-03 NOTE — PROGRESS NOTES
The patient required as needed medication after becoming agitated last evening.  I will start him on scheduled olanzapine 2.5 mg with supper and 5 mg at bedtime in hopes of addressing ongoing sundowning behaviors.

## 2025-01-03 NOTE — PROGRESS NOTES
"Nutrition Services    Patient Name: Mark Aguirre Jr.  YOB: 1945  MRN: [id number]  Admission date: 12/28/2024    PROGRESS NOTE      Encounter Information: Pt sleeping when I visited. Family at bedside reported pt has only been taking bites of his meals and has not been eating much for the past week. Family agreeable to supplements.         PO Diet: Diet: Cardiac; Healthy Heart (2-3 Na+); Fluid Consistency: Thin (IDDSI 0)   PO Supplements: Magic cups TID, Boost TID   PO Intake:  Minimal PO intake x 1 week per family.        Current nutrition support: -   Nutrition support review: -       Labs (reviewed below): Reviewed        GI Function:  Last BM 12/31 dulcolax given yesterday        Nutrition Intervention Updates: Magic Cups TID  Boost TID  Encourage good nutrition     Results from last 7 days   Lab Units 01/03/25  0801 01/02/25  1118 01/01/25  0231   SODIUM mmol/L 141 139 139   POTASSIUM mmol/L 3.6 3.5 3.7   CHLORIDE mmol/L 106 104 104   CO2 mmol/L 22.5 23.0 20.0*   BUN mg/dL 20 19 23   CREATININE mg/dL 0.75* 0.71* 0.81   CALCIUM mg/dL 8.7 8.6 9.0   BILIRUBIN mg/dL 2.6* 2.8* 3.0*   ALK PHOS U/L 57 57 61   ALT (SGPT) U/L 12 14 15   AST (SGOT) U/L 20 26 34   GLUCOSE mg/dL 107* 98 103*     Results from last 7 days   Lab Units 01/03/25  0801 01/02/25  1118 12/29/24  0551 12/28/24  2253   MAGNESIUM mg/dL 2.0  --   --  2.1   HEMOGLOBIN g/dL  --  14.3   < > 14.9   HEMATOCRIT %  --  42.7   < > 45.9    < > = values in this interval not displayed.     COVID19   Date Value Ref Range Status   12/28/2024 Not Detected Not Detected - Ref. Range Final     No results found for: \"HGBA1C\"    Wt Readings from Last 10 Encounters:   12/29/24 0818 110 kg (241 lb 8 oz)   12/17/24 0739 103 kg (226 lb)   12/13/24 0930 103 kg (228 lb)   10/07/24 1123 104 kg (229 lb)   09/05/24 0803 104 kg (229 lb 4.5 oz)   08/21/24 1104 102 kg (224 lb)   03/20/24 1019 99.9 kg (220 lb 4.8 oz)   03/19/24 0912 102 kg (224 lb)   02/27/24 0957 102 " kg (225 lb)   12/04/23 1030 101 kg (223 lb)   11/29/23 0831 102 kg (224 lb 12.8 oz)     RD to follow up per protocol.    Electronically signed by:  Jose Weaver RDN, LD  01/03/25 11:38 EST

## 2025-01-03 NOTE — PLAN OF CARE
Problem: Syncope  Goal: Absence of Syncopal Symptoms  Outcome: Progressing  Intervention: Manage Effect of Syncopal Symptoms  Recent Flowsheet Documentation  Taken 1/3/2025 1800 by Eliz Addison RN  Safety Promotion/Fall Prevention:   activity supervised   assistive device/personal items within reach   clutter free environment maintained   fall prevention program maintained   nonskid shoes/slippers when out of bed   safety round/check completed  Taken 1/3/2025 1600 by Eliz Addison RN  Safety Promotion/Fall Prevention:   activity supervised   assistive device/personal items within reach   clutter free environment maintained   fall prevention program maintained   nonskid shoes/slippers when out of bed  Taken 1/3/2025 1400 by Eliz Addison RN  Safety Promotion/Fall Prevention: safety round/check completed  Taken 1/3/2025 1200 by Eliz Addison RN  Safety Promotion/Fall Prevention:   activity supervised   assistive device/personal items within reach   clutter free environment maintained   fall prevention program maintained   nonskid shoes/slippers when out of bed   safety round/check completed  Taken 1/3/2025 1000 by Eliz Addison RN  Safety Promotion/Fall Prevention:   activity supervised   assistive device/personal items within reach   clutter free environment maintained   fall prevention program maintained   nonskid shoes/slippers when out of bed   safety round/check completed  Taken 1/3/2025 0800 by Eliz Addison RN  Supportive Measures: relaxation techniques promoted  Safety Promotion/Fall Prevention:   assistive device/personal items within reach   activity supervised   clutter free environment maintained   fall prevention program maintained   nonskid shoes/slippers when out of bed  Goal: Absence of Syncopal Symptoms  Outcome: Progressing  Intervention: Manage Effect of Syncopal Symptoms  Recent Flowsheet Documentation  Taken 1/3/2025 1800 by Eliz Addison RN  Safety Promotion/Fall  Prevention:   activity supervised   assistive device/personal items within reach   clutter free environment maintained   fall prevention program maintained   nonskid shoes/slippers when out of bed   safety round/check completed  Taken 1/3/2025 1600 by Eliz Addison RN  Safety Promotion/Fall Prevention:   activity supervised   assistive device/personal items within reach   clutter free environment maintained   fall prevention program maintained   nonskid shoes/slippers when out of bed  Taken 1/3/2025 1400 by Eliz Addison RN  Safety Promotion/Fall Prevention: safety round/check completed  Taken 1/3/2025 1200 by Eliz Addison RN  Safety Promotion/Fall Prevention:   activity supervised   assistive device/personal items within reach   clutter free environment maintained   fall prevention program maintained   nonskid shoes/slippers when out of bed   safety round/check completed  Taken 1/3/2025 1000 by Eliz Addison RN  Safety Promotion/Fall Prevention:   activity supervised   assistive device/personal items within reach   clutter free environment maintained   fall prevention program maintained   nonskid shoes/slippers when out of bed   safety round/check completed  Taken 1/3/2025 0800 by Eliz Addison RN  Supportive Measures: relaxation techniques promoted  Safety Promotion/Fall Prevention:   assistive device/personal items within reach   activity supervised   clutter free environment maintained   fall prevention program maintained   nonskid shoes/slippers when out of bed   Goal Outcome Evaluation:

## 2025-01-03 NOTE — PROGRESS NOTES
Subjective: Patient did well last night but then became acutely confused last night.  He had been given 2.5 mg of zolpidem.  He had to go back in restraints because of agitation and was given IM Zyprexa and lorazepam x 2.  This morning arousable but sedated.  Heart rate continues to jump up when he becomes aggravated.  Wife is at bedside.    Objective:  Vitals:    01/02/25 2319 01/03/25 0014 01/03/25 0015 01/03/25 0403   BP:   131/90 138/68   BP Location:   Left arm Left arm   Patient Position:   Lying Lying   Pulse:   (!) 149 111   Resp:   22 20   Temp: 97.5 °F (36.4 °C)      TempSrc: Oral      SpO2:  98%  98%   Weight:       Height:           General: Arousable to loud voice or tactile stimuli  Heart irregularly tachycardic  Lungs clear to auscultation  Abdomen: Soft bowel sounds present, nontender  Extremities: No edema    Recent Results (from the past 24 hours)   Comprehensive Metabolic Panel    Collection Time: 01/02/25 11:18 AM    Specimen: Blood   Result Value Ref Range    Glucose 98 65 - 99 mg/dL    BUN 19 8 - 23 mg/dL    Creatinine 0.71 (L) 0.76 - 1.27 mg/dL    Sodium 139 136 - 145 mmol/L    Potassium 3.5 3.5 - 5.2 mmol/L    Chloride 104 98 - 107 mmol/L    CO2 23.0 22.0 - 29.0 mmol/L    Calcium 8.6 8.6 - 10.5 mg/dL    Total Protein 5.7 (L) 6.0 - 8.5 g/dL    Albumin 3.4 (L) 3.5 - 5.2 g/dL    ALT (SGPT) 14 1 - 41 U/L    AST (SGOT) 26 1 - 40 U/L    Alkaline Phosphatase 57 39 - 117 U/L    Total Bilirubin 2.8 (H) 0.0 - 1.2 mg/dL    Globulin 2.3 gm/dL    A/G Ratio 1.5 g/dL    BUN/Creatinine Ratio 26.8 (H) 7.0 - 25.0    Anion Gap 12.0 5.0 - 15.0 mmol/L    eGFR 92.2 >60.0 mL/min/1.73   CBC Auto Differential    Collection Time: 01/02/25 11:18 AM    Specimen: Blood   Result Value Ref Range    WBC 9.59 3.40 - 10.80 10*3/mm3    RBC 4.92 4.14 - 5.80 10*6/mm3    Hemoglobin 14.3 13.0 - 17.7 g/dL    Hematocrit 42.7 37.5 - 51.0 %    MCV 86.8 79.0 - 97.0 fL    MCH 29.1 26.6 - 33.0 pg    MCHC 33.5 31.5 - 35.7 g/dL    RDW 13.2  12.3 - 15.4 %    RDW-SD 41.5 37.0 - 54.0 fl    MPV 11.1 6.0 - 12.0 fL    Platelets 164 140 - 450 10*3/mm3    Neutrophil % 79.2 (H) 42.7 - 76.0 %    Lymphocyte % 9.7 (L) 19.6 - 45.3 %    Monocyte % 9.6 5.0 - 12.0 %    Eosinophil % 0.6 0.3 - 6.2 %    Basophil % 0.5 0.0 - 1.5 %    Immature Grans % 0.4 0.0 - 0.5 %    Neutrophils, Absolute 7.59 (H) 1.70 - 7.00 10*3/mm3    Lymphocytes, Absolute 0.93 0.70 - 3.10 10*3/mm3    Monocytes, Absolute 0.92 (H) 0.10 - 0.90 10*3/mm3    Eosinophils, Absolute 0.06 0.00 - 0.40 10*3/mm3    Basophils, Absolute 0.05 0.00 - 0.20 10*3/mm3    Immature Grans, Absolute 0.04 0.00 - 0.05 10*3/mm3    nRBC 0.0 0.0 - 0.2 /100 WBC   Urinalysis With Culture If Indicated - Urine, Clean Catch    Collection Time: 01/02/25 11:37 AM    Specimen: Urine, Clean Catch   Result Value Ref Range    Color, UA Dark Yellow (A) Yellow, Straw    Appearance, UA Clear Clear    pH, UA 6.0 5.0 - 8.0    Specific Gravity, UA 1.024 1.005 - 1.030    Glucose, UA Negative Negative    Ketones, UA 40 mg/dL (2+) (A) Negative    Bilirubin, UA Negative Negative    Blood, UA Moderate (2+) (A) Negative    Protein, UA 30 mg/dL (1+) (A) Negative    Leuk Esterase, UA Negative Negative    Nitrite, UA Negative Negative    Urobilinogen, UA 1.0 E.U./dL 0.2 - 1.0 E.U./dL   Urinalysis, Microscopic Only - Urine, Clean Catch    Collection Time: 01/02/25 11:37 AM    Specimen: Urine, Clean Catch   Result Value Ref Range    RBC, UA 21-50 (A) None Seen, 0-2 /HPF    WBC, UA 0-2 None Seen, 0-2 /HPF    Bacteria, UA None Seen None Seen /HPF    Squamous Epithelial Cells, UA 0-2 None Seen, 0-2 /HPF    Hyaline Casts, UA None Seen None Seen /LPF    Methodology Automated Microscopy      Assessment and plan  1.  Delirium-did much better during the day yesterday.  I would like to let last night's medicines wear out of his system, get him ready oriented, remove restraints and have physical therapy get him out of bed today as I believe that would help.  Ambien  discontinued.  Lack of sleep prior to admission and then being stuck in the emergency room without sleeping the night of admission probably helped contribute to his delirium, but Zyprexa should help with sleep in the evening without the need for zolpidem.  Psychiatry following. Discussed with nursing about getting MRI done yesterday, but felt it would still be difficult to keep him still for the imaging.   2.  Atrial fibrillation-anticoagulated on Lovenox currently.  Once delirium clears we will get him back on oral anticoagulation.  Cardiology following for rate control and he has metoprolol ordered as needed  3.  Loss of appetite-was given Dulcolax suppository to get bowels moving yesterday with no results.  Spoke with nursing about giving him oral Dulcolax today to get his bowels moving. 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  4.  Hematuria-rechecked urinalysis yesterday because of ongoing delirium.  No sign of infection but does have 21-50 red cells.  Has a history of BPH and previous urinary retention.  He has been followed by urology and has had previous cystoscopy, but with his lack of appetite and new hematuria, I'm going to CT the abdomen.  If that's negative, he can follow up with urology as an outpatient once we get him improved.

## 2025-01-03 NOTE — PLAN OF CARE
Goal Outcome Evaluation:  Plan of Care Reviewed With: patient, spouse        Progress: no change  Outcome Evaluation: Patient was doing much better at the beginning of the shift, calm, conversant and cooperative. Started to be agitated and restless again at 10PM after taking Ambien, combative and hallucinating, bilateral wrist restraints restarted. HR sustaining 150-170s when agitated, IV Metoprolol x 1, IV Ativan x 2, IM Zyprexa x 1 administered. Patient did sleep for 6 hours.

## 2025-01-04 ENCOUNTER — APPOINTMENT (OUTPATIENT)
Dept: CT IMAGING | Facility: HOSPITAL | Age: 80
End: 2025-01-04
Payer: MEDICARE

## 2025-01-04 PROCEDURE — 25010000002 OLANZAPINE 10 MG RECONSTITUTED SOLUTION: Performed by: SPECIALIST

## 2025-01-04 PROCEDURE — 25010000002 ENOXAPARIN PER 10 MG: Performed by: INTERNAL MEDICINE

## 2025-01-04 PROCEDURE — 97530 THERAPEUTIC ACTIVITIES: CPT

## 2025-01-04 RX ORDER — OLANZAPINE 5 MG/1
5 TABLET ORAL
Status: DISCONTINUED | OUTPATIENT
Start: 2025-01-04 | End: 2025-01-16

## 2025-01-04 RX ORDER — LORAZEPAM 2 MG/ML
0.25 INJECTION INTRAMUSCULAR
Status: DISPENSED | OUTPATIENT
Start: 2025-01-04 | End: 2025-01-12

## 2025-01-04 RX ADMIN — BISACODYL 10 MG: 10 SUPPOSITORY RECTAL at 11:41

## 2025-01-04 RX ADMIN — ATORVASTATIN CALCIUM 10 MG: 10 TABLET, FILM COATED ORAL at 09:58

## 2025-01-04 RX ADMIN — DIGOXIN 125 MCG: 0.12 TABLET ORAL at 09:58

## 2025-01-04 RX ADMIN — METOPROLOL TARTRATE 5 MG: 1 INJECTION, SOLUTION INTRAVENOUS at 16:20

## 2025-01-04 RX ADMIN — OLANZAPINE 5 MG: 10 INJECTION, POWDER, LYOPHILIZED, FOR SOLUTION INTRAMUSCULAR at 20:36

## 2025-01-04 RX ADMIN — ENOXAPARIN SODIUM 105 MG: 150 INJECTION SUBCUTANEOUS at 06:26

## 2025-01-04 RX ADMIN — METOPROLOL TARTRATE 5 MG: 1 INJECTION, SOLUTION INTRAVENOUS at 04:39

## 2025-01-04 RX ADMIN — Medication 10 ML: at 20:48

## 2025-01-04 RX ADMIN — METOPROLOL TARTRATE 5 MG: 1 INJECTION, SOLUTION INTRAVENOUS at 20:41

## 2025-01-04 RX ADMIN — METOPROLOL TARTRATE 5 MG: 1 INJECTION, SOLUTION INTRAVENOUS at 00:03

## 2025-01-04 RX ADMIN — TAMSULOSIN HYDROCHLORIDE 0.4 MG: 0.4 CAPSULE ORAL at 09:58

## 2025-01-04 RX ADMIN — Medication 10 ML: at 09:58

## 2025-01-04 RX ADMIN — OLANZAPINE 5 MG: 5 TABLET, FILM COATED ORAL at 18:08

## 2025-01-04 RX ADMIN — Medication 1000 MCG: at 09:58

## 2025-01-04 RX ADMIN — METOPROLOL SUCCINATE 25 MG: 25 TABLET, EXTENDED RELEASE ORAL at 09:58

## 2025-01-04 NOTE — THERAPY TREATMENT NOTE
Patient Name: Mark Aguirre Jr.  : 1945    MRN: [id number]                              Today's Date: 2025       Admit Date: 2024    Visit Dx:     ICD-10-CM ICD-9-CM   1. Syncope, unspecified syncope type  R55 780.2   2. Laceration of right ear, initial encounter  S01.311A 872.8   3. Prolonged Q-T interval on ECG  R94.31 794.31   4. Atrial fibrillation, unspecified type  I48.91 427.31     Patient Active Problem List   Diagnosis    Atrial fibrillation    Bifascicular block    STEPHENIE on auto CPAP    Family history of colon cancer    History of colon polyps    Essential hypertension    Hip pain, right    OA (osteoarthritis) of hip    Acute renal failure    Brief psychotic disorder    Family history of colonic polyps    Nonrheumatic aortic valve stenosis    PVC (premature ventricular contraction)    Syncope and collapse    History of adenomatous polyp of colon    Persistent atrial fibrillation    Hypoxemia associated with sleep    Syncope    Acute delirium     Past Medical History:   Diagnosis Date    Acute kidney failure     POST-OP TOTAL HIP 2020    Anticoagulated     PRADAXA FOR A FIB TO HELD 3 DAYS PRIOR    Arthritis     OSTEO. RIGHT HIP    Atrial flutter     Bifascicular block     Cataract     LEFT AND RIGHT    Depression     History of colon polyps     Hypertension     Hypoxemia associated with sleep     Insomnia     Knee pain     STEPHENIE on CPAP 2010    Overnight polysomnogram.  Weight 163 pounds.  Severe STEPHENIE with AHI of 30.9 events per hour.  Low oxygen saturation 72% and sleep-related hypoxia present for 30 minutes    PAF (paroxysmal atrial fibrillation)     Right hip pain     Slow to wake up after anesthesia      Past Surgical History:   Procedure Laterality Date    CARDIAC ABLATION      CARDIAC CATHETERIZATION      COLONOSCOPY N/A 2018    Procedure: COLONOSCOPY INTO CECUM WITH COLD BX POLYPECTOMY ;  Surgeon: Ross Stearns MD;  Location: [facility] ENDOSCOPY;  Service:     COLONOSCOPY N/A  2/3/2021    Procedure: COLONOSCOPY TOCECUM WITH COLD BX POLYPECTOMY AND HOT SNARE POLYPECTOMY;  Surgeon: Ross Stearns MD;  Location: [facility] ENDOSCOPY;  Service: General;  Laterality: N/A;  PERSONAL HX OF POLYPS, FAMILY HX OF COLON CANCER  --POLYPS (X3), DIVERTICULOSIS    COLONOSCOPY N/A 3/20/2024    Procedure: COLONOSCOPY TO CECUM WITH COLD BX POLYPECTOMIES;  Surgeon: Ross Stearns MD;  Location: [facility] ENDOSCOPY;  Service: General;  Laterality: N/A;  HX POLYPS/POLYPS (3)    HERNIA REPAIR      INGUINAL HERNIA? SIDE    TOTAL HIP ARTHROPLASTY Right 6/5/2020    Procedure: TOTAL HIP ARTHROPLASTY;  Surgeon: Travis Hamlin MD;  Location: [facility] MAIN OR;  Service: Orthopedics;  Laterality: Right;      General Information       Row Name 01/04/25 1500          Physical Therapy Time and Intention    Document Type therapy note (daily note)  -     Mode of Treatment individual therapy;physical therapy  -       Row Name 01/04/25 1500          General Information    Existing Precautions/Restrictions fall  -       Row Name 01/04/25 1500          Cognition    Orientation Status (Cognition) oriented to;person  -               User Key  (r) = Recorded By, (t) = Taken By, (c) = Cosigned By      Initials Name Provider Type     Sue Padilla, PT Physical Therapist                   Mobility       Row Name 01/04/25 1500          Bed Mobility    Bed Mobility supine-sit;sit-supine  -     Supine-Sit [place] (Bed Mobility) verbal cues;nonverbal cues (demo/gesture);moderate assist (50% patient effort)  -     Sit-Supine [place] (Bed Mobility) verbal cues;nonverbal cues (demo/gesture);moderate assist (50% patient effort)  -     Assistive Device (Bed Mobility) bed rails;head of bed elevated  -       Row Name 01/04/25 1500          Sit-Stand Transfer    Sit-Stand [place] (Transfers) verbal cues;nonverbal cues (demo/gesture);minimum assist (75% patient effort)  -     Assistive Device (Sit-Stand  Transfers) walker, front-wheeled  -       Row Name 01/04/25 1500          Gait/Stairs (Locomotion)    [place] Level (Gait) verbal cues;nonverbal cues (demo/gesture);contact guard  -     Assistive Device (Gait) walker, front-wheeled  -     Distance in Feet (Gait) 150  -CH     Deviations/Abnormal Patterns (Gait) miki decreased;festinating/shuffling;gait speed decreased;stride length decreased  -     Bilateral Gait Deviations forward flexed posture  -               User Key  (r) = Recorded By, (t) = Taken By, (c) = Cosigned By      Initials Name Provider Type    Sue Deleon, PT Physical Therapist                   Obj/Interventions    No documentation.                  Goals/Plan    No documentation.                  Clinical Impression       Row Name 01/04/25 1501          Pain    Pretreatment Pain Rating 0/10 - no pain  -     Posttreatment Pain Rating 0/10 - no pain  -       Row Name 01/04/25 1501          Plan of Care Review    Plan of Care Reviewed With patient;spouse  -     Outcome Evaluation Pt initially resistant to sitting EOB but was agreeable to therapy and more participatory with encouragement. Pt was able to stand and ambulate around the unit with a walker and some assistance. Pt continues to have confusion but was not aggitated at time of PT session. PT will continue to follow to address strength, mobility, and gait.  -       Row Name 01/04/25 1501          Positioning and Restraints    Pre-Treatment Position in bed  -     Post Treatment Position bed  -CH     In Bed supine;call light within reach;encouraged to call for assist;exit alarm on;with family/caregiver  -     Restraints reapplied:;soft limb  -               User Key  (r) = Recorded By, (t) = Taken By, (c) = Cosigned By      Initials Name Provider Type    Sue Deleon, PT Physical Therapist                   Outcome Measures       Row Name 01/04/25 1504          How much help from another person do  you currently need...    Turning from your back to your side while in flat bed without using bedrails? 2  -CH     Moving from lying on back to sitting on the side of a flat bed without bedrails? 2  -CH     Moving to and from a bed to a chair (including a wheelchair)? 3  -CH     Standing up from a chair using your arms (e.g., wheelchair, bedside chair)? 3  -CH     Climbing 3-5 steps with a railing? 1  -CH     To walk in hospital room? 3  -CH     AM-PAC 6 Clicks Score (PT) 14  -CH     Highest Level of Mobility Goal 4 --> Transfer to chair/commode  -       Row Name 01/04/25 1504          Functional Assessment    Outcome Measure Options AM-PAC 6 Clicks Basic Mobility (PT)  -               User Key  (r) = Recorded By, (t) = Taken By, (c) = Cosigned By      Initials Name Provider Type     Sue Padilla, PT Physical Therapist                                 Physical Therapy Education       Title: PT OT SLP Therapies (In Progress)       Topic: Physical Therapy (In Progress)       Point: Mobility training (In Progress)       Learning Progress Summary            Patient Acceptance, E,TB,D, VU,NR by  at 1/4/2025 1504    Acceptance, E, NR by  at 12/31/2024 1525   Family Acceptance, E, NR by  at 12/31/2024 1525                      Point: Home exercise program (In Progress)       Learning Progress Summary            Patient Acceptance, E, NR by  at 12/31/2024 1525   Family Acceptance, E, NR by  at 12/31/2024 1525                      Point: Body mechanics (In Progress)       Learning Progress Summary            Patient Acceptance, E,TB,D, VU,NR by  at 1/4/2025 1504    Acceptance, E, NR by  at 12/31/2024 1525   Family Acceptance, E, NR by  at 12/31/2024 1525                      Point: Precautions (In Progress)       Learning Progress Summary            Patient Acceptance, E,TB,D, VU,NR by  at 1/4/2025 1504    Acceptance, E, NR by  at 12/31/2024 1525   Family Acceptance, E, NR by  at 12/31/2024  1525                                      User Key       Initials Effective Dates Name Provider Type Discipline     06/16/21 -  Sue Padilla, PT Physical Therapist PT     06/16/21 -  Sima Anne PT Physical Therapist PT                  PT Recommendation and Plan     Outcome Evaluation: Pt initially resistant to sitting EOB but was agreeable to therapy and more participatory with encouragement. Pt was able to stand and ambulate around the unit with a walker and some assistance. Pt continues to have confusion but was not aggitated at time of PT session. PT will continue to follow to address strength, mobility, and gait.     Time Calculation:         PT Charges       Row Name 01/04/25 1504             Time Calculation    Start Time 1355  -CH      Stop Time 1415  -CH      Time Calculation (min) 20 min  -      PT Received On 01/04/25  -      PT - Next Appointment 01/06/25  -         Time Calculation- PT    Total Timed Code Minutes- PT 20 minute(s)  -CH         Timed Charges    30751 - PT Therapeutic Activity Minutes 20  -CH         Total Minutes    Timed Charges Total Minutes 20  -CH       Total Minutes 20  -CH                User Key  (r) = Recorded By, (t) = Taken By, (c) = Cosigned By      Initials Name Provider Type     Sue Padilla, PT Physical Therapist                  Therapy Charges for Today       Code Description Service Date Service Provider Modifiers Qty    97341338553 HC PT THERAPEUTIC ACT EA 15 MIN 1/4/2025 Sue Padilla, PT GP 1            PT G-Codes  Outcome Measure Options: AM-PAC 6 Clicks Basic Mobility (PT)  AM-PAC 6 Clicks Score (PT): 14  PT Discharge Summary  Anticipated Discharge Disposition (PT): home with assist, home with home health, skilled nursing facility (pending progress and home resources)    Sue Padilla, NINA  1/4/2025

## 2025-01-04 NOTE — NURSING NOTE
Patient started to be agitated, restless, and hallucinating again. Yelling out and attempting to climb out of bed. HR sustaining 150-170s. Dr. Staples notified, ordered to give PRN IM Zyprexa now and if patient does not settle after 1-2 hours then may go ahead and give PRN IV Lorazepam.

## 2025-01-04 NOTE — PROGRESS NOTES
Subjective: Patient confused yesterday morning but then did better during the day per nursing.  Physical therapy did not get the patient up yesterday.  Psychiatry adjusted Zyprexa dosing to try and help control nighttime sundowning.  Patient became acutely agitated again last evening required IM olanzapine followed by a dose of lorazepam IV 11 PM last night.  Nursing notes that the IM Zyprexa really had little to no effect.  Lorazepam settled him down a little bit but he ultimately did not fall asleep until about 3 AM.  Currently is sleeping but easily aroused      Current Facility-Administered Medications:     acetaminophen (TYLENOL) tablet 650 mg, 650 mg, Oral, Q4H PRN, Luis Staples MD, 650 mg at 01/02/25 1355    atorvastatin (LIPITOR) tablet 10 mg, 10 mg, Oral, Daily, Luis Staples MD, 10 mg at 01/03/25 1707    sennosides-docusate (PERICOLACE) 8.6-50 MG per tablet 2 tablet, 2 tablet, Oral, BID PRN **AND** polyethylene glycol (MIRALAX) packet 17 g, 17 g, Oral, Daily PRN **AND** bisacodyl (DULCOLAX) EC tablet 5 mg, 5 mg, Oral, Daily PRN, 5 mg at 01/03/25 1706 **AND** bisacodyl (DULCOLAX) suppository 10 mg, 10 mg, Rectal, Daily PRN, Luis Staples MD, 10 mg at 01/02/25 1705    Calcium Replacement - Follow Nurse / BPA Driven Protocol, , Not Applicable, PRN, Luis Staples MD    digoxin (LANOXIN) tablet 125 mcg, 125 mcg, Oral, Daily, Luis Staples MD, 125 mcg at 01/03/25 1706    Enalaprilat (VASOTEC) injection 0.625 mg, 0.625 mg, Intravenous, Q6H PRN, Luis Staples MD    Enoxaparin Sodium (LOVENOX) syringe 105 mg, 1 mg/kg, Subcutaneous, Q12H, Luis Staples MD, 105 mg at 01/04/25 0626    Magnesium Cardiology Dose Replacement - Follow Nurse / BPA Driven Protocol, , Not Applicable, PRN, Luis Staples MD    metoprolol succinate XL (TOPROL-XL) 24 hr tablet 25 mg, 25 mg, Oral, Daily, Luis Staples MD, 25 mg at 01/02/25 1356    metoprolol tartrate (LOPRESSOR) injection 5 mg, 5 mg,  Intravenous, Q4H PRN, Cameron Stroud PA-C, 5 mg at 01/04/25 0439    nitroglycerin (NITROSTAT) SL tablet 0.4 mg, 0.4 mg, Sublingual, Q5 Min PRN, Luis Staples MD    OLANZapine (zyPREXA) injection 5 mg, 5 mg, Intramuscular, Q8H PRN, Jevon Celestin III, MD, 5 mg at 01/03/25 2149    OLANZapine (zyPREXA) tablet 2.5 mg, 2.5 mg, Oral, Daily With Dinner, Jevon Celestin III, MD, 2.5 mg at 01/03/25 1706    OLANZapine (zyPREXA) tablet 5 mg, 5 mg, Oral, Nightly, Jevon Celestin III, MD, 5 mg at 01/03/25 2049    Phosphorus Replacement - Follow Nurse / BPA Driven Protocol, , Not Applicable, PRBel VAZQUEZ Paul J., MD    Potassium Replacement - Follow Nurse / BPA Driven Protocol, , Not Applicable, PRBel VAZQUEZ Paul J., MD    [COMPLETED] Insert Peripheral IV, , , Once **AND** sodium chloride 0.9 % flush 10 mL, 10 mL, Intravenous, PRN, Luis Staples MD    sodium chloride 0.9 % flush 10 mL, 10 mL, Intravenous, Q12H, Luis Staples MD, 10 mL at 01/03/25 2059    sodium chloride 0.9 % flush 10 mL, 10 mL, Intravenous, PRN, Luis Staples MD    sodium chloride 0.9 % infusion 40 mL, 40 mL, Intravenous, PRN, Luis Staples MD    tamsulosin (FLOMAX) 24 hr capsule 0.4 mg, 0.4 mg, Oral, Daily, Luis Staples MD, 0.4 mg at 01/03/25 1706    vitamin B-12 (CYANOCOBALAMIN) tablet 1,000 mcg, 1,000 mcg, Oral, Daily, Luis Staples MD, 1,000 mcg at 01/03/25 1705   Objective:  Vitals:    01/04/25 0400 01/04/25 0437 01/04/25 0624 01/04/25 0625   BP: 132/75      BP Location: Left arm      Patient Position: Lying      Pulse: 113 (!) 177 118 114   Resp: 20      Temp:       TempSrc:       SpO2: 98%      Weight:       Height:           General: Sleeping but easily aroused.  Oriented to person and place.  Recognizes me   heart irregularl tachycardic  Lungs clear to auscultation  Abdomen: Soft bowel sounds present, nontender  Extremities: No edema    Assessment and plan  1.  Delirium-continues  to have significant sundowning.  Psychiatry following for medication management.  I will try and discuss with them today on further recommendations.  Also check in with nursing later today about possibly getting MRI done if he becomes calm enough to perform.  Spoke with nursing regarding making sure physical therapy gets him up today as he settles down as I think it will help the delirium.    2.  Atrial fibrillation-gets significantly tachycardic with agitation and requires IV metoprolol.  3.  Loss of appetite/constipation-spoke with nursing.  Has not had a bowel movement.  Going to have them give Dulcolax suppository today.  4.  Hematuria-I would like to get the CT scan done today if possible.  Unable to do yesterday due to ongoing confusion.

## 2025-01-04 NOTE — PROGRESS NOTES
The patient is more confused when seen today, but wife reports that earlier today, while visiting with his sons had a lucid conversation.  He continues to have episodes of fluctuating mentation consistent with delirium.  I have increased his dinnertime dose of Zyprexa to 5 mg.

## 2025-01-04 NOTE — PLAN OF CARE
Goal Outcome Evaluation:  Plan of Care Reviewed With: patient, spouse           Outcome Evaluation: Pt initially resistant to sitting EOB but was agreeable to therapy and more participatory with encouragement. Pt was able to stand and ambulate around the unit with a walker and some assistance. Pt continues to have confusion but was not aggitated at time of PT session. PT will continue to follow to address strength, mobility, and gait.    Anticipated Discharge Disposition (PT): home with assist, home with home health, skilled nursing facility (pending progress and home resources)

## 2025-01-04 NOTE — PLAN OF CARE
Goal Outcome Evaluation:  Plan of Care Reviewed With: patient        Progress: no change  Outcome Evaluation: No changes overnight. Remains confused, agitated, and restless overnight. HR sustaining 140-180s when agitated. IV Metoprolol given x 3. MD updated overnight, patient received IM Zyprexa x 1 and IV Ativan x 1. Continued bilateral wrist restraints. Patient slept on and off from 3AM. MD rounded this AM. CT abdo/pelvis ordered.

## 2025-01-05 LAB
ALBUMIN SERPL-MCNC: 3.4 G/DL (ref 3.5–5.2)
ALBUMIN/GLOB SERPL: 1.4 G/DL
ALP SERPL-CCNC: 52 U/L (ref 39–117)
ALT SERPL W P-5'-P-CCNC: 21 U/L (ref 1–41)
ANION GAP SERPL CALCULATED.3IONS-SCNC: 12 MMOL/L (ref 5–15)
AST SERPL-CCNC: 26 U/L (ref 1–40)
BASOPHILS # BLD AUTO: 0.05 10*3/MM3 (ref 0–0.2)
BASOPHILS NFR BLD AUTO: 0.6 % (ref 0–1.5)
BILIRUB SERPL-MCNC: 2.4 MG/DL (ref 0–1.2)
BUN SERPL-MCNC: 24 MG/DL (ref 8–23)
BUN/CREAT SERPL: 30 (ref 7–25)
CALCIUM SPEC-SCNC: 8.9 MG/DL (ref 8.6–10.5)
CHLORIDE SERPL-SCNC: 107 MMOL/L (ref 98–107)
CO2 SERPL-SCNC: 25 MMOL/L (ref 22–29)
CREAT SERPL-MCNC: 0.8 MG/DL (ref 0.76–1.27)
DEPRECATED RDW RBC AUTO: 40.3 FL (ref 37–54)
EGFRCR SERPLBLD CKD-EPI 2021: 88.9 ML/MIN/1.73
EOSINOPHIL # BLD AUTO: 0.08 10*3/MM3 (ref 0–0.4)
EOSINOPHIL NFR BLD AUTO: 0.9 % (ref 0.3–6.2)
ERYTHROCYTE [DISTWIDTH] IN BLOOD BY AUTOMATED COUNT: 13 % (ref 12.3–15.4)
GLOBULIN UR ELPH-MCNC: 2.4 GM/DL
GLUCOSE SERPL-MCNC: 103 MG/DL (ref 65–99)
HCT VFR BLD AUTO: 38.8 % (ref 37.5–51)
HGB BLD-MCNC: 13.1 G/DL (ref 13–17.7)
IMM GRANULOCYTES # BLD AUTO: 0.04 10*3/MM3 (ref 0–0.05)
IMM GRANULOCYTES NFR BLD AUTO: 0.5 % (ref 0–0.5)
LYMPHOCYTES # BLD AUTO: 1 10*3/MM3 (ref 0.7–3.1)
LYMPHOCYTES NFR BLD AUTO: 11.4 % (ref 19.6–45.3)
MCH RBC QN AUTO: 29.1 PG (ref 26.6–33)
MCHC RBC AUTO-ENTMCNC: 33.8 G/DL (ref 31.5–35.7)
MCV RBC AUTO: 86.2 FL (ref 79–97)
MONOCYTES # BLD AUTO: 0.79 10*3/MM3 (ref 0.1–0.9)
MONOCYTES NFR BLD AUTO: 9 % (ref 5–12)
NEUTROPHILS NFR BLD AUTO: 6.8 10*3/MM3 (ref 1.7–7)
NEUTROPHILS NFR BLD AUTO: 77.6 % (ref 42.7–76)
NRBC BLD AUTO-RTO: 0 /100 WBC (ref 0–0.2)
PLATELET # BLD AUTO: 193 10*3/MM3 (ref 140–450)
PMV BLD AUTO: 11.1 FL (ref 6–12)
POTASSIUM SERPL-SCNC: 3.7 MMOL/L (ref 3.5–5.2)
PROT SERPL-MCNC: 5.8 G/DL (ref 6–8.5)
RBC # BLD AUTO: 4.5 10*6/MM3 (ref 4.14–5.8)
SODIUM SERPL-SCNC: 144 MMOL/L (ref 136–145)
WBC NRBC COR # BLD AUTO: 8.76 10*3/MM3 (ref 3.4–10.8)

## 2025-01-05 PROCEDURE — 25010000002 LORAZEPAM PER 2 MG: Performed by: INTERNAL MEDICINE

## 2025-01-05 PROCEDURE — 80053 COMPREHEN METABOLIC PANEL: CPT | Performed by: INTERNAL MEDICINE

## 2025-01-05 PROCEDURE — 85025 COMPLETE CBC W/AUTO DIFF WBC: CPT | Performed by: INTERNAL MEDICINE

## 2025-01-05 PROCEDURE — 25010000002 ENOXAPARIN PER 10 MG: Performed by: INTERNAL MEDICINE

## 2025-01-05 RX ADMIN — METOPROLOL TARTRATE 5 MG: 1 INJECTION, SOLUTION INTRAVENOUS at 05:05

## 2025-01-05 RX ADMIN — METOPROLOL TARTRATE 5 MG: 1 INJECTION, SOLUTION INTRAVENOUS at 00:43

## 2025-01-05 RX ADMIN — Medication 10 ML: at 10:29

## 2025-01-05 RX ADMIN — ENOXAPARIN SODIUM 105 MG: 150 INJECTION SUBCUTANEOUS at 18:05

## 2025-01-05 RX ADMIN — Medication 10 ML: at 20:08

## 2025-01-05 RX ADMIN — ENOXAPARIN SODIUM 105 MG: 150 INJECTION SUBCUTANEOUS at 05:26

## 2025-01-05 RX ADMIN — OLANZAPINE 5 MG: 5 TABLET, FILM COATED ORAL at 20:04

## 2025-01-05 RX ADMIN — METOPROLOL TARTRATE 5 MG: 1 INJECTION, SOLUTION INTRAVENOUS at 18:05

## 2025-01-05 RX ADMIN — LORAZEPAM 0.25 MG: 2 INJECTION INTRAMUSCULAR; INTRAVENOUS at 08:06

## 2025-01-05 RX ADMIN — METOPROLOL TARTRATE 5 MG: 1 INJECTION, SOLUTION INTRAVENOUS at 22:25

## 2025-01-05 RX ADMIN — LORAZEPAM 0.25 MG: 2 INJECTION INTRAMUSCULAR; INTRAVENOUS at 22:25

## 2025-01-05 RX ADMIN — METOPROLOL TARTRATE 5 MG: 1 INJECTION, SOLUTION INTRAVENOUS at 13:15

## 2025-01-05 RX ADMIN — ACETAMINOPHEN 650 MG: 325 TABLET ORAL at 20:05

## 2025-01-05 NOTE — PROGRESS NOTES
S: Was again doing much better during the day but still pleasantly confused and then became very agitated at about 8 PM last night.  Psychiatry increased Zyprexa to 5 mg at dinnertime and then he required IM Zyprexa in the evening because he would not take oral meds.  He did not require Ativan.  Wife is at bedside this morning.  Patient is alert and denies any specific complaints but remains confused    Objective  Vitals:    01/04/25 1925 01/04/25 1933 01/05/25 0013 01/05/25 0400   BP: 130/88 147/91 129/79 141/98   BP Location:  Left arm Left arm Left arm   Patient Position:  Lying Lying Lying   Pulse: 114 117 (!) 144 (!) 121   Resp:  18 20 20   Temp:  97.5 °F (36.4 °C)     TempSrc:  Oral     SpO2: 95% 99% 98% 100%   Weight:       Height:            General: Sleeping but easily aroused.  Oriented to person and place.  Recognizes me   heart irregularl tachycardic  Lungs clear to auscultation  Abdomen: Soft bowel sounds present, nontender  Extremities: No edema     Assessment and plan  1.  Delirium-continues to have significant sundowning.  He did get up and get moving yesterday with physical therapy.  Psychiatry following for medication management.  He had similar episode in 2020 but this time it is not clearing.  He has had some sinus during the day that he has been improving but then continues to sundown at night.  I have been trying to avoid doing anesthesia in order to get MRI but to complete the workup I think that needs to be done today.  I discussed with wife and she is in agreement.   2.  Atrial fibrillation-gets significantly tachycardic with agitation and requires IV metoprolol.  Cardiology is following.  Eventually planned for AV carlos a ablation with pacemaker but really need to see improvement first.  He is anticoagulated with Lovenox since he is not consistently taking oral medicine.  Lovenox had to be held last night due to him picking at his ear laceration causing it to bleed.  Bleeding has stopped and  he is resuming Lovenox this morning.  3.  Loss of appetite/constipation-spoke with nursing.  Bowels did move this morning.  He did taken some boost yesterday.    4.  Hematuria-needs to have CT scan of the abdomen pelvis with and without for evaluation at some point.  Has been unable to do due to agitation.  Abdominal exam is benign.  Will work on getting MRI today and CT scan can be done at a later date.

## 2025-01-05 NOTE — PLAN OF CARE
Goal Outcome Evaluation:  Plan of Care Reviewed With: patient, spouse        Progress: no change  Outcome Evaluation: Remains confused, continues to have episodes of fluctuating of mentation. Started to be agitated and restless again at 8PM, refused to take scheduled PO Zyprexa, IM Zyprexa given. HR remains elevated with agitation, PRN IV Metoprolol administered x 3. Patient has been sleeping on and off. Incontinence care done, pt had one BM this shift. Continued bilateral wrist restraints.

## 2025-01-05 NOTE — PLAN OF CARE
Problem: Syncope  Goal: Absence of Syncopal Symptoms  Outcome: Progressing  Intervention: Manage Effect of Syncopal Symptoms  Recent Flowsheet Documentation  Taken 1/5/2025 1453 by Dedrick Cabrales RN  Safety Promotion/Fall Prevention:   safety round/check completed   room organization consistent  Taken 1/5/2025 0822 by Dedrick Cabrales RN  Supportive Measures: active listening utilized  Safety Promotion/Fall Prevention:   safety round/check completed   room organization consistent  Goal: Absence of Syncopal Symptoms  Outcome: Progressing  Intervention: Manage Effect of Syncopal Symptoms  Recent Flowsheet Documentation  Taken 1/5/2025 1453 by Dedrick Cabrales RN  Safety Promotion/Fall Prevention:   safety round/check completed   room organization consistent  Taken 1/5/2025 0822 by Dedrick Cabrales RN  Supportive Measures: active listening utilized  Safety Promotion/Fall Prevention:   safety round/check completed   room organization consistent     Problem: Adult Inpatient Plan of Care  Goal: Plan of Care Review  Outcome: Progressing  Goal: Patient-Specific Goal (Individualized)  Outcome: Progressing  Goal: Absence of Hospital-Acquired Illness or Injury  Outcome: Progressing  Intervention: Identify and Manage Fall Risk  Recent Flowsheet Documentation  Taken 1/5/2025 1453 by Dedrick Cabrales RN  Safety Promotion/Fall Prevention:   safety round/check completed   room organization consistent  Taken 1/5/2025 0822 by Dedrick Cabrales RN  Safety Promotion/Fall Prevention:   safety round/check completed   room organization consistent  Intervention: Prevent Skin Injury  Recent Flowsheet Documentation  Taken 1/5/2025 1453 by Dedrick Cabrales RN  Body Position:   turned   legs elevated  Skin Protection:   transparent dressing maintained   incontinence pads utilized  Taken 1/5/2025 0822 by Dedrick Cabrales RN  Body Position:   tilted    right  Skin Protection: incontinence pads utilized  Intervention: Prevent Infection  Recent Flowsheet Documentation  Taken 1/5/2025 1453 by Dedrick Cabrales RN  Infection Prevention:   single patient room provided   rest/sleep promoted   hand hygiene promoted  Taken 1/5/2025 0822 by Dedrick Cabrales RN  Infection Prevention:   single patient room provided   rest/sleep promoted   hand hygiene promoted  Goal: Optimal Comfort and Wellbeing  Outcome: Progressing  Intervention: Provide Person-Centered Care  Recent Flowsheet Documentation  Taken 1/5/2025 1453 by Dedrick Cabrales RN  Trust Relationship/Rapport:   care explained   choices provided   emotional support provided   empathic listening provided   questions answered   questions encouraged   reassurance provided   thoughts/feelings acknowledged  Taken 1/5/2025 0822 by Dedrick Cabrales RN  Trust Relationship/Rapport:   thoughts/feelings acknowledged   reassurance provided   questions encouraged   questions answered   empathic listening provided   emotional support provided   choices provided   care explained  Goal: Readiness for Transition of Care  Outcome: Progressing     Problem: Comorbidity Management  Goal: Blood Pressure in Desired Range  Outcome: Progressing  Intervention: Maintain Blood Pressure Management  Recent Flowsheet Documentation  Taken 1/5/2025 1453 by Dedrick Cabrales RN  Medication Review/Management: medications reviewed  Taken 1/5/2025 0822 by Dedrick Cabrales RN  Medication Review/Management: medications reviewed     Problem: Restraint, Nonviolent  Goal: Absence of Harm or Injury  Outcome: Progressing  Intervention: Implement Least Restrictive Safety Strategies  Recent Flowsheet Documentation  Taken 1/5/2025 1836 by Dedrick Cabrales RN  Medical Device Protection: tubing secured  Diversional Activities: television  Taken 1/5/2025 1620 by Dedrick Cabrales  RN  Medical Device Protection: tubing secured  Diversional Activities: television  Taken 1/5/2025 1453 by Dedrick Cabrales RN  Medical Device Protection: tubing secured  Diversional Activities: television  Taken 1/5/2025 1425 by Dedrick Cabrales RN  Medical Device Protection: tubing secured  Diversional Activities: television  Taken 1/5/2025 1200 by Dedrick Cabrales RN  Medical Device Protection: tubing secured  Diversional Activities: television  Taken 1/5/2025 1010 by Dedrick Cabrales RN  Medical Device Protection: tubing secured  Diversional Activities: television  Taken 1/5/2025 0822 by Dedrick Cabrales RN  Medical Device Protection: tubing secured  Diversional Activities: television  Taken 1/5/2025 0800 by Dedrick Cabrales RN  Medical Device Protection: tubing secured  Diversional Activities: television  Intervention: Protect Dignity, Rights and Personal Wellbeing  Recent Flowsheet Documentation  Taken 1/5/2025 1453 by Dedrick Cabralse RN  Trust Relationship/Rapport:   care explained   choices provided   emotional support provided   empathic listening provided   questions answered   questions encouraged   reassurance provided   thoughts/feelings acknowledged  Taken 1/5/2025 0822 by Dedrick Cabrales RN  Trust Relationship/Rapport:   thoughts/feelings acknowledged   reassurance provided   questions encouraged   questions answered   empathic listening provided   emotional support provided   choices provided   care explained  Intervention: Protect Skin and Joint Integrity  Recent Flowsheet Documentation  Taken 1/5/2025 1453 by Dedrick Cabrales RN  Body Position:   turned   legs elevated  Skin Protection:   transparent dressing maintained   incontinence pads utilized  Range of Motion: active ROM (range of motion) encouraged  Taken 1/5/2025 0822 by Dedrick Cabrales RN  Body Position:   tilted   right  Skin  Protection: incontinence pads utilized  Range of Motion: active ROM (range of motion) encouraged     Problem: Fall Injury Risk  Goal: Absence of Fall and Fall-Related Injury  Outcome: Progressing  Intervention: Identify and Manage Contributors  Recent Flowsheet Documentation  Taken 1/5/2025 1453 by Dedrick Cabrales RN  Medication Review/Management: medications reviewed  Self-Care Promotion: independence encouraged  Taken 1/5/2025 0822 by Dedrick Cabrales RN  Medication Review/Management: medications reviewed  Self-Care Promotion: independence encouraged  Intervention: Promote Injury-Free Environment  Recent Flowsheet Documentation  Taken 1/5/2025 1453 by Dedrick Cabrales RN  Safety Promotion/Fall Prevention:   safety round/check completed   room organization consistent  Taken 1/5/2025 0822 by Dedrick Cabrales RN  Safety Promotion/Fall Prevention:   safety round/check completed   room organization consistent     Problem: Dysrhythmia  Goal: Normalized Cardiac Rhythm  Outcome: Progressing     Problem: VTE (Venous Thromboembolism)  Goal: Tissue Perfusion  Outcome: Progressing  Goal: Optimal Right Ventricular Function  Outcome: Progressing     Problem: Heart Failure  Goal: Optimal Coping  Outcome: Progressing  Intervention: Support Psychosocial Response  Recent Flowsheet Documentation  Taken 1/5/2025 1453 by Dedrick Cabrales RN  Family/Support System Care:   support provided   self-care encouraged   presence promoted   involvement promoted  Taken 1/5/2025 0822 by Dedrick Cabrales RN  Supportive Measures: active listening utilized  Family/Support System Care:   support provided   self-care encouraged   presence promoted   involvement promoted  Goal: Optimal Cardiac Output and Blood Flow  Outcome: Progressing  Intervention: Optimize Cardiac Output  Recent Flowsheet Documentation  Taken 1/5/2025 0822 by Dedrick Cabrales RN  Environmental Support:  calm environment promoted  Goal: Stable Heart Rate and Rhythm  Outcome: Progressing  Goal: Fluid and Electrolyte Balance  Outcome: Progressing  Goal: Optimal Functional Ability  Outcome: Progressing  Intervention: Optimize Functional Ability  Recent Flowsheet Documentation  Taken 1/5/2025 1453 by Dedrick Cabrales RN  Activity Management: activity encouraged  Self-Care Promotion: independence encouraged  Taken 1/5/2025 0822 by Dedrick Cabrales RN  Activity Management: activity encouraged  Self-Care Promotion: independence encouraged  Goal: Improved Oral Intake  Outcome: Progressing  Goal: Effective Oxygenation and Ventilation  Outcome: Progressing  Intervention: Promote Airway Secretion Clearance  Recent Flowsheet Documentation  Taken 1/5/2025 1453 by Dedrick Cabrales RN  Activity Management: activity encouraged  Cough And Deep Breathing: done independently per patient  Taken 1/5/2025 0822 by Dedrick Cabrales RN  Activity Management: activity encouraged  Cough And Deep Breathing: done independently per patient  Intervention: Optimize Oxygenation and Ventilation  Recent Flowsheet Documentation  Taken 1/5/2025 1453 by Dedrick Cabrales RN  Head of Bed (HOB) Positioning: HOB at 30-45 degrees  Taken 1/5/2025 0822 by Dedrick Cabrales RN  Head of Bed (HOB) Positioning: HOB at 20-30 degrees  Goal: Effective Breathing Pattern During Sleep  Outcome: Progressing  Intervention: Monitor and Manage Obstructive Sleep Apnea  Recent Flowsheet Documentation  Taken 1/5/2025 1453 by Dedrick Cabrales RN  Medication Review/Management: medications reviewed  Taken 1/5/2025 0822 by Dedrick Cabrales RN  Medication Review/Management: medications reviewed     Problem: Delirium  Goal: Optimal Coping  Outcome: Progressing  Intervention: Optimize Psychosocial Adjustment to Delirium  Recent Flowsheet Documentation  Taken 1/5/2025 1453 by Gamaliel Montanez  KARINE Tse  Family/Support System Care:   support provided   self-care encouraged   presence promoted   involvement promoted  Taken 1/5/2025 0822 by Dedrick Cabrales RN  Supportive Measures: active listening utilized  Family/Support System Care:   support provided   self-care encouraged   presence promoted   involvement promoted  Goal: Improved Behavioral Control  Outcome: Progressing  Intervention: Minimize Safety Risk  Recent Flowsheet Documentation  Taken 1/5/2025 1453 by Dedrick Cabrales RN  Trust Relationship/Rapport:   care explained   choices provided   emotional support provided   empathic listening provided   questions answered   questions encouraged   reassurance provided   thoughts/feelings acknowledged  Enhanced Safety Measures:   bed alarm set   family to remain at bedside  Taken 1/5/2025 0822 by Dedrick Cabrales RN  Trust Relationship/Rapport:   thoughts/feelings acknowledged   reassurance provided   questions encouraged   questions answered   empathic listening provided   emotional support provided   choices provided   care explained  Enhanced Safety Measures:   bed alarm set   family to remain at bedside  Communication Support Strategies: active listening utilized  Goal: Improved Attention and Thought Clarity  Outcome: Progressing  Goal: Improved Sleep  Outcome: Progressing  Intervention: Promote Sleep  Recent Flowsheet Documentation  Taken 1/5/2025 1453 by Dedrick Cabrales RN  Sleep/Rest Enhancement: awakenings minimized  Taken 1/5/2025 0822 by Dedrick Cabrales RN  Sleep/Rest Enhancement: awakenings minimized     Problem: Skin Injury Risk Increased  Goal: Skin Health and Integrity  Outcome: Progressing  Intervention: Optimize Skin Protection  Recent Flowsheet Documentation  Taken 1/5/2025 1453 by Dedrick Cabrales RN  Activity Management: activity encouraged  Head of Bed (HOB) Positioning: HOB at 30-45 degrees  Skin Protection:    transparent dressing maintained   incontinence pads utilized  Taken 1/5/2025 0822 by Dedrcik Cabrales, RN  Activity Management: activity encouraged  Head of Bed (HOB) Positioning: HOB at 20-30 degrees  Skin Protection: incontinence pads utilized   Goal Outcome Evaluation:

## 2025-01-06 ENCOUNTER — APPOINTMENT (OUTPATIENT)
Dept: CT IMAGING | Facility: HOSPITAL | Age: 80
End: 2025-01-06
Payer: MEDICARE

## 2025-01-06 ENCOUNTER — ANESTHESIA (OUTPATIENT)
Dept: MRI IMAGING | Facility: HOSPITAL | Age: 80
End: 2025-01-06
Payer: MEDICARE

## 2025-01-06 ENCOUNTER — APPOINTMENT (OUTPATIENT)
Dept: MRI IMAGING | Facility: HOSPITAL | Age: 80
End: 2025-01-06
Payer: MEDICARE

## 2025-01-06 ENCOUNTER — ANESTHESIA EVENT (OUTPATIENT)
Dept: MRI IMAGING | Facility: HOSPITAL | Age: 80
End: 2025-01-06
Payer: MEDICARE

## 2025-01-06 LAB
BASOPHILS # BLD AUTO: 0.03 10*3/MM3 (ref 0–0.2)
BASOPHILS NFR BLD AUTO: 0.3 % (ref 0–1.5)
DEPRECATED RDW RBC AUTO: 43.1 FL (ref 37–54)
EOSINOPHIL # BLD AUTO: 0.01 10*3/MM3 (ref 0–0.4)
EOSINOPHIL NFR BLD AUTO: 0.1 % (ref 0.3–6.2)
ERYTHROCYTE [DISTWIDTH] IN BLOOD BY AUTOMATED COUNT: 13.4 % (ref 12.3–15.4)
HCT VFR BLD AUTO: 37.1 % (ref 37.5–51)
HGB BLD-MCNC: 12.2 G/DL (ref 13–17.7)
IMM GRANULOCYTES # BLD AUTO: 0.04 10*3/MM3 (ref 0–0.05)
IMM GRANULOCYTES NFR BLD AUTO: 0.4 % (ref 0–0.5)
LYMPHOCYTES # BLD AUTO: 0.79 10*3/MM3 (ref 0.7–3.1)
LYMPHOCYTES NFR BLD AUTO: 7.3 % (ref 19.6–45.3)
MCH RBC QN AUTO: 28.9 PG (ref 26.6–33)
MCHC RBC AUTO-ENTMCNC: 32.9 G/DL (ref 31.5–35.7)
MCV RBC AUTO: 87.9 FL (ref 79–97)
MONOCYTES # BLD AUTO: 1.1 10*3/MM3 (ref 0.1–0.9)
MONOCYTES NFR BLD AUTO: 10.2 % (ref 5–12)
NEUTROPHILS NFR BLD AUTO: 8.78 10*3/MM3 (ref 1.7–7)
NEUTROPHILS NFR BLD AUTO: 81.7 % (ref 42.7–76)
NRBC BLD AUTO-RTO: 0 /100 WBC (ref 0–0.2)
PLATELET # BLD AUTO: 193 10*3/MM3 (ref 140–450)
PMV BLD AUTO: 11 FL (ref 6–12)
RBC # BLD AUTO: 4.22 10*6/MM3 (ref 4.14–5.8)
WBC NRBC COR # BLD AUTO: 10.75 10*3/MM3 (ref 3.4–10.8)

## 2025-01-06 PROCEDURE — 25010000002 LIDOCAINE 2% SOLUTION: Performed by: NURSE ANESTHETIST, CERTIFIED REGISTERED

## 2025-01-06 PROCEDURE — 85025 COMPLETE CBC W/AUTO DIFF WBC: CPT | Performed by: INTERNAL MEDICINE

## 2025-01-06 PROCEDURE — 25010000002 PHENYLEPHRINE 10 MG/ML SOLUTION: Performed by: NURSE ANESTHETIST, CERTIFIED REGISTERED

## 2025-01-06 PROCEDURE — 74178 CT ABD&PLV WO CNTR FLWD CNTR: CPT

## 2025-01-06 PROCEDURE — 70551 MRI BRAIN STEM W/O DYE: CPT

## 2025-01-06 PROCEDURE — 25810000003 LACTATED RINGERS PER 1000 ML: Performed by: NURSE ANESTHETIST, CERTIFIED REGISTERED

## 2025-01-06 PROCEDURE — 25010000002 PROPOFOL 10 MG/ML EMULSION: Performed by: NURSE ANESTHETIST, CERTIFIED REGISTERED

## 2025-01-06 PROCEDURE — 25010000002 LORAZEPAM PER 2 MG: Performed by: INTERNAL MEDICINE

## 2025-01-06 PROCEDURE — 25510000001 IOPAMIDOL 61 % SOLUTION: Performed by: INTERNAL MEDICINE

## 2025-01-06 PROCEDURE — 25010000002 OLANZAPINE 10 MG RECONSTITUTED SOLUTION: Performed by: SPECIALIST

## 2025-01-06 RX ORDER — PHENYLEPHRINE HYDROCHLORIDE 10 MG/ML
INJECTION INTRAVENOUS AS NEEDED
Status: DISCONTINUED | OUTPATIENT
Start: 2025-01-06 | End: 2025-01-06 | Stop reason: SURG

## 2025-01-06 RX ORDER — SODIUM CHLORIDE, SODIUM LACTATE, POTASSIUM CHLORIDE, CALCIUM CHLORIDE 600; 310; 30; 20 MG/100ML; MG/100ML; MG/100ML; MG/100ML
INJECTION, SOLUTION INTRAVENOUS CONTINUOUS PRN
Status: DISCONTINUED | OUTPATIENT
Start: 2025-01-06 | End: 2025-01-06 | Stop reason: SURG

## 2025-01-06 RX ORDER — LIDOCAINE HYDROCHLORIDE 20 MG/ML
INJECTION, SOLUTION INFILTRATION; PERINEURAL AS NEEDED
Status: DISCONTINUED | OUTPATIENT
Start: 2025-01-06 | End: 2025-01-06 | Stop reason: SURG

## 2025-01-06 RX ORDER — IOPAMIDOL 612 MG/ML
100 INJECTION, SOLUTION INTRAVASCULAR
Status: COMPLETED | OUTPATIENT
Start: 2025-01-06 | End: 2025-01-06

## 2025-01-06 RX ORDER — PROPOFOL 10 MG/ML
VIAL (ML) INTRAVENOUS AS NEEDED
Status: DISCONTINUED | OUTPATIENT
Start: 2025-01-06 | End: 2025-01-06 | Stop reason: SURG

## 2025-01-06 RX ADMIN — Medication 10 ML: at 18:44

## 2025-01-06 RX ADMIN — METOPROLOL TARTRATE 5 MG: 1 INJECTION, SOLUTION INTRAVENOUS at 06:37

## 2025-01-06 RX ADMIN — SODIUM CHLORIDE, POTASSIUM CHLORIDE, SODIUM LACTATE AND CALCIUM CHLORIDE: 600; 310; 30; 20 INJECTION, SOLUTION INTRAVENOUS at 11:43

## 2025-01-06 RX ADMIN — Medication 10 ML: at 20:26

## 2025-01-06 RX ADMIN — LIDOCAINE HYDROCHLORIDE 100 MG: 20 INJECTION, SOLUTION INFILTRATION; PERINEURAL at 11:51

## 2025-01-06 RX ADMIN — METOPROLOL TARTRATE 5 MG: 1 INJECTION, SOLUTION INTRAVENOUS at 15:51

## 2025-01-06 RX ADMIN — LORAZEPAM 0.25 MG: 2 INJECTION INTRAMUSCULAR; INTRAVENOUS at 15:51

## 2025-01-06 RX ADMIN — OLANZAPINE 5 MG: 10 INJECTION, POWDER, LYOPHILIZED, FOR SOLUTION INTRAMUSCULAR at 20:25

## 2025-01-06 RX ADMIN — PHENYLEPHRINE HYDROCHLORIDE 200 MCG: 10 INJECTION INTRAVENOUS at 12:07

## 2025-01-06 RX ADMIN — METOPROLOL TARTRATE 5 MG: 1 INJECTION, SOLUTION INTRAVENOUS at 02:27

## 2025-01-06 RX ADMIN — PHENYLEPHRINE HYDROCHLORIDE 200 MCG: 10 INJECTION INTRAVENOUS at 11:53

## 2025-01-06 RX ADMIN — PROPOFOL 150 MG: 10 INJECTION, EMULSION INTRAVENOUS at 11:51

## 2025-01-06 RX ADMIN — METOPROLOL TARTRATE 5 MG: 1 INJECTION, SOLUTION INTRAVENOUS at 12:40

## 2025-01-06 RX ADMIN — METOPROLOL TARTRATE 5 MG: 1 INJECTION, SOLUTION INTRAVENOUS at 20:33

## 2025-01-06 RX ADMIN — LORAZEPAM 0.25 MG: 2 INJECTION INTRAMUSCULAR; INTRAVENOUS at 23:38

## 2025-01-06 RX ADMIN — IOPAMIDOL 100 ML: 612 INJECTION, SOLUTION INTRAVENOUS at 10:14

## 2025-01-06 NOTE — NURSING NOTE
Patient noted to have large hematoma on right flank. As per patient's wife, this was first noticed yesterday on day shift while doing hygiene care. Area marked and will notify MD.

## 2025-01-06 NOTE — NURSING NOTE
Dr. Staples notified about the large hematoma, right flank. Hold morning dose of Lovenox and MD will see pt later.

## 2025-01-06 NOTE — CONSULTS
Nutrition Services    Patient Name:  Mark Aguirre Jr.  YOB: 1945  MRN: [id number]  Admit Date:  12/28/2024  Assessment Date:  01/06/25    Summary: Follow Up  Pt eating either 0% or 75% of meals split about evenly while admitted. Receiving both Boost and magic cups TID. No new weights since 12/29, RD kadytely asked RN for a new weight. Last BM 1/4.     RECS:  Boost TID  Magic Cups TID    CLINICAL NUTRITION ASSESSMENT      Reason for Assessment Follow-up Protocol     Diagnosis/Problem   Syncope  Ear laceration     Medical/Surgical History Past Medical History:   Diagnosis Date    Acute kidney failure     POST-OP TOTAL HIP 2020    Anticoagulated     PRADAXA FOR A FIB TO HELD 3 DAYS PRIOR    Arthritis     OSTEO. RIGHT HIP    Atrial flutter     Bifascicular block     Cataract     LEFT AND RIGHT    Depression     History of colon polyps     Hypertension     Hypoxemia associated with sleep     Insomnia     Knee pain     STEPHENIE on CPAP 05/27/2010    Overnight polysomnogram.  Weight 163 pounds.  Severe STEPHENIE with AHI of 30.9 events per hour.  Low oxygen saturation 72% and sleep-related hypoxia present for 30 minutes    PAF (paroxysmal atrial fibrillation)     Right hip pain     Slow to wake up after anesthesia        Past Surgical History:   Procedure Laterality Date    CARDIAC ABLATION      CARDIAC CATHETERIZATION      COLONOSCOPY N/A 2/21/2018    Procedure: COLONOSCOPY INTO CECUM WITH COLD BX POLYPECTOMY ;  Surgeon: Ross Stearns MD;  Location: [facility] ENDOSCOPY;  Service:     COLONOSCOPY N/A 2/3/2021    Procedure: COLONOSCOPY TOCECUM WITH COLD BX POLYPECTOMY AND HOT SNARE POLYPECTOMY;  Surgeon: Ross Stearns MD;  Location: [facility] ENDOSCOPY;  Service: General;  Laterality: N/A;  PERSONAL HX OF POLYPS, FAMILY HX OF COLON CANCER  --POLYPS (X3), DIVERTICULOSIS    COLONOSCOPY N/A 3/20/2024    Procedure: COLONOSCOPY TO CECUM WITH COLD BX POLYPECTOMIES;  Surgeon: Ross Stearns MD;  Location: [facility]  "ENDOSCOPY;  Service: General;  Laterality: N/A;  HX POLYPS/POLYPS (3)    HERNIA REPAIR      INGUINAL HERNIA? SIDE    TOTAL HIP ARTHROPLASTY Right 6/5/2020    Procedure: TOTAL HIP ARTHROPLASTY;  Surgeon: Travis Hamlin MD;  Location: [facility] OR;  Service: Orthopedics;  Laterality: Right;        Anthropometrics        Current Height  Current Weight  BMI kg/m2 Height: 185.4 cm (73\")  Weight: 110 kg (241 lb 8 oz) (12/29/24 0818)  Body mass index is 31.86 kg/m².   Adjusted BMI (if applicable)    BMI Category Obese, Class I (30 - 34.9)   Ideal Body Weight (IBW) 184 lb (83.6 kg)   Usual Body Weight (UBW) 220-241 lb   Weight Trend Gain?   Weight History Wt Readings from Last 30 Encounters:   12/29/24 0818 110 kg (241 lb 8 oz)   12/17/24 0739 103 kg (226 lb)   12/13/24 0930 103 kg (228 lb)   10/07/24 1123 104 kg (229 lb)   09/05/24 0803 104 kg (229 lb 4.5 oz)   08/21/24 1104 102 kg (224 lb)   03/20/24 1019 99.9 kg (220 lb 4.8 oz)   03/19/24 0912 102 kg (224 lb)   02/27/24 0957 102 kg (225 lb)   12/04/23 1030 101 kg (223 lb)   11/29/23 0831 102 kg (224 lb 12.8 oz)   07/31/23 1000 103 kg (226 lb 6.4 oz)   07/05/23 1519 102 kg (224 lb)   07/04/23 1736 102 kg (224 lb)   07/04/23 1654 102 kg (224 lb)   02/23/23 0746 99.3 kg (219 lb)   12/02/22 1003 103 kg (226 lb)   11/30/22 0900 103 kg (227 lb)   08/31/22 0700 103 kg (226 lb 6.4 oz)   02/23/22 0941 105 kg (231 lb)   10/20/21 1248 105 kg (231 lb 7.7 oz)   10/06/21 0850 105 kg (230 lb 6.4 oz)   09/01/21 0700 105 kg (232 lb)   06/24/21 0943 105 kg (231 lb)   02/03/21 0802 102 kg (224 lb)   02/02/21 1218 103 kg (228 lb)   09/02/20 1413 106 kg (233 lb 9.6 oz)   08/21/20 1323 107 kg (235 lb)   07/30/20 0829 107 kg (235 lb)   07/07/20 1113 103 kg (226 lb)   06/16/20 0349 111 kg (243 lb 11.2 oz)   06/15/20 2026 121 kg (266 lb 8 oz)   06/05/20 0911 108 kg (238 lb 8.6 oz)   06/04/20 1103 107 kg (235 lb 12.8 oz)   06/03/20 0803 107 kg (236 lb)      --  Labs       Pertinent Labs  " "  Results from last 7 days   Lab Units 01/05/25  0908 01/03/25  2136 01/03/25  0801 01/02/25  1118   SODIUM mmol/L 144  --  141 139   POTASSIUM mmol/L 3.7 3.7 3.6 3.5   CHLORIDE mmol/L 107  --  106 104   CO2 mmol/L 25.0  --  22.5 23.0   BUN mg/dL 24*  --  20 19   CREATININE mg/dL 0.80  --  0.75* 0.71*   CALCIUM mg/dL 8.9  --  8.7 8.6   BILIRUBIN mg/dL 2.4*  --  2.6* 2.8*   ALK PHOS U/L 52  --  57 57   ALT (SGPT) U/L 21  --  12 14   AST (SGOT) U/L 26  --  20 26   GLUCOSE mg/dL 103*  --  107* 98     Results from last 7 days   Lab Units 01/06/25  1640 01/05/25  0908 01/03/25  0801   MAGNESIUM mg/dL  --   --  2.0   HEMOGLOBIN g/dL 12.2* 13.1  --    HEMATOCRIT % 37.1* 38.8  --    WBC 10*3/mm3 10.75 8.76  --    ALBUMIN g/dL  --  3.4* 3.3*     Results from last 7 days   Lab Units 01/06/25  1640 01/05/25  0908 01/02/25  1118 12/31/24  0251   PLATELETS 10*3/mm3 193 193 164 132*     COVID19   Date Value Ref Range Status   12/28/2024 Not Detected Not Detected - Ref. Range Final     No results found for: \"HGBA1C\"       Medications           Scheduled Medications atorvastatin, 10 mg, Oral, Daily  digoxin, 125 mcg, Oral, Daily  enoxaparin, 1 mg/kg, Subcutaneous, Q12H  metoprolol succinate XL, 25 mg, Oral, Daily  OLANZapine, 5 mg, Oral, Nightly  OLANZapine, 5 mg, Oral, Daily With Dinner  sodium chloride, 10 mL, Intravenous, Q12H  tamsulosin, 0.4 mg, Oral, Daily  vitamin B-12, 1,000 mcg, Oral, Daily       Infusions       PRN Medications   acetaminophen    senna-docusate sodium **AND** polyethylene glycol **AND** bisacodyl **AND** bisacodyl    Calcium Replacement - Follow Nurse / BPA Driven Protocol    Enalaprilat    LORazepam    Magnesium Cardiology Dose Replacement - Follow Nurse / BPA Driven Protocol    metoprolol tartrate    nitroglycerin    OLANZapine    Phosphorus Replacement - Follow Nurse / BPA Driven Protocol    Potassium Replacement - Follow Nurse / BPA Driven Protocol    [COMPLETED] Insert Peripheral IV **AND** sodium " chloride    sodium chloride    sodium chloride     Physical Findings          General Findings confused, disoriented, obese, room air   Oral/Mouth Cavity WDL   Edema  no edema   Gastrointestinal last bowel movement: 1/4   Skin  other: R ear laceration   Tubes/Drains/Lines none   NFPE Not indicated at this time   --  Current Nutrition Orders & Evaluation of Intake       Oral Nutrition     Food Allergies NKFA   Current PO Diet Diet: Cardiac; Healthy Heart (2-3 Na+); Fluid Consistency: Thin (IDDSI 0)   Supplement n/a   PO Evaluation     % PO Intake 0% or 75% of meals split evenly while admitted    Factors Affecting Intake: altered mental status   --  PES STATEMENT / NUTRITION DIAGNOSIS      Nutrition Dx Problem  Problem: Inadequate Oral Intake  Etiology: Medical Diagnosis - AMS    Signs/Symptoms: NPO and Report/Observation     NUTRITION INTERVENTION / PLAN OF CARE      Intervention Goal(s) Maintain nutrition status, Reduce/improve symptoms, Disease management/therapy, Initiate feeding/diet, and No significant weight loss         RD Intervention/Action Encourage intake, Continue to monitor, Care plan reviewed, and Recommend/order: Boost TID, Magic Cups TID   --      Prescription/Orders:       PO Diet       Supplements Boost TID, Magic Cups TID      Enteral Nutrition       Parenteral Nutrition    New Prescription Ordered? Yes   --      Monitor/Evaluation Per protocol, I&O, Pertinent labs, Weight, Symptoms, POC/GOC, Swallow function   Discharge Plan/Needs Pending clinical course   --    RD to follow per protocol.      Electronically signed by:  Jose Weaver RDN, LD  01/06/25 17:03 EST

## 2025-01-06 NOTE — ANESTHESIA PROCEDURE NOTES
Airway  Urgency: elective    Date/Time: 1/6/2025 11:52 AM  Airway not difficult    General Information and Staff    Patient location during procedure: OR  Anesthesiologist: Quintin Mariee MD  CRNA/CAA: Pako Lawler CRNA    Indications and Patient Condition  Indications for airway management: airway protection    Preoxygenated: yes  Mask difficulty assessment: 1 - vent by mask    Final Airway Details  Final airway type: supraglottic airway      Successful airway: unique  Size 4     Number of attempts at approach: 1  Assessment: lips, teeth, and gum same as pre-op and atraumatic intubation    Additional Comments  Pre 02 100%, SIVI, atraumatic intubation, BLBS, Positive ETC02.

## 2025-01-06 NOTE — PROGRESS NOTES
S: Pt seen while down for MRI.  Remains confused. Continues to get agitated at night and require additional meds and restraints.  Wife at bedside. Nursing called me this am with noting of hematoma on right side that was new. He is getting CT abd/pelvis today as well to eval and also because of hematuria.     O:  Vitals:    01/06/25 1240 01/06/25 1245 01/06/25 1300 01/06/25 1313   BP: 126/81 116/82 (!) 141/113 110/89   BP Location:       Patient Position:       Pulse: (!) 162 115 (!) 134    Resp: 18 18 18    Temp:       TempSrc:       SpO2: 93% 99% 100%    Weight:       Height:         Gen: Awake - confused  Heart: Irregular  Lungs: CTA  Abd: soft NTND  Ext; No edema    No results found for this or any previous visit (from the past 24 hours).    Imaging Results (Last 24 Hours)       Procedure Component Value Units Date/Time    MRI Brain Without Contrast [[id number]] Collected: 01/06/25 1237     Updated: 01/06/25 1304    Narrative:      MRI OF THE BRAIN WITHOUT CONTRAST     CLINICAL HISTORY: Mental status changes/ ongoing delerium; R55-Syncope  and collapse; S01.311A-Laceration without foreign body of right ear,  initial encounter; R94.31-Abnormal electrocardiogram (ECG) (EKG);  I48.91-Unspecified atrial fibrillation     TECHNIQUE: MRI of the brain was obtained with sagittal T1, axial T1,  axial FLAIR, axial T2, axial diffusion, and susceptibility weighted  images.     COMPARISON: No previous MRIs of the brain are available for comparison.     FINDINGS:     The ventricles, sulci, and cisterns are age-appropriate. There are no  abnormal foci of restricted diffusion. The major intracranial flow  related signal voids are within normal limits. Incidental note is made  of a few chronic microhemorrhages within the corticomedullary interfaces  of both occipital lobes and the left precentral gyrus and these are  likely secondary to amyloid. The midline intracranial anatomy is within  normal limits. Mild changes of chronic  small vessel ischemic phenomena  are noted. Incidental note is made of a small chronic infarct within the  posterior aspect of the right cerebellar hemisphere measuring up to 1 cm  in diameter and this is within the right PICA distribution.     Changes of inflammatory paranasal sinus disease are identified within  the sphenoid sinus and the ethmoid air cells with the most prominent  mucosal thickening appreciated within the sphenoid sinus. A small amount  of proteinaceous secretions are noted within the central aspect of the  sphenoid sinus.       Impression:         No evidence for acute intracranial pathology.     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     A few incidental chronic microhemorrhages likely related to underlying  amyloid.           This report was finalized on 1/6/2025 1:00 PM by Dr. Milo Doan M.D  on Workstation: [id number]       CT Abdomen Pelvis With & Without Contrast [[id number]] Collected: 01/06/25 1244     Updated: 01/06/25 1300    Narrative:      CT ABDOMEN AND PELVIS WITH AND WITHOUT IV CONTRAST     HISTORY: Hematuria     TECHNIQUE: Radiation dose reduction techniques were utilized, including  automated exposure control and exposure modulation based on body size.  Axial images were obtained through the abdomen and pelvis before and  after the administration of IV contrast. Coronal and sagittal  reformatted images obtained. Multiphase protocol.     COMPARISON: Renal ultrasound 11/9/2020     FINDINGS:     ABDOMEN:  There is some atelectasis in the lung bases. The liver and the  gallbladder are unremarkable. The spleen is unremarkable. There are  multiple bilateral nonobstructing kidney stones. There is a exophytic  lesion with some peripheral calcification arising from the upper pole  the left kidney measuring about 4 cm. It is mildly hyperdense on the  noncontrast portion of the study and shows no convincing evidence  of  enhancement. It is most consistent with a hemorrhagic/proteinaceous cyst  and is stable in size compared with the previous ultrasound. The adrenal  glands are unremarkable. The pancreas is unremarkable. There is a  right-sided rectus sheath hematoma. It measures about 22 cm in length  along the rectus sheath and measures about 6 cm in thickness.     PELVIS:  The lower pelvis is somewhat obscured by streak artifact. The prostate  is enlarged. There is no significant bladder wall thickening. The colon  appears unremarkable. Bone windows show lumbar spine degenerative  changes. Postsurgical changes of the right hip       Impression:      1. Bilateral nonobstructing kidney stones  2. There is a large right rectus sheath hematoma. Follow-up recommended  to ensure clearing. Findings discussed with the patient's RN at time of  dictation        Radiation dose reduction techniques were utilized, including automated  exposure control and exposure modulation based on body size.        This report was finalized on 1/6/2025 12:57 PM by Dr. Pako Velasquez M.D on Workstation: [id number]             A/P  Delerium/confusion - no new findings on brain MRI. 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  Continues to get agitated and combative in the evening.  Psychiatry suspects we have unmasked underlying dementia.  He has been followed by neurology as outpatient for tremor and originally some cognitive change that was felt to be post-concussive in 2021 and had largely improved.  Need to work on finding regiment that controls the agitation but the catch is that doesn't help his cognition. Currently on Olanzapine 5mg at 5pm and again at bedtime.  Still requirig ativan at times and restrants. I placed a call to psychiatry to discuss.  Afib -rate goes up with agitation.  He has had decline since going into afib.  I have to wonder if periods of rapid afib has contributed to some cerebral hypoperfusion and the delerium.   EP following.  Has plans to do afib ablation and pacemaker. Will discuss with them.  Hematoma-  new large hematoma in right rectus - This necessitates hold lovenox. WIll recheck CBC to make sure hgb does not drop significantly.   Hematuria - CT shows bilateral non-obstrcucting stones. Can f/u with urology once outpatient.

## 2025-01-06 NOTE — ANESTHESIA POSTPROCEDURE EVALUATION
"Patient: Mark Aguirre Jr.    Procedure Summary       Date: 01/06/25 Room / Location: [facility] MRI    Anesthesia Start: 1139 Anesthesia Stop: 1236    Procedure: MRI BRAIN WO CONTRAST Diagnosis: (Mental status changes/ ongoing delerium)    Scheduled Providers:  Provider: Quintin Mariee MD    Anesthesia Type: general ASA Status: 4            Anesthesia Type: general    Vitals  Vitals Value Taken Time   /82 01/06/25 1245   Temp 36.7 °C (98.1 °F) 01/06/25 1235   Pulse 110 01/06/25 1255   Resp 18 01/06/25 1240   SpO2 99 % 01/06/25 1255   Vitals shown include unfiled device data.        Post Anesthesia Care and Evaluation    Patient location during evaluation: bedside  Patient participation: complete - patient participated  Level of consciousness: awake  Pain management: adequate    Airway patency: patent  Anesthetic complications: No anesthetic complications    Cardiovascular status: acceptable  Respiratory status: acceptable  Hydration status: acceptable    Comments: /81   Pulse (!) 162   Temp 36.7 °C (98.1 °F) (Oral)   Resp 18   Ht 185.4 cm (73\")   Wt 110 kg (241 lb 8 oz)   SpO2 93%   BMI 31.86 kg/m²     "

## 2025-01-06 NOTE — ANESTHESIA PREPROCEDURE EVALUATION
Anesthesia Evaluation     Patient summary reviewed and Nursing notes reviewed   history of anesthetic complications:  prolonged sedation  NPO Solid Status: > 8 hours  NPO Liquid Status: > 8 hours           Airway   Mallampati: II  Neck ROM: full  No difficulty expected  Dental - normal exam     Pulmonary     breath sounds clear to auscultation  (+) ,sleep apnea  Cardiovascular     Rhythm: irregular  Rate: abnormal    (+) hypertension, valvular problems/murmurs AS and MR, dysrhythmias Atrial Fib    ROS comment: RBBB,LAFB    Neuro/Psych  (+) syncope, psychiatric history  GI/Hepatic/Renal/Endo    (+) obesity, renal disease-    Musculoskeletal     Abdominal   (+) obese   Substance History      OB/GYN          Other                    Anesthesia Plan    ASA 4     general     intravenous induction     Anesthetic plan, risks, benefits, and alternatives have been provided, discussed and informed consent has been obtained with: patient.    CODE STATUS:    Code Status (Patient has no pulse and is not breathing): CPR (Attempt to Resuscitate)  Medical Interventions (Patient has pulse or is breathing): Full Support

## 2025-01-06 NOTE — NURSING NOTE
Mr Aguirre has been confused the  whole shift. He was agitated and trying to pull the monitor leads and brief. He was given the PRN ativan. He slept for about 3-4 hours, but restless on his sleep. Attempted multiple times to give PO medicine in apple sauce but patient was unable to take it related to his confusion and refusal. HR is poorly controlled with IV Lopressor and easily increases with minimal exertion. The MRI screening was completed at bedside with patient's wife.

## 2025-01-06 NOTE — PLAN OF CARE
Goal Outcome Evaluation:  Plan of Care Reviewed With: patient        Progress: no change  Outcome Evaluation: Remains confused, mentation fluctuates, still having episodes of agitation and restlessness. Bedtime PO Zyprexa and IV Ativan x 1 given. HR remains elevated with agitation, IV Metoprolol given. Still for MRI head and CT abdo/pelvis. MD updated this morning. Continued bilateral wrist restraints.

## 2025-01-06 NOTE — PROGRESS NOTES
The patient is seen after undergoing MRI earlier today.  He remains tremulous and is in soft wrist restraints.  Wife reports that the patient's mental decline began shortly before Thanksgiving when his atrial fibrillation worsened and she questions whether some of the changes with his medications could be causing this.  I suspect that we are actually seeing progression of a pre-existing dementia.  Continuing to follow.

## 2025-01-06 NOTE — PLAN OF CARE
Mr. Aguirre still continues to have delirium and confusion. His heart rate continues to remain elevated when he is agitated. Symptoms are worse at night. He remains on IV lopressor 5 mg  every 4 hours as he will intermittently not  be able to take oral medications. He was not able to take digoxin or metoprolol on 1/5/2024.     We will continue to follow peripherally this week to see his progress. Initially we had plans for device this week if he is stable but we will hold off on this until his mental status is better and he can get an MRI completed.

## 2025-01-07 PROCEDURE — 99232 SBSQ HOSP IP/OBS MODERATE 35: CPT | Performed by: PHYSICIAN ASSISTANT

## 2025-01-07 PROCEDURE — 25010000002 LORAZEPAM PER 2 MG: Performed by: INTERNAL MEDICINE

## 2025-01-07 PROCEDURE — 25010000002 OLANZAPINE 10 MG RECONSTITUTED SOLUTION: Performed by: SPECIALIST

## 2025-01-07 PROCEDURE — 25010000002 DIGOXIN PER 500 MCG: Performed by: PHYSICIAN ASSISTANT

## 2025-01-07 RX ORDER — DIGOXIN 0.25 MG/ML
250 INJECTION INTRAMUSCULAR; INTRAVENOUS ONCE
Status: COMPLETED | OUTPATIENT
Start: 2025-01-07 | End: 2025-01-07

## 2025-01-07 RX ORDER — DIGOXIN 0.25 MG/ML
250 INJECTION INTRAMUSCULAR; INTRAVENOUS ONCE
Status: DISCONTINUED | OUTPATIENT
Start: 2025-01-07 | End: 2025-01-07

## 2025-01-07 RX ADMIN — METOPROLOL TARTRATE 5 MG: 1 INJECTION, SOLUTION INTRAVENOUS at 15:45

## 2025-01-07 RX ADMIN — Medication 10 ML: at 09:35

## 2025-01-07 RX ADMIN — DIGOXIN 125 MCG: 0.12 TABLET ORAL at 12:12

## 2025-01-07 RX ADMIN — METOPROLOL TARTRATE 5 MG: 1 INJECTION, SOLUTION INTRAVENOUS at 09:35

## 2025-01-07 RX ADMIN — LORAZEPAM 0.25 MG: 2 INJECTION INTRAMUSCULAR; INTRAVENOUS at 18:55

## 2025-01-07 RX ADMIN — METOPROLOL SUCCINATE 25 MG: 25 TABLET, EXTENDED RELEASE ORAL at 12:12

## 2025-01-07 RX ADMIN — METOPROLOL TARTRATE 7.5 MG: 1 INJECTION, SOLUTION INTRAVENOUS at 21:50

## 2025-01-07 RX ADMIN — Medication 10 ML: at 21:51

## 2025-01-07 RX ADMIN — LORAZEPAM 0.25 MG: 2 INJECTION INTRAMUSCULAR; INTRAVENOUS at 21:50

## 2025-01-07 RX ADMIN — OLANZAPINE 5 MG: 10 INJECTION, POWDER, LYOPHILIZED, FOR SOLUTION INTRAMUSCULAR at 17:19

## 2025-01-07 RX ADMIN — TAMSULOSIN HYDROCHLORIDE 0.4 MG: 0.4 CAPSULE ORAL at 12:15

## 2025-01-07 RX ADMIN — DIGOXIN 250 MCG: 0.25 INJECTION INTRAMUSCULAR; INTRAVENOUS at 16:21

## 2025-01-07 RX ADMIN — METOPROLOL TARTRATE 5 MG: 1 INJECTION, SOLUTION INTRAVENOUS at 05:30

## 2025-01-07 RX ADMIN — METOPROLOL TARTRATE 5 MG: 1 INJECTION, SOLUTION INTRAVENOUS at 01:15

## 2025-01-07 NOTE — CASE MANAGEMENT/SOCIAL WORK
Continued Stay Note  [facility]     Patient Name: Mark Aguirre Jr.  MRN: [id number]  Today's Date: 1/7/2025    Admit Date: 12/28/2024    Plan: Pending clinical course.   Discharge Plan       Row Name 01/07/25 6048       Plan    Plan Pending clinical course.    Plan Comments Patient is exhibiting confusion. Afib may be contributor to his confusion. Will continue to monitor for new or changing discharge needs January AVILES RN CCP                   Discharge Codes    No documentation.                 Expected Discharge Date and Time       Expected Discharge Date Expected Discharge Time    Jan 9, 2025               January Joiner RN

## 2025-01-07 NOTE — PROGRESS NOTES
Subjective: Wife notes he was a little bit less agitated last night.  Still required IM Zyprexa and lorazepam x 1.  Still remains in restraints.  Responds to my voice but not answering questions appropriately.    Objective:  Vitals:    01/07/25 0634 01/07/25 0653 01/07/25 0723 01/07/25 0725   BP:    116/75   BP Location:    Left arm   Patient Position:    Lying   Pulse: 116 (!) 136 (!) 130 (!) 150   Resp:    18   Temp:   98.4 °F (36.9 °C)    TempSrc:   Oral    SpO2: 97%  100% 95%   Weight:       Height:          Gen: Awake, confused  Heart; tachy irregular  Lungs: CTA  Abd; SOft - eccymosis along right low back, no firm consolidation noted  Ext; No edema    Recent Results (from the past 24 hours)   CBC Auto Differential    Collection Time: 01/06/25  4:40 PM    Specimen: Blood   Result Value Ref Range    WBC 10.75 3.40 - 10.80 10*3/mm3    RBC 4.22 4.14 - 5.80 10*6/mm3    Hemoglobin 12.2 (L) 13.0 - 17.7 g/dL    Hematocrit 37.1 (L) 37.5 - 51.0 %    MCV 87.9 79.0 - 97.0 fL    MCH 28.9 26.6 - 33.0 pg    MCHC 32.9 31.5 - 35.7 g/dL    RDW 13.4 12.3 - 15.4 %    RDW-SD 43.1 37.0 - 54.0 fl    MPV 11.0 6.0 - 12.0 fL    Platelets 193 140 - 450 10*3/mm3    Neutrophil % 81.7 (H) 42.7 - 76.0 %    Lymphocyte % 7.3 (L) 19.6 - 45.3 %    Monocyte % 10.2 5.0 - 12.0 %    Eosinophil % 0.1 (L) 0.3 - 6.2 %    Basophil % 0.3 0.0 - 1.5 %    Immature Grans % 0.4 0.0 - 0.5 %    Neutrophils, Absolute 8.78 (H) 1.70 - 7.00 10*3/mm3    Lymphocytes, Absolute 0.79 0.70 - 3.10 10*3/mm3    Monocytes, Absolute 1.10 (H) 0.10 - 0.90 10*3/mm3    Eosinophils, Absolute 0.01 0.00 - 0.40 10*3/mm3    Basophils, Absolute 0.03 0.00 - 0.20 10*3/mm3    Immature Grans, Absolute 0.04 0.00 - 0.05 10*3/mm3    nRBC 0.0 0.0 - 0.2 /100 WBC     A/P  Delirium/confusion-MRI yesterday without new changes.  Metabolic studies have all been stable.  No signs of infectious process.  He really has been struggling since going back into atrial fibrillation.  Now having continued  difficulty with rate control.  I do have to wonder whether the rapid A-fib has contributed.  Syncopal episode upon admission certainly could indicate some cerebral hypoperfusion at times.  I am going to discussed with cardiology today.  Currently remains on Zyprexa for agitation.  Atrial fibrillation-heart rate continues to be difficult to control.  Getting metoprolol IV as needed.  Will discuss going forward with AV carlos a ablation and pacemaker with cardiology.  Hematoma-hemoglobin dropped to 12.2 but no sign of ongoing bleeding.  No tight hematoma noted on exam.  Will continue to monitor.  Currently anticoagulation is on hold but can likely start back tomorrow if no signs of ongoing bleeding.  I suspect he probably aggravated the area by agitation and manipulating in bed  Hematuria-bilateral nonobstructing kidney stones.  No sign of infection.  Can follow-up with urology as an outpatient.

## 2025-01-07 NOTE — SIGNIFICANT NOTE
01/07/25 1317   OTHER   Discipline physical therapist   Rehab Time/Intention   Session Not Performed other (see comments)  (Per Nurse, hold PT servcies today due to cognitive issues as well as afib/rvr. Will f/u 1/8 if appropriate)   Recommendation   PT - Next Appointment 01/08/25

## 2025-01-07 NOTE — PROGRESS NOTES
Wife reports the patient seems better today though he remains quite delirious and is in wrist restraints again.  I believe we may need to consider a geropsychiatric hospitalization if we do not begin to see some improvement.

## 2025-01-07 NOTE — PLAN OF CARE
Goal Outcome Evaluation:      80 yo male admitted 12/28 with syncope. Remains in delirium and afib RVR. Lopressor dose increased, one time dose IV digoxin given. Remains in bilateral soft wrist restraints due to agitation. Disoriented x4, speech garbled and difficult to understand. BP stable, 2L O2.

## 2025-01-07 NOTE — PROGRESS NOTES
Electrophysiology Follow-Up Note      Patient Name: Mark Aguirre Jr.  Age/Sex: 79 y.o. male  : 1945  MRN: [id number]    Date of Admission: 2024  Day of Service: 25       Chief Complaint:     HPI:   Mark Aguirre Jr. is a 79 y.o. male who presented to the ER on 2024 with increasing nausea, anorexia and vomiting.  He was getting up to walk to the restroom and fell and hit his head on a door frame.  Family then brought him into the ER.     He was seen to be in atrial fibrillation with RVR while here inpatient and in the ER.  EP has been asked to evaluate him sooner as we previously discussed plan for a RV lead pacemaker at the end of January with subsequent AV node ablation in early February.     I just saw him In the office for AF on 2024     He has a history of PAF s/p PVI ablation x2. Unknown when the PVI ablations were, over 8 years ago.      He was seen in office on 10/07 by Dr. Armando. The patient had been in AFIB for the past few months and became more fatigued and had decreased exercise tolerance.      He was admitted on 24 for initiation of dofetilide and CV. His metoprolol was stopped due to low HR after the CV. Dofetilide was decreased to 250mcg BID due to borderline QTC prolongation.      ECG on 11/15/2024 showed prolong QTC. His dofetilide was decreased again to 125mcg BID.       He called the office 2024 for an ECG. He was in AFIB and QTC at that time was prolonged 563. Dofetilide was stopped. Digoxin 125mcg qd was started for rate control.      He still had high heart rate and metoprolol was added.  We discussed an AV node ablation with pacemaker implantation and he was agreeable after talking to his PCP.     PMH: Persistent AFIB s/p ablation x2, aortic stenosis, HTN, STEPHENIE on CPAP, PVCs      Follow up:  24 patient combative and irritated this morning.  He is having confusion and agitation.  He is unable to take oral medications and heart rate  getting into the 160s when he is agitated.  Given an additional dose of IV digoxin as he is unable to get his oral medications this morning.  Heart rate remains in A-fib with intermittent episodes into the 130s to 140s     25 Last night he had severe agitation again and is back in restrains. His heart rate was sustaining into the 160's for over 30 minutes and he was given a one time does of IV lopressor. He is very confused this morning and crying out. Wife at bedside and case was discussed.         25 Patient seen at bedside this morning.  He was sleeping and in restraints but able to wake up and respond to some questions appropriately by his wife.  She reports that starting around 9:00 last night is when things got bad and followed when he took some Ativan.  They are wondering if maybe this is the cause for some of the ongoing delirium.  Heart rates have been better controlled in the 80s to 100s with some PVCs.    25  He was able to get MRI of brain completed today without any acute findings with prior chronic changes in right cerebellum and microhemorrages.     He still remains confused at times but can hold a short conversation. Wife at bedside    On review of chart, it does seem like he had syncope in relation to an episode of AF with RVR in the past around 1.5 years ago.       Temp:  [98.2 °F (36.8 °C)-99.3 °F (37.4 °C)] 99.3 °F (37.4 °C)  Heart Rate:  [115-197] 123  Resp:  [18-20] 19  BP: (116-146)/() 127/76     PHYSICAL EXAM    General: 79 y.o. male Elderly male in restraints at side of bed, confused but pleasant   Neck: No JVD or carotid bruit  Lungs: Clear to ausculation bilaterally, symmetric  Heart:irregular rate and rhythm  with no overt murmurs, rubs or gallops. Normal S1 and S2.   Abdomen: soft, non-tender  Extremities: No lower extremity edema or cyanosis.   Neuo: no lateralizing defects.        Telemetry/EK25: Atrial fibrillation with average rates into the  130s                      I personally viewed and interpreted the patient's EKG/Telemetry data.      Prior testing:   - Echo 09/05/2024: LVEF 48.8%. LA moderate dilation. Moderate AS aortic valve area 1.8cm2. RSVP 48mmHg.       MRI OF THE BRAIN WITHOUT CONTRAST 1/7/2025  IMPRESSION:     No evidence for acute intracranial pathology.     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     A few incidental chronic microhemorrhages likely related to underlying  amyloid.       Lab Review:   Results from last 7 days   Lab Units 01/05/25  0908 01/03/25  2136 01/03/25  0801 01/02/25  1118   SODIUM mmol/L 144  --  141 139   POTASSIUM mmol/L 3.7 3.7 3.6 3.5   CHLORIDE mmol/L 107  --  106 104   CO2 mmol/L 25.0  --  22.5 23.0   BUN mg/dL 24*  --  20 19   CREATININE mg/dL 0.80  --  0.75* 0.71*   GLUCOSE mg/dL 103*  --  107* 98   CALCIUM mg/dL 8.9  --  8.7 8.6     Results from last 7 days   Lab Units 01/06/25  1640 01/05/25  0908 01/02/25  1118   WBC 10*3/mm3 10.75 8.76 9.59   HEMOGLOBIN g/dL 12.2* 13.1 14.3   HEMATOCRIT % 37.1* 38.8 42.7   PLATELETS 10*3/mm3 193 193 164                       Current Medications:   Scheduled Meds:atorvastatin, 10 mg, Oral, Daily  digoxin, 250 mcg, Intravenous, Once  digoxin, 125 mcg, Oral, Daily  [Held by provider] enoxaparin, 1 mg/kg, Subcutaneous, Q12H  metoprolol succinate XL, 25 mg, Oral, Daily  OLANZapine, 5 mg, Oral, Nightly  OLANZapine, 5 mg, Oral, Daily With Dinner  sodium chloride, 10 mL, Intravenous, Q12H  tamsulosin, 0.4 mg, Oral, Daily  vitamin B-12, 1,000 mcg, Oral, Daily          Assessment /Plan:  Persistent atrial fibrillation- uncontrolled rates. He did not get his oral metoprolol or dig the past 48 hours. HE was finally able to get oral dig at around 1 pm today  He has previously failed AAD tx due to qtc prolongation (dofetilide)  We have been asked to re-evaluate his AF as a source of his delirium and contributing to  hypoperfusion.He did have syncope on this admission and  has had syncope in past with episodes of AF with RVR.   Plan is for an upcoming AVN ablation and pacemaker implantation with follow up AVN ablation.   He is on Iv metoprolol q 4 hours, increase dose to 7.5 mg q 4 hours PRN and oral Toprol 25 mg daily when he can take oral meds.   Continue digoxin 125 mcg daily for rate control. Check dig level in AM. Give IV dig 250 mcg now   AC including lovenox has been held. Was on eliquis as an OP  Will check lactic acid level, BNP in the AM, concern was for hypoperfusion but based on labs all organ systems appear to perfusing well with normal renal function, liver function and Hg  Dr. Armando will talk with Dr. Staples in the AM  PVCs- ongoing, asymptomatic, no long runs. Continue BB as tolerated  Delirium in setting of early on set dementia- Dr. Staples following closely as well as neuro  Repeat CT of head was normal   Plans for an MRI if he is stable  Possibly due to ativan, this is now held.  Would recommend neruo to come and evaluate again  Syncope- likely due to orthostatic changes, could be from AF with RVR but we dont think this is the ongoing driving feature for his current delirium.  He has been very bed bound while in pt but no episodes of bradycardia or pausing noted on tele.     Cameron Denny PA-C  01/07/25  15:09 EST

## 2025-01-07 NOTE — PLAN OF CARE
Goal Outcome Evaluation:  Plan of Care Reviewed With: patient        Progress: no change  Outcome Evaluation: No changes overnight. Remains confused, with episodes of agitation and restlessness. Bilateral wrist restraints. IM Zyprexa and IV Ativan x 1 given. IV Metoprolol given for HR control. Patient had few hours of sleep this shift.

## 2025-01-08 ENCOUNTER — APPOINTMENT (OUTPATIENT)
Dept: CT IMAGING | Facility: HOSPITAL | Age: 80
End: 2025-01-08
Payer: MEDICARE

## 2025-01-08 ENCOUNTER — APPOINTMENT (OUTPATIENT)
Dept: GENERAL RADIOLOGY | Facility: HOSPITAL | Age: 80
End: 2025-01-08
Payer: MEDICARE

## 2025-01-08 LAB
ALBUMIN SERPL-MCNC: 3.4 G/DL (ref 3.5–5.2)
ALBUMIN/GLOB SERPL: 1.4 G/DL
ALP SERPL-CCNC: 51 U/L (ref 39–117)
ALT SERPL W P-5'-P-CCNC: 20 U/L (ref 1–41)
ANION GAP SERPL CALCULATED.3IONS-SCNC: 10 MMOL/L (ref 5–15)
ANION GAP SERPL CALCULATED.3IONS-SCNC: 9.4 MMOL/L (ref 5–15)
APPEARANCE CSF: CLEAR
APPEARANCE CSF: CLEAR
AST SERPL-CCNC: 20 U/L (ref 1–40)
BASOPHILS # BLD AUTO: 0.06 10*3/MM3 (ref 0–0.2)
BASOPHILS NFR BLD AUTO: 0.6 % (ref 0–1.5)
BILIRUB SERPL-MCNC: 2.5 MG/DL (ref 0–1.2)
BUN SERPL-MCNC: 27 MG/DL (ref 8–23)
BUN SERPL-MCNC: 27 MG/DL (ref 8–23)
BUN/CREAT SERPL: 35.5 (ref 7–25)
BUN/CREAT SERPL: 35.5 (ref 7–25)
C GATTII+NEOFOR DNA CSF QL NAA+NON-PROBE: NOT DETECTED
CALCIUM SPEC-SCNC: 8.6 MG/DL (ref 8.6–10.5)
CALCIUM SPEC-SCNC: 8.8 MG/DL (ref 8.6–10.5)
CHLORIDE SERPL-SCNC: 110 MMOL/L (ref 98–107)
CHLORIDE SERPL-SCNC: 110 MMOL/L (ref 98–107)
CMV DNA CSF QL NAA+PROBE: NOT DETECTED
CO2 SERPL-SCNC: 26 MMOL/L (ref 22–29)
CO2 SERPL-SCNC: 27.6 MMOL/L (ref 22–29)
COLOR CSF: COLORLESS
COLOR CSF: COLORLESS
CREAT SERPL-MCNC: 0.76 MG/DL (ref 0.76–1.27)
CREAT SERPL-MCNC: 0.76 MG/DL (ref 0.76–1.27)
D-LACTATE SERPL-SCNC: 1.2 MMOL/L (ref 0.5–2)
D-LACTATE SERPL-SCNC: 1.4 MMOL/L (ref 0.5–2)
DEPRECATED RDW RBC AUTO: 42.7 FL (ref 37–54)
DEPRECATED RDW RBC AUTO: 43.7 FL (ref 37–54)
DIGOXIN SERPL-MCNC: 0.6 NG/ML (ref 0.6–1.2)
E COLI K1 DNA CSF QL NAA+NON-PROBE: NOT DETECTED
EGFRCR SERPLBLD CKD-EPI 2021: 90.3 ML/MIN/1.73
EGFRCR SERPLBLD CKD-EPI 2021: 90.3 ML/MIN/1.73
EOSINOPHIL # BLD AUTO: 0.13 10*3/MM3 (ref 0–0.4)
EOSINOPHIL NFR BLD AUTO: 1.4 % (ref 0.3–6.2)
ERYTHROCYTE [DISTWIDTH] IN BLOOD BY AUTOMATED COUNT: 13.1 % (ref 12.3–15.4)
ERYTHROCYTE [DISTWIDTH] IN BLOOD BY AUTOMATED COUNT: 13.2 % (ref 12.3–15.4)
EV RNA CSF QL NAA+PROBE: NOT DETECTED
GLOBULIN UR ELPH-MCNC: 2.4 GM/DL
GLUCOSE CSF-MCNC: 63 MG/DL (ref 40–70)
GLUCOSE SERPL-MCNC: 105 MG/DL (ref 65–99)
GLUCOSE SERPL-MCNC: 95 MG/DL (ref 65–99)
GP B STREP DNA SPEC QL NAA+PROBE: NOT DETECTED
HAEM INFLU SEROTYP DNA SPEC NAA+PROBE: NOT DETECTED
HCT VFR BLD AUTO: 36.6 % (ref 37.5–51)
HCT VFR BLD AUTO: 37.1 % (ref 37.5–51)
HGB BLD-MCNC: 11.4 G/DL (ref 13–17.7)
HGB BLD-MCNC: 12 G/DL (ref 13–17.7)
HHV6 DNA CSF QL NAA+PROBE: NOT DETECTED
HOLD SPECIMEN: NORMAL
HSV1 DNA CSF QL NAA+PROBE: NOT DETECTED
HSV2 DNA CSF QL NAA+PROBE: NOT DETECTED
IMM GRANULOCYTES # BLD AUTO: 0.04 10*3/MM3 (ref 0–0.05)
IMM GRANULOCYTES NFR BLD AUTO: 0.4 % (ref 0–0.5)
L MONOCYTOG RRNA SPEC QL PROBE: NOT DETECTED
LYMPHOCYTES # BLD AUTO: 1.09 10*3/MM3 (ref 0.7–3.1)
LYMPHOCYTES NFR BLD AUTO: 11.4 % (ref 19.6–45.3)
MCH RBC QN AUTO: 27.9 PG (ref 26.6–33)
MCH RBC QN AUTO: 29.1 PG (ref 26.6–33)
MCHC RBC AUTO-ENTMCNC: 30.7 G/DL (ref 31.5–35.7)
MCHC RBC AUTO-ENTMCNC: 32.8 G/DL (ref 31.5–35.7)
MCV RBC AUTO: 88.6 FL (ref 79–97)
MCV RBC AUTO: 90.7 FL (ref 79–97)
METHOD: NORMAL
METHOD: NORMAL
MONOCYTES # BLD AUTO: 0.93 10*3/MM3 (ref 0.1–0.9)
MONOCYTES NFR BLD AUTO: 9.7 % (ref 5–12)
N MEN DNA SPEC QL NAA+PROBE: NOT DETECTED
NEUTROPHILS NFR BLD AUTO: 7.35 10*3/MM3 (ref 1.7–7)
NEUTROPHILS NFR BLD AUTO: 76.5 % (ref 42.7–76)
NRBC BLD AUTO-RTO: 0 /100 WBC (ref 0–0.2)
NT-PROBNP SERPL-MCNC: 3411 PG/ML (ref 0–1800)
NUC CELL # CSF MANUAL: 1 /MM3 (ref 0–5)
NUC CELL # CSF MANUAL: 2 /MM3 (ref 0–5)
PARECHOVIRUS A RNA CSF QL NAA+NON-PROBE: NOT DETECTED
PLATELET # BLD AUTO: 192 10*3/MM3 (ref 140–450)
PLATELET # BLD AUTO: 217 10*3/MM3 (ref 140–450)
PMV BLD AUTO: 10.8 FL (ref 6–12)
PMV BLD AUTO: 11.6 FL (ref 6–12)
POTASSIUM SERPL-SCNC: 3.7 MMOL/L (ref 3.5–5.2)
POTASSIUM SERPL-SCNC: 3.8 MMOL/L (ref 3.5–5.2)
PROT CSF-MCNC: 54.1 MG/DL (ref 15–45)
PROT SERPL-MCNC: 5.8 G/DL (ref 6–8.5)
RBC # BLD AUTO: 4.09 10*6/MM3 (ref 4.14–5.8)
RBC # BLD AUTO: 4.13 10*6/MM3 (ref 4.14–5.8)
RBC # CSF MANUAL: 0 /MM3 (ref 0–0)
RBC # CSF MANUAL: 0 /MM3 (ref 0–0)
S PNEUM DNA CSF QL NAA+NON-PROBE: NOT DETECTED
SODIUM SERPL-SCNC: 146 MMOL/L (ref 136–145)
SODIUM SERPL-SCNC: 147 MMOL/L (ref 136–145)
TUBE # CSF: 1
TUBE # CSF: 3
VZV DNA CSF QL NAA+PROBE: NOT DETECTED
WBC NRBC COR # BLD AUTO: 9.6 10*3/MM3 (ref 3.4–10.8)
WBC NRBC COR # BLD AUTO: 9.92 10*3/MM3 (ref 3.4–10.8)

## 2025-01-08 PROCEDURE — 80162 ASSAY OF DIGOXIN TOTAL: CPT | Performed by: PHYSICIAN ASSISTANT

## 2025-01-08 PROCEDURE — 36600 WITHDRAWAL OF ARTERIAL BLOOD: CPT | Performed by: INTERNAL MEDICINE

## 2025-01-08 PROCEDURE — 87015 SPECIMEN INFECT AGNT CONCNTJ: CPT | Performed by: NURSE PRACTITIONER

## 2025-01-08 PROCEDURE — 009U3ZX DRAINAGE OF SPINAL CANAL, PERCUTANEOUS APPROACH, DIAGNOSTIC: ICD-10-PCS | Performed by: INTERNAL MEDICINE

## 2025-01-08 PROCEDURE — 83605 ASSAY OF LACTIC ACID: CPT | Performed by: PHYSICIAN ASSISTANT

## 2025-01-08 PROCEDURE — 83880 ASSAY OF NATRIURETIC PEPTIDE: CPT | Performed by: PHYSICIAN ASSISTANT

## 2025-01-08 PROCEDURE — 25010000002 DIGOXIN PER 500 MCG: Performed by: PHYSICIAN ASSISTANT

## 2025-01-08 PROCEDURE — 25010000002 LIDOCAINE 1 % SOLUTION: Performed by: NURSE PRACTITIONER

## 2025-01-08 PROCEDURE — 87205 SMEAR GRAM STAIN: CPT | Performed by: NURSE PRACTITIONER

## 2025-01-08 PROCEDURE — 25010000002 LORAZEPAM PER 2 MG: Performed by: INTERNAL MEDICINE

## 2025-01-08 PROCEDURE — 83520 IMMUNOASSAY QUANT NOS NONAB: CPT | Performed by: NURSE PRACTITIONER

## 2025-01-08 PROCEDURE — 85025 COMPLETE CBC W/AUTO DIFF WBC: CPT | Performed by: INTERNAL MEDICINE

## 2025-01-08 PROCEDURE — 25010000002 OLANZAPINE 10 MG RECONSTITUTED SOLUTION: Performed by: SPECIALIST

## 2025-01-08 PROCEDURE — 89050 BODY FLUID CELL COUNT: CPT | Performed by: NURSE PRACTITIONER

## 2025-01-08 PROCEDURE — 87483 CNS DNA AMP PROBE TYPE 12-25: CPT | Performed by: NURSE PRACTITIONER

## 2025-01-08 PROCEDURE — 84157 ASSAY OF PROTEIN OTHER: CPT | Performed by: NURSE PRACTITIONER

## 2025-01-08 PROCEDURE — 83605 ASSAY OF LACTIC ACID: CPT | Performed by: INTERNAL MEDICINE

## 2025-01-08 PROCEDURE — 82803 BLOOD GASES ANY COMBINATION: CPT | Performed by: INTERNAL MEDICINE

## 2025-01-08 PROCEDURE — 99232 SBSQ HOSP IP/OBS MODERATE 35: CPT | Performed by: NURSE PRACTITIONER

## 2025-01-08 PROCEDURE — 80053 COMPREHEN METABOLIC PANEL: CPT | Performed by: INTERNAL MEDICINE

## 2025-01-08 PROCEDURE — 87070 CULTURE OTHR SPECIMN AEROBIC: CPT | Performed by: NURSE PRACTITIONER

## 2025-01-08 PROCEDURE — 82945 GLUCOSE OTHER FLUID: CPT | Performed by: NURSE PRACTITIONER

## 2025-01-08 PROCEDURE — 85027 COMPLETE CBC AUTOMATED: CPT | Performed by: INTERNAL MEDICINE

## 2025-01-08 PROCEDURE — 71045 X-RAY EXAM CHEST 1 VIEW: CPT

## 2025-01-08 RX ORDER — DIGOXIN 0.25 MG/ML
250 INJECTION INTRAMUSCULAR; INTRAVENOUS ONCE
Status: COMPLETED | OUTPATIENT
Start: 2025-01-08 | End: 2025-01-08

## 2025-01-08 RX ORDER — LIDOCAINE HYDROCHLORIDE 10 MG/ML
10 INJECTION, SOLUTION INFILTRATION; PERINEURAL ONCE
Status: COMPLETED | OUTPATIENT
Start: 2025-01-08 | End: 2025-01-08

## 2025-01-08 RX ADMIN — Medication 10 ML: at 08:56

## 2025-01-08 RX ADMIN — METOPROLOL SUCCINATE 25 MG: 25 TABLET, EXTENDED RELEASE ORAL at 08:56

## 2025-01-08 RX ADMIN — Medication 1000 MCG: at 08:56

## 2025-01-08 RX ADMIN — DIGOXIN 250 MCG: 0.25 INJECTION INTRAMUSCULAR; INTRAVENOUS at 16:31

## 2025-01-08 RX ADMIN — TAMSULOSIN HYDROCHLORIDE 0.4 MG: 0.4 CAPSULE ORAL at 08:56

## 2025-01-08 RX ADMIN — METOPROLOL TARTRATE 5 MG: 1 INJECTION, SOLUTION INTRAVENOUS at 20:10

## 2025-01-08 RX ADMIN — LORAZEPAM 0.25 MG: 2 INJECTION INTRAMUSCULAR; INTRAVENOUS at 18:57

## 2025-01-08 RX ADMIN — OLANZAPINE 5 MG: 10 INJECTION, POWDER, LYOPHILIZED, FOR SOLUTION INTRAMUSCULAR at 16:25

## 2025-01-08 RX ADMIN — Medication 10 ML: at 20:13

## 2025-01-08 RX ADMIN — LIDOCAINE HYDROCHLORIDE 3 ML: 10 INJECTION, SOLUTION INFILTRATION; PERINEURAL at 14:58

## 2025-01-08 RX ADMIN — DIGOXIN 125 MCG: 0.12 TABLET ORAL at 08:55

## 2025-01-08 RX ADMIN — LORAZEPAM 0.25 MG: 2 INJECTION INTRAMUSCULAR; INTRAVENOUS at 16:09

## 2025-01-08 RX ADMIN — METOPROLOL TARTRATE 7.5 MG: 1 INJECTION, SOLUTION INTRAVENOUS at 16:14

## 2025-01-08 NOTE — PROGRESS NOTES
The patient remains encephalopathic and continues to have sundowning.  I spoke with the patient's attending, Dr. Staples regarding the patient's recent decline, which seems to have coincided with development of rapid A-fib.  I believe it is entirely possible that the patient had been masking dementia given his high level of intelligence and functioning but that this medical insult may have unmasked by pre-existing dementia which was more advanced than felt.  Neurology is to see the patient later today, and if we see no improvement with action of the patient's A-fib and neurology finds nothing then it geropsych hospitalization may need to be considered.

## 2025-01-08 NOTE — PROGRESS NOTES
DOS: 2025  NAME: Mark Aguirre Jr.   : 1945  PCP: Luis Staples MD    Chief Complaint   Patient presents with    Fall    Ear Laceration         Subjective: Pt seen in follow up.  Wife at bedside and states that he has been recognizing her and their children but has continued to be confused and intermittently sleeping.  She thinks his agitation is less.  He has been getting scheduled Zyprexa and intermittent Ativan.  Currently he is drowsy, opens his eyes to the calling of his name but does not stay awake.  Did not follow commands.    Objective:  Vital signs:   Vitals:    25 2341 25 0400 25 0800 25 1100   BP: 154/81 117/94 115/86    BP Location:  Left arm Left arm    Patient Position:  Lying Lying    Pulse:  (!) 141 (!) 125    Resp:  20 16    Temp:   98.1 °F (36.7 °C)    TempSrc:   Oral    SpO2:  90% 92%    Weight:    95.6 kg (210 lb 12.2 oz)   Height:           Current Facility-Administered Medications:     acetaminophen (TYLENOL) tablet 650 mg, 650 mg, Oral, Q4H PRN, Luis Staples MD, 650 mg at 25    atorvastatin (LIPITOR) tablet 10 mg, 10 mg, Oral, Daily, Luis Staples MD, 10 mg at 25 0958    sennosides-docusate (PERICOLACE) 8.6-50 MG per tablet 2 tablet, 2 tablet, Oral, BID PRN **AND** polyethylene glycol (MIRALAX) packet 17 g, 17 g, Oral, Daily PRN **AND** bisacodyl (DULCOLAX) EC tablet 5 mg, 5 mg, Oral, Daily PRN, 5 mg at 25 1706 **AND** bisacodyl (DULCOLAX) suppository 10 mg, 10 mg, Rectal, Daily PRN, Luis Staples MD, 10 mg at 25 1141    Calcium Replacement - Follow Nurse / BPA Driven Protocol, , Not Applicable, PRN, Luis Staples MD    digoxin (LANOXIN) tablet 125 mcg, 125 mcg, Oral, Daily, Luis Staples MD, 125 mcg at 25 0855    Enalaprilat (VASOTEC) injection 0.625 mg, 0.625 mg, Intravenous, Q6H PRN, Luis Staples MD    [Held by provider] Enoxaparin Sodium (LOVENOX) syringe 105 mg, 1 mg/kg, Subcutaneous,  Q12H, Luis Staples MD, 105 mg at 01/05/25 1805    LORazepam (ATIVAN) injection 0.25 mg, 0.25 mg, Intravenous, Q2H PRN, Luis Staples MD, 0.25 mg at 01/07/25 2150    Magnesium Cardiology Dose Replacement - Follow Nurse / BPA Driven Protocol, , Not Applicable, PRN, Luis Staples MD    metoprolol succinate XL (TOPROL-XL) 24 hr tablet 25 mg, 25 mg, Oral, Daily, Luis Staples MD, 25 mg at 01/08/25 0856    metoprolol tartrate (LOPRESSOR) injection 7.5 mg, 7.5 mg, Intravenous, Q4H PRN, Cameron Stroud PA-C, 7.5 mg at 01/07/25 2150    nitroglycerin (NITROSTAT) SL tablet 0.4 mg, 0.4 mg, Sublingual, Q5 Min PRN, Luis Staples MD    OLANZapine (zyPREXA) injection 5 mg, 5 mg, Intramuscular, Q8H PRN, Jevon Celestin III, MD, 5 mg at 01/07/25 1719    OLANZapine (zyPREXA) tablet 5 mg, 5 mg, Oral, Nightly, Jevon Celestin III, MD, 5 mg at 01/05/25 2004    OLANZapine (zyPREXA) tablet 5 mg, 5 mg, Oral, Daily With Dinner, Jevon Celestin III, MD, 5 mg at 01/04/25 1808    Phosphorus Replacement - Follow Nurse / BPA Driven Protocol, , Not Applicable, PRN, Luis Staples MD    Potassium Replacement - Follow Nurse / BPA Driven Protocol, , Not Applicable, PRN, Luis Staples MD    [COMPLETED] Insert Peripheral IV, , , Once **AND** sodium chloride 0.9 % flush 10 mL, 10 mL, Intravenous, PRN, Luis Staples MD    sodium chloride 0.9 % flush 10 mL, 10 mL, Intravenous, Q12H, Luis Staples MD, 10 mL at 01/08/25 0856    sodium chloride 0.9 % flush 10 mL, 10 mL, Intravenous, PRN, Luis Staples MD    sodium chloride 0.9 % infusion 40 mL, 40 mL, Intravenous, PRN, Luis Staples MD    tamsulosin (FLOMAX) 24 hr capsule 0.4 mg, 0.4 mg, Oral, Daily, Luis Staples MD, 0.4 mg at 01/08/25 0856    vitamin B-12 (CYANOCOBALAMIN) tablet 1,000 mcg, 1,000 mcg, Oral, Daily, Luis Staples MD, 1,000 mcg at 01/08/25 0856    PRN meds    acetaminophen    senna-docusate sodium  **AND** polyethylene glycol **AND** bisacodyl **AND** bisacodyl    Calcium Replacement - Follow Nurse / BPA Driven Protocol    Enalaprilat    LORazepam    Magnesium Cardiology Dose Replacement - Follow Nurse / BPA Driven Protocol    metoprolol tartrate    nitroglycerin    OLANZapine    Phosphorus Replacement - Follow Nurse / BPA Driven Protocol    Potassium Replacement - Follow Nurse / BPA Driven Protocol    [COMPLETED] Insert Peripheral IV **AND** sodium chloride    sodium chloride    sodium chloride    No current facility-administered medications on file prior to encounter.     Current Outpatient Medications on File Prior to Encounter   Medication Sig    apixaban (ELIQUIS) 5 MG tablet tablet Take 1 tablet by mouth Every 12 (Twelve) Hours.    atorvastatin (LIPITOR) 10 MG tablet Take 1 tablet by mouth Daily.    chlorthalidone (HYGROTON) 25 MG tablet Take 1 tablet by mouth Daily.    citalopram (CeleXA) 20 MG tablet Take 1 tablet by mouth Every Morning.    digoxin (LANOXIN) 125 MCG tablet Take 1 tablet by mouth Daily.    metoprolol succinate XL (TOPROL-XL) 25 MG 24 hr tablet Take 1 tablet by mouth Daily.    tamsulosin (FLOMAX) 0.4 MG capsule 24 hr capsule Take 1 capsule by mouth Daily.    zolpidem (AMBIEN) 5 MG tablet Take 0.5 tablets by mouth At Night As Needed for Sleep.    acetaminophen (TYLENOL) 325 MG tablet Take 2 tablets by mouth Every 4 (Four) Hours As Needed for Mild Pain .    Cyanocobalamin (VITAMIN B 12 PO) Take  by mouth.       General appearance: Drowsy, NAD  HEENT: Normocephalic, atraumatic, PERRL    Neurological:   MS: opened eyes to the calling of his first name, did not answer any orientation questions, drowsy  CN: + blink to threat bilaterally, withdraws in all 4 extremities   Motor: moves all extremities spontaneously but not to command  Reflexes: downgoing toes  Coordination: JUAN  Gait and station: deferred   Rapid alternating movements: JUAN    Laboratory results:  Lab Results   Component Value  "Date    TSH 2.620 12/30/2024     Lab Results   Component Value Date    CLXTEUVD10 717 12/30/2024     No results found for: \"HGBA1C\"  Lab Results   Component Value Date    GLUCOSE 95 01/08/2025    BUN 27 (H) 01/08/2025    CREATININE 0.76 01/08/2025    EGFRIFNONA 93 06/22/2020    EGFRIFAFRI  06/16/2020      Comment:      <15 Indicative of kidney failure.    BCR 35.5 (H) 01/08/2025    K 3.7 01/08/2025    CO2 26.0 01/08/2025    CALCIUM 8.8 01/08/2025    ALBUMIN 3.4 (L) 01/08/2025    AST 20 01/08/2025    ALT 20 01/08/2025     Lab Results   Component Value Date    WBC 9.60 01/08/2025    HGB 11.4 (L) 01/08/2025    HCT 37.1 (L) 01/08/2025    MCV 90.7 01/08/2025     01/08/2025     Brief Urine Lab Results  (Last result in the past 365 days)        Color   Clarity   Blood   Leuk Est   Nitrite   Protein   CREAT   Urine HCG        01/02/25 1137 Dark Yellow   Clear   Moderate (2+)   Negative   Negative   30 mg/dL (1+)                 No results found for: \"ACANTHNAEG\", \"AFBCX\", \"BPERTUSSISCX\", \"BLOODCX\"  No results found for: \"BCIDPCR\", \"CXREFLEX\", \"CSFCX\", \"CULTURETIS\"  No results found for: \"CULTURES\", \"HSVCX\", \"URCX\"  No results found for: \"EYECULTURE\", \"GCCX\", \"HSVCULTURE\", \"LABHSV\"  No results found for: \"LEGIONELLA\", \"MRSACX\", \"MUMPSCX\", \"MYCOPLASCX\"  No results found for: \"NOCARDIACX\", \"STOOLCX\"  No results found for: \"THROATCX\", \"UNSTIMCULT\", \"URINECX\", \"CULTURE\", \"VZVCULTUR\"  No results found for: \"VIRALCULTU\", \"WOUNDCX\"  Pain Management Panel          Latest Ref Rng & Units 6/16/2020   Pain Management Panel   Creatinine, Urine mg/dL 54.0       Details                 Folate 9.48    Review and interpretation of imaging:  MRI Brain Without Contrast    Result Date: 1/6/2025   No evidence for acute intracranial pathology.  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    This " report was finalized on 1/6/2025 1:00 PM by Dr. Milo Doan M.D on Workstation: [id number]      CT Abdomen Pelvis With & Without Contrast    Result Date: 1/6/2025  1. Bilateral nonobstructing kidney stones 2. There is a large right rectus sheath hematoma. Follow-up recommended to ensure clearing. Findings discussed with the patient's RN at time of dictation   Radiation dose reduction techniques were utilized, including automated exposure control and exposure modulation based on body size.   This report was finalized on 1/6/2025 12:57 PM by Dr. Pako Velasquez M.D on Workstation: [id number]     Results for orders placed during the hospital encounter of 09/05/24    Adult Transthoracic Echo Complete W/ Cont if Necessary Per Protocol    Interpretation Summary    Left ventricular systolic function is low normal. Calculated left ventricular EF = 48.8%    The left ventricular cavity is borderline dilated.    Left ventricular diastolic function is consistent with (grade II w/high LAP) pseudonormalization.    The left atrial cavity is mild to moderately dilated.    Moderate aortic valve stenosis is present. Aortic valve area is 1.8 cm2.    Peak velocity of the flow distal to the aortic valve is 328 cm/s. Aortic valve maximum pressure gradient is 43 mmHg. Aortic valve mean pressure gradient is 24 mmHg. Aortic valve dimensionless index is 0.4 .    Estimated right ventricular systolic pressure from tricuspid regurgitation is moderately elevated (45-55 mmHg). Calculated right ventricular systolic pressure from tricuspid regurgitation is 48 mmHg.    Mild to moderate aortic valve regurgitation is present.    Mild to moderate mitral valve regurgitation is present with an eccentric jet noted.      Impression/Assessment:  This is a 79-year-old male with past medical history of hypertension, atrial flutter/PAF on Eliquis PTA, obstructive sleep apnea on CPAP, prior stroke with reported no residual deficit, postconcussive syndrome,  hearing loss who presented to the hospital on 12/28/2024 with complaints of suffering an acute syncopal episode causing him to fall and hit the right side of his head resulting in a right ear laceration.  Reportedly he had not been eating or drinking well due to feeling ill and had some nausea.  The day of admission he had gotten up to go use the bathroom and all of a sudden felt lightheaded and lost consciousness.  No reported seizure activity.  He was seen by cardiology who felt that his syncopal event was likely related to orthostasis and dehydration.  They have also been following him for persistent A-fib with uncontrolled rates.      Our service was consulted due to him developing agitation and visual hallucinations after admission.  He had an initial noncontrast CT head that did not reveal any acute intracranial abnormalities.  His follow-up CT head on 12/31 did not reveal any acute intracranial abnormalities.  He has been treated with Haldol, Zyprexa, lorazepam, and Seroquel.  He had a normal B12, folate, urinalysis, and ammonia levels.  Initially MRI was held off due to some improvement of his mental status however he started to regress again therefore this was obtained on 1/6 and per my review it shows an old cerebellar stroke with 2 areas of chronic microhemorrhage, and bitemporal atrophy.  He has not been able to wear his CPAP since admission for his sleep apnea due to wrist restraint use and inability to remove the mask if needed.  We were asked back to see the patient yesterday by EP due to his persistent delirium.  Psychiatry has also been following and feels that the patient likely has an underlying neurodegenerative process with dementia and with recent medical insult this has now become more pronounced and likely will require a geropsych hospitalization.     Diagnosis:  Persistent delirium  Paroxysmal atrial fibrillation with RVR  Obstructive sleep apnea on CPAP    Plan:  - Asked back to see the  patient today due to his persistent delirium.  He appears more encephalopathic on my exam today then when I last saw him but the wife reports this has been intermittent.  She feels his agitation is less but his delirium remains.  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   - It is less likely he has CNS infectious involvement as he has been afebrile and white count stable but we can send him down to IR to get LP and send routine studies along with adding beta amyloid testing.   - Will repeat a plan CTH today.  - He will likely need sedation to complete the LP which is fine however would like to hold all sedating medications after that.  Follow delirium precautions.  If the above unremarkable, agree with Dr. Celestin's recommendation of geropsych admission due to concern for underlying neurodegenerative process being exacerbated by acute illness and prolonged hospitalization.  Dr. Frank to see him later today or tomorrow morning.  Will follow.    Evidence-based delirium precautions include:     1. Frequent reorientation, reminding patient not questioning patient   2. Shades up during day/down at night   3. TV off except for soft music only   4. Familiar objects at bedside   5. Encourage friends/family to spend the night   6. Minimize anticholingeric medications   7. Minimize polypharmacy   8. Minimize restraints, includes lines and/or foleys and/or feeding tubes as able   9. Minimize overnight checks/vitals to encourage restful consistent sleep patterns   10. Out of bed during day as much as possible     Case discussed with patient and wife at bedside, and Dr. Frank, and he agrees with plan above.     KEISHA Reese

## 2025-01-08 NOTE — SIGNIFICANT NOTE
01/08/25 1526   OTHER   Discipline physical therapist   Rehab Time/Intention   Session Not Performed other (see comments)  (pt with resting HR in the 150s this AM and BRUNILDA this PM, will f/u)   Recommendation   PT - Next Appointment 01/09/25

## 2025-01-08 NOTE — CONSULTS
Nutrition Services    Patient Name:  Mark Aguirre Jr.  YOB: 1945  MRN: [id number]  Admit Date:  12/28/2024  Assessment Date:  01/08/25    Summary: Follow Up  Pt laying in bed when I visited. Wife at bedside provided a hx. Wife reported pt has not been eating more than just a few bites of pudding or applesauce for the past two weeks. RN endorsed pt having poor PO intake. RD weighed pt using bed scale and found him to weigh 210 lbs. Per EMR, pt weighed using bedscale on 12/29 and found to weigh 241 lbs. RD suspects this weight is inaccurate. Weight loss of 8.3% noted since 9/5 (not significant).      Pt does not currently meet criteria for malnutrition but RD suspects he will soon if poor PO intake continues. Pt would likely benefit from alternative means of nutrition if aligns with POC/GOC. If PO intake is preferred, RD recommends trialing an appetite stimulant if medically appropriate.     RECS:  Boost TID  Magic Cups TID    CLINICAL NUTRITION ASSESSMENT      Reason for Assessment Follow-up Protocol     Diagnosis/Problem   Syncope  Ear laceration     Medical/Surgical History Past Medical History:   Diagnosis Date    Acute kidney failure     POST-OP TOTAL HIP 2020    Anticoagulated     PRADAXA FOR A FIB TO HELD 3 DAYS PRIOR    Arthritis     OSTEO. RIGHT HIP    Atrial flutter     Bifascicular block     Cataract     LEFT AND RIGHT    Depression     History of colon polyps     Hypertension     Hypoxemia associated with sleep     Insomnia     Knee pain     STEPHENIE on CPAP 05/27/2010    Overnight polysomnogram.  Weight 163 pounds.  Severe STEPHENIE with AHI of 30.9 events per hour.  Low oxygen saturation 72% and sleep-related hypoxia present for 30 minutes    PAF (paroxysmal atrial fibrillation)     Right hip pain     Slow to wake up after anesthesia        Past Surgical History:   Procedure Laterality Date    CARDIAC ABLATION      CARDIAC CATHETERIZATION      COLONOSCOPY N/A 2/21/2018    Procedure: COLONOSCOPY  "INTO CECUM WITH COLD BX POLYPECTOMY ;  Surgeon: Ross Stearns MD;  Location: [facility] ENDOSCOPY;  Service:     COLONOSCOPY N/A 2/3/2021    Procedure: COLONOSCOPY TOCECUM WITH COLD BX POLYPECTOMY AND HOT SNARE POLYPECTOMY;  Surgeon: Ross Stearns MD;  Location: [facility] ENDOSCOPY;  Service: General;  Laterality: N/A;  PERSONAL HX OF POLYPS, FAMILY HX OF COLON CANCER  --POLYPS (X3), DIVERTICULOSIS    COLONOSCOPY N/A 3/20/2024    Procedure: COLONOSCOPY TO CECUM WITH COLD BX POLYPECTOMIES;  Surgeon: Ross Stearns MD;  Location: [facility] ENDOSCOPY;  Service: General;  Laterality: N/A;  HX POLYPS/POLYPS (3)    HERNIA REPAIR      INGUINAL HERNIA? SIDE    TOTAL HIP ARTHROPLASTY Right 6/5/2020    Procedure: TOTAL HIP ARTHROPLASTY;  Surgeon: Travis Hamlin MD;  Location: [facility] MAIN OR;  Service: Orthopedics;  Laterality: Right;        Anthropometrics        Current Height  Current Weight  BMI kg/m2 Height: 185.4 cm (73\")  Weight: 110 kg (241 lb 8 oz) (12/29/24 0818)  Body mass index is 31.86 kg/m².   Adjusted BMI (if applicable)    BMI Category Obese, Class I (30 - 34.9)   Ideal Body Weight (IBW) 184 lb (83.6 kg)   Usual Body Weight (UBW) 220-241 lb   Weight Trend Loss   Weight History Wt Readings from Last 30 Encounters:   12/29/24 0818 110 kg (241 lb 8 oz)   12/17/24 0739 103 kg (226 lb)   12/13/24 0930 103 kg (228 lb)   10/07/24 1123 104 kg (229 lb)   09/05/24 0803 104 kg (229 lb 4.5 oz)   08/21/24 1104 102 kg (224 lb)   03/20/24 1019 99.9 kg (220 lb 4.8 oz)   03/19/24 0912 102 kg (224 lb)   02/27/24 0957 102 kg (225 lb)   12/04/23 1030 101 kg (223 lb)   11/29/23 0831 102 kg (224 lb 12.8 oz)   07/31/23 1000 103 kg (226 lb 6.4 oz)   07/05/23 1519 102 kg (224 lb)   07/04/23 1736 102 kg (224 lb)   07/04/23 1654 102 kg (224 lb)   02/23/23 0746 99.3 kg (219 lb)   12/02/22 1003 103 kg (226 lb)   11/30/22 0900 103 kg (227 lb)   08/31/22 0700 103 kg (226 lb 6.4 oz)   02/23/22 0941 105 kg (231 lb)   10/20/21 1248 105 kg (231 " "lb 7.7 oz)   10/06/21 0850 105 kg (230 lb 6.4 oz)   09/01/21 0700 105 kg (232 lb)   06/24/21 0943 105 kg (231 lb)   02/03/21 0802 102 kg (224 lb)   02/02/21 1218 103 kg (228 lb)   09/02/20 1413 106 kg (233 lb 9.6 oz)   08/21/20 1323 107 kg (235 lb)   07/30/20 0829 107 kg (235 lb)   07/07/20 1113 103 kg (226 lb)   06/16/20 0349 111 kg (243 lb 11.2 oz)   06/15/20 2026 121 kg (266 lb 8 oz)   06/05/20 0911 108 kg (238 lb 8.6 oz)   06/04/20 1103 107 kg (235 lb 12.8 oz)   06/03/20 0803 107 kg (236 lb)      --  Labs       Pertinent Labs    Results from last 7 days   Lab Units 01/08/25  0243 01/05/25  0908 01/03/25  2136 01/03/25  0801   SODIUM mmol/L 146* 144  --  141   POTASSIUM mmol/L 3.7 3.7 3.7 3.6   CHLORIDE mmol/L 110* 107  --  106   CO2 mmol/L 26.0 25.0  --  22.5   BUN mg/dL 27* 24*  --  20   CREATININE mg/dL 0.76 0.80  --  0.75*   CALCIUM mg/dL 8.8 8.9  --  8.7   BILIRUBIN mg/dL 2.5* 2.4*  --  2.6*   ALK PHOS U/L 51 52  --  57   ALT (SGPT) U/L 20 21  --  12   AST (SGOT) U/L 20 26  --  20   GLUCOSE mg/dL 95 103*  --  107*     Results from last 7 days   Lab Units 01/08/25  0243 01/05/25  0908 01/03/25  0801   MAGNESIUM mg/dL  --   --  2.0   HEMOGLOBIN g/dL 11.4*   < >  --    HEMATOCRIT % 37.1*   < >  --    WBC 10*3/mm3 9.60   < >  --    ALBUMIN g/dL 3.4*   < > 3.3*    < > = values in this interval not displayed.     Results from last 7 days   Lab Units 01/08/25  0243 01/06/25  1640 01/05/25  0908 01/02/25  1118   PLATELETS 10*3/mm3 192 193 193 164     COVID19   Date Value Ref Range Status   12/28/2024 Not Detected Not Detected - Ref. Range Final     No results found for: \"HGBA1C\"       Medications           Scheduled Medications atorvastatin, 10 mg, Oral, Daily  digoxin, 125 mcg, Oral, Daily  [Held by provider] enoxaparin, 1 mg/kg, Subcutaneous, Q12H  metoprolol succinate XL, 25 mg, Oral, Daily  OLANZapine, 5 mg, Oral, Nightly  OLANZapine, 5 mg, Oral, Daily With Dinner  sodium chloride, 10 mL, Intravenous, " Q12H  tamsulosin, 0.4 mg, Oral, Daily  vitamin B-12, 1,000 mcg, Oral, Daily       Infusions       PRN Medications   acetaminophen    senna-docusate sodium **AND** polyethylene glycol **AND** bisacodyl **AND** bisacodyl    Calcium Replacement - Follow Nurse / BPA Driven Protocol    Enalaprilat    LORazepam    Magnesium Cardiology Dose Replacement - Follow Nurse / BPA Driven Protocol    metoprolol tartrate    nitroglycerin    OLANZapine    Phosphorus Replacement - Follow Nurse / BPA Driven Protocol    Potassium Replacement - Follow Nurse / BPA Driven Protocol    [COMPLETED] Insert Peripheral IV **AND** sodium chloride    sodium chloride    sodium chloride     Physical Findings          General Findings confused, disoriented, obese, room air   Oral/Mouth Cavity WDL   Edema  no edema   Gastrointestinal last bowel movement: 1/6   Skin  other: R ear laceration   Tubes/Drains/Lines none   NFPE No clinical signs of muscle wasting or fat loss   --  Current Nutrition Orders & Evaluation of Intake       Oral Nutrition     Food Allergies NKFA   Current PO Diet Diet: Cardiac; Healthy Heart (2-3 Na+); Fluid Consistency: Thin (IDDSI 0)   Supplement n/a   PO Evaluation     % PO Intake Family reports minimal intake x 2 weeks    Factors Affecting Intake: altered mental status   --  PES STATEMENT / NUTRITION DIAGNOSIS      Nutrition Dx Problem  Problem: Inadequate Oral Intake  Etiology: Medical Diagnosis - AMS    Signs/Symptoms: NPO and Report/Observation     NUTRITION INTERVENTION / PLAN OF CARE      Intervention Goal(s) Maintain nutrition status, Reduce/improve symptoms, Disease management/therapy, Initiate feeding/diet, and No significant weight loss         RD Intervention/Action Encourage intake, Continue to monitor, Care plan reviewed, and Recommend/order: Boost TID, Magic Cups TID   --      Prescription/Orders:       PO Diet       Supplements Boost TID, Magic Cups TID      Enteral Nutrition       Parenteral Nutrition    New  Prescription Ordered? Yes   --      Monitor/Evaluation Per protocol, I&O, Pertinent labs, Weight, Symptoms, POC/GOC, Swallow function   Discharge Plan/Needs Pending clinical course   --    RD to follow per protocol.      Electronically signed by:  Jose Weaver RDN, LD  01/08/25 11:18 EST

## 2025-01-08 NOTE — PLAN OF CARE
Problem: Syncope  Goal: Absence of Syncopal Symptoms  Outcome: Progressing  Intervention: Manage Effect of Syncopal Symptoms  Recent Flowsheet Documentation  Taken 1/8/2025 0600 by Bessy Rivera RN  Safety Promotion/Fall Prevention: safety round/check completed  Taken 1/8/2025 0400 by Bessy Rivera RN  Safety Promotion/Fall Prevention: safety round/check completed  Taken 1/8/2025 0200 by Bessy Rivera RN  Safety Promotion/Fall Prevention:   activity supervised   assistive device/personal items within reach   clutter free environment maintained   fall prevention program maintained   room organization consistent   safety round/check completed  Taken 1/8/2025 0000 by Bessy Rivera RN  Safety Promotion/Fall Prevention:   activity supervised   assistive device/personal items within reach   clutter free environment maintained   fall prevention program maintained   lighting adjusted   nonskid shoes/slippers when out of bed   room organization consistent   safety round/check completed  Taken 1/7/2025 2020 by Bessy Rivera RN  Safety Promotion/Fall Prevention:   activity supervised   assistive device/personal items within reach   clutter free environment maintained   fall prevention program maintained   room organization consistent   safety round/check completed  Goal: Absence of Syncopal Symptoms  Outcome: Progressing  Intervention: Manage Effect of Syncopal Symptoms  Recent Flowsheet Documentation  Taken 1/8/2025 0600 by Bessy Rivera RN  Safety Promotion/Fall Prevention: safety round/check completed  Taken 1/8/2025 0400 by Bessy Rivera RN  Safety Promotion/Fall Prevention: safety round/check completed  Taken 1/8/2025 0200 by Bessy Rivera RN  Safety Promotion/Fall Prevention:   activity supervised   assistive device/personal items within reach   clutter free environment maintained   fall prevention program maintained   room organization consistent   safety round/check completed  Taken 1/8/2025 0000 by Bessy Rivera  RN  Safety Promotion/Fall Prevention:   activity supervised   assistive device/personal items within reach   clutter free environment maintained   fall prevention program maintained   lighting adjusted   nonskid shoes/slippers when out of bed   room organization consistent   safety round/check completed  Taken 1/7/2025 2020 by Bessy Rivera RN  Safety Promotion/Fall Prevention:   activity supervised   assistive device/personal items within reach   clutter free environment maintained   fall prevention program maintained   room organization consistent   safety round/check completed     Problem: Adult Inpatient Plan of Care  Goal: Plan of Care Review  Outcome: Progressing  Flowsheets (Taken 1/8/2025 0644)  Progress: no change  Outcome Evaluation: Pt confused and restless. 1 dose of PRN ativan admin. No acute change in condition noted on assessment. Wife at bedside, plan of care ongoing.  Plan of Care Reviewed With:   patient   spouse  Goal: Patient-Specific Goal (Individualized)  Outcome: Progressing  Goal: Absence of Hospital-Acquired Illness or Injury  Outcome: Progressing  Intervention: Identify and Manage Fall Risk  Recent Flowsheet Documentation  Taken 1/8/2025 0600 by Bessy Rivera RN  Safety Promotion/Fall Prevention: safety round/check completed  Taken 1/8/2025 0400 by Bessy Rivera RN  Safety Promotion/Fall Prevention: safety round/check completed  Taken 1/8/2025 0200 by Bessy Rivera RN  Safety Promotion/Fall Prevention:   activity supervised   assistive device/personal items within reach   clutter free environment maintained   fall prevention program maintained   room organization consistent   safety round/check completed  Taken 1/8/2025 0000 by Bessy Rivera RN  Safety Promotion/Fall Prevention:   activity supervised   assistive device/personal items within reach   clutter free environment maintained   fall prevention program maintained   lighting adjusted   nonskid shoes/slippers when out of bed   room  organization consistent   safety round/check completed  Taken 1/7/2025 2020 by Bessy Rivera RN  Safety Promotion/Fall Prevention:   activity supervised   assistive device/personal items within reach   clutter free environment maintained   fall prevention program maintained   room organization consistent   safety round/check completed  Intervention: Prevent Skin Injury  Recent Flowsheet Documentation  Taken 1/8/2025 0600 by Bessy Rivera RN  Body Position:   tilted   right  Taken 1/8/2025 0400 by Bessy Rivera RN  Body Position:   tilted   left  Taken 1/8/2025 0200 by Bessy Rivera RN  Body Position:   tilted   right  Taken 1/8/2025 0000 by Bessy Rivera RN  Body Position:   tilted   left  Skin Protection: incontinence pads utilized  Taken 1/7/2025 2020 by Bessy Rivera RN  Body Position: weight shifting  Skin Protection: incontinence pads utilized  Intervention: Prevent Infection  Recent Flowsheet Documentation  Taken 1/8/2025 0000 by Bessy Rivera RN  Infection Prevention:   environmental surveillance performed   rest/sleep promoted   single patient room provided  Goal: Optimal Comfort and Wellbeing  Outcome: Progressing  Intervention: Provide Person-Centered Care  Recent Flowsheet Documentation  Taken 1/8/2025 0000 by Bessy Rivera RN  Trust Relationship/Rapport:   care explained   choices provided   reassurance provided   thoughts/feelings acknowledged  Taken 1/7/2025 2020 by Bessy Rivera RN  Trust Relationship/Rapport:   care explained   reassurance provided   thoughts/feelings acknowledged  Goal: Readiness for Transition of Care  Outcome: Progressing     Problem: Comorbidity Management  Goal: Blood Pressure in Desired Range  Outcome: Progressing  Intervention: Maintain Blood Pressure Management  Recent Flowsheet Documentation  Taken 1/8/2025 0600 by Bessy Rivera RN  Medication Review/Management: medications reviewed  Taken 1/8/2025 0400 by Bessy Rivera RN  Medication Review/Management: medications  reviewed  Taken 1/8/2025 0200 by Bessy Rivera RN  Medication Review/Management: medications reviewed  Taken 1/8/2025 0000 by Bessy Rivera RN  Medication Review/Management: medications reviewed  Taken 1/7/2025 2020 by Bessy Rivera RN  Medication Review/Management: medications reviewed     Problem: Restraint, Nonviolent  Goal: Absence of Harm or Injury  Outcome: Progressing  Intervention: Implement Least Restrictive Safety Strategies  Recent Flowsheet Documentation  Taken 1/8/2025 0600 by Bessy Rivera RN  Medical Device Protection: tubing secured  Diversional Activities: television  Taken 1/8/2025 0400 by Bessy Rivera RN  Medical Device Protection: tubing secured  Diversional Activities: television  Taken 1/8/2025 0200 by Bessy Rivera RN  Medical Device Protection: tubing secured  Diversional Activities: television  Taken 1/8/2025 0000 by Bessy Rivera RN  Medical Device Protection: tubing secured  Diversional Activities: television  Taken 1/7/2025 2200 by Bessy Rivera RN  Medical Device Protection: tubing secured  Diversional Activities: television  Taken 1/7/2025 2020 by Bessy Rivera RN  Diversional Activities: television  Taken 1/7/2025 2000 by Bessy Rivera RN  Medical Device Protection: tubing secured  Diversional Activities: television  Intervention: Protect Dignity, Rights and Personal Wellbeing  Recent Flowsheet Documentation  Taken 1/8/2025 0000 by Bessy Rivera RN  Trust Relationship/Rapport:   care explained   choices provided   reassurance provided   thoughts/feelings acknowledged  Taken 1/7/2025 2020 by Bessy Rivera RN  Trust Relationship/Rapport:   care explained   reassurance provided   thoughts/feelings acknowledged  Intervention: Protect Skin and Joint Integrity  Recent Flowsheet Documentation  Taken 1/8/2025 0600 by Bessy Rivera RN  Body Position:   tilted   right  Taken 1/8/2025 0400 by Bessy Rivera RN  Body Position:   tilted   left  Taken 1/8/2025 0200 by Bessy Rivera RN  Body  Position:   tilted   right  Taken 1/8/2025 0000 by Bessy Rivera RN  Body Position:   tilted   left  Skin Protection: incontinence pads utilized  Range of Motion: ROM (range of motion) performed  Taken 1/7/2025 2020 by Bessy Rivera RN  Body Position: weight shifting  Skin Protection: incontinence pads utilized  Range of Motion: active ROM (range of motion) encouraged     Problem: Fall Injury Risk  Goal: Absence of Fall and Fall-Related Injury  Outcome: Progressing  Intervention: Identify and Manage Contributors  Recent Flowsheet Documentation  Taken 1/8/2025 0600 by Bessy Rivera RN  Medication Review/Management: medications reviewed  Taken 1/8/2025 0400 by Bessy Rivera RN  Medication Review/Management: medications reviewed  Taken 1/8/2025 0200 by Bessy Rivera RN  Medication Review/Management: medications reviewed  Taken 1/8/2025 0000 by Bessy Rivera RN  Medication Review/Management: medications reviewed  Taken 1/7/2025 2020 by Bessy Rivera RN  Medication Review/Management: medications reviewed  Intervention: Promote Injury-Free Environment  Recent Flowsheet Documentation  Taken 1/8/2025 0600 by Bessy Rivera RN  Safety Promotion/Fall Prevention: safety round/check completed  Taken 1/8/2025 0400 by Bessy Rivera RN  Safety Promotion/Fall Prevention: safety round/check completed  Taken 1/8/2025 0200 by Bessy Rivera RN  Safety Promotion/Fall Prevention:   activity supervised   assistive device/personal items within reach   clutter free environment maintained   fall prevention program maintained   room organization consistent   safety round/check completed  Taken 1/8/2025 0000 by Bessy Rivera RN  Safety Promotion/Fall Prevention:   activity supervised   assistive device/personal items within reach   clutter free environment maintained   fall prevention program maintained   lighting adjusted   nonskid shoes/slippers when out of bed   room organization consistent   safety round/check completed  Taken 1/7/2025 2020  by Rivera, Bessy, RN  Safety Promotion/Fall Prevention:   activity supervised   assistive device/personal items within reach   clutter free environment maintained   fall prevention program maintained   room organization consistent   safety round/check completed     Problem: Dysrhythmia  Goal: Normalized Cardiac Rhythm  Outcome: Progressing     Problem: VTE (Venous Thromboembolism)  Goal: Tissue Perfusion  Outcome: Progressing  Goal: Optimal Right Ventricular Function  Outcome: Progressing     Problem: Heart Failure  Goal: Optimal Coping  Outcome: Progressing  Goal: Optimal Cardiac Output and Blood Flow  Outcome: Progressing  Goal: Stable Heart Rate and Rhythm  Outcome: Progressing  Goal: Fluid and Electrolyte Balance  Outcome: Progressing  Goal: Optimal Functional Ability  Outcome: Progressing  Goal: Improved Oral Intake  Outcome: Progressing  Goal: Effective Oxygenation and Ventilation  Outcome: Progressing  Intervention: Promote Airway Secretion Clearance  Recent Flowsheet Documentation  Taken 1/8/2025 0000 by Bessy Rivera RN  Cough And Deep Breathing: done independently per patient  Taken 1/7/2025 2020 by Bessy Rivera RN  Cough And Deep Breathing: done independently per patient  Intervention: Optimize Oxygenation and Ventilation  Recent Flowsheet Documentation  Taken 1/8/2025 0600 by Bessy Rivera RN  Head of Bed (HOB) Positioning: HOB at 20-30 degrees  Taken 1/8/2025 0400 by Bessy Rivera RN  Head of Bed (HOB) Positioning: HOB at 20-30 degrees  Taken 1/8/2025 0200 by Bessy Rivera RN  Head of Bed (HOB) Positioning: HOB at 20-30 degrees  Taken 1/8/2025 0000 by Bessy Rivera RN  Head of Bed (HOB) Positioning: HOB at 30 degrees  Taken 1/7/2025 2020 by Bessy Rivera RN  Head of Bed (HOB) Positioning: HOB at 30 degrees  Goal: Effective Breathing Pattern During Sleep  Outcome: Progressing  Intervention: Monitor and Manage Obstructive Sleep Apnea  Recent Flowsheet Documentation  Taken 1/8/2025 0600 by Bessy Rivera  RN  Medication Review/Management: medications reviewed  Taken 1/8/2025 0400 by Bessy Rivera RN  Medication Review/Management: medications reviewed  Taken 1/8/2025 0200 by Bessy Rivera RN  Medication Review/Management: medications reviewed  Taken 1/8/2025 0000 by Bessy Rivera RN  Medication Review/Management: medications reviewed  Taken 1/7/2025 2020 by eBssy Rivera RN  Medication Review/Management: medications reviewed     Problem: Delirium  Goal: Optimal Coping  Outcome: Progressing  Goal: Improved Behavioral Control  Outcome: Progressing  Intervention: Minimize Safety Risk  Recent Flowsheet Documentation  Taken 1/8/2025 0600 by Bessy Rivera RN  Enhanced Safety Measures: bed alarm set  Taken 1/8/2025 0400 by Bessy Rivera RN  Enhanced Safety Measures: bed alarm set  Taken 1/8/2025 0200 by Bessy Rivera RN  Enhanced Safety Measures: bed alarm set  Taken 1/8/2025 0000 by Bessy Rivera RN  Trust Relationship/Rapport:   care explained   choices provided   reassurance provided   thoughts/feelings acknowledged  Enhanced Safety Measures:   bed alarm set   family to remain at bedside  Taken 1/7/2025 2020 by Bessy Rivera RN  Trust Relationship/Rapport:   care explained   reassurance provided   thoughts/feelings acknowledged  Enhanced Safety Measures:   bed alarm set   family to remain at bedside  Goal: Improved Attention and Thought Clarity  Outcome: Progressing  Goal: Improved Sleep  Outcome: Progressing     Problem: Skin Injury Risk Increased  Goal: Skin Health and Integrity  Outcome: Progressing  Intervention: Optimize Skin Protection  Recent Flowsheet Documentation  Taken 1/8/2025 0600 by Bessy Rivera RN  Head of Bed (HOB) Positioning: HOB at 20-30 degrees  Taken 1/8/2025 0400 by Bessy Rivera RN  Head of Bed (HOB) Positioning: HOB at 20-30 degrees  Taken 1/8/2025 0200 by Bessy Rivera RN  Head of Bed (HOB) Positioning: HOB at 20-30 degrees  Taken 1/8/2025 0000 by Bessy Rivera RN  Pressure Reduction Techniques:  weight shift assistance provided  Head of Bed (HOB) Positioning: HOB at 30 degrees  Pressure Reduction Devices:   alternating pressure pump (JUAN)   pressure-redistributing mattress utilized  Skin Protection: incontinence pads utilized  Taken 1/7/2025 2020 by Bessy Rivera RN  Pressure Reduction Techniques: weight shift assistance provided  Head of Bed (HOB) Positioning: HOB at 30 degrees  Pressure Reduction Devices:   alternating pressure pump (JUAN)   pressure-redistributing mattress utilized   specialty bed utilized  Skin Protection: incontinence pads utilized   Goal Outcome Evaluation:  Plan of Care Reviewed With: patient, spouse        Progress: no change  Outcome Evaluation: Pt confused and restless. 1 dose of PRN ativan admin. No acute change in condition noted on assessment. Wife at bedside, plan of care ongoing. BL wrist Restraints in place, assessments and documentation completed.

## 2025-01-08 NOTE — PROGRESS NOTES
Subjective: Patient remains confused/agitated.  Received IM Zyprexa yesterday early evening and then dose of lorazepam at about 10 PM last night.  Wife is at bedside notes that he rested somewhat during the night.  Did not eat anything but did take in a little water yesterday.    Objective:  Vitals:    01/07/25 2150 01/07/25 2340 01/07/25 2341 01/08/25 0400   BP:   154/81 117/94   BP Location:    Left arm   Patient Position:    Lying   Pulse: (!) 140 112  (!) 141   Resp:  20  20   Temp:  98.7 °F (37.1 °C)     TempSrc:  Oral     SpO2:  99%  90%   Weight:       Height:         General: Awake, confused, mumbling  Heart: Irregularly tachycardic  Lungs: Clear  Abdomen: Soft nondistended.  Ecchymosis in the right flank more prominent.  Small area of ecchymosis where the brief sits on the left anterior hip.  No firm masses noted  Extremities: No clubbing cyanosis or edema    Recent Results (from the past 24 hours)   Digoxin Level    Collection Time: 01/08/25  2:43 AM    Specimen: Blood   Result Value Ref Range    Digoxin 0.60 0.60 - 1.20 ng/mL   BNP    Collection Time: 01/08/25  2:43 AM    Specimen: Blood   Result Value Ref Range    proBNP 3,411.0 (H) 0.0 - 1,800.0 pg/mL   Lactic Acid, Plasma    Collection Time: 01/08/25  2:43 AM    Specimen: Blood   Result Value Ref Range    Lactate 1.2 0.5 - 2.0 mmol/L   Comprehensive Metabolic Panel    Collection Time: 01/08/25  2:43 AM    Specimen: Blood   Result Value Ref Range    Glucose 95 65 - 99 mg/dL    BUN 27 (H) 8 - 23 mg/dL    Creatinine 0.76 0.76 - 1.27 mg/dL    Sodium 146 (H) 136 - 145 mmol/L    Potassium 3.7 3.5 - 5.2 mmol/L    Chloride 110 (H) 98 - 107 mmol/L    CO2 26.0 22.0 - 29.0 mmol/L    Calcium 8.8 8.6 - 10.5 mg/dL    Total Protein 5.8 (L) 6.0 - 8.5 g/dL    Albumin 3.4 (L) 3.5 - 5.2 g/dL    ALT (SGPT) 20 1 - 41 U/L    AST (SGOT) 20 1 - 40 U/L    Alkaline Phosphatase 51 39 - 117 U/L    Total Bilirubin 2.5 (H) 0.0 - 1.2 mg/dL    Globulin 2.4 gm/dL    A/G Ratio 1.4 g/dL     BUN/Creatinine Ratio 35.5 (H) 7.0 - 25.0    Anion Gap 10.0 5.0 - 15.0 mmol/L    eGFR 90.3 >60.0 mL/min/1.73   CBC Auto Differential    Collection Time: 01/08/25  2:43 AM    Specimen: Blood   Result Value Ref Range    WBC 9.60 3.40 - 10.80 10*3/mm3    RBC 4.09 (L) 4.14 - 5.80 10*6/mm3    Hemoglobin 11.4 (L) 13.0 - 17.7 g/dL    Hematocrit 37.1 (L) 37.5 - 51.0 %    MCV 90.7 79.0 - 97.0 fL    MCH 27.9 26.6 - 33.0 pg    MCHC 30.7 (L) 31.5 - 35.7 g/dL    RDW 13.1 12.3 - 15.4 %    RDW-SD 43.7 37.0 - 54.0 fl    MPV 11.6 6.0 - 12.0 fL    Platelets 192 140 - 450 10*3/mm3    Neutrophil % 76.5 (H) 42.7 - 76.0 %    Lymphocyte % 11.4 (L) 19.6 - 45.3 %    Monocyte % 9.7 5.0 - 12.0 %    Eosinophil % 1.4 0.3 - 6.2 %    Basophil % 0.6 0.0 - 1.5 %    Immature Grans % 0.4 0.0 - 0.5 %    Neutrophils, Absolute 7.35 (H) 1.70 - 7.00 10*3/mm3    Lymphocytes, Absolute 1.09 0.70 - 3.10 10*3/mm3    Monocytes, Absolute 0.93 (H) 0.10 - 0.90 10*3/mm3    Eosinophils, Absolute 0.13 0.00 - 0.40 10*3/mm3    Basophils, Absolute 0.06 0.00 - 0.20 10*3/mm3    Immature Grans, Absolute 0.04 0.00 - 0.05 10*3/mm3    nRBC 0.0 0.0 - 0.2 /100 WBC       Assessment plan  1.  Delirium-not improving.  Psychiatry suggesting possible need for geropsych hospitalization.  Will need to continue to address heart rate and following blood count due to hematoma formation prior to a transfer for that.  Cardiology feels unlikely A-fib contributing at all to current process.  I am going to have neurology come back by and give an opinion.  I am going to try and discuss whether we can try and stop the lorazepam with psychiatry as I do have to wonder whether it is contributing to ongoing confusion.  On the other hand Zyprexa often times is not seeming like it is having a big effect on controlling agitation.  2.  Atrial fibrillation-rate not controlled but patient very often not taking his oral medicines.  Did receive a dose of IV digoxin yesterday.  Electrophysiology continues  to follow.  They have planned on AV carlos a ablation and pacemaker.  3.  Rectus hematoma-hemoglobin is trended down.  Lovenox has been held the last 48 hours.  Usually would require holding of Lovenox for 3 to 4 days.  I like to see blood count stabilized prior to considering reinitiation.  4.  Fluids/electrolytes/nutrition-not taking hardly any nutrition in.  Family tries to encourage boost.  I think placing a nasal feeding tube would only worsen agitation and he would pull it out.  Sodium slightly elevated today.  Will continue to monitor electrolytes.  May need boluses of fluids if continues not to take in fluid.

## 2025-01-09 LAB
ANION GAP SERPL CALCULATED.3IONS-SCNC: 7 MMOL/L (ref 5–15)
ARTERIAL PATENCY WRIST A: POSITIVE
ATMOSPHERIC PRESS: 757.2 MMHG
BASE EXCESS BLDA CALC-SCNC: 3.9 MMOL/L (ref 0–2)
BASOPHILS # BLD AUTO: 0.04 10*3/MM3 (ref 0–0.2)
BASOPHILS NFR BLD AUTO: 0.5 % (ref 0–1.5)
BDY SITE: ABNORMAL
BUN SERPL-MCNC: 25 MG/DL (ref 8–23)
BUN/CREAT SERPL: 41 (ref 7–25)
CALCIUM SPEC-SCNC: 8.4 MG/DL (ref 8.6–10.5)
CHLORIDE SERPL-SCNC: 111 MMOL/L (ref 98–107)
CO2 BLDA-SCNC: 28.2 MMOL/L (ref 23–27)
CO2 SERPL-SCNC: 29 MMOL/L (ref 22–29)
CREAT SERPL-MCNC: 0.61 MG/DL (ref 0.76–1.27)
DEPRECATED RDW RBC AUTO: 41.6 FL (ref 37–54)
DEVICE COMMENT: ABNORMAL
EGFRCR SERPLBLD CKD-EPI 2021: 96.5 ML/MIN/1.73
EOSINOPHIL # BLD AUTO: 0.15 10*3/MM3 (ref 0–0.4)
EOSINOPHIL NFR BLD AUTO: 1.8 % (ref 0.3–6.2)
ERYTHROCYTE [DISTWIDTH] IN BLOOD BY AUTOMATED COUNT: 13.1 % (ref 12.3–15.4)
GAS FLOW AIRWAY: 8 LPM
GLUCOSE SERPL-MCNC: 88 MG/DL (ref 65–99)
HCO3 BLDA-SCNC: 27.1 MMOL/L (ref 22–28)
HCT VFR BLD AUTO: 35.9 % (ref 37.5–51)
HEMODILUTION: NO
HGB BLD-MCNC: 11.7 G/DL (ref 13–17.7)
IMM GRANULOCYTES # BLD AUTO: 0.03 10*3/MM3 (ref 0–0.05)
IMM GRANULOCYTES NFR BLD AUTO: 0.4 % (ref 0–0.5)
LYMPHOCYTES # BLD AUTO: 1.08 10*3/MM3 (ref 0.7–3.1)
LYMPHOCYTES NFR BLD AUTO: 12.6 % (ref 19.6–45.3)
MCH RBC QN AUTO: 28.8 PG (ref 26.6–33)
MCHC RBC AUTO-ENTMCNC: 32.6 G/DL (ref 31.5–35.7)
MCV RBC AUTO: 88.4 FL (ref 79–97)
MODALITY: ABNORMAL
MONOCYTES # BLD AUTO: 0.75 10*3/MM3 (ref 0.1–0.9)
MONOCYTES NFR BLD AUTO: 8.8 % (ref 5–12)
NEUTROPHILS NFR BLD AUTO: 6.5 10*3/MM3 (ref 1.7–7)
NEUTROPHILS NFR BLD AUTO: 75.9 % (ref 42.7–76)
NRBC BLD AUTO-RTO: 0 /100 WBC (ref 0–0.2)
PCO2 BLDA: 34.9 MM HG (ref 35–45)
PH BLDA: 7.5 PH UNITS (ref 7.35–7.45)
PLATELET # BLD AUTO: 202 10*3/MM3 (ref 140–450)
PMV BLD AUTO: 11.3 FL (ref 6–12)
PO2 BLDA: 119.5 MM HG (ref 80–100)
POTASSIUM SERPL-SCNC: 3.8 MMOL/L (ref 3.5–5.2)
RBC # BLD AUTO: 4.06 10*6/MM3 (ref 4.14–5.8)
SAO2 % BLDCOA: 99 % (ref 92–98.5)
SODIUM SERPL-SCNC: 147 MMOL/L (ref 136–145)
TOTAL RATE: 19 BREATHS/MINUTE
WBC NRBC COR # BLD AUTO: 8.55 10*3/MM3 (ref 3.4–10.8)

## 2025-01-09 PROCEDURE — 25810000003 SODIUM CHLORIDE 0.9 % SOLUTION: Performed by: INTERNAL MEDICINE

## 2025-01-09 PROCEDURE — 25010000002 ENOXAPARIN PER 10 MG: Performed by: INTERNAL MEDICINE

## 2025-01-09 PROCEDURE — 99232 SBSQ HOSP IP/OBS MODERATE 35: CPT | Performed by: PHYSICIAN ASSISTANT

## 2025-01-09 PROCEDURE — 97530 THERAPEUTIC ACTIVITIES: CPT

## 2025-01-09 PROCEDURE — 80048 BASIC METABOLIC PNL TOTAL CA: CPT | Performed by: INTERNAL MEDICINE

## 2025-01-09 PROCEDURE — 99232 SBSQ HOSP IP/OBS MODERATE 35: CPT | Performed by: NURSE PRACTITIONER

## 2025-01-09 PROCEDURE — 85025 COMPLETE CBC W/AUTO DIFF WBC: CPT | Performed by: INTERNAL MEDICINE

## 2025-01-09 RX ORDER — METOPROLOL SUCCINATE 50 MG/1
50 TABLET, EXTENDED RELEASE ORAL DAILY
Status: DISCONTINUED | OUTPATIENT
Start: 2025-01-09 | End: 2025-01-10

## 2025-01-09 RX ORDER — ENOXAPARIN SODIUM 100 MG/ML
40 INJECTION SUBCUTANEOUS DAILY
Status: DISCONTINUED | OUTPATIENT
Start: 2025-01-09 | End: 2025-01-11

## 2025-01-09 RX ADMIN — OLANZAPINE 5 MG: 5 TABLET, FILM COATED ORAL at 17:32

## 2025-01-09 RX ADMIN — Medication 10 ML: at 20:06

## 2025-01-09 RX ADMIN — Medication 10 ML: at 09:46

## 2025-01-09 RX ADMIN — METOPROLOL TARTRATE 7.5 MG: 1 INJECTION, SOLUTION INTRAVENOUS at 17:32

## 2025-01-09 RX ADMIN — SODIUM CHLORIDE 500 ML: 9 INJECTION, SOLUTION INTRAVENOUS at 08:25

## 2025-01-09 RX ADMIN — SODIUM CHLORIDE 500 ML: 9 INJECTION, SOLUTION INTRAVENOUS at 20:08

## 2025-01-09 RX ADMIN — ENOXAPARIN SODIUM 40 MG: 100 INJECTION SUBCUTANEOUS at 09:46

## 2025-01-09 RX ADMIN — METOPROLOL TARTRATE 7.5 MG: 1 INJECTION, SOLUTION INTRAVENOUS at 23:22

## 2025-01-09 NOTE — PLAN OF CARE
Problem: Syncope  Goal: Absence of Syncopal Symptoms  Outcome: Progressing  Intervention: Manage Effect of Syncopal Symptoms  Recent Flowsheet Documentation  Taken 1/9/2025 0600 by Bessy Rivera RN  Safety Promotion/Fall Prevention: safety round/check completed  Taken 1/9/2025 0400 by Bessy Rivera RN  Safety Promotion/Fall Prevention: safety round/check completed  Taken 1/9/2025 0200 by Bessy Rivera RN  Safety Promotion/Fall Prevention: safety round/check completed  Taken 1/9/2025 0000 by Bessy Rivera RN  Safety Promotion/Fall Prevention:   safety round/check completed   room organization consistent   activity supervised   assistive device/personal items within reach   clutter free environment maintained   fall prevention program maintained  Taken 1/8/2025 2200 by Bessy Rivera RN  Safety Promotion/Fall Prevention: safety round/check completed  Taken 1/8/2025 2013 by Bessy Rivera RN  Safety Promotion/Fall Prevention:   activity supervised   assistive device/personal items within reach   clutter free environment maintained   fall prevention program maintained   room organization consistent   safety round/check completed  Goal: Absence of Syncopal Symptoms  Outcome: Progressing  Intervention: Manage Effect of Syncopal Symptoms  Recent Flowsheet Documentation  Taken 1/9/2025 0600 by Bessy Rivera RN  Safety Promotion/Fall Prevention: safety round/check completed  Taken 1/9/2025 0400 by Bessy Rivera RN  Safety Promotion/Fall Prevention: safety round/check completed  Taken 1/9/2025 0200 by Bessy Rivera RN  Safety Promotion/Fall Prevention: safety round/check completed  Taken 1/9/2025 0000 by Bessy Rivera RN  Safety Promotion/Fall Prevention:   safety round/check completed   room organization consistent   activity supervised   assistive device/personal items within reach   clutter free environment maintained   fall prevention program maintained  Taken 1/8/2025 2200 by Bessy Rivera RN  Safety Promotion/Fall  Prevention: safety round/check completed  Taken 1/8/2025 2013 by Bessy Rivera RN  Safety Promotion/Fall Prevention:   activity supervised   assistive device/personal items within reach   clutter free environment maintained   fall prevention program maintained   room organization consistent   safety round/check completed     Problem: Adult Inpatient Plan of Care  Goal: Plan of Care Review  Outcome: Progressing  Flowsheets (Taken 1/9/2025 0650)  Progress: no change  Outcome Evaluation: Pt confused and restless. No acute change in condition noted on assessment. Plan of care ongoing. BL wrist Restraints in place, assessments and documentation completed.  Plan of Care Reviewed With:   patient   child  Goal: Patient-Specific Goal (Individualized)  Outcome: Progressing  Goal: Absence of Hospital-Acquired Illness or Injury  Outcome: Progressing  Intervention: Identify and Manage Fall Risk  Recent Flowsheet Documentation  Taken 1/9/2025 0600 by Bessy Rivera RN  Safety Promotion/Fall Prevention: safety round/check completed  Taken 1/9/2025 0400 by Bessy Rivera RN  Safety Promotion/Fall Prevention: safety round/check completed  Taken 1/9/2025 0200 by Bessy Rivera RN  Safety Promotion/Fall Prevention: safety round/check completed  Taken 1/9/2025 0000 by Bessy Rivera RN  Safety Promotion/Fall Prevention:   safety round/check completed   room organization consistent   activity supervised   assistive device/personal items within reach   clutter free environment maintained   fall prevention program maintained  Taken 1/8/2025 2200 by Bessy Rivera RN  Safety Promotion/Fall Prevention: safety round/check completed  Taken 1/8/2025 2013 by Bessy Rivera RN  Safety Promotion/Fall Prevention:   activity supervised   assistive device/personal items within reach   clutter free environment maintained   fall prevention program maintained   room organization consistent   safety round/check completed  Intervention: Prevent Skin  Injury  Recent Flowsheet Documentation  Taken 1/9/2025 0600 by Bessy Rivera RN  Body Position:   supine   weight shifting  Taken 1/9/2025 0400 by Bessy Rivera RN  Body Position:   turned   left   weight shifting  Taken 1/9/2025 0200 by Bessy Rivera RN  Body Position:   turned   right  Taken 1/9/2025 0000 by Bessy Rivera RN  Body Position:   supine   weight shifting  Skin Protection: incontinence pads utilized  Taken 1/8/2025 2200 by Bessy Rivera RN  Body Position:   turned   left  Taken 1/8/2025 2013 by Bessy Rivera RN  Body Position:   turned   right  Skin Protection: incontinence pads utilized  Intervention: Prevent Infection  Recent Flowsheet Documentation  Taken 1/9/2025 0000 by Bessy Rivera RN  Infection Prevention:   environmental surveillance performed   rest/sleep promoted   single patient room provided  Taken 1/8/2025 2013 by Bessy Rivera RN  Infection Prevention:   environmental surveillance performed   rest/sleep promoted   single patient room provided  Goal: Optimal Comfort and Wellbeing  Outcome: Progressing  Intervention: Provide Person-Centered Care  Recent Flowsheet Documentation  Taken 1/9/2025 0000 by Bessy Rivera RN  Trust Relationship/Rapport:   care explained   choices provided  Taken 1/8/2025 2013 by Bessy Rivera RN  Trust Relationship/Rapport:   care explained   choices provided   questions answered   reassurance provided   thoughts/feelings acknowledged  Goal: Readiness for Transition of Care  Outcome: Progressing     Problem: Comorbidity Management  Goal: Blood Pressure in Desired Range  Outcome: Progressing  Intervention: Maintain Blood Pressure Management  Recent Flowsheet Documentation  Taken 1/9/2025 0600 by Bessy Rivera RN  Medication Review/Management: medications reviewed  Taken 1/9/2025 0400 by Bessy Rivera RN  Medication Review/Management: medications reviewed  Taken 1/9/2025 0200 by Bessy Rivera RN  Medication Review/Management: medications reviewed  Taken 1/9/2025  0000 by Bessy Rivera RN  Medication Review/Management: medications reviewed  Taken 1/8/2025 2200 by Bessy Rivera RN  Medication Review/Management: medications reviewed  Taken 1/8/2025 2013 by Bessy Rivera RN  Medication Review/Management: medications reviewed     Problem: Restraint, Nonviolent  Goal: Absence of Harm or Injury  Outcome: Progressing  Intervention: Implement Least Restrictive Safety Strategies  Recent Flowsheet Documentation  Taken 1/9/2025 0600 by Bessy Rivera RN  Medical Device Protection:   IV pole/bag removed from visual field   tubing secured  Diversional Activities: television  Taken 1/9/2025 0400 by Bessy Rivera RN  Medical Device Protection: tubing secured  Diversional Activities: television  Taken 1/9/2025 0200 by Bessy Rivera RN  Medical Device Protection:   tubing secured   IV pole/bag removed from visual field  Diversional Activities: television  Taken 1/9/2025 0000 by Bessy Rivera RN  Medical Device Protection: tubing secured  Diversional Activities: television  Taken 1/8/2025 2200 by Bessy Rivera RN  Medical Device Protection:   tubing secured   IV pole/bag removed from visual field  Diversional Activities: television  Taken 1/8/2025 2013 by Bessy Rivera RN  Diversional Activities: television  Taken 1/8/2025 2000 by Bessy Rivera RN  Medical Device Protection: tubing secured  Diversional Activities: television  Intervention: Protect Dignity, Rights and Personal Wellbeing  Recent Flowsheet Documentation  Taken 1/9/2025 0000 by Bessy Rivera RN  Trust Relationship/Rapport:   care explained   choices provided  Taken 1/8/2025 2013 by Bessy Rivera RN  Trust Relationship/Rapport:   care explained   choices provided   questions answered   reassurance provided   thoughts/feelings acknowledged  Intervention: Protect Skin and Joint Integrity  Recent Flowsheet Documentation  Taken 1/9/2025 0600 by Bessy Rivera RN  Body Position:   supine   weight shifting  Taken 1/9/2025 0400 by Nicole  KARINE Doherty  Body Position:   turned   left   weight shifting  Taken 1/9/2025 0200 by Bessy Rivera RN  Body Position:   turned   right  Taken 1/9/2025 0000 by Bessy Rivera RN  Body Position:   supine   weight shifting  Skin Protection: incontinence pads utilized  Range of Motion: ROM (range of motion) performed  Taken 1/8/2025 2200 by Bessy Rivera RN  Body Position:   turned   left  Taken 1/8/2025 2013 by Bessy Rivera RN  Body Position:   turned   right  Skin Protection: incontinence pads utilized  Range of Motion: ROM (range of motion) performed     Problem: Fall Injury Risk  Goal: Absence of Fall and Fall-Related Injury  Outcome: Progressing  Intervention: Identify and Manage Contributors  Recent Flowsheet Documentation  Taken 1/9/2025 0600 by Bessy Rivera RN  Medication Review/Management: medications reviewed  Taken 1/9/2025 0400 by Bessy Rivera RN  Medication Review/Management: medications reviewed  Taken 1/9/2025 0200 by Bessy Rivera RN  Medication Review/Management: medications reviewed  Taken 1/9/2025 0000 by Bessy Rivera RN  Medication Review/Management: medications reviewed  Taken 1/8/2025 2200 by Bessy Rivera RN  Medication Review/Management: medications reviewed  Taken 1/8/2025 2013 by Bessy Rivera RN  Medication Review/Management: medications reviewed  Intervention: Promote Injury-Free Environment  Recent Flowsheet Documentation  Taken 1/9/2025 0600 by Bessy Rivera RN  Safety Promotion/Fall Prevention: safety round/check completed  Taken 1/9/2025 0400 by Bessy Rivera RN  Safety Promotion/Fall Prevention: safety round/check completed  Taken 1/9/2025 0200 by Bessy Rivera RN  Safety Promotion/Fall Prevention: safety round/check completed  Taken 1/9/2025 0000 by Bessy Rivera RN  Safety Promotion/Fall Prevention:   safety round/check completed   room organization consistent   activity supervised   assistive device/personal items within reach   clutter free environment maintained   fall prevention  program maintained  Taken 1/8/2025 2200 by Bessy Rivera RN  Safety Promotion/Fall Prevention: safety round/check completed  Taken 1/8/2025 2013 by Bessy Rivera RN  Safety Promotion/Fall Prevention:   activity supervised   assistive device/personal items within reach   clutter free environment maintained   fall prevention program maintained   room organization consistent   safety round/check completed     Problem: Dysrhythmia  Goal: Normalized Cardiac Rhythm  Outcome: Progressing     Problem: VTE (Venous Thromboembolism)  Goal: Tissue Perfusion  Outcome: Progressing  Goal: Optimal Right Ventricular Function  Outcome: Progressing     Problem: Heart Failure  Goal: Optimal Coping  Outcome: Progressing  Intervention: Support Psychosocial Response  Recent Flowsheet Documentation  Taken 1/8/2025 2013 by Bessy Rivera RN  Family/Support System Care: caregiver stress acknowledged  Goal: Optimal Cardiac Output and Blood Flow  Outcome: Progressing  Goal: Stable Heart Rate and Rhythm  Outcome: Progressing  Goal: Fluid and Electrolyte Balance  Outcome: Progressing  Goal: Optimal Functional Ability  Outcome: Progressing  Goal: Improved Oral Intake  Outcome: Progressing  Goal: Effective Oxygenation and Ventilation  Outcome: Progressing  Intervention: Promote Airway Secretion Clearance  Recent Flowsheet Documentation  Taken 1/9/2025 0000 by Bessy Rivera RN  Cough And Deep Breathing: done independently per patient  Taken 1/8/2025 2013 by Bessy Rivera RN  Cough And Deep Breathing: done independently per patient  Intervention: Optimize Oxygenation and Ventilation  Recent Flowsheet Documentation  Taken 1/9/2025 0600 by Bessy Rivera RN  Head of Bed (HOB) Positioning: HOB at 20-30 degrees  Taken 1/9/2025 0400 by Bessy Rivera RN  Head of Bed (HOB) Positioning: HOB at 20-30 degrees  Taken 1/9/2025 0200 by Bessy Rivera RN  Head of Bed (HOB) Positioning: HOB at 20-30 degrees  Taken 1/9/2025 0000 by Bessy Rivera RN  Head of Bed (HOB)  Positioning: HOB at 20-30 degrees  Taken 1/8/2025 2200 by Bessy Rivera RN  Head of Bed (HOB) Positioning: HOB at 20-30 degrees  Taken 1/8/2025 2013 by Bessy Rivera RN  Head of Bed (HOB) Positioning: HOB at 20-30 degrees  Goal: Effective Breathing Pattern During Sleep  Outcome: Progressing  Intervention: Monitor and Manage Obstructive Sleep Apnea  Recent Flowsheet Documentation  Taken 1/9/2025 0600 by Bessy Rivera RN  Medication Review/Management: medications reviewed  Taken 1/9/2025 0400 by Bessy Rivera RN  Medication Review/Management: medications reviewed  Taken 1/9/2025 0200 by Bessy Rivera RN  Medication Review/Management: medications reviewed  Taken 1/9/2025 0000 by Bessy Rivera RN  Medication Review/Management: medications reviewed  Taken 1/8/2025 2200 by Bessy Rivera RN  Medication Review/Management: medications reviewed  Taken 1/8/2025 2013 by Bessy Rivera RN  Medication Review/Management: medications reviewed     Problem: Delirium  Goal: Optimal Coping  Outcome: Progressing  Intervention: Optimize Psychosocial Adjustment to Delirium  Recent Flowsheet Documentation  Taken 1/8/2025 2013 by Bessy Rivera RN  Family/Support System Care: caregiver stress acknowledged  Goal: Improved Behavioral Control  Outcome: Progressing  Intervention: Minimize Safety Risk  Recent Flowsheet Documentation  Taken 1/9/2025 0600 by Bessy Rivera RN  Enhanced Safety Measures: bed alarm set  Taken 1/9/2025 0400 by Bessy Rivera RN  Enhanced Safety Measures: bed alarm set  Taken 1/9/2025 0200 by Bessy Rivera RN  Enhanced Safety Measures: bed alarm set  Taken 1/9/2025 0000 by Bessy Rivera RN  Trust Relationship/Rapport:   care explained   choices provided  Enhanced Safety Measures: bed alarm set  Taken 1/8/2025 2200 by Bessy Rivera RN  Enhanced Safety Measures: bed alarm set  Taken 1/8/2025 2013 by Bessy Rivera RN  Trust Relationship/Rapport:   care explained   choices provided   questions answered   reassurance provided    thoughts/feelings acknowledged  Enhanced Safety Measures: bed alarm set  Goal: Improved Attention and Thought Clarity  Outcome: Progressing  Goal: Improved Sleep  Outcome: Progressing     Problem: Skin Injury Risk Increased  Goal: Skin Health and Integrity  Outcome: Progressing  Intervention: Optimize Skin Protection  Recent Flowsheet Documentation  Taken 1/9/2025 0600 by Bessy Rivera RN  Head of Bed (HOB) Positioning: HOB at 20-30 degrees  Taken 1/9/2025 0400 by Bessy Rivera RN  Head of Bed (HOB) Positioning: HOB at 20-30 degrees  Taken 1/9/2025 0200 by Bessy Rivera RN  Head of Bed (HOB) Positioning: HOB at 20-30 degrees  Taken 1/9/2025 0000 by Bessy Rivera RN  Pressure Reduction Techniques:   frequent weight shift encouraged   weight shift assistance provided  Head of Bed (HOB) Positioning: HOB at 20-30 degrees  Pressure Reduction Devices:   pressure-redistributing mattress utilized   alternating pressure pump (JUAN)  Skin Protection: incontinence pads utilized  Taken 1/8/2025 2200 by Bessy Rivera RN  Head of Bed (HOB) Positioning: HOB at 20-30 degrees  Taken 1/8/2025 2013 by Bessy Rivera RN  Pressure Reduction Techniques: frequent weight shift encouraged  Head of Bed (HOB) Positioning: HOB at 20-30 degrees  Pressure Reduction Devices:   pressure-redistributing mattress utilized   alternating pressure pump (JUAN)  Skin Protection: incontinence pads utilized   Goal Outcome Evaluation:  Plan of Care Reviewed With: patient, child        Progress: no change  Outcome Evaluation: Pt confused and restless. No acute change in condition noted on assessment. Plan of care ongoing. BL wrist Restraints in place, assessments and documentation completed.

## 2025-01-09 NOTE — PROGRESS NOTES
[facility]  [phone]  Dr. Giancarlo Saucedo    Subjective:  LOS: 10    Chief Complaint: Hypoxia    Patient is sitting in a chair when examined.  He is very confused.  His wife and son at bedside.  Saturation was 100% even without oxygen nasal cannula at the time of evaluation.    Objective   Vital Signs past 24hrs  Temp range: Temp (24hrs), Av.7 °F (36.5 °C), Min:97.5 °F (36.4 °C), Max:97.8 °F (36.6 °C)    BP range: BP: (128-160)/() 128/69  Pulse range: Heart Rate:  [] 97  Resp rate range: Resp:  [20-22] 20  Device (Oxygen Therapy): nasal cannulaFlow (L/min) (Oxygen Therapy):  [2-4] 2  Oxygen range:SpO2:  [81 %-100 %] 100 %   Mechanical Ventilator:     Physical Exam  Eyes:      Pupils: Pupils are equal, round, and reactive to light.   Cardiovascular:      Rate and Rhythm: Normal rate and regular rhythm.      Heart sounds: No murmur heard.  Pulmonary:      Effort: Pulmonary effort is normal.      Breath sounds: Examination of the right-lower field reveals rales.   Abdominal:      General: Bowel sounds are normal.      Palpations: Abdomen is soft. There is no mass.      Tenderness: There is no abdominal tenderness.   Musculoskeletal:         General: No swelling.   Neurological:      Mental Status: He is alert.       Results Review:    I have reviewed the laboratory and imaging data since the last note by [facility] physician.  My annotations are noted in assessment and plan.      Result Review:  I have personally reviewed the results from last note by [facility] physician to 2025 11:10 EST and agree with these findings:  [x]  Laboratory list / accordion  [x]  Microbiology  [x]  Radiology  []  EKG/Telemetry   []  Cardiology/Vascular   []  Pathology  []  Old records  []  Other:      Medication Review:  I have reviewed the current MAR.  My annotations are noted in assessment and plan.    atorvastatin, 10 mg, Oral, Daily  digoxin, 125 mcg, Oral, Daily  enoxaparin, 40 mg, Subcutaneous,  Daily  metoprolol succinate XL, 50 mg, Oral, Daily  OLANZapine, 5 mg, Oral, Nightly  OLANZapine, 5 mg, Oral, Daily With Dinner  sodium chloride, 500 mL, Intravenous, BID  sodium chloride, 10 mL, Intravenous, Q12H  tamsulosin, 0.4 mg, Oral, Daily  vitamin B-12, 1,000 mcg, Oral, Daily           Lines, Drains & Airways       Active LDAs       Name Placement date Placement time Site Days    Peripheral IV 12/30/24 0327 Anterior;Right;Upper Arm 12/30/24  0327  Arm  10                  Isolation status: No active isolations    Dietary Orders (From admission, onward)       Start     Ordered    01/03/25 1200  Dietary Nutrition Supplements Magic Cup  Daily With Breakfast, Lunch & Dinner      Comments: Chocolate   Question:  Select Supplement:  Answer:  Magic Cup    01/03/25 1146    01/03/25 1200  Dietary Nutrition Supplements Boost Plus (Ensure Enlive, Ensure Plus); chocolate  Daily With Breakfast, Lunch & Dinner      Question Answer Comment   Select Supplement: Boost Plus (Ensure Enlive, Ensure Plus)    Flavor: chocolate        01/03/25 1146    12/31/24 1132  Diet: Cardiac; Healthy Heart (2-3 Na+); Fluid Consistency: Thin (IDDSI 0)  Diet Effective Now        References:    Diet Order Definitions   Question Answer Comment   Diets: Cardiac    Cardiac Diet: Healthy Heart (2-3 Na+)    Fluid Consistency: Thin (IDDSI 0)        12/31/24 1132                    PCCM Problems  Hypoxia  Mild infiltrate vs atelectasis in RLL  Afib with RVR  Rectus sheath hematoma with AC on hold  AMS with concern for Alzheimer's dementia  STEPHENIE  Hypernatremia      THESE ARE NEW MEDICAL PROBLEMS TO ME.    Plan of Treatment    Hypoxia likely artifact.  Reviewed notes by pulmonologist last night.  Agree with findings.  Also mild infiltrates versus atelectasis in the right lower lobe on last CT abdomen.  May benefit from evaluation by speech therapy as I agree with Dr. Vivar that he is at risk of aspiration.  This may necessitate antibiotics in the future  but currently not needed.  His white count is normal.  No evidence of sleep Titus or respiratory distress at this time.    Giancarlo Saucedo MD  01/09/25  11:10 EST      Part of this note may be an electronic transcription/translation of spoken language to printed text using the Dragon Dictation System.

## 2025-01-09 NOTE — PROGRESS NOTES
RN called reporting increased HR.   His respiratory status has declined and pulmonary is reportedly at bedside. Additional IV metoprolol ordered. HR may improve once pt is less agitated and respiratory status improves.

## 2025-01-09 NOTE — THERAPY PROGRESS REPORT/RE-CERT
Patient Name: Mark Aguirre Jr.  : 1945    MRN: [id number]                              Today's Date: 2025       Admit Date: 2024    Visit Dx:     ICD-10-CM ICD-9-CM   1. Syncope, unspecified syncope type  R55 780.2   2. Laceration of right ear, initial encounter  S01.311A 872.8   3. Prolonged Q-T interval on ECG  R94.31 794.31   4. Atrial fibrillation, unspecified type  I48.91 427.31     Patient Active Problem List   Diagnosis    Atrial fibrillation    Bifascicular block    STEPHENIE on auto CPAP    Family history of colon cancer    History of colon polyps    Essential hypertension    Hip pain, right    OA (osteoarthritis) of hip    Acute renal failure    Brief psychotic disorder    Family history of colonic polyps    Nonrheumatic aortic valve stenosis    PVC (premature ventricular contraction)    Syncope and collapse    History of adenomatous polyp of colon    Persistent atrial fibrillation    Hypoxemia associated with sleep    Syncope    Acute delirium     Past Medical History:   Diagnosis Date    Acute kidney failure     POST-OP TOTAL HIP 2020    Anticoagulated     PRADAXA FOR A FIB TO HELD 3 DAYS PRIOR    Arthritis     OSTEO. RIGHT HIP    Atrial flutter     Bifascicular block     Cataract     LEFT AND RIGHT    Depression     History of colon polyps     Hypertension     Hypoxemia associated with sleep     Insomnia     Knee pain     STEPHENIE on CPAP 2010    Overnight polysomnogram.  Weight 163 pounds.  Severe STEPHENIE with AHI of 30.9 events per hour.  Low oxygen saturation 72% and sleep-related hypoxia present for 30 minutes    PAF (paroxysmal atrial fibrillation)     Right hip pain     Slow to wake up after anesthesia      Past Surgical History:   Procedure Laterality Date    CARDIAC ABLATION      CARDIAC CATHETERIZATION      COLONOSCOPY N/A 2018    Procedure: COLONOSCOPY INTO CECUM WITH COLD BX POLYPECTOMY ;  Surgeon: Ross Stearns MD;  Location: [facility] ENDOSCOPY;  Service:     COLONOSCOPY N/A  2/3/2021    Procedure: COLONOSCOPY TOCECUM WITH COLD BX POLYPECTOMY AND HOT SNARE POLYPECTOMY;  Surgeon: Ross Stearns MD;  Location: [facility] ENDOSCOPY;  Service: General;  Laterality: N/A;  PERSONAL HX OF POLYPS, FAMILY HX OF COLON CANCER  --POLYPS (X3), DIVERTICULOSIS    COLONOSCOPY N/A 3/20/2024    Procedure: COLONOSCOPY TO CECUM WITH COLD BX POLYPECTOMIES;  Surgeon: Ross Stearns MD;  Location: [facility] ENDOSCOPY;  Service: General;  Laterality: N/A;  HX POLYPS/POLYPS (3)    HERNIA REPAIR      INGUINAL HERNIA? SIDE    TOTAL HIP ARTHROPLASTY Right 6/5/2020    Procedure: TOTAL HIP ARTHROPLASTY;  Surgeon: Travis Hamlin MD;  Location: [facility] MAIN OR;  Service: Orthopedics;  Laterality: Right;      General Information       Row Name 01/09/25 1152          Physical Therapy Time and Intention    Document Type therapy note (daily note);progress note/recertification  -ST     Mode of Treatment individual therapy;physical therapy  -       Row Name 01/09/25 1152          General Information    Patient Profile Reviewed yes  -ST     Existing Precautions/Restrictions fall;oxygen therapy device and L/min  -ST       Row Name 01/09/25 1152          Cognition    Orientation Status (Cognition) oriented to;person  -ST       Row Name 01/09/25 1152          Safety Issues/Impairments Affecting Functional Mobility    Safety Issues Affecting Function (Mobility) ability to follow commands;safety precaution awareness;safety precautions follow-through/compliance;sequencing abilities  -ST     Impairments Affecting Function (Mobility) balance;endurance/activity tolerance  -ST     Comment, Safety Issues/Impairments (Mobility) nonskid socks, gait belt donned  -ST               User Key  (r) = Recorded By, (t) = Taken By, (c) = Cosigned By      Initials Name Provider Type    ST Demi Otero PT Physical Therapist                   Mobility       Row Name 01/09/25 1153          Bed Mobility    Supine-Sit [place] (Bed Mobility)  minimum assist (75% patient effort);verbal cues;nonverbal cues (demo/gesture)  -ST     Assistive Device (Bed Mobility) bed rails;head of bed elevated  -ST     Comment, (Bed Mobility) increased time for sequencing  -ST       Row Name 01/09/25 1153          Bed-Chair Transfer    Bed-Chair [place] (Transfers) minimum assist (75% patient effort);verbal cues;nonverbal cues (demo/gesture)  -ST     Comment, (Bed-Chair Transfer) HHA, takes a few steps to reach recliner  -ST       Row Name 01/09/25 1153          Sit-Stand Transfer    Sit-Stand [place] (Transfers) verbal cues;nonverbal cues (demo/gesture);minimum assist (75% patient effort)  -ST     Assistive Device (Sit-Stand Transfers) --  HHA  -ST       Row Name 01/09/25 1153          Gait/Stairs (Locomotion)    [place] Level (Gait) not tested  -ST     Comment, (Gait/Stairs) HR to 130s with activity , did not progress to ambulation today  -ST               User Key  (r) = Recorded By, (t) = Taken By, (c) = Cosigned By      Initials Name Provider Type    ST Demi Otero, PT Physical Therapist                   Obj/Interventions       Row Name 01/09/25 1154          Balance    Comment, Balance SBA for unsupported sitting. min A for standing balance  -ST               User Key  (r) = Recorded By, (t) = Taken By, (c) = Cosigned By      Initials Name Provider Type    ST Demi Otero, PT Physical Therapist                   Goals/Plan       Row Name 01/09/25 1200          Bed Mobility Goal 1 (PT)    Activity/Assistive Device (Bed Mobility Goal 1, PT) bed mobility activities, all  -ST     [place] Level/Cues Needed (Bed Mobility Goal 1, PT) modified independence  -ST     Time Frame (Bed Mobility Goal 1, PT) 2 weeks  -ST     Progress/Outcomes (Bed Mobility Goal 1, PT) new goal  -ST       Row Name 01/09/25 1200          Transfer Goal 1 (PT)    Activity/Assistive Device (Transfer Goal 1, PT) sit-to-stand/stand-to-sit;bed-to-chair/chair-to-bed  -ST      [place] Level/Cues Needed (Transfer Goal 1, PT) standby assist  -ST     Time Frame (Transfer Goal 1, PT) 2 weeks  -ST     Progress/Outcome (Transfer Goal 1, PT) goal revised this date  -ST       Row Name 01/09/25 1200          Gait Training Goal 1 (PT)    Activity/Assistive Device (Gait Training Goal 1, PT) gait (walking locomotion)  -ST     [place] Level (Gait Training Goal 1, PT) modified independence  -ST     Distance (Gait Training Goal 1, PT) 250'  -ST     Time Frame (Gait Training Goal 1, PT) 2 weeks  -ST     Progress/Outcome (Gait Training Goal 1, PT) goal revised this date  -ST               User Key  (r) = Recorded By, (t) = Taken By, (c) = Cosigned By      Initials Name Provider Type    Demi Jara, PT Physical Therapist                   Clinical Impression       Row Name 01/09/25 1157          Pain    Pretreatment Pain Rating 0/10 - no pain  -ST     Posttreatment Pain Rating 0/10 - no pain  -ST       Row Name 01/09/25 1157          Plan of Care Review    Plan of Care Reviewed With patient;family  -ST     Progress declining  -ST     Outcome Evaluation Pt seen for PT this AM, noted hold over the past few days d/t uncontrolled Afib with high RHR. noted resting in 90s/low 100s upon arrival today. Min A reach EOB stand and turn to sit in recliner. HR to 130s with activity but back to low 100s with rest. Educated family on slow progression of activity while monitoring HR in addition to promoting activity to decrease delirium. Pt in recliner out of restraints for lunch with family - RN aware and okay with this. Goals updated d/t change in status. Recommend SNU at this time pending progress and clinical course.  -ST       Row Name 01/09/25 1156          Therapy Assessment/Plan (PT)    Rehab Potential (PT) good  -ST     Criteria for Skilled Interventions Met (PT) yes  -ST     Therapy Frequency (PT) 5 times/wk  -ST       Row Name 01/09/25 1157          Positioning and Restraints     Pre-Treatment Position in bed  -ST     Post Treatment Position chair  -ST     In Chair notified nsg;sitting;call light within reach;encouraged to call for assist;exit alarm on;with family/caregiver  -ST     Restraints released:;soft limb  -ST               User Key  (r) = Recorded By, (t) = Taken By, (c) = Cosigned By      Initials Name Provider Type    Demi Jara PT Physical Therapist                   Outcome Measures       Row Name 01/09/25 1200          How much help from another person do you currently need...    Turning from your back to your side while in flat bed without using bedrails? 3  -ST     Moving from lying on back to sitting on the side of a flat bed without bedrails? 3  -ST     Moving to and from a bed to a chair (including a wheelchair)? 3  -ST     Standing up from a chair using your arms (e.g., wheelchair, bedside chair)? 3  -ST     Climbing 3-5 steps with a railing? 2  -ST     To walk in hospital room? 2  -ST     AM-PAC 6 Clicks Score (PT) 16  -ST     Highest Level of Mobility Goal 5 --> Static standing  -ST               User Key  (r) = Recorded By, (t) = Taken By, (c) = Cosigned By      Initials Name Provider Type    Demi Jara PT Physical Therapist                                 Physical Therapy Education       Title: PT OT SLP Therapies (In Progress)       Topic: Physical Therapy (In Progress)       Point: Mobility training (In Progress)       Learning Progress Summary            Patient Acceptance, E,TB, VU,NR by  at 1/9/2025 1200    Acceptance, E,TB,D, VU,NR by  at 1/4/2025 1504    Acceptance, E, NR by  at 12/31/2024 1525   Family Acceptance, E, NR by  at 12/31/2024 1525                      Point: Home exercise program (In Progress)       Learning Progress Summary            Patient Acceptance, E, NR by  at 12/31/2024 1525   Family Acceptance, E, NR by  at 12/31/2024 1525                      Point: Body mechanics (In Progress)       Learning  Progress Summary            Patient Acceptance, E,TB, VU,NR by  at 1/9/2025 1200    Acceptance, E,TB,D, VU,NR by  at 1/4/2025 1504    Acceptance, E, NR by  at 12/31/2024 1525   Family Acceptance, E, NR by  at 12/31/2024 1525                      Point: Precautions (In Progress)       Learning Progress Summary            Patient Acceptance, E,TB,D, VU,NR by  at 1/4/2025 1504    Acceptance, E, NR by  at 12/31/2024 1525   Family Acceptance, E, NR by  at 12/31/2024 1525                                      User Key       Initials Effective Dates Name Provider Type Discipline     06/16/21 -  Sue Padilla, PT Physical Therapist PT     06/16/21 -  Sima Anne, PT Physical Therapist PT     09/22/22 -  Demi Otero, PT Physical Therapist PT                  PT Recommendation and Plan     Progress: declining  Outcome Evaluation: Pt seen for PT this AM, noted hold over the past few days d/t uncontrolled Afib with high RHR. noted resting in 90s/low 100s upon arrival today. Min A reach EOB stand and turn to sit in recliner. HR to 130s with activity but back to low 100s with rest. Educated family on slow progression of activity while monitoring HR in addition to promoting activity to decrease delirium. Pt in recliner out of restraints for lunch with family - RN aware and okay with this. Goals updated d/t change in status. Recommend SNU at this time pending progress and clinical course.     Time Calculation:         PT Charges       Row Name 01/09/25 1201             Time Calculation    Start Time 1107  -ST      Stop Time 1132  -ST      Time Calculation (min) 25 min  -ST      PT Received On 01/09/25  -ST      PT - Next Appointment 01/10/25  -ST      PT Goal Re-Cert Due Date 01/23/25  -ST         Time Calculation- PT    Total Timed Code Minutes- PT 25 minute(s)  -ST         Timed Charges    92460 - PT Therapeutic Activity Minutes 25  -ST         Total Minutes    Timed Charges Total Minutes 25  -ST        Total Minutes 25  -ST                User Key  (r) = Recorded By, (t) = Taken By, (c) = Cosigned By      Initials Name Provider Type    Demi Jara, PT Physical Therapist                  Therapy Charges for Today       Code Description Service Date Service Provider Modifiers Qty    [id number] HC PT THERAPEUTIC ACT EA 15 MIN 1/9/2025 Demi Otero, PT GP 2            PT G-Codes  Outcome Measure Options: AM-PAC 6 Clicks Basic Mobility (PT)  AM-PAC 6 Clicks Score (PT): 16  PT Discharge Summary  Anticipated Discharge Disposition (PT): skilled nursing facility    Demi Otero PT  1/9/2025

## 2025-01-09 NOTE — PLAN OF CARE
Goal Outcome Evaluation:  Plan of Care Reviewed With: patient, family        Progress: declining  Outcome Evaluation: Pt seen for PT this AM, noted hold over the past few days d/t uncontrolled Afib with high RHR. noted resting in 90s/low 100s upon arrival today. Min A reach EOB stand and turn to sit in recliner. HR to 130s with activity but back to low 100s with rest. Educated family on slow progression of activity while monitoring HR in addition to promoting activity to decrease delirium. Pt in recliner out of restraints for lunch with family - RN aware and okay with this. Goals updated d/t change in status. Recommend SNU at this time pending progress and clinical course.    Anticipated Discharge Disposition (PT): skilled nursing facility

## 2025-01-09 NOTE — PROGRESS NOTES
Subjective: Wife reported some periods of lucidity during the afternoon yesterday but then again quite confused in the evening.  Still not eating or drinking much.  Oxygen sats dropped last night and was on 8 L.  Pulmonary was consulted stat.  Chest x-ray was fairly unrevealing.  ABG showed PaO2 was in a reasonable range and it was felt the sat monitor was not accurate due to being on his toe and because of his rapid A-fib.  He is currently back down to 2 L and not had any respiratory distress.  Patient did have LP done yesterday and final studies are pending.  Daughter is currently at bedside.    Objective:  Vitals:    01/08/25 1938 01/08/25 1939 01/08/25 2340 01/09/25 0452   BP: (!) 143/112 (!) 134/103 148/75 160/71   BP Location:  Left arm Left arm Left arm   Patient Position:  Lying Lying Lying   Pulse: 117 (!) 136 110 115   Resp:  20 20 20   Temp:  97.8 °F (36.6 °C) 97.5 °F (36.4 °C)    TempSrc:  Oral Oral    SpO2: (!) 81% 92% 97% 100%   Weight:       Height:         General: Easily aroused, did follow directions for me as far as taking deep breaths,  Heart: Irregularly irregular  Lungs: No rales or rhonchi noted  Abdomen: Soft, he does grimace to palpation in the right lower quadrant at the site of known rectus hematoma  Extremities: No edema    Recent Results (from the past 24 hours)   Glucose, CSF - Cerebrospinal Fluid, Lumbar Puncture    Collection Time: 01/08/25  3:24 PM    Specimen: Lumbar Puncture; Cerebrospinal Fluid   Result Value Ref Range    Glucose, CSF 63 40 - 70 mg/dL   Protein, CSF - Cerebrospinal Fluid, Lumbar Puncture    Collection Time: 01/08/25  3:24 PM    Specimen: Lumbar Puncture; Cerebrospinal Fluid   Result Value Ref Range    Protein, Total (CSF) 54.1 (H) 15.0 - 45.0 mg/dL   CSF Tube - Cerebrospinal Fluid, Lumbar Puncture    Collection Time: 01/08/25  3:24 PM    Specimen: Lumbar Puncture; Cerebrospinal Fluid   Result Value Ref Range    Extra Tube hold    Culture, CSF - Cerebrospinal  Fluid, Lumbar Puncture    Collection Time: 01/08/25  3:24 PM    Specimen: Lumbar Puncture; Cerebrospinal Fluid   Result Value Ref Range    CSF Culture No growth     Gram Stain No WBCs or organisms seen    Meningitis / Encephalitis Panel, PCR - Cerebrospinal Fluid, Lumbar Puncture    Collection Time: 01/08/25  3:24 PM    Specimen: Lumbar Puncture; Cerebrospinal Fluid   Result Value Ref Range    ESCHERICHIA COLI K1, PCR Not Detected Not Detected    HAEMOPHILUS INFLUENZAE, PCR Not Detected Not Detected    LISTERIA MONOCYTOGENES, PCR Not Detected Not Detected    NEISSERIA MENINGITIDIS, PCR Not Detected Not Detected    STREPTOCOCCUS AGALACTIAE, PCR Not Detected Not Detected    STREPTOCOCCUS PNEUMONIAE, PCR Not Detected Not Detected    CYTOMEGALOVIRUS (CMV), PCR Not Detected Not Detected    ENTEROVIRUS, PCR Not Detected Not Detected    HERPES SIMPLEX VIRUS 1 (HSV-1), PCR Not Detected Not Detected    HERPES SIMPLEX VIRUS 2 (HSV-2), PCR Not Detected Not Detected    HUMAN PARECHOVIRUS, PCR Not Detected Not Detected    VARICELLA ZOSTER VIRUS (VZV), PCR Not Detected Not Detected    CRYPTOCOCCUS NEOFORMANS / GATTII, PCR Not Detected Not Detected    HUMAN HERPES VIRUS 6 PCR Not Detected Not Detected   Cell Count, CSF - Cerebrospinal Fluid, Lumbar Puncture    Collection Time: 01/08/25  3:24 PM    Specimen: Lumbar Puncture; Cerebrospinal Fluid   Result Value Ref Range    Color, CSF Colorless Colorless    Appearance, CSF Clear Clear    RBC, CSF 0 0 - 0 /mm3    Nucleated Cells, CSF 2 0 - 5 /mm3    Tube Number, CSF 1     Method: UF 5000 Automated Method    Cell Count, CSF - Cerebrospinal Fluid, Lumbar Puncture    Collection Time: 01/08/25  3:24 PM    Specimen: Lumbar Puncture; Cerebrospinal Fluid   Result Value Ref Range    Color, CSF Colorless Colorless    Appearance, CSF Clear Clear    RBC, CSF 0 0 - 0 /mm3    Nucleated Cells, CSF 1 0 - 5 /mm3    Tube Number, CSF 3     Method: UF 5000 Automated Method    Blood Gas, Arterial -     Collection Time: 01/08/25  8:04 PM    Specimen: Arterial Blood   Result Value Ref Range    Site Arterial: right radial     Gabe's Test Positive     pH, Arterial 7.498 (H) 7.350 - 7.450 pH units    pCO2, Arterial 34.9 (L) 35.0 - 45.0 mm Hg    pO2, Arterial 119.5 (H) 80.0 - 100.0 mm Hg    HCO3, Arterial 27.1 22.0 - 28.0 mmol/L    Base Excess, Arterial 3.9 (H) 0.0 - 2.0 mmol/L    O2 Saturation, Arterial 99.0 (H) 92.0 - 98.5 %    CO2 Content 28.2 (H) 23 - 27 mmol/L    Barometric Pressure for Blood Gas 757.2000 mmHg    Modality Cannula     Flow Rate 8.0000 lpm    Rate 19 Breaths/minute    Hemodilution No     Device Comment sat 95    CBC (No Diff)    Collection Time: 01/08/25  8:20 PM    Specimen: Blood   Result Value Ref Range    WBC 9.92 3.40 - 10.80 10*3/mm3    RBC 4.13 (L) 4.14 - 5.80 10*6/mm3    Hemoglobin 12.0 (L) 13.0 - 17.7 g/dL    Hematocrit 36.6 (L) 37.5 - 51.0 %    MCV 88.6 79.0 - 97.0 fL    MCH 29.1 26.6 - 33.0 pg    MCHC 32.8 31.5 - 35.7 g/dL    RDW 13.2 12.3 - 15.4 %    RDW-SD 42.7 37.0 - 54.0 fl    MPV 10.8 6.0 - 12.0 fL    Platelets 217 140 - 450 10*3/mm3   Basic Metabolic Panel    Collection Time: 01/08/25  8:20 PM    Specimen: Blood   Result Value Ref Range    Glucose 105 (H) 65 - 99 mg/dL    BUN 27 (H) 8 - 23 mg/dL    Creatinine 0.76 0.76 - 1.27 mg/dL    Sodium 147 (H) 136 - 145 mmol/L    Potassium 3.8 3.5 - 5.2 mmol/L    Chloride 110 (H) 98 - 107 mmol/L    CO2 27.6 22.0 - 29.0 mmol/L    Calcium 8.6 8.6 - 10.5 mg/dL    BUN/Creatinine Ratio 35.5 (H) 7.0 - 25.0    Anion Gap 9.4 5.0 - 15.0 mmol/L    eGFR 90.3 >60.0 mL/min/1.73   Lactic Acid, Plasma    Collection Time: 01/08/25  8:20 PM    Specimen: Blood   Result Value Ref Range    Lactate 1.4 0.5 - 2.0 mmol/L   Basic Metabolic Panel    Collection Time: 01/09/25  3:51 AM    Specimen: Arm, Right; Blood   Result Value Ref Range    Glucose 88 65 - 99 mg/dL    BUN 25 (H) 8 - 23 mg/dL    Creatinine 0.61 (L) 0.76 - 1.27 mg/dL    Sodium 147 (H) 136 - 145 mmol/L     Potassium 3.8 3.5 - 5.2 mmol/L    Chloride 111 (H) 98 - 107 mmol/L    CO2 29.0 22.0 - 29.0 mmol/L    Calcium 8.4 (L) 8.6 - 10.5 mg/dL    BUN/Creatinine Ratio 41.0 (H) 7.0 - 25.0    Anion Gap 7.0 5.0 - 15.0 mmol/L    eGFR 96.5 >60.0 mL/min/1.73   CBC Auto Differential    Collection Time: 01/09/25  3:51 AM    Specimen: Arm, Right; Blood   Result Value Ref Range    WBC 8.55 3.40 - 10.80 10*3/mm3    RBC 4.06 (L) 4.14 - 5.80 10*6/mm3    Hemoglobin 11.7 (L) 13.0 - 17.7 g/dL    Hematocrit 35.9 (L) 37.5 - 51.0 %    MCV 88.4 79.0 - 97.0 fL    MCH 28.8 26.6 - 33.0 pg    MCHC 32.6 31.5 - 35.7 g/dL    RDW 13.1 12.3 - 15.4 %    RDW-SD 41.6 37.0 - 54.0 fl    MPV 11.3 6.0 - 12.0 fL    Platelets 202 140 - 450 10*3/mm3    Neutrophil % 75.9 42.7 - 76.0 %    Lymphocyte % 12.6 (L) 19.6 - 45.3 %    Monocyte % 8.8 5.0 - 12.0 %    Eosinophil % 1.8 0.3 - 6.2 %    Basophil % 0.5 0.0 - 1.5 %    Immature Grans % 0.4 0.0 - 0.5 %    Neutrophils, Absolute 6.50 1.70 - 7.00 10*3/mm3    Lymphocytes, Absolute 1.08 0.70 - 3.10 10*3/mm3    Monocytes, Absolute 0.75 0.10 - 0.90 10*3/mm3    Eosinophils, Absolute 0.15 0.00 - 0.40 10*3/mm3    Basophils, Absolute 0.04 0.00 - 0.20 10*3/mm3    Immature Grans, Absolute 0.03 0.00 - 0.05 10*3/mm3    nRBC 0.0 0.0 - 0.2 /100 WBC     Assessment and plan  1.  Delirium/confusion-not seeing a lot of improvement.  Wife felt like he was doing somewhat better at periods during the afternoon.  May need Geropsych admission once medically stable if he is not improving.  Discussed with psychiatry yesterday.  Continues on scheduled Zyprexa with IM doses as needed.  Did receive lorazepam twice yesterday.  Continue to work on keeping environment conducive to sleeping at night and orienting him during the day.  Awaiting results of protein biomarkers on CSF regarding potential Alzheimer's.  Infectious studies from CSF look normal.  2.  Atrial fibrillation-discussed with cardiology yesterday.  Working on rate control as he has  largely been running 130-140.  Has metoprolol and is also received IV dig.  Plans as per cardiology  3.  Rectus hematoma-Lovenox has been on hold for the past 72 hours.  Hemoglobin looks to have stabilized.   in that right lower quadrant.  I would like to get him back on Lovenox because of clotting risk especially given his bedbound status.  I am going to give him DVT prophylaxis dose today and watch his blood count.  Can consider resumption of anticoagulation if hemoglobin remains stable with no signs of increased bleeding.  4.  Respiratory-given his current state of confusion he is certainly at risk for aspiration, PE etc.  Hypoxia last night looks to be fictitious.  Currently back down to 2 L and lungs are clear.  Normally uses CPAP at night but due to delirium is unable to wear mask.  5.  Fluid/nutrition-sodium level rising and certainly looks dehydrated.  I do not want to keep him on continuous IV fluids as I think he will just pull it out but I am going to give him a bolus today.  I do not think trying to put a feeding tube in him is wise given his level of agitation and is just going to agitate him and complicate his care.  I would consider adding Megace for appetite once he is back on anticoagulation to limit clotting risk as long as psychiatry does not think it would aggravate his neurologic status.

## 2025-01-09 NOTE — PLAN OF CARE
Problem: Syncope  Goal: Absence of Syncopal Symptoms  Outcome: Progressing  Intervention: Manage Effect of Syncopal Symptoms  Recent Flowsheet Documentation  Taken 1/9/2025 1455 by Dedrick Cabrales RN  Syncope Management: position changed slowly  Safety Promotion/Fall Prevention:   safety round/check completed   room organization consistent  Taken 1/9/2025 0815 by Dedrick Cabrales RN  Syncope Management: position changed slowly  Safety Promotion/Fall Prevention:   room organization consistent   safety round/check completed  Goal: Absence of Syncopal Symptoms  Outcome: Progressing  Intervention: Manage Effect of Syncopal Symptoms  Recent Flowsheet Documentation  Taken 1/9/2025 1455 by Dedrick Cabrales RN  Syncope Management: position changed slowly  Safety Promotion/Fall Prevention:   safety round/check completed   room organization consistent  Taken 1/9/2025 0815 by Dedrick Cabrales RN  Syncope Management: position changed slowly  Safety Promotion/Fall Prevention:   room organization consistent   safety round/check completed     Problem: Adult Inpatient Plan of Care  Goal: Plan of Care Review  Outcome: Progressing  Goal: Patient-Specific Goal (Individualized)  Outcome: Progressing  Goal: Absence of Hospital-Acquired Illness or Injury  Outcome: Progressing  Intervention: Identify and Manage Fall Risk  Recent Flowsheet Documentation  Taken 1/9/2025 1455 by Dedrick Cabrales RN  Safety Promotion/Fall Prevention:   safety round/check completed   room organization consistent  Taken 1/9/2025 0815 by Dedrick Cabrales RN  Safety Promotion/Fall Prevention:   room organization consistent   safety round/check completed  Intervention: Prevent Skin Injury  Recent Flowsheet Documentation  Taken 1/9/2025 1455 by Dedrick Cabrales RN  Body Position: legs elevated  Skin Protection:   transparent dressing maintained   incontinence pads utilized  Taken  1/9/2025 0815 by Dedrick Cabrales RN  Body Position:   turned   right   tilted  Skin Protection:   drying agents applied   transparent dressing maintained  Intervention: Prevent and Manage VTE (Venous Thromboembolism) Risk  Recent Flowsheet Documentation  Taken 1/9/2025 1455 by Dedrick Cabrales RN  VTE Prevention/Management: (eliquis) other (see comments)  Taken 1/9/2025 0815 by Dedrick Cabrales RN  VTE Prevention/Management: (eliquis) other (see comments)  Intervention: Prevent Infection  Recent Flowsheet Documentation  Taken 1/9/2025 1455 by Dedrick Cabrales RN  Infection Prevention:   single patient room provided   rest/sleep promoted   hand hygiene promoted  Taken 1/9/2025 0815 by Dedrick Cabrales RN  Infection Prevention:   single patient room provided   rest/sleep promoted   hand hygiene promoted  Goal: Optimal Comfort and Wellbeing  Outcome: Progressing  Intervention: Monitor Pain and Promote Comfort  Recent Flowsheet Documentation  Taken 1/9/2025 1455 by Dedrick Cabrales RN  Pain Management Interventions:   pain management plan reviewed with patient/caregiver   quiet environment facilitated  Taken 1/9/2025 0815 by Dedrick Cabrales RN  Pain Management Interventions:   pain management plan reviewed with patient/caregiver   position adjusted   unnecessary movement minimized   quiet environment facilitated  Intervention: Provide Person-Centered Care  Recent Flowsheet Documentation  Taken 1/9/2025 1455 by Dedrick Cabrales RN  Trust Relationship/Rapport:   thoughts/feelings acknowledged   reassurance provided   questions encouraged   questions answered   empathic listening provided   emotional support provided   choices provided   care explained  Taken 1/9/2025 0815 by Dedrick Cabrales RN  Trust Relationship/Rapport:   care explained   choices provided   emotional support provided   empathic listening provided    questions answered   questions encouraged   reassurance provided   thoughts/feelings acknowledged  Goal: Readiness for Transition of Care  Outcome: Progressing     Problem: Comorbidity Management  Goal: Blood Pressure in Desired Range  Outcome: Progressing  Intervention: Maintain Blood Pressure Management  Recent Flowsheet Documentation  Taken 1/9/2025 1455 by Dedrick Cabrales RN  Medication Review/Management: medications reviewed  Taken 1/9/2025 0815 by Dedrick Cabrales RN  Medication Review/Management: medications reviewed     Problem: Restraint, Nonviolent  Goal: Absence of Harm or Injury  Outcome: Progressing  Intervention: Implement Least Restrictive Safety Strategies  Recent Flowsheet Documentation  Taken 1/9/2025 1640 by Dedrick Cabrales RN  Medical Device Protection:   tubing secured   IV pole/bag removed from visual field  Diversional Activities: television  Taken 1/9/2025 1455 by Dedrick Cabrales RN  Medical Device Protection:   tubing secured   IV pole/bag removed from visual field  Diversional Activities: television  Taken 1/9/2025 1425 by Dedrick Cabrales RN  Medical Device Protection:   tubing secured   IV pole/bag removed from visual field  Diversional Activities: television  Taken 1/9/2025 1220 by Dedrick Cabrales RN  Medical Device Protection:   tubing secured   IV pole/bag removed from visual field  Diversional Activities: television  Taken 1/9/2025 1020 by Dedrick Cabrales RN  Medical Device Protection:   tubing secured   IV pole/bag removed from visual field  Diversional Activities: television  Taken 1/9/2025 0815 by Dedrick Cabrales RN  Medical Device Protection:   tubing secured   IV pole/bag removed from visual field  Diversional Activities: television  Taken 1/9/2025 0810 by Dedrick Cabrales RN  Medical Device Protection:   tubing secured   IV pole/bag removed from visual field  Diversional  Activities: television  Intervention: Protect Dignity, Rights and Personal Wellbeing  Recent Flowsheet Documentation  Taken 1/9/2025 1455 by Dedrick Cabrales RN  Trust Relationship/Rapport:   thoughts/feelings acknowledged   reassurance provided   questions encouraged   questions answered   empathic listening provided   emotional support provided   choices provided   care explained  Taken 1/9/2025 0815 by Dedrick Cabrales RN  Trust Relationship/Rapport:   care explained   choices provided   emotional support provided   empathic listening provided   questions answered   questions encouraged   reassurance provided   thoughts/feelings acknowledged  Intervention: Protect Skin and Joint Integrity  Recent Flowsheet Documentation  Taken 1/9/2025 1455 by Dedrick Cabrales RN  Body Position: legs elevated  Skin Protection:   transparent dressing maintained   incontinence pads utilized  Range of Motion: active ROM (range of motion) encouraged  Taken 1/9/2025 0815 by Dedrick Cabrales RN  Body Position:   turned   right   tilted  Skin Protection:   drying agents applied   transparent dressing maintained  Range of Motion: active ROM (range of motion) encouraged     Problem: Fall Injury Risk  Goal: Absence of Fall and Fall-Related Injury  Outcome: Progressing  Intervention: Identify and Manage Contributors  Recent Flowsheet Documentation  Taken 1/9/2025 1455 by Dedrick Cabrales RN  Medication Review/Management: medications reviewed  Self-Care Promotion: independence encouraged  Taken 1/9/2025 0815 by Dedrick Cabrales RN  Medication Review/Management: medications reviewed  Self-Care Promotion: independence encouraged  Intervention: Promote Injury-Free Environment  Recent Flowsheet Documentation  Taken 1/9/2025 1455 by Dedrick Cabrales RN  Safety Promotion/Fall Prevention:   safety round/check completed   room organization consistent  Taken 1/9/2025 0815 by  Dedrick Cabrales RN  Safety Promotion/Fall Prevention:   room organization consistent   safety round/check completed     Problem: Dysrhythmia  Goal: Normalized Cardiac Rhythm  Outcome: Progressing  Intervention: Monitor and Manage Cardiac Rhythm Effect  Recent Flowsheet Documentation  Taken 1/9/2025 1455 by Dedrick Cabrales RN  VTE Prevention/Management: (eliquis) other (see comments)  Taken 1/9/2025 0815 by Dedrick Cabrales RN  VTE Prevention/Management: (eliquis) other (see comments)     Problem: VTE (Venous Thromboembolism)  Goal: Tissue Perfusion  Outcome: Progressing  Intervention: Optimize Tissue Perfusion  Recent Flowsheet Documentation  Taken 1/9/2025 1455 by Dedrick Cabrales RN  VTE Prevention/Management: (eliquis) other (see comments)  Taken 1/9/2025 0815 by Dedrick Cabrales RN  VTE Prevention/Management: (eliquis) other (see comments)  Goal: Optimal Right Ventricular Function  Outcome: Progressing     Problem: Heart Failure  Goal: Optimal Coping  Outcome: Progressing  Intervention: Support Psychosocial Response  Recent Flowsheet Documentation  Taken 1/9/2025 1455 by Dedrick Cabrales RN  Family/Support System Care:   support provided   self-care encouraged   presence promoted   involvement promoted  Taken 1/9/2025 0815 by Dedrick Cabrales RN  Family/Support System Care:   support provided   self-care encouraged   presence promoted   involvement promoted  Goal: Optimal Cardiac Output and Blood Flow  Outcome: Progressing  Intervention: Optimize Cardiac Output  Recent Flowsheet Documentation  Taken 1/9/2025 1455 by Dedrick Cabrales RN  Environmental Support:   calm environment promoted   distractions minimized   rest periods encouraged  Goal: Stable Heart Rate and Rhythm  Outcome: Progressing  Goal: Fluid and Electrolyte Balance  Outcome: Progressing  Goal: Optimal Functional Ability  Outcome: Progressing  Intervention:  Optimize Functional Ability  Recent Flowsheet Documentation  Taken 1/9/2025 1455 by Dedrick Cabrales RN  Activity Management:   up in chair   activity minimized  Self-Care Promotion: independence encouraged  Taken 1/9/2025 0815 by Dedrick Cabrales RN  Activity Management: activity minimized  Self-Care Promotion: independence encouraged  Goal: Improved Oral Intake  Outcome: Progressing  Goal: Effective Oxygenation and Ventilation  Outcome: Progressing  Intervention: Promote Airway Secretion Clearance  Recent Flowsheet Documentation  Taken 1/9/2025 1455 by Dedrick Cabrales RN  Activity Management:   up in chair   activity minimized  Cough And Deep Breathing: done independently per patient  Taken 1/9/2025 0815 by Dedrick Cabrales RN  Activity Management: activity minimized  Cough And Deep Breathing: done independently per patient  Intervention: Optimize Oxygenation and Ventilation  Recent Flowsheet Documentation  Taken 1/9/2025 1455 by Dedrick Cabrales RN  Head of Bed (HOB) Positioning: HOB at 30-45 degrees  Taken 1/9/2025 0815 by Dedrick Cabrales RN  Head of Bed (HOB) Positioning: HOB at 30-45 degrees  Goal: Effective Breathing Pattern During Sleep  Outcome: Progressing  Intervention: Monitor and Manage Obstructive Sleep Apnea  Recent Flowsheet Documentation  Taken 1/9/2025 1455 by Dedrick Cabrales RN  Medication Review/Management: medications reviewed  Taken 1/9/2025 0815 by Dedrick Cabrales RN  Medication Review/Management: medications reviewed     Problem: Delirium  Goal: Optimal Coping  Outcome: Progressing  Intervention: Optimize Psychosocial Adjustment to Delirium  Recent Flowsheet Documentation  Taken 1/9/2025 1455 by Dedrick Cabrales RN  Family/Support System Care:   support provided   self-care encouraged   presence promoted   involvement promoted  Taken 1/9/2025 0815 by Dedrick Cabrales  RN  Family/Support System Care:   support provided   self-care encouraged   presence promoted   involvement promoted  Goal: Improved Behavioral Control  Outcome: Progressing  Intervention: Minimize Safety Risk  Recent Flowsheet Documentation  Taken 1/9/2025 1455 by Dedrick Cabrales RN  Trust Relationship/Rapport:   thoughts/feelings acknowledged   reassurance provided   questions encouraged   questions answered   empathic listening provided   emotional support provided   choices provided   care explained  Enhanced Safety Measures: chair alarm set  Communication Support Strategies: active listening utilized  Taken 1/9/2025 0815 by Dedrick Cabrales RN  Trust Relationship/Rapport:   care explained   choices provided   emotional support provided   empathic listening provided   questions answered   questions encouraged   reassurance provided   thoughts/feelings acknowledged  Enhanced Safety Measures: bed alarm set  Communication Support Strategies: active listening utilized  Goal: Improved Attention and Thought Clarity  Outcome: Progressing  Intervention: Maximize Cognitive Function  Recent Flowsheet Documentation  Taken 1/9/2025 0815 by Dedrick Cabrales RN  Reorientation Measures: calendar in view  Goal: Improved Sleep  Outcome: Progressing  Intervention: Promote Sleep  Recent Flowsheet Documentation  Taken 1/9/2025 1455 by Dedrick Cabrales RN  Sleep/Rest Enhancement: awakenings minimized  Taken 1/9/2025 0815 by Dedrick Cabrales RN  Sleep/Rest Enhancement: awakenings minimized     Problem: Skin Injury Risk Increased  Goal: Skin Health and Integrity  Outcome: Progressing  Intervention: Optimize Skin Protection  Recent Flowsheet Documentation  Taken 1/9/2025 1455 by Dedrick Cabrales RN  Activity Management:   up in chair   activity minimized  Head of Bed (HOB) Positioning: HOB at 30-45 degrees  Skin Protection:   transparent dressing maintained    incontinence pads utilized  Taken 1/9/2025 0815 by Dedrick Cabrales, RN  Activity Management: activity minimized  Head of Bed (HOB) Positioning: HOB at 30-45 degrees  Skin Protection:   drying agents applied   transparent dressing maintained   Goal Outcome Evaluation:

## 2025-01-09 NOTE — PROGRESS NOTES
"DOS: 2025  NAME: Mark Aguirre Jr.   : 1945  PCP: Luis Staples MD  Chief Complaint   Patient presents with    Fall    Ear Laceration     Neurology     Subjective: Family at bedside reports patient is doing much better today, more alert and conversant.  Soft wrist restraints in place. Pt seen in follow up today, however the problem is new to the examiner.     Objective:  Vital signs: /69 (BP Location: Left arm, Patient Position: Lying)   Pulse 97   Temp 97.3 °F (36.3 °C)   Resp 20   Ht 185.4 cm (73\")   Wt 95.6 kg (210 lb 12.2 oz)   SpO2 100%   BMI 27.81 kg/m²       General appearance: alert, NAD, v/s reviewed  HEENT: Normocephalic.   Neck: Supple   Cardiac: Regular rate and rhythm.   Chest Exam: Clear to auscultation bilaterally, no wheezes, no rhonchi.  Extremities: No edema.   Skin: No rashes or birthmarks.     Higher integrative function: Awake and alert, states his name is \"Efe Wei\" and he calls his wife \"Sofya\" when asked her name.  Not oriented to month, year, season, or location.  Difficulty following commands though follows some simple commands.  No neglect.  Cranial nerves: Visual fields intact to threat bilaterally, extraocular movements intact, PERRL OS, face symmetric, tongue midline, no dysarthria  Motor: Moving extremities spontaneously/symmetrically and against resistance  Sensation: Intact to light touch in all extremities  Station and gait: Deferred  Coordination: Unable to test coordination, not following complex commands    Scheduled Meds:atorvastatin, 10 mg, Oral, Daily  digoxin, 125 mcg, Oral, Daily  enoxaparin, 40 mg, Subcutaneous, Daily  metoprolol succinate XL, 50 mg, Oral, Daily  OLANZapine, 5 mg, Oral, Nightly  OLANZapine, 5 mg, Oral, Daily With Dinner  sodium chloride, 500 mL, Intravenous, BID  sodium chloride, 10 mL, Intravenous, Q12H  tamsulosin, 0.4 mg, Oral, Daily  vitamin B-12, 1,000 mcg, Oral, Daily      Continuous Infusions:   PRN Meds:.  " "acetaminophen    senna-docusate sodium **AND** polyethylene glycol **AND** bisacodyl **AND** bisacodyl    Calcium Replacement - Follow Nurse / BPA Driven Protocol    Enalaprilat    LORazepam    Magnesium Cardiology Dose Replacement - Follow Nurse / BPA Driven Protocol    metoprolol tartrate    nitroglycerin    OLANZapine    Phosphorus Replacement - Follow Nurse / BPA Driven Protocol    Potassium Replacement - Follow Nurse / BPA Driven Protocol    [COMPLETED] Insert Peripheral IV **AND** sodium chloride    sodium chloride    sodium chloride      Laboratory results:  Lab Results   Component Value Date    GLUCOSE 88 01/09/2025    CALCIUM 8.4 (L) 01/09/2025     (H) 01/09/2025    K 3.8 01/09/2025    CO2 29.0 01/09/2025     (H) 01/09/2025    BUN 25 (H) 01/09/2025    CREATININE 0.61 (L) 01/09/2025    EGFRIFAFRI  06/16/2020      Comment:      <15 Indicative of kidney failure.    EGFRIFNONA 93 06/22/2020    BCR 41.0 (H) 01/09/2025    ANIONGAP 7.0 01/09/2025     Lab Results   Component Value Date    WBC 8.55 01/09/2025    HGB 11.7 (L) 01/09/2025    HCT 35.9 (L) 01/09/2025    MCV 88.4 01/09/2025     01/09/2025     No results found for: \"CHOL\"  No results found for: \"HDL\"  No results found for: \"LDL\"  No results found for: \"TRIG\"       Review and interpretation of imaging:  IR LUMBAR PUNCTURE DIAGNOSTIC    Result Date: 1/9/2025   Exam: FLUOROSCOPICALLY GUIDED DIAGNOSTIC LUMBAR PUNCTURE:  DATE: 1/8/2025 2:45 PM  HISTORY: persistent encephalopathy and delirium; R55-Syncope and collapse; S01.311A-Laceration without foreign body of right ear, initial encounter; R94.31-Abnormal electrocardiogram (ECG) (EKG); I48.91-Unspecified atrial fibrillation   TECHNIQUE: Informed consent was obtained. The lower back was prepped with betadine and draped in the usual sterile fashion. Pre-cutaneous handwashing and sterile gloves were worn. 1% Lidocaine solution was used for local anesthesia. Using fluoroscopic guidance, a " 22-gauge spinal needle was advanced into the thecal sac at L3.  *  Dose Area Product: 41.3 uGym2  FINDINGS: *  Hard copy fluoroscopic image shows good position of the needle in the thecal sac. *  5 mL of clear CSF fluid was obtained via gravity drip method for special beta amyloid testing. *  8 mL of clear CSF fluid was sent for routine laboratory assessment.      Successful fluoroscopically guided diagnostic lumbar puncture.   Procedure performed by KEISHA Mcclain. By electronically signing this report, I, the supervising radiologist, attest that I was not present for the procedure(s) but agree with the final edited report.  This report was finalized on 1/9/2025 8:25 AM by Dr. Roberto Westfall M.D on Workstation: [id number]      XR Chest 1 View    Result Date: 1/8/2025  AP CHEST  HISTORY: Increased oxygen requirements  COMPARISON: 12/28/2024  FINDINGS: There is some mild increased atelectasis or infiltrate within the lung bases. Stable cardiomegaly. No definite pneumothorax.      Mildly increased atelectasis or infiltrate in the lung bases    This report was finalized on 1/8/2025 7:39 PM by Dr. Pako Velasquez M.D on Workstation: [id number]      MRI Brain Without Contrast    Result Date: 1/6/2025  MRI OF THE BRAIN WITHOUT CONTRAST  CLINICAL HISTORY: Mental status changes/ ongoing delerium; R55-Syncope and collapse; S01.311A-Laceration without foreign body of right ear, initial encounter; R94.31-Abnormal electrocardiogram (ECG) (EKG); I48.91-Unspecified atrial fibrillation  TECHNIQUE: MRI of the brain was obtained with sagittal T1, axial T1, axial FLAIR, axial T2, axial diffusion, and susceptibility weighted images.  COMPARISON: No previous MRIs of the brain are available for comparison.  FINDINGS:  The ventricles, sulci, and cisterns are age-appropriate. There are no abnormal foci of restricted diffusion. The major intracranial flow related signal voids are within normal limits. 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 The midline intracranial anatomy is within normal limits. Mild changes of chronic small vessel ischemic phenomena are noted. 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  Changes of inflammatory paranasal sinus disease are identified within the sphenoid sinus and the ethmoid air cells with the most prominent mucosal thickening appreciated within the sphenoid sinus. A small amount of proteinaceous secretions are noted within the central aspect of the sphenoid sinus.       No evidence for acute intracranial pathology.  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    This report was finalized on 1/6/2025 1:00 PM by Dr. Milo Doan M.D on Workstation: [id number]      CT Abdomen Pelvis With & Without Contrast    Result Date: 1/6/2025  CT ABDOMEN AND PELVIS WITH AND WITHOUT IV CONTRAST  HISTORY: Hematuria  TECHNIQUE: Radiation dose reduction techniques were utilized, including automated exposure control and exposure modulation based on body size. Axial images were obtained through the abdomen and pelvis before and after the administration of IV contrast. Coronal and sagittal reformatted images obtained. Multiphase protocol.  COMPARISON: Renal ultrasound 11/9/2020  FINDINGS:  ABDOMEN: There is some atelectasis in the lung bases. The liver and the gallbladder are unremarkable. The spleen is unremarkable. There are multiple bilateral nonobstructing kidney stones. There is a exophytic lesion with some peripheral calcification arising from the upper pole the left kidney  measuring about 4 cm. It is mildly hyperdense on the noncontrast portion of the study and shows no convincing evidence of enhancement. It is most consistent with a hemorrhagic/proteinaceous cyst and is stable in size compared with the previous ultrasound. The adrenal glands are unremarkable. The pancreas is unremarkable. There is a right-sided rectus sheath hematoma. It measures about 22 cm in length along the rectus sheath and measures about 6 cm in thickness.  PELVIS: The lower pelvis is somewhat obscured by streak artifact. The prostate is enlarged. There is no significant bladder wall thickening. The colon appears unremarkable. Bone windows show lumbar spine degenerative changes. Postsurgical changes of the right hip      1. Bilateral nonobstructing kidney stones 2. There is a large right rectus sheath hematoma. Follow-up recommended to ensure clearing. Findings discussed with the patient's RN at time of dictation   Radiation dose reduction techniques were utilized, including automated exposure control and exposure modulation based on body size.   This report was finalized on 1/6/2025 12:57 PM by Dr. Pako Velasquez M.D on Workstation: [id number]       Impression:  79-year-old male with past medical history of hypertension, A-fib/flutter on Eliquis p.o. prior to arrival, STEPHENIE on CPAP, prior stroke with no reported residual deficit, previous postconcussive syndrome, and hearing loss who presented 12/28/2024 after he had an acute syncopal spell causing him to fall and hit the right side of his head resulting in right ear laceration.  Family reported he had not been eating or drinking well due to feeling ill and had some associated nausea.  On the date of admission he had gotten up to go to the bathroom and had sudden lightheadedness and lost consciousness without any reported seizure activity.  This was felt by cardiology to be related to orthostasis and dehydration.  Cardiology also following him for persistent  A-fib with uncontrolled rates.    Neurology originally const 12/30/2024 due to patient developing agitation and having visual hallucinations.  Initial CT head showed no acute findings, follow-up CT head 12/31 showed no acute intracranial abnormalities.  He has been treated with multiple medications for agitation including Haldol, Zyprexa, lorazepam, and Seroquel.  Ammonia levels, B12, folate, UA unremarkable.  Initially improved some but later began regressing so MRI brain obtained on 1/6/2025 and showed old R cerebellar stroke with 2 areas of chronic microhemorrhage as well as bitemporal atrophy but no acute findings.  Neurology reconsulted 1/7 due to persistent delirium.  Psychiatry has been following and feels patient likely has an underlying neurodegenerative process with dementia and feel patient may require geropsych hospitalization.  Repeat CT head ordered, but has not been completed yet.  Lumbar puncture completed yesterday and showed no nucleated cells, protein mildly elevated at 54, meningitis/encephalitis panel negative, beta amyloid testing for Alzheimer's pending.  He has become more alert and interactive overnight per family despite sodium level increasing.    Diagnoses:  Persistent hospital delirium, concern for underlying dementia  PAF with RVR  STEPHENIE on CPAP    Plan:  Will follow-up on results of CT head peripherally.   Recommend delirium precautions (see below), discussed with patient's family  Resume CPAP when able.  Discussed with Dr. Frank today.  Neurology will sign off but recommend outpatient memory evaluation.  Please call with further questions or concerns.      Evidence-based delirium precautions include:    1. Frequent reorientation, reminding patient not questioning patient  2. Shades up during day/down at night  3. TV off except for soft music only  4. Familiar objects at bedside  5. Encourage friends/family to spend the night  6. Minimize anticholingeric medications  7. Minimize  polypharmacy  8. Minimize restraints, includes lines and/or foleys and/or feeding tubes as able  9. Minimize overnight checks/vitals to encourage restful consistent sleep patterns  10. Out of bed during day as much as possible

## 2025-01-09 NOTE — PROGRESS NOTES
The patient is up in a chair today.  He continues to be in wrist restraints but seems much more awake and alert though his speech remains occasionally nonsensical.  Overall, he does seem improved but continues to exhibit signs of altered mentation either consistent with delirium or emergent dementia, or both.

## 2025-01-09 NOTE — CONSULTS
Patient Care Team:  Luis Staples MD as PCP - General (Internal Medicine)  uLis Staples MD as PCP - Internal Medicine  Rian Johns MD as Consulting Physician (Sleep Medicine)      Subjective     Patient is a 79 y.o. male.  Asked to see regarding low O2.  Patient apparently has been in the hospital since 12/28 after a syncopal episode with a ear laceration he has a history of A-fib with rate control issues and was planned for an AV carlos a ablation and pacemaker placement this month.  He apparently got sweaty lightheaded when he got up out of a chair no palpitations some dull chest pressure for couple days but no increased shortness of breath orthopnea or leg swelling wife heard him go down when she came in he was just arousing he did not think he lost consciousness she thinks he did he had also had some a couple of days of increased fatigue difficulty sleeping headache and had some nausea the morning of his admission.  Looks like pretty much the whole admission he has been in A-fib with rapid rate EP has been following he has been having pretty significant delirium both psychiatry and neurology are following he has had several CT heads MRI he had a lumbar puncture earlier today.  He has no history of lung disease and was never a smoker.  He does have obstructive sleep apnea and normally uses a CPAP machine but because of his delirium and agitation he is not been able to use his CPAP here in the hospital.  On the sixth he was noted to have a large right rectus sheath hematoma relatively mild change in hemoglobin he was 14.9 on admission and he was 11.4 today.  Patient is in restraints he is not communicating with  any understandable context occasionally I can understand a few words but nothing is making sense his daughter is at bedside and she cannot make any sense out of what he is trying to say.  Patient's anticoagulation for his A-fib has been held with his rectus sheath hematoma and  his lumbar puncture.  Patient had been on 2 L nurses noted that he had been saturating pretty well on this looking at the reports it looks like his sats are up and down on this but had not been below about 90%.  This evening they noted he was more confused and his oxygen was lower when I came in the room he has a pulse oximeter on his right foot on middle toe and the Plath is not very good at all when it looks like the Pleth is picking up a little bit is 93 to 95% but that is maybe for a second or 2 not getting good pleth tracings.    Review of Systems:  Unable to obtain from patient      History  Past Medical History:   Diagnosis Date    Acute kidney failure     POST-OP TOTAL HIP 2020    Anticoagulated     PRADAXA FOR A FIB TO HELD 3 DAYS PRIOR    Arthritis     OSTEO. RIGHT HIP    Atrial flutter     Bifascicular block     Cataract     LEFT AND RIGHT    Depression     History of colon polyps     Hypertension     Hypoxemia associated with sleep     Insomnia     Knee pain     STEPHENIE on CPAP 05/27/2010    Overnight polysomnogram.  Weight 163 pounds.  Severe STEPHENIE with AHI of 30.9 events per hour.  Low oxygen saturation 72% and sleep-related hypoxia present for 30 minutes    PAF (paroxysmal atrial fibrillation)     Right hip pain     Slow to wake up after anesthesia      Past Surgical History:   Procedure Laterality Date    CARDIAC ABLATION      CARDIAC CATHETERIZATION      COLONOSCOPY N/A 2/21/2018    Procedure: COLONOSCOPY INTO CECUM WITH COLD BX POLYPECTOMY ;  Surgeon: Ross Stearns MD;  Location: [facility] ENDOSCOPY;  Service:     COLONOSCOPY N/A 2/3/2021    Procedure: COLONOSCOPY TOCECUM WITH COLD BX POLYPECTOMY AND HOT SNARE POLYPECTOMY;  Surgeon: Ross Stearns MD;  Location: [facility] ENDOSCOPY;  Service: General;  Laterality: N/A;  PERSONAL HX OF POLYPS, FAMILY HX OF COLON CANCER  --POLYPS (X3), DIVERTICULOSIS    COLONOSCOPY N/A 3/20/2024    Procedure: COLONOSCOPY TO CECUM WITH COLD BX POLYPECTOMIES;  Surgeon:  Ross Stearns MD;  Location: [facility] ENDOSCOPY;  Service: General;  Laterality: N/A;  HX POLYPS/POLYPS (3)    HERNIA REPAIR      INGUINAL HERNIA? SIDE    TOTAL HIP ARTHROPLASTY Right 6/5/2020    Procedure: TOTAL HIP ARTHROPLASTY;  Surgeon: Travis Hamlin MD;  Location: [facility] MAIN OR;  Service: Orthopedics;  Laterality: Right;     Social History     Socioeconomic History    Marital status:    Tobacco Use    Smoking status: Never    Smokeless tobacco: Current     Types: Snuff    Tobacco comments:     2 weekly chew   Vaping Use    Vaping status: Never Used   Substance and Sexual Activity    Alcohol use: Never    Drug use: Never    Sexual activity: Defer     Family History   Problem Relation Age of Onset    Breast cancer Mother     Colon cancer Father     Diabetes Brother     No Known Problems Maternal Grandmother     No Known Problems Maternal Grandfather     No Known Problems Paternal Grandmother     Stroke Paternal Grandfather     Heart disease Brother     Malig Hyperthermia Neg Hx          Allergies:  Patient has no known allergies.    Medications:  Prior to Admission medications    Medication Sig Start Date End Date Taking? Authorizing Provider   apixaban (ELIQUIS) 5 MG tablet tablet Take 1 tablet by mouth Every 12 (Twelve) Hours. 11/15/24  Yes Claus Lindquist MD   atorvastatin (LIPITOR) 10 MG tablet Take 1 tablet by mouth Daily. 1/4/22  Yes Toro Stuart MD   chlorthalidone (HYGROTON) 25 MG tablet Take 1 tablet by mouth Daily. 12/4/24  Yes Toro Stuart MD   citalopram (CeleXA) 20 MG tablet Take 1 tablet by mouth Every Morning. 1/15/20  Yes Toro Stuart MD   digoxin (LANOXIN) 125 MCG tablet Take 1 tablet by mouth Daily. 11/20/24  Yes Cameron Stroud PA-C   metoprolol succinate XL (TOPROL-XL) 25 MG 24 hr tablet Take 1 tablet by mouth Daily. 12/2/24  Yes Cameron Stroud PA-C   tamsulosin (FLOMAX) 0.4 MG capsule 24 hr capsule Take 1 capsule by mouth Daily. 12/28/21   "Yes Provider, MD Toro   zolpidem (AMBIEN) 5 MG tablet Take 0.5 tablets by mouth At Night As Needed for Sleep. 11/13/24  Yes Kell Blevins APRN   acetaminophen (TYLENOL) 325 MG tablet Take 2 tablets by mouth Every 4 (Four) Hours As Needed for Mild Pain . 6/22/20   Luis Staples MD   Cyanocobalamin (VITAMIN B 12 PO) Take  by mouth.    Provider, MD Toro     atorvastatin, 10 mg, Oral, Daily  digoxin, 125 mcg, Oral, Daily  [Held by provider] enoxaparin, 1 mg/kg, Subcutaneous, Q12H  metoprolol succinate XL, 25 mg, Oral, Daily  metoprolol tartrate, 5 mg, Intravenous, Once  OLANZapine, 5 mg, Oral, Nightly  OLANZapine, 5 mg, Oral, Daily With Dinner  sodium chloride, 10 mL, Intravenous, Q12H  tamsulosin, 0.4 mg, Oral, Daily  vitamin B-12, 1,000 mcg, Oral, Daily           Objective     Vital Signs  Vital Sign Min/Max for last 24 hours  Temp  Min: 97.7 °F (36.5 °C)  Max: 99 °F (37.2 °C)   BP  Min: 115/86  Max: 154/81   Pulse  Min: 112  Max: 163   Resp  Min: 16  Max: 22   SpO2  Min: 90 %  Max: 100 %   Flow (L/min) (Oxygen Therapy)  Min: 2  Max: 2   Weight  Min: 95.6 kg (210 lb 12.2 oz)  Max: 95.6 kg (210 lb 12.2 oz)       Intake/Output Summary (Last 24 hours) at 1/8/2025 1951  Last data filed at 1/8/2025 1520  Gross per 24 hour   Intake --   Output 175 ml   Net -175 ml     No intake/output data recorded.  Last Weight and Admission Weight        01/08/25  1100   Weight: 95.6 kg (210 lb 12.2 oz)     Flowsheet Rows      Flowsheet Row First Filed Value   Admission Height 185.4 cm (73\") Documented at 12/29/2024 0818   Admission Weight 110 kg (241 lb 8 oz) Documented at 12/29/2024 0818            Body mass index is 27.81 kg/m².           Physical Exam:  General Appearance: Patient is extremely restless he is in wrist restraints he is picking at everything just fingers up in the air picking he is talking a lot but cannot understand  Eyes: Conjunctiva are clear and anicteric pupils are equal they are about 2-1/2 mm " both react to light  ENT: Mucous membranes are extremely dry his tongue looks a little yellowish to me on the top the daughter thinks it just looks his normal dry tongue.  There are no exudates.  Neck: Trachea midline no visible jugular venous distention even with him lying at about 20 degrees elevation.  No palpable adenopathy or thyromegaly he is certainly moving his head and neck vigorously  Lungs: Hard to hear because he does not stop talking when you are close to him or examining him but I do not hear any definite wheezes or rales he was not using any accessory muscles he did not appear to be labored.  When I was in the room he was awake and he certainly was not having any apneic type events.  Cardiac: Irregularly irregular tachycardic heart rate was in the 1 teens to 150s most of the time I was in the room but never got below the 1 teens looks to be A-fib on the monitor  Abdomen: He is definitely tender to touch on that right abdomen and it is more firm than the left he has hematoma showing up in that right flank  : Not examined  Musculoskeletal: Grossly normal aside from the right abdominal firmness and right flank hematoma  Skin: Warm and dry no jaundice no petechiae  Neuro: He is definitely moving all 4 extremities he is picking at things in the air he is talking incoherently   Extremities/P Vascular: He has palpable radial and dorsalis pedis pulses bilaterally  MSE: Agitated unable to really assess      Labs:  Results from last 7 days   Lab Units 01/08/25  0243 01/05/25  0908 01/03/25  2136 01/03/25  0801 01/02/25  1118   GLUCOSE mg/dL 95 103*  --  107* 98   SODIUM mmol/L 146* 144  --  141 139   POTASSIUM mmol/L 3.7 3.7 3.7 3.6 3.5   MAGNESIUM mg/dL  --   --   --  2.0  --    CO2 mmol/L 26.0 25.0  --  22.5 23.0   CHLORIDE mmol/L 110* 107  --  106 104   ANION GAP mmol/L 10.0 12.0  --  12.5 12.0   CREATININE mg/dL 0.76 0.80  --  0.75* 0.71*   BUN mg/dL 27* 24*  --  20 19   BUN / CREAT RATIO  35.5* 30.0*  --   26.7* 26.8*   CALCIUM mg/dL 8.8 8.9  --  8.7 8.6   ALK PHOS U/L 51 52  --  57 57   TOTAL PROTEIN g/dL 5.8* 5.8*  --  6.0 5.7*   ALT (SGPT) U/L 20 21  --  12 14   AST (SGOT) U/L 20 26  --  20 26   BILIRUBIN mg/dL 2.5* 2.4*  --  2.6* 2.8*   ALBUMIN g/dL 3.4* 3.4*  --  3.3* 3.4*   GLOBULIN gm/dL 2.4 2.4  --  2.7 2.3     Estimated Creatinine Clearance: 103.1 mL/min (by C-G formula based on SCr of 0.76 mg/dL).      Results from last 7 days   Lab Units 01/08/25  0243 01/06/25  1640 01/05/25  0908 01/02/25  1118   WBC 10*3/mm3 9.60 10.75 8.76 9.59   RBC 10*6/mm3 4.09* 4.22 4.50 4.92   HEMOGLOBIN g/dL 11.4* 12.2* 13.1 14.3   HEMATOCRIT % 37.1* 37.1* 38.8 42.7   MCV fL 90.7 87.9 86.2 86.8   MCH pg 27.9 28.9 29.1 29.1   MCHC g/dL 30.7* 32.9 33.8 33.5   RDW % 13.1 13.4 13.0 13.2   RDW-SD fl 43.7 43.1 40.3 41.5   MPV fL 11.6 11.0 11.1 11.1   PLATELETS 10*3/mm3 192 193 193 164   NEUTROPHIL % % 76.5* 81.7* 77.6* 79.2*   LYMPHOCYTE % % 11.4* 7.3* 11.4* 9.7*   MONOCYTES % % 9.7 10.2 9.0 9.6   EOSINOPHIL % % 1.4 0.1* 0.9 0.6   BASOPHIL % % 0.6 0.3 0.6 0.5   IMM GRAN % % 0.4 0.4 0.5 0.4   NEUTROS ABS 10*3/mm3 7.35* 8.78* 6.80 7.59*   LYMPHS ABS 10*3/mm3 1.09 0.79 1.00 0.93   MONOS ABS 10*3/mm3 0.93* 1.10* 0.79 0.92*   EOS ABS 10*3/mm3 0.13 0.01 0.08 0.06   BASOS ABS 10*3/mm3 0.06 0.03 0.05 0.05   IMMATURE GRANS (ABS) 10*3/mm3 0.04 0.04 0.04 0.04   NRBC /100 WBC 0.0 0.0 0.0 0.0             Results from last 7 days   Lab Units 01/08/25  0243   PROBNP pg/mL 3,411.0*         Results from last 7 days   Lab Units 01/08/25  0243   LACTATE mmol/L 1.2         Microbiology Results (last 10 days)       Procedure Component Value - Date/Time    Meningitis / Encephalitis Panel, PCR - Cerebrospinal Fluid, Lumbar Puncture [[id number]]  (Normal) Collected: 01/08/25 1524    Lab Status: Final result Specimen: Cerebrospinal Fluid from Lumbar Puncture Updated: 01/08/25 1722     ESCHERICHIA COLI K1, PCR Not Detected     HAEMOPHILUS INFLUENZAE, PCR Not  Detected     LISTERIA MONOCYTOGENES, PCR Not Detected     NEISSERIA MENINGITIDIS, PCR Not Detected     STREPTOCOCCUS AGALACTIAE, PCR Not Detected     STREPTOCOCCUS PNEUMONIAE, PCR Not Detected     CYTOMEGALOVIRUS (CMV), PCR Not Detected     ENTEROVIRUS, PCR Not Detected     HERPES SIMPLEX VIRUS 1 (HSV-1), PCR Not Detected     HERPES SIMPLEX VIRUS 2 (HSV-2), PCR Not Detected     HUMAN PARECHOVIRUS, PCR Not Detected     VARICELLA ZOSTER VIRUS (VZV), PCR Not Detected     CRYPTOCOCCUS NEOFORMANS / GATTII, PCR Not Detected     HUMAN HERPES VIRUS 6 PCR Not Detected              Diagnostics:  XR Chest 1 View    Result Date: 1/8/2025  AP CHEST  HISTORY: Increased oxygen requirements  COMPARISON: 12/28/2024  FINDINGS: There is some mild increased atelectasis or infiltrate within the lung bases. Stable cardiomegaly. No definite pneumothorax.      Mildly increased atelectasis or infiltrate in the lung bases    This report was finalized on 1/8/2025 7:39 PM by Dr. Pako Velasquez M.D on Workstation: [id number]      IR LUMBAR PUNCTURE DIAGNOSTIC    Result Date: 1/8/2025   Exam: FLUOROSCOPICALLY GUIDED DIAGNOSTIC LUMBAR PUNCTURE:  DATE: 1/8/2025 2:45 PM  HISTORY: persistent encephalopathy and delirium; R55-Syncope and collapse; S01.311A-Laceration without foreign body of right ear, initial encounter; R94.31-Abnormal electrocardiogram (ECG) (EKG); I48.91-Unspecified atrial fibrillation   TECHNIQUE: Informed consent was obtained. The lower back was prepped with betadine and draped in the usual sterile fashion. Pre-cutaneous handwashing and sterile gloves were worn. 1% Lidocaine solution was used for local anesthesia. Using fluoroscopic guidance, a 22-gauge spinal needle was advanced into the thecal sac at L3.  *  Dose Area Product: 41.3 uGym2  FINDINGS: *  Hard copy fluoroscopic image shows good position of the needle in the thecal sac. *  5 mL of clear CSF fluid was obtained via gravity drip method for special beta amyloid  testing. *  8 mL of clear CSF fluid was sent for routine laboratory assessment.      Successful fluoroscopically guided diagnostic lumbar puncture.   Procedure performed by KEISHA Mcclain. By electronically signing this report, I, the supervising radiologist, attest that I was not present for the procedure(s) but agree with the final edited report.      MRI Brain Without Contrast    Result Date: 1/6/2025  MRI OF THE BRAIN WITHOUT CONTRAST  CLINICAL HISTORY: Mental status changes/ ongoing delerium; R55-Syncope and collapse; S01.311A-Laceration without foreign body of right ear, initial encounter; R94.31-Abnormal electrocardiogram (ECG) (EKG); I48.91-Unspecified atrial fibrillation  TECHNIQUE: MRI of the brain was obtained with sagittal T1, axial T1, axial FLAIR, axial T2, axial diffusion, and susceptibility weighted images.  COMPARISON: No previous MRIs of the brain are available for comparison.  FINDINGS:  The ventricles, sulci, and cisterns are age-appropriate. There are no abnormal foci of restricted diffusion. The major intracranial flow related signal voids are within normal limits. 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 The midline intracranial anatomy is within normal limits. Mild changes of chronic small vessel ischemic phenomena are noted. 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  Changes of inflammatory paranasal sinus disease are identified within the sphenoid sinus and the ethmoid air cells with the most prominent mucosal thickening appreciated within the sphenoid sinus. A small amount of proteinaceous secretions are noted within the central aspect of the sphenoid sinus.       No evidence for acute  intracranial pathology.  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    This report was finalized on 1/6/2025 1:00 PM by Dr. Milo Doan M.D on Workstation: [id number]      CT Abdomen Pelvis With & Without Contrast    Result Date: 1/6/2025  CT ABDOMEN AND PELVIS WITH AND WITHOUT IV CONTRAST  HISTORY: Hematuria  TECHNIQUE: Radiation dose reduction techniques were utilized, including automated exposure control and exposure modulation based on body size. Axial images were obtained through the abdomen and pelvis before and after the administration of IV contrast. Coronal and sagittal reformatted images obtained. Multiphase protocol.  COMPARISON: Renal ultrasound 11/9/2020  FINDINGS:  ABDOMEN: There is some atelectasis in the lung bases. The liver and the gallbladder are unremarkable. The spleen is unremarkable. There are multiple bilateral nonobstructing kidney stones. There is a exophytic lesion with some peripheral calcification arising from the upper pole the left kidney measuring about 4 cm. It is mildly hyperdense on the noncontrast portion of the study and shows no convincing evidence of enhancement. It is most consistent with a hemorrhagic/proteinaceous cyst and is stable in size compared with the previous ultrasound. The adrenal glands are unremarkable. The pancreas is unremarkable. There is a right-sided rectus sheath hematoma. It measures about 22 cm in length along the rectus sheath and measures about 6 cm in thickness.  PELVIS: The lower pelvis is somewhat obscured by streak artifact. The prostate is enlarged. There is no significant bladder wall thickening. The colon appears unremarkable. Bone windows show lumbar spine degenerative changes. Postsurgical changes of the right hip      1. Bilateral nonobstructing kidney stones 2. There is a large right rectus sheath  hematoma. Follow-up recommended to ensure clearing. Findings discussed with the patient's RN at time of dictation   Radiation dose reduction techniques were utilized, including automated exposure control and exposure modulation based on body size.   This report was finalized on 1/6/2025 12:57 PM by Dr. Pako Velasquez M.D on Workstation: [id number]      CT Head Without Contrast    Result Date: 12/31/2024  CT HEAD WO CONTRAST-  HISTORY:  mental status change after recent fall with head injury; R55-Syncope and collapse; S01.311A-Laceration without foreign body of right ear, initial encounter; R94.31-Abnormal electrocardiogram (ECG) (EKG); I48.91-Unspecified atrial fibrillation  COMPARISON: CT head 12/28/2024  FINDINGS: The brain ventricles are symmetrical. Moderate to severe vascular calcification is noted. There is no evidence of intracranial hemorrhage, hydrocephalus or of acute infarction. There is moderate mucosal thickening involving the sphenoid sinus to the left. Small central calcifications are appreciated within the sphenoid sinus.      No acute process is identified. Further evaluation could be performed with MRI examination of the brain. Paranasal sinus disease is noted as described above.      Radiation dose reduction techniques were utilized, including automated exposure modulation based on body size.  This report was finalized on 12/31/2024 2:09 PM by Dr. Robbin Zarate M.D on Workstation: [id number]      US Gallbladder    Result Date: 12/29/2024  RIGHT UPPER QUADRANT ULTRASOUND  HISTORY: 79-year-old male with nausea and abnormal LFTs.  TECHNIQUE: Real-time ultrasound of the right upper quadrant was performed. Confirmatory images were obtained.  FINDINGS: 1. The gallbladder is capacious, but no definite stones or sludge are seen within the gallbladder. There is no pericholecystic fluid or evidence for acute cholecystitis. If there is a high clinical suspicion for acute cholecystitis, consider a HIDA  scan to evaluate for cystic duct obstruction. There is no intra or extrahepatic biliary dilatation. The common bile duct measures 3 mm.  2. Right kidney as visualized appears unremarkable. No right renal stones or hydronephrosis. The pancreas is poorly seen secondary to shadowing from gastric or bowel gas.  This report was finalized on 12/29/2024 7:18 PM by Dr. Jessica Reyes M.D on Workstation: [id number]      CT Cervical Spine Without Contrast    Result Date: 12/29/2024  Patient: HARJEET HAYNES  Time Out: 00:06 Exam(s): CT C SPINE EXAM:   CT Cervical Spine Without Intravenous Contrast CLINICAL HISTORY:    Reason for exam: fall, head injury. TECHNIQUE:   Axial computed tomography images of the cervical spine without intravenous contrastwith coronal and sagittal reformats.  CTDI is 17.02 mGy and DLP is 388 mGy-cm.  This CT exam was performed according to the principle of ALARA (As Low As Reasonably Achievable) by using one or more of the following dose reduction techniques: automated exposure control, adjustment of the mA and or kV according to patient size, and or use of iterative reconstruction technique. COMPARISON:   No relevant prior studies available. FINDINGS:   Vertebrae:  Cervical vertebrae intact.  Degenerative facet findings.   Discs spinal canal neural foramina:  Noncritical canal and foraminal stenosis.  Degenerative disc findings. IMPRESSION:       No acute cervical spine abnormality.     Electronically signed by Hemant Manzo MD on 12-29-24 at 0006    CT Head Without Contrast    Result Date: 12/29/2024  Patient: HARJEET HAYNES  Time Out: 00:05 Exam(s): CT HEAD Without Contrast EXAM:   CT Head Without Intravenous Contrast CLINICAL HISTORY:    Reason for exam: fall, head injury. TECHNIQUE:   Axial computed tomography images of the head brain without intravenous contrast.  CTDI is 56.23 mGy and DLP is 1069.4 mGy-cm.  This CT exam was performed according to the principle of ALARA (As Low As Reasonably  Achievable) by using one or more of the following dose reduction techniques: automated exposure control, adjustment of the mA and or kV according to patient size, and or use of iterative reconstruction technique. COMPARISON:   6 15 2020 FINDINGS:   Brain:  Unremarkable.  No acute intracranial hemorrhage or mass effect.   Ventricles:  No hydrocephalus.   Bones joints:  No acute abnormality.   Soft tissues:  Unremarkable.   Sinuses:  Unremarkable.   Mastoid air cells:  Unremarkable. IMPRESSION:       No acute intracranial abnormality.     Electronically signed by Hemant Manzo MD on 12-29-24 at 0005    XR Chest 1 View    Result Date: 12/28/2024  CHEST SINGLE VIEW  HISTORY: 79 years of age, Male.  fever weakness  COMPARISON: AP chest 07/04/2023.  FINDINGS: Heart size is enlarged. Aortic vascular calcifications are present. There is apical lordotic positioning. Lungs appear clear of focal airspace disease and there is no evidence for pulmonary edema or pleural effusion or infiltrate. Cardiac monitoring leads are present.      Cardiomegaly. No evidence for active disease in the chest.  This report was finalized on 12/28/2024 11:09 PM by Mir Munoz M.D on Workstation: [id number]      Results for orders placed during the hospital encounter of 09/05/24    Adult Transthoracic Echo Complete W/ Cont if Necessary Per Protocol    Interpretation Summary    Left ventricular systolic function is low normal. Calculated left ventricular EF = 48.8%    The left ventricular cavity is borderline dilated.    Left ventricular diastolic function is consistent with (grade II w/high LAP) pseudonormalization.    The left atrial cavity is mild to moderately dilated.    Moderate aortic valve stenosis is present. Aortic valve area is 1.8 cm2.    Peak velocity of the flow distal to the aortic valve is 328 cm/s. Aortic valve maximum pressure gradient is 43 mmHg. Aortic valve mean pressure gradient is 24 mmHg. Aortic valve dimensionless  index is 0.4 .    Estimated right ventricular systolic pressure from tricuspid regurgitation is moderately elevated (45-55 mmHg). Calculated right ventricular systolic pressure from tricuspid regurgitation is 48 mmHg.    Mild to moderate aortic valve regurgitation is present.    Mild to moderate mitral valve regurgitation is present with an eccentric jet noted.      Chest x-ray just done I looked at it on the machine I do not see any definite acute process there may be a little atelectasis at the right base looking back he had a little atelectasis with a CT abdomen in both bases the right was greater than left to me it does not look markedly different of course it is hard to compare chest x-ray to CT scan.  I do not see any pneumothorax any big infiltrates any pulmonary edema    Assessment & Plan     Hypoxia this is based on SpO2 that is on his toe with very poor Plath I think that is due to 1 the location to his constant movement in 3 is A-fib I am not sure first of all how much I believe is SpO2 monitor and I want to get a arterial blood gas to confirm whether he is indeed significantly hypoxic.  If he is hypoxic then I want to go ahead and get a CT angiogram of the chest this would help rule out clot he is probably in a hypercoagulable state with his rectus sheath hematoma he has been off his anticoagulation for several days in bed restrained so he is set up for potentially developing a clot.  His creatinine was normal this morning I am going to order another he looks like he was dry this morning and may be difficult to if we do a CT angiogram at probably try and get a IV in him and give him a bolus of a liter of fluids to protect his kidneys.  Rectus sheath hematoma he is very tender there tonight and he looks pretty distended I have not seen him before I talked with Dr. Suyapa Vernon I am going to go ahead and order a stat CBC and follow-up his H&H make sure he is not having more bleeding.  Altered mental status  this looks to be delirium both psychiatry and neurology are seeing he has multiple possible factors contributing he has not probably been getting good sleep with his sleep apnea not being able to use a Pap machine he is used to using all the time probably does not help the situation.  If he is having some pain from his rectus hematoma that may be contributing.  Although I will say if you ask him if he is having pain he does not respond affirmatively but I am not sure how much I can put in his responses he certainly winced when I just lightly touched that right abdomen.  Obstructive sleep apnea I would love to be able to treat his sleep apnea but I do not think that is going to be very possible there is no way in his current state he could keep a Pap machine on he is thrashing too much even in restraints and of course you cannot really use a Pap machine in restraints.  I am afraid if we intubate him and sedating for that were just going to make his delirium even worse although if we see signs of further problems that could be an eventuality.  A-fib with rapid ventricular response he has been rapid for his entire admission apparently this is not really a new issue and he was planned for ambulation AV node and pacer placement this month.  Cardiology is following.    Addendum:    His blood gas and I do not have the values I am told looked really good for some reason there is problems it is not crossed over to the computer and it cannot be pulled up I had the team trying to get the results for me.  But they remember that the pO2 was really good and the pH was good they repositioned his pulse oximeter after this they have him on 4 L and he is saturating in the upper 90s.  His chemistries are back his sodium just a little bit higher and his hemoglobin is stable.  After all the agitation he is resting sound asleep now I think that is what he needs the most we are going to try and leave him alone and let him rest.  I do not  think we need to pursue further workup on the low O2 saturations I think it was probably spurious I am still going to wait for them to try and get me the actual values.  Tono Gardiner Jr, MD  01/08/25  19:51 EST    Time: Critical care time 65 minutes thus far

## 2025-01-09 NOTE — PROGRESS NOTES
Electrophysiology Follow-Up Note      Patient Name: Mark Aguirre Jr.  Age/Sex: 79 y.o. male  : 1945  MRN: [id number]    Date of Admission: 2024  Day of Service: 25       Chief Complaint: Confusion     HPI:   Mark Aguirre Jr. is a 79 y.o. male  who presented to the ER on 2024 with increasing nausea, anorexia and vomiting.  He was getting up to walk to the restroom and fell and hit his head on a door frame.  Family then brought him into the ER.     He was seen to be in atrial fibrillation with RVR while here inpatient and in the ER.  EP has been asked to evaluate him sooner as we previously discussed plan for a RV lead pacemaker at the end of January with subsequent AV node ablation in early February.     I just saw him In the office for AF on 2024     He has a history of PAF s/p PVI ablation x2. Unknown when the PVI ablations were, over 8 years ago.      He was seen in office on 10/07 by Dr. Armando. The patient had been in AFIB for the past few months and became more fatigued and had decreased exercise tolerance.      He was admitted on 24 for initiation of dofetilide and CV. His metoprolol was stopped due to low HR after the CV. Dofetilide was decreased to 250mcg BID due to borderline QTC prolongation.      ECG on 11/15/2024 showed prolong QTC. His dofetilide was decreased again to 125mcg BID.       He called the office 2024 for an ECG. He was in AFIB and QTC at that time was prolonged 563. Dofetilide was stopped. Digoxin 125mcg qd was started for rate control.      He still had high heart rate and metoprolol was added.  We discussed an AV node ablation with pacemaker implantation and he was agreeable after talking to his PCP.     PMH: Persistent AFIB s/p ablation x2, aortic stenosis, HTN, STEPHENIE on CPAP, PVCs      Follow up:  24 patient combative and irritated this morning.  He is having confusion and agitation.  He is unable to take oral medications and  heart rate getting into the 160s when he is agitated.  Given an additional dose of IV digoxin as he is unable to get his oral medications this morning.  Heart rate remains in A-fib with intermittent episodes into the 130s to 140s     01/01/25 Last night he had severe agitation again and is back in restrains. His heart rate was sustaining into the 160's for over 30 minutes and he was given a one time does of IV lopressor. He is very confused this morning and crying out. Wife at bedside and case was discussed.         01/03/25 Patient seen at bedside this morning.  He was sleeping and in restraints but able to wake up and respond to some questions appropriately by his wife.  She reports that starting around 9:00 last night is when things got bad and followed when he took some Ativan.  They are wondering if maybe this is the cause for some of the ongoing delirium.  Heart rates have been better controlled in the 80s to 100s with some PVCs.     01/07/25  He was able to get MRI of brain completed today without any acute findings with prior chronic changes in right cerebellum and microhemorrages.      He still remains confused at times but can hold a short conversation. Wife at bedside     On review of chart, it does seem like he had syncope in relation to an episode of AF with RVR in the past around 1.5 years ago.     01/09/25  He had LP completed on 1/8/2025, awaiting final reports. He does seem more clear thi AM and he is talking with daughter and other family member at bedside. He is able to respond back to questioning. HR is better this AM in the 90s.     Nursing concerned he will not be able to take oral meds again, he was sipping water. If not able to take we will continue the PRN Iv metoprolol for now.     He was given a bolus of fluids this AM.     Temp:  [97.5 °F (36.4 °C)-97.8 °F (36.6 °C)] 97.5 °F (36.4 °C)  Heart Rate:  [] 97  Resp:  [20-22] 20  BP: (128-160)/() 128/69     PHYSICAL EXAM    General:  79 y.o. male No acute distress, laying in bed. Alert and Oriented.   Neck: No JVD or carotid bruit  Lungs: Clear to ausculation bilaterally, symmetric  Heart: irregular rhythm with no overt murmurs, rubs or gallops. Normal S1 and S2.   Abdomen: soft, non-tender  Extremities: No lower extremity edema or cyanosis.   Neuo: no lateralizing defects.        Telemetry/EK25: AF with rates into the 90s                I personally viewed and interpreted the patient's EKG/Telemetry data.    Previous testing:   - Echo 2024: LVEF 48.8%. LA moderate dilation. Moderate AS aortic valve area 1.8cm2. RSVP 48mmHg.         MRI OF THE BRAIN WITHOUT CONTRAST 2025  IMPRESSION:     No evidence for acute intracranial pathology.     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     A few incidental chronic microhemorrhages likely related to underlying  amyloid.      Lab Review:   Results from last 7 days   Lab Units 25  03525  0243   SODIUM mmol/L 147* 147* 146*   POTASSIUM mmol/L 3.8 3.8 3.7   CHLORIDE mmol/L 111* 110* 110*   CO2 mmol/L 29.0 27.6 26.0   BUN mg/dL 25* 27* 27*   CREATININE mg/dL 0.61* 0.76 0.76   GLUCOSE mg/dL 88 105* 95   CALCIUM mg/dL 8.4* 8.6 8.8     Results from last 7 days   Lab Units 25  03525  0243   WBC 10*3/mm3 8.55 9.92 9.60   HEMOGLOBIN g/dL 11.7* 12.0* 11.4*   HEMATOCRIT % 35.9* 36.6* 37.1*   PLATELETS 10*3/mm3 202 217 192                       Current Medications:   Scheduled Meds:atorvastatin, 10 mg, Oral, Daily  digoxin, 125 mcg, Oral, Daily  enoxaparin, 40 mg, Subcutaneous, Daily  metoprolol succinate XL, 50 mg, Oral, Daily  OLANZapine, 5 mg, Oral, Nightly  OLANZapine, 5 mg, Oral, Daily With Dinner  sodium chloride, 500 mL, Intravenous, BID  sodium chloride, 10 mL, Intravenous, Q12H  tamsulosin, 0.4 mg, Oral, Daily  vitamin B-12, 1,000 mcg, Oral, Daily          Assessment  /Plan:  Persistent atrial fibrillation- rates better this AM in the 90s. S/p IV dig 250 yesterday and some Iv metprolol.   He did take oral meds the past two days, will try to increase his metoprolol to 50 mg daily  Continue PRN IV lopressor 7.5 for HR over 115.   He has previously failed AAD tx due to qtc prolongation (dofetilide)  We have low suspicion his AF is leading to ongoing delirium.   Plan is for an upcoming AVN ablation and pacemaker implantation with follow up AVN ablation.   Continue digoxin 125 mcg daily for rate control. Dig level was 0.6.  AC including lovenox has been held. Was on eliquis as an OP  PVCs- ongoing, asymptomatic, no long runs. Continue BB. Increasing today.   Acute Delirium in setting of early on set dementia- ongoing-  Dr. Staples following closely as well as neuro  Repeat CT of head was normal   MRI of head was normal, had LP done yesterday. They are considering geropsych admission   Possibly due to ativan, this is now held.  Syncope- likely due to orthostatic changes, could be from AF with RVR but we dont think this is the ongoing driving feature for his current delirium.  He has been very bed bound while in pt but no episodes of bradycardia or pausing noted on tele.     Cameron Denny PA-C  01/09/25  09:15 EST

## 2025-01-10 LAB
ALBUMIN SERPL-MCNC: 3 G/DL (ref 3.5–5.2)
ALBUMIN/GLOB SERPL: 1.2 G/DL
ALP SERPL-CCNC: 50 U/L (ref 39–117)
ALT SERPL W P-5'-P-CCNC: 17 U/L (ref 1–41)
ANION GAP SERPL CALCULATED.3IONS-SCNC: 8.5 MMOL/L (ref 5–15)
AST SERPL-CCNC: 23 U/L (ref 1–40)
BASOPHILS # BLD AUTO: 0.06 10*3/MM3 (ref 0–0.2)
BASOPHILS NFR BLD AUTO: 0.5 % (ref 0–1.5)
BILIRUB SERPL-MCNC: 2.9 MG/DL (ref 0–1.2)
BUN SERPL-MCNC: 23 MG/DL (ref 8–23)
BUN/CREAT SERPL: 27.1 (ref 7–25)
CALCIUM SPEC-SCNC: 8.4 MG/DL (ref 8.6–10.5)
CHLORIDE SERPL-SCNC: 109 MMOL/L (ref 98–107)
CO2 SERPL-SCNC: 25.5 MMOL/L (ref 22–29)
CREAT SERPL-MCNC: 0.85 MG/DL (ref 0.76–1.27)
DEPRECATED RDW RBC AUTO: 43.2 FL (ref 37–54)
EGFRCR SERPLBLD CKD-EPI 2021: 87.3 ML/MIN/1.73
EOSINOPHIL # BLD AUTO: 0.18 10*3/MM3 (ref 0–0.4)
EOSINOPHIL NFR BLD AUTO: 1.6 % (ref 0.3–6.2)
ERYTHROCYTE [DISTWIDTH] IN BLOOD BY AUTOMATED COUNT: 13.4 % (ref 12.3–15.4)
GLOBULIN UR ELPH-MCNC: 2.5 GM/DL
GLUCOSE BLDC GLUCOMTR-MCNC: 108 MG/DL (ref 70–130)
GLUCOSE BLDC GLUCOMTR-MCNC: 117 MG/DL (ref 70–130)
GLUCOSE BLDC GLUCOMTR-MCNC: 132 MG/DL (ref 70–130)
GLUCOSE SERPL-MCNC: 101 MG/DL (ref 65–99)
HCT VFR BLD AUTO: 37.6 % (ref 37.5–51)
HGB BLD-MCNC: 11.7 G/DL (ref 13–17.7)
IMM GRANULOCYTES # BLD AUTO: 0.05 10*3/MM3 (ref 0–0.05)
IMM GRANULOCYTES NFR BLD AUTO: 0.5 % (ref 0–0.5)
LYMPHOCYTES # BLD AUTO: 1.06 10*3/MM3 (ref 0.7–3.1)
LYMPHOCYTES NFR BLD AUTO: 9.6 % (ref 19.6–45.3)
MCH RBC QN AUTO: 27.6 PG (ref 26.6–33)
MCHC RBC AUTO-ENTMCNC: 31.1 G/DL (ref 31.5–35.7)
MCV RBC AUTO: 88.7 FL (ref 79–97)
MONOCYTES # BLD AUTO: 0.89 10*3/MM3 (ref 0.1–0.9)
MONOCYTES NFR BLD AUTO: 8.1 % (ref 5–12)
NEUTROPHILS NFR BLD AUTO: 79.7 % (ref 42.7–76)
NEUTROPHILS NFR BLD AUTO: 8.77 10*3/MM3 (ref 1.7–7)
NRBC BLD AUTO-RTO: 0 /100 WBC (ref 0–0.2)
PLATELET # BLD AUTO: 213 10*3/MM3 (ref 140–450)
PMV BLD AUTO: 11.3 FL (ref 6–12)
POTASSIUM SERPL-SCNC: 3.6 MMOL/L (ref 3.5–5.2)
POTASSIUM SERPL-SCNC: 4.3 MMOL/L (ref 3.5–5.2)
PROCALCITONIN SERPL-MCNC: 0.06 NG/ML (ref 0–0.25)
PROT SERPL-MCNC: 5.5 G/DL (ref 6–8.5)
RBC # BLD AUTO: 4.24 10*6/MM3 (ref 4.14–5.8)
SODIUM SERPL-SCNC: 143 MMOL/L (ref 136–145)
WBC NRBC COR # BLD AUTO: 11.01 10*3/MM3 (ref 3.4–10.8)

## 2025-01-10 PROCEDURE — 92610 EVALUATE SWALLOWING FUNCTION: CPT

## 2025-01-10 PROCEDURE — 25010000002 OLANZAPINE 10 MG RECONSTITUTED SOLUTION: Performed by: SPECIALIST

## 2025-01-10 PROCEDURE — 84132 ASSAY OF SERUM POTASSIUM: CPT | Performed by: INTERNAL MEDICINE

## 2025-01-10 PROCEDURE — 97116 GAIT TRAINING THERAPY: CPT

## 2025-01-10 PROCEDURE — 85025 COMPLETE CBC W/AUTO DIFF WBC: CPT | Performed by: INTERNAL MEDICINE

## 2025-01-10 PROCEDURE — 25010000002 ENOXAPARIN PER 10 MG: Performed by: INTERNAL MEDICINE

## 2025-01-10 PROCEDURE — 80053 COMPREHEN METABOLIC PANEL: CPT | Performed by: INTERNAL MEDICINE

## 2025-01-10 PROCEDURE — 97530 THERAPEUTIC ACTIVITIES: CPT

## 2025-01-10 PROCEDURE — 82948 REAGENT STRIP/BLOOD GLUCOSE: CPT

## 2025-01-10 PROCEDURE — 84145 PROCALCITONIN (PCT): CPT | Performed by: INTERNAL MEDICINE

## 2025-01-10 RX ORDER — LABETALOL HYDROCHLORIDE 5 MG/ML
5 INJECTION, SOLUTION INTRAVENOUS
Status: DISCONTINUED | OUTPATIENT
Start: 2025-01-10 | End: 2025-01-23 | Stop reason: HOSPADM

## 2025-01-10 RX ORDER — EPHEDRINE SULFATE 50 MG/ML
5 INJECTION, SOLUTION INTRAVENOUS ONCE AS NEEDED
Status: DISCONTINUED | OUTPATIENT
Start: 2025-01-10 | End: 2025-01-23 | Stop reason: HOSPADM

## 2025-01-10 RX ORDER — NALOXONE HCL 0.4 MG/ML
0.2 VIAL (ML) INJECTION AS NEEDED
Status: DISCONTINUED | OUTPATIENT
Start: 2025-01-10 | End: 2025-01-23 | Stop reason: HOSPADM

## 2025-01-10 RX ORDER — IPRATROPIUM BROMIDE AND ALBUTEROL SULFATE 2.5; .5 MG/3ML; MG/3ML
3 SOLUTION RESPIRATORY (INHALATION) ONCE AS NEEDED
Status: DISCONTINUED | OUTPATIENT
Start: 2025-01-10 | End: 2025-01-23 | Stop reason: HOSPADM

## 2025-01-10 RX ORDER — POTASSIUM CHLORIDE 750 MG/1
40 TABLET, FILM COATED, EXTENDED RELEASE ORAL EVERY 4 HOURS
Status: COMPLETED | OUTPATIENT
Start: 2025-01-10 | End: 2025-01-10

## 2025-01-10 RX ORDER — PROMETHAZINE HYDROCHLORIDE 25 MG/1
25 TABLET ORAL ONCE AS NEEDED
Status: DISCONTINUED | OUTPATIENT
Start: 2025-01-10 | End: 2025-01-23 | Stop reason: HOSPADM

## 2025-01-10 RX ORDER — POTASSIUM CHLORIDE 750 MG/1
40 TABLET, FILM COATED, EXTENDED RELEASE ORAL ONCE
Status: DISCONTINUED | OUTPATIENT
Start: 2025-01-10 | End: 2025-01-23 | Stop reason: HOSPADM

## 2025-01-10 RX ORDER — ATROPINE SULFATE 0.4 MG/ML
0.4 INJECTION, SOLUTION INTRAMUSCULAR; INTRAVENOUS; SUBCUTANEOUS ONCE AS NEEDED
Status: DISCONTINUED | OUTPATIENT
Start: 2025-01-10 | End: 2025-01-23 | Stop reason: HOSPADM

## 2025-01-10 RX ORDER — FLUMAZENIL 0.1 MG/ML
0.2 INJECTION INTRAVENOUS AS NEEDED
Status: DISCONTINUED | OUTPATIENT
Start: 2025-01-10 | End: 2025-01-23 | Stop reason: HOSPADM

## 2025-01-10 RX ORDER — ONDANSETRON 2 MG/ML
4 INJECTION INTRAMUSCULAR; INTRAVENOUS ONCE AS NEEDED
Status: DISCONTINUED | OUTPATIENT
Start: 2025-01-10 | End: 2025-01-23 | Stop reason: HOSPADM

## 2025-01-10 RX ORDER — DIPHENHYDRAMINE HYDROCHLORIDE 50 MG/ML
12.5 INJECTION INTRAMUSCULAR; INTRAVENOUS
Status: DISCONTINUED | OUTPATIENT
Start: 2025-01-10 | End: 2025-01-23 | Stop reason: HOSPADM

## 2025-01-10 RX ORDER — HYDRALAZINE HYDROCHLORIDE 20 MG/ML
5 INJECTION INTRAMUSCULAR; INTRAVENOUS
Status: DISCONTINUED | OUTPATIENT
Start: 2025-01-10 | End: 2025-01-23 | Stop reason: HOSPADM

## 2025-01-10 RX ORDER — FENTANYL CITRATE 50 UG/ML
25 INJECTION, SOLUTION INTRAMUSCULAR; INTRAVENOUS
Status: DISCONTINUED | OUTPATIENT
Start: 2025-01-10 | End: 2025-01-23 | Stop reason: HOSPADM

## 2025-01-10 RX ORDER — PROMETHAZINE HYDROCHLORIDE 25 MG/1
25 SUPPOSITORY RECTAL ONCE AS NEEDED
Status: DISCONTINUED | OUTPATIENT
Start: 2025-01-10 | End: 2025-01-23 | Stop reason: HOSPADM

## 2025-01-10 RX ADMIN — ATORVASTATIN CALCIUM 10 MG: 10 TABLET, FILM COATED ORAL at 09:22

## 2025-01-10 RX ADMIN — Medication 10 ML: at 21:23

## 2025-01-10 RX ADMIN — POTASSIUM CHLORIDE 40 MEQ: 750 TABLET, EXTENDED RELEASE ORAL at 09:21

## 2025-01-10 RX ADMIN — METOPROLOL TARTRATE 7.5 MG: 1 INJECTION, SOLUTION INTRAVENOUS at 03:42

## 2025-01-10 RX ADMIN — OLANZAPINE 5 MG: 5 TABLET, FILM COATED ORAL at 18:57

## 2025-01-10 RX ADMIN — TAMSULOSIN HYDROCHLORIDE 0.4 MG: 0.4 CAPSULE ORAL at 09:22

## 2025-01-10 RX ADMIN — ENOXAPARIN SODIUM 40 MG: 100 INJECTION SUBCUTANEOUS at 09:22

## 2025-01-10 RX ADMIN — OLANZAPINE 5 MG: 10 INJECTION, POWDER, LYOPHILIZED, FOR SOLUTION INTRAMUSCULAR at 00:08

## 2025-01-10 RX ADMIN — METOPROLOL TARTRATE 7.5 MG: 1 INJECTION, SOLUTION INTRAVENOUS at 14:28

## 2025-01-10 RX ADMIN — METOPROLOL TARTRATE 7.5 MG: 1 INJECTION, SOLUTION INTRAVENOUS at 21:23

## 2025-01-10 RX ADMIN — METOPROLOL TARTRATE 7.5 MG: 1 INJECTION, SOLUTION INTRAVENOUS at 09:34

## 2025-01-10 RX ADMIN — DIGOXIN 125 MCG: 0.12 TABLET ORAL at 09:22

## 2025-01-10 RX ADMIN — OLANZAPINE 5 MG: 5 TABLET, FILM COATED ORAL at 21:23

## 2025-01-10 RX ADMIN — POTASSIUM CHLORIDE 40 MEQ: 750 TABLET, EXTENDED RELEASE ORAL at 13:06

## 2025-01-10 RX ADMIN — Medication 10 ML: at 09:36

## 2025-01-10 RX ADMIN — Medication 1000 MCG: at 09:22

## 2025-01-10 NOTE — CASE MANAGEMENT/SOCIAL WORK
Continued Stay Note  [facility]     Patient Name: Mark Aguirre Jr.  MRN: [id number]  Today's Date: 1/10/2025    Admit Date: 12/28/2024    Plan: TBD pending clinical course   Discharge Plan       Row Name 01/10/25 1230       Plan    Plan TBD pending clinical course    Patient/Family in Agreement with Plan yes    Plan Comments Clinicals reviewed. Pt continues with periods of confusion and restraints. Psych follow for possible enid-psych admission at DC if needed. CCP continues to follow for medical readiness for DC planning. Gabriele RN/CCP                   Discharge Codes    No documentation.                 Expected Discharge Date and Time       Expected Discharge Date Expected Discharge Time    Jan 13, 2025               Gisele Guadarrama RN

## 2025-01-10 NOTE — THERAPY DISCHARGE NOTE
Acute Care - Speech Language Pathology   Swallow Initial Evaluation [facility]     Patient Name: Mark Aguirre Jr.  : 1945  MRN: [id number]  Today's Date: 1/10/2025               Admit Date: 2024    Visit Dx:     ICD-10-CM ICD-9-CM   1. Syncope, unspecified syncope type  R55 780.2   2. Laceration of right ear, initial encounter  S01.311A 872.8   3. Prolonged Q-T interval on ECG  R94.31 794.31   4. Atrial fibrillation, unspecified type  I48.91 427.31     Patient Active Problem List   Diagnosis    Atrial fibrillation    Bifascicular block    STEPHENIE on auto CPAP    Family history of colon cancer    History of colon polyps    Essential hypertension    Hip pain, right    OA (osteoarthritis) of hip    Acute renal failure    Brief psychotic disorder    Family history of colonic polyps    Nonrheumatic aortic valve stenosis    PVC (premature ventricular contraction)    Syncope and collapse    History of adenomatous polyp of colon    Persistent atrial fibrillation    Hypoxemia associated with sleep    Syncope    Acute delirium     Past Medical History:   Diagnosis Date    Acute kidney failure     POST-OP TOTAL HIP 2020    Anticoagulated     PRADAXA FOR A FIB TO HELD 3 DAYS PRIOR    Arthritis     OSTEO. RIGHT HIP    Atrial flutter     Bifascicular block     Cataract     LEFT AND RIGHT    Depression     History of colon polyps     Hypertension     Hypoxemia associated with sleep     Insomnia     Knee pain     STEPHENIE on CPAP 2010    Overnight polysomnogram.  Weight 163 pounds.  Severe STEPHENIE with AHI of 30.9 events per hour.  Low oxygen saturation 72% and sleep-related hypoxia present for 30 minutes    PAF (paroxysmal atrial fibrillation)     Right hip pain     Slow to wake up after anesthesia      Past Surgical History:   Procedure Laterality Date    CARDIAC ABLATION      CARDIAC CATHETERIZATION      COLONOSCOPY N/A 2018    Procedure: COLONOSCOPY INTO CECUM WITH COLD BX POLYPECTOMY ;  Surgeon: Ross  MD Jinny;  Location: [facility] ENDOSCOPY;  Service:     COLONOSCOPY N/A 2/3/2021    Procedure: COLONOSCOPY TOCECUM WITH COLD BX POLYPECTOMY AND HOT SNARE POLYPECTOMY;  Surgeon: Ross Stearns MD;  Location: [facility] ENDOSCOPY;  Service: General;  Laterality: N/A;  PERSONAL HX OF POLYPS, FAMILY HX OF COLON CANCER  --POLYPS (X3), DIVERTICULOSIS    COLONOSCOPY N/A 3/20/2024    Procedure: COLONOSCOPY TO CECUM WITH COLD BX POLYPECTOMIES;  Surgeon: Ross Stearns MD;  Location: [facility] ENDOSCOPY;  Service: General;  Laterality: N/A;  HX POLYPS/POLYPS (3)    HERNIA REPAIR      INGUINAL HERNIA? SIDE    TOTAL HIP ARTHROPLASTY Right 6/5/2020    Procedure: TOTAL HIP ARTHROPLASTY;  Surgeon: Travis Hamlin MD;  Location: [facility] MAIN OR;  Service: Orthopedics;  Laterality: Right;       SLP Recommendation and Plan  SLP Swallowing Diagnosis: swallow WFL/no suspected pharyngeal impairment (01/10/25 0900)  SLP Diet Recommendation: regular textures, thin liquids (01/10/25 0900)  Recommended Precautions and Strategies: upright posture during/after eating, general aspiration precautions (01/10/25 0900)  SLP Rec. for Method of Medication Administration: meds whole, as tolerated (01/10/25 0900)     Monitor for Signs of Aspiration: yes, notify SLP if any concerns (01/10/25 0900)  Recommended Diagnostics: No further SLP services recommended (01/10/25 0900)  Swallow Criteria for Skilled Therapeutic Interventions Met: baseline status (01/10/25 0900)  Anticipated Discharge Disposition (SLP): unknown, No further SLP services warranted (01/10/25 0900)  Rehab Potential/Prognosis, Swallowing: good, to achieve stated therapy goals (01/10/25 0900)  Therapy Frequency (Swallow): evaluation only (01/10/25 0900)  Predicted Duration Therapy Intervention (Days): until discharge (01/10/25 0900)  Oral Care Recommendations: Oral Care BID/PRN (01/10/25 0900)                                        Outcome Evaluation: Clinical swallow eval completed.   Recommend continue with regular textures and thin liquids.  Medications whole as tolerated.  Recommend general aspiration precautions.  SLP to sign off.      SWALLOW EVALUATION (Last 72 Hours)       SLP Adult Swallow Evaluation       Row Name 01/10/25 0900                   Rehab Evaluation    Document Type evaluation  -OC        Subjective Information no complaints  -OC        Patient Observations alert;cooperative;agree to therapy  -OC        Patient Effort good  -OC        Symptoms Noted During/After Treatment none  -OC           General Information    Patient Profile Reviewed yes  -OC        Pertinent History Of Current Problem Patient admitted with syncope. SLP consulte secondary to concern for aspiration risks related to delerium.  -OC        Current Method of Nutrition regular textures;thin liquids  -OC        Precautions/Limitations, Vision WFL;for purposes of eval  -OC        Precautions/Limitations, Hearing WFL;for purposes of eval  -OC        Prior Level of Function-Communication unknown  -OC        Prior Level of Function-Swallowing no diet consistency restrictions  -OC        Plans/Goals Discussed with patient;spouse/S.O.;agreed upon  -OC        Barriers to Rehab none identified  -OC        Patient's Goals for Discharge patient did not state  -OC        Family Goals for Discharge family did not state  -OC           Pain    Pretreatment Pain Rating 0/10 - no pain  -OC        Posttreatment Pain Rating 0/10 - no pain  -OC           Oral Motor Structure and Function    Dentition Assessment natural, present and adequate  -OC        Secretion Management WNL/WFL  -OC        Mucosal Quality moist, healthy  -OC        Volitional Swallow WFL  -OC        Volitional Cough WFL  -OC           Oral Musculature and Cranial Nerve Assessment    Oral Motor General Assessment generalized oral motor weakness  -OC        Oral Motor, Comment slow to follow commands  -OC           Clinical Swallow Eval    Clinical Swallow  Evaluation Summary Clinical swallow eval completed. No overt s/s aspiration with thin via cup/straw, puree, mechanical soft, and regular textures. Adequate mastication and oral clearance. No change in vocal quality. Tangential with cues to attend to PO.  -OC           SLP Evaluation Clinical Impression    SLP Swallowing Diagnosis swallow WFL/no suspected pharyngeal impairment  -OC        Functional Impact no impact on function  -OC        Rehab Potential/Prognosis, Swallowing good, to achieve stated therapy goals  -OC        Swallow Criteria for Skilled Therapeutic Interventions Met baseline status  -OC           Recommendations    Therapy Frequency (Swallow) evaluation only  -OC        Predicted Duration Therapy Intervention (Days) until discharge  -OC        SLP Diet Recommendation regular textures;thin liquids  -OC        Recommended Diagnostics No further SLP services recommended  -OC        Recommended Precautions and Strategies upright posture during/after eating;general aspiration precautions  -OC        Oral Care Recommendations Oral Care BID/PRN  -OC        SLP Rec. for Method of Medication Administration meds whole;as tolerated  -OC        Monitor for Signs of Aspiration yes;notify SLP if any concerns  -OC        Anticipated Discharge Disposition (SLP) unknown;No further SLP services warranted  -OC                  User Key  (r) = Recorded By, (t) = Taken By, (c) = Cosigned By      Initials Name Effective Dates    OC Card, JANNIE Jacobs 08/28/23 -                     EDUCATION  The patient has been educated in the following areas:   Dysphagia (Swallowing Impairment).                Time Calculation:    Time Calculation- SLP       Row Name 01/10/25 1235             Time Calculation- SLP    SLP Start Time 1239  -OC      SLP Received On 01/10/25  -OC         Untimed Charges    SLP Eval/Re-eval  ST Eval Oral Pharyng Swallow - [id number]  -OC      [id number] Eval Oral Pharyng Swallow Minutes 60  -OC         Total  Minutes    Untimed Charges Total Minutes 60  -OC       Total Minutes 60  -OC                User Key  (r) = Recorded By, (t) = Taken By, (c) = Cosigned By      Initials Name Provider Type    Arlene Gonsalez SLP Speech and Language Pathologist                    Therapy Charges for Today       Code Description Service Date Service Provider Modifiers Qty    [id number]  ST EVAL ORAL PHARYNG SWALLOW 4 1/10/2025 Arlene Lopez SLP GN 1                 JANNIE Chapman  1/10/2025

## 2025-01-10 NOTE — CONSULTS
Chap visited with pt, wife and daughter in law.  Pt and chap discussed pt's kids, grandkids, his jessica, and his friends.  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  Chap prayed with family.  Chap remains available.

## 2025-01-10 NOTE — THERAPY TREATMENT NOTE
Patient Name: Mark Aguirre Jr.  : 1945    MRN: [id number]                              Today's Date: 1/10/2025       Admit Date: 2024    Visit Dx:     ICD-10-CM ICD-9-CM   1. Syncope, unspecified syncope type  R55 780.2   2. Laceration of right ear, initial encounter  S01.311A 872.8   3. Prolonged Q-T interval on ECG  R94.31 794.31   4. Atrial fibrillation, unspecified type  I48.91 427.31     Patient Active Problem List   Diagnosis    Atrial fibrillation    Bifascicular block    STEPHENIE on auto CPAP    Family history of colon cancer    History of colon polyps    Essential hypertension    Hip pain, right    OA (osteoarthritis) of hip    Acute renal failure    Brief psychotic disorder    Family history of colonic polyps    Nonrheumatic aortic valve stenosis    PVC (premature ventricular contraction)    Syncope and collapse    History of adenomatous polyp of colon    Persistent atrial fibrillation    Hypoxemia associated with sleep    Syncope    Acute delirium     Past Medical History:   Diagnosis Date    Acute kidney failure     POST-OP TOTAL HIP 2020    Anticoagulated     PRADAXA FOR A FIB TO HELD 3 DAYS PRIOR    Arthritis     OSTEO. RIGHT HIP    Atrial flutter     Bifascicular block     Cataract     LEFT AND RIGHT    Depression     History of colon polyps     Hypertension     Hypoxemia associated with sleep     Insomnia     Knee pain     STEPHENIE on CPAP 2010    Overnight polysomnogram.  Weight 163 pounds.  Severe STEPHENIE with AHI of 30.9 events per hour.  Low oxygen saturation 72% and sleep-related hypoxia present for 30 minutes    PAF (paroxysmal atrial fibrillation)     Right hip pain     Slow to wake up after anesthesia      Past Surgical History:   Procedure Laterality Date    CARDIAC ABLATION      CARDIAC CATHETERIZATION      COLONOSCOPY N/A 2018    Procedure: COLONOSCOPY INTO CECUM WITH COLD BX POLYPECTOMY ;  Surgeon: Ross Stearns MD;  Location: [facility] ENDOSCOPY;  Service:     COLONOSCOPY N/A  2/3/2021    Procedure: COLONOSCOPY TOCECUM WITH COLD BX POLYPECTOMY AND HOT SNARE POLYPECTOMY;  Surgeon: Ross Stearns MD;  Location: [facility] ENDOSCOPY;  Service: General;  Laterality: N/A;  PERSONAL HX OF POLYPS, FAMILY HX OF COLON CANCER  --POLYPS (X3), DIVERTICULOSIS    COLONOSCOPY N/A 3/20/2024    Procedure: COLONOSCOPY TO CECUM WITH COLD BX POLYPECTOMIES;  Surgeon: Ross Stearns MD;  Location: [facility] ENDOSCOPY;  Service: General;  Laterality: N/A;  HX POLYPS/POLYPS (3)    HERNIA REPAIR      INGUINAL HERNIA? SIDE    TOTAL HIP ARTHROPLASTY Right 6/5/2020    Procedure: TOTAL HIP ARTHROPLASTY;  Surgeon: Travis Hamlin MD;  Location: [facility] MAIN OR;  Service: Orthopedics;  Laterality: Right;      General Information       Row Name 01/10/25 1521          Physical Therapy Time and Intention    Document Type therapy note (daily note)  -EB     Mode of Treatment individual therapy;physical therapy  -EB       Row Name 01/10/25 1521          General Information    Patient Profile Reviewed yes  -EB     Existing Precautions/Restrictions fall;oxygen therapy device and L/min  -EB       Row Name 01/10/25 1521          Cognition    Orientation Status (Cognition) oriented to;person  -EB       Row Name 01/10/25 1521          Safety Issues/Impairments Affecting Functional Mobility    Safety Issues Affecting Function (Mobility) ability to follow commands;insight into deficits/self-awareness;impulsivity;judgment;problem-solving;awareness of need for assistance;safety precaution awareness;safety precautions follow-through/compliance;sequencing abilities  -EB     Impairments Affecting Function (Mobility) balance;endurance/activity tolerance;strength;cognition;postural/trunk control  -               User Key  (r) = Recorded By, (t) = Taken By, (c) = Cosigned By      Initials Name Provider Type    EB Pat Mehta PTA Physical Therapist Assistant                   Mobility       Row Name 01/10/25 1522          Bed Mobility     Supine-Sit [place] (Bed Mobility) moderate assist (50% patient effort);maximum assist (25% patient effort)  -EB     Sit-Supine [place] (Bed Mobility) not tested  -EB     Assistive Device (Bed Mobility) bed rails;head of bed elevated  -EB     Comment, (Bed Mobility) Pt has poor follow through with following sequencing cues.  -EB       Row Name 01/10/25 1522          Sit-Stand Transfer    Sit-Stand [place] (Transfers) minimum assist (75% patient effort)  -EB     Assistive Device (Sit-Stand Transfers) walker, front-wheeled  -EB       Row Name 01/10/25 1522          Gait/Stairs (Locomotion)    [place] Level (Gait) minimum assist (75% patient effort)  -EB     Assistive Device (Gait) walker, front-wheeled  -EB     Distance in Feet (Gait) 3  -EB     Deviations/Abnormal Patterns (Gait) gait speed decreased;miki decreased;stride length decreased  -EB     Bilateral Gait Deviations forward flexed posture;heel strike decreased  -EB     Comment, (Gait/Stairs) unsteady and does not follow commands well, deferred further gait today for safety.  -EB               User Key  (r) = Recorded By, (t) = Taken By, (c) = Cosigned By      Initials Name Provider Type    Pat Meeks PTA Physical Therapist Assistant                   Obj/Interventions       Row Name 01/10/25 1529          Motor Skills    Therapeutic Exercise --  BLE: LAQs and seated marches (X10)  -EB       Row Name 01/10/25 1529          Balance    Balance Assessment sitting static balance  -EB     Static Sitting Balance contact guard;minimal assist  -EB     Position, Sitting Balance sitting edge of bed  -EB     Comment, Balance Gina to initially sitting but CGA for remainder of sitting EOB  -EB               User Key  (r) = Recorded By, (t) = Taken By, (c) = Cosigned By      Initials Name Provider Type    Pat Meesk PTA Physical Therapist Assistant                   Goals/Plan    No documentation.                  Clinical Impression        Row Name 01/10/25 1530          Pain    Pretreatment Pain Rating 0/10 - no pain  -EB       Row Name 01/10/25 1530          Plan of Care Review    Plan of Care Reviewed With patient  -EB     Progress no change  -EB     Outcome Evaluation Pt seen for PT tx today. Pt just finished a bath with nsg in the bed. Pt goal up to the chair this PM. Pt has impaired cognition which decreases pt's ability to follow commands and sequencing cues. Pt required modA/MaxA with supine to sit. Pt initially needed Gina for sitting balance then CGA with increased time. Pt stood with Gina and ambulated 3ft with rwx to the chair. Pt required safety cues to keep hands on walker and to finish turns before attempting to sit. Deferred further gait today d/t pt's decreased safety awareness and cognition. Will continue to follow and progress pt as able.  -EB       Row Name 01/10/25 1530          Therapy Assessment/Plan (PT)    Therapy Frequency (PT) 5 times/wk  -EB       Row Name 01/10/25 1530          Positioning and Restraints    Pre-Treatment Position in bed  -EB     Post Treatment Position chair  -EB     In Chair reclined;call light within reach;encouraged to call for assist;exit alarm on  -EB               User Key  (r) = Recorded By, (t) = Taken By, (c) = Cosigned By      Initials Name Provider Type    Pat Meeks PTA Physical Therapist Assistant                   Outcome Measures       Row Name 01/10/25 5450          How much help from another person do you currently need...    Turning from your back to your side while in flat bed without using bedrails? 2  -EB     Moving from lying on back to sitting on the side of a flat bed without bedrails? 2  -EB     Moving to and from a bed to a chair (including a wheelchair)? 3  -EB     Standing up from a chair using your arms (e.g., wheelchair, bedside chair)? 2  -EB     Climbing 3-5 steps with a railing? 1  -EB     To walk in hospital room? 2  -EB     AM-PAC 6 Clicks Score (PT) 12  -EB      Highest Level of Mobility Goal 4 --> Transfer to chair/commode  -               User Key  (r) = Recorded By, (t) = Taken By, (c) = Cosigned By      Initials Name Provider Type    EB Pat Mehta PTA Physical Therapist Assistant                                 Physical Therapy Education       Title: PT OT SLP Therapies (In Progress)       Topic: Physical Therapy (In Progress)       Point: Mobility training (In Progress)       Learning Progress Summary            Patient Acceptance, E,D, VU,NR by  at 1/10/2025 1536    Acceptance, E,TB, VU,NR by  at 1/9/2025 1200    Acceptance, E,TB,D, VU,NR by  at 1/4/2025 1504    Acceptance, E, NR by  at 12/31/2024 1525   Family Acceptance, E, NR by  at 12/31/2024 1525                      Point: Home exercise program (In Progress)       Learning Progress Summary            Patient Acceptance, E,D, VU,NR by  at 1/10/2025 1536    Acceptance, E, NR by  at 12/31/2024 1525   Family Acceptance, E, NR by  at 12/31/2024 1525                      Point: Body mechanics (In Progress)       Learning Progress Summary            Patient Acceptance, E,D, VU,NR by  at 1/10/2025 1536    Acceptance, E,TB, VU,NR by  at 1/9/2025 1200    Acceptance, E,TB,D, VU,NR by  at 1/4/2025 1504    Acceptance, E, NR by  at 12/31/2024 1525   Family Acceptance, E, NR by  at 12/31/2024 1525                      Point: Precautions (In Progress)       Learning Progress Summary            Patient Acceptance, E,TB,D, VU,NR by  at 1/4/2025 1504    Acceptance, E, NR by  at 12/31/2024 1525   Family Acceptance, E, NR by  at 12/31/2024 1525                                      User Key       Initials Effective Dates Name Provider Type Discipline     06/16/21 -  Sue Padilla, PT Physical Therapist PT     06/16/21 -  Sima Anne, PT Physical Therapist PT     02/14/23 -  Pat Mehta PTA Physical Therapist Assistant PT    ST 09/22/22 -  Demi Otero, PT Physical Therapist  PT                  PT Recommendation and Plan     Progress: no change  Outcome Evaluation: Pt seen for PT tx today. Pt just finished a bath with nsg in the bed. Pt goal up to the chair this PM. Pt has impaired cognition which decreases pt's ability to follow commands and sequencing cues. Pt required modA/MaxA with supine to sit. Pt initially needed Gina for sitting balance then CGA with increased time. Pt stood with Gina and ambulated 3ft with rwx to the chair. Pt required safety cues to keep hands on walker and to finish turns before attempting to sit. Deferred further gait today d/t pt's decreased safety awareness and cognition. Will continue to follow and progress pt as able.     Time Calculation:         PT Charges       Row Name 01/10/25 1536             Time Calculation    Start Time 1323  -EB      Stop Time 1346  -EB      Time Calculation (min) 23 min  -EB      PT Received On 01/10/25  -EB      PT - Next Appointment 01/13/25  -EB         Time Calculation- PT    Total Timed Code Minutes- PT 23 minute(s)  -EB                User Key  (r) = Recorded By, (t) = Taken By, (c) = Cosigned By      Initials Name Provider Type    EB Pat Mehta PTA Physical Therapist Assistant                  Therapy Charges for Today       Code Description Service Date Service Provider Modifiers Qty    11059090549 HC GAIT TRAINING EA 15 MIN 1/10/2025 Pat Mehta PTA GP 1    29273981172 HC PT THERAPEUTIC ACT EA 15 MIN 1/10/2025 Pat Mehta PTA GP 1            PT G-Codes  Outcome Measure Options: AM-PAC 6 Clicks Basic Mobility (PT)  AM-PAC 6 Clicks Score (PT): 12       Pat Mehta PTA  1/10/2025

## 2025-01-10 NOTE — PLAN OF CARE
Goal Outcome Evaluation:         Pt is currently asleep. Pt has been restless, agitated, but cooperative. Pt was given IM zyprexa which was effective for some time. Also, pt was given x 2 doses of metoprolol IVP for sustained elevated HR, pt still in AFIB. Pt is still in bilateral upper ext restraints. Pt tolerated being out of restraints at the beginning of shift, but once agitated pt did not tolerate well. Pt still having visual hallucinations and paranoid delusions. Pt's spouse is at bedside.

## 2025-01-10 NOTE — PROGRESS NOTES
"Nutrition Services    Patient Name: Mark Aguirre Jr.  YOB: 1945  MRN: [id number]  Admission date: 12/28/2024    PROGRESS NOTE      Encounter Information: Pt has eaten 0% of 1 recorded meal since he was last seen by this RD. MD reporting pt was able to eat some ice cream yesterday. MD to consider megace once pt on full dose of lovenox per note today.        PO Diet: Diet: Cardiac; Healthy Heart (2-3 Na+); Fluid Consistency: Thin (IDDSI 0)   PO Supplements: Magic Cups TID, Boost TID   PO Intake:  0% one recorded meal since last seen by RD       Current nutrition support: -   Nutrition support review: -       Labs (reviewed below): Reviewed        GI Function:  Last BM 1/6       Nutrition Intervention Updates: Continue Magic Cups and Boost TID  Encourage PO intake  Recommend appetite stimulant if appropriate        Results from last 7 days   Lab Units 01/10/25  0329 01/09/25  0351 01/08/25  2020 01/08/25  0243 01/05/25  0908   SODIUM mmol/L 143 147* 147* 146* 144   POTASSIUM mmol/L 3.6 3.8 3.8 3.7 3.7   CHLORIDE mmol/L 109* 111* 110* 110* 107   CO2 mmol/L 25.5 29.0 27.6 26.0 25.0   BUN mg/dL 23 25* 27* 27* 24*   CREATININE mg/dL 0.85 0.61* 0.76 0.76 0.80   CALCIUM mg/dL 8.4* 8.4* 8.6 8.8 8.9   BILIRUBIN mg/dL 2.9*  --   --  2.5* 2.4*   ALK PHOS U/L 50  --   --  51 52   ALT (SGPT) U/L 17  --   --  20 21   AST (SGOT) U/L 23  --   --  20 26   GLUCOSE mg/dL 101* 88 105* 95 103*     Results from last 7 days   Lab Units 01/10/25  0329   HEMOGLOBIN g/dL 11.7*   HEMATOCRIT % 37.6     COVID19   Date Value Ref Range Status   12/28/2024 Not Detected Not Detected - Ref. Range Final     No results found for: \"HGBA1C\"    Wt Readings from Last 10 Encounters:   01/08/25 1100 95.6 kg (210 lb 12.2 oz)   12/29/24 0818 110 kg (241 lb 8 oz)   12/17/24 0739 103 kg (226 lb)   12/13/24 0930 103 kg (228 lb)   10/07/24 1123 104 kg (229 lb)   09/05/24 0803 104 kg (229 lb 4.5 oz)   08/21/24 1104 102 kg (224 lb)   03/20/24 1019 " 99.9 kg (220 lb 4.8 oz)   03/19/24 0912 102 kg (224 lb)   02/27/24 0957 102 kg (225 lb)   12/04/23 1030 101 kg (223 lb)   11/29/23 0831 102 kg (224 lb 12.8 oz)     RD to follow up per protocol.    Electronically signed by:  Jose Weaver RDN, LD  01/10/25 16:12 EST

## 2025-01-10 NOTE — PLAN OF CARE
He continues to have elevated heart rates but is more mentally clear overnight.  Patient family happy with his improvement.  He only needed 1 additional dose of antipsychotics last night for confusion.    He is not reliably taking his oral medicines and we feel like this is driving the reason he is having so much breakthrough atrial fibrillation with RVR.    Going to switch his metoprolol oral to IV until he is reliably taking oral medicine again.  Hopefully this will help provide better coverage and keeping his heart rates down more consistently.

## 2025-01-10 NOTE — PLAN OF CARE
Goal Outcome Evaluation:  Plan of Care Reviewed With: patient        Progress: no change  Outcome Evaluation: Pt seen for PT tx today. Pt just finished a bath with nsg in the bed. Pt goal up to the chair this PM. Pt has impaired cognition which decreases pt's ability to follow commands and sequencing cues. Pt required modA/MaxA with supine to sit. Pt initially needed Gina for sitting balance then CGA with increased time. Pt stood with Gina and ambulated 3ft with rwx to the chair. Pt required safety cues to keep hands on walker and to finish turns before attempting to sit. Deferred further gait today d/t pt's decreased safety awareness and cognition. Will continue to follow and progress pt as able.

## 2025-01-10 NOTE — PROGRESS NOTES
"The patient is again in bright spirits today, and wife seems to feel as though she is doing better though his confusional symptoms persist, with the patient stating that we were \"in [place] yesterday\" and being unable to identify our location today.  His heart rate remains greatly elevated.  It is my hope that the patient's mentation may return to baseline with resolution of his atrial fibrillation, but I remain concerned that this episode may have unmasked a pre-existing dementia.  "

## 2025-01-10 NOTE — PROGRESS NOTES
Patient is tolerating room air.  Procalcitonin was negative.  Respiratory status appears to be stable.    Pulmonary will be available as needed.    Electronically signed by Giancarlo Saucedo MD, 01/10/25, 12:09 PM EST.

## 2025-01-10 NOTE — PROGRESS NOTES
Subjective:  Up in the chair yesterday talking nonstop but with some more appropriate conversations with family.  Did require IM Zyprexa last night but did not require Ativan.  Did take an more fluid by mouth yesterday and some ice cream.  Wife at bedside this morning.      Objective:  Vitals:    01/09/25 1951 01/09/25 2322 01/10/25 0341 01/10/25 0342   BP: 135/89 125/84 145/72 145/72   BP Location: Left arm Left arm Left arm    Patient Position: Lying Lying Lying    Pulse: 102 (!) 135 114    Resp: 16 16 18    Temp: 98.6 °F (37 °C)      TempSrc: Oral      SpO2: 99% 95% 98%    Weight:       Height:         General: Easily aroused, did follow directions for me as far as taking deep breaths,  Heart: Irregularly irregular  Lungs: No rales or rhonchi noted  Abdomen: Soft, he does grimace to palpation in the right lower quadrant at the site of known rectus hematoma  Extremities: No edema    Recent Results (from the past 24 hours)   Procalcitonin    Collection Time: 01/10/25  3:29 AM    Specimen: Blood   Result Value Ref Range    Procalcitonin 0.06 0.00 - 0.25 ng/mL   Comprehensive Metabolic Panel    Collection Time: 01/10/25  3:29 AM    Specimen: Blood   Result Value Ref Range    Glucose 101 (H) 65 - 99 mg/dL    BUN 23 8 - 23 mg/dL    Creatinine 0.85 0.76 - 1.27 mg/dL    Sodium 143 136 - 145 mmol/L    Potassium 3.6 3.5 - 5.2 mmol/L    Chloride 109 (H) 98 - 107 mmol/L    CO2 25.5 22.0 - 29.0 mmol/L    Calcium 8.4 (L) 8.6 - 10.5 mg/dL    Total Protein 5.5 (L) 6.0 - 8.5 g/dL    Albumin 3.0 (L) 3.5 - 5.2 g/dL    ALT (SGPT) 17 1 - 41 U/L    AST (SGOT) 23 1 - 40 U/L    Alkaline Phosphatase 50 39 - 117 U/L    Total Bilirubin 2.9 (H) 0.0 - 1.2 mg/dL    Globulin 2.5 gm/dL    A/G Ratio 1.2 g/dL    BUN/Creatinine Ratio 27.1 (H) 7.0 - 25.0    Anion Gap 8.5 5.0 - 15.0 mmol/L    eGFR 87.3 >60.0 mL/min/1.73   CBC Auto Differential    Collection Time: 01/10/25  3:29 AM    Specimen: Blood   Result Value Ref Range    WBC 11.01 (H) 3.40 -  10.80 10*3/mm3    RBC 4.24 4.14 - 5.80 10*6/mm3    Hemoglobin 11.7 (L) 13.0 - 17.7 g/dL    Hematocrit 37.6 37.5 - 51.0 %    MCV 88.7 79.0 - 97.0 fL    MCH 27.6 26.6 - 33.0 pg    MCHC 31.1 (L) 31.5 - 35.7 g/dL    RDW 13.4 12.3 - 15.4 %    RDW-SD 43.2 37.0 - 54.0 fl    MPV 11.3 6.0 - 12.0 fL    Platelets 213 140 - 450 10*3/mm3    Neutrophil % 79.7 (H) 42.7 - 76.0 %    Lymphocyte % 9.6 (L) 19.6 - 45.3 %    Monocyte % 8.1 5.0 - 12.0 %    Eosinophil % 1.6 0.3 - 6.2 %    Basophil % 0.5 0.0 - 1.5 %    Immature Grans % 0.5 0.0 - 0.5 %    Neutrophils, Absolute 8.77 (H) 1.70 - 7.00 10*3/mm3    Lymphocytes, Absolute 1.06 0.70 - 3.10 10*3/mm3    Monocytes, Absolute 0.89 0.10 - 0.90 10*3/mm3    Eosinophils, Absolute 0.18 0.00 - 0.40 10*3/mm3    Basophils, Absolute 0.06 0.00 - 0.20 10*3/mm3    Immature Grans, Absolute 0.05 0.00 - 0.05 10*3/mm3    nRBC 0.0 0.0 - 0.2 /100 WBC      Imaging Results (Last 24 Hours)       Procedure Component Value Units Date/Time    IR LUMBAR PUNCTURE DIAGNOSTIC [[id number]] Collected: 01/08/25 1544     Updated: 01/09/25 0828    Narrative:         Exam:  FLUOROSCOPICALLY GUIDED DIAGNOSTIC LUMBAR PUNCTURE:     DATE:  1/8/2025 2:45 PM     HISTORY:  persistent encephalopathy and delirium; R55-Syncope and collapse;  S01.311A-Laceration without foreign body of right ear, initial  encounter; R94.31-Abnormal electrocardiogram (ECG) (EKG);  I48.91-Unspecified atrial fibrillation        TECHNIQUE:  Informed consent was obtained. The lower back was prepped with betadine  and draped in the usual sterile fashion. Pre-cutaneous handwashing and  sterile gloves were worn. 1% Lidocaine solution was used for local  anesthesia. Using fluoroscopic guidance, a 22-gauge spinal needle was  advanced into the thecal sac at L3.     *  Dose Area Product: 41.3 uGym2     FINDINGS:  *  Hard copy fluoroscopic image shows good position of the needle in the  thecal sac.  *  5 mL of clear CSF fluid was obtained via gravity drip method  for  special beta amyloid testing.  *  8 mL of clear CSF fluid was sent for routine laboratory assessment.       Impression:      Successful fluoroscopically guided diagnostic lumbar puncture.        Procedure performed by KEISHA Mcclain.  By electronically signing this report, I, the supervising radiologist,  attest that I was not present for the procedure(s) but agree with the  final edited report.     This report was finalized on 1/9/2025 8:25 AM by Dr. Roberto Westfall M.D on Workstation: [id number]                Assessment and plan  Delirium/confusion-awaiting protein studies from CSF regarding Alzheimer's.  Still gets quite agitated at night.  Did better during the day yesterday and I do think getting him up with physical therapy helps.  I would like them to get him up walking again today.  Discussed with wife today again about possible need for geropsych admit once medically stable.  Psychiatry is following.  Atrial fibrillation-heart rate down a little bit but still running frequently in the 110 to 140.  Cardiology following.  Anticoagulation had been on hold because of rectus sheath hematoma.  Started back on DVT dose Lovenox yesterday and hemoglobin stable.  Still quite tender in that right lower quadrant.  I am going to continue the Lovenox 40 today and then likely start back to full dose tomorrow.  Eventually would like to get him back to taking oral meds well back on his Eliquis  Respiratory-lungs remain fairly clear on exam.  He is at risk for aspiration.  I will ask speech to see him today.  Pulmonary is seeing him due to drop in oxygen sats that looks to be related to spurious values from the sat monitor.  He does have atelectasis on chest x-ray.  Pulmonary asked for procalcitonin this morning which appears to be normal.  White count bumped up a little bit and we will continue to monitor.  Hopefully getting him up and keeping him more alert during the day can help with respiratory status and his  orientation.  Ideally would be able to get him back to tolerating using his CPAP again for his sleep apnea.  Rectus sheath hematoma-does not appear to have ongoing bleeding.  Started back on Lovenox 40 yesterday for DVT  prophylaxis and if remains stable we will plan on going back to full dose Lovenox for A-fib anticoagulation tomorrow.  Fluids/nutrition-taking in more fluids yesterday.  Received some IV fluids because of hypernatremia and looking drive yesterday.  Will consider Megace for appetite stimulation once on full dose Lovenox.  Replace potassium today.

## 2025-01-10 NOTE — PLAN OF CARE
Goal Outcome Evaluation:  Plan of Care Reviewed With: patient, spouse           Outcome Evaluation: Clinical swallow eval completed.  Recommend continue with regular textures and thin liquids.  Medications whole as tolerated.  Recommend general aspiration precautions.  SLP to sign off.    Anticipated Discharge Disposition (SLP): unknown, No further SLP services warranted          SLP Swallowing Diagnosis: swallow WFL/no suspected pharyngeal impairment (01/10/25 0900)

## 2025-01-11 LAB
ANION GAP SERPL CALCULATED.3IONS-SCNC: 10.8 MMOL/L (ref 5–15)
BACTERIA SPEC AEROBE CULT: NORMAL
BASOPHILS # BLD AUTO: 0.05 10*3/MM3 (ref 0–0.2)
BASOPHILS NFR BLD AUTO: 0.5 % (ref 0–1.5)
BUN SERPL-MCNC: 22 MG/DL (ref 8–23)
BUN/CREAT SERPL: 28.6 (ref 7–25)
CALCIUM SPEC-SCNC: 9 MG/DL (ref 8.6–10.5)
CHLORIDE SERPL-SCNC: 109 MMOL/L (ref 98–107)
CO2 SERPL-SCNC: 24.2 MMOL/L (ref 22–29)
CREAT SERPL-MCNC: 0.77 MG/DL (ref 0.76–1.27)
DEPRECATED RDW RBC AUTO: 43.8 FL (ref 37–54)
EGFRCR SERPLBLD CKD-EPI 2021: 89.9 ML/MIN/1.73
EOSINOPHIL # BLD AUTO: 0.08 10*3/MM3 (ref 0–0.4)
EOSINOPHIL NFR BLD AUTO: 0.8 % (ref 0.3–6.2)
ERYTHROCYTE [DISTWIDTH] IN BLOOD BY AUTOMATED COUNT: 13.7 % (ref 12.3–15.4)
GLUCOSE BLDC GLUCOMTR-MCNC: 101 MG/DL (ref 70–130)
GLUCOSE BLDC GLUCOMTR-MCNC: 111 MG/DL (ref 70–130)
GLUCOSE BLDC GLUCOMTR-MCNC: 97 MG/DL (ref 70–130)
GLUCOSE BLDC GLUCOMTR-MCNC: 98 MG/DL (ref 70–130)
GLUCOSE SERPL-MCNC: 104 MG/DL (ref 65–99)
GRAM STN SPEC: NORMAL
HCT VFR BLD AUTO: 38.3 % (ref 37.5–51)
HGB BLD-MCNC: 12.6 G/DL (ref 13–17.7)
IMM GRANULOCYTES # BLD AUTO: 0.04 10*3/MM3 (ref 0–0.05)
IMM GRANULOCYTES NFR BLD AUTO: 0.4 % (ref 0–0.5)
LYMPHOCYTES # BLD AUTO: 1.08 10*3/MM3 (ref 0.7–3.1)
LYMPHOCYTES NFR BLD AUTO: 11.1 % (ref 19.6–45.3)
MCH RBC QN AUTO: 29.2 PG (ref 26.6–33)
MCHC RBC AUTO-ENTMCNC: 32.9 G/DL (ref 31.5–35.7)
MCV RBC AUTO: 88.9 FL (ref 79–97)
MONOCYTES # BLD AUTO: 0.8 10*3/MM3 (ref 0.1–0.9)
MONOCYTES NFR BLD AUTO: 8.2 % (ref 5–12)
NEUTROPHILS NFR BLD AUTO: 7.67 10*3/MM3 (ref 1.7–7)
NEUTROPHILS NFR BLD AUTO: 79 % (ref 42.7–76)
NRBC BLD AUTO-RTO: 0 /100 WBC (ref 0–0.2)
PLATELET # BLD AUTO: 235 10*3/MM3 (ref 140–450)
PMV BLD AUTO: 10.9 FL (ref 6–12)
POTASSIUM SERPL-SCNC: 4.2 MMOL/L (ref 3.5–5.2)
RBC # BLD AUTO: 4.31 10*6/MM3 (ref 4.14–5.8)
SODIUM SERPL-SCNC: 144 MMOL/L (ref 136–145)
WBC NRBC COR # BLD AUTO: 9.72 10*3/MM3 (ref 3.4–10.8)

## 2025-01-11 PROCEDURE — 85025 COMPLETE CBC W/AUTO DIFF WBC: CPT | Performed by: INTERNAL MEDICINE

## 2025-01-11 PROCEDURE — 25010000002 OLANZAPINE 10 MG RECONSTITUTED SOLUTION: Performed by: SPECIALIST

## 2025-01-11 PROCEDURE — 80048 BASIC METABOLIC PNL TOTAL CA: CPT | Performed by: INTERNAL MEDICINE

## 2025-01-11 PROCEDURE — 82948 REAGENT STRIP/BLOOD GLUCOSE: CPT

## 2025-01-11 PROCEDURE — 25010000002 ENOXAPARIN PER 10 MG: Performed by: INTERNAL MEDICINE

## 2025-01-11 RX ORDER — ENOXAPARIN SODIUM 100 MG/ML
1 INJECTION SUBCUTANEOUS EVERY 12 HOURS
Status: DISCONTINUED | OUTPATIENT
Start: 2025-01-11 | End: 2025-01-15

## 2025-01-11 RX ORDER — MEGESTROL ACETATE 40 MG/ML
400 SUSPENSION ORAL DAILY
Status: DISCONTINUED | OUTPATIENT
Start: 2025-01-11 | End: 2025-01-23 | Stop reason: HOSPADM

## 2025-01-11 RX ADMIN — Medication 10 ML: at 13:05

## 2025-01-11 RX ADMIN — TAMSULOSIN HYDROCHLORIDE 0.4 MG: 0.4 CAPSULE ORAL at 13:04

## 2025-01-11 RX ADMIN — OLANZAPINE 5 MG: 10 INJECTION, POWDER, LYOPHILIZED, FOR SOLUTION INTRAMUSCULAR at 03:17

## 2025-01-11 RX ADMIN — DIGOXIN 125 MCG: 0.12 TABLET ORAL at 13:04

## 2025-01-11 RX ADMIN — OLANZAPINE 5 MG: 5 TABLET, FILM COATED ORAL at 17:57

## 2025-01-11 RX ADMIN — ENOXAPARIN SODIUM 100 MG: 100 INJECTION SUBCUTANEOUS at 13:04

## 2025-01-11 RX ADMIN — METOPROLOL TARTRATE 7.5 MG: 1 INJECTION, SOLUTION INTRAVENOUS at 03:17

## 2025-01-11 RX ADMIN — Medication 1000 MCG: at 13:04

## 2025-01-11 RX ADMIN — METOPROLOL TARTRATE 7.5 MG: 1 INJECTION, SOLUTION INTRAVENOUS at 09:29

## 2025-01-11 RX ADMIN — METOPROLOL TARTRATE 7.5 MG: 1 INJECTION, SOLUTION INTRAVENOUS at 20:19

## 2025-01-11 RX ADMIN — METOPROLOL TARTRATE 7.5 MG: 1 INJECTION, SOLUTION INTRAVENOUS at 14:33

## 2025-01-11 RX ADMIN — Medication 10 ML: at 20:20

## 2025-01-11 RX ADMIN — SENNOSIDES AND DOCUSATE SODIUM 2 TABLET: 50; 8.6 TABLET ORAL at 13:04

## 2025-01-11 RX ADMIN — MEGESTROL ACETATE 400 MG: 40 SUSPENSION ORAL at 13:04

## 2025-01-11 RX ADMIN — ATORVASTATIN CALCIUM 10 MG: 10 TABLET, FILM COATED ORAL at 13:04

## 2025-01-11 NOTE — PLAN OF CARE
Goal Outcome Evaluation:                    Pt much more restless during this night shift than the shift prior. Pt had to be given IM zyprexa for agitation, intervention not very effective. Pt unable to tolerate being out of restraints with out supervision. Pt's wife is still at bedside attentive to pt care

## 2025-01-11 NOTE — PROGRESS NOTES
"[facility] Clinical Pharmacy Services: Enoxaparin Consult    Mark Delaneykelly Lock has a pharmacy consult to dose full-dose enoxaparin per Dr. Staples's request.     Indication: A Fib - requiring full anticoagulation       Relevant clinical data and objective history reviewed:  79 y.o. male 185.4 cm (73\") 95.6 kg (210 lb 12.2 oz)   Body mass index is 27.81 kg/m².   Results from last 7 days   Lab Units 01/11/25  0806   PLATELETS 10*3/mm3 235     Estimated Creatinine Clearance: 101.7 mL/min (by C-G formula based on SCr of 0.77 mg/dL).    Assessment/Plan    Will start patient on  100 (1mg/kg) subcutaneous every 12 hours, adjusted for renal function. Consult order will be discontinued but pharmacy will continue to follow.     Galilea Stephens, [facility]  Clinical Pharmacist    "

## 2025-01-11 NOTE — PLAN OF CARE
Goal Outcome Evaluation:               Patient is confused and restless. He stayed up in the chair for about 3 hours, engaged in conversations with his family, but mostly incoherent. Ate about 25% of his lunch and took his medications crushed in apple sauce. He became agitated after he went back to bed and taking the Zyprexa. His heart rate was under 100 for about an hour after the IV dose of metoprolol, but has been over 130 this evening. Family has remained at bedside and are attentive and involved in patient care. Care plan ongoing.

## 2025-01-11 NOTE — PROGRESS NOTES
Subjective: Was up to chair yesterday and conversing with family.  Wife provides details but he was never oriented to place but having cognizant conversations about recent events with family members.  Placedo's again last night became agitated and required restraints again.  Ended up getting IM Zyprexa at 3 AM.  Did not require any lorazepam.  Did eat a couple bites of a chicken sandwich yesterday and took in some fluids.    Objective:  Vitals:    01/10/25 2318 01/10/25 2331 01/11/25 0317 01/11/25 0730   BP: 132/91   116/93   BP Location: Left arm   Left arm   Patient Position: Lying   Lying   Pulse: (!) 131 (!) 124 (!) 143 (!) 127   Resp:  20  20   Temp:  97.7 °F (36.5 °C)  98.1 °F (36.7 °C)   TempSrc:  Oral  Oral   SpO2:  94%     Weight:       Height:         General: Arousable, confused and restrained  Heart: Irregularly irregular, tachycardic  Lungs: Without rhonchi or rales  Abdomen: Soft still somewhat tender in right lower quadrant  Extremities: No edema    Recent Results (from the past 24 hours)   POC Glucose Once    Collection Time: 01/10/25 11:13 AM    Specimen: Blood   Result Value Ref Range    Glucose 117 70 - 130 mg/dL   POC Glucose Once    Collection Time: 01/10/25  4:02 PM    Specimen: Blood   Result Value Ref Range    Glucose 132 (H) 70 - 130 mg/dL   Potassium    Collection Time: 01/10/25  7:44 PM    Specimen: Blood   Result Value Ref Range    Potassium 4.3 3.5 - 5.2 mmol/L   POC Glucose Once    Collection Time: 01/10/25  8:50 PM    Specimen: Blood   Result Value Ref Range    Glucose 108 70 - 130 mg/dL   POC Glucose Once    Collection Time: 01/11/25  6:26 AM    Specimen: Blood   Result Value Ref Range    Glucose 98 70 - 130 mg/dL   Basic Metabolic Panel    Collection Time: 01/11/25  8:06 AM    Specimen: Blood   Result Value Ref Range    Glucose 104 (H) 65 - 99 mg/dL    BUN 22 8 - 23 mg/dL    Creatinine 0.77 0.76 - 1.27 mg/dL    Sodium 144 136 - 145 mmol/L    Potassium 4.2 3.5 - 5.2 mmol/L    Chloride  109 (H) 98 - 107 mmol/L    CO2 24.2 22.0 - 29.0 mmol/L    Calcium 9.0 8.6 - 10.5 mg/dL    BUN/Creatinine Ratio 28.6 (H) 7.0 - 25.0    Anion Gap 10.8 5.0 - 15.0 mmol/L    eGFR 89.9 >60.0 mL/min/1.73   CBC Auto Differential    Collection Time: 01/11/25  8:06 AM    Specimen: Blood   Result Value Ref Range    WBC 9.72 3.40 - 10.80 10*3/mm3    RBC 4.31 4.14 - 5.80 10*6/mm3    Hemoglobin 12.6 (L) 13.0 - 17.7 g/dL    Hematocrit 38.3 37.5 - 51.0 %    MCV 88.9 79.0 - 97.0 fL    MCH 29.2 26.6 - 33.0 pg    MCHC 32.9 31.5 - 35.7 g/dL    RDW 13.7 12.3 - 15.4 %    RDW-SD 43.8 37.0 - 54.0 fl    MPV 10.9 6.0 - 12.0 fL    Platelets 235 140 - 450 10*3/mm3    Neutrophil % 79.0 (H) 42.7 - 76.0 %    Lymphocyte % 11.1 (L) 19.6 - 45.3 %    Monocyte % 8.2 5.0 - 12.0 %    Eosinophil % 0.8 0.3 - 6.2 %    Basophil % 0.5 0.0 - 1.5 %    Immature Grans % 0.4 0.0 - 0.5 %    Neutrophils, Absolute 7.67 (H) 1.70 - 7.00 10*3/mm3    Lymphocytes, Absolute 1.08 0.70 - 3.10 10*3/mm3    Monocytes, Absolute 0.80 0.10 - 0.90 10*3/mm3    Eosinophils, Absolute 0.08 0.00 - 0.40 10*3/mm3    Basophils, Absolute 0.05 0.00 - 0.20 10*3/mm3    Immature Grans, Absolute 0.04 0.00 - 0.05 10*3/mm3    nRBC 0.0 0.0 - 0.2 /100 WBC       A/P  Delirium-starting to show some signs he is doing somewhat better during the daytime.  Had a rough night without much sleep last night which may make today a little bit more difficult for him.  Hopefully can get him up to chair again and engaging with family.  As noted previously may need geropsych admission.  Will need to have heart rate under better control in order for that to happen.  Atrial fibrillation-at times heart rate will get down in the 80s and 90s but largely still running over 110.  Getting digoxin and IV metoprolol.  Cardiology following.  Need to get heart rate under better control in order for consideration for transition of care out of the hospital.  I am going to resume full dose Lovenox today and watch his blood  count closely.  Pulmonary-looks to be doing okay.  Lungs are clear.  If we can get his sundowning better I would like to try him on his CPAP but as I think that may be helpful in getting him ready oriented again.  Fluids/nutrition-still not much appetite.  Passed speech evaluation yesterday.  I like to try and get his bowels moving today and I am going to start some Megace for appetite.  He will be on Lovenox which will reduce thrombosis risk.

## 2025-01-11 NOTE — PLAN OF CARE
Goal Outcome Evaluation:           Problem: Syncope  Goal: Absence of Syncopal Symptoms  Outcome: Progressing  Intervention: Manage Effect of Syncopal Symptoms  Recent Flowsheet Documentation  Taken 1/11/2025 1448 by Dedrick Cabrales RN  Syncope Management: position changed slowly  Safety Promotion/Fall Prevention:   safety round/check completed   room organization consistent  Taken 1/11/2025 0815 by Dedrick Cabrales RN  Syncope Management: position changed slowly  Supportive Measures: active listening utilized  Safety Promotion/Fall Prevention:   safety round/check completed   room organization consistent  Goal: Absence of Syncopal Symptoms  Outcome: Progressing  Intervention: Manage Effect of Syncopal Symptoms  Recent Flowsheet Documentation  Taken 1/11/2025 1448 by Dedrick Cabrales RN  Syncope Management: position changed slowly  Safety Promotion/Fall Prevention:   safety round/check completed   room organization consistent  Taken 1/11/2025 0815 by Dedrick Cabrales RN  Syncope Management: position changed slowly  Supportive Measures: active listening utilized  Safety Promotion/Fall Prevention:   safety round/check completed   room organization consistent     Problem: Adult Inpatient Plan of Care  Goal: Plan of Care Review  Outcome: Progressing  Goal: Patient-Specific Goal (Individualized)  Outcome: Progressing  Goal: Absence of Hospital-Acquired Illness or Injury  Outcome: Progressing  Intervention: Identify and Manage Fall Risk  Recent Flowsheet Documentation  Taken 1/11/2025 1448 by Dedrick Cabrales RN  Safety Promotion/Fall Prevention:   safety round/check completed   room organization consistent  Taken 1/11/2025 0815 by Dedrick Cabrales RN  Safety Promotion/Fall Prevention:   safety round/check completed   room organization consistent  Intervention: Prevent Skin Injury  Recent Flowsheet Documentation  Taken 1/11/2025 1448 by Gamaliel  Dedrick Montanez RN  Body Position:   supine   weight shifting  Skin Protection:   transparent dressing maintained   incontinence pads utilized  Taken 1/11/2025 0815 by Dedrick Cabrales RN  Body Position:   supine   weight shifting  Skin Protection:   transparent dressing maintained   incontinence pads utilized  Intervention: Prevent Infection  Recent Flowsheet Documentation  Taken 1/11/2025 1448 by Dedrick Cabrales RN  Infection Prevention:   single patient room provided   rest/sleep promoted   hand hygiene promoted  Taken 1/11/2025 0815 by Dedrick Cabrales RN  Infection Prevention:   single patient room provided   rest/sleep promoted   hand hygiene promoted  Goal: Optimal Comfort and Wellbeing  Outcome: Progressing  Intervention: Provide Person-Centered Care  Recent Flowsheet Documentation  Taken 1/11/2025 1448 by Dedrick Cabrales RN  Trust Relationship/Rapport:   thoughts/feelings acknowledged   reassurance provided   questions encouraged   questions answered   empathic listening provided   emotional support provided   choices provided   care explained  Taken 1/11/2025 0815 by Dedrick Cabrales RN  Trust Relationship/Rapport:   thoughts/feelings acknowledged   reassurance provided   questions encouraged   questions answered   empathic listening provided   emotional support provided   choices provided   care explained  Goal: Readiness for Transition of Care  Outcome: Progressing     Problem: Comorbidity Management  Goal: Blood Pressure in Desired Range  Outcome: Progressing  Intervention: Maintain Blood Pressure Management  Recent Flowsheet Documentation  Taken 1/11/2025 1448 by Dedrick Cabrales RN  Medication Review/Management: medications reviewed  Taken 1/11/2025 0815 by Dedrick Cabrales RN  Medication Review/Management: medications reviewed     Problem: Fall Injury Risk  Goal: Absence of Fall and Fall-Related Injury  Outcome:  Progressing  Intervention: Identify and Manage Contributors  Recent Flowsheet Documentation  Taken 1/11/2025 1448 by Dedrick Cabrales RN  Medication Review/Management: medications reviewed  Self-Care Promotion: independence encouraged  Taken 1/11/2025 0815 by Dedrick Cabrales RN  Medication Review/Management: medications reviewed  Self-Care Promotion: independence encouraged  Intervention: Promote Injury-Free Environment  Recent Flowsheet Documentation  Taken 1/11/2025 1448 by Dedrick Cabrales RN  Safety Promotion/Fall Prevention:   safety round/check completed   room organization consistent  Taken 1/11/2025 0815 by Dedrick Cabrales RN  Safety Promotion/Fall Prevention:   safety round/check completed   room organization consistent     Problem: Dysrhythmia  Goal: Normalized Cardiac Rhythm  Outcome: Progressing     Problem: VTE (Venous Thromboembolism)  Goal: Tissue Perfusion  Outcome: Progressing  Goal: Optimal Right Ventricular Function  Outcome: Progressing     Problem: Heart Failure  Goal: Optimal Coping  Outcome: Progressing  Intervention: Support Psychosocial Response  Recent Flowsheet Documentation  Taken 1/11/2025 1448 by Dedrick Cabrales RN  Family/Support System Care:   support provided   self-care encouraged   presence promoted   involvement promoted  Taken 1/11/2025 0815 by Dedrick Cabrales RN  Supportive Measures: active listening utilized  Family/Support System Care:   self-care encouraged   presence promoted   involvement promoted   support provided  Goal: Optimal Cardiac Output and Blood Flow  Outcome: Progressing  Intervention: Optimize Cardiac Output  Recent Flowsheet Documentation  Taken 1/11/2025 0815 by Dedrick Cabrales RN  Environmental Support: calm environment promoted  Goal: Stable Heart Rate and Rhythm  Outcome: Progressing  Goal: Fluid and Electrolyte Balance  Outcome: Progressing  Goal: Optimal Functional  Ability  Outcome: Progressing  Intervention: Optimize Functional Ability  Recent Flowsheet Documentation  Taken 1/11/2025 1448 by Dedrick Cabrales RN  Activity Management: up in chair  Self-Care Promotion: independence encouraged  Taken 1/11/2025 0815 by Dedrick Cabrales RN  Activity Management: activity minimized  Self-Care Promotion: independence encouraged  Goal: Improved Oral Intake  Outcome: Progressing  Goal: Effective Oxygenation and Ventilation  Outcome: Progressing  Intervention: Promote Airway Secretion Clearance  Recent Flowsheet Documentation  Taken 1/11/2025 1448 by Dedrick Cabrales RN  Activity Management: up in chair  Cough And Deep Breathing: done independently per patient  Taken 1/11/2025 0815 by Dedrick Cabrales RN  Activity Management: activity minimized  Cough And Deep Breathing: done independently per patient  Intervention: Optimize Oxygenation and Ventilation  Recent Flowsheet Documentation  Taken 1/11/2025 1448 by Dedrick Cabrales RN  Head of Bed (HOB) Positioning: HOB at 20-30 degrees  Taken 1/11/2025 0815 by Dedrick Cabrales RN  Head of Bed (HOB) Positioning: HOB at 20-30 degrees  Goal: Effective Breathing Pattern During Sleep  Outcome: Progressing  Intervention: Monitor and Manage Obstructive Sleep Apnea  Recent Flowsheet Documentation  Taken 1/11/2025 1448 by Dedrick Cabrales RN  Medication Review/Management: medications reviewed  Taken 1/11/2025 0815 by Dedrick Cabrales RN  Medication Review/Management: medications reviewed     Problem: Delirium  Goal: Optimal Coping  Outcome: Progressing  Intervention: Optimize Psychosocial Adjustment to Delirium  Recent Flowsheet Documentation  Taken 1/11/2025 1448 by Dedrick Cabrales RN  Family/Support System Care:   support provided   self-care encouraged   presence promoted   involvement promoted  Taken 1/11/2025 0815 by Dedrick Cabrales  RN  Supportive Measures: active listening utilized  Family/Support System Care:   self-care encouraged   presence promoted   involvement promoted   support provided  Goal: Improved Behavioral Control  Outcome: Progressing  Intervention: Minimize Safety Risk  Recent Flowsheet Documentation  Taken 1/11/2025 1448 by Dedrick Cabarles RN  Trust Relationship/Rapport:   thoughts/feelings acknowledged   reassurance provided   questions encouraged   questions answered   empathic listening provided   emotional support provided   choices provided   care explained  Enhanced Safety Measures:   bed alarm set   room near unit station  Communication Support Strategies: active listening utilized  Taken 1/11/2025 0815 by Dedrick Cabrales RN  Trust Relationship/Rapport:   thoughts/feelings acknowledged   reassurance provided   questions encouraged   questions answered   empathic listening provided   emotional support provided   choices provided   care explained  Enhanced Safety Measures:   bed alarm set   room near unit station  Communication Support Strategies: active listening utilized  Goal: Improved Attention and Thought Clarity  Outcome: Progressing  Intervention: Maximize Cognitive Function  Recent Flowsheet Documentation  Taken 1/11/2025 1448 by Dedrick Cabrales RN  Reorientation Measures:   calendar in view   reorientation provided   clock in view  Taken 1/11/2025 0815 by Dedrick Cabrales RN  Reorientation Measures:   calendar in view   clock in view   familiar social contact encouraged   reorientation provided  Goal: Improved Sleep  Outcome: Progressing  Intervention: Promote Sleep  Recent Flowsheet Documentation  Taken 1/11/2025 1448 by Dedrick Cabrales RN  Sleep/Rest Enhancement: awakenings minimized  Taken 1/11/2025 0815 by Dedrick Cabrales RN  Sleep/Rest Enhancement: awakenings minimized     Problem: Skin Injury Risk Increased  Goal: Skin Health and  Integrity  Outcome: Progressing  Intervention: Optimize Skin Protection  Recent Flowsheet Documentation  Taken 1/11/2025 1448 by Dedrick Cabrales RN  Activity Management: up in chair  Head of Bed (HOB) Positioning: HOB at 20-30 degrees  Skin Protection:   transparent dressing maintained   incontinence pads utilized  Taken 1/11/2025 0815 by Dedrick Cabrales RN  Activity Management: activity minimized  Head of Bed (HOB) Positioning: HOB at 20-30 degrees  Skin Protection:   transparent dressing maintained   incontinence pads utilized     Problem: Restraint, Nonviolent  Goal: Absence of Harm or Injury  Outcome: Progressing  Intervention: Implement Least Restrictive Safety Strategies  Recent Flowsheet Documentation  Taken 1/11/2025 1807 by Dedrick Cabrales RN  Medical Device Protection:   IV pole/bag removed from visual field   tubing secured  Diversional Activities: television  Taken 1/11/2025 1620 by Dedrick Cabrales RN  Medical Device Protection: IV pole/bag removed from visual field  Diversional Activities: television  Taken 1/11/2025 1448 by Dedrick Cabrales RN  Medical Device Protection:   IV pole/bag removed from visual field   tubing secured  Taken 1/11/2025 1425 by Dedrick Cabrales RN  Medical Device Protection:   IV pole/bag removed from visual field   tubing secured  Diversional Activities: television  Taken 1/11/2025 1210 by Dedrick Cabrales RN  Medical Device Protection:   IV pole/bag removed from visual field   tubing secured  Diversional Activities: television  Taken 1/11/2025 1020 by Dedrick Cabrales RN  Medical Device Protection:   IV pole/bag removed from visual field   tubing secured  Diversional Activities: television  Taken 1/11/2025 0815 by Dedrick Cabrales RN  Medical Device Protection: IV pole/bag removed from visual field  Diversional Activities: sensory items  Intervention: Protect Dignity, Rights  and Personal Wellbeing  Recent Flowsheet Documentation  Taken 1/11/2025 1448 by Dedrick Cabrales RN  Trust Relationship/Rapport:   thoughts/feelings acknowledged   reassurance provided   questions encouraged   questions answered   empathic listening provided   emotional support provided   choices provided   care explained  Taken 1/11/2025 0815 by Dedrick Cabrales, KARINE  Trust Relationship/Rapport:   thoughts/feelings acknowledged   reassurance provided   questions encouraged   questions answered   empathic listening provided   emotional support provided   choices provided   care explained  Intervention: Protect Skin and Joint Integrity  Recent Flowsheet Documentation  Taken 1/11/2025 1448 by Dedrick Cabrales RN  Body Position:   supine   weight shifting  Skin Protection:   transparent dressing maintained   incontinence pads utilized  Range of Motion: active ROM (range of motion) encouraged  Taken 1/11/2025 0815 by Dedrick Cabrales RN  Body Position:   supine   weight shifting  Skin Protection:   transparent dressing maintained   incontinence pads utilized  Range of Motion: active ROM (range of motion) encouraged

## 2025-01-12 LAB
ANION GAP SERPL CALCULATED.3IONS-SCNC: 10.2 MMOL/L (ref 5–15)
BASOPHILS # BLD AUTO: 0.06 10*3/MM3 (ref 0–0.2)
BASOPHILS NFR BLD AUTO: 0.6 % (ref 0–1.5)
BUN SERPL-MCNC: 24 MG/DL (ref 8–23)
BUN/CREAT SERPL: 30.8 (ref 7–25)
CALCIUM SPEC-SCNC: 8.9 MG/DL (ref 8.6–10.5)
CHLORIDE SERPL-SCNC: 111 MMOL/L (ref 98–107)
CO2 SERPL-SCNC: 23.8 MMOL/L (ref 22–29)
CREAT SERPL-MCNC: 0.78 MG/DL (ref 0.76–1.27)
DEPRECATED RDW RBC AUTO: 44 FL (ref 37–54)
EGFRCR SERPLBLD CKD-EPI 2021: 89.6 ML/MIN/1.73
EOSINOPHIL # BLD AUTO: 0.16 10*3/MM3 (ref 0–0.4)
EOSINOPHIL NFR BLD AUTO: 1.5 % (ref 0.3–6.2)
ERYTHROCYTE [DISTWIDTH] IN BLOOD BY AUTOMATED COUNT: 13.7 % (ref 12.3–15.4)
GLUCOSE BLDC GLUCOMTR-MCNC: 90 MG/DL (ref 70–130)
GLUCOSE BLDC GLUCOMTR-MCNC: 92 MG/DL (ref 70–130)
GLUCOSE SERPL-MCNC: 94 MG/DL (ref 65–99)
HCT VFR BLD AUTO: 41.2 % (ref 37.5–51)
HGB BLD-MCNC: 12.8 G/DL (ref 13–17.7)
IMM GRANULOCYTES # BLD AUTO: 0.07 10*3/MM3 (ref 0–0.05)
IMM GRANULOCYTES NFR BLD AUTO: 0.7 % (ref 0–0.5)
LYMPHOCYTES # BLD AUTO: 1.32 10*3/MM3 (ref 0.7–3.1)
LYMPHOCYTES NFR BLD AUTO: 12.5 % (ref 19.6–45.3)
MCH RBC QN AUTO: 27.8 PG (ref 26.6–33)
MCHC RBC AUTO-ENTMCNC: 31.1 G/DL (ref 31.5–35.7)
MCV RBC AUTO: 89.4 FL (ref 79–97)
MONOCYTES # BLD AUTO: 0.88 10*3/MM3 (ref 0.1–0.9)
MONOCYTES NFR BLD AUTO: 8.3 % (ref 5–12)
NEUTROPHILS NFR BLD AUTO: 76.4 % (ref 42.7–76)
NEUTROPHILS NFR BLD AUTO: 8.1 10*3/MM3 (ref 1.7–7)
NRBC BLD AUTO-RTO: 0 /100 WBC (ref 0–0.2)
PLATELET # BLD AUTO: 253 10*3/MM3 (ref 140–450)
PMV BLD AUTO: 11.1 FL (ref 6–12)
POTASSIUM SERPL-SCNC: 4.2 MMOL/L (ref 3.5–5.2)
RBC # BLD AUTO: 4.61 10*6/MM3 (ref 4.14–5.8)
SODIUM SERPL-SCNC: 145 MMOL/L (ref 136–145)
WBC NRBC COR # BLD AUTO: 10.59 10*3/MM3 (ref 3.4–10.8)

## 2025-01-12 PROCEDURE — 85025 COMPLETE CBC W/AUTO DIFF WBC: CPT | Performed by: INTERNAL MEDICINE

## 2025-01-12 PROCEDURE — 80048 BASIC METABOLIC PNL TOTAL CA: CPT | Performed by: INTERNAL MEDICINE

## 2025-01-12 PROCEDURE — 25010000002 OLANZAPINE 10 MG RECONSTITUTED SOLUTION: Performed by: SPECIALIST

## 2025-01-12 PROCEDURE — 82948 REAGENT STRIP/BLOOD GLUCOSE: CPT

## 2025-01-12 PROCEDURE — 25010000002 ENOXAPARIN PER 10 MG: Performed by: INTERNAL MEDICINE

## 2025-01-12 RX ADMIN — MEGESTROL ACETATE 400 MG: 40 SUSPENSION ORAL at 10:44

## 2025-01-12 RX ADMIN — OLANZAPINE 5 MG: 10 INJECTION, POWDER, LYOPHILIZED, FOR SOLUTION INTRAMUSCULAR at 23:42

## 2025-01-12 RX ADMIN — METOPROLOL TARTRATE 7.5 MG: 1 INJECTION, SOLUTION INTRAVENOUS at 20:55

## 2025-01-12 RX ADMIN — Medication 1000 MCG: at 10:43

## 2025-01-12 RX ADMIN — DIGOXIN 125 MCG: 0.12 TABLET ORAL at 10:43

## 2025-01-12 RX ADMIN — Medication 10 ML: at 21:47

## 2025-01-12 RX ADMIN — ENOXAPARIN SODIUM 100 MG: 100 INJECTION SUBCUTANEOUS at 10:44

## 2025-01-12 RX ADMIN — METOPROLOL TARTRATE 7.5 MG: 1 INJECTION, SOLUTION INTRAVENOUS at 10:44

## 2025-01-12 RX ADMIN — METOPROLOL TARTRATE 7.5 MG: 1 INJECTION, SOLUTION INTRAVENOUS at 03:31

## 2025-01-12 RX ADMIN — Medication 10 ML: at 10:45

## 2025-01-12 RX ADMIN — METOPROLOL TARTRATE 7.5 MG: 1 INJECTION, SOLUTION INTRAVENOUS at 14:48

## 2025-01-12 RX ADMIN — TAMSULOSIN HYDROCHLORIDE 0.4 MG: 0.4 CAPSULE ORAL at 10:43

## 2025-01-12 RX ADMIN — ENOXAPARIN SODIUM 100 MG: 100 INJECTION SUBCUTANEOUS at 00:14

## 2025-01-12 RX ADMIN — ATORVASTATIN CALCIUM 10 MG: 10 TABLET, FILM COATED ORAL at 10:43

## 2025-01-12 NOTE — PROGRESS NOTES
Subjective: Patient up in chair.  Wife and grandchildren at bedside.  Wife and RN both report patient slept very well last night.  Agitated and confused after 5 PM dose of Zyprexa.  Ate a little bit better yesterday and this morning.  Took in some pancake and nutritional shake.  Was started on Megace yesterday.    Objective:  Vitals:    01/11/25 1926 01/11/25 2313 01/11/25 2315 01/12/25 0755   BP: (!) 145/125 133/92  128/82   BP Location: Right arm Right arm  Right arm   Patient Position: Lying Lying  Lying   Pulse: (!) 137 (!) 160 (!) 145 (!) 139   Resp:   20 20   Temp:   99.1 °F (37.3 °C) 98.1 °F (36.7 °C)   TempSrc:   Oral Oral   SpO2:    98%   Weight:       Height:         General: Up in chair, more alert, not oriented to place, still talks at times incoherently  Heart: Irregularly irregular, tachycardic  Lungs: Clear  Abdomen: Soft no significant tenderness.  Some tightness on the right rectus but definitely not worse than yesterday  Extremities: No edema    Recent Results (from the past 24 hours)   POC Glucose Once    Collection Time: 01/11/25  3:56 PM    Specimen: Blood   Result Value Ref Range    Glucose 101 70 - 130 mg/dL   POC Glucose Once    Collection Time: 01/11/25  8:18 PM    Specimen: Blood   Result Value Ref Range    Glucose 111 70 - 130 mg/dL   POC Glucose Once    Collection Time: 01/12/25  6:14 AM    Specimen: Blood   Result Value Ref Range    Glucose 90 70 - 130 mg/dL   Basic Metabolic Panel    Collection Time: 01/12/25  8:05 AM    Specimen: Blood   Result Value Ref Range    Glucose 94 65 - 99 mg/dL    BUN 24 (H) 8 - 23 mg/dL    Creatinine 0.78 0.76 - 1.27 mg/dL    Sodium 145 136 - 145 mmol/L    Potassium 4.2 3.5 - 5.2 mmol/L    Chloride 111 (H) 98 - 107 mmol/L    CO2 23.8 22.0 - 29.0 mmol/L    Calcium 8.9 8.6 - 10.5 mg/dL    BUN/Creatinine Ratio 30.8 (H) 7.0 - 25.0    Anion Gap 10.2 5.0 - 15.0 mmol/L    eGFR 89.6 >60.0 mL/min/1.73   CBC Auto Differential    Collection Time: 01/12/25  8:05 AM     Specimen: Blood   Result Value Ref Range    WBC 10.59 3.40 - 10.80 10*3/mm3    RBC 4.61 4.14 - 5.80 10*6/mm3    Hemoglobin 12.8 (L) 13.0 - 17.7 g/dL    Hematocrit 41.2 37.5 - 51.0 %    MCV 89.4 79.0 - 97.0 fL    MCH 27.8 26.6 - 33.0 pg    MCHC 31.1 (L) 31.5 - 35.7 g/dL    RDW 13.7 12.3 - 15.4 %    RDW-SD 44.0 37.0 - 54.0 fl    MPV 11.1 6.0 - 12.0 fL    Platelets 253 140 - 450 10*3/mm3    Neutrophil % 76.4 (H) 42.7 - 76.0 %    Lymphocyte % 12.5 (L) 19.6 - 45.3 %    Monocyte % 8.3 5.0 - 12.0 %    Eosinophil % 1.5 0.3 - 6.2 %    Basophil % 0.6 0.0 - 1.5 %    Immature Grans % 0.7 (H) 0.0 - 0.5 %    Neutrophils, Absolute 8.10 (H) 1.70 - 7.00 10*3/mm3    Lymphocytes, Absolute 1.32 0.70 - 3.10 10*3/mm3    Monocytes, Absolute 0.88 0.10 - 0.90 10*3/mm3    Eosinophils, Absolute 0.16 0.00 - 0.40 10*3/mm3    Basophils, Absolute 0.06 0.00 - 0.20 10*3/mm3    Immature Grans, Absolute 0.07 (H) 0.00 - 0.05 10*3/mm3    nRBC 0.0 0.0 - 0.2 /100 WBC   POC Glucose Once    Collection Time: 01/12/25 10:59 AM    Specimen: Blood   Result Value Ref Range    Glucose 92 70 - 130 mg/dL       A/P  Delirium/confusion-slept better last night.  At times asks appropriate questions per the wife.  Recognizes family members.  Still gets agitated each evening.  Currently getting Zyprexa at 5 PM trying to limit agitation as he sundown's each evening.  Has an additional dose before bed.  [facility]  Psychiatry following  Atrial fibrillation-heart rate still largely remaining elevated.  Cardiology scheduled metoprolol IV.  He is doing a bit better taking his medications during the day.  I am asking cardiology to come back by and see him to see about getting better rate control.  I do not think he would be accepted at [facility] until rate is controlled.  Rectus sheath hematoma-back on Lovenox for his A-fib.  Hemoglobin stable.  Continue to monitor closely.  Fluids/nutrition-started Megace yesterday.  Bowels moved today.  Some  signs he is having some increased oral intake.  Continue to follow and will continue Megace.

## 2025-01-12 NOTE — PROGRESS NOTES
Patient seen, chart reviewed and discussed with staff  Patient found sitting in chair, dressed in hospital gown, and appears in no apparent distress.  Wife and 2 grandsons at bedside.  Per wife he is continuing to improve every day.  He was able to tell me the names of his wife and both grandsons.  His mood was cheerful and he appeared happy with his visitors.

## 2025-01-12 NOTE — PLAN OF CARE
Problem: Syncope  Goal: Absence of Syncopal Symptoms  Outcome: Progressing  Intervention: Manage Effect of Syncopal Symptoms  Recent Flowsheet Documentation  Taken 1/12/2025 0600 by Bessy Rivera RN  Safety Promotion/Fall Prevention: safety round/check completed  Taken 1/12/2025 0400 by Bessy Rivera RN  Safety Promotion/Fall Prevention: safety round/check completed  Taken 1/12/2025 0200 by Bessy Rivera RN  Safety Promotion/Fall Prevention: safety round/check completed  Taken 1/12/2025 0000 by Bessy Rivera RN  Safety Promotion/Fall Prevention:   activity supervised   assistive device/personal items within reach   clutter free environment maintained   fall prevention program maintained   gait belt   mobility aid in reach   nonskid shoes/slippers when out of bed   room organization consistent   safety round/check completed  Taken 1/11/2025 2200 by Bessy Rivera RN  Safety Promotion/Fall Prevention: safety round/check completed  Taken 1/11/2025 2020 by Bessy Rivera RN  Safety Promotion/Fall Prevention:   activity supervised   assistive device/personal items within reach   clutter free environment maintained   fall prevention program maintained   room organization consistent   safety round/check completed   lighting adjusted   nonskid shoes/slippers when out of bed   mobility aid in reach   gait belt  Goal: Absence of Syncopal Symptoms  Outcome: Progressing  Intervention: Manage Effect of Syncopal Symptoms  Recent Flowsheet Documentation  Taken 1/12/2025 0600 by Bessy Rivera RN  Safety Promotion/Fall Prevention: safety round/check completed  Taken 1/12/2025 0400 by Bessy Rivera RN  Safety Promotion/Fall Prevention: safety round/check completed  Taken 1/12/2025 0200 by Bessy Rivera RN  Safety Promotion/Fall Prevention: safety round/check completed  Taken 1/12/2025 0000 by Bessy Rivera RN  Safety Promotion/Fall Prevention:   activity supervised   assistive device/personal items within reach   clutter free  environment maintained   fall prevention program maintained   gait belt   mobility aid in reach   nonskid shoes/slippers when out of bed   room organization consistent   safety round/check completed  Taken 1/11/2025 2200 by Bessy Rivera RN  Safety Promotion/Fall Prevention: safety round/check completed  Taken 1/11/2025 2020 by Bessy Rivera RN  Safety Promotion/Fall Prevention:   activity supervised   assistive device/personal items within reach   clutter free environment maintained   fall prevention program maintained   room organization consistent   safety round/check completed   lighting adjusted   nonskid shoes/slippers when out of bed   mobility aid in reach   gait belt     Problem: Adult Inpatient Plan of Care  Goal: Plan of Care Review  Outcome: Progressing  Flowsheets (Taken 1/12/2025 0616)  Progress: no change  Outcome Evaluation: Pt confused and restless at times. Wife at bedside. Restraints in place, safey precautions maintained, plan of care ongoing.  Plan of Care Reviewed With:   patient   spouse  Goal: Patient-Specific Goal (Individualized)  Outcome: Progressing  Goal: Absence of Hospital-Acquired Illness or Injury  Outcome: Progressing  Intervention: Identify and Manage Fall Risk  Recent Flowsheet Documentation  Taken 1/12/2025 0600 by Bessy Rivera RN  Safety Promotion/Fall Prevention: safety round/check completed  Taken 1/12/2025 0400 by Bessy Rivera RN  Safety Promotion/Fall Prevention: safety round/check completed  Taken 1/12/2025 0200 by Bessy Rivera RN  Safety Promotion/Fall Prevention: safety round/check completed  Taken 1/12/2025 0000 by Bessy Rivera RN  Safety Promotion/Fall Prevention:   activity supervised   assistive device/personal items within reach   clutter free environment maintained   fall prevention program maintained   gait belt   mobility aid in reach   nonskid shoes/slippers when out of bed   room organization consistent   safety round/check completed  Taken 1/11/2025 2200 by  Rivera, Bessy, RN  Safety Promotion/Fall Prevention: safety round/check completed  Taken 1/11/2025 2020 by Bessy Rivera RN  Safety Promotion/Fall Prevention:   activity supervised   assistive device/personal items within reach   clutter free environment maintained   fall prevention program maintained   room organization consistent   safety round/check completed   lighting adjusted   nonskid shoes/slippers when out of bed   mobility aid in reach   gait belt  Intervention: Prevent Skin Injury  Recent Flowsheet Documentation  Taken 1/12/2025 0600 by Bessy Rivera RN  Body Position:   supine   weight shifting  Taken 1/12/2025 0400 by Bessy Rivera RN  Body Position:   supine   weight shifting  Taken 1/12/2025 0200 by Bessy Rivera RN  Body Position:   weight shifting   supine  Taken 1/12/2025 0000 by Bessy Rivera RN  Body Position:   weight shifting   supine  Skin Protection: incontinence pads utilized  Taken 1/11/2025 2200 by Bessy Rivera RN  Body Position:   supine   weight shifting  Taken 1/11/2025 2020 by Bessy Rivera RN  Body Position:   supine   weight shifting  Skin Protection: incontinence pads utilized  Intervention: Prevent and Manage VTE (Venous Thromboembolism) Risk  Recent Flowsheet Documentation  Taken 1/11/2025 2020 by Bessy Rivera RN  VTE Prevention/Management: (Lovenox) other (see comments)  Intervention: Prevent Infection  Recent Flowsheet Documentation  Taken 1/12/2025 0000 by Bessy Rivera RN  Infection Prevention:   environmental surveillance performed   single patient room provided   rest/sleep promoted  Taken 1/11/2025 2020 by Bessy Rivera RN  Infection Prevention:   environmental surveillance performed   single patient room provided   rest/sleep promoted  Goal: Optimal Comfort and Wellbeing  Outcome: Progressing  Intervention: Provide Person-Centered Care  Recent Flowsheet Documentation  Taken 1/12/2025 0000 by Bessy Rivera RN  Trust Relationship/Rapport:   care explained   reassurance  provided   questions answered  Taken 1/11/2025 2020 by Bessy Rivera RN  Trust Relationship/Rapport:   care explained   reassurance provided  Goal: Readiness for Transition of Care  Outcome: Progressing     Problem: Comorbidity Management  Goal: Blood Pressure in Desired Range  Outcome: Progressing  Intervention: Maintain Blood Pressure Management  Recent Flowsheet Documentation  Taken 1/12/2025 0600 by Bessy Rivera RN  Medication Review/Management: medications reviewed  Taken 1/12/2025 0400 by Bessy Rivera RN  Medication Review/Management: medications reviewed  Taken 1/12/2025 0200 by Bessy Rivera RN  Medication Review/Management: medications reviewed  Taken 1/12/2025 0000 by Bessy Rivera RN  Medication Review/Management: medications reviewed  Taken 1/11/2025 2200 by Bessy Rivera RN  Medication Review/Management: medications reviewed  Taken 1/11/2025 2020 by Bessy Rivera RN  Medication Review/Management: medications reviewed     Problem: Fall Injury Risk  Goal: Absence of Fall and Fall-Related Injury  Outcome: Progressing  Intervention: Identify and Manage Contributors  Recent Flowsheet Documentation  Taken 1/12/2025 0600 by Bessy Rivera RN  Medication Review/Management: medications reviewed  Taken 1/12/2025 0400 by Bessy Rivera RN  Medication Review/Management: medications reviewed  Taken 1/12/2025 0200 by Bessy Rivera RN  Medication Review/Management: medications reviewed  Taken 1/12/2025 0000 by Bessy Rivera RN  Medication Review/Management: medications reviewed  Taken 1/11/2025 2200 by Bessy Rivera RN  Medication Review/Management: medications reviewed  Taken 1/11/2025 2020 by Bessy Rivera RN  Medication Review/Management: medications reviewed  Intervention: Promote Injury-Free Environment  Recent Flowsheet Documentation  Taken 1/12/2025 0600 by Bessy Rivera RN  Safety Promotion/Fall Prevention: safety round/check completed  Taken 1/12/2025 0400 by Bessy Rivera RN  Safety Promotion/Fall  Prevention: safety round/check completed  Taken 1/12/2025 0200 by Bessy Rivera RN  Safety Promotion/Fall Prevention: safety round/check completed  Taken 1/12/2025 0000 by Bessy Rivera RN  Safety Promotion/Fall Prevention:   activity supervised   assistive device/personal items within reach   clutter free environment maintained   fall prevention program maintained   gait belt   mobility aid in reach   nonskid shoes/slippers when out of bed   room organization consistent   safety round/check completed  Taken 1/11/2025 2200 by Bessy Rivera RN  Safety Promotion/Fall Prevention: safety round/check completed  Taken 1/11/2025 2020 by Bessy Rivera RN  Safety Promotion/Fall Prevention:   activity supervised   assistive device/personal items within reach   clutter free environment maintained   fall prevention program maintained   room organization consistent   safety round/check completed   lighting adjusted   nonskid shoes/slippers when out of bed   mobility aid in reach   gait belt     Problem: Dysrhythmia  Goal: Normalized Cardiac Rhythm  Outcome: Progressing  Intervention: Monitor and Manage Cardiac Rhythm Effect  Recent Flowsheet Documentation  Taken 1/11/2025 2020 by Bessy Rivera RN  VTE Prevention/Management: (Lovenox) other (see comments)     Problem: VTE (Venous Thromboembolism)  Goal: Tissue Perfusion  Outcome: Progressing  Intervention: Optimize Tissue Perfusion  Recent Flowsheet Documentation  Taken 1/11/2025 2020 by Bessy Rivera RN  VTE Prevention/Management: (Lovenox) other (see comments)  Goal: Optimal Right Ventricular Function  Outcome: Progressing     Problem: Heart Failure  Goal: Optimal Coping  Outcome: Progressing  Goal: Optimal Cardiac Output and Blood Flow  Outcome: Progressing  Goal: Stable Heart Rate and Rhythm  Outcome: Progressing  Goal: Fluid and Electrolyte Balance  Outcome: Progressing  Goal: Optimal Functional Ability  Outcome: Progressing  Goal: Improved Oral Intake  Outcome:  Progressing  Goal: Effective Oxygenation and Ventilation  Outcome: Progressing  Intervention: Promote Airway Secretion Clearance  Recent Flowsheet Documentation  Taken 1/12/2025 0000 by Bessy Rviera RN  Cough And Deep Breathing: done independently per patient  Taken 1/11/2025 2020 by Bessy Rivera RN  Cough And Deep Breathing: done independently per patient  Intervention: Optimize Oxygenation and Ventilation  Recent Flowsheet Documentation  Taken 1/12/2025 0600 by Bessy Rivera RN  Head of Bed (HOB) Positioning: HOB at 20-30 degrees  Taken 1/12/2025 0400 by Bessy Rivera RN  Head of Bed (HOB) Positioning: HOB at 20-30 degrees  Taken 1/12/2025 0200 by Bessy Rivera RN  Head of Bed (HOB) Positioning: HOB at 20-30 degrees  Taken 1/12/2025 0000 by Bessy Rivera RN  Head of Bed (HOB) Positioning: HOB at 20-30 degrees  Taken 1/11/2025 2200 by Bessy Rivera RN  Head of Bed (HOB) Positioning: HOB at 20-30 degrees  Taken 1/11/2025 2020 by Bessy Rivera RN  Head of Bed (HOB) Positioning: HOB at 20-30 degrees  Goal: Effective Breathing Pattern During Sleep  Outcome: Progressing  Intervention: Monitor and Manage Obstructive Sleep Apnea  Recent Flowsheet Documentation  Taken 1/12/2025 0600 by Bessy Rivera RN  Medication Review/Management: medications reviewed  Taken 1/12/2025 0400 by Bessy Rivera RN  Medication Review/Management: medications reviewed  Taken 1/12/2025 0200 by Bessy Rivera RN  Medication Review/Management: medications reviewed  Taken 1/12/2025 0000 by Bessy Rivera RN  Medication Review/Management: medications reviewed  Taken 1/11/2025 2200 by Bessy Rivera RN  Medication Review/Management: medications reviewed  Taken 1/11/2025 2020 by Bessy Rivera RN  Medication Review/Management: medications reviewed     Problem: Delirium  Goal: Optimal Coping  Outcome: Progressing  Goal: Improved Behavioral Control  Outcome: Progressing  Intervention: Minimize Safety Risk  Recent Flowsheet Documentation  Taken 1/12/2025  0600 by Bessy Rivera RN  Enhanced Safety Measures: bed alarm set  Taken 1/12/2025 0400 by Bessy Rivera RN  Enhanced Safety Measures: bed alarm set  Taken 1/12/2025 0200 by Bessy Rivera RN  Enhanced Safety Measures: bed alarm set  Taken 1/12/2025 0000 by Bessy Rivera RN  Trust Relationship/Rapport:   care explained   reassurance provided   questions answered  Enhanced Safety Measures:   bed alarm set   room near unit station  Taken 1/11/2025 2200 by Bessy Rivera RN  Enhanced Safety Measures: bed alarm set  Taken 1/11/2025 2020 by Bessy Rivera RN  Trust Relationship/Rapport:   care explained   reassurance provided  Enhanced Safety Measures:   bed alarm set   room near unit station  Goal: Improved Attention and Thought Clarity  Outcome: Progressing  Goal: Improved Sleep  Outcome: Progressing     Problem: Skin Injury Risk Increased  Goal: Skin Health and Integrity  Outcome: Progressing  Intervention: Optimize Skin Protection  Recent Flowsheet Documentation  Taken 1/12/2025 0600 by Bessy Rivera RN  Head of Bed (HOB) Positioning: HOB at 20-30 degrees  Taken 1/12/2025 0400 by Bessy Rivera RN  Head of Bed (HOB) Positioning: HOB at 20-30 degrees  Taken 1/12/2025 0200 by Bessy Rivera RN  Head of Bed (HOB) Positioning: HOB at 20-30 degrees  Taken 1/12/2025 0000 by Bessy Rivera RN  Pressure Reduction Techniques:   frequent weight shift encouraged   weight shift assistance provided  Head of Bed (HOB) Positioning: HOB at 20-30 degrees  Pressure Reduction Devices:   alternating pressure pump (JUAN)   pressure-redistributing mattress utilized  Skin Protection: incontinence pads utilized  Taken 1/11/2025 2200 by Bessy Rivera RN  Head of Bed (HOB) Positioning: HOB at 20-30 degrees  Taken 1/11/2025 2020 by Bessy Rivera RN  Pressure Reduction Techniques:   frequent weight shift encouraged   weight shift assistance provided  Head of Bed (HOB) Positioning: HOB at 20-30 degrees  Pressure Reduction Devices:   alternating  pressure pump (JUAN)   pressure-redistributing mattress utilized  Skin Protection: incontinence pads utilized     Problem: Restraint, Nonviolent  Goal: Absence of Harm or Injury  Outcome: Progressing  Intervention: Implement Least Restrictive Safety Strategies  Recent Flowsheet Documentation  Taken 1/12/2025 0000 by Bessy Rivera RN  Medical Device Protection:   IV pole/bag removed from visual field   tubing secured  Diversional Activities: television  Taken 1/11/2025 2020 by Bessy Rivera RN  Medical Device Protection:   IV pole/bag removed from visual field   tubing secured  Diversional Activities: television  Intervention: Protect Dignity, Rights and Personal Wellbeing  Recent Flowsheet Documentation  Taken 1/12/2025 0000 by Bessy Rivera RN  Trust Relationship/Rapport:   care explained   reassurance provided   questions answered  Taken 1/11/2025 2020 by Bessy Rivera RN  Trust Relationship/Rapport:   care explained   reassurance provided  Intervention: Protect Skin and Joint Integrity  Recent Flowsheet Documentation  Taken 1/12/2025 0600 by Bessy Rivera RN  Body Position:   supine   weight shifting  Taken 1/12/2025 0400 by Bessy Rivera RN  Body Position:   supine   weight shifting  Taken 1/12/2025 0200 by Bessy Rivera RN  Body Position:   weight shifting   supine  Taken 1/12/2025 0000 by Bessy Rivera RN  Body Position:   weight shifting   supine  Skin Protection: incontinence pads utilized  Range of Motion: ROM (range of motion) performed  Taken 1/11/2025 2200 by Bessy Rivera RN  Body Position:   supine   weight shifting  Taken 1/11/2025 2020 by Bessy Rivera RN  Body Position:   supine   weight shifting  Skin Protection: incontinence pads utilized  Range of Motion: ROM (range of motion) performed   Goal Outcome Evaluation:  Plan of Care Reviewed With: patient, spouse        Progress: no change  Outcome Evaluation: Pt confused and restless at times. Wife at bedside. Restraints in place, safey precautions  maintained, plan of care ongoing.

## 2025-01-12 NOTE — PLAN OF CARE
Problem: Syncope  Goal: Absence of Syncopal Symptoms  1/12/2025 1832 by Dedrick Cabrales RN  Outcome: Progressing  1/12/2025 1809 by Dedrick Cabrales RN  Outcome: Progressing  Intervention: Manage Effect of Syncopal Symptoms  Recent Flowsheet Documentation  Taken 1/12/2025 1425 by Dedrick Cabrales RN  Syncope Management: position changed slowly  Safety Promotion/Fall Prevention:   safety round/check completed   room organization consistent  Taken 1/12/2025 0815 by Dedrick Cabrales RN  Syncope Management: position changed slowly  Safety Promotion/Fall Prevention:   room organization consistent   safety round/check completed  Goal: Absence of Syncopal Symptoms  1/12/2025 1832 by Dedrick Cabrales RN  Outcome: Progressing  1/12/2025 1809 by Dedrick Cabrales RN  Outcome: Progressing  Intervention: Manage Effect of Syncopal Symptoms  Recent Flowsheet Documentation  Taken 1/12/2025 1425 by Dedrick Cabrales RN  Syncope Management: position changed slowly  Safety Promotion/Fall Prevention:   safety round/check completed   room organization consistent  Taken 1/12/2025 0815 by Dedrick Cabrales RN  Syncope Management: position changed slowly  Safety Promotion/Fall Prevention:   room organization consistent   safety round/check completed     Problem: Adult Inpatient Plan of Care  Goal: Plan of Care Review  1/12/2025 1832 by Dedrick Cabrales RN  Outcome: Progressing  1/12/2025 1809 by Dedrick Cabrales RN  Outcome: Progressing  Goal: Patient-Specific Goal (Individualized)  1/12/2025 1832 by Dedrick Cabrales RN  Outcome: Progressing  1/12/2025 1809 by Dedrick Cabrales RN  Outcome: Progressing  Goal: Absence of Hospital-Acquired Illness or Injury  1/12/2025 1832 by Dedrick Cabrales RN  Outcome: Progressing  1/12/2025 1809 by Dedrick Cabrales RN  Outcome:  Progressing  Intervention: Identify and Manage Fall Risk  Recent Flowsheet Documentation  Taken 1/12/2025 1425 by Dedrick Cabrales RN  Safety Promotion/Fall Prevention:   safety round/check completed   room organization consistent  Taken 1/12/2025 0815 by Dedrick Cabrales RN  Safety Promotion/Fall Prevention:   room organization consistent   safety round/check completed  Intervention: Prevent Skin Injury  Recent Flowsheet Documentation  Taken 1/12/2025 1425 by Dedrick Cabrales RN  Body Position:   supine   weight shifting  Skin Protection:   transparent dressing maintained   incontinence pads utilized  Taken 1/12/2025 0815 by Dedrick Cabrales RN  Body Position:   supine   weight shifting  Skin Protection:   transparent dressing maintained   incontinence pads utilized  Intervention: Prevent and Manage VTE (Venous Thromboembolism) Risk  Recent Flowsheet Documentation  Taken 1/12/2025 0815 by Dedrick Cabrales RN  VTE Prevention/Management: (lovenox) other (see comments)  Intervention: Prevent Infection  Recent Flowsheet Documentation  Taken 1/12/2025 1425 by Dedrick Cabrales RN  Infection Prevention:   single patient room provided   rest/sleep promoted   hand hygiene promoted  Taken 1/12/2025 0815 by Dedrick Cabrales RN  Infection Prevention:   single patient room provided   rest/sleep promoted   hand hygiene promoted  Goal: Optimal Comfort and Wellbeing  1/12/2025 1832 by Dedrick Cabrales RN  Outcome: Progressing  1/12/2025 1809 by Dedrick Cabrales RN  Outcome: Progressing  Intervention: Provide Person-Centered Care  Recent Flowsheet Documentation  Taken 1/12/2025 1425 by Dedrick Cabrales RN  Trust Relationship/Rapport:   thoughts/feelings acknowledged   reassurance provided   questions encouraged   questions answered   empathic listening provided   emotional support provided   choices provided   care  explained  Taken 1/12/2025 0815 by Dedrick Cabrlaes RN  Trust Relationship/Rapport:   thoughts/feelings acknowledged   reassurance provided   questions encouraged   questions answered   empathic listening provided   emotional support provided   choices provided   care explained  Goal: Readiness for Transition of Care  1/12/2025 1832 by Dedrick Cabrales RN  Outcome: Progressing  1/12/2025 1809 by Dedrick Cabrales RN  Outcome: Progressing     Problem: Comorbidity Management  Goal: Blood Pressure in Desired Range  1/12/2025 1832 by Dedrick Cabrales RN  Outcome: Progressing  1/12/2025 1809 by Dedrick Cabrales RN  Outcome: Progressing  Intervention: Maintain Blood Pressure Management  Recent Flowsheet Documentation  Taken 1/12/2025 1425 by Dedrick Cabrales RN  Medication Review/Management: medications reviewed  Taken 1/12/2025 0815 by Dedrick Cabrales RN  Medication Review/Management: medications reviewed     Problem: Fall Injury Risk  Goal: Absence of Fall and Fall-Related Injury  1/12/2025 1832 by Dedrick Cabrales RN  Outcome: Progressing  1/12/2025 1809 by Dedrick Cabrales RN  Outcome: Progressing  Intervention: Identify and Manage Contributors  Recent Flowsheet Documentation  Taken 1/12/2025 1425 by Dedrick Cabrales RN  Medication Review/Management: medications reviewed  Self-Care Promotion: independence encouraged  Taken 1/12/2025 0815 by Dedrick Cabrales RN  Medication Review/Management: medications reviewed  Self-Care Promotion: independence encouraged  Intervention: Promote Injury-Free Environment  Recent Flowsheet Documentation  Taken 1/12/2025 1425 by Dedrick Cabrales RN  Safety Promotion/Fall Prevention:   safety round/check completed   room organization consistent  Taken 1/12/2025 0815 by Dedrick Cabrales RN  Safety Promotion/Fall Prevention:   room organization  consistent   safety round/check completed     Problem: Dysrhythmia  Goal: Normalized Cardiac Rhythm  1/12/2025 1832 by Dedrick Cabrales RN  Outcome: Progressing  1/12/2025 1809 by Dedrick Cabrales RN  Outcome: Progressing  Intervention: Monitor and Manage Cardiac Rhythm Effect  Recent Flowsheet Documentation  Taken 1/12/2025 0815 by Dedrick Cabrales RN  VTE Prevention/Management: (lovenox) other (see comments)     Problem: VTE (Venous Thromboembolism)  Goal: Tissue Perfusion  1/12/2025 1832 by Dedrick Cabrales RN  Outcome: Progressing  1/12/2025 1809 by Dedrick Cabrales RN  Outcome: Progressing  Intervention: Optimize Tissue Perfusion  Recent Flowsheet Documentation  Taken 1/12/2025 0815 by Dedrick Cabrales RN  VTE Prevention/Management: (lovenox) other (see comments)  Goal: Optimal Right Ventricular Function  1/12/2025 1832 by Dedrick Cabrales RN  Outcome: Progressing  1/12/2025 1809 by Dedrick Cabrales RN  Outcome: Progressing     Problem: Heart Failure  Goal: Optimal Coping  1/12/2025 1832 by Dedrick Cabrales RN  Outcome: Progressing  1/12/2025 1809 by Dedrick Cabrales RN  Outcome: Progressing  Intervention: Support Psychosocial Response  Recent Flowsheet Documentation  Taken 1/12/2025 1425 by Dedrick Cabrales RN  Family/Support System Care:   support provided   self-care encouraged   presence promoted   involvement promoted  Taken 1/12/2025 0815 by Dedrick Cabrales RN  Family/Support System Care:   support provided   self-care encouraged   presence promoted   involvement promoted  Goal: Optimal Cardiac Output and Blood Flow  1/12/2025 1832 by Dedrick Cabrales RN  Outcome: Progressing  1/12/2025 1809 by Dedrick Cabrales RN  Outcome: Progressing  Intervention: Optimize Cardiac Output  Recent Flowsheet Documentation  Taken 1/12/2025 0815 by Gamaliel Montanez  KARINE Tse  Environmental Support:   calm environment promoted   rest periods encouraged  Goal: Stable Heart Rate and Rhythm  1/12/2025 1832 by Dedrick Cabrales RN  Outcome: Progressing  1/12/2025 1809 by Dedrick Cabrales RN  Outcome: Progressing  Goal: Fluid and Electrolyte Balance  1/12/2025 1832 by Dedrick Cabrales RN  Outcome: Progressing  1/12/2025 1809 by Dedrick Cabrales RN  Outcome: Progressing  Goal: Optimal Functional Ability  1/12/2025 1832 by Dedrick Cabrales RN  Outcome: Progressing  1/12/2025 1809 by Dedrick Cabrales RN  Outcome: Progressing  Intervention: Optimize Functional Ability  Recent Flowsheet Documentation  Taken 1/12/2025 1425 by Dedrick Cabrales RN  Activity Management: up in chair  Self-Care Promotion: independence encouraged  Taken 1/12/2025 0815 by Dedrick Cabrales RN  Activity Management: activity minimized  Self-Care Promotion: independence encouraged  Goal: Improved Oral Intake  1/12/2025 1832 by Dedrick Cabrales RN  Outcome: Progressing  1/12/2025 1809 by Dedrick Cabrales RN  Outcome: Progressing  Goal: Effective Oxygenation and Ventilation  1/12/2025 1832 by Dedrick Cabrales RN  Outcome: Progressing  1/12/2025 1809 by Dedrick Cabrales RN  Outcome: Progressing  Intervention: Promote Airway Secretion Clearance  Recent Flowsheet Documentation  Taken 1/12/2025 1425 by Dedrick Cabrales RN  Activity Management: up in chair  Cough And Deep Breathing: done independently per patient  Taken 1/12/2025 0815 by Dedrick Cabrales RN  Activity Management: activity minimized  Cough And Deep Breathing: done independently per patient  Intervention: Optimize Oxygenation and Ventilation  Recent Flowsheet Documentation  Taken 1/12/2025 1425 by Dedrick Cabrales RN  Head of Bed (HOB) Positioning: HOB at 20-30 degrees  Taken 1/12/2025 0815 by  Dedrick Cabrales RN  Head of Bed (HOB) Positioning: HOB at 20-30 degrees  Goal: Effective Breathing Pattern During Sleep  1/12/2025 1832 by Dedrick Cabrales RN  Outcome: Progressing  1/12/2025 1809 by Dedrick Cabrales RN  Outcome: Progressing  Intervention: Monitor and Manage Obstructive Sleep Apnea  Recent Flowsheet Documentation  Taken 1/12/2025 1425 by Dedrick Cabrales RN  Medication Review/Management: medications reviewed  Taken 1/12/2025 0815 by Dedrick Cabrales RN  Medication Review/Management: medications reviewed     Problem: Delirium  Goal: Optimal Coping  1/12/2025 1832 by Dedrick Cabrales RN  Outcome: Progressing  1/12/2025 1809 by Dedrick Cabrales RN  Outcome: Progressing  Intervention: Optimize Psychosocial Adjustment to Delirium  Recent Flowsheet Documentation  Taken 1/12/2025 1425 by Dedrick Cabrales RN  Family/Support System Care:   support provided   self-care encouraged   presence promoted   involvement promoted  Taken 1/12/2025 0815 by Dedrick Cabrales RN  Family/Support System Care:   support provided   self-care encouraged   presence promoted   involvement promoted  Goal: Improved Behavioral Control  1/12/2025 1832 by Dedrick Cabrales RN  Outcome: Progressing  1/12/2025 1809 by Dedrick Cabrales RN  Outcome: Progressing  Intervention: Minimize Safety Risk  Recent Flowsheet Documentation  Taken 1/12/2025 1425 by Dedrick Cabrales RN  Trust Relationship/Rapport:   thoughts/feelings acknowledged   reassurance provided   questions encouraged   questions answered   empathic listening provided   emotional support provided   choices provided   care explained  Enhanced Safety Measures: bed alarm set  Taken 1/12/2025 0815 by Dedrick Cabrales RN  Trust Relationship/Rapport:   thoughts/feelings acknowledged   reassurance provided   questions encouraged   questions  answered   empathic listening provided   emotional support provided   choices provided   care explained  Enhanced Safety Measures:   bed alarm set   room near unit station  Communication Support Strategies: active listening utilized  Goal: Improved Attention and Thought Clarity  1/12/2025 1832 by Dedrick Cabrales RN  Outcome: Progressing  1/12/2025 1809 by Dedrick Cabrales RN  Outcome: Progressing  Intervention: Maximize Cognitive Function  Recent Flowsheet Documentation  Taken 1/12/2025 1425 by Dedrick Cabrales RN  Reorientation Measures:   calendar in view   clock in view   reorientation provided   familiar social contact encouraged  Taken 1/12/2025 0815 by Dedrick Cabrales RN  Reorientation Measures:   calendar in view   clock in view   reorientation provided   familiar social contact encouraged  Goal: Improved Sleep  1/12/2025 1832 by Dedrick Cabrales RN  Outcome: Progressing  1/12/2025 1809 by Dedrick Cabrales RN  Outcome: Progressing  Intervention: Promote Sleep  Recent Flowsheet Documentation  Taken 1/12/2025 1425 by Dedrick Cabrales RN  Sleep/Rest Enhancement: awakenings minimized  Taken 1/12/2025 0815 by Dedrick Cabrales RN  Sleep/Rest Enhancement: awakenings minimized     Problem: Skin Injury Risk Increased  Goal: Skin Health and Integrity  1/12/2025 1832 by Dedrick Cabrales RN  Outcome: Progressing  1/12/2025 1809 by Dderick Cabrales RN  Outcome: Progressing  Intervention: Optimize Skin Protection  Recent Flowsheet Documentation  Taken 1/12/2025 1425 by Dedrick Cabrales RN  Activity Management: up in chair  Head of Bed (HOB) Positioning: HOB at 20-30 degrees  Skin Protection:   transparent dressing maintained   incontinence pads utilized  Taken 1/12/2025 0815 by Dedrick Cabrales RN  Activity Management: activity minimized  Head of Bed (HOB) Positioning: HOB at 20-30  degrees  Skin Protection:   transparent dressing maintained   incontinence pads utilized     Problem: Restraint, Nonviolent  Goal: Absence of Harm or Injury  1/12/2025 1832 by Dedrick Cabrales RN  Outcome: Progressing  1/12/2025 1809 by Dedrick Cabrales RN  Outcome: Progressing  Intervention: Implement Least Restrictive Safety Strategies  Recent Flowsheet Documentation  Taken 1/12/2025 1425 by Dedrick Cabrales RN  Medical Device Protection:   tubing secured   IV pole/bag removed from visual field  Diversional Activities: television  Taken 1/12/2025 1220 by Dedrick Cabrales RN  Medical Device Protection:   tubing secured   IV pole/bag removed from visual field  Diversional Activities: television  Taken 1/12/2025 1020 by Dedrick Cabrales RN  Medical Device Protection:   tubing secured   IV pole/bag removed from visual field  Diversional Activities: television  Taken 1/12/2025 0815 by Dedrick Cabrales RN  Medical Device Protection:   IV pole/bag removed from visual field   tubing secured  Diversional Activities: television  Taken 1/12/2025 0810 by Dedrick Cabrales RN  Medical Device Protection:   tubing secured   IV pole/bag removed from visual field  Diversional Activities: television  Intervention: Protect Dignity, Rights and Personal Wellbeing  Recent Flowsheet Documentation  Taken 1/12/2025 1425 by Dedrick Cabrales RN  Trust Relationship/Rapport:   thoughts/feelings acknowledged   reassurance provided   questions encouraged   questions answered   empathic listening provided   emotional support provided   choices provided   care explained  Taken 1/12/2025 0815 by Dedrick Cabrales RN  Trust Relationship/Rapport:   thoughts/feelings acknowledged   reassurance provided   questions encouraged   questions answered   empathic listening provided   emotional support provided   choices provided   care explained  Intervention:  Protect Skin and Joint Integrity  Recent Flowsheet Documentation  Taken 1/12/2025 1425 by Dedrick Cabrales, RN  Body Position:   supine   weight shifting  Skin Protection:   transparent dressing maintained   incontinence pads utilized  Range of Motion: active ROM (range of motion) encouraged  Taken 1/12/2025 0815 by Dedrick Cabrales, RN  Body Position:   supine   weight shifting  Skin Protection:   transparent dressing maintained   incontinence pads utilized  Range of Motion: active ROM (range of motion) encouraged   Goal Outcome Evaluation:

## 2025-01-12 NOTE — PLAN OF CARE
Goal Outcome Evaluation:            This patient has been restless/agitated. He woke up early and was helped to the chair where he stayed for over 5 hours and remained pleasantly confused. Engaged in conversations with family members, but continues to be incoherent. He took his medicines crushed in apple sauce this morning and ate about 15% of breakfast and lunch. He had another large BM this afternoon, no diarrhea, no blood noted. His heart rate remains over 130 and only decrease to the low 100's and 90's right after metoprolol administration. I contacted the cardiologist group and spoke to Leatha/KEISHA. Cardiology has been following his rate and the patient will be seen by EP tomorrow. I removed   The stiches to right earlobe. Spouse is at bed side. Care plan ongoing.

## 2025-01-13 LAB
ANION GAP SERPL CALCULATED.3IONS-SCNC: 10 MMOL/L (ref 5–15)
BASOPHILS # BLD AUTO: 0.05 10*3/MM3 (ref 0–0.2)
BASOPHILS NFR BLD AUTO: 0.6 % (ref 0–1.5)
BUN SERPL-MCNC: 23 MG/DL (ref 8–23)
BUN/CREAT SERPL: 26.4 (ref 7–25)
CALCIUM SPEC-SCNC: 8.8 MG/DL (ref 8.6–10.5)
CHLORIDE SERPL-SCNC: 112 MMOL/L (ref 98–107)
CO2 SERPL-SCNC: 23 MMOL/L (ref 22–29)
CREAT SERPL-MCNC: 0.87 MG/DL (ref 0.76–1.27)
DEPRECATED RDW RBC AUTO: 45.1 FL (ref 37–54)
EGFRCR SERPLBLD CKD-EPI 2021: 86.7 ML/MIN/1.73
EOSINOPHIL # BLD AUTO: 0.13 10*3/MM3 (ref 0–0.4)
EOSINOPHIL NFR BLD AUTO: 1.5 % (ref 0.3–6.2)
ERYTHROCYTE [DISTWIDTH] IN BLOOD BY AUTOMATED COUNT: 14 % (ref 12.3–15.4)
GLUCOSE SERPL-MCNC: 95 MG/DL (ref 65–99)
HCT VFR BLD AUTO: 39.2 % (ref 37.5–51)
HGB BLD-MCNC: 12.2 G/DL (ref 13–17.7)
IMM GRANULOCYTES # BLD AUTO: 0.04 10*3/MM3 (ref 0–0.05)
IMM GRANULOCYTES NFR BLD AUTO: 0.4 % (ref 0–0.5)
LYMPHOCYTES # BLD AUTO: 1.08 10*3/MM3 (ref 0.7–3.1)
LYMPHOCYTES NFR BLD AUTO: 12.1 % (ref 19.6–45.3)
MCH RBC QN AUTO: 28 PG (ref 26.6–33)
MCHC RBC AUTO-ENTMCNC: 31.1 G/DL (ref 31.5–35.7)
MCV RBC AUTO: 90.1 FL (ref 79–97)
MONOCYTES # BLD AUTO: 0.7 10*3/MM3 (ref 0.1–0.9)
MONOCYTES NFR BLD AUTO: 7.9 % (ref 5–12)
NEUTROPHILS NFR BLD AUTO: 6.89 10*3/MM3 (ref 1.7–7)
NEUTROPHILS NFR BLD AUTO: 77.5 % (ref 42.7–76)
NRBC BLD AUTO-RTO: 0 /100 WBC (ref 0–0.2)
PLATELET # BLD AUTO: 250 10*3/MM3 (ref 140–450)
PMV BLD AUTO: 11 FL (ref 6–12)
POTASSIUM SERPL-SCNC: 3.9 MMOL/L (ref 3.5–5.2)
RBC # BLD AUTO: 4.35 10*6/MM3 (ref 4.14–5.8)
SODIUM SERPL-SCNC: 145 MMOL/L (ref 136–145)
WBC NRBC COR # BLD AUTO: 8.89 10*3/MM3 (ref 3.4–10.8)

## 2025-01-13 PROCEDURE — 25010000002 OLANZAPINE 10 MG RECONSTITUTED SOLUTION: Performed by: SPECIALIST

## 2025-01-13 PROCEDURE — 25010000002 ENOXAPARIN PER 10 MG: Performed by: INTERNAL MEDICINE

## 2025-01-13 PROCEDURE — 80048 BASIC METABOLIC PNL TOTAL CA: CPT | Performed by: INTERNAL MEDICINE

## 2025-01-13 PROCEDURE — 99232 SBSQ HOSP IP/OBS MODERATE 35: CPT | Performed by: PHYSICIAN ASSISTANT

## 2025-01-13 PROCEDURE — 85025 COMPLETE CBC W/AUTO DIFF WBC: CPT | Performed by: INTERNAL MEDICINE

## 2025-01-13 RX ORDER — METOPROLOL TARTRATE 50 MG
50 TABLET ORAL EVERY 8 HOURS
Status: DISCONTINUED | OUTPATIENT
Start: 2025-01-13 | End: 2025-01-16

## 2025-01-13 RX ORDER — METOPROLOL TARTRATE 50 MG
50 TABLET ORAL EVERY 12 HOURS SCHEDULED
Status: DISCONTINUED | OUTPATIENT
Start: 2025-01-13 | End: 2025-01-13

## 2025-01-13 RX ADMIN — Medication 1000 MCG: at 09:32

## 2025-01-13 RX ADMIN — ENOXAPARIN SODIUM 100 MG: 100 INJECTION SUBCUTANEOUS at 21:33

## 2025-01-13 RX ADMIN — OLANZAPINE 5 MG: 10 INJECTION, POWDER, LYOPHILIZED, FOR SOLUTION INTRAMUSCULAR at 21:40

## 2025-01-13 RX ADMIN — ENOXAPARIN SODIUM 100 MG: 100 INJECTION SUBCUTANEOUS at 00:13

## 2025-01-13 RX ADMIN — DIGOXIN 125 MCG: 0.12 TABLET ORAL at 09:32

## 2025-01-13 RX ADMIN — METOPROLOL TARTRATE 7.5 MG: 1 INJECTION, SOLUTION INTRAVENOUS at 07:31

## 2025-01-13 RX ADMIN — METOPROLOL TARTRATE 7.5 MG: 5 INJECTION INTRAVENOUS at 21:28

## 2025-01-13 RX ADMIN — Medication 10 ML: at 09:32

## 2025-01-13 RX ADMIN — OLANZAPINE 5 MG: 5 TABLET, FILM COATED ORAL at 18:19

## 2025-01-13 RX ADMIN — OLANZAPINE 5 MG: 5 TABLET, FILM COATED ORAL at 20:10

## 2025-01-13 RX ADMIN — METOPROLOL TARTRATE 50 MG: 50 TABLET, FILM COATED ORAL at 18:19

## 2025-01-13 RX ADMIN — METOPROLOL TARTRATE 50 MG: 50 TABLET, FILM COATED ORAL at 13:01

## 2025-01-13 RX ADMIN — Medication 10 ML: at 20:10

## 2025-01-13 RX ADMIN — ENOXAPARIN SODIUM 100 MG: 100 INJECTION SUBCUTANEOUS at 09:32

## 2025-01-13 RX ADMIN — ATORVASTATIN CALCIUM 10 MG: 10 TABLET, FILM COATED ORAL at 09:32

## 2025-01-13 RX ADMIN — METOPROLOL TARTRATE 7.5 MG: 1 INJECTION, SOLUTION INTRAVENOUS at 01:15

## 2025-01-13 RX ADMIN — TAMSULOSIN HYDROCHLORIDE 0.4 MG: 0.4 CAPSULE ORAL at 09:32

## 2025-01-13 RX ADMIN — MEGESTROL ACETATE 400 MG: 40 SUSPENSION ORAL at 09:32

## 2025-01-13 NOTE — PROGRESS NOTES
Electrophysiology Follow-Up Note      Patient Name: Mark Aguirre Jr.  Age/Sex: 79 y.o. male  : 1945  MRN: [id number]    Date of Admission: 2024  Day of Service: 25       Chief Complaint: confusion     Follow up:  25 current plans would be for him to go to a geriatric psych unit however Dr. Garza he is concerned that heart rates continuing to be elevated will prevent transfer.    His heart rate is the lowest it has been in the past few days on tele  (100s) when seen this afternoon. He is pleasant and talking/ mumbling to us with wife and son in the room. He was able to take oral meds today.       Temp:  [97.5 °F (36.4 °C)-98.4 °F (36.9 °C)] 97.5 °F (36.4 °C)  Heart Rate:  [105-146] 118  Resp:  [18-20] 18  BP: (105-143)/() 129/61     PHYSICAL EXAM    General: 79 y.o. male No acute distress, laying in bed. Alert and Oriented.   Neck: No JVD or carotid bruit  Lungs: Clear to ausculation bilaterally, symmetric  Heart: irregular rate and rhythm  with no overt murmurs, rubs or gallops. Normal S1 and S2.   Abdomen: soft, non-tender  Extremities: No lower extremity edema or cyanosis.   Neuo: no lateralizing defects.        Telemetry/EK25: AF with RVR into the 140s        I personally viewed and interpreted the patient's EKG/Telemetry data.    Previous testing:   - Echo 2024: LVEF 48.8%. LA moderate dilation. Moderate AS aortic valve area 1.8cm2. RSVP 48mmHg.           Lab Review:   Results from last 7 days   Lab Units 25  0809 25  0805 25  0806   SODIUM mmol/L 145 145 144   POTASSIUM mmol/L 3.9 4.2 4.2   CHLORIDE mmol/L 112* 111* 109*   CO2 mmol/L 23.0 23.8 24.2   BUN mg/dL 23 24* 22   CREATININE mg/dL 0.87 0.78 0.77   GLUCOSE mg/dL 95 94 104*   CALCIUM mg/dL 8.8 8.9 9.0     Results from last 7 days   Lab Units 25  0809 25  0805 25  0806   WBC 10*3/mm3 8.89 10.59 9.72   HEMOGLOBIN g/dL 12.2* 12.8* 12.6*   HEMATOCRIT % 39.2  41.2 38.3   PLATELETS 10*3/mm3 250 253 235                       Current Medications:   Scheduled Meds:atorvastatin, 10 mg, Oral, Daily  digoxin, 125 mcg, Oral, Daily  enoxaparin, 1 mg/kg, Subcutaneous, Q12H  megestrol, 400 mg, Oral, Daily  metoprolol tartrate, 7.5 mg, Intravenous, Q6H  OLANZapine, 5 mg, Oral, Nightly  OLANZapine, 5 mg, Oral, Daily With Dinner  potassium chloride, 40 mEq, Oral, Once  potassium chloride, 40 mEq, Oral, Once  sodium chloride, 10 mL, Intravenous, Q12H  tamsulosin, 0.4 mg, Oral, Daily  vitamin B-12, 1,000 mcg, Oral, Daily          Assessment /Plan:  Persistent atrial fibrillation-HR remains elevated despite regular IV lopressor 7.5 mg over the weekend every 6 hours.   Will start him on oral metoprolol 50 mg three times per day, Dr. Armando evaluated, as long as he can get his oral dosing of medications goal HR is around 100. This should allow him to be discharged to plan on OP PPM.   Continue PRN IV lopressor 7.5 for HR over 115.   He has previously failed AAD tx due to qtc prolongation (dofetilide)  We have low suspicion his AF is leading to ongoing delirium.   Plan is for an upcoming AVN ablation and pacemaker implantation with follow up AVN ablation.   Continue digoxin 125 mcg daily for rate control. Dig level was noted.   He has been started back on lovenox.   PVCs- ongoing, asymptomatic, no long runs. Continue BB. Increasing today.   Acute Delirium in setting of early on set dementia- ongoing but better. Dr. Staples following [facility] the [facility] once HR controlled. Goal HR < 100.   Repeat CT of head was normal   MRI of head was normal, had LP done yesterday. [facility]   Possibly due to ativan, this is now held.  Syncope- likely due to orthostatic changes, could be from AF with RVR but we dont think this is the ongoing driving feature for his current delirium.  He has been very bed bound while in pt but no  episodes of bradycardia or pausing noted on tele.     Cameron Denny PA-C  01/13/25  09:56 EST

## 2025-01-13 NOTE — PROGRESS NOTES
The patient remains confused, though wife reports that when his heart rate normalizes that he seems more cogent.  Continuing current treatment for now.  Perhaps there is some hope that with resolution of his atrial fibrillation that his mentation may return to baseline, however I fear that this recent medical setback may have unmasked a pre-existing dementing illness.

## 2025-01-13 NOTE — PLAN OF CARE
Problem: Syncope  Goal: Absence of Syncopal Symptoms  Outcome: Progressing  Intervention: Manage Effect of Syncopal Symptoms  Recent Flowsheet Documentation  Taken 1/13/2025 0400 by Bessy Rivera RN  Safety Promotion/Fall Prevention: safety round/check completed  Taken 1/13/2025 0200 by Bessy Rivera RN  Safety Promotion/Fall Prevention: safety round/check completed  Taken 1/13/2025 0024 by Bessy Rivera RN  Safety Promotion/Fall Prevention:   activity supervised   assistive device/personal items within reach   clutter free environment maintained   room organization consistent   safety round/check completed   fall prevention program maintained   lighting adjusted   mobility aid in reach   nonskid shoes/slippers when out of bed  Taken 1/12/2025 2055 by Bessy Rievra RN  Safety Promotion/Fall Prevention:   activity supervised   assistive device/personal items within reach   clutter free environment maintained   fall prevention program maintained   lighting adjusted   mobility aid in reach   nonskid shoes/slippers when out of bed   room organization consistent   safety round/check completed  Goal: Absence of Syncopal Symptoms  Outcome: Progressing  Intervention: Manage Effect of Syncopal Symptoms  Recent Flowsheet Documentation  Taken 1/13/2025 0400 by Bessy Rivera RN  Safety Promotion/Fall Prevention: safety round/check completed  Taken 1/13/2025 0200 by Bessy Rivera RN  Safety Promotion/Fall Prevention: safety round/check completed  Taken 1/13/2025 0024 by Bessy Rivera RN  Safety Promotion/Fall Prevention:   activity supervised   assistive device/personal items within reach   clutter free environment maintained   room organization consistent   safety round/check completed   fall prevention program maintained   lighting adjusted   mobility aid in reach   nonskid shoes/slippers when out of bed  Taken 1/12/2025 2055 by Bessy Rivera RN  Safety Promotion/Fall Prevention:   activity supervised   assistive  device/personal items within reach   clutter free environment maintained   fall prevention program maintained   lighting adjusted   mobility aid in reach   nonskid shoes/slippers when out of bed   room organization consistent   safety round/check completed     Problem: Adult Inpatient Plan of Care  Goal: Plan of Care Review  Outcome: Progressing  Flowsheets  Taken 1/13/2025 0430  Progress: no change  Plan of Care Reviewed With:   patient   spouse  Taken 1/12/2025 0616  Outcome Evaluation: Pt confused and restless at times. Wife at bedside. Restraints in place, safey precautions maintained, plan of care ongoing.  Goal: Patient-Specific Goal (Individualized)  Outcome: Progressing  Goal: Absence of Hospital-Acquired Illness or Injury  Outcome: Progressing  Intervention: Identify and Manage Fall Risk  Recent Flowsheet Documentation  Taken 1/13/2025 0400 by Bessy Rivera RN  Safety Promotion/Fall Prevention: safety round/check completed  Taken 1/13/2025 0200 by Bessy Rivera RN  Safety Promotion/Fall Prevention: safety round/check completed  Taken 1/13/2025 0024 by Bessy Rivera RN  Safety Promotion/Fall Prevention:   activity supervised   assistive device/personal items within reach   clutter free environment maintained   room organization consistent   safety round/check completed   fall prevention program maintained   lighting adjusted   mobility aid in reach   nonskid shoes/slippers when out of bed  Taken 1/12/2025 2055 by Bessy Rivera RN  Safety Promotion/Fall Prevention:   activity supervised   assistive device/personal items within reach   clutter free environment maintained   fall prevention program maintained   lighting adjusted   mobility aid in reach   nonskid shoes/slippers when out of bed   room organization consistent   safety round/check completed  Intervention: Prevent Skin Injury  Recent Flowsheet Documentation  Taken 1/13/2025 0400 by Bessy Rivera, KARINE  Body Position:   weight shifting   supine  Taken  1/13/2025 0200 by Bessy Rivera RN  Body Position:   weight shifting   supine  Taken 1/13/2025 0024 by Bessy Rivera RN  Body Position:   supine   weight shifting  Skin Protection:   incontinence pads utilized   silicone foam dressing in place  Taken 1/12/2025 2055 by Bessy Rivera RN  Body Position:   supine   weight shifting  Skin Protection:   incontinence pads utilized   silicone foam dressing in place  Intervention: Prevent and Manage VTE (Venous Thromboembolism) Risk  Recent Flowsheet Documentation  Taken 1/13/2025 0024 by Bessy Rivera RN  VTE Prevention/Management: (lovenox) other (see comments)  Taken 1/12/2025 2055 by Bessy Rivera RN  VTE Prevention/Management: (lovenox) other (see comments)  Intervention: Prevent Infection  Recent Flowsheet Documentation  Taken 1/13/2025 0024 by Bessy Rivera RN  Infection Prevention:   environmental surveillance performed   rest/sleep promoted   single patient room provided  Taken 1/12/2025 2055 by Bessy Rivera RN  Infection Prevention:   environmental surveillance performed   rest/sleep promoted   single patient room provided  Goal: Optimal Comfort and Wellbeing  Outcome: Progressing  Intervention: Provide Person-Centered Care  Recent Flowsheet Documentation  Taken 1/13/2025 0024 by Bessy Rivera RN  Trust Relationship/Rapport:   care explained   choices provided   reassurance provided  Taken 1/12/2025 2055 by Bessy Rivera RN  Trust Relationship/Rapport:   care explained   choices provided   thoughts/feelings acknowledged  Goal: Readiness for Transition of Care  Outcome: Progressing     Problem: Comorbidity Management  Goal: Blood Pressure in Desired Range  Outcome: Progressing  Intervention: Maintain Blood Pressure Management  Recent Flowsheet Documentation  Taken 1/13/2025 0400 by Bessy Rivera RN  Medication Review/Management: medications reviewed  Taken 1/13/2025 0200 by Bessy Rivera RN  Medication Review/Management: medications reviewed  Taken 1/13/2025 0024 by  Bessy Rivera RN  Medication Review/Management: medications reviewed  Taken 1/12/2025 2055 by Bessy Rivera RN  Medication Review/Management: medications reviewed     Problem: Fall Injury Risk  Goal: Absence of Fall and Fall-Related Injury  Outcome: Progressing  Intervention: Identify and Manage Contributors  Recent Flowsheet Documentation  Taken 1/13/2025 0400 by Bessy Rivera RN  Medication Review/Management: medications reviewed  Taken 1/13/2025 0200 by Bessy Rivera RN  Medication Review/Management: medications reviewed  Taken 1/13/2025 0024 by Bessy Rivera RN  Medication Review/Management: medications reviewed  Taken 1/12/2025 2055 by Bessy Rivera RN  Medication Review/Management: medications reviewed  Intervention: Promote Injury-Free Environment  Recent Flowsheet Documentation  Taken 1/13/2025 0400 by Bessy Rivera RN  Safety Promotion/Fall Prevention: safety round/check completed  Taken 1/13/2025 0200 by Bessy Rivera RN  Safety Promotion/Fall Prevention: safety round/check completed  Taken 1/13/2025 0024 by Bessy Rivera RN  Safety Promotion/Fall Prevention:   activity supervised   assistive device/personal items within reach   clutter free environment maintained   room organization consistent   safety round/check completed   fall prevention program maintained   lighting adjusted   mobility aid in reach   nonskid shoes/slippers when out of bed  Taken 1/12/2025 2055 by Bessy Rivera RN  Safety Promotion/Fall Prevention:   activity supervised   assistive device/personal items within reach   clutter free environment maintained   fall prevention program maintained   lighting adjusted   mobility aid in reach   nonskid shoes/slippers when out of bed   room organization consistent   safety round/check completed     Problem: Dysrhythmia  Goal: Normalized Cardiac Rhythm  Outcome: Progressing  Intervention: Monitor and Manage Cardiac Rhythm Effect  Recent Flowsheet Documentation  Taken 1/13/2025 0024 by Nicole  KARINE Doherty  VTE Prevention/Management: (lovenox) other (see comments)  Taken 1/12/2025 2055 by Bessy Rivera RN  VTE Prevention/Management: (lovenox) other (see comments)     Problem: VTE (Venous Thromboembolism)  Goal: Tissue Perfusion  Outcome: Progressing  Intervention: Optimize Tissue Perfusion  Recent Flowsheet Documentation  Taken 1/13/2025 0024 by Bessy Rivera RN  VTE Prevention/Management: (lovenox) other (see comments)  Taken 1/12/2025 2055 by Bessy Rivera RN  VTE Prevention/Management: (lovenox) other (see comments)  Goal: Optimal Right Ventricular Function  Outcome: Progressing     Problem: Heart Failure  Goal: Optimal Coping  Outcome: Progressing  Goal: Optimal Cardiac Output and Blood Flow  Outcome: Progressing  Goal: Stable Heart Rate and Rhythm  Outcome: Progressing  Goal: Fluid and Electrolyte Balance  Outcome: Progressing  Goal: Optimal Functional Ability  Outcome: Progressing  Goal: Improved Oral Intake  Outcome: Progressing  Goal: Effective Oxygenation and Ventilation  Outcome: Progressing  Intervention: Promote Airway Secretion Clearance  Recent Flowsheet Documentation  Taken 1/13/2025 0024 by Bessy Rivera RN  Cough And Deep Breathing: done independently per patient  Taken 1/12/2025 2055 by Bessy Rivera RN  Cough And Deep Breathing: done independently per patient  Intervention: Optimize Oxygenation and Ventilation  Recent Flowsheet Documentation  Taken 1/13/2025 0400 by Bessy Rivera RN  Head of Bed (HOB) Positioning: HOB at 20-30 degrees  Taken 1/13/2025 0200 by Bessy Rivera RN  Head of Bed (HOB) Positioning: HOB at 20-30 degrees  Taken 1/13/2025 0024 by Bessy Rivera RN  Head of Bed (HOB) Positioning: HOB at 20-30 degrees  Taken 1/12/2025 2055 by Bessy Rivera RN  Head of Bed (HOB) Positioning: HOB at 20-30 degrees  Goal: Effective Breathing Pattern During Sleep  Outcome: Progressing  Intervention: Monitor and Manage Obstructive Sleep Apnea  Recent Flowsheet Documentation  Taken 1/13/2025  0400 by Bessy Rivera RN  Medication Review/Management: medications reviewed  Taken 1/13/2025 0200 by Bessy Rivera RN  Medication Review/Management: medications reviewed  Taken 1/13/2025 0024 by Bessy Rivera RN  Medication Review/Management: medications reviewed  Taken 1/12/2025 2055 by Bessy Rivera RN  Medication Review/Management: medications reviewed     Problem: Delirium  Goal: Optimal Coping  Outcome: Progressing  Goal: Improved Behavioral Control  Outcome: Progressing  Intervention: Minimize Safety Risk  Recent Flowsheet Documentation  Taken 1/13/2025 0400 by Bessy Rivera RN  Enhanced Safety Measures: bed alarm set  Taken 1/13/2025 0200 by Bessy Rivera RN  Enhanced Safety Measures: bed alarm set  Taken 1/13/2025 0024 by Bessy Rivera RN  Trust Relationship/Rapport:   care explained   choices provided   reassurance provided  Enhanced Safety Measures:   bed alarm set   family to remain at bedside  Taken 1/12/2025 2055 by Bessy Rivera RN  Trust Relationship/Rapport:   care explained   choices provided   thoughts/feelings acknowledged  Enhanced Safety Measures: bed alarm set  Goal: Improved Attention and Thought Clarity  Outcome: Progressing  Goal: Improved Sleep  Outcome: Progressing     Problem: Skin Injury Risk Increased  Goal: Skin Health and Integrity  Outcome: Progressing  Intervention: Optimize Skin Protection  Recent Flowsheet Documentation  Taken 1/13/2025 0400 by Bessy Rivera RN  Head of Bed (HOB) Positioning: HOB at 20-30 degrees  Taken 1/13/2025 0200 by Bessy Rivera RN  Head of Bed (HOB) Positioning: HOB at 20-30 degrees  Taken 1/13/2025 0024 by Bessy Rivera RN  Pressure Reduction Techniques:   frequent weight shift encouraged   weight shift assistance provided  Head of Bed (HOB) Positioning: HOB at 20-30 degrees  Pressure Reduction Devices:   alternating pressure pump (JUAN)   pressure-redistributing mattress utilized  Skin Protection:   incontinence pads utilized   silicone foam dressing in  place  Taken 1/12/2025 2055 by Bessy Rivera RN  Pressure Reduction Techniques: frequent weight shift encouraged  Head of Bed (HOB) Positioning: HOB at 20-30 degrees  Pressure Reduction Devices:   alternating pressure pump (JUAN)   pressure-redistributing mattress utilized  Skin Protection:   incontinence pads utilized   silicone foam dressing in place     Problem: Restraint, Nonviolent  Goal: Absence of Harm or Injury  Outcome: Progressing  Intervention: Implement Least Restrictive Safety Strategies  Recent Flowsheet Documentation  Taken 1/13/2025 0400 by Bessy Rivera RN  Medical Device Protection:   IV pole/bag removed from visual field   tubing secured  Diversional Activities: television  Taken 1/13/2025 0200 by Bessy Rivera RN  Medical Device Protection:   IV pole/bag removed from visual field   tubing secured  Diversional Activities: television  Taken 1/13/2025 0024 by Bessy Rivera RN  Medical Device Protection:   IV pole/bag removed from visual field   tubing secured  Diversional Activities: television  Taken 1/13/2025 0000 by Bessy Rivera RN  Medical Device Protection:   IV pole/bag removed from visual field   tubing secured  Diversional Activities: television  Taken 1/12/2025 2200 by Bessy Rivera RN  Medical Device Protection:   IV pole/bag removed from visual field   tubing secured  Diversional Activities: television  Taken 1/12/2025 2055 by Bessy Rivera RN  Diversional Activities: television  Taken 1/12/2025 2000 by Bessy Rivera RN  Medical Device Protection:   IV pole/bag removed from visual field   tubing secured  Diversional Activities: television  Intervention: Protect Dignity, Rights and Personal Wellbeing  Recent Flowsheet Documentation  Taken 1/13/2025 0024 by Bessy Rivera RN  Trust Relationship/Rapport:   care explained   choices provided   reassurance provided  Taken 1/12/2025 2055 by Bessy Rivera RN  Trust Relationship/Rapport:   care explained   choices provided   thoughts/feelings  acknowledged  Intervention: Protect Skin and Joint Integrity  Recent Flowsheet Documentation  Taken 1/13/2025 0400 by Bessy Rivera RN  Body Position:   weight shifting   supine  Taken 1/13/2025 0200 by Bessy Rivera RN  Body Position:   weight shifting   supine  Taken 1/13/2025 0024 by Bessy Rivera RN  Body Position:   supine   weight shifting  Skin Protection:   incontinence pads utilized   silicone foam dressing in place  Range of Motion:   active ROM (range of motion) encouraged   ROM (range of motion) performed  Taken 1/12/2025 2055 by Bessy Rivera RN  Body Position:   supine   weight shifting  Skin Protection:   incontinence pads utilized   silicone foam dressing in place  Range of Motion:   active ROM (range of motion) encouraged   ROM (range of motion) performed   Goal Outcome Evaluation:  Plan of Care Reviewed With: patient, spouse        Progress: no change  Outcome Evaluation: Pt confused and restless at times. Wife at bedside. Restraints in place, safey precautions maintained, plan of care ongoing.

## 2025-01-13 NOTE — PROGRESS NOTES
Subjective: Restless night.  Received olanzapine IM about midnight.  Wife at bedside this morning.  Patient alert talking incoherently.  Requires restraints in the evening.  Was up a lot during the day but without lucid speech.  Drank 2 nutritional shakes small amount of eggs and pancake.    Objective  Vitals:    01/12/25 1930 01/13/25 0019 01/13/25 0115 01/13/25 0210   BP: 140/82  (!) 133/105 143/67   BP Location: Right arm  Right arm Right arm   Patient Position: Lying  Lying Lying   Pulse: (!) 139  (!) 136 105   Resp: 18 18  20   Temp: 98.4 °F (36.9 °C) 97.9 °F (36.6 °C)     TempSrc: Oral Axillary     SpO2: 100%      Weight:       Height:           General: Alert confused  HEENT: Sutures removed from right ear laceration looks to be healing well  Heart: Irregularly irregular-tachycardic  Lungs: Clear  Abdomen: Soft nontender nondistended, ecchymosis to right flank  Extremities: No edema    Recent Results (from the past 24 hours)   Basic Metabolic Panel    Collection Time: 01/12/25  8:05 AM    Specimen: Blood   Result Value Ref Range    Glucose 94 65 - 99 mg/dL    BUN 24 (H) 8 - 23 mg/dL    Creatinine 0.78 0.76 - 1.27 mg/dL    Sodium 145 136 - 145 mmol/L    Potassium 4.2 3.5 - 5.2 mmol/L    Chloride 111 (H) 98 - 107 mmol/L    CO2 23.8 22.0 - 29.0 mmol/L    Calcium 8.9 8.6 - 10.5 mg/dL    BUN/Creatinine Ratio 30.8 (H) 7.0 - 25.0    Anion Gap 10.2 5.0 - 15.0 mmol/L    eGFR 89.6 >60.0 mL/min/1.73   CBC Auto Differential    Collection Time: 01/12/25  8:05 AM    Specimen: Blood   Result Value Ref Range    WBC 10.59 3.40 - 10.80 10*3/mm3    RBC 4.61 4.14 - 5.80 10*6/mm3    Hemoglobin 12.8 (L) 13.0 - 17.7 g/dL    Hematocrit 41.2 37.5 - 51.0 %    MCV 89.4 79.0 - 97.0 fL    MCH 27.8 26.6 - 33.0 pg    MCHC 31.1 (L) 31.5 - 35.7 g/dL    RDW 13.7 12.3 - 15.4 %    RDW-SD 44.0 37.0 - 54.0 fl    MPV 11.1 6.0 - 12.0 fL    Platelets 253 140 - 450 10*3/mm3    Neutrophil % 76.4 (H) 42.7 - 76.0 %    Lymphocyte % 12.5 (L) 19.6 - 45.3 %     Monocyte % 8.3 5.0 - 12.0 %    Eosinophil % 1.5 0.3 - 6.2 %    Basophil % 0.6 0.0 - 1.5 %    Immature Grans % 0.7 (H) 0.0 - 0.5 %    Neutrophils, Absolute 8.10 (H) 1.70 - 7.00 10*3/mm3    Lymphocytes, Absolute 1.32 0.70 - 3.10 10*3/mm3    Monocytes, Absolute 0.88 0.10 - 0.90 10*3/mm3    Eosinophils, Absolute 0.16 0.00 - 0.40 10*3/mm3    Basophils, Absolute 0.06 0.00 - 0.20 10*3/mm3    Immature Grans, Absolute 0.07 (H) 0.00 - 0.05 10*3/mm3    nRBC 0.0 0.0 - 0.2 /100 WBC   POC Glucose Once    Collection Time: 01/12/25 10:59 AM    Specimen: Blood   Result Value Ref Range    Glucose 92 70 - 130 mg/dL       Assessment/plan  1.  Confusion/delirium-still sundown's considerably at night.  He is largely taking his oral medicines during the day.  I do suspect he is going to need geropsych admission.  Psychiatry following.  Remains on Zyprexa in the evenings  2.  Atrial fibrillation with rapid ventricular rate-cardiology following.  Had nursing reach out to cardiology again yesterday his heart rates remaining largely in the 130s.  They are going to see again today.  Will need rate control in order to get him out of the hospital if we are looking at transfer  3.  Rectus sheath hematoma-appears to be stable.  Back on anticoagulation with Lovenox.  Blood count pending today.  4.  Nutrition-Megace added.  He is taking a little bit more.  Bowels moved yesterday.

## 2025-01-14 ENCOUNTER — APPOINTMENT (OUTPATIENT)
Dept: CT IMAGING | Facility: HOSPITAL | Age: 80
End: 2025-01-14
Payer: MEDICARE

## 2025-01-14 PROCEDURE — 25010000002 OLANZAPINE 10 MG RECONSTITUTED SOLUTION: Performed by: SPECIALIST

## 2025-01-14 PROCEDURE — 70450 CT HEAD/BRAIN W/O DYE: CPT

## 2025-01-14 PROCEDURE — 25010000002 ENOXAPARIN PER 10 MG: Performed by: INTERNAL MEDICINE

## 2025-01-14 PROCEDURE — 97530 THERAPEUTIC ACTIVITIES: CPT

## 2025-01-14 RX ADMIN — METOPROLOL TARTRATE 50 MG: 50 TABLET, FILM COATED ORAL at 10:15

## 2025-01-14 RX ADMIN — TAMSULOSIN HYDROCHLORIDE 0.4 MG: 0.4 CAPSULE ORAL at 10:13

## 2025-01-14 RX ADMIN — ENOXAPARIN SODIUM 100 MG: 100 INJECTION SUBCUTANEOUS at 10:14

## 2025-01-14 RX ADMIN — METOPROLOL TARTRATE 50 MG: 50 TABLET, FILM COATED ORAL at 18:40

## 2025-01-14 RX ADMIN — ENOXAPARIN SODIUM 100 MG: 100 INJECTION SUBCUTANEOUS at 23:38

## 2025-01-14 RX ADMIN — Medication 10 ML: at 10:14

## 2025-01-14 RX ADMIN — OLANZAPINE 5 MG: 5 TABLET, FILM COATED ORAL at 20:48

## 2025-01-14 RX ADMIN — ATORVASTATIN CALCIUM 10 MG: 10 TABLET, FILM COATED ORAL at 10:13

## 2025-01-14 RX ADMIN — OLANZAPINE 5 MG: 10 INJECTION, POWDER, LYOPHILIZED, FOR SOLUTION INTRAMUSCULAR at 15:38

## 2025-01-14 RX ADMIN — Medication 10 ML: at 20:52

## 2025-01-14 RX ADMIN — MEGESTROL ACETATE 400 MG: 40 SUSPENSION ORAL at 10:13

## 2025-01-14 RX ADMIN — METOPROLOL TARTRATE 7.5 MG: 5 INJECTION INTRAVENOUS at 23:38

## 2025-01-14 RX ADMIN — Medication 1000 MCG: at 10:13

## 2025-01-14 RX ADMIN — DIGOXIN 125 MCG: 0.12 TABLET ORAL at 10:14

## 2025-01-14 RX ADMIN — METOPROLOL TARTRATE 7.5 MG: 5 INJECTION INTRAVENOUS at 05:18

## 2025-01-14 RX ADMIN — OLANZAPINE 5 MG: 5 TABLET, FILM COATED ORAL at 18:40

## 2025-01-14 NOTE — PLAN OF CARE
Goal Outcome Evaluation:  Plan of Care Reviewed With: patient, spouse        Progress: no change  Outcome Evaluation: Patient remains confused, calm and cooperative at the beginning of the shift. Oral meds taken. Started to be restless and agitated again close to bedtime, pt having visual hallucinations. Scheduled and PRN Zyprexa given. Bilateral wrist restraints in place. HR sustaining 130s-160s when agitated, PRN IV Metoprolol given x 2. Incontinence care completed. Continue to monitor.

## 2025-01-14 NOTE — THERAPY TREATMENT NOTE
Patient Name: Mark Aguirre Jr.  : 1945    MRN: [id number]                              Today's Date: 2025       Admit Date: 2024    Visit Dx:     ICD-10-CM ICD-9-CM   1. Syncope, unspecified syncope type  R55 780.2   2. Laceration of right ear, initial encounter  S01.311A 872.8   3. Prolonged Q-T interval on ECG  R94.31 794.31   4. Atrial fibrillation, unspecified type  I48.91 427.31     Patient Active Problem List   Diagnosis    Atrial fibrillation    Bifascicular block    STEPHENIE on auto CPAP    Family history of colon cancer    History of colon polyps    Essential hypertension    Hip pain, right    OA (osteoarthritis) of hip    Acute renal failure    Brief psychotic disorder    Family history of colonic polyps    Nonrheumatic aortic valve stenosis    PVC (premature ventricular contraction)    Syncope and collapse    History of adenomatous polyp of colon    Persistent atrial fibrillation    Hypoxemia associated with sleep    Syncope    Acute delirium     Past Medical History:   Diagnosis Date    Acute kidney failure     POST-OP TOTAL HIP 2020    Anticoagulated     PRADAXA FOR A FIB TO HELD 3 DAYS PRIOR    Arthritis     OSTEO. RIGHT HIP    Atrial flutter     Bifascicular block     Cataract     LEFT AND RIGHT    Depression     History of colon polyps     Hypertension     Hypoxemia associated with sleep     Insomnia     Knee pain     STEPHENIE on CPAP 2010    Overnight polysomnogram.  Weight 163 pounds.  Severe STEPHENIE with AHI of 30.9 events per hour.  Low oxygen saturation 72% and sleep-related hypoxia present for 30 minutes    PAF (paroxysmal atrial fibrillation)     Right hip pain     Slow to wake up after anesthesia      Past Surgical History:   Procedure Laterality Date    CARDIAC ABLATION      CARDIAC CATHETERIZATION      COLONOSCOPY N/A 2018    Procedure: COLONOSCOPY INTO CECUM WITH COLD BX POLYPECTOMY ;  Surgeon: Ross Stearns MD;  Location: [facility] ENDOSCOPY;  Service:     COLONOSCOPY N/A  2/3/2021    Procedure: COLONOSCOPY TOCECUM WITH COLD BX POLYPECTOMY AND HOT SNARE POLYPECTOMY;  Surgeon: Ross Stearns MD;  Location: [facility] ENDOSCOPY;  Service: General;  Laterality: N/A;  PERSONAL HX OF POLYPS, FAMILY HX OF COLON CANCER  --POLYPS (X3), DIVERTICULOSIS    COLONOSCOPY N/A 3/20/2024    Procedure: COLONOSCOPY TO CECUM WITH COLD BX POLYPECTOMIES;  Surgeon: Ross Stearns MD;  Location: [facility] ENDOSCOPY;  Service: General;  Laterality: N/A;  HX POLYPS/POLYPS (3)    HERNIA REPAIR      INGUINAL HERNIA? SIDE    TOTAL HIP ARTHROPLASTY Right 6/5/2020    Procedure: TOTAL HIP ARTHROPLASTY;  Surgeon: Travis Hamlin MD;  Location: [facility] MAIN OR;  Service: Orthopedics;  Laterality: Right;      General Information       Row Name 01/14/25 1430          Physical Therapy Time and Intention    Document Type therapy note (daily note)  -CW     Mode of Treatment physical therapy;individual therapy  -CW       Row Name 01/14/25 1430          General Information    Patient Profile Reviewed yes  -CW     Existing Precautions/Restrictions fall;oxygen therapy device and L/min  -CW       Row Name 01/14/25 1430          Cognition    Orientation Status (Cognition) oriented to;person  -CW       Row Name 01/14/25 1430          Safety Issues/Impairments Affecting Functional Mobility    Safety Issues Affecting Function (Mobility) ability to follow commands;insight into deficits/self-awareness;judgment;problem-solving;awareness of need for assistance;safety precautions follow-through/compliance;sequencing abilities;safety precaution awareness  -CW     Impairments Affecting Function (Mobility) balance;endurance/activity tolerance;strength;cognition;postural/trunk control  -CW               User Key  (r) = Recorded By, (t) = Taken By, (c) = Cosigned By      Initials Name Provider Type    CW Carol Robbins PT Physical Therapist                   Mobility       Row Name 01/14/25 1430          Bed Mobility    Bed Mobility  supine-sit;sit-supine  -CW     Supine-Sit [place] (Bed Mobility) moderate assist (50% patient effort);2 person assist  -CW     Sit-Supine [place] (Bed Mobility) minimum assist (75% patient effort);1 person assist;verbal cues  -CW     Assistive Device (Bed Mobility) head of bed elevated  -CW       Row Name 01/14/25 1430          Sit-Stand Transfer    Sit-Stand [place] (Transfers) moderate assist (50% patient effort);2 person assist;verbal cues  -CW       Row Name 01/14/25 1430          Gait/Stairs (Locomotion)    [place] Level (Gait) minimum assist (75% patient effort);2 person assist;verbal cues  -CW     Assistive Device (Gait) walker, front-wheeled  -CW     Distance in Feet (Gait) 4  -CW     Deviations/Abnormal Patterns (Gait) miki decreased;gait speed decreased;stride length decreased  -CW     Comment, (Gait/Stairs) Lateral steps along EOB, impulsive and shaky in standing  -CW               User Key  (r) = Recorded By, (t) = Taken By, (c) = Cosigned By      Initials Name Provider Type    Carol Putnam PT Physical Therapist                   Obj/Interventions       Row Name 01/14/25 1436          Range of Motion Comprehensive    General Range of Motion bilateral lower extremity ROM WFL  -CW       Row Name 01/14/25 1436          Balance    Balance Assessment standing static balance;standing dynamic balance  -CW     Static Standing Balance minimal assist;2-person assist  -CW     Dynamic Standing Balance minimal assist;2-person assist  -CW     Position/Device Used, Standing Balance supported;walker, front-wheeled  -CW               User Key  (r) = Recorded By, (t) = Taken By, (c) = Cosigned By      Initials Name Provider Type    Carol Putnam PT Physical Therapist                   Goals/Plan    No documentation.                  Clinical Impression       Row Name 01/14/25 1438          Pain    Pretreatment Pain Rating 0/10 - no pain  -CW     Posttreatment Pain Rating 0/10 - no  pain  -CW       Row Name 01/14/25 1438          Plan of Care Review    Plan of Care Reviewed With patient;spouse  -CW     Outcome Evaluation Pt seen for PT this PM. Pt remains restrained and confused. He talks nonstop during session but speech largely unintelligible.  -CW       Row Name 01/14/25 1438          Vital Signs    O2 Delivery Pre Treatment room air  -CW       Row Name 01/14/25 1438          Positioning and Restraints    Pre-Treatment Position in bed  -CW     Post Treatment Position bed  -CW     In Bed notified nsg;call light within reach;encouraged to call for assist;exit alarm on;fowlers  -CW     Restraints reapplied:;soft limb  -CW               User Key  (r) = Recorded By, (t) = Taken By, (c) = Cosigned By      Initials Name Provider Type    Carol Putnam PT Physical Therapist                   Outcome Measures       Row Name 01/14/25 1439          How much help from another person do you currently need...    Turning from your back to your side while in flat bed without using bedrails? 3  -CW     Moving from lying on back to sitting on the side of a flat bed without bedrails? 2  -CW     Moving to and from a bed to a chair (including a wheelchair)? 2  -CW     Standing up from a chair using your arms (e.g., wheelchair, bedside chair)? 2  -CW     Climbing 3-5 steps with a railing? 1  -CW     To walk in hospital room? 2  -CW     AM-PAC 6 Clicks Score (PT) 12  -CW     Highest Level of Mobility Goal 4 --> Transfer to chair/commode  -CW       Row Name 01/14/25 1439          Functional Assessment    Outcome Measure Options AM-PAC 6 Clicks Basic Mobility (PT)  -CW               User Key  (r) = Recorded By, (t) = Taken By, (c) = Cosigned By      Initials Name Provider Type    Carol Putnam PT Physical Therapist                                 Physical Therapy Education       Title: PT OT SLP Therapies (In Progress)       Topic: Physical Therapy (In Progress)       Point: Mobility training (In  Progress)       Learning Progress Summary            Patient Acceptance, E,D, VU,NR by  at 1/10/2025 1536    Acceptance, E,TB, VU,NR by  at 1/9/2025 1200    Acceptance, E,TB,D, VU,NR by  at 1/4/2025 1504    Acceptance, E, NR by  at 12/31/2024 1525   Family Acceptance, E, NR by  at 12/31/2024 1525                      Point: Home exercise program (In Progress)       Learning Progress Summary            Patient Acceptance, E,D, VU,NR by  at 1/10/2025 1536    Acceptance, E, NR by  at 12/31/2024 1525   Family Acceptance, E, NR by  at 12/31/2024 1525                      Point: Body mechanics (In Progress)       Learning Progress Summary            Patient Acceptance, E,D, VU,NR by  at 1/10/2025 1536    Acceptance, E,TB, VU,NR by  at 1/9/2025 1200    Acceptance, E,TB,D, VU,NR by  at 1/4/2025 1504    Acceptance, E, NR by  at 12/31/2024 1525   Family Acceptance, E, NR by  at 12/31/2024 1525                      Point: Precautions (In Progress)       Learning Progress Summary            Patient Acceptance, E,TB,D, VU,NR by  at 1/4/2025 1504    Acceptance, E, NR by  at 12/31/2024 1525   Family Acceptance, E, NR by  at 12/31/2024 1525                                      User Key       Initials Effective Dates Name Provider Type Discipline     06/16/21 -  Sue Padilla, PT Physical Therapist PT     06/16/21 -  Sima Anne, PT Physical Therapist PT     02/14/23 -  Pat Mehta PTA Physical Therapist Assistant PT     09/22/22 -  Demi Otero, PT Physical Therapist PT                  PT Recommendation and Plan     Outcome Evaluation: Pt seen for PT this PM. Pt remains restrained and confused. He talks nonstop during session but speech largely unintelligible.     Time Calculation:         PT Charges       Row Name 01/14/25 1429             Time Calculation    Start Time 1355  -CW      Stop Time 1409  -CW      Time Calculation (min) 14 min  -CW      PT Received On 01/14/25   -CW      PT - Next Appointment 01/15/25  -CW                User Key  (r) = Recorded By, (t) = Taken By, (c) = Cosigned By      Initials Name Provider Type    Carol Putnam PT Physical Therapist                  Therapy Charges for Today       Code Description Service Date Service Provider Modifiers Qty    [id number]  PT THERAPEUTIC ACT EA 15 MIN 1/14/2025 Carol Robbins PT GP 1            PT G-Codes  Outcome Measure Options: AM-PAC 6 Clicks Basic Mobility (PT)  AM-PAC 6 Clicks Score (PT): 12  PT Discharge Summary  Anticipated Discharge Disposition (PT): skilled nursing facility    Carol Robbins PT  1/14/2025

## 2025-01-14 NOTE — CASE MANAGEMENT/SOCIAL WORK
Continued Stay Note  [facility]     Patient Name: Mark Aguirre Jr.  MRN: [id number]  Today's Date: 1/14/2025    Admit Date: 12/28/2024    Plan: TBD pending clinical course   Discharge Plan       Row Name 01/14/25 1120       Plan    Plan TBD pending clinical course    Patient/Family in Agreement with Plan yes    Plan Comments Clinicals reviewed. Pt remains in restraints, receiving IM Zyprexa, HR control and nutrition. CCP met with pt and spouse Alexandra at the bedside to explain CCP continues to follow and will follow up when pt medically stable for DC planning. Alexandra verbalized understanding. Gabriele RN/CCP                   Discharge Codes    No documentation.                 Expected Discharge Date and Time       Expected Discharge Date Expected Discharge Time    Jan 16, 2025               Gisele Guadarrama RN

## 2025-01-14 NOTE — PROGRESS NOTES
He was able to tolerate taking a larger dose of oral metoprolol at 50 mg however he did not take last night's dose.  Heart rate today is much better being out in the 80s to 90s.  We strongly feel that if he can continue to reliably take his oral medicine that his heart rate slowly better enough controlled to get him out of here.    I think if rates remain like this, tomorrow being more reasonably controlled on the average that he should be good to go from a cardiac perspective and we can continue our follow-up as indicated after he completes his geriatric psych evaluation.

## 2025-01-14 NOTE — PLAN OF CARE
Goal Outcome Evaluation:  Plan of Care Reviewed With: patient, spouse           Outcome Evaluation: Pt seen for PT this PM. Pt remains restrained and confused. He talks nonstop during session but speech largely unintelligible. Pt completed bed mobility with mod A, STS with min A x2 and took a few lateral steps along EOB min A x2 using RW. Pt impulsive and follows commands intermittently. Not safe to progress mobility away from EOB at this time. Will continue to follow and progress as able. Restraints reapplied after session. Family at bedside    Anticipated Discharge Disposition (PT): skilled nursing facility

## 2025-01-14 NOTE — CONSULTS
Nutrition Services    Patient Name:  Mark Aguirre Jr.  YOB: 1945  MRN: [id number]  Admit Date:  12/28/2024  Assessment Date:  01/14/25    Summary: Follow Up  Pt laying in bed when I visited. Wife at bedside provided a hx. Wife reported pt ate better yesterday than he has all admission. Wife reported pt ate 50% of breakfast and drank a boost, had 25% of lunch and drank a boost, and ate a few bites of dinner yesterday. Wife states pt has not eaten breakfast yet today. RD weighed pt using bedscale and found him to weigh 200 lbs. Per EMR, pt has had a 4.8% weight loss x 6 days (significant). Wife denied pt c/o any n/v or abdominal pain. MD started pt on megace 1/11. RD performed NFPE which did not reveal any significant muscle wasting or fat loss. Pt continues to receive Boost TID and Magic Cups TID.     Patient meets ASPEN/AND criteria for nutrition diagnosis of severe malnutrition of acute illness based on: poor PO intake and weight loss.     Pt currently on appetite stimulant and while his intake has improved, he does not appear to be meeting estimated needs via PO intake alone. Pt would likely benefit from alternative means of nutrition.    RECS:  Continue Boost TID  Continue Magic Cups TID  Recommend continuing appetite stimulant  Encourage PO and supplement intake     CLINICAL NUTRITION ASSESSMENT      Reason for Assessment Follow-up Protocol     Diagnosis/Problem   Syncope  Ear laceration     Medical/Surgical History Past Medical History:   Diagnosis Date    Acute kidney failure     POST-OP TOTAL HIP 2020    Anticoagulated     PRADAXA FOR A FIB TO HELD 3 DAYS PRIOR    Arthritis     OSTEO. RIGHT HIP    Atrial flutter     Bifascicular block     Cataract     LEFT AND RIGHT    Depression     History of colon polyps     Hypertension     Hypoxemia associated with sleep     Insomnia     Knee pain     STEPHENIE on CPAP 05/27/2010    Overnight polysomnogram.  Weight 163 pounds.  Severe STEPHENIE with AHI of 30.9  "events per hour.  Low oxygen saturation 72% and sleep-related hypoxia present for 30 minutes    PAF (paroxysmal atrial fibrillation)     Right hip pain     Slow to wake up after anesthesia        Past Surgical History:   Procedure Laterality Date    CARDIAC ABLATION      CARDIAC CATHETERIZATION      COLONOSCOPY N/A 2/21/2018    Procedure: COLONOSCOPY INTO CECUM WITH COLD BX POLYPECTOMY ;  Surgeon: Ross Stearns MD;  Location: [facility] ENDOSCOPY;  Service:     COLONOSCOPY N/A 2/3/2021    Procedure: COLONOSCOPY TOCECUM WITH COLD BX POLYPECTOMY AND HOT SNARE POLYPECTOMY;  Surgeon: Ross Stearns MD;  Location: [facility] ENDOSCOPY;  Service: General;  Laterality: N/A;  PERSONAL HX OF POLYPS, FAMILY HX OF COLON CANCER  --POLYPS (X3), DIVERTICULOSIS    COLONOSCOPY N/A 3/20/2024    Procedure: COLONOSCOPY TO CECUM WITH COLD BX POLYPECTOMIES;  Surgeon: Ross Stearns MD;  Location: [facility] ENDOSCOPY;  Service: General;  Laterality: N/A;  HX POLYPS/POLYPS (3)    HERNIA REPAIR      INGUINAL HERNIA? SIDE    TOTAL HIP ARTHROPLASTY Right 6/5/2020    Procedure: TOTAL HIP ARTHROPLASTY;  Surgeon: Travis Hamlin MD;  Location: [facility] MAIN OR;  Service: Orthopedics;  Laterality: Right;        Anthropometrics        Current Height  Current Weight  BMI kg/m2 Height: 185.4 cm (73\")  Weight: 91.1 kg (200 lb 13.8 oz) (01/14/25 0900)  Body mass index is 26.5 kg/m².   Adjusted BMI (if applicable)    BMI Category Obese, Class I (30 - 34.9)   Ideal Body Weight (IBW) 184 lb (83.6 kg)   Usual Body Weight (UBW) 220-241 lb   Weight Trend Loss   Weight History Wt Readings from Last 30 Encounters:   01/14/25 0900 91.1 kg (200 lb 13.8 oz)   01/08/25 1100 95.6 kg (210 lb 12.2 oz)   12/29/24 0818 110 kg (241 lb 8 oz)   12/17/24 0739 103 kg (226 lb)   12/13/24 0930 103 kg (228 lb)   10/07/24 1123 104 kg (229 lb)   09/05/24 0803 104 kg (229 lb 4.5 oz)   08/21/24 1104 102 kg (224 lb)   03/20/24 1019 99.9 kg (220 lb 4.8 oz)   03/19/24 0912 102 kg (224 " lb)   02/27/24 0957 102 kg (225 lb)   12/04/23 1030 101 kg (223 lb)   11/29/23 0831 102 kg (224 lb 12.8 oz)   07/31/23 1000 103 kg (226 lb 6.4 oz)   07/05/23 1519 102 kg (224 lb)   07/04/23 1736 102 kg (224 lb)   07/04/23 1654 102 kg (224 lb)   02/23/23 0746 99.3 kg (219 lb)   12/02/22 1003 103 kg (226 lb)   11/30/22 0900 103 kg (227 lb)   08/31/22 0700 103 kg (226 lb 6.4 oz)   02/23/22 0941 105 kg (231 lb)   10/20/21 1248 105 kg (231 lb 7.7 oz)   10/06/21 0850 105 kg (230 lb 6.4 oz)   09/01/21 0700 105 kg (232 lb)   06/24/21 0943 105 kg (231 lb)   02/03/21 0802 102 kg (224 lb)   02/02/21 1218 103 kg (228 lb)   09/02/20 1413 106 kg (233 lb 9.6 oz)   08/21/20 1323 107 kg (235 lb)   07/30/20 0829 107 kg (235 lb)   07/07/20 1113 103 kg (226 lb)   06/16/20 0349 111 kg (243 lb 11.2 oz)   06/15/20 2026 121 kg (266 lb 8 oz)   06/05/20 0911 108 kg (238 lb 8.6 oz)   06/04/20 1103 107 kg (235 lb 12.8 oz)   06/03/20 0803 107 kg (236 lb)        Estimated Requirements         Weight used  80 kg IBW    Calories  9988-5576 (25-30 kcal/kg)    Protein   (1.2 - 1.5 gm/kg)    Fluid  (1 mL/kcal)     Labs       Pertinent Labs    Results from last 7 days   Lab Units 01/13/25  0809 01/12/25  0805 01/11/25  0806 01/10/25  1944 01/10/25  0329 01/08/25 2020 01/08/25  0243   SODIUM mmol/L 145 145 144  --  143   < > 146*   POTASSIUM mmol/L 3.9 4.2 4.2   < > 3.6   < > 3.7   CHLORIDE mmol/L 112* 111* 109*  --  109*   < > 110*   CO2 mmol/L 23.0 23.8 24.2  --  25.5   < > 26.0   BUN mg/dL 23 24* 22  --  23   < > 27*   CREATININE mg/dL 0.87 0.78 0.77  --  0.85   < > 0.76   CALCIUM mg/dL 8.8 8.9 9.0  --  8.4*   < > 8.8   BILIRUBIN mg/dL  --   --   --   --  2.9*  --  2.5*   ALK PHOS U/L  --   --   --   --  50  --  51   ALT (SGPT) U/L  --   --   --   --  17  --  20   AST (SGOT) U/L  --   --   --   --  23  --  20   GLUCOSE mg/dL 95 94 104*  --  101*   < > 95    < > = values in this interval not displayed.     Results from last 7 days   Lab Units  "01/13/25  0809 01/11/25  0806 01/10/25  0329   HEMOGLOBIN g/dL 12.2*   < > 11.7*   HEMATOCRIT % 39.2   < > 37.6   WBC 10*3/mm3 8.89   < > 11.01*   ALBUMIN g/dL  --   --  3.0*    < > = values in this interval not displayed.     Results from last 7 days   Lab Units 01/13/25  0809 01/12/25  0805 01/11/25  0806 01/10/25  0329 01/09/25  0351   PLATELETS 10*3/mm3 250 253 235 213 202     COVID19   Date Value Ref Range Status   12/28/2024 Not Detected Not Detected - Ref. Range Final     No results found for: \"HGBA1C\"       Medications           Scheduled Medications atorvastatin, 10 mg, Oral, Daily  digoxin, 125 mcg, Oral, Daily  enoxaparin, 1 mg/kg, Subcutaneous, Q12H  megestrol, 400 mg, Oral, Daily  metoprolol tartrate, 50 mg, Oral, Q8H  OLANZapine, 5 mg, Oral, Nightly  OLANZapine, 5 mg, Oral, Daily With Dinner  potassium chloride, 40 mEq, Oral, Once  potassium chloride, 40 mEq, Oral, Once  sodium chloride, 10 mL, Intravenous, Q12H  tamsulosin, 0.4 mg, Oral, Daily  vitamin B-12, 1,000 mcg, Oral, Daily       Infusions       PRN Medications   acetaminophen    atropine    senna-docusate sodium **AND** polyethylene glycol **AND** bisacodyl **AND** bisacodyl    Calcium Replacement - Follow Nurse / BPA Driven Protocol    diphenhydrAMINE    Enalaprilat    ePHEDrine    fentanyl    flumazenil    hydrALAZINE    ipratropium-albuterol    labetalol    Magnesium Cardiology Dose Replacement - Follow Nurse / BPA Driven Protocol    metoprolol tartrate    naloxone    nitroglycerin    OLANZapine    ondansetron    Phosphorus Replacement - Follow Nurse / BPA Driven Protocol    Potassium Replacement - Follow Nurse / BPA Driven Protocol    promethazine **OR** promethazine    [COMPLETED] Insert Peripheral IV **AND** sodium chloride    sodium chloride    sodium chloride     Physical Findings          General Findings confused, disoriented, overweight, room air   Oral/Mouth Cavity WDL   Edema  no edema   Gastrointestinal last bowel movement: 1/12 "   Skin  other: R ear laceration   Tubes/Drains/Lines none   NFPE No clinical signs of muscle wasting or fat loss     Malnutrition Severity Assessment      Patient meets criteria for : Severe Malnutrition  Malnutrition Type (Last 8 Hours)       Malnutrition Severity Assessment       Row Name 01/14/25 1106       Malnutrition Severity Assessment    Malnutrition Type Acute Disease or Injury - Related Malnutrition      Row Name 01/14/25 1106       Insufficient Energy Intake     Insufficient Energy Intake Findings Severe    Insufficient Energy Intake  < or equal to 50% of est. energy requirement for > or equal to 5d)      Row Name 01/14/25 1106       Unintentional Weight Loss     Unintentional Weight Loss Findings Severe    Unintentional Weight Loss  Weight loss greater than 2% in one week      Row Name 01/14/25 1106       Muscle Loss    Loss of Muscle Mass Findings None      Row Name 01/14/25 1106       Fat Loss    Subcutaneous Fat Loss Findings None      Row Name 01/14/25 1106       Criteria Met (Must meet criteria for severity in at least 2 of these categories: M Wasting, Fat Loss, Fluid, Secondary Signs, Wt. Status, Intake)    Patient meets criteria for  Severe Malnutrition                     Current Nutrition Orders & Evaluation of Intake       Oral Nutrition     Food Allergies NKFA   Current PO Diet Diet: Cardiac; Healthy Heart (2-3 Na+); Fluid Consistency: Thin (IDDSI 0)   Supplement n/a   PO Evaluation     % PO Intake Less than 50% intake x 1 week per EMR.     Factors Affecting Intake: altered mental status   --  PES STATEMENT / NUTRITION DIAGNOSIS      Nutrition Dx Problem  Problem: Malnutrition (severe)  Etiology: Medical Diagnosis - AMS    Signs/Symptoms: Report of Minimal PO Intake and Unintended Weight Change     NUTRITION INTERVENTION / PLAN OF CARE      Intervention Goal(s) Maintain nutrition status, Reduce/improve symptoms, Disease management/therapy, Initiate feeding/diet, and No significant weight loss          RD Intervention/Action Encourage intake, Continue to monitor, Care plan reviewed, and Recommend/order: Boost TID, Magic Cups TID   --      Prescription/Orders:       PO Diet       Supplements Boost TID, Magic Cups TID      Enteral Nutrition       Parenteral Nutrition    New Prescription Ordered? Yes   --      Monitor/Evaluation Per protocol, I&O, Pertinent labs, Weight, Symptoms, POC/GOC, Swallow function   Discharge Plan/Needs Pending clinical course   --    RD to follow per protocol.      Electronically signed by:  Jose Weaver RDN, LD  01/14/25 10:03 EST

## 2025-01-14 NOTE — PROGRESS NOTES
Subjective: Wife at bedside.  He drank 2 boosts yesterday had a small amount of eggs and sausage.  Was up in pleasant much of the day and then again became agitated last night.  Did report quire IM Zyprexa.  He was started on oral metoprolol 3 times daily by cardiology.  Did not take the middle of the night dose, but did receive IV metoprolol x 2.  Heart rate when he is quiet does get down in the 90s and low 100s.    Objective:  Vitals:    01/14/25 0409 01/14/25 0512 01/14/25 0513 01/14/25 0516   BP: 148/83      BP Location: Left arm      Patient Position: Lying      Pulse: 103 (!) 125 (!) 154 (!) 139   Resp: 18      Temp:       TempSrc:       SpO2: 94%      Weight:       Height:         General: Sleeping, but easily aroused, mumbling  Heart irregularly irregular  Lungs clear  Abdomen soft nontender, ecchymosis in right flank unchanged  Extremities: No edema    Recent Results (from the past 24 hours)   Basic Metabolic Panel    Collection Time: 01/13/25  8:09 AM    Specimen: Blood   Result Value Ref Range    Glucose 95 65 - 99 mg/dL    BUN 23 8 - 23 mg/dL    Creatinine 0.87 0.76 - 1.27 mg/dL    Sodium 145 136 - 145 mmol/L    Potassium 3.9 3.5 - 5.2 mmol/L    Chloride 112 (H) 98 - 107 mmol/L    CO2 23.0 22.0 - 29.0 mmol/L    Calcium 8.8 8.6 - 10.5 mg/dL    BUN/Creatinine Ratio 26.4 (H) 7.0 - 25.0    Anion Gap 10.0 5.0 - 15.0 mmol/L    eGFR 86.7 >60.0 mL/min/1.73   CBC Auto Differential    Collection Time: 01/13/25  8:09 AM    Specimen: Blood   Result Value Ref Range    WBC 8.89 3.40 - 10.80 10*3/mm3    RBC 4.35 4.14 - 5.80 10*6/mm3    Hemoglobin 12.2 (L) 13.0 - 17.7 g/dL    Hematocrit 39.2 37.5 - 51.0 %    MCV 90.1 79.0 - 97.0 fL    MCH 28.0 26.6 - 33.0 pg    MCHC 31.1 (L) 31.5 - 35.7 g/dL    RDW 14.0 12.3 - 15.4 %    RDW-SD 45.1 37.0 - 54.0 fl    MPV 11.0 6.0 - 12.0 fL    Platelets 250 140 - 450 10*3/mm3    Neutrophil % 77.5 (H) 42.7 - 76.0 %    Lymphocyte % 12.1 (L) 19.6 - 45.3 %    Monocyte % 7.9 5.0 - 12.0 %     Eosinophil % 1.5 0.3 - 6.2 %    Basophil % 0.6 0.0 - 1.5 %    Immature Grans % 0.4 0.0 - 0.5 %    Neutrophils, Absolute 6.89 1.70 - 7.00 10*3/mm3    Lymphocytes, Absolute 1.08 0.70 - 3.10 10*3/mm3    Monocytes, Absolute 0.70 0.10 - 0.90 10*3/mm3    Eosinophils, Absolute 0.13 0.00 - 0.40 10*3/mm3    Basophils, Absolute 0.05 0.00 - 0.20 10*3/mm3    Immature Grans, Absolute 0.04 0.00 - 0.05 10*3/mm3    nRBC 0.0 0.0 - 0.2 /100 WBC       Assessment plan  1.  Delirium/confusion-still sundowning at night.  Protein studies on CSF still pending.  I do suspect there is probably some underlying cognitive impairment with recent insult causing the current picture.  Still gets too agitated to be able to return home.  I do suspect he is going to need geropsych admission.  Will discuss case with care coordinator.  Continue to try and get patient up during the day and ambulate as well as up to chair.  2.  Atrial fibrillation-has metoprolol ordered orally 3 times daily.  Middle of the night dosing I think is going to be hard for him to take given his agitation each evening.  Cardiology following.  Probably is going to need dosing during the daytime when he is not agitated.  3.  Rectus sheath hematoma, hemoglobin has been stable.  Back on Lovenox for his A-fib.  4.  Nutrition is taking in a bit better oral intake.  Drinks fluids had 2 nutritional shakes yesterday with small bites at each meal.  Nutrition is following.  Contacted me yesterday about possible feeding tube.  Given his agitation I do not think this would be helpful to his condition.

## 2025-01-14 NOTE — PROGRESS NOTES
The patient is still in restraints and continues to require IM Zyprexa on occasion.  He does awaken briefly for interview and wife and son reports that he had a period of lucidity earlier this morning.  His heart rate is now in the high 80s, greatly improved from yesterday.  Continuing to follow.

## 2025-01-15 LAB
AL BETA40 CSF-MCNC: 9084 PG/ML
AL BETA42 CSF-MCNC: 304 PG/ML
AL BETA42/ABETA 40 CSF: 0.03
ANION GAP SERPL CALCULATED.3IONS-SCNC: 8 MMOL/L (ref 5–15)
BASOPHILS # BLD AUTO: 0.04 10*3/MM3 (ref 0–0.2)
BASOPHILS NFR BLD AUTO: 0.5 % (ref 0–1.5)
BUN SERPL-MCNC: 24 MG/DL (ref 8–23)
BUN/CREAT SERPL: 29.3 (ref 7–25)
CALCIUM SPEC-SCNC: 8.9 MG/DL (ref 8.6–10.5)
CHLORIDE SERPL-SCNC: 112 MMOL/L (ref 98–107)
CO2 SERPL-SCNC: 24 MMOL/L (ref 22–29)
CREAT SERPL-MCNC: 0.82 MG/DL (ref 0.76–1.27)
DEPRECATED RDW RBC AUTO: 44.5 FL (ref 37–54)
EGFRCR SERPLBLD CKD-EPI 2021: 88.3 ML/MIN/1.73
EOSINOPHIL # BLD AUTO: 0.11 10*3/MM3 (ref 0–0.4)
EOSINOPHIL NFR BLD AUTO: 1.3 % (ref 0.3–6.2)
ERYTHROCYTE [DISTWIDTH] IN BLOOD BY AUTOMATED COUNT: 14.1 % (ref 12.3–15.4)
GLUCOSE SERPL-MCNC: 102 MG/DL (ref 65–99)
HCT VFR BLD AUTO: 40.3 % (ref 37.5–51)
HGB BLD-MCNC: 13.1 G/DL (ref 13–17.7)
IMM GRANULOCYTES # BLD AUTO: 0.05 10*3/MM3 (ref 0–0.05)
IMM GRANULOCYTES NFR BLD AUTO: 0.6 % (ref 0–0.5)
INTERPRETATION: ABNORMAL
LYMPHOCYTES # BLD AUTO: 1.13 10*3/MM3 (ref 0.7–3.1)
LYMPHOCYTES NFR BLD AUTO: 13.5 % (ref 19.6–45.3)
Lab: ABNORMAL
MCH RBC QN AUTO: 28.8 PG (ref 26.6–33)
MCHC RBC AUTO-ENTMCNC: 32.5 G/DL (ref 31.5–35.7)
MCV RBC AUTO: 88.6 FL (ref 79–97)
MONOCYTES # BLD AUTO: 0.66 10*3/MM3 (ref 0.1–0.9)
MONOCYTES NFR BLD AUTO: 7.9 % (ref 5–12)
NEUTROPHILS NFR BLD AUTO: 6.38 10*3/MM3 (ref 1.7–7)
NEUTROPHILS NFR BLD AUTO: 76.2 % (ref 42.7–76)
NRBC BLD AUTO-RTO: 0 /100 WBC (ref 0–0.2)
PLATELET # BLD AUTO: 261 10*3/MM3 (ref 140–450)
PMV BLD AUTO: 11.1 FL (ref 6–12)
POTASSIUM SERPL-SCNC: 4.1 MMOL/L (ref 3.5–5.2)
RBC # BLD AUTO: 4.55 10*6/MM3 (ref 4.14–5.8)
SODIUM SERPL-SCNC: 144 MMOL/L (ref 136–145)
WBC NRBC COR # BLD AUTO: 8.37 10*3/MM3 (ref 3.4–10.8)

## 2025-01-15 PROCEDURE — 99232 SBSQ HOSP IP/OBS MODERATE 35: CPT | Performed by: PHYSICIAN ASSISTANT

## 2025-01-15 PROCEDURE — 85025 COMPLETE CBC W/AUTO DIFF WBC: CPT | Performed by: INTERNAL MEDICINE

## 2025-01-15 PROCEDURE — 80048 BASIC METABOLIC PNL TOTAL CA: CPT | Performed by: INTERNAL MEDICINE

## 2025-01-15 PROCEDURE — 25010000002 ENOXAPARIN PER 10 MG: Performed by: INTERNAL MEDICINE

## 2025-01-15 RX ORDER — ENOXAPARIN SODIUM 100 MG/ML
1 INJECTION SUBCUTANEOUS EVERY 12 HOURS
Status: DISCONTINUED | OUTPATIENT
Start: 2025-01-15 | End: 2025-01-20

## 2025-01-15 RX ADMIN — Medication 10 ML: at 09:06

## 2025-01-15 RX ADMIN — OLANZAPINE 5 MG: 5 TABLET, FILM COATED ORAL at 17:08

## 2025-01-15 RX ADMIN — OLANZAPINE 5 MG: 5 TABLET, FILM COATED ORAL at 19:33

## 2025-01-15 RX ADMIN — TAMSULOSIN HYDROCHLORIDE 0.4 MG: 0.4 CAPSULE ORAL at 09:04

## 2025-01-15 RX ADMIN — Medication 10 ML: at 20:40

## 2025-01-15 RX ADMIN — METOPROLOL TARTRATE 50 MG: 50 TABLET, FILM COATED ORAL at 21:24

## 2025-01-15 RX ADMIN — ATORVASTATIN CALCIUM 10 MG: 10 TABLET, FILM COATED ORAL at 09:04

## 2025-01-15 RX ADMIN — ENOXAPARIN SODIUM 90 MG: 100 INJECTION SUBCUTANEOUS at 13:51

## 2025-01-15 RX ADMIN — Medication 1000 MCG: at 09:04

## 2025-01-15 RX ADMIN — METOPROLOL TARTRATE 7.5 MG: 5 INJECTION INTRAVENOUS at 05:08

## 2025-01-15 RX ADMIN — METOPROLOL TARTRATE 50 MG: 50 TABLET, FILM COATED ORAL at 07:42

## 2025-01-15 RX ADMIN — DIGOXIN 125 MCG: 0.12 TABLET ORAL at 09:04

## 2025-01-15 RX ADMIN — ENOXAPARIN SODIUM 90 MG: 100 INJECTION SUBCUTANEOUS at 22:53

## 2025-01-15 RX ADMIN — MEGESTROL ACETATE 400 MG: 40 SUSPENSION ORAL at 09:04

## 2025-01-15 RX ADMIN — METOPROLOL TARTRATE 50 MG: 50 TABLET, FILM COATED ORAL at 14:57

## 2025-01-15 NOTE — PROGRESS NOTES
Subjective: Heart rate stayed down much of the day yesterday.  Did not take his middle the night metoprolol and needed IV.  Wife at bedside providing much of the history.  Took in two boost yesterday, some ice cream, small amount of macaroni and cheese.  Remained confused throughout the day.  Started getting agitated a little earlier yesterday at 4 PM.    Objective:  Vitals:    01/14/25 2326 01/14/25 2327 01/15/25 0458 01/15/25 0508   BP: (!) 152/110  121/96    BP Location: Left arm  Left arm    Patient Position: Lying  Lying    Pulse: (!) 138 (!) 146 (!) 121 (!) 158   Resp: 20  18    Temp: 97.9 °F (36.6 °C)      TempSrc: Oral      SpO2: 95%  94%    Weight:       Height:         General: Sleeping, but easily aroused, mumbling  Heart irregularly irregular  Lungs clear  Abdomen soft nontender, ecchymosis in right flank unchanged  Extremities: No edema    No results found for this or any previous visit (from the past 24 hours).    A/P  1.  Delirium/confusion-still confused.  Protein studies on CSF still pending.  I do suspect there is probably some underlying cognitive impairment with recent insult causing the current picture.  Still gets too agitated to be able to return home.  I do suspect he is going to need geropsych admission.  Now with heart rate looks to be better controlled when he did start making plans.  In the meantime, continue to try and get patient up during the day and ambulate as well as up to chair.  2.  Atrial fibrillation-has metoprolol ordered orally 3 times daily.  Missed oral dose in middle of the night again and pharmacy adjusting timeing.  Cardiology following.    3.  Rectus sheath hematoma, hemoglobin has been stable. CBC pending this AM  Back on Lovenox for his A-fib.  If taking oral medication well would get him back to his Eliquis prior to discharge  4.  Nutrition is taking in a bit better oral intake.   Continues on Megace. Given his agitation I do not think feeding tube would  be helpful  to his condition

## 2025-01-15 NOTE — PROGRESS NOTES
[facility] Clinical Pharmacy Services: Dose Adjustment    Lovenox 1 mg/kg BID has been appropriately dose adjusted based on our System [employer] approved policy  (dose decreased Lovenox 100 BID --> 90 mg BID due to lower weight). Pharmacy will continue to monitor patient renal function while in-house.     Radha Lange RP  Clinical Pharmacist

## 2025-01-15 NOTE — PLAN OF CARE
Problem: Adult Inpatient Plan of Care  Goal: Plan of Care Review  Outcome: Progressing  Flowsheets  Taken 1/14/2025 1846 by Marcela Tai RN  Progress: no change  Outcome Evaluation:   Patient confused  disoriented x 4 delusional restless, poor appetite able to gimedication crush with applesauce  but megace patient spill it out, complete ADL's x2 in restrain patient agitated at times given Zyprexa IM in the afternoom was able to eat at supper more   incontinent B&B will continue monitor.  Taken 1/14/2025 1438 by Carol Robbins, PT  Plan of Care Reviewed With:   patient   spouse     Problem: Syncope  Goal: Absence of Syncopal Symptoms  Intervention: Manage Effect of Syncopal Symptoms  Recent Flowsheet Documentation  Taken 1/14/2025 1015 by Marcela Tai RN  Supportive Measures: active listening utilized  Safety Promotion/Fall Prevention:   room organization consistent   safety round/check completed   activity supervised   assistive device/personal items within reach     Problem: Adult Inpatient Plan of Care  Goal: Absence of Hospital-Acquired Illness or Injury  Intervention: Identify and Manage Fall Risk  Recent Flowsheet Documentation  Taken 1/14/2025 1015 by Marcela Tai RN  Safety Promotion/Fall Prevention:   room organization consistent   safety round/check completed   activity supervised   assistive device/personal items within reach     Problem: Adult Inpatient Plan of Care  Goal: Absence of Hospital-Acquired Illness or Injury  Intervention: Prevent Skin Injury  Recent Flowsheet Documentation  Taken 1/14/2025 1015 by Marcela Tai RN  Body Position:   turned   lower extremity elevated   tilted   right   Goal Outcome Evaluation:           Progress: no change  Outcome Evaluation: Patient confused  disoriented x 4 delusional restless, poor appetite able to gimedication crush with applesauce  but megace patient spill it out, complete ADL's x2 in restrain patient agitated at times given Zyprexa IM in the afternoom  was able to eat at supper more; incontinent B&B will continue monitor.

## 2025-01-15 NOTE — PROGRESS NOTES
"The patient remains pleasantly confused and continues in wrist restraints today.  He reports that we are \"in the [place]\" when asked about our whereabouts.  His heart rate is again elevated today averaging around 137.  Continuing current treatment for now.  "

## 2025-01-15 NOTE — PLAN OF CARE
Goal Outcome Evaluation:  Plan of Care Reviewed With: patient        Progress: no change  Outcome Evaluation: no significant changes, remains in restraints, up in the chair for a few hours, pleasantly confused, vss, HR stable, will continue to monitor

## 2025-01-15 NOTE — PROGRESS NOTES
Electrophysiology Follow-Up Note      Patient Name: Mark Aguirre Jr.  Age/Sex: 79 y.o. male  : 1945  MRN: [id number]    Date of Admission: 2024  Day of Service: 01/15/25       Chief Complaint: Confusion     Follow up:  01/15/25 Hrs are better, he was not able to get nighttime does of metoprolol again, now dosing changed to a 8 AM, 3 PM and 11 PM schedule. HE has been taking his meds more reliably and even tolerating eating some food.       Temp:  [97.7 °F (36.5 °C)-97.9 °F (36.6 °C)] 97.9 °F (36.6 °C)  Heart Rate:  [] 158  Resp:  [16-20] 18  BP: ()/() 121/96     PHYSICAL EXAM    General: 79 y.o. male No acute distress, laying in bed. Alert and Oriented.   Neck: No JVD or carotid bruit  Lungs: Clear to ausculation bilaterally, symmetric  Heart: irregular rate and rhythm with no overt murmurs, rubs or gallops. Normal S1 and S2.   Abdomen: soft, non-tender  Extremities: No lower extremity edema or cyanosis.   Neuo: no lateralizing defects.        Telemetry/EK/15/25: Remains in AF with intermittent RVR but overall HR is lower than it has been            I personally viewed and interpreted the patient's EKG/Telemetry data.    Previous testing:   - Echo 2024: LVEF 48.8%. LA moderate dilation. Moderate AS aortic valve area 1.8cm2. RSVP 48mmHg.        AP CHEST- 2025     HISTORY: Increased oxygen requirements     COMPARISON: 2024     FINDINGS: There is some mild increased atelectasis or infiltrate within  the lung bases. Stable cardiomegaly. No definite pneumothorax.     IMPRESSION:  Mildly increased atelectasis or infiltrate in the lung bases      Lab Review:   Results from last 7 days   Lab Units 25  0809 25  0805 25  0806   SODIUM mmol/L 145 145 144   POTASSIUM mmol/L 3.9 4.2 4.2   CHLORIDE mmol/L 112* 111* 109*   CO2 mmol/L 23.0 23.8 24.2   BUN mg/dL 23 24* 22   CREATININE mg/dL 0.87 0.78 0.77   GLUCOSE mg/dL 95 94 104*   CALCIUM mg/dL  8.8 8.9 9.0     Results from last 7 days   Lab Units 01/13/25  0809 01/12/25  0805 01/11/25  0806   WBC 10*3/mm3 8.89 10.59 9.72   HEMOGLOBIN g/dL 12.2* 12.8* 12.6*   HEMATOCRIT % 39.2 41.2 38.3   PLATELETS 10*3/mm3 250 253 235                       Current Medications:   Scheduled Meds:atorvastatin, 10 mg, Oral, Daily  digoxin, 125 mcg, Oral, Daily  enoxaparin, 1 mg/kg, Subcutaneous, Q12H  megestrol, 400 mg, Oral, Daily  metoprolol tartrate, 50 mg, Oral, Q8H  OLANZapine, 5 mg, Oral, Nightly  OLANZapine, 5 mg, Oral, Daily With Dinner  potassium chloride, 40 mEq, Oral, Once  potassium chloride, 40 mEq, Oral, Once  sodium chloride, 10 mL, Intravenous, Q12H  tamsulosin, 0.4 mg, Oral, Daily  vitamin B-12, 1,000 mcg, Oral, Daily          Assessment /Plan:  Persistent atrial fibrillation- rates are improving. Average at rest in the 80-90s, will get to 100-40s if agitated. Overall, much better now with being able to take oral medications. He is now on lopressor 50 mg three times daily with digoxin 125 mcg daily. He did not require additional PRN IV metoprolol last night  Once delirium resolved (when able to cooperate during pacemaker procedure) we will plan on single RV lead pacemaker implantation followed with AVN ablation for long term solution to uncontrolled AF. No able to take AAD tx due to qTc prolongation  Delerium- improving, now able to eat, his meds have really been weaned down. Neuro and Dr. Bradley following. MRI of head and repeat CT was normal  Syncope- initially what brought pt to hospital- liekly from orthostatic changes made worse from his AF, he is now bed bound mostly but will work with PT when able.   PVCs- chronic and ongoing, overal low burden. Continue BB    Cameron Denny PA-C  01/15/25  07:46 EST

## 2025-01-15 NOTE — PLAN OF CARE
Goal Outcome Evaluation:  Plan of Care Reviewed With: patient, spouse        Progress: no change  Outcome Evaluation: No changes overnight. Remains confused, agitated and restless at times. HR remains elevated when agitated. PRN IV Metoprolol given x 2.. Bilateral wrist restraints. Patient unable to take 2AM dose of Metoprolol PO, pharmacy adjusted times starting today. Incontinence care done. Continue to monitor.

## 2025-01-16 PROBLEM — E43 SEVERE PROTEIN-CALORIE MALNUTRITION: Status: ACTIVE | Noted: 2025-01-16

## 2025-01-16 PROCEDURE — 99232 SBSQ HOSP IP/OBS MODERATE 35: CPT | Performed by: PHYSICIAN ASSISTANT

## 2025-01-16 PROCEDURE — 25010000002 ENOXAPARIN PER 10 MG: Performed by: INTERNAL MEDICINE

## 2025-01-16 PROCEDURE — 25010000002 OLANZAPINE 10 MG RECONSTITUTED SOLUTION: Performed by: SPECIALIST

## 2025-01-16 RX ORDER — OLANZAPINE 5 MG/1
7.5 TABLET ORAL
Status: DISCONTINUED | OUTPATIENT
Start: 2025-01-16 | End: 2025-01-23 | Stop reason: HOSPADM

## 2025-01-16 RX ORDER — OLANZAPINE 5 MG/1
7.5 TABLET ORAL NIGHTLY
Status: DISCONTINUED | OUTPATIENT
Start: 2025-01-16 | End: 2025-01-23 | Stop reason: HOSPADM

## 2025-01-16 RX ADMIN — Medication 10 ML: at 21:06

## 2025-01-16 RX ADMIN — Medication 10 ML: at 14:07

## 2025-01-16 RX ADMIN — METOPROLOL TARTRATE 75 MG: 50 TABLET, FILM COATED ORAL at 17:38

## 2025-01-16 RX ADMIN — DIGOXIN 125 MCG: 0.12 TABLET ORAL at 15:00

## 2025-01-16 RX ADMIN — OLANZAPINE 7.5 MG: 5 TABLET, FILM COATED ORAL at 17:38

## 2025-01-16 RX ADMIN — OLANZAPINE 7.5 MG: 5 TABLET, FILM COATED ORAL at 21:00

## 2025-01-16 RX ADMIN — OLANZAPINE 5 MG: 10 INJECTION, POWDER, LYOPHILIZED, FOR SOLUTION INTRAMUSCULAR at 00:14

## 2025-01-16 RX ADMIN — ENOXAPARIN SODIUM 90 MG: 100 INJECTION SUBCUTANEOUS at 14:46

## 2025-01-16 RX ADMIN — METOPROLOL TARTRATE 7.5 MG: 5 INJECTION INTRAVENOUS at 00:15

## 2025-01-16 NOTE — PLAN OF CARE
Goal Outcome Evaluation:              Outcome Evaluation: Pt extremely fatigue and drowsy. Pt been sleeping throughout the shif and was unable to wake up to take any meds. Care contd...

## 2025-01-16 NOTE — PLAN OF CARE
Goal Outcome Evaluation:  Plan of Care Reviewed With: patient        Progress: no change  Outcome Evaluation: no significant changes, not able to give most medications today, needs geropsych, remains in restraints, vss, elevated HR at times, will continue to monitor

## 2025-01-16 NOTE — PROGRESS NOTES
Electrophysiology Follow-Up Note      Patient Name: Mark Aguirre Jr.  Age/Sex: 79 y.o. male  : 1945  MRN: [id number]    Date of Admission: 2024  Day of Service: 25       Chief Complaint: Confusion    Follow up:  25 he was a little bit more confused last night and I reviewed Dr. Bre Vernon's note that says his LP does show findings that he likely has Alzheimer's disease which could explain ongoing sundowning symptoms.  His heart rate was elevated again last night into the 140s and he needed a dose of IV metoprolol.  I discussed this with Dr. Armando and he recommended continuing uptitrating the beta-blocker as his blood pressures been able to tolerate this.      Temp:  [98.1 °F (36.7 °C)-98.2 °F (36.8 °C)] 98.1 °F (36.7 °C)  Heart Rate:  [] 94  Resp:  [18-20] 20  BP: (104-130)/(74-88) 125/81     PHYSICAL EXAM    General: 79 y.o. male No acute distress, laying in bed. Alert and Oriented.  Restraints in place  Neck: No JVD or carotid bruit  Lungs: Clear to ausculation bilaterally, symmetric  Heart: Irregular heart rate and rhythm with no overt murmurs, rubs or gallops. Normal S1 and S2.   Abdomen: soft, non-tender  Extremities: No lower extremity edema or cyanosis.   Neuo: no lateralizing defects.        Telemetry/EK25:   Atrial fibrillation with RVR with overnight's episodes into the 140s.  This morning rates are now down in the 90s to 100s          I personally viewed and interpreted the patient's EKG/Telemetry data.    Previous testing:   - Echo 2024: LVEF 48.8%. LA moderate dilation. Moderate AS aortic valve area 1.8cm2. RSVP 48mmHg.         AP CHEST- 2025     HISTORY: Increased oxygen requirements     COMPARISON: 2024     FINDINGS: There is some mild increased atelectasis or infiltrate within  the lung bases. Stable cardiomegaly. No definite pneumothorax.     IMPRESSION:  Mildly increased atelectasis or infiltrate in the lung bases      Lab  Review:   Results from last 7 days   Lab Units 01/15/25  0839 01/13/25  0809 01/12/25  0805   SODIUM mmol/L 144 145 145   POTASSIUM mmol/L 4.1 3.9 4.2   CHLORIDE mmol/L 112* 112* 111*   CO2 mmol/L 24.0 23.0 23.8   BUN mg/dL 24* 23 24*   CREATININE mg/dL 0.82 0.87 0.78   GLUCOSE mg/dL 102* 95 94   CALCIUM mg/dL 8.9 8.8 8.9     Results from last 7 days   Lab Units 01/15/25  0839 01/13/25  0809 01/12/25  0805   WBC 10*3/mm3 8.37 8.89 10.59   HEMOGLOBIN g/dL 13.1 12.2* 12.8*   HEMATOCRIT % 40.3 39.2 41.2   PLATELETS 10*3/mm3 261 250 253                       Current Medications:   Scheduled Meds:atorvastatin, 10 mg, Oral, Daily  digoxin, 125 mcg, Oral, Daily  enoxaparin, 1 mg/kg, Subcutaneous, Q12H  megestrol, 400 mg, Oral, Daily  metoprolol tartrate, 75 mg, Oral, Q8H  OLANZapine, 5 mg, Oral, Nightly  OLANZapine, 5 mg, Oral, Daily With Dinner  potassium chloride, 40 mEq, Oral, Once  potassium chloride, 40 mEq, Oral, Once  sodium chloride, 10 mL, Intravenous, Q12H  tamsulosin, 0.4 mg, Oral, Daily  vitamin B-12, 1,000 mcg, Oral, Daily          Assessment /Plan:  Persistent atrial fibrillation-another episode of RVR overnight requiring IV Lopressor.  Will increase metoprolol to 75 mg 3 times daily continue digoxin 125 mcg daily.  At rest this morning now back in the 80s to 90s.  Overall it still remains much better.  Once delirium resolved (when able to cooperate during pacemaker procedure) we will plan on single RV lead pacemaker implantation followed with AVN ablation for long term solution to uncontrolled AF. Not able to take AAD tx due to qTc prolongation  Delerium- improving, now able to eat, his meds have really been weaned down. Neuro and Dr. Bradley following. MRI of head and repeat CT was normal, recent LP does show possible findings that he has Alzheimer's which could could explain his sundowning symptoms.  Dr. Staples is working to find a [facility] psych facility.  Syncope- initially what brought pt to hospital-  liekly from orthostatic changes made worse from his AF, he is now bed bound mostly but will work with PT when able.   PVCs- chronic and ongoing, overal low burden. Continue BB    Cameron Denny PA-C  01/16/25  10:17 EST

## 2025-01-16 NOTE — PROGRESS NOTES
S: Patient sleeping well this morning.  Daughter at bedside.  Spoke with nursing.  Did require IM Zyprexa last night.  Did take his oral metoprolol 3 times yesterday.  Required 1 dose of IV metoprolol in the middle the night.  Remained confused during the day.      O:  Vitals:    01/15/25 1320 01/15/25 1932 01/15/25 2255 01/15/25 2259   BP:  104/85 129/74    BP Location:  Right arm Right arm    Patient Position:  Lying Lying    Pulse: 100 106 113 120   Resp:  20 20    Temp: 98.2 °F (36.8 °C) 98.2 °F (36.8 °C) 98.1 °F (36.7 °C)    TempSrc:  Oral     SpO2:  94%  98%   Weight:       Height:           General: Sleeping, but easily aroused, mumbling  Heart irregularly irregular  Lungs clear  Abdomen soft nontender, ecchymosis in right flank unchanged  Extremities: No edema        Recent Results (from the past 24 hours)   Basic Metabolic Panel    Collection Time: 01/15/25  8:39 AM    Specimen: Blood   Result Value Ref Range    Glucose 102 (H) 65 - 99 mg/dL    BUN 24 (H) 8 - 23 mg/dL    Creatinine 0.82 0.76 - 1.27 mg/dL    Sodium 144 136 - 145 mmol/L    Potassium 4.1 3.5 - 5.2 mmol/L    Chloride 112 (H) 98 - 107 mmol/L    CO2 24.0 22.0 - 29.0 mmol/L    Calcium 8.9 8.6 - 10.5 mg/dL    BUN/Creatinine Ratio 29.3 (H) 7.0 - 25.0    Anion Gap 8.0 5.0 - 15.0 mmol/L    eGFR 88.3 >60.0 mL/min/1.73   CBC Auto Differential    Collection Time: 01/15/25  8:39 AM    Specimen: Blood   Result Value Ref Range    WBC 8.37 3.40 - 10.80 10*3/mm3    RBC 4.55 4.14 - 5.80 10*6/mm3    Hemoglobin 13.1 13.0 - 17.7 g/dL    Hematocrit 40.3 37.5 - 51.0 %    MCV 88.6 79.0 - 97.0 fL    MCH 28.8 26.6 - 33.0 pg    MCHC 32.5 31.5 - 35.7 g/dL    RDW 14.1 12.3 - 15.4 %    RDW-SD 44.5 37.0 - 54.0 fl    MPV 11.1 6.0 - 12.0 fL    Platelets 261 140 - 450 10*3/mm3    Neutrophil % 76.2 (H) 42.7 - 76.0 %    Lymphocyte % 13.5 (L) 19.6 - 45.3 %    Monocyte % 7.9 5.0 - 12.0 %    Eosinophil % 1.3 0.3 - 6.2 %    Basophil % 0.5 0.0 - 1.5 %    Immature Grans % 0.6 (H) 0.0  - 0.5 %    Neutrophils, Absolute 6.38 1.70 - 7.00 10*3/mm3    Lymphocytes, Absolute 1.13 0.70 - 3.10 10*3/mm3    Monocytes, Absolute 0.66 0.10 - 0.90 10*3/mm3    Eosinophils, Absolute 0.11 0.00 - 0.40 10*3/mm3    Basophils, Absolute 0.04 0.00 - 0.20 10*3/mm3    Immature Grans, Absolute 0.05 0.00 - 0.05 10*3/mm3    nRBC 0.0 0.0 - 0.2 /100 WBC     BETA-AMYLOID 42  pg/mL 304   BETA-AMYLOID 40  pg/mL 9084   Beta-amyloid 42/40  0.058 0.033 Low    Interpretation Comment   Comment: CSF beta-amyloid 1-42/1-40 ratios less than 0.058 indicate a higher  likelihood of a patient having a clinical diagnosis of Alzheimer's  Disease (AD), whereas values equal to or greater than 0.058 indicate  a lower likelihood of AD diagnosis.     A/P  Delerium/confusion - protein studies on CSF came back indicating high likelihood of underlying Alzheimer's disease.  Unmasking of underlying dementia is therefore at least a large part of the explanation for his confusion and agitation with sundowning.  Continues to get agitated at night requiring restraints.  I have contacted care coordination regarding at looking at geropsych transfer.  Will discuss with psychiatry today.  Continues on Zyprexa.  Atrial fibrillation-timing of metoprolol was adjusted and he got all 3 doses yesterday.  Heart rate elevated much of last night and required IV metoprolol at midnight.  Just arousing him today it shoots up to 160.  Cardiology following.  Rectus sheath hematoma-hemoglobin stable.  Back on Lovenox without signs of bleeding  Nutrition-remains on Megace.  Would avoid feeding tube given agitation.

## 2025-01-16 NOTE — PLAN OF CARE
Goal Outcome Evaluation:  Plan of Care Reviewed With: patient        Progress: no change  Outcome Evaluation: No changes overnight. Remains confused. Agitated and restless, IM Zyprexa given once. HR remains elevated with agitation, sustaining 130-150s, IV Metoprolol given. Bilateral wrist restraints. Incontinence care done. Continue to monitor.

## 2025-01-16 NOTE — SIGNIFICANT NOTE
01/16/25 1121   OTHER   Discipline physical therapist   Rehab Time/Intention   Session Not Performed other (see comments)  (Attempted to see patient. Patient occasionally opened eyes for very brief moment but unable to awaken to follow commands or actively participate in PT. Acute PT will f/u.)   Recommendation   PT - Next Appointment 01/17/25

## 2025-01-16 NOTE — PROGRESS NOTES
The patient is sleeping soundly today.  He continues to require wrist restraints and can tend use to require nighttime Zyprexa with increasing agitation.  I have spoken with Dr. Staples and I am in agreement that we are probably seeing signs of an unmasking dementia which could positive benefit from a general psychiatric hospitalization.  Unfortunately, the patient's cardiac status may prevent any facility from accepting him for care.  In the meantime I have increased his Zyprexa to 7.5 mg with supper and at bedtime.

## 2025-01-16 NOTE — CASE MANAGEMENT/SOCIAL WORK
Continued Stay Note  [facility]     Patient Name: Mark Aguirre Jr.  MRN: [id number]  Today's Date: 1/16/2025    Admit Date: 12/28/2024    Plan: Poss [facility] pending medical readiness and acceptance   Discharge Plan       Row Name 01/16/25 1403       Plan    Plan Poss [facility] pending medical readiness and acceptance    Patient/Family in Agreement with Plan yes    Plan Comments Clinicals reviewed. CCP continues to follow for medical readiness for DC. DC Barrier: restraints, heart rate control, IM Zyprexa, nutrition. Gabriele RN/CCP                   Discharge Codes    No documentation.                 Expected Discharge Date and Time       Expected Discharge Date Expected Discharge Time    Jan 21, 2025               Gisele Guadarrama RN

## 2025-01-17 PROCEDURE — 99232 SBSQ HOSP IP/OBS MODERATE 35: CPT | Performed by: PHYSICIAN ASSISTANT

## 2025-01-17 PROCEDURE — 97530 THERAPEUTIC ACTIVITIES: CPT

## 2025-01-17 PROCEDURE — 25010000002 ENOXAPARIN PER 10 MG: Performed by: INTERNAL MEDICINE

## 2025-01-17 RX ADMIN — METOPROLOL TARTRATE 75 MG: 50 TABLET, FILM COATED ORAL at 09:17

## 2025-01-17 RX ADMIN — Medication 10 ML: at 21:08

## 2025-01-17 RX ADMIN — DIGOXIN 125 MCG: 0.12 TABLET ORAL at 09:17

## 2025-01-17 RX ADMIN — METOPROLOL TARTRATE 75 MG: 50 TABLET, FILM COATED ORAL at 18:03

## 2025-01-17 RX ADMIN — Medication 1000 MCG: at 09:17

## 2025-01-17 RX ADMIN — TAMSULOSIN HYDROCHLORIDE 0.4 MG: 0.4 CAPSULE ORAL at 09:17

## 2025-01-17 RX ADMIN — ENOXAPARIN SODIUM 90 MG: 100 INJECTION SUBCUTANEOUS at 11:01

## 2025-01-17 RX ADMIN — OLANZAPINE 7.5 MG: 5 TABLET, FILM COATED ORAL at 18:03

## 2025-01-17 RX ADMIN — ATORVASTATIN CALCIUM 10 MG: 10 TABLET, FILM COATED ORAL at 09:17

## 2025-01-17 RX ADMIN — METOPROLOL TARTRATE 75 MG: 50 TABLET, FILM COATED ORAL at 00:25

## 2025-01-17 RX ADMIN — Medication 10 ML: at 09:18

## 2025-01-17 RX ADMIN — OLANZAPINE 7.5 MG: 5 TABLET, FILM COATED ORAL at 20:50

## 2025-01-17 RX ADMIN — MEGESTROL ACETATE 400 MG: 40 SUSPENSION ORAL at 09:17

## 2025-01-17 RX ADMIN — ENOXAPARIN SODIUM 90 MG: 100 INJECTION SUBCUTANEOUS at 00:24

## 2025-01-17 NOTE — PLAN OF CARE
Goal Outcome Evaluation:  Plan of Care Reviewed With: patient        Progress: no change  Outcome Evaluation: Remains confused, less agitated tonight. HR stable, elevated at times with movement. Patient was able to take all his PO meds. Bilateral wrist restraints. Incontinence care done.

## 2025-01-17 NOTE — PROGRESS NOTES
Wife reports that the patient had a better night with the increased Zyprexa dose.  He remains in restraints and is groggy when seen today.    I was able to have a john conversation with the patient's wife regarding the possibility that the patient's recent medical issues have unmasked a pre-existing dementia which the patient was able to hide given his degree of intelligence and high functioning premorbidly.  She is understanding of this.  Once medically stable, we may consider a geropsych hospitalization.

## 2025-01-17 NOTE — THERAPY TREATMENT NOTE
Patient Name: Mark Aguirre Jr.  : 1945    MRN: [id number]                              Today's Date: 2025       Admit Date: 2024    Visit Dx:     ICD-10-CM ICD-9-CM   1. Syncope, unspecified syncope type  R55 780.2   2. Laceration of right ear, initial encounter  S01.311A 872.8   3. Prolonged Q-T interval on ECG  R94.31 794.31   4. Atrial fibrillation, unspecified type  I48.91 427.31     Patient Active Problem List   Diagnosis    Atrial fibrillation    Bifascicular block    STEPHENIE on auto CPAP    Family history of colon cancer    History of colon polyps    Essential hypertension    Hip pain, right    OA (osteoarthritis) of hip    Acute renal failure    Brief psychotic disorder    Family history of colonic polyps    Nonrheumatic aortic valve stenosis    PVC (premature ventricular contraction)    Syncope and collapse    History of adenomatous polyp of colon    Persistent atrial fibrillation    Hypoxemia associated with sleep    Syncope    Acute delirium    Severe protein-calorie malnutrition     Past Medical History:   Diagnosis Date    Acute kidney failure     POST-OP TOTAL HIP 2020    Anticoagulated     PRADAXA FOR A FIB TO HELD 3 DAYS PRIOR    Arthritis     OSTEO. RIGHT HIP    Atrial flutter     Bifascicular block     Cataract     LEFT AND RIGHT    Depression     History of colon polyps     Hypertension     Hypoxemia associated with sleep     Insomnia     Knee pain     STEPHENIE on CPAP 2010    Overnight polysomnogram.  Weight 163 pounds.  Severe STEPHENIE with AHI of 30.9 events per hour.  Low oxygen saturation 72% and sleep-related hypoxia present for 30 minutes    PAF (paroxysmal atrial fibrillation)     Right hip pain     Slow to wake up after anesthesia      Past Surgical History:   Procedure Laterality Date    CARDIAC ABLATION      CARDIAC CATHETERIZATION      COLONOSCOPY N/A 2018    Procedure: COLONOSCOPY INTO CECUM WITH COLD BX POLYPECTOMY ;  Surgeon: Ross Stearns MD;  Location: [facility]  ENDOSCOPY;  Service:     COLONOSCOPY N/A 2/3/2021    Procedure: COLONOSCOPY TOCECUM WITH COLD BX POLYPECTOMY AND HOT SNARE POLYPECTOMY;  Surgeon: Ross Stearns MD;  Location: [facility] ENDOSCOPY;  Service: General;  Laterality: N/A;  PERSONAL HX OF POLYPS, FAMILY HX OF COLON CANCER  --POLYPS (X3), DIVERTICULOSIS    COLONOSCOPY N/A 3/20/2024    Procedure: COLONOSCOPY TO CECUM WITH COLD BX POLYPECTOMIES;  Surgeon: Ross Stearns MD;  Location: [facility] ENDOSCOPY;  Service: General;  Laterality: N/A;  HX POLYPS/POLYPS (3)    HERNIA REPAIR      INGUINAL HERNIA? SIDE    TOTAL HIP ARTHROPLASTY Right 6/5/2020    Procedure: TOTAL HIP ARTHROPLASTY;  Surgeon: Travis Hamlin MD;  Location: [facility] OR;  Service: Orthopedics;  Laterality: Right;      General Information       Row Name 01/17/25 1143          Physical Therapy Time and Intention    Document Type therapy note (daily note)  -     Mode of Treatment individual therapy;physical therapy  -       Row Name 01/17/25 1143          General Information    Patient Profile Reviewed yes  -       Row Name 01/17/25 1143          Cognition    Orientation Status (Cognition) unable/difficult to assess  -       Row Name 01/17/25 1143          Safety Issues/Impairments Affecting Functional Mobility    Safety Issues Affecting Function (Mobility) ability to follow commands;insight into deficits/self-awareness;judgment;problem-solving;safety precautions follow-through/compliance;safety precaution awareness;sequencing abilities  -     Impairments Affecting Function (Mobility) balance;endurance/activity tolerance;strength;cognition;postural/trunk control  -               User Key  (r) = Recorded By, (t) = Taken By, (c) = Cosigned By      Initials Name Provider Type     Laura Coughlin PT Physical Therapist                   Mobility       Row Name 01/17/25 1144          Bed Mobility    Comment, (Bed Mobility) Very poor command following. Patient would bring legs to EOB  with some assistance but bring legs back into bed. Patient unable to follow commands to bring trunk to midline.  -SM               User Key  (r) = Recorded By, (t) = Taken By, (c) = Cosigned By      Initials Name Provider Type    Laura Thomas PT Physical Therapist                   Obj/Interventions       Row Name 01/17/25 1145          Motor Skills    Therapeutic Exercise other (see comments)  bridge to scoot up in bed  -SM               User Key  (r) = Recorded By, (t) = Taken By, (c) = Cosigned By      Initials Name Provider Type    Laura Thomas PT Physical Therapist                   Goals/Plan    No documentation.                  Clinical Impression       Row Name 01/17/25 1145          Pain    Pretreatment Pain Rating 0/10 - no pain  -SM     Posttreatment Pain Rating 0/10 - no pain  -SM       Row Name 01/17/25 1145          Plan of Care Review    Plan of Care Reviewed With patient  -     Outcome Evaluation Patient seen for PT session this AM. Patient supine in bed upon arrival with B soft wrist restraints. Wife at bedside. Patient remains confused and very drowsy. Patient would wake up for brief periods and say a few things before falling back to sleep. Very poor command following. Patient would bring legs to EOB with some assistance but then bring legs back into bed. Patient unable to follow commands to bring trunk to midline. When therapist bent patient knees in bed patient would push through feet and bridge himself up but unable to follow commands to continuously perform repetitions. Progress remains largely limited by cognitive status at this time. Acute PT will continue to monitor.  -       Row Name 01/17/25 1145          Therapy Assessment/Plan (PT)    Therapy Frequency (PT) 3 times/wk  -[facility] Name 01/17/25 1145          Vital Signs    Pre Patient Position Supine  -SM     Intra Patient Position Supine  -SM     Post Patient Position Supine  -SM       Row Name 01/17/25 1145           Positioning and Restraints    Pre-Treatment Position in bed  -SM     Post Treatment Position bed  -SM     In Bed notified nsg;call light within reach;encouraged to call for assist;exit alarm on;fowlers  -     Restraints reapplied:;soft limb  -SM               User Key  (r) = Recorded By, (t) = Taken By, (c) = Cosigned By      Initials Name Provider Type    Laura Thomas PT Physical Therapist                   Outcome Measures       Row Name 01/17/25 1151          How much help from another person do you currently need...    Turning from your back to your side while in flat bed without using bedrails? 1  -SM     Moving from lying on back to sitting on the side of a flat bed without bedrails? 1  -SM     Moving to and from a bed to a chair (including a wheelchair)? 1  -SM     Standing up from a chair using your arms (e.g., wheelchair, bedside chair)? 1  -SM     Climbing 3-5 steps with a railing? 1  -SM     To walk in hospital room? 1  -SM     AM-PAC 6 Clicks Score (PT) 6  -SM     Highest Level of Mobility Goal 2 --> Bed activities/dependent transfer  -SM               User Key  (r) = Recorded By, (t) = Taken By, (c) = Cosigned By      Initials Name Provider Type    Laura Thomas PT Physical Therapist                                 Physical Therapy Education       Title: PT OT SLP Therapies (In Progress)       Topic: Physical Therapy (In Progress)       Point: Mobility training (In Progress)       Learning Progress Summary            Patient Acceptance, E, NR by  at 1/17/2025 1152    Acceptance, E,D, VU,NR by EB at 1/10/2025 1536    Acceptance, E,TB, VU,NR by ST at 1/9/2025 1200    Acceptance, E,TB,D, VU,NR by  at 1/4/2025 1504    Acceptance, E, NR by  at 12/31/2024 1525   Family Acceptance, E, NR by  at 12/31/2024 1525                      Point: Home exercise program (In Progress)       Learning Progress Summary            Patient Acceptance, E, NR by  at 1/17/2025 1152     Acceptance, E,D, VU,NR by  at 1/10/2025 1536    Acceptance, E, NR by  at 12/31/2024 1525   Family Acceptance, E, NR by  at 12/31/2024 1525                      Point: Body mechanics (In Progress)       Learning Progress Summary            Patient Acceptance, E, NR by  at 1/17/2025 1152    Acceptance, E,D, VU,NR by  at 1/10/2025 1536    Acceptance, E,TB, VU,NR by  at 1/9/2025 1200    Acceptance, E,TB,D, VU,NR by  at 1/4/2025 1504    Acceptance, E, NR by  at 12/31/2024 1525   Family Acceptance, E, NR by  at 12/31/2024 1525                      Point: Precautions (In Progress)       Learning Progress Summary            Patient Acceptance, E, NR by  at 1/17/2025 1152    Acceptance, E,TB,D, VU,NR by  at 1/4/2025 1504    Acceptance, E, NR by  at 12/31/2024 1525   Family Acceptance, E, NR by  at 12/31/2024 1525                                      User Key       Initials Effective Dates Name Provider Type Discipline     06/16/21 -  Sue Padilla, PT Physical Therapist PT     06/16/21 -  Sima Anne, PT Physical Therapist PT     02/14/23 -  Pat Mehta, PTA Physical Therapist Assistant PT     05/02/22 -  Laura Coughlin, PT Physical Therapist PT     09/22/22 -  Demi Otero, PT Physical Therapist PT                  PT Recommendation and Plan     Outcome Evaluation: Patient seen for PT session this AM. Patient supine in bed upon arrival with B soft wrist restraints. Wife at bedside. Patient remains confused and very drowsy. Patient would wake up for brief periods and say a few things before falling back to sleep. Very poor command following. Patient would bring legs to EOB with some assistance but then bring legs back into bed. Patient unable to follow commands to bring trunk to midline. When therapist bent patient knees in bed patient would push through feet and bridge himself up but unable to follow commands to continuously perform repetitions. Progress remains largely  limited by cognitive status at this time. Acute PT will continue to monitor.     Time Calculation:         PT Charges       Row Name 01/17/25 1152             Time Calculation    Start Time 1113  -SM      Stop Time 1141  -SM      Time Calculation (min) 28 min  -SM         Timed Charges    20470 - PT Therapeutic Activity Minutes 28  -SM         Total Minutes    Timed Charges Total Minutes 28  -SM       Total Minutes 28  -SM                User Key  (r) = Recorded By, (t) = Taken By, (c) = Cosigned By      Initials Name Provider Type     Laura Coughlin, NINA Physical Therapist                  Therapy Charges for Today       Code Description Service Date Service Provider Modifiers Qty    [id number]  PT THERAPEUTIC ACT EA 15 MIN 1/17/2025 Laura Coughlin, NINA GP 2            PT G-Codes  Outcome Measure Options: AM-PAC 6 Clicks Basic Mobility (PT)  AM-PAC 6 Clicks Score (PT): 6  PT Discharge Summary  Anticipated Discharge Disposition (PT): skilled nursing facility    Laura Coughlin PT  1/17/2025

## 2025-01-17 NOTE — PROGRESS NOTES
Subjective: Was sleepy a lot during the day yesterday.  Wife reports that he actually look like he had restful sleep as opposed to the jerking movements he normally was having.  He did finally arouse at about 3 PM.  Missed morning dose of meds but did take afternoon and evenings.  Did not require any IM Zyprexa.    Objective:  Vitals:    01/16/25 0414 01/16/25 1920 01/16/25 2340 01/17/25 0405   BP: 125/81 111/71 108/84 120/71   BP Location: Right arm Right arm Right arm Left arm   Patient Position: Lying Lying Lying Lying   Pulse: 94 96 112    Resp: 20 18 18 18   Temp:  98.3 °F (36.8 °C) 97.9 °F (36.6 °C)    TempSrc:  Oral Oral    SpO2: 96% 94% 93% 94%   Weight:       Height:         General: Easily aroused this morning, does answer some questions appropriately but remains confused  Heart: Irregularly irregular  Lungs: Clear  Abdomen soft nontender  Extremities: No edema    No results found for this or any previous visit (from the past 24 hours).    Assessment plan  1.  Delirium/confusion-protein studies consistent with Alzheimer's diagnosis.  Zyprexa dose was increased yesterday.  Could consider addition of donepezil but my concern is that his appetite is already poor and it can certainly affect his GI symptoms.  I think likelihood of it by itself improving his symptoms is low unless psychiatry thinks otherwise.  He is being evaluated for geropsych admission.  Heart rate control has been a limiting factor but does look to be improving as long as we can keep him taking his oral medicines.    2.  Atrial fibrillation-heart rate control improving on increased dose of metoprolol.  Remains on Lovenox.  Was considering getting him back on oral anticoagulation but then he missed his morning medicines yesterday so I am going to wait another day before resuming oral and trying to get him off of Lovenox.    3.  Rectus sheath hematoma-hemoglobin has been stable.  No signs of active bleeding.  He is back on  anticoagulation    4.  Nutrition-looks to be taking enough to maintain hydration and nutritional stores.  Remains on Megace.  Would not consider feeding tube giving underlying dementia and agitation

## 2025-01-17 NOTE — PROGRESS NOTES
Electrophysiology Follow-Up Note      Patient Name: Mark Aguirre Jr.  Age/Sex: 79 y.o. male  : 1945  MRN: [id number]    Date of Admission: 2024  Day of Service: 25       Chief Complaint: confusion, AF      Follow up:  25 HR remains well controlled this AM. His blood pressure was able to tolerate larger dose of metoprolol last night with oral meds. Psych reviewed and thinks that his cardiac status will prevent him from acceptance at a facility. His zyprexa was increased. I will speak to Dr. Armando about plan this morning. Wife at bedside.       Temp:  [97.9 °F (36.6 °C)-98.3 °F (36.8 °C)] 97.9 °F (36.6 °C)  Heart Rate:  [] 112  Resp:  [18] 18  BP: (108-120)/(71-84) 120/71     PHYSICAL EXAM    General: 79 y.o. male No acute distress, laying in bed. Alert and Oriented. In restrains, talking  Neck: No JVD or carotid bruit  Lungs: Clear to ausculation bilaterally, symmetric  Heart: irregular rhythm with no overt murmurs, rubs or gallops. Normal S1 and S2.   Abdomen: soft, non-tender  Extremities: No lower extremity edema or cyanosis.   Neuo: no lateralizing defects.        Telemetry/EK25: Atrial fibrillation with average rates into the 90s, PVCs noted, short run of NSVT 7 beats with sleep this AM, Hrs around 1 AM were elevated to 150s        I personally viewed and interpreted the patient's EKG/Telemetry data.    Previous testing:   - Echo 2024: LVEF 48.8%. LA moderate dilation. Moderate AS aortic valve area 1.8cm2. RSVP 48mmHg.       Lab Review:   Results from last 7 days   Lab Units 01/15/25  0839 25  0809 25  0805   SODIUM mmol/L 144 145 145   POTASSIUM mmol/L 4.1 3.9 4.2   CHLORIDE mmol/L 112* 112* 111*   CO2 mmol/L 24.0 23.0 23.8   BUN mg/dL 24* 23 24*   CREATININE mg/dL 0.82 0.87 0.78   GLUCOSE mg/dL 102* 95 94   CALCIUM mg/dL 8.9 8.8 8.9     Results from last 7 days   Lab Units 01/15/25  0839 25  0809 25  0805   WBC 10*3/mm3 8.37  8.89 10.59   HEMOGLOBIN g/dL 13.1 12.2* 12.8*   HEMATOCRIT % 40.3 39.2 41.2   PLATELETS 10*3/mm3 261 250 253                       Current Medications:   Scheduled Meds:atorvastatin, 10 mg, Oral, Daily  digoxin, 125 mcg, Oral, Daily  enoxaparin, 1 mg/kg, Subcutaneous, Q12H  megestrol, 400 mg, Oral, Daily  metoprolol tartrate, 75 mg, Oral, Q8H  OLANZapine, 7.5 mg, Oral, Nightly  OLANZapine, 7.5 mg, Oral, Daily With Dinner  potassium chloride, 40 mEq, Oral, Once  potassium chloride, 40 mEq, Oral, Once  sodium chloride, 10 mL, Intravenous, Q12H  tamsulosin, 0.4 mg, Oral, Daily  vitamin B-12, 1,000 mcg, Oral, Daily          Assessment /Plan:  Persistent atrial fibrillation- HR better controlled overnight. He was able to take his oral medications and is now on lopresor 75 mg TID with digoxin 125 mcg daily.  Average Hrs are much better when looking at Hr trends on monitor.   Once delirium resolved (when able to cooperate during pacemaker procedure) we will plan on single RV lead pacemaker implantation followed with AVN ablation for long term solution to uncontrolled AF. Not able to take AAD tx due to qTc prolongation  There are concerns cardiac status would prohibit geriatic psych admission.   Discussed with Dr. Armando, we are looking for average heart rates under 110, He has met this threshold the past 3 nights. At discharge, we would recommend switching him to metoprolol succinate 150 mg twice daily with digoxin 125 mcg in the morning for ease of dosing at a facility.   Delerium- improving, now able to eat, his meds have really been weaned down. Neuro and Dr. Bradley following. MRI of head and repeat CT was normal, recent LP does show possible findings that he has Alzheimer's which could could explain his sundowning symptoms.  Dr. Staples is working to find a [facility] psych facility.  Syncope- initially what brought pt to hospital- liekly from orthostatic changes made worse from his AF, he is now bed bound mostly but will  work with PT when able.   PVCs- chronic and ongoing, overal low burden. Continue BB    Cameron Denny PA-C  01/17/25  07:45 EST

## 2025-01-17 NOTE — PLAN OF CARE
Goal Outcome Evaluation:  Plan of Care Reviewed With: patient           Outcome Evaluation: Patient seen for PT session this AM. Patient supine in bed upon arrival with B soft wrist restraints. Wife at bedside. Patient remains confused and very drowsy. Patient would wake up for brief periods and say a few things before falling back to sleep. Very poor command following. Patient would bring legs to EOB with some assistance but then bring legs back into bed. Patient unable to follow commands to bring trunk to midline. When therapist bent patient knees in bed patient would push through feet and bridge himself up but unable to follow commands to continuously perform repetitions. Progress remains largely limited by cognitive status at this time. Acute PT will continue to monitor.    Anticipated Discharge Disposition (PT): skilled nursing facility                         Visit Information Date & Time Provider Department Dept. Phone Encounter #  
 1/8/2018 10:15 AM Olvin Valdes NP [facility] at [address] [place] [address] [id number] Follow-up Instructions Return in 7 days (on 1/15/2018) for Jhonny, pump disconnect/possible hydration. Upcoming Health Maintenance Date Due  
 FOOT EXAM Q1 6/26/1950 MICROALBUMIN Q1 6/26/1950 COLONOSCOPY 6/26/1958 DTaP/Tdap/Td series (1 - Tdap) 6/26/1961 ZOSTER VACCINE AGE 60> 4/26/2000 OSTEOPOROSIS SCREENING (DEXA) 6/26/2005 Pneumococcal 65+ High/Highest Risk (1 of 2 - PCV13) 6/26/2005 MEDICARE YEARLY EXAM 6/26/2005 EYE EXAM RETINAL OR DILATED Q1 11/30/2015 GLAUCOMA SCREENING Q2Y 11/30/2016 LIPID PANEL Q1 1/18/2017 HEMOGLOBIN A1C Q6M 5/15/2017 Influenza Age 5 to Adult 8/1/2017 Allergies as of 1/8/2018  Review Complete On: 12/20/2017 By: Jayshree Aparicio MD  
  
 Severity Noted Reaction Type Reactions Erythromycin  11/28/2012    Hives Flagyl [Metronidazole]  11/28/2012    Hives Macrobid [Nitrofurantoin Monohyd/m-cryst]  10/10/2016    Hives Pcn [Penicillins]  09/09/2016    Hives Sulfa (Sulfonamide Antibiotics)  11/28/2012    Hives Current Immunizations  Reviewed on 1/8/2018 Name Date Influenza Vaccine 10/18/2015 Reviewed by Milo Franz RN on 1/8/2018 at 10:38 AM  
Vitals BP Pulse Temp Resp SpO2 OB Status 154/57 (BP 1 Location: Left arm, BP Patient Position: Sitting) 63 98.3 °F (36.8 °C) (Temporal) 20 99% Hysterectomy Smoking Status Never Smoker Vitals History Preferred Pharmacy Pharmacy Name Phone [id number] [place] [facility] [phone] [phone] Your Updated Medication List  
  
   
This list is accurate as of: 1/8/18 11:58 AM.  Always use your most recent med list.  
  
  
  
  
 amiodarone 200 mg tablet Commonly known as:  CORDARONE  
 Take 100 mg by mouth daily. dexamethasone 4 mg tablet Commonly known as:  DECADRON Take 8mg (two tabs) in the morning days 2-5 of each chemotherapy cycle, while the pump is running. docusate sodium 100 mg capsule Commonly known as:  COLACE  
TAKE 1 CAPSULE BY MOUTH TWICE A DAY  
  
 ergocalciferol 50,000 unit capsule Commonly known as:  ERGOCALCIFEROL  
TAKE ONE CAPSULE BY MOUTH WEEKLY  
  
 hydrALAZINE 25 mg tablet Commonly known as:  APRESOLINE Take 25 mg by mouth three (3) times daily. HYDROcodone-acetaminophen 5-325 mg per tablet Commonly known as:  NORCO  
TAKE 1 TABLET BY MOUTH EVERY 4 HOURS AS NEEDED FOR PAIN  
  
 lidocaine-prilocaine topical cream  
Commonly known as:  EMLA Apply to port 30-60 minutes prior to accessing, cover with plastic wrap.  
  
 losartan 50 mg tablet Commonly known as:  COZAAR Take 50 mg by mouth two (2) times a day. methIMAzole 5 mg tablet Commonly known as:  TAPAZOLE Take 0.5 Tabs by mouth daily. metoprolol tartrate 50 mg tablet Commonly known as:  LOPRESSOR Take 1 Tab by mouth two (2) times a day. MYRBETRIQ 50 mg ER tablet Generic drug:  mirabegron ER  
TAKE 1 TABLET BY MOUTH DAILY  
  
 omeprazole 20 mg capsule Commonly known as:  PRILOSEC  
TAKE ONE CAPSULE EVERY DAY  
  
 ondansetron hcl 8 mg tablet Commonly known as:  Thurlow Idler Take 1 Tab by mouth every eight (8) hours as needed for Nausea. pantoprazole 40 mg tablet Commonly known as:  PROTONIX Take 40 mg by mouth daily. PHOSPHA 250 NEUTRAL 250 mg tablet Generic drug:  phosphorus Take 1 Tab by mouth two (2) times a day. pravastatin 80 mg tablet Commonly known as:  PRAVACHOL Take 1 Tab by mouth nightly. prochlorperazine 10 mg tablet Commonly known as:  COMPAZINE Take 1 Tab by mouth every six (6) hours as needed for Nausea. Follow-up Instructions Return in 7 days (on 1/15/2018) for Jhonny, pump disconnect/possible hydration. To-Do List   
 01/15/2018 3:00 PM  
  Appointment with Oma Norton at [place] [address] ([phone])  
  
 02/05/2018 10:00 AM  
  Appointment with Claire Casey 2 at [place] [address] ([phone])  
  
 02/12/2018 3:00 PM  
  Appointment with Oma Norton at [place] [address] ([phone]) [facility]! Dear Musa Sage: Thank you for requesting a [employer] account. Our records indicate that you already have an active [employer] account. You can access your account anytime at [email or web address] Did you know that you can access your hospital and ER discharge instructions at any time in [employer]? You can also review all of your test results from your hospital stay or ER visit. Additional Information If you have questions, please visit the Frequently Asked Questions section of the [employer] website at [email or web address] Remember, [employer] is NOT to be used for urgent needs. For medical emergencies, dial 911. Now available from your iPhone and Android! Please provide this summary of care documentation to your next provider. Your primary care clinician is listed as Renée Varela. If you have any questions after today's visit, please call [phone].

## 2025-01-17 NOTE — NURSING NOTE
Wife state patient wake up and turn his face red and sweating like he have a panic attack and then after she talked to him he calm down  patient able to take his medication this morning crush with apple sauce. Continue in restrain.

## 2025-01-18 LAB
ALBUMIN SERPL-MCNC: 3.2 G/DL (ref 3.5–5.2)
ALBUMIN/GLOB SERPL: 1 G/DL
ALP SERPL-CCNC: 58 U/L (ref 39–117)
ALT SERPL W P-5'-P-CCNC: 20 U/L (ref 1–41)
ANION GAP SERPL CALCULATED.3IONS-SCNC: 9.2 MMOL/L (ref 5–15)
AST SERPL-CCNC: 23 U/L (ref 1–40)
BASOPHILS # BLD AUTO: 0.04 10*3/MM3 (ref 0–0.2)
BASOPHILS NFR BLD AUTO: 0.5 % (ref 0–1.5)
BILIRUB SERPL-MCNC: 2.3 MG/DL (ref 0–1.2)
BUN SERPL-MCNC: 26 MG/DL (ref 8–23)
BUN/CREAT SERPL: 26.5 (ref 7–25)
CALCIUM SPEC-SCNC: 9.1 MG/DL (ref 8.6–10.5)
CHLORIDE SERPL-SCNC: 112 MMOL/L (ref 98–107)
CO2 SERPL-SCNC: 24.8 MMOL/L (ref 22–29)
CREAT SERPL-MCNC: 0.98 MG/DL (ref 0.76–1.27)
DEPRECATED RDW RBC AUTO: 46.8 FL (ref 37–54)
EGFRCR SERPLBLD CKD-EPI 2021: 77.5 ML/MIN/1.73
EOSINOPHIL # BLD AUTO: 0.09 10*3/MM3 (ref 0–0.4)
EOSINOPHIL NFR BLD AUTO: 1.2 % (ref 0.3–6.2)
ERYTHROCYTE [DISTWIDTH] IN BLOOD BY AUTOMATED COUNT: 14.7 % (ref 12.3–15.4)
GLOBULIN UR ELPH-MCNC: 3.2 GM/DL
GLUCOSE SERPL-MCNC: 90 MG/DL (ref 65–99)
HCT VFR BLD AUTO: 44.5 % (ref 37.5–51)
HGB BLD-MCNC: 14.6 G/DL (ref 13–17.7)
IMM GRANULOCYTES # BLD AUTO: 0.03 10*3/MM3 (ref 0–0.05)
IMM GRANULOCYTES NFR BLD AUTO: 0.4 % (ref 0–0.5)
LYMPHOCYTES # BLD AUTO: 1.31 10*3/MM3 (ref 0.7–3.1)
LYMPHOCYTES NFR BLD AUTO: 17.8 % (ref 19.6–45.3)
MCH RBC QN AUTO: 29.6 PG (ref 26.6–33)
MCHC RBC AUTO-ENTMCNC: 32.8 G/DL (ref 31.5–35.7)
MCV RBC AUTO: 90.1 FL (ref 79–97)
MONOCYTES # BLD AUTO: 0.56 10*3/MM3 (ref 0.1–0.9)
MONOCYTES NFR BLD AUTO: 7.6 % (ref 5–12)
NEUTROPHILS NFR BLD AUTO: 5.35 10*3/MM3 (ref 1.7–7)
NEUTROPHILS NFR BLD AUTO: 72.5 % (ref 42.7–76)
NRBC BLD AUTO-RTO: 0 /100 WBC (ref 0–0.2)
PLATELET # BLD AUTO: 239 10*3/MM3 (ref 140–450)
PMV BLD AUTO: 11.3 FL (ref 6–12)
POTASSIUM SERPL-SCNC: 4 MMOL/L (ref 3.5–5.2)
PROT SERPL-MCNC: 6.4 G/DL (ref 6–8.5)
RBC # BLD AUTO: 4.94 10*6/MM3 (ref 4.14–5.8)
SODIUM SERPL-SCNC: 146 MMOL/L (ref 136–145)
WBC NRBC COR # BLD AUTO: 7.38 10*3/MM3 (ref 3.4–10.8)

## 2025-01-18 PROCEDURE — 99232 SBSQ HOSP IP/OBS MODERATE 35: CPT | Performed by: INTERNAL MEDICINE

## 2025-01-18 PROCEDURE — 80053 COMPREHEN METABOLIC PANEL: CPT | Performed by: INTERNAL MEDICINE

## 2025-01-18 PROCEDURE — 85025 COMPLETE CBC W/AUTO DIFF WBC: CPT | Performed by: INTERNAL MEDICINE

## 2025-01-18 PROCEDURE — 25010000002 ENOXAPARIN PER 10 MG: Performed by: INTERNAL MEDICINE

## 2025-01-18 PROCEDURE — 25010000002 OLANZAPINE 10 MG RECONSTITUTED SOLUTION: Performed by: SPECIALIST

## 2025-01-18 RX ADMIN — TAMSULOSIN HYDROCHLORIDE 0.4 MG: 0.4 CAPSULE ORAL at 10:33

## 2025-01-18 RX ADMIN — METOPROLOL TARTRATE 7.5 MG: 5 INJECTION INTRAVENOUS at 18:52

## 2025-01-18 RX ADMIN — Medication 10 ML: at 10:33

## 2025-01-18 RX ADMIN — ENOXAPARIN SODIUM 90 MG: 100 INJECTION SUBCUTANEOUS at 10:36

## 2025-01-18 RX ADMIN — Medication 10 ML: at 20:02

## 2025-01-18 RX ADMIN — Medication 1000 MCG: at 10:32

## 2025-01-18 RX ADMIN — ENOXAPARIN SODIUM 90 MG: 100 INJECTION SUBCUTANEOUS at 00:22

## 2025-01-18 RX ADMIN — MEGESTROL ACETATE 400 MG: 40 SUSPENSION ORAL at 10:33

## 2025-01-18 RX ADMIN — ATORVASTATIN CALCIUM 10 MG: 10 TABLET, FILM COATED ORAL at 10:33

## 2025-01-18 RX ADMIN — METOPROLOL TARTRATE 75 MG: 50 TABLET, FILM COATED ORAL at 10:32

## 2025-01-18 RX ADMIN — ENOXAPARIN SODIUM 90 MG: 100 INJECTION SUBCUTANEOUS at 22:15

## 2025-01-18 RX ADMIN — DIGOXIN 125 MCG: 0.12 TABLET ORAL at 10:34

## 2025-01-18 RX ADMIN — OLANZAPINE 5 MG: 10 INJECTION, POWDER, LYOPHILIZED, FOR SOLUTION INTRAMUSCULAR at 18:56

## 2025-01-18 NOTE — PROGRESS NOTES
"Patient Name: Mark Aguirre Jr.  :1945  79 y.o.      Patient Care Team:  Luis Staples MD as PCP - General (Internal Medicine)  Luis Staples MD as PCP - Internal Medicine  Rian Johns MD as Consulting Physician (Sleep Medicine)    Interval History:   May have spit out metoprolol last night    Subjective:  Following for A-fib    Objective   Vital Signs  Temp:  [97.3 °F (36.3 °C)-97.7 °F (36.5 °C)] 97.3 °F (36.3 °C)  Heart Rate:  [] 83  Resp:  [18-24] 19  BP: (100-130)/(24-83) 100/62  No intake or output data in the 24 hours ending 25 1348  Flowsheet Rows      Flowsheet Row First Filed Value   Admission Height 185.4 cm (73\") Documented at 2024 0818   Admission Weight 110 kg (241 lb 8 oz) Documented at 2024 0818                Results Review:    Results from last 7 days   Lab Units 25  0756   SODIUM mmol/L 146*   POTASSIUM mmol/L 4.0   CHLORIDE mmol/L 112*   CO2 mmol/L 24.8   BUN mg/dL 26*   CREATININE mg/dL 0.98   GLUCOSE mg/dL 90   CALCIUM mg/dL 9.1         Results from last 7 days   Lab Units 25  0756   WBC 10*3/mm3 7.38   HEMOGLOBIN g/dL 14.6   HEMATOCRIT % 44.5   PLATELETS 10*3/mm3 239                         Medication Review:   atorvastatin, 10 mg, Oral, Daily  digoxin, 125 mcg, Oral, Daily  enoxaparin, 1 mg/kg, Subcutaneous, Q12H  megestrol, 400 mg, Oral, Daily  metoprolol tartrate, 75 mg, Oral, Q8H  OLANZapine, 7.5 mg, Oral, Nightly  OLANZapine, 7.5 mg, Oral, Daily With Dinner  potassium chloride, 40 mEq, Oral, Once  potassium chloride, 40 mEq, Oral, Once  sodium chloride, 10 mL, Intravenous, Q12H  tamsulosin, 0.4 mg, Oral, Daily  vitamin B-12, 1,000 mcg, Oral, Daily              Assessment & Plan     1.  Persistent atrial fibrillation.  Heart rate remains reasonably well-controlled for age and situation.  Continue digoxin and metoprolol.  -Noted plans for a single-chamber RV pacemaker followed by an AV node ablation with Dr. Armando.  -Noted plans " "\"At discharge, we would recommend switching him to metoprolol succinate 150 mg twice daily with digoxin 125 mcg in the morning for ease of dosing at a facility\"  2.  Delirium/dementia/Alzheimer's.  3.  PVCs.  Continue beta-blocker.  4.  Syncope.  Thought to be from orthostatic changes.    Will watch peripherally and Dr. Armando will be back Monday.    Layla Barrera MD, [facility]  01/18/25  13:48 EST        "

## 2025-01-18 NOTE — PROGRESS NOTES
S: Pt seen early this am. WIfe at bedside.  More alert yesterday during the day.  Restless las night. Looks like he spit out the last does metoprolol last night.  Did not receive IM Zyprexa.  Had 2 boosts, ice cream and some chicken/green beans yesterday.    O:  Vitals:    01/17/25 2006 01/18/25 0029 01/18/25 0120 01/18/25 0745   BP: (!) 129/24 101/83  130/78   BP Location: Left arm Right arm  Left arm   Patient Position: Lying Lying  Lying   Pulse: 107  108 108   Resp: 24 18 24   Temp: 97.7 °F (36.5 °C)   97.3 °F (36.3 °C)   TempSrc: Axillary   Oral   SpO2:    94%   Weight:       Height:         Gen: Confused, arousable but falls back to sleep  Heart: irregularly irregular  Lungs: CTA  Abd; SOft  Ext; No edema    Recent Results (from the past 24 hours)   Comprehensive Metabolic Panel    Collection Time: 01/18/25  7:56 AM    Specimen: Blood   Result Value Ref Range    Glucose 90 65 - 99 mg/dL    BUN 26 (H) 8 - 23 mg/dL    Creatinine 0.98 0.76 - 1.27 mg/dL    Sodium 146 (H) 136 - 145 mmol/L    Potassium 4.0 3.5 - 5.2 mmol/L    Chloride 112 (H) 98 - 107 mmol/L    CO2 24.8 22.0 - 29.0 mmol/L    Calcium 9.1 8.6 - 10.5 mg/dL    Total Protein 6.4 6.0 - 8.5 g/dL    Albumin 3.2 (L) 3.5 - 5.2 g/dL    ALT (SGPT) 20 1 - 41 U/L    AST (SGOT) 23 1 - 40 U/L    Alkaline Phosphatase 58 39 - 117 U/L    Total Bilirubin 2.3 (H) 0.0 - 1.2 mg/dL    Globulin 3.2 gm/dL    A/G Ratio 1.0 g/dL    BUN/Creatinine Ratio 26.5 (H) 7.0 - 25.0    Anion Gap 9.2 5.0 - 15.0 mmol/L    eGFR 77.5 >60.0 mL/min/1.73   CBC Auto Differential    Collection Time: 01/18/25  7:56 AM    Specimen: Blood   Result Value Ref Range    WBC 7.38 3.40 - 10.80 10*3/mm3    RBC 4.94 4.14 - 5.80 10*6/mm3    Hemoglobin 14.6 13.0 - 17.7 g/dL    Hematocrit 44.5 37.5 - 51.0 %    MCV 90.1 79.0 - 97.0 fL    MCH 29.6 26.6 - 33.0 pg    MCHC 32.8 31.5 - 35.7 g/dL    RDW 14.7 12.3 - 15.4 %    RDW-SD 46.8 37.0 - 54.0 fl    MPV 11.3 6.0 - 12.0 fL    Platelets 239 140 - 450 10*3/mm3     Neutrophil % 72.5 42.7 - 76.0 %    Lymphocyte % 17.8 (L) 19.6 - 45.3 %    Monocyte % 7.6 5.0 - 12.0 %    Eosinophil % 1.2 0.3 - 6.2 %    Basophil % 0.5 0.0 - 1.5 %    Immature Grans % 0.4 0.0 - 0.5 %    Neutrophils, Absolute 5.35 1.70 - 7.00 10*3/mm3    Lymphocytes, Absolute 1.31 0.70 - 3.10 10*3/mm3    Monocytes, Absolute 0.56 0.10 - 0.90 10*3/mm3    Eosinophils, Absolute 0.09 0.00 - 0.40 10*3/mm3    Basophils, Absolute 0.04 0.00 - 0.20 10*3/mm3    Immature Grans, Absolute 0.03 0.00 - 0.05 10*3/mm3    nRBC 0.0 0.0 - 0.2 /100 WBC       A/p  Confusion-underlying dementia.  Still requiring restraints at night.  Investigating Gerops[facility] admit as HR under better control.    Afib- metoprol now at 75 tid.  TID dosing going to be a challenge given his sundowning.  Cardiology making adjustments  Hematoma - blood count stable back on Lovenox.  To switch to oral anticoagulation soon.  Nutrition - conitnue to encourage intake. Continue megace.   Would avoid feeding tube.

## 2025-01-18 NOTE — PLAN OF CARE
Goal Outcome Evaluation:  Plan of Care Reviewed With: patient      Patient remains confused, difficult to reorient.Resting at this time, very restless and agitated when awake.Restraints still in place.VSS.Room air.Plan of care ongoing

## 2025-01-18 NOTE — PROGRESS NOTES
There is no family at bedside with the patient today.  He continues to exhibit significant clouding of consciousness and continues to require restraints.  He did not require IM Zyprexa last evening.

## 2025-01-18 NOTE — PLAN OF CARE
Problem: Syncope  Goal: Absence of Syncopal Symptoms  Intervention: Manage Effect of Syncopal Symptoms  Recent Flowsheet Documentation  Taken 1/17/2025 2101 by Marcela Tai RN  Syncope Management: legs elevated  Supportive Measures: active listening utilized  Safety Promotion/Fall Prevention:   activity supervised   assistive device/personal items within reach   room organization consistent   safety round/check completed  Taken 1/17/2025 1600 by Marcela Tai RN  Safety Promotion/Fall Prevention:   room organization consistent   safety round/check completed   nonskid shoes/slippers when out of bed   activity supervised   assistive device/personal items within reach  Taken 1/17/2025 1410 by Marcela Tai RN  Syncope Management: legs elevated  Supportive Measures: active listening utilized  Safety Promotion/Fall Prevention:   nonskid shoes/slippers when out of bed   safety round/check completed   room organization consistent   assistive device/personal items within reach   activity supervised  Taken 1/17/2025 0918 by Marcela Tai RN  Supportive Measures: active listening utilized  Safety Promotion/Fall Prevention:   activity supervised   assistive device/personal items within reach   room organization consistent   safety round/check completed     Problem: Syncope  Goal: Absence of Syncopal Symptoms  Intervention: Manage Effect of Syncopal Symptoms  Recent Flowsheet Documentation  Taken 1/17/2025 2101 by Marcela Tai RN  Syncope Management: legs elevated  Supportive Measures: active listening utilized  Safety Promotion/Fall Prevention:   activity supervised   assistive device/personal items within reach   room organization consistent   safety round/check completed  Taken 1/17/2025 1600 by Marcela Tai RN  Safety Promotion/Fall Prevention:   room organization consistent   safety round/check completed   nonskid shoes/slippers when out of bed   activity supervised   assistive device/personal items within reach  Taken  1/17/2025 1410 by Marcela Tai, RN  Syncope Management: legs elevated  Supportive Measures: active listening utilized  Safety Promotion/Fall Prevention:   nonskid shoes/slippers when out of bed   safety round/check completed   room organization consistent   assistive device/personal items within reach   activity supervised  Taken 1/17/2025 0918 by Marcela Tai, RN  Supportive Measures: active listening utilized  Safety Promotion/Fall Prevention:   activity supervised   assistive device/personal items within reach   room organization consistent   safety round/check completed   Goal Outcome Evaluation:  Plan of Care Reviewed With: patient, spouse        Progress: no change  Outcome Evaluation: Patient same in restrain fair appetite take boost and bites of food sleep in the morning in and out ,get more agitate in the afternon and night yelling incontinent B&B total care in ADL's x2 continue in restrain psych increade dose of zyprexa take medication crush with applesauce.

## 2025-01-19 PROCEDURE — 25010000002 OLANZAPINE 10 MG RECONSTITUTED SOLUTION: Performed by: SPECIALIST

## 2025-01-19 PROCEDURE — 25010000002 ENOXAPARIN PER 10 MG: Performed by: INTERNAL MEDICINE

## 2025-01-19 RX ADMIN — Medication 10 ML: at 20:26

## 2025-01-19 RX ADMIN — METOPROLOL TARTRATE 75 MG: 50 TABLET, FILM COATED ORAL at 23:35

## 2025-01-19 RX ADMIN — METOPROLOL TARTRATE 75 MG: 50 TABLET, FILM COATED ORAL at 17:17

## 2025-01-19 RX ADMIN — Medication 1000 MCG: at 13:25

## 2025-01-19 RX ADMIN — OLANZAPINE 7.5 MG: 5 TABLET, FILM COATED ORAL at 20:24

## 2025-01-19 RX ADMIN — METOPROLOL TARTRATE 7.5 MG: 5 INJECTION INTRAVENOUS at 01:15

## 2025-01-19 RX ADMIN — DIGOXIN 125 MCG: 0.12 TABLET ORAL at 13:30

## 2025-01-19 RX ADMIN — TAMSULOSIN HYDROCHLORIDE 0.4 MG: 0.4 CAPSULE ORAL at 13:25

## 2025-01-19 RX ADMIN — OLANZAPINE 5 MG: 10 INJECTION, POWDER, LYOPHILIZED, FOR SOLUTION INTRAMUSCULAR at 04:19

## 2025-01-19 RX ADMIN — ENOXAPARIN SODIUM 90 MG: 100 INJECTION SUBCUTANEOUS at 23:34

## 2025-01-19 RX ADMIN — METOPROLOL TARTRATE 75 MG: 50 TABLET, FILM COATED ORAL at 13:25

## 2025-01-19 RX ADMIN — MEGESTROL ACETATE 400 MG: 40 SUSPENSION ORAL at 13:25

## 2025-01-19 RX ADMIN — Medication 10 ML: at 13:30

## 2025-01-19 RX ADMIN — OLANZAPINE 7.5 MG: 5 TABLET, FILM COATED ORAL at 17:17

## 2025-01-19 RX ADMIN — ENOXAPARIN SODIUM 90 MG: 100 INJECTION SUBCUTANEOUS at 13:28

## 2025-01-19 RX ADMIN — ATORVASTATIN CALCIUM 10 MG: 10 TABLET, FILM COATED ORAL at 13:25

## 2025-01-19 NOTE — PROGRESS NOTES
S: pt spat out metoprolol last night and the night before.  Looks like he only got one dose of his oral metoprolol yesterday by the administration report.  Required IV metoprolol last night for heart rate. No family at bedside today but I did see grandson and spoke with him as he was arriving.     O:  Vitals:    01/19/25 0113 01/19/25 0204 01/19/25 0218 01/19/25 0725   BP: (!) 155/124 99/59 150/59 117/79   BP Location: Left leg Right arm Right arm Right arm   Patient Position: Lying Lying Lying Lying   Pulse: 118 105 117 94   Resp:    21   Temp:    98.4 °F (36.9 °C)   TempSrc:    Oral   SpO2:    95%   Weight:       Height:         Gen: Confused, arousable but falls back to sleep  Heart: irregularly irregular  Lungs: CTA  Abd; SOft  Ext; No edema    No results found for this or any previous visit (from the past 24 hours).    A/P  Confusion-uderlying dementia based on CSF protein studies. Getting Zyprexa.  Refusing meds later in the day when he sundowns.  [facility] until [facility] controlled they won't be able to take him.   Cardiology continues to follow.  Afib - rate up again last night as he refused his meds. May need to consider going ahead and changing to metoprolol succinate bid dosing to see if we can get him consistently taking rather than tid dosing.  EP team will be back tomorrow Cardiology following over the weekend.  Had originally pland for AV carlos a ablation with pacemaker but given current state that has been put on hold. Will discuss again.  Nutrition-family encouraging intake. Continues on megace.

## 2025-01-19 NOTE — PROGRESS NOTES
Tachycardic overnight but the patient spit out his medication.  He received 2 doses of IV metoprolol since he will not take pills.  Heart rate is better this morning.  Continue with IV metoprolol as needed and encouraged taking p.o. metoprolol.    Layla Barrera MD

## 2025-01-19 NOTE — PLAN OF CARE
Goal Outcome Evaluation:      Pt. Refusing to take PO medication this shift. IV Metoprolol given for elevated BP and heart rate. Non violent restraints continued. Pt. Still very confused and and difficult to understand. HR went up to the 170s around 0400 when patient became agitated, Zyprexa given.

## 2025-01-20 PROCEDURE — 97530 THERAPEUTIC ACTIVITIES: CPT

## 2025-01-20 PROCEDURE — 99232 SBSQ HOSP IP/OBS MODERATE 35: CPT | Performed by: PHYSICIAN ASSISTANT

## 2025-01-20 RX ORDER — METOPROLOL TARTRATE 50 MG
100 TABLET ORAL EVERY 12 HOURS SCHEDULED
Status: DISCONTINUED | OUTPATIENT
Start: 2025-01-20 | End: 2025-01-23 | Stop reason: HOSPADM

## 2025-01-20 RX ADMIN — DIGOXIN 125 MCG: 0.12 TABLET ORAL at 08:27

## 2025-01-20 RX ADMIN — Medication 10 ML: at 08:38

## 2025-01-20 RX ADMIN — METOPROLOL TARTRATE 100 MG: 50 TABLET, FILM COATED ORAL at 09:15

## 2025-01-20 RX ADMIN — OLANZAPINE 7.5 MG: 5 TABLET, FILM COATED ORAL at 20:45

## 2025-01-20 RX ADMIN — APIXABAN 5 MG: 5 TABLET, FILM COATED ORAL at 08:27

## 2025-01-20 RX ADMIN — APIXABAN 5 MG: 5 TABLET, FILM COATED ORAL at 20:45

## 2025-01-20 RX ADMIN — METOPROLOL TARTRATE 100 MG: 50 TABLET, FILM COATED ORAL at 20:45

## 2025-01-20 RX ADMIN — TAMSULOSIN HYDROCHLORIDE 0.4 MG: 0.4 CAPSULE ORAL at 09:00

## 2025-01-20 RX ADMIN — Medication 10 ML: at 20:45

## 2025-01-20 NOTE — NURSING NOTE
RN to trial patient out of restraints. Restraints removed at 0800. Patient's wife to remain at bedside and instructed to make RN aware if she leaves. Patient's wife agreeable to plan.

## 2025-01-20 NOTE — PROGRESS NOTES
Electrophysiology Follow-Up Note      Patient Name: Mark Aguirre Jr.  Age/Sex: 79 y.o. male  : 1945  MRN: [id number]    Date of Admission: 2024  Day of Service: 25       Chief Complaint: Confusion, AF      Follow up:  25 he was unable to take all of his medication yesterday.  On 2025 at night he has been out of his meds and needed 2 doses of IV metoprolol.  Blood pressure looks good overnight into this morning.  He is on room air.  BUN and crt are up just a little bit compared to previous readings.    >> He has more difficulty with taking p.m. medicines will switch dosing to twice daily.  While he does seem like he is doing better and his appetite has really improved.     Heart Rate:  [] 87  Resp:  [16-24] 16  BP: ()/(52-76) 118/69     PHYSICAL EXAM    General: 79 y.o. male elderly male, laying flat in bed with restraints  Neck: No JVD or carotid bruit  Lungs: Clear to ausculation bilaterally, symmetric  Heart: irregular rhythm with no overt murmurs, rubs or gallops. Normal S1 and S2.   Abdomen: soft, non-tender  Extremities: No lower extremity edema or cyanosis.   Neuo: no lateralizing defects.        Telemetry/EK25: Atrial fibrillation with most heart rates falling in the 80-90s. He has some RVR with movement. Overall better controlled.         I personally viewed and interpreted the patient's EKG/Telemetry data.    Previous testing:   - Echo 2024: LVEF 48.8%. LA moderate dilation. Moderate AS aortic valve area 1.8cm2. RSVP 48mmHg.       Lab Review:   Results from last 7 days   Lab Units 25  0756 01/15/25  0839   SODIUM mmol/L 146* 144   POTASSIUM mmol/L 4.0 4.1   CHLORIDE mmol/L 112* 112*   CO2 mmol/L 24.8 24.0   BUN mg/dL 26* 24*   CREATININE mg/dL 0.98 0.82   GLUCOSE mg/dL 90 102*   CALCIUM mg/dL 9.1 8.9     Results from last 7 days   Lab Units 25  0756 01/15/25  0839   WBC 10*3/mm3 7.38 8.37   HEMOGLOBIN g/dL 14.6 13.1    HEMATOCRIT % 44.5 40.3   PLATELETS 10*3/mm3 239 261                       Current Medications:   Scheduled Meds:apixaban, 5 mg, Oral, Q12H  atorvastatin, 10 mg, Oral, Daily  digoxin, 125 mcg, Oral, Daily  megestrol, 400 mg, Oral, Daily  metoprolol tartrate, 100 mg, Oral, Q12H  OLANZapine, 7.5 mg, Oral, Nightly  OLANZapine, 7.5 mg, Oral, Daily With Dinner  potassium chloride, 40 mEq, Oral, Once  potassium chloride, 40 mEq, Oral, Once  sodium chloride, 10 mL, Intravenous, Q12H  tamsulosin, 0.4 mg, Oral, Daily  vitamin B-12, 1,000 mcg, Oral, Daily          Assessment /Plan:  Persistent atrial fibrillation-average heart rates falling into the 100 overnight.  He was able to take his oral medication yesterday.  For ease of dosing or going to switch him to twice daily metoprolol 100 mg.   Continue digoxin 125 mcg daily  We will continue with rate control and he is being started on Eliquis 5 mg twice daily today  We will plan to set up outpatient AVN ablation and pacemaker in the near future, timing pending his prognosis.   Delirium in setting of suspected Alzheimer disease- PCP following closely. He is doing much better, was able to keep food down and is very clear on interview today. Working at getting him a new bed and to walk with PT today.     Cameron Denny PA-C  01/20/25  08:19 EST

## 2025-01-20 NOTE — PROGRESS NOTES
Subjective: Patient had a calmer night last night.  Took all of his medicines.  Heart rate stayed <100 most of the time. WIfe at bedside and notes he conversed with son, but remained confused.  Did require restraints last night.  Took in 3 boosts, jello, tater tots and small amount of meals yesterday.    Objective:  Vitals:    01/19/25 2005 01/19/25 2333 01/20/25 0201 01/20/25 0546   BP: 112/76 101/70  118/69   BP Location: Right arm Right arm  Right arm   Patient Position: Lying Lying  Lying   Pulse: 93 102 76 87   Resp: 24 20  16   Temp:       TempSrc: Oral      SpO2: 93% 95%  94%   Weight:       Height:         Gen: Sleeping, easily aroused - remains confused  Heart: irregularly irregular  Lungs: CTA  Abd: soft ntnd  Ext; No edema    A/P  Confusion - showing some signs of being calmer but still requires restraints at night.  Goal to get to [facility] admit as HR more controlled.  Afib - rate better on metoprolol 75 tid.  COnsider ahead getting him to bid dosing as he gets agitated at night and frequently refuses meds  3. Rectus hematoma -no signs of ongoing bleeding. Hgb stable. Back on anticoagualtion. Will get him back to oral anticoagulation  4. Nutrition - continue to encourage po. Some signs he is picking up appetite. Continue megace.

## 2025-01-20 NOTE — PLAN OF CARE
Goal Outcome Evaluation:  Plan of Care Reviewed With: patient, spouse        Progress: no change  Outcome Evaluation: Patient seen this PM for PT session. Patient supine in bed with spouse at bedside upon entry for session. Patient has recently been out of restraints and demonstrates less impulsiveness but still has trouble following commands throughout the session and requires many cues for sequencing and safety measures during movements/activities. Poor command following throughout. Patient able to complete transfer from supine to sit with mod A x 1 and able to complete STS with mod A x 1 with gait belt donned for safety. Pt has increase in pain when sitting on EOB but does not elaborate on where the pain is or how intense it is. Pt has 1 x overt LOB requiring max A from therapist to correct while static sitting at EOB. Pt able to complete transfer to the chair from the bed with mod A for 2' throughout the transfer to assist with maintaining balance and needed many verbal cues to ensure patient follows proper safety measures. Pt attempts to sit down without chair behind him and requires mod A to correct with copious verbal cues. Pt remains a good candidate for skilled PT services to address ongoing deficits. Anticipate d/c to SNF following leaving the hospital pending progress in the acute setting.    Anticipated Discharge Disposition (PT): skilled nursing facility

## 2025-01-20 NOTE — PLAN OF CARE
Goal Outcome Evaluation:  Plan of Care Reviewed With: patient        Progress: improving  Outcome Evaluation: Patient remains confused, less agitated. Vital signs stable, HR between 70-110s. Bilateral wrist restraints. Patient was able to take all PO night meds, patient likes orange jello and apple sauce with meds. Incontinence care done. Patient had a restful sleep, slept for 7 hours. Continue to monitor.

## 2025-01-20 NOTE — THERAPY TREATMENT NOTE
Patient Name: aMrk Aguirre Jr.  : 1945    MRN: [id number]                              Today's Date: 2025       Admit Date: 2024    Visit Dx:     ICD-10-CM ICD-9-CM   1. Syncope, unspecified syncope type  R55 780.2   2. Laceration of right ear, initial encounter  S01.311A 872.8   3. Prolonged Q-T interval on ECG  R94.31 794.31   4. Atrial fibrillation, unspecified type  I48.91 427.31     Patient Active Problem List   Diagnosis    Atrial fibrillation    Bifascicular block    STEPHENIE on auto CPAP    Family history of colon cancer    History of colon polyps    Essential hypertension    Hip pain, right    OA (osteoarthritis) of hip    Acute renal failure    Brief psychotic disorder    Family history of colonic polyps    Nonrheumatic aortic valve stenosis    PVC (premature ventricular contraction)    Syncope and collapse    History of adenomatous polyp of colon    Persistent atrial fibrillation    Hypoxemia associated with sleep    Syncope    Acute delirium    Severe protein-calorie malnutrition     Past Medical History:   Diagnosis Date    Acute kidney failure     POST-OP TOTAL HIP 2020    Anticoagulated     PRADAXA FOR A FIB TO HELD 3 DAYS PRIOR    Arthritis     OSTEO. RIGHT HIP    Atrial flutter     Bifascicular block     Cataract     LEFT AND RIGHT    Depression     History of colon polyps     Hypertension     Hypoxemia associated with sleep     Insomnia     Knee pain     STEPHENIE on CPAP 2010    Overnight polysomnogram.  Weight 163 pounds.  Severe STEPHENIE with AHI of 30.9 events per hour.  Low oxygen saturation 72% and sleep-related hypoxia present for 30 minutes    PAF (paroxysmal atrial fibrillation)     Right hip pain     Slow to wake up after anesthesia      Past Surgical History:   Procedure Laterality Date    CARDIAC ABLATION      CARDIAC CATHETERIZATION      COLONOSCOPY N/A 2018    Procedure: COLONOSCOPY INTO CECUM WITH COLD BX POLYPECTOMY ;  Surgeon: Ross Stearns MD;  Location: [facility]  ENDOSCOPY;  Service:     COLONOSCOPY N/A 2/3/2021    Procedure: COLONOSCOPY TOCECUM WITH COLD BX POLYPECTOMY AND HOT SNARE POLYPECTOMY;  Surgeon: Ross Stearns MD;  Location: [facility] ENDOSCOPY;  Service: General;  Laterality: N/A;  PERSONAL HX OF POLYPS, FAMILY HX OF COLON CANCER  --POLYPS (X3), DIVERTICULOSIS    COLONOSCOPY N/A 3/20/2024    Procedure: COLONOSCOPY TO CECUM WITH COLD BX POLYPECTOMIES;  Surgeon: Ross Stearns MD;  Location: [facility] ENDOSCOPY;  Service: General;  Laterality: N/A;  HX POLYPS/POLYPS (3)    HERNIA REPAIR      INGUINAL HERNIA? SIDE    TOTAL HIP ARTHROPLASTY Right 6/5/2020    Procedure: TOTAL HIP ARTHROPLASTY;  Surgeon: Travis Hamlin MD;  Location: [facility] OR;  Service: Orthopedics;  Laterality: Right;      General Information       Row Name 01/20/25 1618          Physical Therapy Time and Intention    Document Type therapy note (daily note)  -     Mode of Treatment individual therapy;physical therapy  -       Row Name 01/20/25 1618          General Information    Patient Profile Reviewed yes  -     Existing Precautions/Restrictions fall  Very impulsive  -     Barriers to Rehab cognitive status;previous functional deficit;medically complex;hearing deficit;uncooperative  -       Row Name 01/20/25 1618          Cognition    Orientation Status (Cognition) oriented to;person  -       Row Name 01/20/25 1618          Safety Issues/Impairments Affecting Functional Mobility    Safety Issues Affecting Function (Mobility) ability to follow commands;impulsivity;awareness of need for assistance;insight into deficits/self-awareness;judgment;sequencing abilities;safety precautions follow-through/compliance;safety precaution awareness;at risk behavior observed;problem-solving  -     Impairments Affecting Function (Mobility) balance;strength;postural/trunk control;pain;motor control;motor planning;cognition  -     Cognitive Impairments, Mobility Safety/Performance  attention;sequencing abilities;safety precaution follow-through;impulsivity;insight into deficits/self-awareness;judgment;problem-solving/reasoning;safety precaution awareness  -               User Key  (r) = Recorded By, (t) = Taken By, (c) = Cosigned By      Initials Name Provider Type     Everett Pope, PT Physical Therapist                   Mobility       Row Name 01/20/25 1620          Bed Mobility    Bed Mobility supine-sit;scooting/bridging  -     Scooting/Bridging [place] (Bed Mobility) moderate assist (50% patient effort);verbal cues  -     Supine-Sit [place] (Bed Mobility) moderate assist (50% patient effort);verbal cues  -     Sit-Supine [place] (Bed Mobility) not tested  -     Assistive Device (Bed Mobility) bed rails;head of bed elevated  -     Comment, (Bed Mobility) Transfer to bedside chair with many verbal cues throughout  -       Row Name 01/20/25 1620          Bed-Chair Transfer    Bed-Chair [place] (Transfers) moderate assist (50% patient effort);nonverbal cues (demo/gesture)  -     Assistive Device (Bed-Chair Transfers) --  Gait belt  -     Comment, (Bed-Chair Transfer) Mod A x 1 needed throughout transfer to the chair; patient attempts to sit down without the chair behind him and mod A needed to correct  -       Row Name 01/20/25 1620          Sit-Stand Transfer    Sit-Stand [place] (Transfers) moderate assist (50% patient effort);verbal cues;nonverbal cues (demo/gesture)  -       Row Name 01/20/25 1620          Gait/Stairs (Locomotion)    [place] Level (Gait) moderate assist (50% patient effort)  -     Patient was able to Ambulate yes  -     Distance in Feet (Gait) 2  -     Deviations/Abnormal Patterns (Gait) gait speed decreased;stride length decreased;weight shifting decreased  -     Bilateral Gait Deviations forward flexed posture;heel strike decreased;weight shift ability decreased  -     Comment, (Gait/Stairs) Pt able to  take a few small steps to chair for chair to bed transfer but needs mod A x 1 throughout  -               User Key  (r) = Recorded By, (t) = Taken By, (c) = Cosigned By      Initials Name Provider Type    Everett Sandra PT Physical Therapist                   Obj/Interventions       Row Name 01/20/25 1625          Motor Skills    Motor Skills functional endurance  -     Functional Endurance Poor  -       Row Name 01/20/25 1625          Balance    Balance Assessment sitting static balance;sitting dynamic balance;standing static balance;standing dynamic balance  -     Static Sitting Balance maximum assist  1 x overt LOB during seated EOB requiring max A from therapist to correct  -     Dynamic Sitting Balance maximum assist  -     Position, Sitting Balance supported;sitting edge of bed  -     Static Standing Balance moderate assist;verbal cues  -     Dynamic Standing Balance verbal cues;maximum assist  -     Balance Interventions sitting;standing;sit to stand;supported;moderate challenge;highly challenging;static;dynamic  -       Row Name 01/20/25 1625          Sensory Assessment (Somatosensory)    Sensory Assessment (Somatosensory) sensation intact  -               User Key  (r) = Recorded By, (t) = Taken By, (c) = Cosigned By      Initials Name Provider Type    Everett Sandra, NINA Physical Therapist                   Goals/Plan    No documentation.                  Clinical Impression       Row Name 01/20/25 1627          Pain    Pain Location other (see comments)  Pt has 1 x instance of pain when seated EOB but does not rate and does not say where he is hurting  -       Row Name 01/20/25 1627          Plan of Care Review    Plan of Care Reviewed With patient;spouse  -     Progress no change  -     Outcome Evaluation Patient seen this PM for PT session. Patient supine in bed with spouse at bedside upon entry for session. Patient has recently been out of restraints and demonstrates  less impulsiveness but still has trouble following commands throughout the session and requires many cues for sequencing and safety measures during movements/activities. Poor command following throughout. Patient able to complete transfer from supine to sit with mod A x 1 and able to complete STS with mod A x 1 with gait belt donned for safety. Pt has increase in pain when sitting on EOB but does not elaborate on where the pain is or how intense it is. Pt has 1 x overt LOB requiring max A from therapist to correct while static sitting at EOB. Pt able to complete transfer to the chair from the bed with mod A for 2' throughout the transfer to assist with maintaining balance and needed many verbal cues to ensure patient follows proper safety measures. Pt attempts to sit down without chair behind him and requires mod A to correct with copious verbal cues. Pt remains a good candidate for skilled PT services to address ongoing deficits. Anticipate d/c to SNF following leaving the hospital pending progress in the acute setting.  -       Row Name 01/20/25 1627          Therapy Assessment/Plan (PT)    Criteria for Skilled Interventions Met (PT) yes  -     Therapy Frequency (PT) 3 times/wk  -       Row Name 01/20/25 1627          Vital Signs    O2 Delivery Pre Treatment room air  -     O2 Delivery Intra Treatment room air  -     O2 Delivery Post Treatment room air  -     Pre Patient Position Supine  -     Intra Patient Position Standing  -     Post Patient Position Sitting  -       Row Name 01/20/25 1627          Positioning and Restraints    Pre-Treatment Position in bed  -MH     Post Treatment Position chair  -MH     In Chair notified nsg;reclined;encouraged to call for assist;exit alarm on;with family/caregiver;call light within reach  -               User Key  (r) = Recorded By, (t) = Taken By, (c) = Cosigned By      Initials Name Provider Type    Everett Sandra, PT Physical Therapist                    Outcome Measures       Row Name 01/20/25 1629          How much help from another person do you currently need...    Turning from your back to your side while in flat bed without using bedrails? 2  -MH     Moving from lying on back to sitting on the side of a flat bed without bedrails? 2  -MH     Moving to and from a bed to a chair (including a wheelchair)? 2  -MH     Standing up from a chair using your arms (e.g., wheelchair, bedside chair)? 2  -MH     Climbing 3-5 steps with a railing? 1  -MH     To walk in hospital room? 2  -MH     AM-PAC 6 Clicks Score (PT) 11  -MH     Highest Level of Mobility Goal 4 --> Transfer to chair/commode  -               User Key  (r) = Recorded By, (t) = Taken By, (c) = Cosigned By      Initials Name Provider Type     Everett Pope, PT Physical Therapist                                 Physical Therapy Education       Title: PT OT SLP Therapies (In Progress)       Topic: Physical Therapy (In Progress)       Point: Mobility training (In Progress)       Learning Progress Summary            Patient Acceptance, E, NR by  at 1/20/2025 1629    Acceptance, E, NR by  at 1/17/2025 1152    Acceptance, E,D, VU,NR by  at 1/10/2025 1536    Acceptance, E,TB, VU,NR by  at 1/9/2025 1200    Acceptance, E,TB,D, VU,NR by  at 1/4/2025 1504    Acceptance, E, NR by  at 12/31/2024 1525   Family Acceptance, E, NR by  at 1/20/2025 1629    Acceptance, E, NR by  at 12/31/2024 1525                      Point: Home exercise program (In Progress)       Learning Progress Summary            Patient Acceptance, E, NR by  at 1/20/2025 1629    Acceptance, E, NR by  at 1/17/2025 1152    Acceptance, E,D, VU,NR by  at 1/10/2025 1536    Acceptance, E, NR by  at 12/31/2024 1525   Family Acceptance, E, NR by  at 1/20/2025 1629    Acceptance, E, NR by  at 12/31/2024 1525                      Point: Body mechanics (In Progress)       Learning Progress Summary            Patient Acceptance,  E, NR by  at 1/20/2025 1629    Acceptance, E, NR by  at 1/17/2025 1152    Acceptance, E,D, VU,NR by  at 1/10/2025 1536    Acceptance, E,TB, VU,NR by  at 1/9/2025 1200    Acceptance, E,TB,D, VU,NR by  at 1/4/2025 1504    Acceptance, E, NR by  at 12/31/2024 1525   Family Acceptance, E, NR by  at 1/20/2025 1629    Acceptance, E, NR by  at 12/31/2024 1525                      Point: Precautions (In Progress)       Learning Progress Summary            Patient Acceptance, E, NR by  at 1/20/2025 1629    Acceptance, E, NR by  at 1/17/2025 1152    Acceptance, E,TB,D, VU,NR by  at 1/4/2025 1504    Acceptance, E, NR by  at 12/31/2024 1525   Family Acceptance, E, NR by  at 1/20/2025 1629    Acceptance, E, NR by  at 12/31/2024 1525                                      User Key       Initials Effective Dates Name Provider Type Discipline     06/16/21 -  Sue Padilla, PT Physical Therapist PT     06/16/21 -  Sima Anne, PT Physical Therapist PT     02/14/23 -  Pat Mehta, PTA Physical Therapist Assistant PT     05/02/22 -  Laura Coughlin, PT Physical Therapist PT     09/22/22 -  Demi Otero, PT Physical Therapist PT     09/11/24 -  Everett Pope, PT Physical Therapist PT                  PT Recommendation and Plan     Progress: no change  Outcome Evaluation: Patient seen this PM for PT session. Patient supine in bed with spouse at bedside upon entry for session. Patient has recently been out of restraints and demonstrates less impulsiveness but still has trouble following commands throughout the session and requires many cues for sequencing and safety measures during movements/activities. Poor command following throughout. Patient able to complete transfer from supine to sit with mod A x 1 and able to complete STS with mod A x 1 with gait belt donned for safety. Pt has increase in pain when sitting on EOB but does not elaborate on where the pain is or how intense it  is. Pt has 1 x overt LOB requiring max A from therapist to correct while static sitting at EOB. Pt able to complete transfer to the chair from the bed with mod A for 2' throughout the transfer to assist with maintaining balance and needed many verbal cues to ensure patient follows proper safety measures. Pt attempts to sit down without chair behind him and requires mod A to correct with copious verbal cues. Pt remains a good candidate for skilled PT services to address ongoing deficits. Anticipate d/c to SNF following leaving the hospital pending progress in the acute setting.     Time Calculation:         PT Charges       Row Name 01/20/25 1631             Time Calculation    Start Time 1323  -MH      Stop Time 1341  -MH      Time Calculation (min) 18 min  -MH      PT Received On 01/20/25  -      PT - Next Appointment 01/22/25  -         Time Calculation- PT    Total Timed Code Minutes- PT 18 minute(s)  -MH         Timed Charges    67805 - PT Therapeutic Activity Minutes 18  -MH         Total Minutes    Timed Charges Total Minutes 18  -MH       Total Minutes 18  -MH                User Key  (r) = Recorded By, (t) = Taken By, (c) = Cosigned By      Initials Name Provider Type     Everett Pope, PT Physical Therapist                  Therapy Charges for Today       Code Description Service Date Service Provider Modifiers Qty    [id number]  PT THERAPEUTIC ACT EA 15 MIN 1/20/2025 Everett Pope, PT GP 1            PT G-Codes  Outcome Measure Options: AM-PAC 6 Clicks Basic Mobility (PT)  AM-PAC 6 Clicks Score (PT): 11  PT Discharge Summary  Anticipated Discharge Disposition (PT): skilled nursing facility    Everett Pope PT  1/20/2025

## 2025-01-20 NOTE — PROGRESS NOTES
The patient is jovial but continues to exhibit symptoms of clouded consciousness during today's interview.  His heart rate does seem to be under better control as during today's interview and it does not exceed 100.  Continuing current treatment for now.

## 2025-01-21 LAB
ALBUMIN SERPL-MCNC: 3.2 G/DL (ref 3.5–5.2)
ALBUMIN/GLOB SERPL: 1.4 G/DL
ALP SERPL-CCNC: 56 U/L (ref 39–117)
ALT SERPL W P-5'-P-CCNC: 25 U/L (ref 1–41)
ANION GAP SERPL CALCULATED.3IONS-SCNC: 11 MMOL/L (ref 5–15)
AST SERPL-CCNC: 22 U/L (ref 1–40)
BASOPHILS # BLD AUTO: 0.04 10*3/MM3 (ref 0–0.2)
BASOPHILS NFR BLD AUTO: 0.5 % (ref 0–1.5)
BILIRUB SERPL-MCNC: 1.8 MG/DL (ref 0–1.2)
BUN SERPL-MCNC: 23 MG/DL (ref 8–23)
BUN/CREAT SERPL: 27.7 (ref 7–25)
CALCIUM SPEC-SCNC: 8.6 MG/DL (ref 8.6–10.5)
CHLORIDE SERPL-SCNC: 109 MMOL/L (ref 98–107)
CO2 SERPL-SCNC: 24 MMOL/L (ref 22–29)
CREAT SERPL-MCNC: 0.83 MG/DL (ref 0.76–1.27)
DEPRECATED RDW RBC AUTO: 49.2 FL (ref 37–54)
EGFRCR SERPLBLD CKD-EPI 2021: 87.9 ML/MIN/1.73
EOSINOPHIL # BLD AUTO: 0.14 10*3/MM3 (ref 0–0.4)
EOSINOPHIL NFR BLD AUTO: 1.8 % (ref 0.3–6.2)
ERYTHROCYTE [DISTWIDTH] IN BLOOD BY AUTOMATED COUNT: 15.1 % (ref 12.3–15.4)
GLOBULIN UR ELPH-MCNC: 2.3 GM/DL
GLUCOSE SERPL-MCNC: 81 MG/DL (ref 65–99)
HCT VFR BLD AUTO: 43 % (ref 37.5–51)
HGB BLD-MCNC: 14.2 G/DL (ref 13–17.7)
IMM GRANULOCYTES # BLD AUTO: 0.03 10*3/MM3 (ref 0–0.05)
IMM GRANULOCYTES NFR BLD AUTO: 0.4 % (ref 0–0.5)
LYMPHOCYTES # BLD AUTO: 1.42 10*3/MM3 (ref 0.7–3.1)
LYMPHOCYTES NFR BLD AUTO: 18.1 % (ref 19.6–45.3)
MCH RBC QN AUTO: 29.6 PG (ref 26.6–33)
MCHC RBC AUTO-ENTMCNC: 33 G/DL (ref 31.5–35.7)
MCV RBC AUTO: 89.6 FL (ref 79–97)
MONOCYTES # BLD AUTO: 0.55 10*3/MM3 (ref 0.1–0.9)
MONOCYTES NFR BLD AUTO: 7 % (ref 5–12)
NEUTROPHILS NFR BLD AUTO: 5.67 10*3/MM3 (ref 1.7–7)
NEUTROPHILS NFR BLD AUTO: 72.2 % (ref 42.7–76)
NRBC BLD AUTO-RTO: 0 /100 WBC (ref 0–0.2)
PLATELET # BLD AUTO: 217 10*3/MM3 (ref 140–450)
PMV BLD AUTO: 11.3 FL (ref 6–12)
POTASSIUM SERPL-SCNC: 4 MMOL/L (ref 3.5–5.2)
PROT SERPL-MCNC: 5.5 G/DL (ref 6–8.5)
RBC # BLD AUTO: 4.8 10*6/MM3 (ref 4.14–5.8)
SODIUM SERPL-SCNC: 144 MMOL/L (ref 136–145)
WBC NRBC COR # BLD AUTO: 7.85 10*3/MM3 (ref 3.4–10.8)

## 2025-01-21 PROCEDURE — 85025 COMPLETE CBC W/AUTO DIFF WBC: CPT | Performed by: INTERNAL MEDICINE

## 2025-01-21 PROCEDURE — 80053 COMPREHEN METABOLIC PANEL: CPT | Performed by: INTERNAL MEDICINE

## 2025-01-21 RX ORDER — DIVALPROEX SODIUM 125 MG/1
125 CAPSULE, COATED PELLETS ORAL 3 TIMES DAILY
Status: DISCONTINUED | OUTPATIENT
Start: 2025-01-21 | End: 2025-01-23 | Stop reason: HOSPADM

## 2025-01-21 RX ADMIN — Medication 10 ML: at 10:27

## 2025-01-21 RX ADMIN — METOPROLOL TARTRATE 100 MG: 50 TABLET, FILM COATED ORAL at 08:29

## 2025-01-21 RX ADMIN — METOPROLOL TARTRATE 100 MG: 50 TABLET, FILM COATED ORAL at 20:08

## 2025-01-21 RX ADMIN — DIVALPROEX SODIUM 125 MG: 125 CAPSULE, COATED PELLETS ORAL at 20:08

## 2025-01-21 RX ADMIN — DIVALPROEX SODIUM 125 MG: 125 CAPSULE, COATED PELLETS ORAL at 16:36

## 2025-01-21 RX ADMIN — OLANZAPINE 7.5 MG: 5 TABLET, FILM COATED ORAL at 18:00

## 2025-01-21 RX ADMIN — DIGOXIN 125 MCG: 0.12 TABLET ORAL at 08:29

## 2025-01-21 RX ADMIN — ATORVASTATIN CALCIUM 10 MG: 10 TABLET, FILM COATED ORAL at 08:29

## 2025-01-21 RX ADMIN — MEGESTROL ACETATE 400 MG: 40 SUSPENSION ORAL at 10:26

## 2025-01-21 RX ADMIN — APIXABAN 5 MG: 5 TABLET, FILM COATED ORAL at 08:29

## 2025-01-21 RX ADMIN — APIXABAN 5 MG: 5 TABLET, FILM COATED ORAL at 20:08

## 2025-01-21 RX ADMIN — OLANZAPINE 7.5 MG: 5 TABLET, FILM COATED ORAL at 20:08

## 2025-01-21 RX ADMIN — Medication 1000 MCG: at 08:29

## 2025-01-21 RX ADMIN — TAMSULOSIN HYDROCHLORIDE 0.4 MG: 0.4 CAPSULE ORAL at 08:29

## 2025-01-21 NOTE — PROGRESS NOTES
Nutrition Services    Patient Name:  Mark Aguirre Jr.  YOB: 1945  MRN: [id number]  Admit Date:  12/28/2024  Assessment Date:  01/21/25    Summary: Follow Up  Pt laying in bed when I visited, wife at bedside provided a hx. Wife reported pt hasn't been eating much for breakfast or dinner but does well at lunch and will drink 1-2 boost per day. Per EMR, pt eating 0-50% of all recorded meals x 1 week. Pt continues on megace.     RECS:  Continue Boost TID  Continue Magic Cups TID  Recommend continuing appetite stimulant  Encourage PO and supplement intake     CLINICAL NUTRITION ASSESSMENT      Reason for Assessment Follow-up Protocol     Diagnosis/Problem   Syncope  Ear laceration     Medical/Surgical History Past Medical History:   Diagnosis Date    Acute kidney failure     POST-OP TOTAL HIP 2020    Anticoagulated     PRADAXA FOR A FIB TO HELD 3 DAYS PRIOR    Arthritis     OSTEO. RIGHT HIP    Atrial flutter     Bifascicular block     Cataract     LEFT AND RIGHT    Depression     History of colon polyps     Hypertension     Hypoxemia associated with sleep     Insomnia     Knee pain     STEPHENIE on CPAP 05/27/2010    Overnight polysomnogram.  Weight 163 pounds.  Severe STEPHENIE with AHI of 30.9 events per hour.  Low oxygen saturation 72% and sleep-related hypoxia present for 30 minutes    PAF (paroxysmal atrial fibrillation)     Right hip pain     Slow to wake up after anesthesia        Past Surgical History:   Procedure Laterality Date    CARDIAC ABLATION      CARDIAC CATHETERIZATION      COLONOSCOPY N/A 2/21/2018    Procedure: COLONOSCOPY INTO CECUM WITH COLD BX POLYPECTOMY ;  Surgeon: Ross Stearns MD;  [facility];  Service:     COLONOSCOPY N/A 2/3/2021    Procedure: COLONOSCOPY TOCECUM WITH COLD BX POLYPECTOMY AND HOT SNARE POLYPECTOMY;  Surgeon: Ross Stearns MD;  Location: [facility];  Service: General;  Laterality: N/A;  PERSONAL HX OF POLYPS, FAMILY HX OF COLON CANCER  --POLYPS  "(X3), DIVERTICULOSIS    COLONOSCOPY N/A 3/20/2024    Procedure: COLONOSCOPY TO CECUM WITH COLD BX POLYPECTOMIES;  Surgeon: Ross Stearns MD;  Location: [facility] ENDOSCOPY;  Service: General;  Laterality: N/A;  HX POLYPS/POLYPS (3)    HERNIA REPAIR      INGUINAL HERNIA? SIDE    TOTAL HIP ARTHROPLASTY Right 6/5/2020    Procedure: TOTAL HIP ARTHROPLASTY;  Surgeon: Travis Hamlin MD;  Location: [facility] MAIN OR;  Service: Orthopedics;  Laterality: Right;        Anthropometrics        Current Height  Current Weight  BMI kg/m2 Height: 185.4 cm (73\")  Weight: 91.1 kg (200 lb 13.8 oz) (01/14/25 0900)  Body mass index is 26.5 kg/m².   Adjusted BMI (if applicable)    BMI Category Obese, Class I (30 - 34.9)   Ideal Body Weight (IBW) 184 lb (83.6 kg)   Usual Body Weight (UBW) 220-241 lb   Weight Trend Loss   Weight History Wt Readings from Last 30 Encounters:   01/21/25 1500 89.7 kg (197 lb 12 oz)   01/14/25 0900 91.1 kg (200 lb 13.8 oz)   01/08/25 1100 95.6 kg (210 lb 12.2 oz)   12/29/24 0818 110 kg (241 lb 8 oz)   12/17/24 0739 103 kg (226 lb)   12/13/24 0930 103 kg (228 lb)   10/07/24 1123 104 kg (229 lb)   09/05/24 0803 104 kg (229 lb 4.5 oz)   08/21/24 1104 102 kg (224 lb)   03/20/24 1019 99.9 kg (220 lb 4.8 oz)   03/19/24 0912 102 kg (224 lb)   02/27/24 0957 102 kg (225 lb)   12/04/23 1030 101 kg (223 lb)   11/29/23 0831 102 kg (224 lb 12.8 oz)   07/31/23 1000 103 kg (226 lb 6.4 oz)   07/05/23 1519 102 kg (224 lb)   07/04/23 1736 102 kg (224 lb)   07/04/23 1654 102 kg (224 lb)   02/23/23 0746 99.3 kg (219 lb)   12/02/22 1003 103 kg (226 lb)   11/30/22 0900 103 kg (227 lb)   08/31/22 0700 103 kg (226 lb 6.4 oz)   02/23/22 0941 105 kg (231 lb)   10/20/21 1248 105 kg (231 lb 7.7 oz)   10/06/21 0850 105 kg (230 lb 6.4 oz)   09/01/21 0700 105 kg (232 lb)   06/24/21 0943 105 kg (231 lb)   02/03/21 0802 102 kg (224 lb)   02/02/21 1218 103 kg (228 lb)   09/02/20 1413 106 kg (233 lb 9.6 oz)   08/21/20 1323 107 kg (235 lb) " "  07/30/20 0829 107 kg (235 lb)   07/07/20 1113 103 kg (226 lb)   06/16/20 0349 111 kg (243 lb 11.2 oz)   06/15/20 2026 121 kg (266 lb 8 oz)   06/05/20 0911 108 kg (238 lb 8.6 oz)   06/04/20 1103 107 kg (235 lb 12.8 oz)   06/03/20 0803 107 kg (236 lb)        Estimated Requirements         Weight used  80 kg IBW    Calories  3433-9087 (25-30 kcal/kg)    Protein   (1.2 - 1.5 gm/kg)    Fluid  (1 mL/kcal)     Labs       Pertinent Labs    Results from last 7 days   Lab Units 01/21/25  0756 01/18/25  0756 01/15/25  0839   SODIUM mmol/L 144 146* 144   POTASSIUM mmol/L 4.0 4.0 4.1   CHLORIDE mmol/L 109* 112* 112*   CO2 mmol/L 24.0 24.8 24.0   BUN mg/dL 23 26* 24*   CREATININE mg/dL 0.83 0.98 0.82   CALCIUM mg/dL 8.6 9.1 8.9   BILIRUBIN mg/dL 1.8* 2.3*  --    ALK PHOS U/L 56 58  --    ALT (SGPT) U/L 25 20  --    AST (SGOT) U/L 22 23  --    GLUCOSE mg/dL 81 90 102*     Results from last 7 days   Lab Units 01/21/25  0756   HEMOGLOBIN g/dL 14.2   HEMATOCRIT % 43.0   WBC 10*3/mm3 7.85   ALBUMIN g/dL 3.2*     Results from last 7 days   Lab Units 01/21/25  0756 01/18/25  0756 01/15/25  0839   PLATELETS 10*3/mm3 217 239 261     COVID19   Date Value Ref Range Status   12/28/2024 Not Detected Not Detected - Ref. Range Final     No results found for: \"HGBA1C\"       Medications           Scheduled Medications apixaban, 5 mg, Oral, Q12H  atorvastatin, 10 mg, Oral, Daily  digoxin, 125 mcg, Oral, Daily  Divalproex Sodium, 125 mg, Oral, TID  megestrol, 400 mg, Oral, Daily  metoprolol tartrate, 100 mg, Oral, Q12H  OLANZapine, 7.5 mg, Oral, Nightly  OLANZapine, 7.5 mg, Oral, Daily With Dinner  potassium chloride, 40 mEq, Oral, Once  potassium chloride, 40 mEq, Oral, Once  sodium chloride, 10 mL, Intravenous, Q12H  tamsulosin, 0.4 mg, Oral, Daily  vitamin B-12, 1,000 mcg, Oral, Daily       Infusions       PRN Medications   acetaminophen    atropine    senna-docusate sodium **AND** polyethylene glycol **AND** bisacodyl **AND** " bisacodyl    Calcium Replacement - Follow Nurse / BPA Driven Protocol    diphenhydrAMINE    Enalaprilat    ePHEDrine    fentanyl    flumazenil    hydrALAZINE    ipratropium-albuterol    labetalol    Magnesium Cardiology Dose Replacement - Follow Nurse / BPA Driven Protocol    metoprolol tartrate    naloxone    nitroglycerin    OLANZapine    ondansetron    Phosphorus Replacement - Follow Nurse / BPA Driven Protocol    Potassium Replacement - Follow Nurse / BPA Driven Protocol    promethazine **OR** promethazine    [COMPLETED] Insert Peripheral IV **AND** sodium chloride    sodium chloride    sodium chloride     Physical Findings          General Findings confused, disoriented, overweight, room air   Oral/Mouth Cavity WDL   Edema  no edema   Gastrointestinal last bowel movement: 1/20   Skin  other: R ear laceration   Tubes/Drains/Lines none   NFPE No clinical signs of muscle wasting or fat loss     Malnutrition Severity Assessment      Patient meets criteria for : Severe Malnutrition         Current Nutrition Orders & Evaluation of Intake       Oral Nutrition     Food Allergies NKFA   Current PO Diet Diet: Cardiac; Healthy Heart (2-3 Na+); Fluid Consistency: Thin (IDDSI 0)   Supplement n/a   PO Evaluation     % PO Intake Less than 50% intake x 1 week per EMR.     Factors Affecting Intake: altered mental status   --  PES STATEMENT / NUTRITION DIAGNOSIS      Nutrition Dx Problem  Problem: Malnutrition (severe)  Etiology: Medical Diagnosis - AMS    Signs/Symptoms: Report of Minimal PO Intake and Unintended Weight Change     NUTRITION INTERVENTION / PLAN OF CARE      Intervention Goal(s) Maintain nutrition status, Reduce/improve symptoms, Disease management/therapy, Initiate feeding/diet, and No significant weight loss         RD Intervention/Action Encourage intake, Continue to monitor, Care plan reviewed, and Recommend/order: Boost TID, Magic Cups TID   --      Prescription/Orders:       PO Diet       Supplements Boost  TID, Magic Cups TID      Enteral Nutrition       Parenteral Nutrition    New Prescription Ordered? Yes   --      Monitor/Evaluation Per protocol, I&O, Pertinent labs, Weight, Symptoms, POC/GOC, Swallow function   Discharge Plan/Needs Pending clinical course   --    RD to follow per protocol.      Electronically signed by:  Jose Weaver RDN, LD  01/21/25 15:17 EST

## 2025-01-21 NOTE — PLAN OF CARE
Goal Outcome Evaluation:  Plan of Care Reviewed With: patient        Progress: improving  Outcome Evaluation: No acute changes overnight. Pt remains confused, less agitated. All PO night meds and snacks taken. Vitals stable, HR 80-100s. Still requiring bilateral wrist restraints. Incontinence care completed. Patient slept for 8 hours.

## 2025-01-21 NOTE — PROGRESS NOTES
"Enter Query Response Below      Query Response: In regards to your query regarding clarification of diagnosis I would list Malnutrition as the diagnosis.             If applicable, please update the problem list.       Patient: Mark Aguirre Jr. \"Ernst\"        : 1945  Account: [id number]           Admit Date:         How to Respond to this query:       a. Click New Note     b. Answer query within the yellow box.                c. Update the Problem List, if applicable.      If you have any questions about this query contact me at: [email or web address]     Dr. Staples,    Patient presented on  and was made inpatient on .  On  Registered Dietitian documented, \"Pt does not currently meet criteria for malnutrition but RD suspects he will soon if poor PO intake continues. Pt would likely benefit from alternative means of nutrition if aligns with POC/GOC.\"  Patient was seen on  by Registered Dietitian.  Registered Dietitian documented patient's severe insufficient energy intake, severe unintentional weight loss, and, \"Patient meets ASPEN/AND criteria for nutrition diagnosis of severe malnutrition of acute illness.\"  Registered Dietitian also documented:  \"RECS:  Continue Boost TID  Continue Magic Cups TID  Recommend continuing appetite stimulant  Encourage PO and supplement intake.\"    Please clarify diagnosis treated/monitored:    - Acute illness related severe protein calorie malnutrition  - Malnutrition  - Other- specify __________    By submitting this query, we are merely seeking further clarification of documentation to accurately reflect all conditions that you are monitoring, evaluating, treating or that extend the hospitalization or utilize additional resources of care. Please utilize your independent clinical judgment when addressing the question(s) above.     This query and your response, once completed, will be entered into the legal medical record.    Sincerely,  Marga STOKES" Gamaliel RN CCDS CCS  Clinical Documentation Integrity Program

## 2025-01-21 NOTE — PROGRESS NOTES
The patient is seen with wife and friend present today.  Unfortunately, he continues to require soft wrist restraints and will need to be out of these for 24 hours prior to admission to any geropsychiatric facility.  I will add Depakote sprinkles in hopes of addressing impulsivity and allowing for removal of restraints so that [facility] psychiatric hospitalization can occur.

## 2025-01-21 NOTE — PROGRESS NOTES
Subjective: Patient slept well.  Ate better yesterday and took an reasonably good lunch as per wife who is at bedside this morning.  Bowels moved yesterday.  Remained out of restraints throughout the day yesterday.  Heart rate stayed down.  Did require soft restraints last night again    Objective:  Vitals:    01/20/25 1350 01/20/25 2024 01/20/25 2326 01/21/25 0521   BP: 90/54 135/97 132/83 117/84   BP Location: Right arm Left arm Left arm Left arm   Patient Position: Lying Lying Lying Lying   Pulse: 76 (!) 126 86 89   Resp: 18 18 18 16   Temp: 97.3 °F (36.3 °C) 97.5 °F (36.4 °C) 97.8 °F (36.6 °C)    TempSrc: Oral Oral Oral    SpO2:  94% 95% 94%   Weight:       Height:         General: No acute distress  Heart: Irregularly irregular  Lungs: Clear to auscultation  Abdomen: Soft nondistended, I can feel firmness in the rectus abdominis muscle on the right consistent with his known hematoma.  He is slightly tender in this area  Extremities: No edema    Assessment and plan  1.  Confusion-underlying dementia that is likely been unmasked by hospital delirium.  Is becoming a bit Calmer now.  Will discuss with psychiatry regarding possible geropsych admission.  I am going to try and get physical therapy to get him up daily as I think will help him to be out of bed more.  2.  Atrial fibrillation-now on twice daily dosing of metoprolol which I think will help because he will be less apt to refuse medicine.  3.  Rectus sheath hematoma-continue to monitor hemoglobin.  I changed him back to Eliquis yesterday.  No change on exam  4.  Nutrition-continue Megace.  Surveillance labs today.  Sounds like he is taking in better intake.

## 2025-01-21 NOTE — PLAN OF CARE
Goal Outcome Evaluation:               Patient progressing with care. Received all scheduled meds with success.

## 2025-01-21 NOTE — CASE MANAGEMENT/SOCIAL WORK
Continued Stay Note  [facility]     Patient Name: Mark Aguirre Jr.  MRN: [id number]  Today's Date: 1/21/2025    Admit Date: 12/28/2024    Plan: [facility] pending acceptance   Discharge Plan       Row Name 01/21/25 1244       Plan    Plan [facility] pending acceptance    Plan Comments Met with patient and wife at bedside. Wife is agreeable to referrals to [facility] facilities. Spoke with Dillon in Access department, he cautioned that patient has to be out of restraints for 24 hours to go to [facility].  Will continue to monitor for new or changing discharge needs January AVILES RN CCP                   Discharge Codes    No documentation.                 Expected Discharge Date and Time       Expected Discharge Date Expected Discharge Time    Jan 21, 2025               January Joiner RN

## 2025-01-22 LAB — SARS-COV-2 AG RESP QL IA.RAPID: NORMAL

## 2025-01-22 PROCEDURE — 97530 THERAPEUTIC ACTIVITIES: CPT

## 2025-01-22 PROCEDURE — 99232 SBSQ HOSP IP/OBS MODERATE 35: CPT | Performed by: PHYSICIAN ASSISTANT

## 2025-01-22 PROCEDURE — 87426 SARSCOV CORONAVIRUS AG IA: CPT | Performed by: INTERNAL MEDICINE

## 2025-01-22 RX ORDER — OLANZAPINE 7.5 MG/1
7.5 TABLET, FILM COATED ORAL NIGHTLY
Qty: 30 TABLET | Refills: 0 | Status: ON HOLD | OUTPATIENT
Start: 2025-01-22 | End: 2025-01-29

## 2025-01-22 RX ORDER — AMOXICILLIN 250 MG
2 CAPSULE ORAL 2 TIMES DAILY PRN
Qty: 30 TABLET | Refills: 0 | Status: ON HOLD | OUTPATIENT
Start: 2025-01-22 | End: 2025-01-29

## 2025-01-22 RX ORDER — METOPROLOL TARTRATE 100 MG/1
100 TABLET ORAL EVERY 12 HOURS SCHEDULED
Qty: 60 TABLET | Refills: 0 | Status: ON HOLD | OUTPATIENT
Start: 2025-01-22

## 2025-01-22 RX ORDER — OLANZAPINE 7.5 MG/1
7.5 TABLET, FILM COATED ORAL
Qty: 30 TABLET | Refills: 0 | Status: ON HOLD | OUTPATIENT
Start: 2025-01-23 | End: 2025-01-29

## 2025-01-22 RX ORDER — DIVALPROEX SODIUM 125 MG/1
125 CAPSULE, COATED PELLETS ORAL 3 TIMES DAILY
Qty: 90 CAPSULE | Refills: 0 | Status: ON HOLD | OUTPATIENT
Start: 2025-01-22 | End: 2025-01-29

## 2025-01-22 RX ORDER — MEGESTROL ACETATE 40 MG/ML
400 SUSPENSION ORAL DAILY
Qty: 300 ML | Refills: 0 | Status: ON HOLD | OUTPATIENT
Start: 2025-01-23

## 2025-01-22 RX ADMIN — APIXABAN 5 MG: 5 TABLET, FILM COATED ORAL at 08:57

## 2025-01-22 RX ADMIN — METOPROLOL TARTRATE 100 MG: 50 TABLET, FILM COATED ORAL at 08:58

## 2025-01-22 RX ADMIN — DIVALPROEX SODIUM 125 MG: 125 CAPSULE, COATED PELLETS ORAL at 08:57

## 2025-01-22 RX ADMIN — OLANZAPINE 7.5 MG: 5 TABLET, FILM COATED ORAL at 20:05

## 2025-01-22 RX ADMIN — ATORVASTATIN CALCIUM 10 MG: 10 TABLET, FILM COATED ORAL at 09:47

## 2025-01-22 RX ADMIN — TAMSULOSIN HYDROCHLORIDE 0.4 MG: 0.4 CAPSULE ORAL at 08:58

## 2025-01-22 RX ADMIN — DIGOXIN 125 MCG: 0.12 TABLET ORAL at 09:02

## 2025-01-22 RX ADMIN — METOPROLOL TARTRATE 100 MG: 50 TABLET, FILM COATED ORAL at 20:05

## 2025-01-22 RX ADMIN — OLANZAPINE 7.5 MG: 5 TABLET, FILM COATED ORAL at 17:43

## 2025-01-22 RX ADMIN — Medication 10 ML: at 20:15

## 2025-01-22 RX ADMIN — DIVALPROEX SODIUM 125 MG: 125 CAPSULE, COATED PELLETS ORAL at 20:05

## 2025-01-22 RX ADMIN — Medication 1000 MCG: at 09:47

## 2025-01-22 RX ADMIN — MEGESTROL ACETATE 400 MG: 40 SUSPENSION ORAL at 09:47

## 2025-01-22 RX ADMIN — DIVALPROEX SODIUM 125 MG: 125 CAPSULE, COATED PELLETS ORAL at 17:43

## 2025-01-22 RX ADMIN — Medication 10 ML: at 09:47

## 2025-01-22 RX ADMIN — APIXABAN 5 MG: 5 TABLET, FILM COATED ORAL at 20:05

## 2025-01-22 NOTE — DISCHARGE SUMMARY
Date of Discharge:  1/23/2025    Discharge Diagnosis:   Delirium with underlying dementia  Atrial fibrillation with rapid ventricular rate - now controlled  Syncope secondary to dehydration  Right ear laceration from fall - now healed  Malnutrition  6.  Right rectus sheath hematoma    Presenting Problem/History of Present Illness  Active Hospital Problems    Diagnosis  POA    **Syncope [R55]  Yes    Severe protein-calorie malnutrition [E43]  Yes    Acute delirium [R41.0]  Yes      Resolved Hospital Problems   No resolved problems to display.        Hospital Course  Patient is a 79 y.o. male presented with a fall at home with syncope and right ear laceration.  He has known afib and was planned for av carlos a ablation with pacemaker for late January.  Laceration was closed in the ER and he was kept for evaluation for the syncope. Cardiology saw him and electrophysiology was consulted. He was kept on cardiac monitor. Initial labs suggested mild dehydration. Etiology of syncope was felt to be the mild dehydration in addition to his rhythm disorder with rapid afib. CT of the head in the ER showed no acute abnormality However on the second night of admission he developed acute delirium with agitation. He required IM Zyprexa, Haldol and finally lorazepam with restraints.      He remained quite confused. Labs, urinalysis and CT of the head were repeated without obvious cause to his decline. Neurology was consulted and ultimately psychiatry in order to address his agitation which mainly occurred each evening . MRI of the brain showed no acute abnormality.      He developed right flank ecchymosis and CT of the abdomen showed a large rectus sheath hematoma. This was felt to be secondary to trying to get out of bed with his anticoagulation. Anticoagulation was held for 4 days and then resumed with no sign of recurrent bleeding.      He did have an episode where his oxygen saturations dipped and he was requiring higher levels of  nasal cannula oxygen to maintain oxygen sats.  Pulmonary was consulted and evaluation suggested that desaturations were largely due to poor waveform as his ABG showed adequate oxygenation.  He was able to wean off of oxygen and has been on room air.    He had diminished intake and was seen by nutrition.  Nutritional shakes were provided.  Feeding tube was not felt to be an option as it would worsen agitation.  Megace was added and family assisted with intake.  He has seen gradual improvement in intake.     He was placed on zyprexa twice daily and ultimately this was adjusted to 7.5mg at 5pm and 9pm to help with patient's sundowning.  Neurology re-evaluated him and did spinal tap for further evaluation. Meningitis panel was negative. Beta amyloid 42/40 ratio came back in a low range indicating a high likelihood of alzheimer's disease.  It is felt his hospitalization unmasked underlying dementia.  Psychiatry continued to adjust his medicines and added Depakote 125 mg 3 times daily.  He did settle down and no longer was requiring restraints.  He was able to be seen by physical therapy and was ambulated with assistance.  He was pleasant during the day but remained confused.    His heart rate had remained elevated due to his refusing of oral meds. He was requiring freqeuent IV metoproplol.  Once his acute agitation began to settle down he was changed back to oral medications.  Metoprolol was titrated and finally at 100 mg twice daily his heart rate was staying largely in the 80s to 90s.    Because of his ongoing confusion with trying to get his behaviors more manageable it was felt that he would be best served with [facility].  Bed has become available and he is going to be moving today.  I will see him upon discharge from that facility and family will keep me up-to-date on how he is doing as we may need to make plans on care depending on how he does.    Procedures Performed         Consults:   Consults        Date and Time Order Name Status Description    1/8/2025  7:03 PM Inpatient Pulmonology Consult      1/8/2025  7:51 AM Inpatient Neurology Consult General Completed     12/31/2024  9:32 PM Inpatient Psychiatrist Consult Completed     12/30/2024 11:54 AM Inpatient Neurology Consult General Completed     12/29/2024 11:54 AM Cardiac Electrophysiologist Inpatient Consult      12/29/2024  8:41 AM Inpatient Cardiology Consult              Pertinent Test Results:   1/5/25 MRI brain  IMPRESSION:     No evidence for acute intracranial pathology.     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     A few incidental chronic microhemorrhages likely related to underlying  amyloid.    1/3/25 CT abdomen  IMPRESSION:  1. Bilateral nonobstructing kidney stones  2. There is a large right rectus sheath hematoma. Follow-up recommended  to ensure clearing. Findings discussed with the patient's RN at time of  Dictation    12/31 CT head  IMPRESSION:  No acute process is identified. Further evaluation could be  performed with MRI examination of the brain. Paranasal sinus disease is  noted as described above.    1/22/25   COVID19  Presumptive Negative - Ref. Range Presumptive Negative        Component  Ref Range & Units 2 d ago 5 d ago 8 d ago 10 d ago 11 d ago 12 d ago 13 d ago   WBC  3.40 - 10.80 10*3/mm3 7.85 7.38 8.37 8.89 10.59 9.72 11.01 High    RBC  4.14 - 5.80 10*6/mm3 4.80 4.94 4.55 4.35 4.61 4.31 4.24   Hemoglobin  13.0 - 17.7 g/dL 14.2 14.6 13.1 12.2 Low  12.8 Low  12.6 Low  11.7 Low    Hematocrit  37.5 - 51.0 % 43.0 44.5 40.3 39.2 41.2 38.3 37.6   MCV  79.0 - 97.0 fL 89.6 90.1 88.6 90.1 89.4 88.9 88.7   MCH  26.6 - 33.0 pg 29.6 29.6 28.8 28.0 27.8 29.2 27.6   MCHC  31.5 - 35.7 g/dL 33.0 32.8 32.5 31.1 Low  31.1 Low  32.9 31.1 Low    RDW  12.3 - 15.4 % 15.1 14.7 14.1 14.0 13.7 13.7 13.4   RDW-SD  37.0 - 54.0 fl 49.2 46.8 44.5 45.1 44.0 43.8 43.2   MPV  6.0 - 12.0 fL 11.3  11.3 11.1 11.0 11.1 10.9 11.3   Platelets  140 - 450 10*3/mm3 217 239 261 250 253 235 213   Neutrophil %  42.7 - 76.0 % 72.2 72.5 76.2 High  77.5 High  76.4 High  79.0 High  79.7 High    Lymphocyte %  19.6 - 45.3 % 18.1 Low  17.8 Low  13.5 Low  12.1 Low  12.5 Low  11.1 Low  9.6 Low    Monocyte %  5.0 - 12.0 % 7.0 7.6 7.9 7.9 8.3 8.2 8.1   Eosinophil %  0.3 - 6.2 % 1.8 1.2 1.3 1.5 1.5 0.8 1.6   Basophil %  0.0 - 1.5 % 0.5 0.5 0.5 0.6 0.6 0.5 0.5   Immature Grans %  0.0 - 0.5 % 0.4 0.4 0.6 High  0.4 0.7 High  0.4 0.5   Neutrophils, Absolute  1.70 - 7.00 10*3/mm3 5.67 5.35 6.38 6.89 8.10 High  7.67 High  8.77 High    Lymphocytes, Absolute  0.70 - 3.10 10*3/mm3 1.42 1.31 1.13 1.08 1.32 1.08 1.06   Monocytes, Absolute  0.10 - 0.90 10*3/mm3 0.55 0.56 0.66 0.70 0.88 0.80 0.89   Eosinophils, Absolute  0.00 - 0.40 10*3/mm3 0.14 0.09 0.11 0.13 0.16 0.08 0.18   Basophils, Absolute  0.00 - 0.20 10*3/mm3 0.04 0.04 0.04 0.05 0.06 0.05 0.06   Immature Grans, Absolute  0.00 - 0.05 10*3/mm3 0.03 0.03 0.05 0.04 0.07 High  0.04 0.05   nRBC  0.0 - 0.2 /100 WBC 0.0 0.0 0.0 0.0 0.0 0.0 0.0               Component  Ref Range & Units 2 d ago  (1/21/25) 5 d ago  (1/18/25) 8 d ago  (1/15/25) 10 d ago  (1/13/25) 11 d ago  (1/12/25) 11 d ago  (1/12/25) 11 d ago  (1/12/25)   Glucose  65 - 99 mg/dL 81 90 102 High  95 92 R 94 90 R   BUN  8 - 23 mg/dL 23 26 High  24 High  23  24 High     Creatinine  0.76 - 1.27 mg/dL 0.83 0.98 0.82 0.87  0.78    Sodium  136 - 145 mmol/L 144 146 High  144 145  145    Potassium  3.5 - 5.2 mmol/L 4.0 4.0 4.1 CM 3.9  4.2    Comment: Slight hemolysis detected by analyzer. Result may be falsely elevated.   Chloride  98 - 107 mmol/L 109 High  112 High  112 High  112 High   111 High     CO2  22.0 - 29.0 mmol/L 24.0 24.8 24.0 23.0  23.8    Calcium  8.6 - 10.5 mg/dL 8.6 9.1 8.9 8.8  8.9    Total Protein  6.0 - 8.5 g/dL 5.5 Low  6.4        Albumin  3.5 - 5.2 g/dL 3.2 Low  3.2 Low         ALT (SGPT)  1 - 41 U/L 25 20         AST (SGOT)  1 - 40 U/L 22 23        Alkaline Phosphatase  39 - 117 U/L 56 58        Total Bilirubin  0.0 - 1.2 mg/dL 1.8 High  2.3 High         Globulin  gm/dL 2.3 3.2        A/G Ratio  g/dL 1.4 1.0        BUN/Creatinine Ratio  7.0 - 25.0 27.7 High  26.5 High  29.3 High  26.4 High   30.8 High     Anion Gap  5.0 - 15.0 mmol/L 11.0 9.2 8.0 10.0  10.2    eGFR  >60.0 mL/min/1.73 87.9 77.5 88.3 86.7  89.6          Condition on Discharge:  good    Vital Signs  Temp:  [97.7 °F (36.5 °C)-98.2 °F (36.8 °C)] 98.1 °F (36.7 °C)  Heart Rate:  [] 86  Resp:  [16-18] 16  BP: ()/() 95/71    Physical Exam:    Gen: Pt easily aroused - confused  Heent: Right ear laceration healing well  Heart: irregularly irregular  Lungs: CTA  Abd; Soft NTND - right rectus muscle firm but unchanged  Ext: no edema    Discharge Disposition  Psychiatric Hospital or Unit (DC - External or [facility])    Discharge Medications     Discharge Medications        New Medications        Instructions Start Date   Divalproex Sodium 125 MG capsule  Commonly known as: DEPAKOTE SPRINKLE   125 mg, Oral, 3 times daily      megestrol 40 MG/ML suspension  Commonly known as: MEGACE   400 mg, Oral, Daily      metoprolol tartrate 100 MG tablet  Commonly known as: LOPRESSOR   100 mg, Oral, Every 12 Hours Scheduled      OLANZapine 7.5 MG tablet  Commonly known as: zyPREXA   7.5 mg, Oral, Nightly      OLANZapine 7.5 MG tablet  Commonly known as: zyPREXA   7.5 mg, Oral, Daily With Dinner      sennosides-docusate 8.6-50 MG per tablet  Commonly known as: PERICOLACE   2 tablets, Oral, 2 Times Daily PRN             Continue These Medications        Instructions Start Date   acetaminophen 325 MG tablet  Commonly known as: TYLENOL   650 mg, Oral, Every 4 Hours PRN      apixaban 5 MG tablet tablet  Commonly known as: ELIQUIS   5 mg, Oral, Every 12 Hours Scheduled      atorvastatin 10 MG tablet  Commonly known as: LIPITOR   10 mg, Daily      digoxin 125 MCG  tablet  Commonly known as: LANOXIN   125 mcg, Oral, Daily      tamsulosin 0.4 MG capsule 24 hr capsule  Commonly known as: FLOMAX   1 capsule, Daily      VITAMIN B 12 PO   Take  by mouth.             Stop These Medications      chlorthalidone 25 MG tablet  Commonly known as: HYGROTON     citalopram 20 MG tablet  Commonly known as: CeleXA     metoprolol succinate XL 25 MG 24 hr tablet  Commonly known as: TOPROL-XL     zolpidem 5 MG tablet  Commonly known as: AMBIEN              Discharge Diet:   Diet Instructions       Diet: Regular/House Diet; Thin (IDDSI 0)      Discharge Diet: Regular/House Diet    Fluid Consistency: Thin (IDDSI 0)            Activity at Discharge:   Activity Instructions       Up WIth Assist              Follow-up Appointments  Future Appointments   Date Time Provider Department Center   2/27/2025 10:00 AM Kosta Javier MD [facility]   3/14/2025  9:00 AM Hemant Armando MD [id number] None   8/27/2025 10:20 AM Claus Lindquist MD [id number]     Additional Instructions for the Follow-ups that You Need to Schedule       Discharge Follow-up with PCP   As directed       Currently Documented PCP:    Luis Staples MD    PCP Phone Number:    [phone]     Follow Up Details: upon discharge from facility-please call with questions/updates        Discharge Follow-up with Specified Provider: Dr. Hemant Armando; 1 Month   As directed      To: Dr. Hemant Armando   Follow Up: 1 Month                Test Results Pending at Discharge       Luis Staples MD  01/22/25  18:25 EST    Time: Discharge 45 min

## 2025-01-22 NOTE — PROGRESS NOTES
Subjective: Patient remains confused but has been much more calm.  Spoke with nursing and wife is at bedside.  He has been out of restraints overnight.  Did pull out his IV but otherwise slept well.  Took all his meds yesterday.  Ate a reasonably good lunch and boost.    Objective:  Vitals:    01/21/25 1326 01/21/25 1500 01/21/25 1945 01/22/25 0003   BP: (!) 88/66  (!) 126/108 109/83   BP Location: Left arm  Left arm Right arm   Patient Position: Lying  Lying Lying   Pulse: 79  109 87   Resp:   18 16   Temp: 97.7 °F (36.5 °C)  97.7 °F (36.5 °C)    TempSrc: Axillary  Oral    SpO2:   95% 98%   Weight:  89.7 kg (197 lb 12 oz)     Height:         General: No acute distress-easily aroused, remains confused but recognizes me and talked about my office  Heart: Irregularly irregular  Lungs: Clear  Abdomen: Soft, does continue to have fullness in the right rectus muscle consistent with his hematoma but not unusually tender and certainly not getting bigger  Extremities: No edema    Recent Results (from the past 24 hours)   Comprehensive Metabolic Panel    Collection Time: 01/21/25  7:56 AM    Specimen: Blood   Result Value Ref Range    Glucose 81 65 - 99 mg/dL    BUN 23 8 - 23 mg/dL    Creatinine 0.83 0.76 - 1.27 mg/dL    Sodium 144 136 - 145 mmol/L    Potassium 4.0 3.5 - 5.2 mmol/L    Chloride 109 (H) 98 - 107 mmol/L    CO2 24.0 22.0 - 29.0 mmol/L    Calcium 8.6 8.6 - 10.5 mg/dL    Total Protein 5.5 (L) 6.0 - 8.5 g/dL    Albumin 3.2 (L) 3.5 - 5.2 g/dL    ALT (SGPT) 25 1 - 41 U/L    AST (SGOT) 22 1 - 40 U/L    Alkaline Phosphatase 56 39 - 117 U/L    Total Bilirubin 1.8 (H) 0.0 - 1.2 mg/dL    Globulin 2.3 gm/dL    A/G Ratio 1.4 g/dL    BUN/Creatinine Ratio 27.7 (H) 7.0 - 25.0    Anion Gap 11.0 5.0 - 15.0 mmol/L    eGFR 87.9 >60.0 mL/min/1.73   CBC Auto Differential    Collection Time: 01/21/25  7:56 AM    Specimen: Blood   Result Value Ref Range    WBC 7.85 3.40 - 10.80 10*3/mm3    RBC 4.80 4.14 - 5.80 10*6/mm3    Hemoglobin  14.2 13.0 - 17.7 g/dL    Hematocrit 43.0 37.5 - 51.0 %    MCV 89.6 79.0 - 97.0 fL    MCH 29.6 26.6 - 33.0 pg    MCHC 33.0 31.5 - 35.7 g/dL    RDW 15.1 12.3 - 15.4 %    RDW-SD 49.2 37.0 - 54.0 fl    MPV 11.3 6.0 - 12.0 fL    Platelets 217 140 - 450 10*3/mm3    Neutrophil % 72.2 42.7 - 76.0 %    Lymphocyte % 18.1 (L) 19.6 - 45.3 %    Monocyte % 7.0 5.0 - 12.0 %    Eosinophil % 1.8 0.3 - 6.2 %    Basophil % 0.5 0.0 - 1.5 %    Immature Grans % 0.4 0.0 - 0.5 %    Neutrophils, Absolute 5.67 1.70 - 7.00 10*3/mm3    Lymphocytes, Absolute 1.42 0.70 - 3.10 10*3/mm3    Monocytes, Absolute 0.55 0.10 - 0.90 10*3/mm3    Eosinophils, Absolute 0.14 0.00 - 0.40 10*3/mm3    Basophils, Absolute 0.04 0.00 - 0.20 10*3/mm3    Immature Grans, Absolute 0.03 0.00 - 0.05 10*3/mm3    nRBC 0.0 0.0 - 0.2 /100 WBC     Assessment plan  1.  Confusion-underlying dementia with delirium.  Agitation seems to be improving.  Depakote was added yesterday.  Has been able to remain out of restraints overnight without agitation.  I think we can leave his IV out since we are looking to get him out of the hospital soon.  I spoke with physical therapy yesterday about getting him up daily as I think that is important in helping to get him reoriented.  [facility] transfer in the works  2.  Atrial fibrillation-heart rate better controlled and is now on metoprolol twice daily.  He is taking his digoxin and metoprolol and heart rate looks to be doing much better.  3.  Rectus hematoma-blood count stable.  Will take quite some time for this to resolve.  He is back on anticoagulation with no signs of active bleeding  4.  Nutrition-is showing signs that he is starting to eat a little bit better.  Depakote may actually help this.  Continues on Megace for now.  Family encouraging oral intake.

## 2025-01-22 NOTE — CASE MANAGEMENT/SOCIAL WORK
Jean Love at [facility] - patient accepted for inpatient geropsych admission    Dr Coombs - accepting doctor    Call report to [phone]    [facility] to reach out with transportation time

## 2025-01-22 NOTE — CASE MANAGEMENT/SOCIAL WORK
Jean Love at [facility] - family not able to provide POA paperwork until Thurs morning and facility transport not available until the morning.      Gave info to KARINE Alvarez    Pending admission (need COVID results faxed) set for Thursday     at [facility] will be Carol

## 2025-01-22 NOTE — PROGRESS NOTES
Electrophysiology Follow-Up Note      Patient Name: Mark Aguirre Jr.  Age/Sex: 79 y.o. male  : 1945  MRN: [id number]    Date of Admission: 2024  Day of Service: 25       Chief Complaint: Atrial fibrillation      Follow up:  25 he was able to sleep last night without restraints and was started on Depakote.  This morning his wife says he is more clear and cognizant during conversations.  He did take his IV out last night and I reviewed Dr. Staples's note from this morning.  Overall it seems like he is doing much better and his appetite remains improved.  His heart rates have still been really well-controlled in the average range of the 80s to 90s.      Temp:  [97.7 °F (36.5 °C)-98.2 °F (36.8 °C)] 98.2 °F (36.8 °C)  Heart Rate:  [] 97  Resp:  [16-18] 16  BP: ()/() 105/90     PHYSICAL EXAM    General: 79 y.o. male No acute distress, laying in bed. Alert.  Elderly male.  Neck: No JVD or carotid bruit  Lungs: Clear to ausculation bilaterally, symmetric  Heart: Irregular rate and rhythm no overt murmurs, rubs or gallops. Normal S1 and S2.   Abdomen: soft, non-tender  Extremities: No lower extremity edema or cyanosis.   Neuo: no lateralizing defects.        Telemetry/EK25: Atrial fibrillation with PVCs sometimes in couplet form            I personally viewed and interpreted the patient's EKG/Telemetry data.    Previous testing:   - Echo 2024: LVEF 48.8%. LA moderate dilation. Moderate AS aortic valve area 1.8cm2. RSVP 48mmHg.       Lab Review:   Results from last 7 days   Lab Units 25  0756 25  0756   SODIUM mmol/L 144 146*   POTASSIUM mmol/L 4.0 4.0   CHLORIDE mmol/L 109* 112*   CO2 mmol/L 24.0 24.8   BUN mg/dL 23 26*   CREATININE mg/dL 0.83 0.98   GLUCOSE mg/dL 81 90   CALCIUM mg/dL 8.6 9.1     Results from last 7 days   Lab Units 25  0756 25  0756   WBC 10*3/mm3 7.85 7.38   HEMOGLOBIN g/dL 14.2 14.6   HEMATOCRIT % 43.0 44.5    PLATELETS 10*3/mm3 217 239                       Current Medications:   Scheduled Meds:apixaban, 5 mg, Oral, Q12H  atorvastatin, 10 mg, Oral, Daily  digoxin, 125 mcg, Oral, Daily  Divalproex Sodium, 125 mg, Oral, TID  megestrol, 400 mg, Oral, Daily  metoprolol tartrate, 100 mg, Oral, Q12H  OLANZapine, 7.5 mg, Oral, Nightly  OLANZapine, 7.5 mg, Oral, Daily With Dinner  potassium chloride, 40 mEq, Oral, Once  potassium chloride, 40 mEq, Oral, Once  sodium chloride, 10 mL, Intravenous, Q12H  tamsulosin, 0.4 mg, Oral, Daily  vitamin B-12, 1,000 mcg, Oral, Daily          Assessment /Plan:  Right controlled persistent atrial fibrillation-he has been tolerating taking his oral medication of the 100 mg of metoprolol twice daily.  He also remains on digoxin.  Recent dig level was reviewed.  Overall we will continue rate control as it seems to be working currently and his mentation is improving  He has plans to go to a geriatric psych unit and we had plans for an AV node ablation and pacemaker in the near future.  This is still tentatively planned for 1/28/2025 but we will reevaluate this at discharge to see if this is going to be adequate timing or not  Continue apixaban 5 mg twice daily, denies bleeding complications  Delirium with suspected Alzheimer's disease-symptoms are really improving.  He is now out of restraints at night and responding well to medications.  Hopefully he will be placed at a facility later this week.  He has had a rectus hematoma and is now been resumed on oral anticoagulation.  Blood counts appear stable.    Cameron Denny PA-C  01/22/25  09:15 EST

## 2025-01-22 NOTE — THERAPY TREATMENT NOTE
Patient Name: Mark Aguirre Jr.  : 1945    MRN: [id number]                              Today's Date: 2025       Admit Date: 2024    Visit Dx:     ICD-10-CM ICD-9-CM   1. Syncope, unspecified syncope type  R55 780.2   2. Laceration of right ear, initial encounter  S01.311A 872.8   3. Prolonged Q-T interval on ECG  R94.31 794.31   4. Atrial fibrillation, unspecified type  I48.91 427.31     Patient Active Problem List   Diagnosis    Atrial fibrillation    Bifascicular block    STEPHENIE on auto CPAP    Family history of colon cancer    History of colon polyps    Essential hypertension    Hip pain, right    OA (osteoarthritis) of hip    Acute renal failure    Brief psychotic disorder    Family history of colonic polyps    Nonrheumatic aortic valve stenosis    PVC (premature ventricular contraction)    Syncope and collapse    History of adenomatous polyp of colon    Persistent atrial fibrillation    Hypoxemia associated with sleep    Syncope    Acute delirium    Severe protein-calorie malnutrition     Past Medical History:   Diagnosis Date    Acute kidney failure     POST-OP TOTAL HIP 2020    Anticoagulated     PRADAXA FOR A FIB TO HELD 3 DAYS PRIOR    Arthritis     OSTEO. RIGHT HIP    Atrial flutter     Bifascicular block     Cataract     LEFT AND RIGHT    Depression     History of colon polyps     Hypertension     Hypoxemia associated with sleep     Insomnia     Knee pain     STEPHENIE on CPAP 2010    Overnight polysomnogram.  Weight 163 pounds.  Severe STEPHENIE with AHI of 30.9 events per hour.  Low oxygen saturation 72% and sleep-related hypoxia present for 30 minutes    PAF (paroxysmal atrial fibrillation)     Right hip pain     Slow to wake up after anesthesia      Past Surgical History:   Procedure Laterality Date    CARDIAC ABLATION      CARDIAC CATHETERIZATION      COLONOSCOPY N/A 2018    Procedure: COLONOSCOPY INTO CECUM WITH COLD BX POLYPECTOMY ;  Surgeon: Ross Stearns MD;  Location: [facility]  ENDOSCOPY;  Service:     COLONOSCOPY N/A 2/3/2021    Procedure: COLONOSCOPY TOCECUM WITH COLD BX POLYPECTOMY AND HOT SNARE POLYPECTOMY;  Surgeon: Ross Stearns MD;  Location: [facility] ENDOSCOPY;  Service: General;  Laterality: N/A;  PERSONAL HX OF POLYPS, FAMILY HX OF COLON CANCER  --POLYPS (X3), DIVERTICULOSIS    COLONOSCOPY N/A 3/20/2024    Procedure: COLONOSCOPY TO CECUM WITH COLD BX POLYPECTOMIES;  Surgeon: Ross Stearns MD;  Location: [facility] ENDOSCOPY;  Service: General;  Laterality: N/A;  HX POLYPS/POLYPS (3)    HERNIA REPAIR      INGUINAL HERNIA? SIDE    TOTAL HIP ARTHROPLASTY Right 6/5/2020    Procedure: TOTAL HIP ARTHROPLASTY;  Surgeon: Travis Hamlin MD;  Location: [facility] OR;  Service: Orthopedics;  Laterality: Right;      General Information       Row Name 01/22/25 1542          Physical Therapy Time and Intention    Document Type therapy note (daily note)  -     Mode of Treatment individual therapy;physical therapy  -       Row Name 01/22/25 1542          General Information    Patient Profile Reviewed yes  -     Prior Level of Function independent:  -     Existing Precautions/Restrictions fall  Improved impulsiveness, but still impulsive  -     Barriers to Rehab uncooperative;hearing deficit;cognitive status;previous functional deficit;medically complex  -       Row Name 01/22/25 1542          Safety Issues/Impairments Affecting Functional Mobility    Safety Issues Affecting Function (Mobility) ability to follow commands;at risk behavior observed;impulsivity;judgment;insight into deficits/self-awareness;sequencing abilities;safety precautions follow-through/compliance;safety precaution awareness;positioning of assistive device;awareness of need for assistance  -     Impairments Affecting Function (Mobility) balance;endurance/activity tolerance;strength;cognition;postural/trunk control;pain  -     Cognitive Impairments, Mobility Safety/Performance attention;safety precaution  follow-through;sequencing abilities;impulsivity;judgment;problem-solving/reasoning;safety precaution awareness  -     Comment, Safety Issues/Impairments (Mobility) Gait belt and non skid socks donned for safety  -               User Key  (r) = Recorded By, (t) = Taken By, (c) = Cosigned By      Initials Name Provider Type    Everett Sandra, NINA Physical Therapist                   Mobility       Row Name 01/22/25 1543          Bed Mobility    Bed Mobility other (see comments)  Not tested today due to patient being in upright chair upon entry for today's session  -     Comment, (Bed Mobility) Not tested today  -       Row Name 01/22/25 1543          Sit-Stand Transfer    Sit-Stand [place] (Transfers) minimum assist (75% patient effort);verbal cues  -     Assistive Device (Sit-Stand Transfers) walker, front-wheeled  -     Comment, (Sit-Stand Transfer) Maximal cues  -       Row Name 01/22/25 1543          Gait/Stairs (Locomotion)    [place] Level (Gait) minimum assist (75% patient effort);moderate assist (50% patient effort)  -     Assistive Device (Gait) walker, front-wheeled  Gait belt  -     Patient was able to Ambulate yes  -     Distance in Feet (Gait) 50  -     Deviations/Abnormal Patterns (Gait) stride length decreased;gait speed decreased;miki decreased  -     Bilateral Gait Deviations weight shift ability decreased;forward flexed posture;heel strike decreased  -     Comment, (Gait/Stairs) Pt required maximal cues throughout ambulation for proper AD positioning for safety and assistance with turning  -               User Key  (r) = Recorded By, (t) = Taken By, (c) = Cosigned By      Initials Name Provider Type    Everett Sandra PT Physical Therapist                   Obj/Interventions    No documentation.                  Goals/Plan    No documentation.                  Clinical Impression       Row Name 01/22/25 1544          Pain    Pain Location other (see  comments);back;extremity  Does not rate  -     Pain Management Interventions activity modification encouraged;exercise or physical activity utilized  -     Response to Pain Interventions activity level improved  -       Row Name 01/22/25 1548          Plan of Care Review    Plan of Care Reviewed With patient;spouse  -     Progress improving  -     Outcome Evaluation Patient seen this PM for PT session and is agreeable. Wife is at bedside. Patient in upright chair next to bed so did not test bed mobility on this date. Pt has now been out of restraints for over 24 hours and is becoming more aware. Pt still has intermittent confusion throughout the session and needs many verbal cues for sequencing and for proper safety measures. Pt is able to ambulate on this date for a total of 50' out into the dela cruz and back to the bedside chair. Pt requires min A for STS, mod A for ambulation with the FWW with assistance needed during turns and with walker placement. Maximal verbal cues needed throughout to keep patient focused on current task. Throughout treatment session patient states he has pain in his legs and back but does not rate when asked to do so. Pt remains a good candidate for skilled PT services going forward while in the acute setting. Anticipate d/c to SNF following leaving the hospital.  -       Row Name 01/22/25 154          Therapy Assessment/Plan (PT)    Criteria for Skilled Interventions Met (PT) yes  -     Therapy Frequency (PT) 3 times/wk  -       Row Name 01/22/25 1543          Vital Signs    O2 Delivery Pre Treatment room air  -     O2 Delivery Intra Treatment room air  -     O2 Delivery Post Treatment room air  -     Pre Patient Position Sitting  -     Intra Patient Position Standing  -     Post Patient Position Sitting  -       Row Name 01/22/25 1547          Positioning and Restraints    Pre-Treatment Position sitting in chair/recliner  -     Post Treatment Position chair   -     In Chair notified nsg;sitting;call light within reach;encouraged to call for assist;with family/caregiver  -               User Key  (r) = Recorded By, (t) = Taken By, (c) = Cosigned By      Initials Name Provider Type    Everett Sandra, PT Physical Therapist                   Outcome Measures    No documentation.                                Physical Therapy Education       Title: PT OT SLP Therapies (In Progress)       Topic: Physical Therapy (In Progress)       Point: Mobility training (In Progress)       Learning Progress Summary            Patient Acceptance, E, NR by  at 1/22/2025 1546    Acceptance, E, NR by  at 1/20/2025 1629    Acceptance, E, NR by  at 1/17/2025 1152    Acceptance, E,D, VU,NR by  at 1/10/2025 1536    Acceptance, E,TB, VU,NR by  at 1/9/2025 1200    Acceptance, E,TB,D, VU,NR by  at 1/4/2025 1504    Acceptance, E, NR by  at 12/31/2024 1525   Family Acceptance, E, NR by  at 1/20/2025 1629    Acceptance, E, NR by  at 12/31/2024 1525                      Point: Home exercise program (In Progress)       Learning Progress Summary            Patient Acceptance, E, NR by  at 1/20/2025 1629    Acceptance, E, NR by  at 1/17/2025 1152    Acceptance, E,D, VU,NR by  at 1/10/2025 1536    Acceptance, E, NR by  at 12/31/2024 1525   Family Acceptance, E, NR by  at 1/20/2025 1629    Acceptance, E, NR by  at 12/31/2024 1525                      Point: Body mechanics (In Progress)       Learning Progress Summary            Patient Acceptance, E, NR by  at 1/22/2025 1546    Acceptance, E, NR by  at 1/20/2025 1629    Acceptance, E, NR by  at 1/17/2025 1152    Acceptance, E,D, VU,NR by  at 1/10/2025 1536    Acceptance, E,TB, VU,NR by  at 1/9/2025 1200    Acceptance, E,TB,D, VU,NR by  at 1/4/2025 1504    Acceptance, E, NR by  at 12/31/2024 1525   Family Acceptance, E, NR by  at 1/20/2025 1629    Acceptance, E, NR by  at 12/31/2024 1525                       Point: Precautions (In Progress)       Learning Progress Summary            Patient Acceptance, E, NR by  at 1/22/2025 1546    Acceptance, E, NR by  at 1/20/2025 1629    Acceptance, E, NR by  at 1/17/2025 1152    Acceptance, E,TB,D, VU,NR by  at 1/4/2025 1504    Acceptance, E, NR by  at 12/31/2024 1525   Family Acceptance, E, NR by  at 1/20/2025 1629    Acceptance, E, NR by  at 12/31/2024 1525                                      User Key       Initials Effective Dates Name Provider Type Discipline     06/16/21 -  Sue Padilla, PT Physical Therapist PT     06/16/21 -  Sima Anne, PT Physical Therapist PT     02/14/23 -  Pat Mehta PTA Physical Therapist Assistant PT     05/02/22 -  Laura Coughlin, PT Physical Therapist PT     09/22/22 -  Demi Otero, PT Physical Therapist PT     09/11/24 -  Everett Pope, PT Physical Therapist PT                  PT Recommendation and Plan     Progress: improving  Outcome Evaluation: Patient seen this PM for PT session and is agreeable. Wife is at bedside. Patient in upright chair next to bed so did not test bed mobility on this date. Pt has now been out of restraints for over 24 hours and is becoming more aware. Pt still has intermittent confusion throughout the session and needs many verbal cues for sequencing and for proper safety measures. Pt is able to ambulate on this date for a total of 50' out into the anne and back to the bedside chair. Pt requires min A for STS, mod A for ambulation with the FWW with assistance needed during turns and with walker placement. Maximal verbal cues needed throughout to keep patient focused on current task. Throughout treatment session patient states he has pain in his legs and back but does not rate when asked to do so. Pt remains a good candidate for skilled PT services going forward while in the acute setting. Anticipate d/c to SNF following leaving the hospital.     Time Calculation:          PT Charges       Row Name 01/22/25 1541             Time Calculation    Start Time 1443  -MH      Stop Time 1459  -MH      Time Calculation (min) 16 min  -      PT Received On 01/22/25  -      PT - Next Appointment 01/24/25  -MH         Time Calculation- PT    Total Timed Code Minutes- PT 16 minute(s)  -MH         Timed Charges    48810 - PT Therapeutic Activity Minutes 16  -MH         Total Minutes    Timed Charges Total Minutes 16  -MH       Total Minutes 16  -MH                User Key  (r) = Recorded By, (t) = Taken By, (c) = Cosigned By      Initials Name Provider Type     Everett Pope, PT Physical Therapist                  Therapy Charges for Today       Code Description Service Date Service Provider Modifiers Qty    12189444862 HC PT THERAPEUTIC ACT EA 15 MIN 1/22/2025 Everett Pope, PT GP 1            PT G-Codes  Outcome Measure Options: AM-PAC 6 Clicks Basic Mobility (PT)  AM-PAC 6 Clicks Score (PT): 13  PT Discharge Summary  Anticipated Discharge Disposition (PT): skilled nursing facility    Everett Pope, NINA  1/22/2025

## 2025-01-22 NOTE — PLAN OF CARE
Goal Outcome Evaluation:  Plan of Care Reviewed With: patient, spouse        Progress: improving  Outcome Evaluation: Patient seen this PM for PT session and is agreeable. Wife is at bedside. Patient in upright chair next to bed so did not test bed mobility on this date. Pt has now been out of restraints for over 24 hours and is becoming more aware. Pt still has intermittent confusion throughout the session and needs many verbal cues for sequencing and for proper safety measures. Pt is able to ambulate on this date for a total of 50' out into the dela cruz and back to the bedside chair. Pt requires min A for STS, mod A for ambulation with the FWW with assistance needed during turns and with walker placement. Maximal verbal cues needed throughout to keep patient focused on current task. Throughout treatment session patient states he has pain in his legs and back but does not rate when asked to do so. Pt remains a good candidate for skilled PT services going forward while in the acute setting. Anticipate d/c to SNF following leaving the hospital.    Anticipated Discharge Disposition (PT): skilled nursing facility

## 2025-01-22 NOTE — PLAN OF CARE
Goal Outcome Evaluation:  Plan of Care Reviewed With: patient        Progress: improving  Outcome Evaluation: Patient remains off restraints. Still confused, less agitated. Patient removed IV access, did not reinsert this shift to lessen agitation. Vital signs stable, HR 80-110s, elevated at times when agitated.  All PO night meds and snacks taken. Incontinence care completed. Patient slept for 5-6 hours.

## 2025-01-22 NOTE — PROGRESS NOTES
The patient looks markedly improved with initiation of Depakote.  He is out of restraints sitting up in a chair and is a bit more lucid though oriented to person only.  If he is able to remain out of restraints for another 24 hours, we can begin looking for a [facility] bed if family wishes to pursue this course.

## 2025-01-23 VITALS
OXYGEN SATURATION: 96 % | WEIGHT: 197.75 LBS | TEMPERATURE: 97.2 F | HEART RATE: 95 BPM | SYSTOLIC BLOOD PRESSURE: 109 MMHG | DIASTOLIC BLOOD PRESSURE: 60 MMHG | BODY MASS INDEX: 26.21 KG/M2 | HEIGHT: 73 IN | RESPIRATION RATE: 16 BRPM

## 2025-01-23 PROBLEM — R55 SYNCOPE: Status: RESOLVED | Noted: 2024-12-28 | Resolved: 2025-01-23

## 2025-01-23 RX ADMIN — DIVALPROEX SODIUM 125 MG: 125 CAPSULE, COATED PELLETS ORAL at 10:20

## 2025-01-23 RX ADMIN — TAMSULOSIN HYDROCHLORIDE 0.4 MG: 0.4 CAPSULE ORAL at 10:20

## 2025-01-23 RX ADMIN — APIXABAN 5 MG: 5 TABLET, FILM COATED ORAL at 10:20

## 2025-01-23 RX ADMIN — METOPROLOL TARTRATE 100 MG: 50 TABLET, FILM COATED ORAL at 10:20

## 2025-01-23 RX ADMIN — Medication 10 ML: at 10:21

## 2025-01-23 RX ADMIN — ATORVASTATIN CALCIUM 10 MG: 10 TABLET, FILM COATED ORAL at 10:20

## 2025-01-23 RX ADMIN — MEGESTROL ACETATE 400 MG: 40 SUSPENSION ORAL at 10:20

## 2025-01-23 RX ADMIN — Medication 1000 MCG: at 10:20

## 2025-01-23 RX ADMIN — DIGOXIN 125 MCG: 0.12 TABLET ORAL at 10:20

## 2025-01-23 NOTE — PROGRESS NOTES
Subjective: Better became available yesterday at [facility].  Arrangements for transport being made and should go today.  Had a good night last night without need for restraints for the last 48+ hours.  Wife is at bedside reports good intake of lunch yesterday.  Got up with physical therapy yesterday.  Conversing but still remains confused    Objective:  Vitals:    01/22/25 1914 01/22/25 2334 01/22/25 2335 01/23/25 0340   BP: 118/76 90/76 99/80 119/75   BP Location: Left arm  Left arm Left arm   Patient Position: Lying  Lying Lying   Pulse:  85 89 91   Resp: 16  16 16   Temp: 97.7 °F (36.5 °C)      TempSrc: Oral      SpO2:       Weight:       Height:           Gen: Pt easily aroused - confused  Heent: Right ear laceration healing well  Heart: irregularly irregular  Lungs: CTA  Abd; Soft NTND - right rectus muscle firm but unchanged  Ext: no edema    Recent Results (from the past 24 hours)   COVID-19 RAPID AG,VERITOR,COR/PAD/YAHAIRA/BLESSING/LAG/CAROLANN/ IN-HOUSE,DRY SWAB, 1 HR TAT - Swab, Nasal Cavity    Collection Time: 01/22/25  7:02 PM    Specimen: Nasal Cavity; Swab   Result Value Ref Range    COVID19 Presumptive Negative Presumptive Negative - Ref. Range       A/P  1.  Confusion-based on labs from his CSF it appears he has underlying Alzheimer's which was unmasked by the hospitalization/delirium.  Behavior/agitation is now much better.  Plan is to move to [facility] unit today for further management and hopefully continue to see him improve as he has been conversing with family.  2.  Atrial fibrillation-anticoagulated on Eliquis and rate is controlled with metoprolol 100 mg twice daily and digoxin once daily  3.  Right rectus sheath hematoma-stable and is back on anticoagulation  4.  Nutrition remains on Megace taking and nutritional shakes and eating some of his meals now

## 2025-01-23 NOTE — CASE MANAGEMENT/SOCIAL WORK
Received a call from Carol at [facility]- Their transport will be here to  pt around 1530.   This information was given to MORENITA Jaquez, who will notify the pt's nurse.

## 2025-01-23 NOTE — CASE MANAGEMENT/SOCIAL WORK
Case Management Discharge Note      Final Note: [facility] via their transportation. No additional CCP needs.         Selected Continued Care - Admitted Since 12/28/2024       Destination    No services have been selected for the patient.                Durable Medical Equipment    No services have been selected for the patient.                Dialysis/Infusion    No services have been selected for the patient.                Home Medical Care    No services have been selected for the patient.                Therapy    No services have been selected for the patient.                Community Resources    No services have been selected for the patient.                Community & DME    No services have been selected for the patient.                         Final Discharge Disposition Code: 65 - psychiatric hospital or unit

## 2025-01-23 NOTE — PLAN OF CARE
Problem: Syncope  Goal: Absence of Syncopal Symptoms  Outcome: Progressing  Intervention: Manage Effect of Syncopal Symptoms  Recent Flowsheet Documentation  Taken 1/23/2025 1425 by Dedrick Cabrales RN  Syncope Management: position changed slowly  Supportive Measures: active listening utilized  Safety Promotion/Fall Prevention:   safety round/check completed   room organization consistent  Taken 1/23/2025 0815 by Dedrick Cabrales RN  Syncope Management: position changed slowly  Supportive Measures: relaxation techniques promoted  Safety Promotion/Fall Prevention:   safety round/check completed   room organization consistent  Goal: Absence of Syncopal Symptoms  Outcome: Progressing  Intervention: Manage Effect of Syncopal Symptoms  Recent Flowsheet Documentation  Taken 1/23/2025 1425 by Dedrick Cabrales RN  Syncope Management: position changed slowly  Supportive Measures: active listening utilized  Safety Promotion/Fall Prevention:   safety round/check completed   room organization consistent  Taken 1/23/2025 0815 by Dedrick Cabrales RN  Syncope Management: position changed slowly  Supportive Measures: relaxation techniques promoted  Safety Promotion/Fall Prevention:   safety round/check completed   room organization consistent     Problem: Adult Inpatient Plan of Care  Goal: Plan of Care Review  Outcome: Progressing  Goal: Patient-Specific Goal (Individualized)  Outcome: Progressing  Goal: Absence of Hospital-Acquired Illness or Injury  Outcome: Progressing  Intervention: Identify and Manage Fall Risk  Recent Flowsheet Documentation  Taken 1/23/2025 1425 by Dedrick Cabrales RN  Safety Promotion/Fall Prevention:   safety round/check completed   room organization consistent  Taken 1/23/2025 0815 by Dedrick Cabrales RN  Safety Promotion/Fall Prevention:   safety round/check completed   room organization consistent  Intervention: Prevent Skin  Injury  Recent Flowsheet Documentation  Taken 1/23/2025 1425 by Dedrick Cabrales RN  Body Position: position changed independently  Skin Protection:   transparent dressing maintained   incontinence pads utilized  Taken 1/23/2025 0815 by Dedrick Cabrales RN  Body Position: position changed independently  Skin Protection:   transparent dressing maintained   incontinence pads utilized  Intervention: Prevent Infection  Recent Flowsheet Documentation  Taken 1/23/2025 1425 by Dedrick Cabrales RN  Infection Prevention:   single patient room provided   rest/sleep promoted   hand hygiene promoted  Taken 1/23/2025 0815 by Dedrick Cabrales RN  Infection Prevention:   rest/sleep promoted   single patient room provided   hand hygiene promoted  Goal: Optimal Comfort and Wellbeing  Outcome: Progressing  Intervention: Monitor Pain and Promote Comfort  Recent Flowsheet Documentation  Taken 1/23/2025 0815 by Dedrick Cabrales RN  Pain Management Interventions:   pillow support provided   position adjusted  Intervention: Provide Person-Centered Care  Recent Flowsheet Documentation  Taken 1/23/2025 1425 by Dedrick Cabrales RN  Trust Relationship/Rapport:   care explained   choices provided   emotional support provided   empathic listening provided   questions answered   questions encouraged   reassurance provided   thoughts/feelings acknowledged  Taken 1/23/2025 0815 by Dedrick Cabrales RN  Trust Relationship/Rapport:   thoughts/feelings acknowledged   reassurance provided   questions answered   questions encouraged   empathic listening provided   emotional support provided   choices provided   care explained  Goal: Readiness for Transition of Care  Outcome: Progressing     Problem: Comorbidity Management  Goal: Blood Pressure in Desired Range  Outcome: Progressing  Intervention: Maintain Blood Pressure Management  Recent Flowsheet Documentation  Taken  1/23/2025 1425 by Dedrick Cabrales RN  Medication Review/Management: medications reviewed  Taken 1/23/2025 0815 by Dedrick Cabrales RN  Medication Review/Management: medications reviewed     Problem: Fall Injury Risk  Goal: Absence of Fall and Fall-Related Injury  Outcome: Progressing  Intervention: Identify and Manage Contributors  Recent Flowsheet Documentation  Taken 1/23/2025 1425 by Dedrick Cabrales RN  Medication Review/Management: medications reviewed  Taken 1/23/2025 0815 by Dedrick Cabrales RN  Medication Review/Management: medications reviewed  Self-Care Promotion: independence encouraged  Intervention: Promote Injury-Free Environment  Recent Flowsheet Documentation  Taken 1/23/2025 1425 by Dedrick Cabrales RN  Safety Promotion/Fall Prevention:   safety round/check completed   room organization consistent  Taken 1/23/2025 0815 by Dedrick Cabrales RN  Safety Promotion/Fall Prevention:   safety round/check completed   room organization consistent     Problem: Dysrhythmia  Goal: Normalized Cardiac Rhythm  Outcome: Progressing     Problem: VTE (Venous Thromboembolism)  Goal: Tissue Perfusion  Outcome: Progressing  Goal: Optimal Right Ventricular Function  Outcome: Progressing     Problem: Heart Failure  Goal: Optimal Coping  Outcome: Progressing  Intervention: Support Psychosocial Response  Recent Flowsheet Documentation  Taken 1/23/2025 1425 by Dedrick Cabrales RN  Supportive Measures: active listening utilized  Family/Support System Care:   support provided   self-care encouraged   presence promoted   involvement promoted  Taken 1/23/2025 0815 by Dedrick Cabrales RN  Supportive Measures: relaxation techniques promoted  Family/Support System Care:   support provided   self-care encouraged   presence promoted   involvement promoted  Goal: Optimal Cardiac Output and Blood Flow  Outcome: Progressing  Intervention: Optimize  Cardiac Output  Recent Flowsheet Documentation  Taken 1/23/2025 1425 by Dedrick Cabrales RN  Environmental Support: calm environment promoted  Taken 1/23/2025 0815 by Dedrick Cabrales RN  Environmental Support:   calm environment promoted   distractions minimized   rest periods encouraged  Goal: Stable Heart Rate and Rhythm  Outcome: Progressing  Goal: Fluid and Electrolyte Balance  Outcome: Progressing  Goal: Optimal Functional Ability  Outcome: Progressing  Intervention: Optimize Functional Ability  Recent Flowsheet Documentation  Taken 1/23/2025 1425 by Dedrick Cabrales RN  Activity Management: activity minimized  Taken 1/23/2025 0815 by Dedrick Cabrales RN  Activity Management: activity minimized  Self-Care Promotion: independence encouraged  Goal: Improved Oral Intake  Outcome: Progressing  Goal: Effective Oxygenation and Ventilation  Outcome: Progressing  Intervention: Promote Airway Secretion Clearance  Recent Flowsheet Documentation  Taken 1/23/2025 1425 by Dedrick Cabrales RN  Activity Management: activity minimized  Cough And Deep Breathing: done independently per patient  Taken 1/23/2025 0815 by Dedrick Cabrales RN  Activity Management: activity minimized  Cough And Deep Breathing: done independently per patient  Intervention: Optimize Oxygenation and Ventilation  Recent Flowsheet Documentation  Taken 1/23/2025 1425 by Dedrick Cabrales RN  Head of Bed (HOB) Positioning: HOB at 20-30 degrees  Taken 1/23/2025 0815 by Dedrick Cabrales RN  Head of Bed (HOB) Positioning: HOB at 30 degrees  Goal: Effective Breathing Pattern During Sleep  Outcome: Progressing  Intervention: Monitor and Manage Obstructive Sleep Apnea  Recent Flowsheet Documentation  Taken 1/23/2025 1425 by Dedrick Cabrales RN  Medication Review/Management: medications reviewed  Taken 1/23/2025 0815 by Dedrick Cabrales RN  Medication  Review/Management: medications reviewed     Problem: Delirium  Goal: Optimal Coping  Outcome: Progressing  Intervention: Optimize Psychosocial Adjustment to Delirium  Recent Flowsheet Documentation  Taken 1/23/2025 1425 by Dedrick Cabrales RN  Supportive Measures: active listening utilized  Family/Support System Care:   support provided   self-care encouraged   presence promoted   involvement promoted  Taken 1/23/2025 0815 by Dedrick Cabrales RN  Supportive Measures: relaxation techniques promoted  Family/Support System Care:   support provided   self-care encouraged   presence promoted   involvement promoted  Goal: Improved Behavioral Control  Outcome: Progressing  Intervention: Prevent and Manage Agitation  Recent Flowsheet Documentation  Taken 1/23/2025 0815 by Dedrick Cabrales RN  Environment Familiarity/Consistency: daily routine followed  Intervention: Minimize Safety Risk  Recent Flowsheet Documentation  Taken 1/23/2025 1425 by Dedrick Cabrales RN  Trust Relationship/Rapport:   care explained   choices provided   emotional support provided   empathic listening provided   questions answered   questions encouraged   reassurance provided   thoughts/feelings acknowledged  Enhanced Safety Measures: bed alarm set  Communication Support Strategies: active listening utilized  Taken 1/23/2025 0815 by Dedrick Cabrales RN  Trust Relationship/Rapport:   thoughts/feelings acknowledged   reassurance provided   questions answered   questions encouraged   empathic listening provided   emotional support provided   choices provided   care explained  Enhanced Safety Measures: bed alarm set  Communication Support Strategies: active listening utilized  Goal: Improved Attention and Thought Clarity  Outcome: Progressing  Intervention: Maximize Cognitive Function  Recent Flowsheet Documentation  Taken 1/23/2025 1425 by Dedrick Cabrales RN  Sensory Stimulation  Regulation: quiet environment promoted  Reorientation Measures:   calendar in view   clock in view   familiar social contact encouraged  Taken 1/23/2025 0815 by Dedrick Cabrales RN  Sensory Stimulation Regulation:   quiet environment promoted   television on  Goal: Improved Sleep  Outcome: Progressing  Intervention: Promote Sleep  Recent Flowsheet Documentation  Taken 1/23/2025 1425 by Dedrick Cabrales RN  Sleep/Rest Enhancement: awakenings minimized  Taken 1/23/2025 0815 by Dedrick Cabrales RN  Sleep/Rest Enhancement: awakenings minimized     Problem: Skin Injury Risk Increased  Goal: Skin Health and Integrity  Outcome: Progressing  Intervention: Optimize Skin Protection  Recent Flowsheet Documentation  Taken 1/23/2025 1425 by Dedrick Cabrales RN  Activity Management: activity minimized  Head of Bed (HOB) Positioning: HOB at 20-30 degrees  Skin Protection:   transparent dressing maintained   incontinence pads utilized  Taken 1/23/2025 0815 by Dedrick Cabrales RN  Activity Management: activity minimized  Head of Bed (HOB) Positioning: HOB at 30 degrees  Skin Protection:   transparent dressing maintained   incontinence pads utilized     Problem: Restraint, Nonviolent  Goal: Absence of Harm or Injury  Outcome: Progressing  Intervention: Implement Least Restrictive Safety Strategies  Recent Flowsheet Documentation  Taken 1/23/2025 1425 by Dedrick Cabrales RN  Medical Device Protection:   IV pole/bag removed from visual field   tubing secured  Diversional Activities: television  Taken 1/23/2025 0815 by Dedrick Cabrales RN  Medical Device Protection:   IV pole/bag removed from visual field   tubing secured  Diversional Activities: television  Intervention: Protect Dignity, Rights and Personal Wellbeing  Recent Flowsheet Documentation  Taken 1/23/2025 1425 by Dedrick Cabrales RN  Trust Relationship/Rapport:   care explained   choices  provided   emotional support provided   empathic listening provided   questions answered   questions encouraged   reassurance provided   thoughts/feelings acknowledged  Taken 1/23/2025 0815 by Dedrick Cabrales RN  Trust Relationship/Rapport:   thoughts/feelings acknowledged   reassurance provided   questions answered   questions encouraged   empathic listening provided   emotional support provided   choices provided   care explained  Intervention: Protect Skin and Joint Integrity  Recent Flowsheet Documentation  Taken 1/23/2025 1425 by Dedrick Cabrales RN  Body Position: position changed independently  Skin Protection:   transparent dressing maintained   incontinence pads utilized  Range of Motion: active ROM (range of motion) encouraged  Taken 1/23/2025 0815 by Dedrick Cabrales RN  Body Position: position changed independently  Skin Protection:   transparent dressing maintained   incontinence pads utilized  Range of Motion: active ROM (range of motion) encouraged     Problem: Malnutrition  Goal: Improved Nutritional Intake  Outcome: Progressing   Goal Outcome Evaluation:

## 2025-01-23 NOTE — PROGRESS NOTES
The patient is up in chair and seems improved with initiation of Depakote.  He is scheduled for psychiatric admission later today.  Thank you for the opportunity to be involved in the care of this patient.

## 2025-01-23 NOTE — CASE MANAGEMENT/SOCIAL WORK
Continued Stay Note  [facility]     Patient Name: Mark Aguirre Jr.  MRN: [id number]  Today's Date: 1/23/2025    Admit Date: 12/28/2024    Plan: [facility] pending acceptance   Discharge Plan       Row Name 01/23/25 1436       Plan    Plan Comments DC orders noted. Confirmed with [facility] will provide transport and will notify RN or CCP of transportation time. Wife updated at bedside. Packet given to KARINE. Gabriele RN/CCP    Final Discharge Disposition Code 65 - psychiatric hospital or unit    Final Note [facility] via their transportation. No additional CCP needs.                   Discharge Codes    No documentation.                 Expected Discharge Date and Time       Expected Discharge Date Expected Discharge Time    Jan 23, 2025               Gisele Guadarrama RN

## 2025-01-23 NOTE — PLAN OF CARE
Goal Outcome Evaluation:            Pt has been out of restraints all night. Pt has been disoriented x 3 entire shift. Pt tolerated PO intake, perineal care, and safety checks without difficulty. Currently pt is resting quietly, supine with legs elevated. Pt also had mulitple heavily wet diapers.

## 2025-01-24 ENCOUNTER — DOCUMENTATION (OUTPATIENT)
Age: 80
End: 2025-01-24
Payer: MEDICARE

## 2025-01-24 NOTE — PROGRESS NOTES
Attempted to call patient and left voicemail with his wife Paris.  She has been running point on his care recently.    We have him currently set up for a pacemaker on 1/28/2025 and he is currently admitted to a geriatric psych facility to improve his delirium.     I just wanted to talk to Ms. Aguirre and make sure that we were on the same page with timing of the procedure.

## 2025-01-27 ENCOUNTER — TELEPHONE (OUTPATIENT)
Age: 80
End: 2025-01-27
Payer: MEDICARE

## 2025-01-27 NOTE — TELEPHONE ENCOUNTER
Received message per pt's wife Alexandra regarding pacemaker implant. Alexandra stated pt will not be able to do implant this week and that she did receive your call.    Please call Alexandra or advise of recommendations.    BONITA Marcial

## 2025-01-28 ENCOUNTER — APPOINTMENT (OUTPATIENT)
Dept: CT IMAGING | Facility: HOSPITAL | Age: 80
End: 2025-01-28
Payer: MEDICARE

## 2025-01-28 ENCOUNTER — HOSPITAL ENCOUNTER (INPATIENT)
Facility: HOSPITAL | Age: 80
LOS: 1 days | Discharge: ANOTHER HOSPITAL | End: 2025-01-29
Attending: EMERGENCY MEDICINE | Admitting: INTERNAL MEDICINE
Payer: MEDICARE

## 2025-01-28 DIAGNOSIS — I48.91 ATRIAL FIBRILLATION WITH RAPID VENTRICULAR RESPONSE: Primary | ICD-10-CM

## 2025-01-28 LAB
ALBUMIN SERPL-MCNC: 3.8 G/DL (ref 3.5–5.2)
ALBUMIN/GLOB SERPL: 1.8 G/DL
ALP SERPL-CCNC: 62 U/L (ref 39–117)
ALT SERPL W P-5'-P-CCNC: 17 U/L (ref 1–41)
ANION GAP SERPL CALCULATED.3IONS-SCNC: 9.4 MMOL/L (ref 5–15)
AST SERPL-CCNC: 23 U/L (ref 1–40)
B PARAPERT DNA SPEC QL NAA+PROBE: NOT DETECTED
B PERT DNA SPEC QL NAA+PROBE: NOT DETECTED
BASOPHILS # BLD AUTO: 0.02 10*3/MM3 (ref 0–0.2)
BASOPHILS NFR BLD AUTO: 0.3 % (ref 0–1.5)
BILIRUB SERPL-MCNC: 2.3 MG/DL (ref 0–1.2)
BUN SERPL-MCNC: 24 MG/DL (ref 8–23)
BUN/CREAT SERPL: 27.9 (ref 7–25)
C PNEUM DNA NPH QL NAA+NON-PROBE: NOT DETECTED
CALCIUM SPEC-SCNC: 9.4 MG/DL (ref 8.6–10.5)
CHLORIDE SERPL-SCNC: 107 MMOL/L (ref 98–107)
CO2 SERPL-SCNC: 24.6 MMOL/L (ref 22–29)
CREAT SERPL-MCNC: 0.86 MG/DL (ref 0.76–1.27)
DEPRECATED RDW RBC AUTO: 53 FL (ref 37–54)
DIGOXIN SERPL-MCNC: 1.14 NG/ML (ref 0.6–1.2)
EGFRCR SERPLBLD CKD-EPI 2021: 87 ML/MIN/1.73
EOSINOPHIL # BLD AUTO: 0.07 10*3/MM3 (ref 0–0.4)
EOSINOPHIL NFR BLD AUTO: 0.9 % (ref 0.3–6.2)
ERYTHROCYTE [DISTWIDTH] IN BLOOD BY AUTOMATED COUNT: 15.7 % (ref 12.3–15.4)
FLUAV SUBTYP SPEC NAA+PROBE: NOT DETECTED
FLUBV RNA ISLT QL NAA+PROBE: NOT DETECTED
GLOBULIN UR ELPH-MCNC: 2.1 GM/DL
GLUCOSE SERPL-MCNC: 90 MG/DL (ref 65–99)
HADV DNA SPEC NAA+PROBE: NOT DETECTED
HCOV 229E RNA SPEC QL NAA+PROBE: NOT DETECTED
HCOV HKU1 RNA SPEC QL NAA+PROBE: NOT DETECTED
HCOV NL63 RNA SPEC QL NAA+PROBE: NOT DETECTED
HCOV OC43 RNA SPEC QL NAA+PROBE: NOT DETECTED
HCT VFR BLD AUTO: 43.8 % (ref 37.5–51)
HGB BLD-MCNC: 13.9 G/DL (ref 13–17.7)
HMPV RNA NPH QL NAA+NON-PROBE: NOT DETECTED
HOLD SPECIMEN: NORMAL
HPIV1 RNA ISLT QL NAA+PROBE: NOT DETECTED
HPIV2 RNA SPEC QL NAA+PROBE: NOT DETECTED
HPIV3 RNA NPH QL NAA+PROBE: NOT DETECTED
HPIV4 P GENE NPH QL NAA+PROBE: NOT DETECTED
IMM GRANULOCYTES # BLD AUTO: 0.04 10*3/MM3 (ref 0–0.05)
IMM GRANULOCYTES NFR BLD AUTO: 0.5 % (ref 0–0.5)
LYMPHOCYTES # BLD AUTO: 1.14 10*3/MM3 (ref 0.7–3.1)
LYMPHOCYTES NFR BLD AUTO: 15.3 % (ref 19.6–45.3)
M PNEUMO IGG SER IA-ACNC: NOT DETECTED
MAGNESIUM SERPL-MCNC: 2.2 MG/DL (ref 1.6–2.4)
MCH RBC QN AUTO: 29.3 PG (ref 26.6–33)
MCHC RBC AUTO-ENTMCNC: 31.7 G/DL (ref 31.5–35.7)
MCV RBC AUTO: 92.2 FL (ref 79–97)
MONOCYTES # BLD AUTO: 0.68 10*3/MM3 (ref 0.1–0.9)
MONOCYTES NFR BLD AUTO: 9.1 % (ref 5–12)
NEUTROPHILS NFR BLD AUTO: 5.49 10*3/MM3 (ref 1.7–7)
NEUTROPHILS NFR BLD AUTO: 73.9 % (ref 42.7–76)
NRBC BLD AUTO-RTO: 0 /100 WBC (ref 0–0.2)
PLATELET # BLD AUTO: 152 10*3/MM3 (ref 140–450)
PMV BLD AUTO: 11.2 FL (ref 6–12)
POTASSIUM SERPL-SCNC: 4.3 MMOL/L (ref 3.5–5.2)
PROT SERPL-MCNC: 5.9 G/DL (ref 6–8.5)
RBC # BLD AUTO: 4.75 10*6/MM3 (ref 4.14–5.8)
RHINOVIRUS RNA SPEC NAA+PROBE: NOT DETECTED
RSV RNA NPH QL NAA+NON-PROBE: NOT DETECTED
SARS-COV-2 RNA RESP QL NAA+PROBE: NOT DETECTED
SODIUM SERPL-SCNC: 141 MMOL/L (ref 136–145)
TSH SERPL DL<=0.05 MIU/L-ACNC: 2.7 UIU/ML (ref 0.27–4.2)
VALPROATE SERPL-MCNC: 7.1 MCG/ML (ref 50–125)
WBC NRBC COR # BLD AUTO: 7.44 10*3/MM3 (ref 3.4–10.8)
WHOLE BLOOD HOLD COAG: NORMAL

## 2025-01-28 PROCEDURE — P9612 CATHETERIZE FOR URINE SPEC: HCPCS

## 2025-01-28 PROCEDURE — 87086 URINE CULTURE/COLONY COUNT: CPT | Performed by: EMERGENCY MEDICINE

## 2025-01-28 PROCEDURE — 99291 CRITICAL CARE FIRST HOUR: CPT

## 2025-01-28 PROCEDURE — 80164 ASSAY DIPROPYLACETIC ACD TOT: CPT | Performed by: EMERGENCY MEDICINE

## 2025-01-28 PROCEDURE — 80053 COMPREHEN METABOLIC PANEL: CPT | Performed by: EMERGENCY MEDICINE

## 2025-01-28 PROCEDURE — 84443 ASSAY THYROID STIM HORMONE: CPT | Performed by: EMERGENCY MEDICINE

## 2025-01-28 PROCEDURE — 83735 ASSAY OF MAGNESIUM: CPT | Performed by: EMERGENCY MEDICINE

## 2025-01-28 PROCEDURE — 93005 ELECTROCARDIOGRAM TRACING: CPT | Performed by: EMERGENCY MEDICINE

## 2025-01-28 PROCEDURE — 85025 COMPLETE CBC W/AUTO DIFF WBC: CPT | Performed by: EMERGENCY MEDICINE

## 2025-01-28 PROCEDURE — 81001 URINALYSIS AUTO W/SCOPE: CPT | Performed by: EMERGENCY MEDICINE

## 2025-01-28 PROCEDURE — 0202U NFCT DS 22 TRGT SARS-COV-2: CPT | Performed by: EMERGENCY MEDICINE

## 2025-01-28 PROCEDURE — 25010000002 OLANZAPINE 10 MG RECONSTITUTED SOLUTION: Performed by: EMERGENCY MEDICINE

## 2025-01-28 PROCEDURE — 80162 ASSAY OF DIGOXIN TOTAL: CPT | Performed by: EMERGENCY MEDICINE

## 2025-01-28 PROCEDURE — 93005 ELECTROCARDIOGRAM TRACING: CPT

## 2025-01-28 RX ORDER — SODIUM CHLORIDE 0.9 % (FLUSH) 0.9 %
10 SYRINGE (ML) INJECTION AS NEEDED
Status: DISCONTINUED | OUTPATIENT
Start: 2025-01-28 | End: 2025-01-29 | Stop reason: HOSPADM

## 2025-01-28 RX ORDER — WATER 10 ML/10ML
INJECTION INTRAMUSCULAR; INTRAVENOUS; SUBCUTANEOUS
Status: ACTIVE
Start: 2025-01-28 | End: 2025-01-29

## 2025-01-28 RX ORDER — OLANZAPINE 10 MG/2ML
10 INJECTION, POWDER, FOR SOLUTION INTRAMUSCULAR ONCE
Status: COMPLETED | OUTPATIENT
Start: 2025-01-28 | End: 2025-01-28

## 2025-01-28 RX ADMIN — OLANZAPINE 10 MG: 10 INJECTION, POWDER, FOR SOLUTION INTRAMUSCULAR at 23:39

## 2025-01-28 NOTE — Clinical Note
Level of Care: Telemetry [5]   Admitting Physician: TONY WAYNE [[id number]]   Attending Physician: TONY WAYNE [[id number]]   Bed Request Comments: pcu

## 2025-01-29 ENCOUNTER — APPOINTMENT (OUTPATIENT)
Dept: GENERAL RADIOLOGY | Facility: HOSPITAL | Age: 80
End: 2025-01-29
Payer: MEDICARE

## 2025-01-29 ENCOUNTER — APPOINTMENT (OUTPATIENT)
Dept: CT IMAGING | Facility: HOSPITAL | Age: 80
End: 2025-01-29
Payer: MEDICARE

## 2025-01-29 VITALS
OXYGEN SATURATION: 96 % | HEIGHT: 73 IN | DIASTOLIC BLOOD PRESSURE: 74 MMHG | SYSTOLIC BLOOD PRESSURE: 117 MMHG | HEART RATE: 93 BPM | TEMPERATURE: 98.7 F | WEIGHT: 201.06 LBS | BODY MASS INDEX: 26.65 KG/M2 | RESPIRATION RATE: 18 BRPM

## 2025-01-29 PROBLEM — F03.918 DEMENTIA WITH BEHAVIORAL DISTURBANCE: Status: ACTIVE | Noted: 2025-01-29

## 2025-01-29 PROBLEM — I48.91 ATRIAL FIBRILLATION AND FLUTTER: Status: ACTIVE | Noted: 2025-01-29

## 2025-01-29 PROBLEM — I48.92 ATRIAL FIBRILLATION AND FLUTTER: Status: ACTIVE | Noted: 2025-01-29

## 2025-01-29 PROBLEM — N39.0 ACUTE UTI (URINARY TRACT INFECTION): Status: ACTIVE | Noted: 2025-01-29

## 2025-01-29 PROBLEM — I48.91 ATRIAL FIBRILLATION WITH RVR: Status: ACTIVE | Noted: 2025-01-29

## 2025-01-29 LAB
BACTERIA UR QL AUTO: ABNORMAL /HPF
BILIRUB UR QL STRIP: ABNORMAL
CLARITY UR: CLEAR
COLOR UR: ABNORMAL
GLUCOSE UR STRIP-MCNC: NEGATIVE MG/DL
HGB UR QL STRIP.AUTO: ABNORMAL
HYALINE CASTS UR QL AUTO: ABNORMAL /LPF
KETONES UR QL STRIP: ABNORMAL
LEUKOCYTE ESTERASE UR QL STRIP.AUTO: ABNORMAL
NITRITE UR QL STRIP: POSITIVE
PH UR STRIP.AUTO: 5.5 [PH] (ref 5–8)
PROT UR QL STRIP: ABNORMAL
QT INTERVAL: 396 MS
QTC INTERVAL: 503 MS
RBC # UR STRIP: ABNORMAL /HPF
REF LAB TEST METHOD: ABNORMAL
SP GR UR STRIP: 1.02 (ref 1–1.03)
SQUAMOUS #/AREA URNS HPF: ABNORMAL /HPF
UROBILINOGEN UR QL STRIP: ABNORMAL
WBC # UR STRIP: ABNORMAL /HPF

## 2025-01-29 PROCEDURE — 99222 1ST HOSP IP/OBS MODERATE 55: CPT | Performed by: INTERNAL MEDICINE

## 2025-01-29 PROCEDURE — 25810000003 LACTATED RINGERS PER 1000 ML: Performed by: INTERNAL MEDICINE

## 2025-01-29 PROCEDURE — 25010000002 CEFTRIAXONE PER 250 MG: Performed by: INTERNAL MEDICINE

## 2025-01-29 PROCEDURE — 71045 X-RAY EXAM CHEST 1 VIEW: CPT

## 2025-01-29 PROCEDURE — 70450 CT HEAD/BRAIN W/O DYE: CPT

## 2025-01-29 PROCEDURE — 74176 CT ABD & PELVIS W/O CONTRAST: CPT

## 2025-01-29 PROCEDURE — 25810000003 LACTATED RINGERS SOLUTION: Performed by: EMERGENCY MEDICINE

## 2025-01-29 RX ORDER — CLONAZEPAM 0.5 MG/1
0.5 TABLET ORAL 2 TIMES DAILY
Status: DISCONTINUED | OUTPATIENT
Start: 2025-01-29 | End: 2025-01-29 | Stop reason: HOSPADM

## 2025-01-29 RX ORDER — OLANZAPINE 5 MG/1
7.5 TABLET, ORALLY DISINTEGRATING ORAL 2 TIMES DAILY
Status: DISCONTINUED | OUTPATIENT
Start: 2025-01-29 | End: 2025-01-29 | Stop reason: HOSPADM

## 2025-01-29 RX ORDER — BISACODYL 5 MG/1
5 TABLET, DELAYED RELEASE ORAL DAILY PRN
Status: DISCONTINUED | OUTPATIENT
Start: 2025-01-29 | End: 2025-01-29 | Stop reason: HOSPADM

## 2025-01-29 RX ORDER — ACETAMINOPHEN 650 MG/1
650 SUPPOSITORY RECTAL EVERY 4 HOURS PRN
Status: DISCONTINUED | OUTPATIENT
Start: 2025-01-29 | End: 2025-01-29 | Stop reason: HOSPADM

## 2025-01-29 RX ORDER — METOPROLOL TARTRATE 50 MG
100 TABLET ORAL EVERY 12 HOURS SCHEDULED
Status: DISCONTINUED | OUTPATIENT
Start: 2025-01-29 | End: 2025-01-29 | Stop reason: HOSPADM

## 2025-01-29 RX ORDER — DIGOXIN 125 MCG
125 TABLET ORAL
Status: DISCONTINUED | OUTPATIENT
Start: 2025-01-29 | End: 2025-01-29 | Stop reason: HOSPADM

## 2025-01-29 RX ORDER — PROMETHAZINE HYDROCHLORIDE 12.5 MG/1
12.5 TABLET ORAL EVERY 6 HOURS PRN
Status: DISCONTINUED | OUTPATIENT
Start: 2025-01-29 | End: 2025-01-29 | Stop reason: HOSPADM

## 2025-01-29 RX ORDER — SODIUM CHLORIDE, SODIUM LACTATE, POTASSIUM CHLORIDE, CALCIUM CHLORIDE 600; 310; 30; 20 MG/100ML; MG/100ML; MG/100ML; MG/100ML
75 INJECTION, SOLUTION INTRAVENOUS CONTINUOUS
Qty: 1000 ML | Refills: 0 | Status: ON HOLD | OUTPATIENT
Start: 2025-01-29

## 2025-01-29 RX ORDER — SODIUM CHLORIDE 0.9 % (FLUSH) 0.9 %
10 SYRINGE (ML) INJECTION AS NEEDED
Status: DISCONTINUED | OUTPATIENT
Start: 2025-01-29 | End: 2025-01-29 | Stop reason: HOSPADM

## 2025-01-29 RX ORDER — ACETAMINOPHEN 325 MG/1
650 TABLET ORAL EVERY 4 HOURS PRN
Status: DISCONTINUED | OUTPATIENT
Start: 2025-01-29 | End: 2025-01-29 | Stop reason: HOSPADM

## 2025-01-29 RX ORDER — DIVALPROEX SODIUM 125 MG/1
250 CAPSULE, COATED PELLETS ORAL EVERY 8 HOURS SCHEDULED
Status: DISCONTINUED | OUTPATIENT
Start: 2025-01-29 | End: 2025-01-29 | Stop reason: HOSPADM

## 2025-01-29 RX ORDER — POLYETHYLENE GLYCOL 3350 17 G/17G
17 POWDER, FOR SOLUTION ORAL DAILY PRN
Status: DISCONTINUED | OUTPATIENT
Start: 2025-01-29 | End: 2025-01-29 | Stop reason: HOSPADM

## 2025-01-29 RX ORDER — TAMSULOSIN HYDROCHLORIDE 0.4 MG/1
0.4 CAPSULE ORAL NIGHTLY
Status: DISCONTINUED | OUTPATIENT
Start: 2025-01-29 | End: 2025-01-29 | Stop reason: HOSPADM

## 2025-01-29 RX ORDER — BISACODYL 10 MG
10 SUPPOSITORY, RECTAL RECTAL DAILY PRN
Status: DISCONTINUED | OUTPATIENT
Start: 2025-01-29 | End: 2025-01-29 | Stop reason: HOSPADM

## 2025-01-29 RX ORDER — DIVALPROEX SODIUM 125 MG/1
250 CAPSULE, COATED PELLETS ORAL 3 TIMES DAILY
Status: ON HOLD | COMMUNITY

## 2025-01-29 RX ORDER — DILTIAZEM HCL IN NACL,ISO-OSM 125 MG/125
5-15 PLASTIC BAG, INJECTION (ML) INTRAVENOUS
Status: DISCONTINUED | OUTPATIENT
Start: 2025-01-29 | End: 2025-01-29 | Stop reason: HOSPADM

## 2025-01-29 RX ORDER — SODIUM CHLORIDE 9 MG/ML
40 INJECTION, SOLUTION INTRAVENOUS AS NEEDED
Status: DISCONTINUED | OUTPATIENT
Start: 2025-01-29 | End: 2025-01-29 | Stop reason: HOSPADM

## 2025-01-29 RX ORDER — OLANZAPINE 7.5 MG/1
7.5 TABLET, FILM COATED ORAL 2 TIMES DAILY
Status: ON HOLD | COMMUNITY

## 2025-01-29 RX ORDER — ACETAMINOPHEN 160 MG/5ML
650 SOLUTION ORAL EVERY 4 HOURS PRN
Status: DISCONTINUED | OUTPATIENT
Start: 2025-01-29 | End: 2025-01-29 | Stop reason: HOSPADM

## 2025-01-29 RX ORDER — SODIUM CHLORIDE 0.9 % (FLUSH) 0.9 %
10 SYRINGE (ML) INJECTION EVERY 12 HOURS SCHEDULED
Status: DISCONTINUED | OUTPATIENT
Start: 2025-01-29 | End: 2025-01-29 | Stop reason: HOSPADM

## 2025-01-29 RX ORDER — OLANZAPINE 5 MG/1
7.5 TABLET ORAL NIGHTLY
Status: DISCONTINUED | OUTPATIENT
Start: 2025-01-29 | End: 2025-01-29

## 2025-01-29 RX ORDER — CLONAZEPAM 0.5 MG/1
0.5 TABLET ORAL 2 TIMES DAILY
Status: ON HOLD | COMMUNITY

## 2025-01-29 RX ORDER — AMOXICILLIN 250 MG
2 CAPSULE ORAL 2 TIMES DAILY
Status: DISCONTINUED | OUTPATIENT
Start: 2025-01-29 | End: 2025-01-29 | Stop reason: HOSPADM

## 2025-01-29 RX ORDER — SODIUM CHLORIDE, SODIUM LACTATE, POTASSIUM CHLORIDE, CALCIUM CHLORIDE 600; 310; 30; 20 MG/100ML; MG/100ML; MG/100ML; MG/100ML
75 INJECTION, SOLUTION INTRAVENOUS CONTINUOUS
Status: DISPENSED | OUTPATIENT
Start: 2025-01-29 | End: 2025-01-29

## 2025-01-29 RX ORDER — METOPROLOL TARTRATE 1 MG/ML
5 INJECTION, SOLUTION INTRAVENOUS ONCE
Status: DISCONTINUED | OUTPATIENT
Start: 2025-01-29 | End: 2025-01-29

## 2025-01-29 RX ORDER — DILTIAZEM HYDROCHLORIDE 5 MG/ML
10 INJECTION INTRAVENOUS ONCE
Status: DISCONTINUED | OUTPATIENT
Start: 2025-01-29 | End: 2025-01-29

## 2025-01-29 RX ORDER — NITROGLYCERIN 0.4 MG/1
0.4 TABLET SUBLINGUAL
Status: DISCONTINUED | OUTPATIENT
Start: 2025-01-29 | End: 2025-01-29 | Stop reason: HOSPADM

## 2025-01-29 RX ORDER — DIVALPROEX SODIUM 125 MG/1
125 CAPSULE, COATED PELLETS ORAL EVERY 8 HOURS SCHEDULED
Status: DISCONTINUED | OUTPATIENT
Start: 2025-01-29 | End: 2025-01-29

## 2025-01-29 RX ORDER — ATORVASTATIN CALCIUM 10 MG/1
10 TABLET, FILM COATED ORAL NIGHTLY
Status: DISCONTINUED | OUTPATIENT
Start: 2025-01-29 | End: 2025-01-29 | Stop reason: HOSPADM

## 2025-01-29 RX ADMIN — CEFTRIAXONE SODIUM 1000 MG: 1 INJECTION, POWDER, FOR SOLUTION INTRAMUSCULAR; INTRAVENOUS at 05:05

## 2025-01-29 RX ADMIN — DILTIAZEM HYDROCHLORIDE 5 MG/HR: 5 INJECTION INTRAVENOUS at 01:43

## 2025-01-29 RX ADMIN — OLANZAPINE 7.5 MG: 5 TABLET, ORALLY DISINTEGRATING ORAL at 15:25

## 2025-01-29 RX ADMIN — SODIUM CHLORIDE, SODIUM LACTATE, POTASSIUM CHLORIDE, AND CALCIUM CHLORIDE 500 ML: .6; .31; .03; .02 INJECTION, SOLUTION INTRAVENOUS at 03:20

## 2025-01-29 RX ADMIN — DILTIAZEM HYDROCHLORIDE 12.5 MG/HR: 5 INJECTION INTRAVENOUS at 14:21

## 2025-01-29 RX ADMIN — SODIUM CHLORIDE, POTASSIUM CHLORIDE, SODIUM LACTATE AND CALCIUM CHLORIDE 75 ML/HR: 600; 310; 30; 20 INJECTION, SOLUTION INTRAVENOUS at 05:05

## 2025-01-29 NOTE — CONSULTS
[facility]      Patient Identification:  NAME:  Mark Aguirre Jr.  Age:  79 y.o.   Sex:  male   :  1945   MRN:  [id number]       Chief complaint/Reason for consult: Gross hematuria    History of present illness:  79 y.o. male with history of gross hematuria, BPH with LUTS, elevated PSA, and retention presented to ER on 2025 from [facility] due to tachycardia, weakness, and lightheadedness.  Was straight cathed in ER for urinalysis.  Prior to this incident patient's family denies any gross hematuria as of recent.  Urology consulted for gross hematuria.    Past medical history:  Past Medical History:   Diagnosis Date    Acute kidney failure     POST-OP TOTAL HIP 2020    Anticoagulated     PRADAXA FOR A FIB TO HELD 3 DAYS PRIOR    Arthritis     OSTEO. RIGHT HIP    Atrial flutter     Bifascicular block     Cataract     LEFT AND RIGHT    Depression     History of colon polyps     Hypertension     Hypoxemia associated with sleep     Insomnia     Knee pain     STEPHENIE on CPAP 2010    Overnight polysomnogram.  Weight 163 pounds.  Severe STEPHENIE with AHI of 30.9 events per hour.  Low oxygen saturation 72% and sleep-related hypoxia present for 30 minutes    PAF (paroxysmal atrial fibrillation)     Right hip pain     Slow to wake up after anesthesia        Past surgical history:  Past Surgical History:   Procedure Laterality Date    CARDIAC ABLATION      CARDIAC CATHETERIZATION      COLONOSCOPY N/A 2018    Procedure: COLONOSCOPY INTO CECUM WITH COLD BX POLYPECTOMY ;  Surgeon: Ross Stearns MD;  Location: [facility] ENDOSCOPY;  Service:     COLONOSCOPY N/A 2/3/2021    Procedure: COLONOSCOPY TOCECUM WITH COLD BX POLYPECTOMY AND HOT SNARE POLYPECTOMY;  Surgeon: Ross Stearns MD;  Location: [facility] ENDOSCOPY;  Service: General;  Laterality: N/A;  PERSONAL HX OF POLYPS, FAMILY HX OF COLON CANCER  --POLYPS (X3), DIVERTICULOSIS    COLONOSCOPY N/A 3/20/2024    Procedure: COLONOSCOPY TO  CECUM WITH COLD BX POLYPECTOMIES;  Surgeon: Ross Stearns MD;  Location: [facility] ENDOSCOPY;  Service: General;  Laterality: N/A;  HX POLYPS/POLYPS (3)    HERNIA REPAIR      INGUINAL HERNIA? SIDE    TOTAL HIP ARTHROPLASTY Right 6/5/2020    Procedure: TOTAL HIP ARTHROPLASTY;  Surgeon: Travis Hamlin MD;  Location: [facility] MAIN OR;  Service: Orthopedics;  Laterality: Right;       Allergies:  Patient has no known allergies.    Home medications:  Medications Prior to Admission   Medication Sig Dispense Refill Last Dose/Taking    apixaban (ELIQUIS) 5 MG tablet tablet Take 1 tablet by mouth Every 12 (Twelve) Hours. 180 tablet 3 Taking    atorvastatin (LIPITOR) 10 MG tablet Take 1 tablet by mouth Daily.   Taking    clonazePAM (KlonoPIN) 0.5 MG tablet Take 1 tablet by mouth 2 (Two) Times a Day.   Taking    Cyanocobalamin ER 1000 MCG tablet controlled-release Take 1,000 mcg by mouth Daily.   Taking    digoxin (LANOXIN) 125 MCG tablet Take 1 tablet by mouth Daily. 30 tablet 11 Taking    Divalproex Sodium (DEPAKOTE SPRINKLE) 125 MG capsule Take 2 capsules by mouth 3 times a day.   Taking    megestrol (MEGACE) 40 MG/ML suspension Take 10 mL by mouth Daily. 300 mL 0 Taking    metoprolol tartrate (LOPRESSOR) 100 MG tablet Take 1 tablet by mouth Every 12 (Twelve) Hours. 60 tablet 0 Taking    OLANZapine (zyPREXA) 7.5 MG tablet Take 1 tablet by mouth 2 (Two) Times a Day.   Taking    tamsulosin (FLOMAX) 0.4 MG capsule 24 hr capsule Take 1 capsule by mouth Daily.   Taking        Hospital medications:  apixaban, 5 mg, Oral, Q12H  atorvastatin, 10 mg, Oral, Nightly  cefTRIAXone, 1,000 mg, Intravenous, Q24H  clonazePAM, 0.5 mg, Oral, BID  digoxin, 125 mcg, Oral, Daily  Divalproex Sodium, 250 mg, Oral, Q8H  metoprolol tartrate, 100 mg, Oral, Q12H  OLANZapine zydis, 7.5 mg, Oral, BID  senna-docusate sodium, 2 tablet, Oral, BID  sodium chloride, 10 mL, Intravenous, Q12H  tamsulosin, 0.4 mg, Oral, Nightly      dilTIAZem, 5-15 mg/hr, Last  Rate: 12.5 mg/hr (25 0655)  lactated ringers, 75 mL/hr, Last Rate: 75 mL/hr (25 8764)        acetaminophen **OR** acetaminophen **OR** acetaminophen    senna-docusate sodium **AND** polyethylene glycol **AND** bisacodyl **AND** bisacodyl    nitroglycerin    promethazine    [COMPLETED] Insert Peripheral IV **AND** sodium chloride    sodium chloride    sodium chloride    Family history:  Family History   Problem Relation Age of Onset    Breast cancer Mother     Colon cancer Father     Diabetes Brother     No Known Problems Maternal Grandmother     No Known Problems Maternal Grandfather     No Known Problems Paternal Grandmother     Stroke Paternal Grandfather     Heart disease Brother     Malig Hyperthermia Neg Hx        Social history:  Social History     Tobacco Use    Smoking status: Never    Smokeless tobacco: Current     Types: Snuff, Chew    Tobacco comments:     2 weekly chew   Vaping Use    Vaping status: Never Used   Substance Use Topics    Alcohol use: Never    Drug use: Never       Objective:  TMax 24 hours:   Temp (24hrs), Av.3 °F (36.8 °C), Min:97.9 °F (36.6 °C), Max:98.7 °F (37.1 °C)      Vitals Ranges:   Temp:  [97.9 °F (36.6 °C)-98.7 °F (37.1 °C)] 98.7 °F (37.1 °C)  Heart Rate:  [] 107  Resp:  [15-19] 18  BP: (101-123)/(50-78) 107/71    Intake/Output Last 3 shifts:  I/O last 3 completed shifts:  In: 169 [I.V.:169]  Out: -      Physical Exam:    General Appearance:    Alert, cooperative, NAD   Lungs:     Respirations unlabored, no audible wheezing    Heart:    No cyanosis   Abdomen:     Soft, ND    :    No suprapubic distention, external urinary catheter with red output              Results review:   I reviewed the patient's new clinical results.    Data review:  Lab Results (last 24 hours)       Procedure Component Value Units Date/Time    Urinalysis With Culture If Indicated - Straight Cath [[id number]]  (Abnormal) Collected: 25 2100    Specimen: Urine from Straight Cath  Updated: 01/29/25 0234     Color, UA Dark Yellow     Appearance, UA Clear     pH, UA 5.5     Specific Gravity, UA 1.025     Glucose, UA Negative     Ketones, UA 15 mg/dL (1+)     Bilirubin, UA Small (1+)     Comment: Confirmation testing is unavailable.  A serum bilirubin is recommended for further assessment.        Blood, UA Moderate (2+)     Protein, UA Trace     Leuk Esterase, UA Trace     Nitrite, UA Positive     Urobilinogen, UA 2.0 E.U./dL    Narrative:      In absence of clinical symptoms, the presence of pyuria, bacteria, and/or nitrites on the urinalysis result does not correlate with infection.    Urinalysis, Microscopic Only - Straight Cath [[id number]]  (Abnormal) Collected: 01/28/25 2100    Specimen: Urine from Straight Cath Updated: 01/29/25 0234     RBC, UA Too Numerous to Count /HPF      WBC, UA 0-2 /HPF      Comment: Urine culture not indicated.        Bacteria, UA None Seen /HPF      Squamous Epithelial Cells, UA 0-2 /HPF      Hyaline Casts, UA 0-2 /LPF      Methodology Automated Microscopy    Urine Culture - Urine, Straight Cath [[id number]] Collected: 01/28/25 2100    Specimen: Urine from Straight Cath Updated: 01/29/25 0233    Respiratory Panel PCR w/COVID-19(SARS-CoV-2) BLESSING/YAHAIRA/CANDICE/PAD/COR/CAROLANN In-House, NP Swab in UTM/VTM, 2 HR TAT - Swab, Nasopharynx [[id number]]  (Normal) Collected: 01/28/25 2101    Specimen: Swab from Nasopharynx Updated: 01/28/25 2229     ADENOVIRUS, PCR Not Detected     Coronavirus 229E Not Detected     Coronavirus HKU1 Not Detected     Coronavirus NL63 Not Detected     Coronavirus OC43 Not Detected     COVID19 Not Detected     Human Metapneumovirus Not Detected     Human Rhinovirus/Enterovirus Not Detected     Influenza A PCR Not Detected     Influenza B PCR Not Detected     Parainfluenza Virus 1 Not Detected     Parainfluenza Virus 2 Not Detected     Parainfluenza Virus 3 Not Detected     Parainfluenza Virus 4 Not Detected     RSV, PCR Not Detected     Bordetella pertussis  pcr Not Detected     Bordetella parapertussis PCR Not Detected     Chlamydophila pneumoniae PCR Not Detected     Mycoplasma pneumo by PCR Not Detected    Narrative:      In the setting of a positive respiratory panel with a viral infection PLUS a negative procalcitonin without other underlying concern for bacterial infection, consider observing off antibiotics or discontinuation of antibiotics and continue supportive care. If the respiratory panel is positive for atypical bacterial infection (Bordetella pertussis, Chlamydophila pneumoniae, or Mycoplasma pneumoniae), consider antibiotic de-escalation to target atypical bacterial infection.    Comprehensive Metabolic Panel [[id number]]  (Abnormal) Collected: 01/28/25 2123    Specimen: Blood Updated: 01/28/25 2201     Glucose 90 mg/dL      BUN 24 mg/dL      Creatinine 0.86 mg/dL      Sodium 141 mmol/L      Potassium 4.3 mmol/L      Comment: Slight hemolysis detected by analyzer. Result may be falsely elevated.        Chloride 107 mmol/L      CO2 24.6 mmol/L      Calcium 9.4 mg/dL      Total Protein 5.9 g/dL      Albumin 3.8 g/dL      ALT (SGPT) 17 U/L      AST (SGOT) 23 U/L      Alkaline Phosphatase 62 U/L      Total Bilirubin 2.3 mg/dL      Globulin 2.1 gm/dL      A/G Ratio 1.8 g/dL      BUN/Creatinine Ratio 27.9     Anion Gap 9.4 mmol/L      eGFR 87.0 mL/min/1.73     Narrative:      GFR Categories in Chronic Kidney Disease (CKD)      GFR Category          GFR (mL/min/1.73)    Interpretation  G1                     90 or greater         Normal or high (1)  G2                      60-89                Mild decrease (1)  G3a                   45-59                Mild to moderate decrease  G3b                   30-44                Moderate to severe decrease  G4                    15-29                Severe decrease  G5                    14 or less           Kidney failure          (1)In the absence of evidence of kidney disease, neither GFR category G1 or G2 fulfill  the criteria for CKD.    eGFR calculation 2021 CKD-EPI creatinine equation, which does not include race as a factor    Magnesium [[id number]]  (Normal) Collected: 01/28/25 2123    Specimen: Blood Updated: 01/28/25 2201     Magnesium 2.2 mg/dL     TSH Rfx On Abnormal To Free T4 [[id number]]  (Normal) Collected: 01/28/25 2123    Specimen: Blood Updated: 01/28/25 2200     TSH 2.700 uIU/mL     Valproic Acid Level, Total [[id number]]  (Abnormal) Collected: 01/28/25 2123    Specimen: Blood Updated: 01/28/25 2200     Valproic Acid 7.1 mcg/mL     Narrative:      Therapeutic Ranges for Valproic Acid    Epilepsy:       mcg/ml  Bipolar/Teresita  up to 125 mcg/ml      Digoxin Level [[id number]]  (Normal) Collected: 01/28/25 2123    Specimen: Blood Updated: 01/28/25 2200     Digoxin 1.14 ng/mL     CBC & Differential [[id number]]  (Abnormal) Collected: 01/28/25 2123    Specimen: Blood Updated: 01/28/25 2131    Narrative:      The following orders were created for panel order CBC & Differential.  Procedure                               Abnormality         Status                     ---------                               -----------         ------                     CBC Auto Differential[[id number]]        Abnormal            Final result                 Please view results for these tests on the individual orders.    CBC Auto Differential [[id number]]  (Abnormal) Collected: 01/28/25 2123    Specimen: Blood Updated: 01/28/25 2131     WBC 7.44 10*3/mm3      RBC 4.75 10*6/mm3      Hemoglobin 13.9 g/dL      Hematocrit 43.8 %      MCV 92.2 fL      MCH 29.3 pg      MCHC 31.7 g/dL      RDW 15.7 %      RDW-SD 53.0 fl      MPV 11.2 fL      Platelets 152 10*3/mm3      Neutrophil % 73.9 %      Lymphocyte % 15.3 %      Monocyte % 9.1 %      Eosinophil % 0.9 %      Basophil % 0.3 %      Immature Grans % 0.5 %      Neutrophils, Absolute 5.49 10*3/mm3      Lymphocytes, Absolute 1.14 10*3/mm3      Monocytes, Absolute 0.68 10*3/mm3      Eosinophils,  Absolute 0.07 10*3/mm3      Basophils, Absolute 0.02 10*3/mm3      Immature Grans, Absolute 0.04 10*3/mm3      nRBC 0.0 /100 WBC     Extra Tubes [[id number]] Collected: 01/28/25 2123    Specimen: Blood, Venous Line Updated: 01/28/25 2131    Narrative:      The following orders were created for panel order Extra Tubes.  Procedure                               Abnormality         Status                     ---------                               -----------         ------                     Gold Top - SST[[id number]]                                   Final result               Light Blue Top[[id number]]                                   Final result                 Please view results for these tests on the individual orders.    Gold Top - SST [[id number]] Collected: 01/28/25 2123    Specimen: Blood Updated: 01/28/25 2131     Extra Tube Hold for add-ons.     Comment: Auto resulted.       Light Blue Top [[id number]] Collected: 01/28/25 2123    Specimen: Blood Updated: 01/28/25 2131     Extra Tube Hold for add-ons.     Comment: Auto resulted                Imaging:  Imaging Results (Last 24 Hours)       Procedure Component Value Units Date/Time    XR Chest 1 View [[id number]] Collected: 01/29/25 0210     Updated: 01/29/25 0213    Narrative:      XR CHEST 1 VW    Date of Exam: 1/29/2025 1:01 AM EST    Indication: Altered mental status    Comparison: Chest radiograph 1/8/2025    Findings:  The heart is enlarged. Pulmonary vascular markings are normal. The lungs are clear.      Impression:      Impression:  Cardiomegaly. No active disease.          Electronically Signed: Roc Tanner MD    1/29/2025 2:11 AM EST    Workstation ID: [id number]    CT Abdomen Pelvis Without Contrast [[id number]] Collected: 01/29/25 0132     Updated: 01/29/25 0137    Narrative:      CT ABDOMEN PELVIS WO CONTRAST    Date of Exam: 1/29/2025 12:30 AM EST    Indication: Hematuria.    Comparison: CT abdomen pelvis 1/6/2025    Technique: Axial CT images were  obtained of the abdomen and pelvis without the administration of contrast. Sagittal and coronal reconstructions were performed.  Automated exposure control and iterative reconstruction methods were used.      Findings:  Lung Bases:     There is mild bilateral basilar atelectasis. There is moderate coronary artery calcific atherosclerosis and mild cardiomegaly.    Liver:  Liver is normal in size and CT density. No focal lesions.    Biliary/Gallbladder:    The gallbladder is normal without evidence of radiopaque stones. The biliary tree is nondilated.    Spleen:  Spleen is normal in size and CT density.    Pancreas:    Pancreas is normal. There is no evidence of pancreatic mass or peripancreatic fluid.    Kidneys:    There are bilateral nonobstructing renal calculi. There is no hydronephrosis.    Adrenals:    Adrenal glands are unremarkable.    Retroperitoneal/Lymph Nodes/Vasculature:    No retroperitoneal adenopathy is identified.    Gastrointestinal/Mesentery:    The bowel loops are non-dilated without wall thickening or mass. The appendix appears within normal limits. No evidence of obstruction. No free air. No mesenteric fluid collections identified.    Bladder:    The bladder is normal.    Genital:     Unremarkable          Bony Structures/soft tissues:     Visualized bony structures are consistent with the patient's age. The right rectus sheath hematoma seen on previous exam is slightly smaller.        Impression:      Impression:  1.Bilateral nonobstructing renal calculi. No acute abdominal or pelvic abnormality.  2.Slight decrease in size of the right rectus sheath hematoma.                Electronically Signed: Roc Tanner MD    1/29/2025 1:35 AM EST    Workstation ID: [id number]    CT Head Without Contrast [[id number]] Collected: 01/29/25 0106     Updated: 01/29/25 0110    Narrative:      CT HEAD WO CONTRAST    Date of Exam: 1/29/2025 12:30 AM EST    Indication: Altered mental status.    Comparison: CT head  1/8/2025    Technique: Axial CT images were obtained of the head without contrast administration.  Coronal reconstructions were performed.  Automated exposure control and iterative reconstruction methods were used. There is motion artifact which degrades the image   quality.    Findings:  There is mild diffuse generalized atrophy. There are low-attenuation areas in the periventricular white matter consistent with chronic microvascular ischemic change. There is no mass, mass effect or midline shift. There are no abnormal extra-axial fluid   collections or areas of acute hemorrhage. The paranasal sinuses are clear. The mastoid air cells are clear.      Impression:      Impression:  Atrophy and chronic microvascular ischemic change. No acute intracranial process.          Electronically Signed: Roc Tanner MD    1/29/2025 1:08 AM EST    Workstation ID: [id number]               Assessment:       Atrial fibrillation with RVR    Confusion    Acute UTI (urinary tract infection)    Dementia with behavioral disturbance      Gross hematuria  UTI    Plan:     CT images viewed, bilateral nonobstructing renal calculi present no signs of obstruction or hydronephrosis.  Mild bladder.  UA-positive moderate blood, positive nitrites, trace leukocytes, trace protein, small bilirubin, RBCs TNTC.  Culture pending  Continue antibiotics, recommend tailoring to results of urine culture.  Bladder scan every 6 hours, insert indwelling hematuria catheter for residuals greater than 350.  Will sign off please call with any questions concerns or clinical changes.      KEISHA Mcgarry  [facility]  [facility], [address]  [place] [address]  Office: [phone]  Available via Epic Secure Chat  01/29/25  12:27 EST     Discussed with Dr. Roy.

## 2025-01-29 NOTE — LETTER
February 6, 2025     Patient: Mark Aguirre Jr.   YOB: 1945   Date of Visit: 1/29/2025       To Whom it May Concern:    Please be aware that Jim Lindquist's Grandfather, Mark Aguirre, is receiving end of life care at [facility]. This can be a challenging time, requiring extra support and flexibility. If you have any questions or concerns, please don't hesitate to call.         Sincerely,          Dr. Luis Staples MD  [phone]

## 2025-01-29 NOTE — CONSULTS
Nutrition Services    Patient Name: Mark Aguirre Jr.  YOB: 1945  MRN: [id number]  Admission date: 1/28/2025    Comment:  -- Continue current diet and encourage good PO intakes    -- Add Boost Original BID (Provides 480 kcals, 20 g protein if consumed)     -- Severe chronic disease related malnutrition related to decreased appetite in the setting of a-fib, dementia as evidenced by weight loss greater than 7.5% x 3 months and PO intakes less than 75% 1 month.  See MSA below.        CLINICAL NUTRITION ASSESSMENT      Reason for Assessment 1/29: MST of 2     H&P      Past Medical History:   Diagnosis Date    Acute kidney failure     POST-OP TOTAL HIP 2020    Anticoagulated     PRADAXA FOR A FIB TO HELD 3 DAYS PRIOR    Arthritis     OSTEO. RIGHT HIP    Atrial flutter     Bifascicular block     Cataract     LEFT AND RIGHT    Depression     History of colon polyps     Hypertension     Hypoxemia associated with sleep     Insomnia     Knee pain     STEPHENIE on CPAP 05/27/2010    Overnight polysomnogram.  Weight 163 pounds.  Severe STEPHENIE with AHI of 30.9 events per hour.  Low oxygen saturation 72% and sleep-related hypoxia present for 30 minutes    PAF (paroxysmal atrial fibrillation)     Right hip pain     Slow to wake up after anesthesia        Past Surgical History:   Procedure Laterality Date    CARDIAC ABLATION      CARDIAC CATHETERIZATION      COLONOSCOPY N/A 2/21/2018    Procedure: COLONOSCOPY INTO CECUM WITH COLD BX POLYPECTOMY ;  Surgeon: Ross Stearns MD;  Location: [facility] ENDOSCOPY;  Service:     COLONOSCOPY N/A 2/3/2021    Procedure: COLONOSCOPY TOCECUM WITH COLD BX POLYPECTOMY AND HOT SNARE POLYPECTOMY;  Surgeon: Ross Stearns MD;  Location: [facility] ENDOSCOPY;  Service: General;  Laterality: N/A;  PERSONAL HX OF POLYPS, FAMILY HX OF COLON CANCER  --POLYPS (X3), DIVERTICULOSIS    COLONOSCOPY N/A 3/20/2024    Procedure: COLONOSCOPY TO CECUM WITH COLD BX POLYPECTOMIES;  Surgeon: Ross Stearns MD;   "Location: [facility] ENDOSCOPY;  Service: General;  Laterality: N/A;  HX POLYPS/POLYPS (3)    HERNIA REPAIR      INGUINAL HERNIA? SIDE    TOTAL HIP ARTHROPLASTY Right 6/5/2020    Procedure: TOTAL HIP ARTHROPLASTY;  Surgeon: Travis Hamlin MD;  Location: [facility] MAIN OR;  Service: Orthopedics;  Laterality: Right;        Current Problems   A-fib  - cardiology consulted     Dementia    Acute UTI  - urology following       Encounter Information        Trending Narrative     1/29: Admitted for irregular heart rate.  RD visited patient at bedside.  RD spoke to wife who states patient has been losing weight and not eating well since December in which is has lost 18#.  Patient was doing Boost PTA.  Patient not having N/V or any chewing/swallowing issues.  NFPE completed, consistent with nutrition diagnosis of malnutrition using AND/ASPEN criteria. See MSA below.        Anthropometrics        Current Height, Weight Height: 185.4 cm (73\")  Weight: 91.2 kg (201 lb 1 oz) (01/29/25 0349)       Usual Body Weight (UBW) 220# per patients spouse        Trending Weight Hx     This admission: 1/29: 201#             PTA: 12.2% weight loss x 3 months - significant   10.6% weight loss x 11 months     Wt Readings from Last 30 Encounters:   01/29/25 0349 91.2 kg (201 lb 1 oz)   01/21/25 1500 89.7 kg (197 lb 12 oz)   01/14/25 0900 91.1 kg (200 lb 13.8 oz)   01/08/25 1100 95.6 kg (210 lb 12.2 oz)   12/29/24 0818 110 kg (241 lb 8 oz)   12/17/24 0739 103 kg (226 lb)   12/13/24 0930 103 kg (228 lb)   10/07/24 1123 104 kg (229 lb)   09/05/24 0803 104 kg (229 lb 4.5 oz)   08/21/24 1104 102 kg (224 lb)   03/20/24 1019 99.9 kg (220 lb 4.8 oz)   03/19/24 0912 102 kg (224 lb)   02/27/24 0957 102 kg (225 lb)   12/04/23 1030 101 kg (223 lb)   11/29/23 0831 102 kg (224 lb 12.8 oz)   07/31/23 1000 103 kg (226 lb 6.4 oz)   07/05/23 1519 102 kg (224 lb)   07/04/23 1736 102 kg (224 lb)   07/04/23 1654 102 kg (224 lb)   02/23/23 0746 99.3 kg (219 lb)   12/02/22 " "1003 103 kg (226 lb)   11/30/22 0900 103 kg (227 lb)   08/31/22 0700 103 kg (226 lb 6.4 oz)   02/23/22 0941 105 kg (231 lb)   10/20/21 1248 105 kg (231 lb 7.7 oz)   10/06/21 0850 105 kg (230 lb 6.4 oz)   09/01/21 0700 105 kg (232 lb)   06/24/21 0943 105 kg (231 lb)   02/03/21 0802 102 kg (224 lb)   02/02/21 1218 103 kg (228 lb)   09/02/20 1413 106 kg (233 lb 9.6 oz)   08/21/20 1323 107 kg (235 lb)   07/30/20 0829 107 kg (235 lb)   07/07/20 1113 103 kg (226 lb)   06/16/20 0349 111 kg (243 lb 11.2 oz)   06/15/20 2026 121 kg (266 lb 8 oz)   06/05/20 0911 108 kg (238 lb 8.6 oz)   06/04/20 1103 107 kg (235 lb 12.8 oz)      BMI kg/m2 Body mass index is 26.53 kg/m².       Labs        Pertinent Labs    Results from last 7 days   Lab Units 01/28/25  2123   SODIUM mmol/L 141   POTASSIUM mmol/L 4.3   CHLORIDE mmol/L 107   CO2 mmol/L 24.6   BUN mg/dL 24*   CREATININE mg/dL 0.86   CALCIUM mg/dL 9.4   BILIRUBIN mg/dL 2.3*   ALK PHOS U/L 62   ALT (SGPT) U/L 17   AST (SGOT) U/L 23   GLUCOSE mg/dL 90     Results from last 7 days   Lab Units 01/28/25  2123   MAGNESIUM mg/dL 2.2   HEMOGLOBIN g/dL 13.9   HEMATOCRIT % 43.8     No results found for: \"HGBA1C\"     Medications    Scheduled Medications apixaban, 5 mg, Oral, Q12H  atorvastatin, 10 mg, Oral, Nightly  cefTRIAXone, 1,000 mg, Intravenous, Q24H  clonazePAM, 0.5 mg, Oral, BID  digoxin, 125 mcg, Oral, Daily  Divalproex Sodium, 250 mg, Oral, Q8H  metoprolol tartrate, 100 mg, Oral, Q12H  OLANZapine zydis, 7.5 mg, Oral, BID  senna-docusate sodium, 2 tablet, Oral, BID  sodium chloride, 10 mL, Intravenous, Q12H  tamsulosin, 0.4 mg, Oral, Nightly        Infusions dilTIAZem, 5-15 mg/hr, Last Rate: 12.5 mg/hr (01/29/25 6979)  lactated ringers, 75 mL/hr, Last Rate: 75 mL/hr (01/29/25 6802)        PRN Medications   acetaminophen **OR** acetaminophen **OR** acetaminophen    senna-docusate sodium **AND** polyethylene glycol **AND** bisacodyl **AND** bisacodyl    nitroglycerin    promethazine    " [COMPLETED] Insert Peripheral IV **AND** sodium chloride    sodium chloride    sodium chloride     Physical Findings        Trending Physical   Appearance, NFPE 1/29: NFPE completed, consistent with nutrition diagnosis of malnutrition using AND/ASPEN criteria. See MSA below.      --  Edema  2+ right leg     Bowel Function No BM since admission (today)     Tubes No feeding tube      Chewing/Swallowing No known issues      Skin Intact      --  Current Nutrition Orders & Evaluation of Intake       Oral Nutrition     Food Allergies NKFA   Current PO Diet Diet: Cardiac; Healthy Heart (2-3 Na+); Fluid Consistency: Thin (IDDSI 0)   Supplement None ordered    PO Evaluation     Trending % PO Intake 1/29: None documented    --  Nutritional Risk Screening        NRS-2002 Score          Nutrition Diagnosis         Nutrition Dx Problem 1 Severe chronic disease related malnutrition related to decreased appetite in the setting of a-fib, dementia as evidenced by weight loss greater than 7.5% x 3 months and PO intakes less than 75% 1 month.      Nutrition Dx Problem 2        Intervention Goal         Intervention Goal(s) Accept ONS  No weight loss  PO intakes at least 75%     Nutrition Intervention        RD Action Add ONS  Completed NFPE     Nutrition Prescription          Diet Prescription Heart Healthy    Supplement Prescription Boost Original BID   --  Monitor/Evaluation        Monitor Per protocol, I&O, PO intake, Supplement intake, Pertinent labs, Weight     Malnutrition Severity Assessment      Patient meets criteria for : Severe Malnutrition  Malnutrition Type (Last 8 Hours)       Malnutrition Severity Assessment       Row Name 01/29/25 1402       Malnutrition Severity Assessment    Malnutrition Type Chronic Disease - Related Malnutrition      Row Name 01/29/25 1402       Insufficient Energy Intake     Insufficient Energy Intake Findings Severe    Insufficient Energy Intake  <75% of est. energy requirement for > or equal to  1 month      Row Name 01/29/25 1402       Unintentional Weight Loss     Unintentional Weight Loss Findings Severe    Unintentional Weight Loss  Weight loss greater than 7.5% in three months      Row Name 01/29/25 1402       Criteria Met (Must meet criteria for severity in at least 2 of these categories: M Wasting, Fat Loss, Fluid, Secondary Signs, Wt. Status, Intake)    Patient meets criteria for  Severe Malnutrition                     Electronically signed by:  Nasrin Vilchis RD  01/29/25 13:50 EST

## 2025-01-29 NOTE — SIGNIFICANT NOTE
Case Management Readmission Assessment Note    Case Management Readmission Assessment (all recorded)       Readmission Interview       Row Name 01/29/25 1006             Readmission Indications    Is the patient and/or family able to complete the readmission assessment questions? Yes (P)   Patient spouse Alexandra, Patient confused and restrained.      Is this hospitalization related to the prior hospital diagnosis? Yes  Last Hospitalization he had gone in for Afib, causing collapse at home. Needed HR rate control. Here for HR control this time as welll.        Row Name 01/29/25 1006             Recommendation for rehospitalization    Did you speak with your physician prior to coming to the hospital No        Row Name 01/29/25 1006             Follow-up Appointments    Do you have a PCP? Yes  Dr. Staples      Did you have an appointment with PCP after your hospitalization? No  In patient at [facility]      Did you have an appointment with a Specialist? No  In patient at [facility]      Are you current with the Pulmonary Clinic? No      Are you current with the CHF Clinic? No        Row Name 01/29/25 1006             Medications    Did you have newly prescribed medications at discharge? No  not that spouse is aware of. Yes per AVS      Did you understand the reasons for your medications at discharge and how to take them? --  Not applicable      Did you understand the side effects of your medications? --  Not applicable      Are you taking all of you prescribed medications? Yes      What pharmacy was used to fill prescription(s)? [facility] meds to bed per AVS      Were medications picked up? Yes        Row Name 01/29/25 1006             Discharge Instructions    Did you understand your discharge instructions? No  not discussed with family      Did your family/caregiver hear your instructions? No      Were you told to eat a special diet? No      Did you adhere to the diet? --  not applicable      Were you given a number of  someone to call if you had questions or concerns? Yes        Row Name 01/29/25 1006             Index discharge location/services    Where did you go upon discharge? Other (comment)  [facility]      Do you have supportive family or friends in the home? Yes      What services were arranged at discharge? Transportation  [facility] provided transportation        Row Name 01/29/25 1006             Discharge Readiness    On a scale of 1-5 (5 being well prepared), how ready were you for discharge 3  He had not been controlled/stable but for only three days and family feels maybe it should have been a little longer.      Recommendation based on interview Other (comment)        Row Name 01/29/25 1006             Palliative Care/Hospice    Are you current with Palliative Care? No      Are you current with Hospice Care? No        Row Name 01/29/25 0357             Advance Directives (For Healthcare)    Pre-existing AND/MOST/POLST Order No      Advance Directive Status Patient has advance directive, copy in chart  scanned in from [facility]      Type of Advance Directive Health care directive/Living will      Have you reviewed your Advance Directive and is it valid for this stay? Not applicable        Row Name 01/29/25 1006             Readmission Assessment Final Comments    Final Comments Previous - 12/28 - 1/32 at [facility]. Admitted for Afib w/ RVR, Syncope, Ear Lac.  Afib treated with Metoprolol but difficult to control. Syncope thought to be related to Dehydration and Afib combination. CT and MRI's negative. During hospitalization patient developed Acute Delerium and was later diagnosed with Underlying dementia, treated with Haldol. Also developed Rt. Rectus Hematoma, Eliquis stopped for 4 days and symptoms resolved. Patient evaluated and DC'd to Geropsych unit at [facility]. [facility] provided transportation. Meds to bed utilized at [facility]........ Current- Sent to ED from [facility]  with increased HR and confusion. Afib on arrival and treated with Cardizem gtt in ED. Had to sedate patient to complete imaging. CT's negative. Family wants to transfer back to [facility] and Hospitalist working to meet those wishes. Has accepting physician and awaiting bed placement. (P)                       Sherin Echavarria, RN    Office Phone: [phone]  Office Cell:     [phone]

## 2025-01-29 NOTE — ED PROVIDER NOTES
Subjective   History of Present Illness  Below is from right wellness patient is confused    History of present illness an 79-year-old male who is currently at [facility] who was admitted there from [facility]  The patient was discharged from [facility] January after sustaining a syncopal episode and fall and some atrial fibrillation which took some time to control.  The patient subsequently became very agitated and has been confused ever since the middle of January.  Patient was seen by neurology and had multiple evaluations and was sent to [facility] for psychiatric and behavioral admission.  The patient was seen here tonight because they said his heart rate had [facility] was around 2040 in the 130.  He seemed to be somewhat weak and lightheaded.  Patient upon arrival here has no complaints of anything.  He denies headache chest pain neck arm jaw pain shortness of breath or abdominal pain no vomiting or diarrhea he is pleasantly confused and difficult to get an accurate history.  Family reports he has been like this since he had been admitted to [facility] and discharged.      Review of Systems   Unable to perform ROS: Mental status change       Past Medical History:   Diagnosis Date    Acute kidney failure     POST-OP TOTAL HIP 2020    Anticoagulated     PRADAXA FOR A FIB TO HELD 3 DAYS PRIOR    Arthritis     OSTEO. RIGHT HIP    Atrial flutter     Bifascicular block     Cataract     LEFT AND RIGHT    Depression     History of colon polyps     Hypertension     Hypoxemia associated with sleep     Insomnia     Knee pain     STEPHENIE on CPAP 05/27/2010    Overnight polysomnogram.  Weight 163 pounds.  Severe STEPHENIE with AHI of 30.9 events per hour.  Low oxygen saturation 72% and sleep-related hypoxia present for 30 minutes    PAF (paroxysmal atrial fibrillation)     Right hip pain     Slow to wake up after anesthesia        No Known Allergies    Past Surgical History:    Procedure Laterality Date    CARDIAC ABLATION      CARDIAC CATHETERIZATION      COLONOSCOPY N/A 2/21/2018    Procedure: COLONOSCOPY INTO CECUM WITH COLD BX POLYPECTOMY ;  Surgeon: Ross Stearns MD;  Location:  [facility];  Service:     COLONOSCOPY N/A 2/3/2021    Procedure: COLONOSCOPY TOCECUM WITH COLD BX POLYPECTOMY AND HOT SNARE POLYPECTOMY;  Surgeon: Ross Stearns MD;  Location:  [facility];  Service: General;  Laterality: N/A;  PERSONAL HX OF POLYPS, FAMILY HX OF COLON CANCER  --POLYPS (X3), DIVERTICULOSIS    COLONOSCOPY N/A 3/20/2024    Procedure: COLONOSCOPY TO CECUM WITH COLD BX POLYPECTOMIES;  Surgeon: Ross Stearns MD;  Location:  [facility];  Service: General;  Laterality: N/A;  HX POLYPS/POLYPS (3)    HERNIA REPAIR      INGUINAL HERNIA? SIDE    TOTAL HIP ARTHROPLASTY Right 6/5/2020    Procedure: TOTAL HIP ARTHROPLASTY;  Surgeon: Travis Hamlin MD;  Location: [facility] MAIN OR;  Service: Orthopedics;  Laterality: Right;       Family History   Problem Relation Age of Onset    Breast cancer Mother     Colon cancer Father     Diabetes Brother     No Known Problems Maternal Grandmother     No Known Problems Maternal Grandfather     No Known Problems Paternal Grandmother     Stroke Paternal Grandfather     Heart disease Brother     Malig Hyperthermia Neg Hx        Social History     Socioeconomic History    Marital status:    Tobacco Use    Smoking status: Never    Smokeless tobacco: Current     Types: Snuff    Tobacco comments:     2 weekly chew   Vaping Use    Vaping status: Never Used   Substance and Sexual Activity    Alcohol use: Never    Drug use: Never    Sexual activity: Defer     Prior to Admission medications    Medication Sig Start Date End Date Taking? Authorizing Provider   acetaminophen (TYLENOL) 325 MG tablet Take 2 tablets by mouth Every 4 (Four) Hours As Needed for Mild Pain . 6/22/20   Luis Staples MD   apixaban (ELIQUIS) 5 MG tablet tablet Take 1 tablet  by mouth Every 12 (Twelve) Hours. 11/15/24   Claus Lindquist MD   atorvastatin (LIPITOR) 10 MG tablet Take 1 tablet by mouth Daily. 1/4/22   Toro Stuart MD   Cyanocobalamin (VITAMIN B 12 PO) Take  by mouth.    Toro Stuart MD   digoxin (LANOXIN) 125 MCG tablet Take 1 tablet by mouth Daily. 11/20/24   Cameron Stroud, KAYLEE   Divalproex Sodium (DEPAKOTE SPRINKLE) 125 MG capsule Take 1 capsule by mouth 3 times a day. 1/22/25   Luis Staples MD   megestrol (MEGACE) 40 MG/ML suspension Take 10 mL by mouth Daily. 1/23/25   Luis Staples MD   metoprolol tartrate (LOPRESSOR) 100 MG tablet Take 1 tablet by mouth Every 12 (Twelve) Hours. 1/22/25   Luis Staples MD   OLANZapine (zyPREXA) 7.5 MG tablet Take 1 tablet by mouth Every Night. 1/22/25   Luis Staples MD   OLANZapine (zyPREXA) 7.5 MG tablet Take 1 tablet by mouth Daily With Dinner. 1/23/25   Luis Staples MD   sennosides-docusate (PERICOLACE) 8.6-50 MG per tablet Take 2 tablets by mouth 2 (Two) Times a Day As Needed for Constipation. 1/22/25   Luis Staples MD   tamsulosin (FLOMAX) 0.4 MG capsule 24 hr capsule Take 1 capsule by mouth Daily. 12/28/21   Toro Stuart MD          Objective   Physical Exam  Constitutional this is an 79-year-old male awake alert he is in no acute distress triage vital signs are currently reviewed.  HEENT extraocular muscles are intact pupils equal round reactive no photophobia mouth clear neck supple no adenopathy no JV no bruits lungs clear no retraction no use of accessories.  Heart irregular without murmur or rub.  Rate of 100 abdomen soft nontender good bowel sounds no pulsatile masses extremities pulses equal upper and lower extremities no edema no cords no Homans' sign no evidence of DVT skin is warm and dry without rashes or cellulitic changes neurologic he is awake alert and orientated x 1 no facial asymmetry speech normal no drift the arms or legs without focal  weakness.  Procedures           ED Course      Results for orders placed or performed during the hospital encounter of 01/28/25   ECG 12 Lead Electrolyte Imbalance    Collection Time: 01/28/25  8:28 PM   Result Value Ref Range    QT Interval 396 ms    QTC Interval 503 ms   Urinalysis With Culture If Indicated - Straight Cath    Collection Time: 01/28/25  9:00 PM    Specimen: Straight Cath; Urine   Result Value Ref Range    Color, UA Dark Yellow (A) Yellow, Straw    Appearance, UA Clear Clear    pH, UA 5.5 5.0 - 8.0    Specific Gravity, UA 1.025 1.005 - 1.030    Glucose, UA Negative Negative    Ketones, UA 15 mg/dL (1+) (A) Negative    Bilirubin, UA Small (1+) (A) Negative    Blood, UA Moderate (2+) (A) Negative    Protein, UA Trace (A) Negative    Leuk Esterase, UA Trace (A) Negative    Nitrite, UA Positive (A) Negative    Urobilinogen, UA 2.0 E.U./dL (A) 0.2 - 1.0 E.U./dL   Urinalysis, Microscopic Only - Straight Cath    Collection Time: 01/28/25  9:00 PM    Specimen: Straight Cath; Urine   Result Value Ref Range    RBC, UA Too Numerous to Count (A) None Seen, 0-2 /HPF    WBC, UA 0-2 None Seen, 0-2 /HPF    Bacteria, UA None Seen None Seen /HPF    Squamous Epithelial Cells, UA 0-2 None Seen, 0-2 /HPF    Hyaline Casts, UA 0-2 None Seen /LPF    Methodology Automated Microscopy    Respiratory Panel PCR w/COVID-19(SARS-CoV-2) [facility]OLANN In-House, NP Swab in UTM/VTM, 2 HR TAT - Swab, Nasopharynx    Collection Time: 01/28/25  9:01 PM    Specimen: Nasopharynx; Swab   Result Value Ref Range    ADENOVIRUS, PCR Not Detected Not Detected    Coronavirus 229E Not Detected Not Detected    Coronavirus HKU1 Not Detected Not Detected    Coronavirus NL63 Not Detected Not Detected    Coronavirus OC43 Not Detected Not Detected    COVID19 Not Detected Not Detected - Ref. Range    Human Metapneumovirus Not Detected Not Detected    Human Rhinovirus/Enterovirus Not Detected Not Detected    Influenza A PCR Not Detected Not  Detected    Influenza B PCR Not Detected Not Detected    Parainfluenza Virus 1 Not Detected Not Detected    Parainfluenza Virus 2 Not Detected Not Detected    Parainfluenza Virus 3 Not Detected Not Detected    Parainfluenza Virus 4 Not Detected Not Detected    RSV, PCR Not Detected Not Detected    Bordetella pertussis pcr Not Detected Not Detected    Bordetella parapertussis PCR Not Detected Not Detected    Chlamydophila pneumoniae PCR Not Detected Not Detected    Mycoplasma pneumo by PCR Not Detected Not Detected   Comprehensive Metabolic Panel    Collection Time: 01/28/25  9:23 PM    Specimen: Blood   Result Value Ref Range    Glucose 90 65 - 99 mg/dL    BUN 24 (H) 8 - 23 mg/dL    Creatinine 0.86 0.76 - 1.27 mg/dL    Sodium 141 136 - 145 mmol/L    Potassium 4.3 3.5 - 5.2 mmol/L    Chloride 107 98 - 107 mmol/L    CO2 24.6 22.0 - 29.0 mmol/L    Calcium 9.4 8.6 - 10.5 mg/dL    Total Protein 5.9 (L) 6.0 - 8.5 g/dL    Albumin 3.8 3.5 - 5.2 g/dL    ALT (SGPT) 17 1 - 41 U/L    AST (SGOT) 23 1 - 40 U/L    Alkaline Phosphatase 62 39 - 117 U/L    Total Bilirubin 2.3 (H) 0.0 - 1.2 mg/dL    Globulin 2.1 gm/dL    A/G Ratio 1.8 g/dL    BUN/Creatinine Ratio 27.9 (H) 7.0 - 25.0    Anion Gap 9.4 5.0 - 15.0 mmol/L    eGFR 87.0 >60.0 mL/min/1.73   Magnesium    Collection Time: 01/28/25  9:23 PM    Specimen: Blood   Result Value Ref Range    Magnesium 2.2 1.6 - 2.4 mg/dL   TSH Rfx On Abnormal To Free T4    Collection Time: 01/28/25  9:23 PM    Specimen: Blood   Result Value Ref Range    TSH 2.700 0.270 - 4.200 uIU/mL   Valproic Acid Level, Total    Collection Time: 01/28/25  9:23 PM    Specimen: Blood   Result Value Ref Range    Valproic Acid 7.1 (L) 50.0 - 125.0 mcg/mL   Digoxin Level    Collection Time: 01/28/25  9:23 PM    Specimen: Blood   Result Value Ref Range    Digoxin 1.14 0.60 - 1.20 ng/mL   CBC Auto Differential    Collection Time: 01/28/25  9:23 PM    Specimen: Blood   Result Value Ref Range    WBC 7.44 3.40 - 10.80  10*3/mm3    RBC 4.75 4.14 - 5.80 10*6/mm3    Hemoglobin 13.9 13.0 - 17.7 g/dL    Hematocrit 43.8 37.5 - 51.0 %    MCV 92.2 79.0 - 97.0 fL    MCH 29.3 26.6 - 33.0 pg    MCHC 31.7 31.5 - 35.7 g/dL    RDW 15.7 (H) 12.3 - 15.4 %    RDW-SD 53.0 37.0 - 54.0 fl    MPV 11.2 6.0 - 12.0 fL    Platelets 152 140 - 450 10*3/mm3    Neutrophil % 73.9 42.7 - 76.0 %    Lymphocyte % 15.3 (L) 19.6 - 45.3 %    Monocyte % 9.1 5.0 - 12.0 %    Eosinophil % 0.9 0.3 - 6.2 %    Basophil % 0.3 0.0 - 1.5 %    Immature Grans % 0.5 0.0 - 0.5 %    Neutrophils, Absolute 5.49 1.70 - 7.00 10*3/mm3    Lymphocytes, Absolute 1.14 0.70 - 3.10 10*3/mm3    Monocytes, Absolute 0.68 0.10 - 0.90 10*3/mm3    Eosinophils, Absolute 0.07 0.00 - 0.40 10*3/mm3    Basophils, Absolute 0.02 0.00 - 0.20 10*3/mm3    Immature Grans, Absolute 0.04 0.00 - 0.05 10*3/mm3    nRBC 0.0 0.0 - 0.2 /100 WBC   Gold Top - SST    Collection Time: 01/28/25  9:23 PM   Result Value Ref Range    Extra Tube Hold for add-ons.    Light Blue Top    Collection Time: 01/28/25  9:23 PM   Result Value Ref Range    Extra Tube Hold for add-ons.      CT Head Without Contrast    Result Date: 1/29/2025  Impression: Atrophy and chronic microvascular ischemic change. No acute intracranial process. Electronically Signed: Roc Tanner MD  1/29/2025 1:08 AM EST  Workstation ID: [id number]   Medications   sodium chloride 0.9 % flush 10 mL (has no administration in time range)   sterile water (preservative free) injection  - ADS Override Pull (has no administration in time range)   lactated ringers bolus 500 mL (has no administration in time range)   dilTIAZem (CARDIZEM) injection 10 mg (has no administration in time range)   dilTIAZem (CARDIZEM) 125 mg in 125 mL D5W infusion (has no administration in time range)   OLANZapine (zyPREXA) injection 10 mg (10 mg Intramuscular Given 1/28/25 9939)                                            EKG my interpretation atrial fibrillation rate of 97 right bundle branch  block interventricular conduction delay QTc of 503 abnormal EKG unchanged when compared to 12/30/2024            Medical Decision Making  Medical decision making.  IV established monitor placed my review shows atrial fibrillation rate of 100 initially.  EKG my interpretation showed atrial fibrillation rate of 97 right bundle branch block interventricular conduction delay unchanged compared to 12/30/2024.  The patient had labs obtained by independent review comprehensive metabolic profile was unremarkable magnesium normal.  TSH was unremarkable Depakote was 7.1 dig level was 1.14 and CBC was unremarkable.  The patient attempted to do a CT scan but he was somewhat combative and more confused and he had to be sedated with Zyprexa 10 mg IM.  He underwent a CT head without my independent review I do not see any acute hemorrhage or tumors or masses radiology reviewed chronic microvascular changes otherwise unremarkable.  Respiratory panel was negative.  A cath urine obtained showed too numerous to count red blood cells no white cells have positive nitrate that was cultured.  The patient on the ER slowly had loss of his A-fib and started to become faster.  It crept up into the 140s.  But he is increasingly agitated as well.  Patient record reviewed he was admitted to [facility] in the first part of January and discharged on the 23rd he had been admitted for syncopal episode had a ear laceration he had A-fib with rapid ventricular response he was admitted he had CT scans all which were unremarkable.  He became acutely agitated and confused which is new for him.  The patient had to be controlled with a drip of antiarrhythmics.  And subsequently sedated throughout his hospital stay.  He never improved and is had altered mental status ever since that time according to the wife.  They did a MRI of his brain he had a spinal tap there was no evidence of meningitis or acute stroke I thought he had some early dementia  based on his spinal tap he had a rectus abdominal hematoma second anticoagulation.  That was held but subsequently restarted.  He has no abdominal pain tonight.  He is hemodynamically stable otherwise.  I do not see any evidence of an acute stroke I do not see any evidence of meningitis or encephalitis or acute intracerebral hemorrhage or acute myocardial infarction acute intra-abdominal process sepsis or bacteremia.  Consider UTI.  The urine has been cultured..  The patient will be started on Cardizem drip.  Patient's blood pressure is a little soft at about 103/59 with a MAP of 80.  He will 500 cc lactated ringer bolus.  His rate is only in the 120 range currently at times up into the 140s.  Patient blood pressure did improve to the low 110/70 and 1:40 AM.  Patient was given Cardizem 10 mg IV bolus and placed on drip.  Will have to try to control this rate as he has become A-fib with RVR.  Patient is anticoagulated already.  I talked to the wife about this.  Hospitalist notified and case discussed.  Patient is already anticoagulated.  He will be admitted to PCU for rate control and further evaluation.  Will monitor his pressures heart rate time just accordingly.  Critical care 35 minutes.  CT and pelvis obtained my independent review bilateral renal stones I do not see obstruction or perforation free air.  Radiology bilateral nonobstructing renal calculus with decreased size in the right rectal sheath hematoma.    Problems Addressed:  Atrial fibrillation with rapid ventricular response: complicated acute illness or injury    Amount and/or Complexity of Data Reviewed  External Data Reviewed: ECG and notes.  Labs: ordered. Decision-making details documented in ED Course.  Radiology: ordered and independent interpretation performed. Decision-making details documented in ED Course.  ECG/medicine tests: ordered and independent interpretation performed. Decision-making details documented in ED  Course.    Risk  Prescription drug management.  Drug therapy requiring intensive monitoring for toxicity.  Decision regarding hospitalization.        Final diagnoses:   Atrial fibrillation with rapid ventricular response       ED Disposition  ED Disposition       ED Disposition   Intended Admit    Condition   --    Comment   --               No follow-up provider specified.       Medication List      No changes were made to your prescriptions during this visit.            Hemant Ruiz MD  01/29/25 0146

## 2025-01-29 NOTE — LETTER
February 6, 2025     Patient: Mark Aguirre Jr.   YOB: 1945   Date of Visit: 1/29/2025       To Whom it May Concern:    Mark Aguirre was seen in my clinic on 1/29/2025. He {Return to school/sport:86686}.    If you have any questions or concerns, please don't hesitate to call.         Sincerely,          No name on file        CC: No Recipients

## 2025-01-29 NOTE — CASE MANAGEMENT/SOCIAL WORK
Discharge Planning Assessment   [place]     Patient Name: Mark Aguirre Jr.  MRN: [id number]  Today's Date: 1/29/2025    Admit Date: 1/28/2025    Plan: DC Plan: Transfer [facility]   Discharge Needs Assessment       Row Name 01/29/25 1027       Living Environment    People in Home spouse    Name(s) of People in Home Alexandra Aguirre    Current Living Arrangements other (see comments)  [facility]    Potentially Unsafe Housing Conditions none    In the past 12 months has the electric, gas, oil, or water company threatened to shut off services in your home? No    Primary Care Provided by other (see comments)  facility staff    Provides Primary Care For no one    Family Caregiver if Needed spouse    Family Caregiver Names Alexandra Aguirre    Quality of Family Relationships helpful;involved;supportive    Able to Return to Prior Arrangements yes       Resource/Environmental Concerns    Resource/Environmental Concerns none    Transportation Concerns none       Transportation Needs    In the past 12 months, has lack of transportation kept you from medical appointments or from getting medications? no    In the past 12 months, has lack of transportation kept you from meetings, work, or from getting things needed for daily living? No       Food Insecurity    Within the past 12 months, you worried that your food would run out before you got the money to buy more. Never true    Within the past 12 months, the food you bought just didn't last and you didn't have money to get more. Never true       Transition Planning    Patient/Family Anticipates Transition to other (see comments)  [facility]    Patient/Family Anticipated Services at Transition     Transportation Anticipated health plan transportation       Discharge Needs Assessment    Readmission Within the Last 30 Days previous discharge plan unsuccessful    Current Outpatient/Agency/Support Group psychiatric facility    Equipment  Currently Used at Home cpap    Concerns to be Addressed discharge planning    Do you want help finding or keeping work or a job? Patient unable to answer    Do you want help with school or training? For example, starting or completing job training or getting a high school diploma, GED or equivalent Patient unable to answer    Anticipated Changes Related to Illness none    Equipment Needed After Discharge none    Provided Post Acute Provider List? N/A    Provided Post Acute Provider Quality & Resource List? N/A    Current Discharge Risk cognitively impaired                   Discharge Plan       Row Name 01/29/25 1030       Plan    Plan DC Plan: Transfer [facility]    Patient/Family in Agreement with Plan yes    Provided Post Acute Provider List? N/A    Provided Post Acute Provider Quality & Resource List? N/A    Plan Comments CM spoke with patient spouse Alexandra at bedside to discuss admission assessment, readmission assessment, and discharge planning. Alexandra confirms PCP and pharmacy. Patient confirms he is agreeable to meds to bed program at this time. CM updated pharmacy in [facility] for [facility] meds to bed secondary to plan for transfer. Alexandra denies any difficulty affording medications or food at this time. Alexandra denies any additional needs for services or DME at this time. Has Cpap at home. Alexandra reports independent with ADL's and drives at baseline prior to last hospitalization. Patient will require EMS transport.CM spoke with Hospitalist and nursing for morning rounds and reviewed chart for clinical updates. Patient family wishes to transfer back to [facility] for care. Dr. Rosenberg obtained accepting physician. Awaiting bed confirmation at this time. CM will set up EMS transport when bed confirmed. No barriers.                 Expected Discharge Date and Time       Expected Discharge Date Expected Discharge Time    Jan 29, 2025            Demographic Summary       Row Name 01/29/25 1025       General  Information    Admission Type inpatient    Arrived From emergency department;other (see comments)  [facility] unit    Required Notices Provided Important Message from Medicare    Referral Source admission list    Reason for Consult discharge planning    Preferred Language English       Contact Information    Permission Granted to Share Info With                    Functional Status       Row Name 01/29/25 1025       Functional Status    Usual Activity Tolerance excellent    Current Activity Tolerance other (see comments)  JUAN    Functional Status Comments All admission information obtained from patient spouse Alexandra at bedside. Patient confused.       Physical Activity    On average, how many days per week do you engage in moderate to strenuous exercise (like a brisk walk)? 0 days    On average, how many minutes do you engage in exercise at this level? 0 min    Number of minutes of exercise per week 0       Functional Status, IADL    Medications completely dependent    Meal Preparation completely dependent    Housekeeping completely dependent    Laundry completely dependent    Shopping completely dependent    If for any reason you need help with day-to-day activities such as bathing, preparing meals, shopping, managing finances, etc., do you get the help you need? Patient unable to answer    IADL Comments Patietn requiring total care currently, but was Independent prior to last hospitalization.       Mental Status    General Appearance WDL WDL       Mental Status Summary    Recent Changes in Mental Status/Cognitive Functioning mental status    Mental Status Comments increased confusion.       Employment/    Employment Status retired    Current or Previous Occupation not applicable                     Sherin Echavarria RN     Office Phone: [phone]  Office Cell:     [phone]

## 2025-01-29 NOTE — ED NOTES
Nursing report ED to floor  Mark Aguirre Jr.  79 y.o.  male    HPI:   Chief Complaint   Patient presents with    Dizziness       Admitting doctor:   Hemant GEORGE MD    Admitting diagnosis:   The encounter diagnosis was Atrial fibrillation with rapid ventricular response.    Code status:   Current Code Status       Date Active Code Status Order ID Comments User Context       1/29/2025 0151 CPR (Attempt to Resuscitate) [id number]  Hemant Deal MD ED        Question Answer    Code Status (Patient has no pulse and is not breathing) CPR (Attempt to Resuscitate)    Medical Interventions (Patient has pulse or is breathing) Full Support    Level Of Support Discussed With Patient                    Allergies:   Patient has no known allergies.    Isolation:  No active isolations     Fall Risk:  Fall Risk Assessment was completed, and patient is at high risk for falls.   Predictive Model Details         37 (Low) Factor Value    Calculated 1/29/2025 02:21 Age 79    Risk of Fall Model [place] Coma Scale 14     Active Peripheral IV Present     Imaging order in this encounter Present     Magnesium 2.2 mg/dL     Respiratory Rate 18     Diastolic BP 60     Total Bilirubin 2.3 mg/dL     Cody Scale not on file     Number of Distinct Medication Classes administered 2     Albumin 3.8 g/dL     Clinically Relevant Sex Not Female     Cardiac Assessment X     Chloride 107 mmol/L     Days after Admission 0.245     Calcium 9.4 mg/dL     Creatinine 0.86 mg/dL     Potassium 4.3 mmol/L     ALT 17 U/L         Weight:   There were no vitals filed for this visit.    Intake and Output  No intake or output data in the 24 hours ending 01/29/25 0229    Diet:   Dietary Orders (From admission, onward)       Start     Ordered    01/29/25 0150  Diet: Cardiac; Healthy Heart (2-3 Na+); Fluid Consistency: Thin (IDDSI 0)  Diet Effective Now        References:    Diet Order Definitions   Question Answer Comment   Diets: Cardiac    Cardiac Diet: Healthy  Heart (2-3 Na+)    Fluid Consistency: Thin (IDDSI 0)        01/29/25 0151                     Most recent vitals:   Vitals:    01/29/25 0202 01/29/25 0217 01/29/25 0222 01/29/25 0228   BP: 103/54 103/60     BP Location:       Patient Position:       Pulse: 107 101 99 102   Resp:       Temp:       TempSrc:       SpO2:       Height:           Active LDAs/IV Access:   Lines, Drains & Airways       Active LDAs       Name Placement date Placement time Site Days    Peripheral IV 12/30/24 0327 Anterior;Right;Upper Arm 12/30/24 0327  Arm  29    Peripheral IV 01/28/25 2130 Anterior;Distal;Right;Upper Arm 01/28/25 2130  Arm  less than 1                    Skin Condition:   Skin Assessments (last day)       None             Labs (abnormal labs have a star):   Labs Reviewed   COMPREHENSIVE METABOLIC PANEL - Abnormal; Notable for the following components:       Result Value    BUN 24 (*)     Total Protein 5.9 (*)     Total Bilirubin 2.3 (*)     BUN/Creatinine Ratio 27.9 (*)     All other components within normal limits    Narrative:     GFR Categories in Chronic Kidney Disease (CKD)      GFR Category          GFR (mL/min/1.73)    Interpretation  G1                     90 or greater         Normal or high (1)  G2                      60-89                Mild decrease (1)  G3a                   45-59                Mild to moderate decrease  G3b                   30-44                Moderate to severe decrease  G4                    15-29                Severe decrease  G5                    14 or less           Kidney failure          (1)In the absence of evidence of kidney disease, neither GFR category G1 or G2 fulfill the criteria for CKD.    eGFR calculation 2021 CKD-EPI creatinine equation, which does not include race as a factor   URINALYSIS W/ CULTURE IF INDICATED - Abnormal; Notable for the following components:    Color, UA Dark Yellow (*)     Ketones, UA 15 mg/dL (1+) (*)     Bilirubin, UA Small (1+) (*)     Blood,  UA Moderate (2+) (*)     Protein, UA Trace (*)     Leuk Esterase, UA Trace (*)     Nitrite, UA Positive (*)     Urobilinogen, UA 2.0 E.U./dL (*)     All other components within normal limits    Narrative:     In absence of clinical symptoms, the presence of pyuria, bacteria, and/or nitrites on the urinalysis result does not correlate with infection.   VALPROIC ACID LEVEL, TOTAL - Abnormal; Notable for the following components:    Valproic Acid 7.1 (*)     All other components within normal limits    Narrative:     Therapeutic Ranges for Valproic Acid    Epilepsy:       mcg/ml  Bipolar/Teresita  up to 125 mcg/ml     CBC WITH AUTO DIFFERENTIAL - Abnormal; Notable for the following components:    RDW 15.7 (*)     Lymphocyte % 15.3 (*)     All other components within normal limits   URINALYSIS, MICROSCOPIC ONLY - Abnormal; Notable for the following components:    RBC, UA Too Numerous to Count (*)     All other components within normal limits   RESPIRATORY PANEL PCR W/ COVID-19 (SARS-COV-2), NP SWAB IN UTM/VTP, 2 HR TAT - Normal    Narrative:     In the setting of a positive respiratory panel with a viral infection PLUS a negative procalcitonin without other underlying concern for bacterial infection, consider observing off antibiotics or discontinuation of antibiotics and continue supportive care. If the respiratory panel is positive for atypical bacterial infection (Bordetella pertussis, Chlamydophila pneumoniae, or Mycoplasma pneumoniae), consider antibiotic de-escalation to target atypical bacterial infection.   MAGNESIUM - Normal   TSH RFX ON ABNORMAL TO FREE T4 - Normal   DIGOXIN LEVEL - Normal   URINALYSIS W/ CULTURE IF INDICATED   MAGNESIUM   CBC AND DIFFERENTIAL    Narrative:     The following orders were created for panel order CBC & Differential.  Procedure                               Abnormality         Status                     ---------                               -----------         ------                      CBC Auto Differential[[id number]]        Abnormal            Final result                 Please view results for these tests on the individual orders.   EXTRA TUBES    Narrative:     The following orders were created for panel order Extra Tubes.  Procedure                               Abnormality         Status                     ---------                               -----------         ------                     Gold Top - [facility][[id number]]                                   Final result               Light Blue Top[[id number]]                                   Final result                 Please view results for these tests on the individual orders.   [facility]   LIGHT BLUE TOP       LOC: Person    Telemetry:  Telemetry    Cardiac Monitoring Ordered: yes    EKG:   ECG 12 Lead Electrolyte Imbalance   Preliminary Result   HEART RATE=97  bpm   RR Qifzknwn=844  ms   SD Interval=  ms   P Horizontal Axis=  deg   P Front Axis=  deg   QRSD Winitkap=454  ms   QT Zahbozvn=330  ms   HXiS=908  ms   QRS Axis=-89  deg   T Wave Axis=57  deg   - ABNORMAL ECG -   Atrial fibrillation   Right bundle branch block   ST elevation secondary to IVCD   Date and Time of Study:2025-01-28 20:28:30          Medications Given in the ED:   Medications   sodium chloride 0.9 % flush 10 mL (has no administration in time range)   sterile water (preservative free) injection  - ADS Override Pull (has no administration in time range)   lactated ringers bolus 500 mL (has no administration in time range)   dilTIAZem (CARDIZEM) 125 mg in 125 mL sodium chloride  infusion (5 mg/hr Intravenous New Bag 1/29/25 0143)   sodium chloride 0.9 % flush 10 mL (has no administration in time range)   sodium chloride 0.9 % flush 10 mL (has no administration in time range)   sodium chloride 0.9 % infusion 40 mL (has no administration in time range)   nitroglycerin (NITROSTAT) SL tablet 0.4 mg (has no administration in time range)   acetaminophen (TYLENOL)  tablet 650 mg (has no administration in time range)     Or   acetaminophen (TYLENOL) 160 MG/5ML oral solution 650 mg (has no administration in time range)     Or   acetaminophen (TYLENOL) suppository 650 mg (has no administration in time range)   sennosides-docusate (PERICOLACE) 8.6-50 MG per tablet 2 tablet (has no administration in time range)     And   polyethylene glycol (MIRALAX) packet 17 g (has no administration in time range)     And   bisacodyl (DULCOLAX) EC tablet 5 mg (has no administration in time range)     And   bisacodyl (DULCOLAX) suppository 10 mg (has no administration in time range)   promethazine (PHENERGAN) tablet 12.5 mg (has no administration in time range)   cefTRIAXone (ROCEPHIN) 1,000 mg in sodium chloride 0.9 % 100 mL MBP (has no administration in time range)   apixaban (ELIQUIS) tablet 5 mg (has no administration in time range)   atorvastatin (LIPITOR) tablet 10 mg (has no administration in time range)   digoxin (LANOXIN) tablet 125 mcg (has no administration in time range)   Divalproex Sodium (DEPAKOTE SPRINKLE) capsule 125 mg (has no administration in time range)   metoprolol tartrate (LOPRESSOR) tablet 100 mg (has no administration in time range)   OLANZapine (zyPREXA) tablet 7.5 mg (has no administration in time range)   tamsulosin (FLOMAX) 24 hr capsule 0.4 mg (has no administration in time range)   lactated ringers infusion (has no administration in time range)   OLANZapine (zyPREXA) injection 10 mg (10 mg Intramuscular Given 1/28/25 3326)       Imaging results:  XR Chest 1 View    Result Date: 1/29/2025  Impression: Cardiomegaly. No active disease. Electronically Signed: Roc Tanner MD  1/29/2025 2:11 AM EST  Workstation ID: [id number]    CT Abdomen Pelvis Without Contrast    Result Date: 1/29/2025  Impression: 1.Bilateral nonobstructing renal calculi. No acute abdominal or pelvic abnormality. 2.Slight decrease in size of the right rectus sheath hematoma. Electronically Signed:  Roc Tanner MD  1/29/2025 1:35 AM EST  Workstation ID: [id number]    CT Head Without Contrast    Result Date: 1/29/2025  Impression: Atrophy and chronic microvascular ischemic change. No acute intracranial process. Electronically Signed: Roc Tanner MD  1/29/2025 1:08 AM EST  Workstation ID: [id number]     Social issues:   Social History     Socioeconomic History    Marital status:    Tobacco Use    Smoking status: Never    Smokeless tobacco: Current     Types: Snuff    Tobacco comments:     2 weekly chew   Vaping Use    Vaping status: Never Used   Substance and Sexual Activity    Alcohol use: Never    Drug use: Never    Sexual activity: Defer       NIH Stroke Scale:  Interval: (not recorded)  1a. Level of Consciousness: (not recorded)  1b. LOC Questions: (not recorded)  1c. LOC Commands: (not recorded)  2. Best Gaze: (not recorded)  3. Visual: (not recorded)  4. Facial Palsy: (not recorded)  5a. Motor Arm, Left: (not recorded)  5b. Motor Arm, Right: (not recorded)  6a. Motor Leg, Left: (not recorded)  6b. Motor Leg, Right: (not recorded)  7. Limb Ataxia: (not recorded)  8. Sensory: (not recorded)  9. Best Language: (not recorded)  10. Dysarthria: (not recorded)  11. Extinction and Inattention (formerly Neglect): (not recorded)    Total (NIH Stroke Scale): (not recorded)     Additional notable assessment information:       Nursing report ED to floor:    CVU nurseKyara RN, LPN   01/29/25 02:29 EST Nursing report ED to floor  Mark DIVYA Aguirre Jr.  79 y.o.  male    HPI:   Chief Complaint   Patient presents with    Dizziness       Admitting doctor:   Hemant GEORGE MD    Admitting diagnosis:   The encounter diagnosis was Atrial fibrillation with rapid ventricular response.    Code status:   Current Code Status       Date Active Code Status Order ID Comments User Context       1/29/2025 0151 CPR (Attempt to Resuscitate) [id number]  Hemant Deal MD ED        Question Answer    Code Status  (Patient has no pulse and is not breathing) CPR (Attempt to Resuscitate)    Medical Interventions (Patient has pulse or is breathing) Full Support    Level Of Support Discussed With Patient                    Allergies:   Patient has no known allergies.    Isolation:  No active isolations     Fall Risk:  Fall Risk Assessment was completed, and patient is at high risk for falls.   Predictive Model Details         37 (Low) Factor Value    Calculated 1/29/2025 02:21 Age 79    Risk of Fall Model [place] Coma Scale 14     Active Peripheral IV Present     Imaging order in this encounter Present     Magnesium 2.2 mg/dL     Respiratory Rate 18     Diastolic BP 60     Total Bilirubin 2.3 mg/dL     Cody Scale not on file     Number of Distinct Medication Classes administered 2     Albumin 3.8 g/dL     Clinically Relevant Sex Not Female     Cardiac Assessment X     Chloride 107 mmol/L     Days after Admission 0.245     Calcium 9.4 mg/dL     Creatinine 0.86 mg/dL     Potassium 4.3 mmol/L     ALT 17 U/L         Weight:   There were no vitals filed for this visit.    Intake and Output  No intake or output data in the 24 hours ending 01/29/25 0229    Diet:   Dietary Orders (From admission, onward)       Start     Ordered    01/29/25 0150  Diet: Cardiac; Healthy Heart (2-3 Na+); Fluid Consistency: Thin (IDDSI 0)  Diet Effective Now        References:    Diet Order Definitions   Question Answer Comment   Diets: Cardiac    Cardiac Diet: Healthy Heart (2-3 Na+)    Fluid Consistency: Thin (IDDSI 0)        01/29/25 0151                     Most recent vitals:   Vitals:    01/29/25 0202 01/29/25 0217 01/29/25 0222 01/29/25 0228   BP: 103/54 103/60     BP Location:       Patient Position:       Pulse: 107 101 99 102   Resp:       Temp:       TempSrc:       SpO2:       Height:           Active LDAs/IV Access:   Lines, Drains & Airways       Active LDAs       Name Placement date Placement time Site Days    Peripheral IV 12/30/24 9001  Anterior;Right;Upper Arm 12/30/24  0327  Arm  29    Peripheral IV 01/28/25 2130 Anterior;Distal;Right;Upper Arm 01/28/25 2130  Arm  less than 1                    Skin Condition:   Skin Assessments (last day)       None             Labs (abnormal labs have a star):   Labs Reviewed   COMPREHENSIVE METABOLIC PANEL - Abnormal; Notable for the following components:       Result Value    BUN 24 (*)     Total Protein 5.9 (*)     Total Bilirubin 2.3 (*)     BUN/Creatinine Ratio 27.9 (*)     All other components within normal limits    Narrative:     GFR Categories in Chronic Kidney Disease (CKD)      GFR Category          GFR (mL/min/1.73)    Interpretation  G1                     90 or greater         Normal or high (1)  G2                      60-89                Mild decrease (1)  G3a                   45-59                Mild to moderate decrease  G3b                   30-44                Moderate to severe decrease  G4                    15-29                Severe decrease  G5                    14 or less           Kidney failure          (1)In the absence of evidence of kidney disease, neither GFR category G1 or G2 fulfill the criteria for CKD.    eGFR calculation 2021 CKD-EPI creatinine equation, which does not include race as a factor   URINALYSIS W/ CULTURE IF INDICATED - Abnormal; Notable for the following components:    Color, UA Dark Yellow (*)     Ketones, UA 15 mg/dL (1+) (*)     Bilirubin, UA Small (1+) (*)     Blood, UA Moderate (2+) (*)     Protein, UA Trace (*)     Leuk Esterase, UA Trace (*)     Nitrite, UA Positive (*)     Urobilinogen, UA 2.0 E.U./dL (*)     All other components within normal limits    Narrative:     In absence of clinical symptoms, the presence of pyuria, bacteria, and/or nitrites on the urinalysis result does not correlate with infection.   VALPROIC ACID LEVEL, TOTAL - Abnormal; Notable for the following components:    Valproic Acid 7.1 (*)     All other components within  normal limits    Narrative:     Therapeutic Ranges for Valproic Acid    Epilepsy:       mcg/ml  Bipolar/Teresita  up to 125 mcg/ml     CBC WITH AUTO DIFFERENTIAL - Abnormal; Notable for the following components:    RDW 15.7 (*)     Lymphocyte % 15.3 (*)     All other components within normal limits   URINALYSIS, MICROSCOPIC ONLY - Abnormal; Notable for the following components:    RBC, UA Too Numerous to Count (*)     All other components within normal limits   RESPIRATORY PANEL PCR W/ COVID-19 (SARS-COV-2), NP SWAB IN UTM/VTP, 2 HR TAT - Normal    Narrative:     In the setting of a positive respiratory panel with a viral infection PLUS a negative procalcitonin without other underlying concern for bacterial infection, consider observing off antibiotics or discontinuation of antibiotics and continue supportive care. If the respiratory panel is positive for atypical bacterial infection (Bordetella pertussis, Chlamydophila pneumoniae, or Mycoplasma pneumoniae), consider antibiotic de-escalation to target atypical bacterial infection.   MAGNESIUM - Normal   TSH RFX ON ABNORMAL TO FREE T4 - Normal   DIGOXIN LEVEL - Normal   URINALYSIS W/ CULTURE IF INDICATED   MAGNESIUM   CBC AND DIFFERENTIAL    Narrative:     The following orders were created for panel order CBC & Differential.  Procedure                               Abnormality         Status                     ---------                               -----------         ------                     CBC Auto Differential[[id number]]        Abnormal            Final result                 Please view results for these tests on the individual orders.   EXTRA TUBES    Narrative:     The following orders were created for panel order Extra Tubes.  Procedure                               Abnormality         Status                     ---------                               -----------         ------                     Gold Top - [facility][[id number]]                                    Final result               Light Blue Top[[id number]]                                   Final result                 Please view results for these tests on the individual orders.   GOLD TOP - [facility]   LIGHT BLUE TOP       LOC: Person    Telemetry:  Telemetry    Cardiac Monitoring Ordered: yes    EKG:   ECG 12 Lead Electrolyte Imbalance   Preliminary Result   HEART RATE=97  bpm   RR Fdcosltr=761  ms   UT Interval=  ms   P Horizontal Axis=  deg   P Front Axis=  deg   QRSD Hcioqqlb=931  ms   QT Dliupjwe=452  ms   FMyD=540  ms   QRS Axis=-89  deg   T Wave Axis=57  deg   - ABNORMAL ECG -   Atrial fibrillation   Right bundle branch block   ST elevation secondary to IVCD   Date and Time of Study:2025-01-28 20:28:30          Medications Given in the ED:   Medications   sodium chloride 0.9 % flush 10 mL (has no administration in time range)   sterile water (preservative free) injection  - ADS Override Pull (has no administration in time range)   lactated ringers bolus 500 mL (has no administration in time range)   dilTIAZem (CARDIZEM) 125 mg in 125 mL sodium chloride  infusion (5 mg/hr Intravenous New Bag 1/29/25 0143)   sodium chloride 0.9 % flush 10 mL (has no administration in time range)   sodium chloride 0.9 % flush 10 mL (has no administration in time range)   sodium chloride 0.9 % infusion 40 mL (has no administration in time range)   nitroglycerin (NITROSTAT) SL tablet 0.4 mg (has no administration in time range)   acetaminophen (TYLENOL) tablet 650 mg (has no administration in time range)     Or   acetaminophen (TYLENOL) 160 MG/5ML oral solution 650 mg (has no administration in time range)     Or   acetaminophen (TYLENOL) suppository 650 mg (has no administration in time range)   sennosides-docusate (PERICOLACE) 8.6-50 MG per tablet 2 tablet (has no administration in time range)     And   polyethylene glycol (MIRALAX) packet 17 g (has no administration in time range)     And   bisacodyl (DULCOLAX) EC tablet 5 mg  (has no administration in time range)     And   bisacodyl (DULCOLAX) suppository 10 mg (has no administration in time range)   promethazine (PHENERGAN) tablet 12.5 mg (has no administration in time range)   cefTRIAXone (ROCEPHIN) 1,000 mg in sodium chloride 0.9 % 100 mL MBP (has no administration in time range)   apixaban (ELIQUIS) tablet 5 mg (has no administration in time range)   atorvastatin (LIPITOR) tablet 10 mg (has no administration in time range)   digoxin (LANOXIN) tablet 125 mcg (has no administration in time range)   Divalproex Sodium (DEPAKOTE SPRINKLE) capsule 125 mg (has no administration in time range)   metoprolol tartrate (LOPRESSOR) tablet 100 mg (has no administration in time range)   OLANZapine (zyPREXA) tablet 7.5 mg (has no administration in time range)   tamsulosin (FLOMAX) 24 hr capsule 0.4 mg (has no administration in time range)   lactated ringers infusion (has no administration in time range)   OLANZapine (zyPREXA) injection 10 mg (10 mg Intramuscular Given 1/28/25 8558)       Imaging results:  XR Chest 1 View    Result Date: 1/29/2025  Impression: Cardiomegaly. No active disease. Electronically Signed: Roc Tanner MD  1/29/2025 2:11 AM EST  Workstation ID: [id number]    CT Abdomen Pelvis Without Contrast    Result Date: 1/29/2025  Impression: 1.Bilateral nonobstructing renal calculi. No acute abdominal or pelvic abnormality. 2.Slight decrease in size of the right rectus sheath hematoma. Electronically Signed: Roc Tanner MD  1/29/2025 1:35 AM EST  Workstation ID: [id number]    CT Head Without Contrast    Result Date: 1/29/2025  Impression: Atrophy and chronic microvascular ischemic change. No acute intracranial process. Electronically Signed: Roc Tanner MD  1/29/2025 1:08 AM EST  Workstation ID: [id number]     Social issues:   Social History     Socioeconomic History    Marital status:    Tobacco Use    Smoking status: Never    Smokeless tobacco: Current     Types: Snuff    Tobacco  comments:     2 weekly chew   Vaping Use    Vaping status: Never Used   Substance and Sexual Activity    Alcohol use: Never    Drug use: Never    Sexual activity: Defer       NIH Stroke Scale:  Interval: (not recorded)  1a. Level of Consciousness: (not recorded)  1b. LOC Questions: (not recorded)  1c. LOC Commands: (not recorded)  2. Best Gaze: (not recorded)  3. Visual: (not recorded)  4. Facial Palsy: (not recorded)  5a. Motor Arm, Left: (not recorded)  5b. Motor Arm, Right: (not recorded)  6a. Motor Leg, Left: (not recorded)  6b. Motor Leg, Right: (not recorded)  7. Limb Ataxia: (not recorded)  8. Sensory: (not recorded)  9. Best Language: (not recorded)  10. Dysarthria: (not recorded)  11. Extinction and Inattention (formerly Neglect): (not recorded)    Total (NIH Stroke Scale): (not recorded)     Additional notable assessment information:       Nursing report ED to floor:    CVU nurse, Kyara Vila LPN   01/29/25 02:29 EST

## 2025-01-29 NOTE — Clinical Note
Prepped: groin and subxiphoid process. Prepped with: ChloraPrep. The site was clipped. Clipped prior to entering procedure room.

## 2025-01-29 NOTE — DISCHARGE SUMMARY
[facility] Medicine Services  Discharge Summary    Date of Service: 2025  Patient Name: Mark Aguirre Jr.  : 1945  MRN: [id number]    Date of Admission: 2025  Discharge Diagnosis: Atrial fibrillation with RVR  Date of Discharge: 2025  Primary Care Physician: Luis Staples MD      Presenting Problem:   Atrial fibrillation with rapid ventricular response [I48.91]  Atrial fibrillation with RVR [I48.91]    Active and Resolved Hospital Problems:  Active Hospital Problems    Diagnosis POA    **Atrial fibrillation with RVR [I48.91] Yes    Acute UTI (urinary tract infection) [N39.0] Yes    Dementia with behavioral disturbance [F03.918] Yes    Confusion [R41.0] Yes      Resolved Hospital Problems   No resolved problems to display.       1.  Atrial fibrillation with rapid ventricular response will maintain potassium greater than 4 magnesium greater than 2.  Continue home digoxin and metoprolol.  In the interim have him on diltiazem drip and consulted cardiology.  He has had difficulty controlling his heart rate in the past.  2.  Dementia with behavioral disturbance/confusion patient is in [facility] rehab facility where after he developed delirium in his hospitalization in December has never fully recovered.  He was sedated in order to get imaging in the ER so I am not sure how far off his baseline he is.  Will continue his current outpatient medications while inpatient  3.  Acute urinary tract infection patient has a history of renal stones the urine was nitrate positive and a large amount of red blood cells went ahead and started ceftriaxone but I am not sure that this may  not be a true infection    Hospital Course     History of Present Illness: Mark Aguirre Jr. is a 79 y.o. male with a side medical history.  The patient was a functioning adult that was still working part-time up until 2024.  He had atrial fibrillation previously.  However in late December he fell  and hit his head and had a large laceration.  He went to [facility] in [place] and in working in getting his heart rate under control he developed delirium.  He never recovered from this delirium.  Major extensive workups by neurology and psychiatry were performed.  Yet he never got back to baseline.  They also had difficulties controlling his heart rate.  Finally they had 3 nights of heart rate less than 100 and did not require restraints and then he was sent to a rehab facility.  This was last week that they discharged him.  At the facility tonight he had irregular heartbeat that was accelerated around to 140 250 and reported more confusion so they sent him to the emergency room here at [facility].  Here the ER physician got CT scans of the head and abdomen but in order to accomplish this he had to sedate the patient because he was being combative with the nurses.  CTs were performed and did not reveal any major pathology.  But the heart rate was difficult to control.  Discussed with the ER physician about the possibility of giving him some IV metoprolol but his blood pressure had gotten soft from the sedation to get the CT scan so the patient is now on a diltiazem drip.  The patient will be admitted and have cardiology evaluate medications to hopefully better control his atrial fibrillation.  Since he is having extensive workup from his delirium at the other facility we will just try to maintain him on the medications he was discharged on.     His wife at bedside provided very good information.  She did not report any fever nausea vomiting diarrhea    1/29/2025 patient is seen in bed no acute distress, family at bedside, wife wants patient transferred to [facility].  Will transfer the patient this afternoon.  Condition at discharge stable.    DISCHARGE Follow Up Recommendations for labs and diagnostics: Bladder scan every shift    Day of Discharge     Vital Signs:  Temp:  [97.9 °F (36.6 °C)-98.7 °F (37.1  °C)] 98.7 °F (37.1 °C)  Heart Rate:  [] 81  Resp:  [15-19] 18  BP: (101-123)/(50-78) 122/70  Flow (L/min) (Oxygen Therapy):  [2] 2    :  Physical Exam   General Very somnolent but arousable mumbles and attempting to answer only thing I can make out completely was he hated hospitals and doctors  Head atraumatic normocephalic  Nares no discharge  Oropharynx membranes moist no erythema  Pulmonary lungs are clear to auscultation bilaterally no accessory muscle use  Cardiac tachycardic irregular irregular could not appreciate any murmurs no peripheral edema  Abdomen positive bowel sounds no guarding no rebound no hepatosplenomegaly  Extremities symmetric warm to the touch no cyanosis  Neuro unable to do a neuroexam patient would not follow commands but does appear to move all extremities  Psych unable to evaluate too sedated      Pertinent  and/or Most Recent Results     LAB RESULTS:      Lab 01/28/25 2123   WBC 7.44   HEMOGLOBIN 13.9   HEMATOCRIT 43.8   PLATELETS 152   NEUTROS ABS 5.49   IMMATURE GRANS (ABS) 0.04   LYMPHS ABS 1.14   MONOS ABS 0.68   EOS ABS 0.07   MCV 92.2         Lab 01/28/25 2123   SODIUM 141   POTASSIUM 4.3   CHLORIDE 107   CO2 24.6   ANION GAP 9.4   BUN 24*   CREATININE 0.86   EGFR 87.0   GLUCOSE 90   CALCIUM 9.4   MAGNESIUM 2.2   TSH 2.700         Lab 01/28/25 2123   TOTAL PROTEIN 5.9*   ALBUMIN 3.8   GLOBULIN 2.1   ALT (SGPT) 17   AST (SGOT) 23   BILIRUBIN 2.3*   ALK PHOS 62                     Brief Urine Lab Results  (Last result in the past 365 days)        Color   Clarity   Blood   Leuk Est   Nitrite   Protein   CREAT   Urine HCG        01/28/25 2100 Dark Yellow   Clear   Moderate (2+)   Trace   Positive   Trace                 Microbiology Results (last 10 days)       Procedure Component Value - Date/Time    Respiratory Panel PCR w/COVID-19(SARS-CoV-2) BLESSING/YAHAIRA/CANDICE/PAD/COR/CAROLANN In-House, NP Swab in UTM/VTM, 2 HR TAT - Swab, Nasopharynx [[id number]]  (Normal) Collected: 01/28/25 2101     Lab Status: Final result Specimen: Swab from Nasopharynx Updated: 01/28/25 2229     ADENOVIRUS, PCR Not Detected     Coronavirus 229E Not Detected     Coronavirus HKU1 Not Detected     Coronavirus NL63 Not Detected     Coronavirus OC43 Not Detected     COVID19 Not Detected     Human Metapneumovirus Not Detected     Human Rhinovirus/Enterovirus Not Detected     Influenza A PCR Not Detected     Influenza B PCR Not Detected     Parainfluenza Virus 1 Not Detected     Parainfluenza Virus 2 Not Detected     Parainfluenza Virus 3 Not Detected     Parainfluenza Virus 4 Not Detected     RSV, PCR Not Detected     Bordetella pertussis pcr Not Detected     Bordetella parapertussis PCR Not Detected     Chlamydophila pneumoniae PCR Not Detected     Mycoplasma pneumo by PCR Not Detected    Narrative:      In the setting of a positive respiratory panel with a viral infection PLUS a negative procalcitonin without other underlying concern for bacterial infection, consider observing off antibiotics or discontinuation of antibiotics and continue supportive care. If the respiratory panel is positive for atypical bacterial infection (Bordetella pertussis, Chlamydophila pneumoniae, or Mycoplasma pneumoniae), consider antibiotic de-escalation to target atypical bacterial infection.    COVID-19 RAPID AG,VERITOR,COR/PAD/YAHAIRA/BLESSING/LAG/CAROLANN/ IN-HOUSE,DRY SWAB, 1 HR TAT - Swab, Nasal Cavity [[id number]]  (Normal) Collected: 01/22/25 1902    Lab Status: Final result Specimen: Swab from Nasal Cavity Updated: 01/22/25 1931     COVID19 Presumptive Negative    Narrative:      Fact sheets for providers: https://www.fda.gov/media/977055/download    Fact sheets for patients: https://www.fda.gov/media/644682/download            XR Chest 1 View    Result Date: 1/29/2025  Impression: Impression: Cardiomegaly. No active disease. Electronically Signed: Roc Tanner MD  1/29/2025 2:11 AM EST  Workstation ID: [id number]    CT Abdomen Pelvis Without  Contrast    Result Date: 1/29/2025  Impression: Impression: 1.Bilateral nonobstructing renal calculi. No acute abdominal or pelvic abnormality. 2.Slight decrease in size of the right rectus sheath hematoma. Electronically Signed: Roc Tanner MD  1/29/2025 1:35 AM EST  Workstation ID: [id number]    CT Head Without Contrast    Result Date: 1/29/2025  Impression: Impression: Atrophy and chronic microvascular ischemic change. No acute intracranial process. Electronically Signed: Roc Tanner MD  1/29/2025 1:08 AM EST  Workstation ID: [id number]    IR LUMBAR PUNCTURE DIAGNOSTIC    Result Date: 1/9/2025  Impression: Successful fluoroscopically guided diagnostic lumbar puncture.   Procedure performed by KEISHA Mcclain. By electronically signing this report, I, the supervising radiologist, attest that I was not present for the procedure(s) but agree with the final edited report.  This report was finalized on 1/9/2025 8:25 AM by Dr. Roberto Westfall M.D on Workstation: [id number]      XR Chest 1 View    Result Date: 1/8/2025  Impression: Mildly increased atelectasis or infiltrate in the lung bases    This report was finalized on 1/8/2025 7:39 PM by Dr. Pako Velasquez M.D on Workstation: [id number]      MRI Brain Without Contrast    Result Date: 1/6/2025  Impression:  No evidence for acute intracranial pathology.  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    This report was finalized on 1/6/2025 1:00 PM by Dr. Milo Doan M.D on Workstation: [id number]      CT Abdomen Pelvis With & Without Contrast    Result Date: 1/6/2025  Impression: 1. Bilateral nonobstructing kidney stones 2. There is a large right rectus sheath hematoma. Follow-up recommended to ensure clearing. Findings discussed with the patient's RN at time of dictation   Radiation dose reduction techniques were  utilized, including automated exposure control and exposure modulation based on body size.   This report was finalized on 1/6/2025 12:57 PM by Dr. Pako Velasquez M.D on Workstation: [id number]      CT Head Without Contrast    Result Date: 12/31/2024  Impression: No acute process is identified. Further evaluation could be performed with MRI examination of the brain. Paranasal sinus disease is noted as described above.      Radiation dose reduction techniques were utilized, including automated exposure modulation based on body size.  This report was finalized on 12/31/2024 2:09 PM by Dr. Robbin Zarate M.D on Workstation: [id number]       Results for orders placed during the hospital encounter of 06/15/20    Duplex Venous Lower Extremity - Right CAR    Interpretation Summary  · Normal right lower extremity venous duplex scan.      Results for orders placed during the hospital encounter of 06/15/20    Duplex Venous Lower Extremity - Right CAR    Interpretation Summary  · Normal right lower extremity venous duplex scan.      Results for orders placed during the hospital encounter of 09/05/24    Adult Transthoracic Echo Complete W/ Cont if Necessary Per Protocol    Interpretation Summary    Left ventricular systolic function is low normal. Calculated left ventricular EF = 48.8%    The left ventricular cavity is borderline dilated.    Left ventricular diastolic function is consistent with (grade II w/high LAP) pseudonormalization.    The left atrial cavity is mild to moderately dilated.    Moderate aortic valve stenosis is present. Aortic valve area is 1.8 cm2.    Peak velocity of the flow distal to the aortic valve is 328 cm/s. Aortic valve maximum pressure gradient is 43 mmHg. Aortic valve mean pressure gradient is 24 mmHg. Aortic valve dimensionless index is 0.4 .    Estimated right ventricular systolic pressure from tricuspid regurgitation is moderately elevated (45-55 mmHg). Calculated right ventricular systolic  pressure from tricuspid regurgitation is 48 mmHg.    Mild to moderate aortic valve regurgitation is present.    Mild to moderate mitral valve regurgitation is present with an eccentric jet noted.      Labs Pending at Discharge:  Pending Results       Procedure [Order ID] Specimen - Date/Time    Magnesium [[id number]]     Specimen: Blood     Urine Culture - Urine, Straight Cath [[id number]] Collected: 01/28/25 2100    Specimen: Urine from Straight Cath Updated: 01/29/25 0233            Procedures Performed           Consults:   Consults       Date and Time Order Name Status Description    1/29/2025 11:08 AM Inpatient Urology Consult Completed     1/29/2025  2:20 AM Inpatient Cardiology Consult Completed     1/29/2025  1:17 AM Hospitalist (on-call MD unless specified)      1/8/2025  7:51 AM Inpatient Neurology Consult General Completed     12/31/2024  9:32 PM Inpatient Psychiatrist Consult Completed     12/30/2024 11:54 AM Inpatient Neurology Consult General Completed                Assessment:        Atrial fibrillation with RVR    Confusion    Acute UTI (urinary tract infection)    Dementia with behavioral disturbance        Gross hematuria  UTI     Plan:      CT images viewed, bilateral nonobstructing renal calculi present no signs of obstruction or hydronephrosis.  Mild bladder.  UA-positive moderate blood, positive nitrites, trace leukocytes, trace protein, small bilirubin, RBCs TNTC.  Culture pending  Continue antibiotics, recommend tailoring to results of urine culture.  Bladder scan every 6 hours, insert indwelling hematuria catheter for residuals greater than 350.  Will sign off please call with any questions concerns or clinical changes.        KEISHA Mcgarry  [facility]  [facility], [address]  [place] [address]  Office: [phone]  Available via [employer] Secure Chat  01/29/25  12:27 EST      Discussed with Dr. Roy.     Atrial fibrillation  Patient has recently diagnosed atrial fibrillation  and was treated by electrophysiologist in [place].  Patient's heart rate was not controlled very well in spite of placing on metoprolol and digoxin and Eliquis for anticoagulation.  Patient was scheduled for AV node ablation and pacemaker but he started having mental status changes and hence he was placed in a rehab place  Will continue medical therapy for now and no further cardiac workup.     Delirium with suspected Alzheimer's disease  Patient is also having workup done to rule out metabolic encephalopathy.     Hypertension  Patient's blood pressure currently stable on medications including beta-blockers.      I discussed the patients findings and my recommendations with patient's nurse and family     Felix Lozada MD  01/29/25  14:37 EST    Discharge Details        Discharge Medications        New Medications        Instructions Start Date   cefTRIAXone 1,000 mg in sodium chloride 0.9 % 100 mL IVPB   1,000 mg, Intravenous, Every 24 Hours   Start Date: January 30, 2025     lactated ringers infusion   75 mL/hr (75 mL/hr), Intravenous, Continuous             Continue These Medications        Instructions Start Date   apixaban 5 MG tablet tablet  Commonly known as: ELIQUIS   5 mg, Oral, Every 12 Hours Scheduled      atorvastatin 10 MG tablet  Commonly known as: LIPITOR   10 mg, Daily      clonazePAM 0.5 MG tablet  Commonly known as: KlonoPIN   0.5 mg, 2 Times Daily      Cyanocobalamin ER 1000 MCG tablet controlled-release   1,000 mcg, Daily      digoxin 125 MCG tablet  Commonly known as: LANOXIN   125 mcg, Oral, Daily      Divalproex Sodium 125 MG capsule  Commonly known as: DEPAKOTE SPRINKLE   250 mg, 3 times daily      megestrol 40 MG/ML suspension  Commonly known as: MEGACE   400 mg, Oral, Daily      metoprolol tartrate 100 MG tablet  Commonly known as: LOPRESSOR   100 mg, Oral, Every 12 Hours Scheduled      OLANZapine 7.5 MG tablet  Commonly known as: zyPREXA   7.5 mg, 2 Times Daily      tamsulosin 0.4 MG  capsule 24 hr capsule  Commonly known as: FLOMAX   1 capsule, Daily               No Known Allergies      Discharge Disposition:   Short Term Hospital (DC)    Diet:  Hospital:  Diet Order   Procedures    Diet: Cardiac; Healthy Heart (2-3 Na+); Fluid Consistency: Thin (IDDSI 0)         Discharge Activity:         CODE STATUS:  Code Status and Medical Interventions: CPR (Attempt to Resuscitate); Full Support   Ordered at: 01/29/25 0151     Level Of Support Discussed With:    Patient     Code Status (Patient has no pulse and is not breathing):    CPR (Attempt to Resuscitate)     Medical Interventions (Patient has pulse or is breathing):    Full Support         Future Appointments   Date Time Provider Department Center   2/27/2025 10:00 AM Kosta Javier MD [facility]   3/14/2025  9:00 AM Hemant Armando MD [id number] None   8/27/2025 10:20 AM Claus Lindquist MD [id number]           Time spent on Discharge including face to face service:  34 minutes    Signature: Electronically signed by Randy Rosenberg MD, 01/29/25, 15:21 ESTDieudonne Eugene Hospitalist Team

## 2025-01-29 NOTE — CASE MANAGEMENT/SOCIAL WORK
Case Management/Social Work    Patient Name:  Mark Aguirre Jr.  YOB: 1945  MRN: [id number]  Admit Date:  1/28/2025        Contacted by patient's nurse Brigitte Goodman for EMS Medical Necessity to be completed because patient was transferring to [facility] Room 456 where his cardiologist is. After discussing with Brigitte and reviewing patient's chart, EMS medical necessity was completed. Brigitte notified. She states [facility] has already been notified of need for ALS transport.    Laura Mcqueen RN, [facility]  Office: [phone]  Fax: [phone]  [email or web address]      Phone communication or documentation only - no physical contact with patient or family.      Electronically signed by:  Laura Mcqueen RN  01/29/25 16:45 EST

## 2025-01-29 NOTE — CONSULTS
CARDIOLOGY CONSULT:    Mark Aguirre Jr.  1945  male  [id number]      Referring Provider: Hospitalist  Reason for Consultation: Mental status changes and atrial fibrillation    Patient Care Team:  Luis Staples MD as PCP - General (Internal Medicine)  Luis Staples MD as PCP - Internal Medicine  Rian Johns MD as Consulting Physician (Sleep Medicine)    Chief complaint mental status changes    Subjective .     History of present illness:  Mark Aguirre Jr. is a 79 y.o. male with atrial fibrillation hypertension sleep apnea and other medical problems presented to the hospital with mental status changes.  Patient's wife gave history who said that the patient was in [facility] we will for more than a month in December.  He fell down and hurt his head and had a large laceration he did not recover from delirium and went to a facility from where he was sent again because of similar symptoms and has atrial fibrillation and hence a cardiology consult is called.    Review of Systems   Unable to perform ROS: Mental status change       History  Past Medical History:   Diagnosis Date    Acute kidney failure     POST-OP TOTAL HIP 2020    Anticoagulated     PRADAXA FOR A FIB TO HELD 3 DAYS PRIOR    Arthritis     OSTEO. RIGHT HIP    Atrial flutter     Bifascicular block     Cataract     LEFT AND RIGHT    Depression     History of colon polyps     Hypertension     Hypoxemia associated with sleep     Insomnia     Knee pain     STEPHENIE on CPAP 05/27/2010    Overnight polysomnogram.  Weight 163 pounds.  Severe STEPHENIE with AHI of 30.9 events per hour.  Low oxygen saturation 72% and sleep-related hypoxia present for 30 minutes    PAF (paroxysmal atrial fibrillation)     Right hip pain     Slow to wake up after anesthesia        Past Surgical History:   Procedure Laterality Date    CARDIAC ABLATION      CARDIAC CATHETERIZATION      COLONOSCOPY N/A 2/21/2018    Procedure: COLONOSCOPY INTO CECUM WITH COLD BX  POLYPECTOMY ;  Surgeon: Ross Stearns MD;  Location: [facility] ENDOSCOPY;  Service:     COLONOSCOPY N/A 2/3/2021    Procedure: COLONOSCOPY TOCECUM WITH COLD BX POLYPECTOMY AND HOT SNARE POLYPECTOMY;  Surgeon: Ross Stearns MD;  Location: [facility] ENDOSCOPY;  Service: General;  Laterality: N/A;  PERSONAL HX OF POLYPS, FAMILY HX OF COLON CANCER  --POLYPS (X3), DIVERTICULOSIS    COLONOSCOPY N/A 3/20/2024    Procedure: COLONOSCOPY TO CECUM WITH COLD BX POLYPECTOMIES;  Surgeon: Ross Stearns MD;  Location: [facility] ENDOSCOPY;  Service: General;  Laterality: N/A;  HX POLYPS/POLYPS (3)    HERNIA REPAIR      INGUINAL HERNIA? SIDE    TOTAL HIP ARTHROPLASTY Right 6/5/2020    Procedure: TOTAL HIP ARTHROPLASTY;  Surgeon: Travis Hamlin MD;  Location: [facility] MAIN OR;  Service: Orthopedics;  Laterality: Right;       Family History   Problem Relation Age of Onset    Breast cancer Mother     Colon cancer Father     Diabetes Brother     No Known Problems Maternal Grandmother     No Known Problems Maternal Grandfather     No Known Problems Paternal Grandmother     Stroke Paternal Grandfather     Heart disease Brother     Malig Hyperthermia Neg Hx        Social History     Tobacco Use    Smoking status: Never    Smokeless tobacco: Current     Types: Snuff, Chew    Tobacco comments:     2 weekly chew   Vaping Use    Vaping status: Never Used   Substance Use Topics    Alcohol use: Never    Drug use: Never        Medications Prior to Admission   Medication Sig Dispense Refill Last Dose/Taking    apixaban (ELIQUIS) 5 MG tablet tablet Take 1 tablet by mouth Every 12 (Twelve) Hours. 180 tablet 3 Taking    atorvastatin (LIPITOR) 10 MG tablet Take 1 tablet by mouth Daily.   Taking    clonazePAM (KlonoPIN) 0.5 MG tablet Take 1 tablet by mouth 2 (Two) Times a Day.   Taking    Cyanocobalamin ER 1000 MCG tablet controlled-release Take 1,000 mcg by mouth Daily.   Taking    digoxin (LANOXIN) 125 MCG tablet Take 1 tablet by mouth Daily. 30  "tablet 11 Taking    Divalproex Sodium (DEPAKOTE SPRINKLE) 125 MG capsule Take 2 capsules by mouth 3 times a day.   Taking    megestrol (MEGACE) 40 MG/ML suspension Take 10 mL by mouth Daily. 300 mL 0 Taking    metoprolol tartrate (LOPRESSOR) 100 MG tablet Take 1 tablet by mouth Every 12 (Twelve) Hours. 60 tablet 0 Taking    OLANZapine (zyPREXA) 7.5 MG tablet Take 1 tablet by mouth 2 (Two) Times a Day.   Taking    tamsulosin (FLOMAX) 0.4 MG capsule 24 hr capsule Take 1 capsule by mouth Daily.   Taking       Allergies: Patient has no known allergies.    Scheduled Meds:apixaban, 5 mg, Oral, Q12H  atorvastatin, 10 mg, Oral, Nightly  cefTRIAXone, 1,000 mg, Intravenous, Q24H  clonazePAM, 0.5 mg, Oral, BID  digoxin, 125 mcg, Oral, Daily  Divalproex Sodium, 250 mg, Oral, Q8H  metoprolol tartrate, 100 mg, Oral, Q12H  OLANZapine zydis, 7.5 mg, Oral, BID  senna-docusate sodium, 2 tablet, Oral, BID  sodium chloride, 10 mL, Intravenous, Q12H  tamsulosin, 0.4 mg, Oral, Nightly      Continuous Infusions:dilTIAZem, 5-15 mg/hr, Last Rate: 12.5 mg/hr (01/29/25 1421)  lactated ringers, 75 mL/hr, Last Rate: 75 mL/hr (01/29/25 0505)      PRN Meds:.  acetaminophen **OR** acetaminophen **OR** acetaminophen    senna-docusate sodium **AND** polyethylene glycol **AND** bisacodyl **AND** bisacodyl    nitroglycerin    promethazine    [COMPLETED] Insert Peripheral IV **AND** sodium chloride    sodium chloride    sodium chloride    Objective     VITAL SIGNS  Vitals:    01/29/25 0725 01/29/25 0800 01/29/25 1144 01/29/25 1300   BP: 119/67 110/58 107/71 122/70   BP Location: Left arm  Left arm    Patient Position: Lying  Lying    Pulse:  107  81   Resp: 15  18    Temp: 98 °F (36.7 °C)  98.7 °F (37.1 °C)    TempSrc: Axillary  Axillary    SpO2:  92%  100%   Weight:       Height:           Flowsheet Rows      Flowsheet Row First Filed Value   Admission Height 185.4 cm (73\") Documented at 01/28/2025 2025   Admission Weight 91.2 kg (201 lb 1 oz) " Documented at 01/29/2025 0349             TELEMETRY: Atrial fibrillation with slightly higher heart rate    Physical Exam:  Constitutional:       Appearance: Well-developed.   Eyes:      General: No scleral icterus.     Conjunctiva/sclera: Conjunctivae normal.      Pupils: Pupils are equal, round, and reactive to light.   HENT:      Head: Normocephalic and atraumatic.   Neck:      Vascular: No carotid bruit or JVD.   Pulmonary:      Effort: Pulmonary effort is normal.      Breath sounds: Normal breath sounds. No wheezing. No rales.   Cardiovascular:      Normal rate. Regular rhythm.   Pulses:     Intact distal pulses.   Abdominal:      General: Bowel sounds are normal.      Palpations: Abdomen is soft.   Musculoskeletal: Normal range of motion.      Cervical back: Normal range of motion and neck supple. Skin:     General: Skin is warm and dry.      Findings: No rash.   Neurological:      Mental Status: Alert.      Comments: No focal deficits          Results Review:   I reviewed the patient's new clinical results.  Lab Results (last 24 hours)       Procedure Component Value Units Date/Time    Urinalysis With Culture If Indicated - Straight Cath [[id number]]  (Abnormal) Collected: 01/28/25 2100    Specimen: Urine from Straight Cath Updated: 01/29/25 0234     Color, UA Dark Yellow     Appearance, UA Clear     pH, UA 5.5     Specific Gravity, UA 1.025     Glucose, UA Negative     Ketones, UA 15 mg/dL (1+)     Bilirubin, UA Small (1+)     Comment: Confirmation testing is unavailable.  A serum bilirubin is recommended for further assessment.        Blood, UA Moderate (2+)     Protein, UA Trace     Leuk Esterase, UA Trace     Nitrite, UA Positive     Urobilinogen, UA 2.0 E.U./dL    Narrative:      In absence of clinical symptoms, the presence of pyuria, bacteria, and/or nitrites on the urinalysis result does not correlate with infection.    Urinalysis, Microscopic Only - Straight Cath [[id number]]  (Abnormal) Collected:  01/28/25 2100    Specimen: Urine from Straight Cath Updated: 01/29/25 0234     RBC, UA Too Numerous to Count /HPF      WBC, UA 0-2 /HPF      Comment: Urine culture not indicated.        Bacteria, UA None Seen /HPF      Squamous Epithelial Cells, UA 0-2 /HPF      Hyaline Casts, UA 0-2 /LPF      Methodology Automated Microscopy    Urine Culture - Urine, Straight Cath [[id number]] Collected: 01/28/25 2100    Specimen: Urine from Straight Cath Updated: 01/29/25 0233    Respiratory Panel PCR w/COVID-19(SARS-CoV-2) BLESSING/YAHAIRA/CANDICE/PAD/COR/CAROLANN In-House, NP Swab in UTM/VTM, 2 HR TAT - Swab, Nasopharynx [[id number]]  (Normal) Collected: 01/28/25 2101    Specimen: Swab from Nasopharynx Updated: 01/28/25 2229     ADENOVIRUS, PCR Not Detected     Coronavirus 229E Not Detected     Coronavirus HKU1 Not Detected     Coronavirus NL63 Not Detected     Coronavirus OC43 Not Detected     COVID19 Not Detected     Human Metapneumovirus Not Detected     Human Rhinovirus/Enterovirus Not Detected     Influenza A PCR Not Detected     Influenza B PCR Not Detected     Parainfluenza Virus 1 Not Detected     Parainfluenza Virus 2 Not Detected     Parainfluenza Virus 3 Not Detected     Parainfluenza Virus 4 Not Detected     RSV, PCR Not Detected     Bordetella pertussis pcr Not Detected     Bordetella parapertussis PCR Not Detected     Chlamydophila pneumoniae PCR Not Detected     Mycoplasma pneumo by PCR Not Detected    Narrative:      In the setting of a positive respiratory panel with a viral infection PLUS a negative procalcitonin without other underlying concern for bacterial infection, consider observing off antibiotics or discontinuation of antibiotics and continue supportive care. If the respiratory panel is positive for atypical bacterial infection (Bordetella pertussis, Chlamydophila pneumoniae, or Mycoplasma pneumoniae), consider antibiotic de-escalation to target atypical bacterial infection.    Comprehensive Metabolic Panel [[id number]]   (Abnormal) Collected: 01/28/25 2123    Specimen: Blood Updated: 01/28/25 2201     Glucose 90 mg/dL      BUN 24 mg/dL      Creatinine 0.86 mg/dL      Sodium 141 mmol/L      Potassium 4.3 mmol/L      Comment: Slight hemolysis detected by analyzer. Result may be falsely elevated.        Chloride 107 mmol/L      CO2 24.6 mmol/L      Calcium 9.4 mg/dL      Total Protein 5.9 g/dL      Albumin 3.8 g/dL      ALT (SGPT) 17 U/L      AST (SGOT) 23 U/L      Alkaline Phosphatase 62 U/L      Total Bilirubin 2.3 mg/dL      Globulin 2.1 gm/dL      A/G Ratio 1.8 g/dL      BUN/Creatinine Ratio 27.9     Anion Gap 9.4 mmol/L      eGFR 87.0 mL/min/1.73     Narrative:      GFR Categories in Chronic Kidney Disease (CKD)      GFR Category          GFR (mL/min/1.73)    Interpretation  G1                     90 or greater         Normal or high (1)  G2                      60-89                Mild decrease (1)  G3a                   45-59                Mild to moderate decrease  G3b                   30-44                Moderate to severe decrease  G4                    15-29                Severe decrease  G5                    14 or less           Kidney failure          (1)In the absence of evidence of kidney disease, neither GFR category G1 or G2 fulfill the criteria for CKD.    eGFR calculation 2021 CKD-EPI creatinine equation, which does not include race as a factor    Magnesium [[id number]]  (Normal) Collected: 01/28/25 2123    Specimen: Blood Updated: 01/28/25 2201     Magnesium 2.2 mg/dL     TSH Rfx On Abnormal To Free T4 [[id number]]  (Normal) Collected: 01/28/25 2123    Specimen: Blood Updated: 01/28/25 2200     TSH 2.700 uIU/mL     Valproic Acid Level, Total [[id number]]  (Abnormal) Collected: 01/28/25 2123    Specimen: Blood Updated: 01/28/25 2200     Valproic Acid 7.1 mcg/mL     Narrative:      Therapeutic Ranges for Valproic Acid    Epilepsy:       mcg/ml  Bipolar/Teresita  up to 125 mcg/ml      Digoxin Level [[id number]]   (Normal) Collected: 01/28/25 2123    Specimen: Blood Updated: 01/28/25 2200     Digoxin 1.14 ng/mL     CBC & Differential [[id number]]  (Abnormal) Collected: 01/28/25 2123    Specimen: Blood Updated: 01/28/25 2131    Narrative:      The following orders were created for panel order CBC & Differential.  Procedure                               Abnormality         Status                     ---------                               -----------         ------                     CBC Auto Differential[[id number]]        Abnormal            Final result                 Please view results for these tests on the individual orders.    CBC Auto Differential [[id number]]  (Abnormal) Collected: 01/28/25 2123    Specimen: Blood Updated: 01/28/25 2131     WBC 7.44 10*3/mm3      RBC 4.75 10*6/mm3      Hemoglobin 13.9 g/dL      Hematocrit 43.8 %      MCV 92.2 fL      MCH 29.3 pg      MCHC 31.7 g/dL      RDW 15.7 %      RDW-SD 53.0 fl      MPV 11.2 fL      Platelets 152 10*3/mm3      Neutrophil % 73.9 %      Lymphocyte % 15.3 %      Monocyte % 9.1 %      Eosinophil % 0.9 %      Basophil % 0.3 %      Immature Grans % 0.5 %      Neutrophils, Absolute 5.49 10*3/mm3      Lymphocytes, Absolute 1.14 10*3/mm3      Monocytes, Absolute 0.68 10*3/mm3      Eosinophils, Absolute 0.07 10*3/mm3      Basophils, Absolute 0.02 10*3/mm3      Immature Grans, Absolute 0.04 10*3/mm3      nRBC 0.0 /100 WBC     Extra Tubes [[id number]] Collected: 01/28/25 2123    Specimen: Blood, Venous Line Updated: 01/28/25 2131    Narrative:      The following orders were created for panel order Extra Tubes.  Procedure                               Abnormality         Status                     ---------                               -----------         ------                     Gold Top - SST[[id number]]                                   Final result               Light Blue Top[[id number]]                                   Final result                 Please view results  for these tests on the individual orders.    Gold Top - SST [[id number]] Collected: 01/28/25 2123    Specimen: Blood Updated: 01/28/25 2131     Extra Tube Hold for add-ons.     Comment: Auto resulted.       Light Blue Top [[id number]] Collected: 01/28/25 2123    Specimen: Blood Updated: 01/28/25 2131     Extra Tube Hold for add-ons.     Comment: Auto resulted               Imaging Results (Last 24 Hours)       Procedure Component Value Units Date/Time    XR Chest 1 View [[id number]] Collected: 01/29/25 0210     Updated: 01/29/25 0213    Narrative:      XR CHEST 1 VW    Date of Exam: 1/29/2025 1:01 AM EST    Indication: Altered mental status    Comparison: Chest radiograph 1/8/2025    Findings:  The heart is enlarged. Pulmonary vascular markings are normal. The lungs are clear.      Impression:      Impression:  Cardiomegaly. No active disease.          Electronically Signed: Roc Tanner MD    1/29/2025 2:11 AM EST    Workstation ID: [id number]    CT Abdomen Pelvis Without Contrast [[id number]] Collected: 01/29/25 0132     Updated: 01/29/25 0137    Narrative:      CT ABDOMEN PELVIS WO CONTRAST    Date of Exam: 1/29/2025 12:30 AM EST    Indication: Hematuria.    Comparison: CT abdomen pelvis 1/6/2025    Technique: Axial CT images were obtained of the abdomen and pelvis without the administration of contrast. Sagittal and coronal reconstructions were performed.  Automated exposure control and iterative reconstruction methods were used.      Findings:  Lung Bases:     There is mild bilateral basilar atelectasis. There is moderate coronary artery calcific atherosclerosis and mild cardiomegaly.    Liver:  Liver is normal in size and CT density. No focal lesions.    Biliary/Gallbladder:    The gallbladder is normal without evidence of radiopaque stones. The biliary tree is nondilated.    Spleen:  Spleen is normal in size and CT density.    Pancreas:    Pancreas is normal. There is no evidence of pancreatic mass or  peripancreatic fluid.    Kidneys:    There are bilateral nonobstructing renal calculi. There is no hydronephrosis.    Adrenals:    Adrenal glands are unremarkable.    Retroperitoneal/Lymph Nodes/Vasculature:    No retroperitoneal adenopathy is identified.    Gastrointestinal/Mesentery:    The bowel loops are non-dilated without wall thickening or mass. The appendix appears within normal limits. No evidence of obstruction. No free air. No mesenteric fluid collections identified.    Bladder:    The bladder is normal.    Genital:     Unremarkable          Bony Structures/soft tissues:     Visualized bony structures are consistent with the patient's age. The right rectus sheath hematoma seen on previous exam is slightly smaller.        Impression:      Impression:  1.Bilateral nonobstructing renal calculi. No acute abdominal or pelvic abnormality.  2.Slight decrease in size of the right rectus sheath hematoma.                Electronically Signed: Roc Tanner MD    1/29/2025 1:35 AM EST    Workstation ID: [id number]    CT Head Without Contrast [[id number]] Collected: 01/29/25 0106     Updated: 01/29/25 0110    Narrative:      CT HEAD WO CONTRAST    Date of Exam: 1/29/2025 12:30 AM EST    Indication: Altered mental status.    Comparison: CT head 1/8/2025    Technique: Axial CT images were obtained of the head without contrast administration.  Coronal reconstructions were performed.  Automated exposure control and iterative reconstruction methods were used. There is motion artifact which degrades the image   quality.    Findings:  There is mild diffuse generalized atrophy. There are low-attenuation areas in the periventricular white matter consistent with chronic microvascular ischemic change. There is no mass, mass effect or midline shift. There are no abnormal extra-axial fluid   collections or areas of acute hemorrhage. The paranasal sinuses are clear. The mastoid air cells are clear.      Impression:       Impression:  Atrophy and chronic microvascular ischemic change. No acute intracranial process.          Electronically Signed: Roc Tanner MD    1/29/2025 1:08 AM EST    Workstation ID: [id number]            EKG      I personally viewed and interpreted the patient's EKG/Telemetry data:    ECHOCARDIOGRAM:  Results for orders placed during the hospital encounter of 09/05/24    Adult Transthoracic Echo Complete W/ Cont if Necessary Per Protocol    Interpretation Summary    Left ventricular systolic function is low normal. Calculated left ventricular EF = 48.8%    The left ventricular cavity is borderline dilated.    Left ventricular diastolic function is consistent with (grade II w/high LAP) pseudonormalization.    The left atrial cavity is mild to moderately dilated.    Moderate aortic valve stenosis is present. Aortic valve area is 1.8 cm2.    Peak velocity of the flow distal to the aortic valve is 328 cm/s. Aortic valve maximum pressure gradient is 43 mmHg. Aortic valve mean pressure gradient is 24 mmHg. Aortic valve dimensionless index is 0.4 .    Estimated right ventricular systolic pressure from tricuspid regurgitation is moderately elevated (45-55 mmHg). Calculated right ventricular systolic pressure from tricuspid regurgitation is 48 mmHg.    Mild to moderate aortic valve regurgitation is present.    Mild to moderate mitral valve regurgitation is present with an eccentric jet noted.         STRESS MYOVIEW:       CARDIAC CATHETERIZATION:    No results found for this or any previous visit.       OTHER:         MDM      Atrial fibrillation  Patient has recently diagnosed atrial fibrillation and was treated by electrophysiologist in [place].  Patient's heart rate was not controlled very well in spite of placing on metoprolol and digoxin and Eliquis for anticoagulation.  Patient was scheduled for AV node ablation and pacemaker but he started having mental status changes and hence he was placed in a rehab  place  Will continue medical therapy for now and no further cardiac workup.    Delirium with suspected Alzheimer's disease  Patient is also having workup done to rule out metabolic encephalopathy.    Hypertension  Patient's blood pressure currently stable on medications including beta-blockers.     I discussed the patients findings and my recommendations with patient's nurse and family    Felix Lozada MD  01/29/25  14:37 EST

## 2025-01-29 NOTE — Clinical Note
Hemostasis started on the right femoral vein. Figure 8 suturing was used in achieving hemostasis. Closure device deployed in the vessel. Hemostasis achieved successfully. Closure device additional comment: Sheath removed by MD with figure 8 stitch and stopcock bolster. <<-----Click here for Discharge Medication Review

## 2025-01-29 NOTE — PLAN OF CARE
Patient leaving [facility] via EMS on cardizem gtt. Patient going to [facility]. Report called to Alissa and patient's primary physician is accepting at [facility]. Patient being transferred to see primary cardiologist.

## 2025-01-29 NOTE — PLAN OF CARE
Problem: Restraint, Nonviolent  Goal: Absence of Harm or Injury  Intervention: Implement Least Restrictive Safety Strategies  Recent Flowsheet Documentation  Taken 1/29/2025 0401 by Kyara Recio RN  Medical Device Protection:   IV pole/bag removed from visual field   torso covered   tubing secured  Diversional Activities: television  Intervention: Protect Dignity, Rights and Personal Wellbeing  Recent Flowsheet Documentation  Taken 1/29/2025 0401 by Kyara Recio RN  Trust Relationship/Rapport:   care explained   choices provided   emotional support provided   reassurance provided  Intervention: Protect Skin and Joint Integrity  Recent Flowsheet Documentation  Taken 1/29/2025 0401 by Kyara Recio RN  Skin Protection:   incontinence pads utilized   transparent dressing maintained   pulse oximeter probe site changed  Range of Motion: ROM (range of motion) performed

## 2025-01-29 NOTE — CASE MANAGEMENT/SOCIAL WORK
"Physicians Statement of Medical Necessity for  Ambulance Transportation    GENERAL INFORMATION     Name: Mark Aguirre Jr.  YOB: 1945  Medicare #:     [id number]     Transport Date: 01/29/2025 (Valid for round trips this date, or for scheduled repetitive trips for 60 days from the date signed below.)  Origin: [facility] Room 3120  Destination: [address], Room [address]  Is the Patient's stay covered under Medicare Part A (PPS/DRG?)Yes  Closest appropriate facility? Yes  If this a hosp-hosp transfer? Yes, describe services needed at 2nd facility not available at 1st facility Patient's cardiologist    Is this a hospice patient? No    MEDICAL NECESSITY QUESTIONAIRE    Ambulance Transportation is medically necessary only if other means of transportation are contraindicated or would be potentially harmful to the patient.  To meet this requirement, the patient must be either \"bed confined\" or suffer from a condition such that transport by means other than an ambulance is contraindicated by the patient's condition.  The following questions must be answered by the healthcare professional signing below for this form to be valid:     1) Describe the MEDICAL CONDITION (physical and/or mental) of this patient AT THE TIME OF AMBULANCE TRANSPORT that requires the patient to be transported in an ambulance, and why transport by other means is contraindicated by the patient's condition:   Confusion, Cardizem gtt  Past Medical History:   Diagnosis Date    Acute kidney failure     POST-OP TOTAL HIP 2020    Anticoagulated     PRADAXA FOR A FIB TO HELD 3 DAYS PRIOR    Arthritis     OSTEO. RIGHT HIP    Atrial flutter     Bifascicular block     Cataract     LEFT AND RIGHT    Depression     History of colon polyps     Hypertension     Hypoxemia associated with sleep     Insomnia     Knee pain     STEPHENIE on CPAP 05/27/2010    Overnight polysomnogram.  Weight 163 pounds.  Severe STEPHENIE with AHI of 30.9 " "events per hour.  Low oxygen saturation 72% and sleep-related hypoxia present for 30 minutes    PAF (paroxysmal atrial fibrillation)     Right hip pain     Slow to wake up after anesthesia       Past Surgical History:   Procedure Laterality Date    CARDIAC ABLATION      CARDIAC CATHETERIZATION      COLONOSCOPY N/A 2/21/2018    Procedure: COLONOSCOPY INTO CECUM WITH COLD BX POLYPECTOMY ;  Surgeon: Ross Stearns MD;  Location: [facility] ENDOSCOPY;  Service:     COLONOSCOPY N/A 2/3/2021    Procedure: COLONOSCOPY TOCECUM WITH COLD BX POLYPECTOMY AND HOT SNARE POLYPECTOMY;  Surgeon: Ross Stearns MD;  Location: [facility] ENDOSCOPY;  Service: General;  Laterality: N/A;  PERSONAL HX OF POLYPS, FAMILY HX OF COLON CANCER  --POLYPS (X3), DIVERTICULOSIS    COLONOSCOPY N/A 3/20/2024    Procedure: COLONOSCOPY TO CECUM WITH COLD BX POLYPECTOMIES;  Surgeon: Ross Stearns MD;  Location: [facility] ENDOSCOPY;  Service: General;  Laterality: N/A;  HX POLYPS/POLYPS (3)    HERNIA REPAIR      INGUINAL HERNIA? SIDE    TOTAL HIP ARTHROPLASTY Right 6/5/2020    Procedure: TOTAL HIP ARTHROPLASTY;  Surgeon: Travis Hamlin MD;  Location: [facility] OR;  Service: Orthopedics;  Laterality: Right;      2) Is this patient \"bed confined\" as defined below?Yes   To be \"bed confined\" the patient must satisfy all three of the following criteria:  (1) unable to get up from bed without assistance; AND (2) unable to ambulate;  AND (3) unable to sit in a chair or wheelchair.  3) Can this patient safely be transported by car or wheelchair van (I.e., may safely sit during transport, without an attendant or monitoring?)No   4. In addition to completing questions 1-3 above, please check any of the following conditions that apply*:          *Note: supporting documentation for any boxes checked must be maintained in the patient's medical records Patient is confused, IV meds/fluids required, Need or possible need for restraints, Medical attendant required, " Requires oxygen - unable to self administer, and Cardiac monitoring required en route      SIGNATURE OF PHYSICIAN OR OTHER AUTHORIZED HEALTHCARE PROFESSIONAL    I certify that the above information is true and correct based on my evaluation of this patient, and represent that the patient requires transport by ambulance and that other forms of transport are contraindicated.  I understand that this information will be used by the Centers for Medicare and Medicaid Services (CMS) to support the determiniation of medical necessity for ambulance services, and I represent that I have personal knowledge of the patient's condition at the time of transport.    rupal   If this box is checked, I also certify that the patient is physically or mentally incapable of signing the ambulance service's claim form and that the institution with which I am affiliated has furnished care, services or assistance to the patient.  My signature below is made on behalf of the patient pursuant to 42 .36(b)(4). In accordance with 42 .37, the specific reason(s) that the patient is physically or mentally incapable of signing the claim for is as follows:     Signature of Physician or Healthcare Professional     Laura Mcqueen RN, [facility] Date/Time:   01/29/2025  1226     (For Scheduled repetitive transport, this form is not valid for transports performed more than 60 days after this date).                                                                                                                                            --------------------------------------------------------------------------------------------  Printed Name and Credentials of Physician or Authorized Healthcare Professional     *Form must be signed by patient's attending physician for scheduled, repetitive transports,.  For non-repetitive ambulance transports, if unable to obtain the signature of the attending physician, any of the following may sign (please select  below):     Physician  Clinical Nurse Specialist  Registered Nurse     Physician Assistant  Discharge Planner  Licensed Practical Nurse     Nurse Practitioner   x

## 2025-01-29 NOTE — H&P
[facility] Medicine Services  History & Physical    Patient Name: Mark Aguirre Jr.  : 1945  MRN: [id number]  Primary Care Physician:  Luis Staples MD  Date of admission: 2025  Date and Time of Service: 2025    Subjective      Chief Complaint: Irregular heart rate    History of Present Illness: Mark Aguirre Jr. is a 79 y.o. male with a side medical history.  The patient was a functioning adult that was still working part-time up until 2024.  He had atrial fibrillation previously.  However in late December he fell and hit his head and had a large laceration.  He went to [facility] in [place] and in working in getting his heart rate under control he developed delirium.  He never recovered from this delirium.  Major extensive workups by neurology and psychiatry were performed.  Yet he never got back to baseline.  They also had difficulties controlling his heart rate.  Finally they had 3 nights of heart rate less than 100 and did not require restraints and then he was sent to a rehab facility.  This was last week that they discharged him.  At the facility tonight he had irregular heartbeat that was accelerated around to 140 250 and reported more confusion so they sent him to the emergency room here at [place].  Here the ER physician got CT scans of the head and abdomen but in order to accomplish this he had to sedate the patient because he was being combative with the nurses.  CTs were performed and did not reveal any major pathology.  But the heart rate was difficult to control.  Discussed with the ER physician about the possibility of giving him some IV metoprolol but his blood pressure had gotten soft from the sedation to get the CT scan so the patient is now on a diltiazem drip.  The patient will be admitted and have cardiology evaluate medications to hopefully better control his atrial fibrillation.  Since he is having extensive workup from his delirium at the  other facility we will just try to maintain him on the medications he was discharged on.    His wife at bedside provided very good information.  She did not report any fever nausea vomiting diarrhea    Review of Systems unable to get because of altered mental status and now specially after he got sedation for the scans    Personal History     Past Medical History:   Diagnosis Date    Acute kidney failure     POST-OP TOTAL HIP 2020    Anticoagulated     PRADAXA FOR A FIB TO HELD 3 DAYS PRIOR    Arthritis     OSTEO. RIGHT HIP    Atrial flutter     Bifascicular block     Cataract     LEFT AND RIGHT    Depression     History of colon polyps     Hypertension     Hypoxemia associated with sleep     Insomnia     Knee pain     STEPHENIE on CPAP 05/27/2010    Overnight polysomnogram.  Weight 163 pounds.  Severe STEPHENIE with AHI of 30.9 events per hour.  Low oxygen saturation 72% and sleep-related hypoxia present for 30 minutes    PAF (paroxysmal atrial fibrillation)     Right hip pain     Slow to wake up after anesthesia        Past Surgical History:   Procedure Laterality Date    CARDIAC ABLATION      CARDIAC CATHETERIZATION      COLONOSCOPY N/A 2/21/2018    Procedure: COLONOSCOPY INTO CECUM WITH COLD BX POLYPECTOMY ;  Surgeon: Ross Stearns MD;  Location: [facility] ENDOSCOPY;  Service:     COLONOSCOPY N/A 2/3/2021    Procedure: COLONOSCOPY TOCECUM WITH COLD BX POLYPECTOMY AND HOT SNARE POLYPECTOMY;  Surgeon: Ross Stearns MD;  Location: [facility] ENDOSCOPY;  Service: General;  Laterality: N/A;  PERSONAL HX OF POLYPS, FAMILY HX OF COLON CANCER  --POLYPS (X3), DIVERTICULOSIS    COLONOSCOPY N/A 3/20/2024    Procedure: COLONOSCOPY TO CECUM WITH COLD BX POLYPECTOMIES;  Surgeon: Ross Stearns MD;  Location: [facility] ENDOSCOPY;  Service: General;  Laterality: N/A;  HX POLYPS/POLYPS (3)    HERNIA REPAIR      INGUINAL HERNIA? SIDE    TOTAL HIP ARTHROPLASTY Right 6/5/2020    Procedure: TOTAL HIP ARTHROPLASTY;  Surgeon: Travis Hamlin MD;   Location: [facility] OR;  Service: Orthopedics;  Laterality: Right;       Family History: family history includes Breast cancer in his mother; Colon cancer in his father; Diabetes in his brother; Heart disease in his brother; No Known Problems in his maternal grandfather, maternal grandmother, and paternal grandmother; Stroke in his paternal grandfather. Otherwise pertinent FHx was reviewed and not pertinent to current issue.    Social History:  reports that he has never smoked. His smokeless tobacco use includes snuff. He reports that he does not drink alcohol and does not use drugs.    Home Medications:  Prior to Admission Medications       Prescriptions Last Dose Informant Patient Reported? Taking?    acetaminophen (TYLENOL) 325 MG tablet   No No    Take 2 tablets by mouth Every 4 (Four) Hours As Needed for Mild Pain .    apixaban (ELIQUIS) 5 MG tablet tablet   No No    Take 1 tablet by mouth Every 12 (Twelve) Hours.    atorvastatin (LIPITOR) 10 MG tablet   Yes No    Take 1 tablet by mouth Daily.    Cyanocobalamin (VITAMIN B 12 PO)   Yes No    Take  by mouth.    digoxin (LANOXIN) 125 MCG tablet   No No    Take 1 tablet by mouth Daily.    Divalproex Sodium (DEPAKOTE SPRINKLE) 125 MG capsule   No No    Take 1 capsule by mouth 3 times a day.    megestrol (MEGACE) 40 MG/ML suspension   No No    Take 10 mL by mouth Daily.    metoprolol tartrate (LOPRESSOR) 100 MG tablet   No No    Take 1 tablet by mouth Every 12 (Twelve) Hours.    OLANZapine (zyPREXA) 7.5 MG tablet   No No    Take 1 tablet by mouth Every Night.    OLANZapine (zyPREXA) 7.5 MG tablet   No No    Take 1 tablet by mouth Daily With Dinner.    sennosides-docusate (PERICOLACE) 8.6-50 MG per tablet   No No    Take 2 tablets by mouth 2 (Two) Times a Day As Needed for Constipation.    tamsulosin (FLOMAX) 0.4 MG capsule 24 hr capsule   Yes No    Take 1 capsule by mouth Daily.              Allergies:  No Known Allergies    Objective      Vitals:   Temp:  [97.9 °F  (36.6 °C)-98.6 °F (37 °C)] 98.6 °F (37 °C)  Heart Rate:  [] 102  Resp:  [18] 18  BP: (101-123)/(54-78) 103/60  Body mass index is 26.09 kg/m².  Physical Exam  General Very somnolent but arousable mumbles and attempting to answer only thing I can make out completely was he hated hospitals and doctors  Head atraumatic normocephalic  Nares no discharge  Oropharynx membranes moist no erythema  Pulmonary lungs are clear to auscultation bilaterally no accessory muscle use  Cardiac tachycardic irregular irregular could not appreciate any murmurs no peripheral edema  Abdomen positive bowel sounds no guarding no rebound no hepatosplenomegaly  Extremities symmetric warm to the touch no cyanosis  Neuro unable to do a neuroexam patient would not follow commands but does appear to move all extremities  Psych unable to evaluate too sedated  Derm no rash  Diagnostic Data:  Lab Results (last 24 hours)       Procedure Component Value Units Date/Time    Urinalysis With Culture If Indicated - Straight Cath [[id number]]  (Abnormal) Collected: 01/28/25 2100    Specimen: Urine from Straight Cath Updated: 01/29/25 0234     Color, UA Dark Yellow     Appearance, UA Clear     pH, UA 5.5     Specific Gravity, UA 1.025     Glucose, UA Negative     Ketones, UA 15 mg/dL (1+)     Bilirubin, UA Small (1+)     Comment: Confirmation testing is unavailable.  A serum bilirubin is recommended for further assessment.        Blood, UA Moderate (2+)     Protein, UA Trace     Leuk Esterase, UA Trace     Nitrite, UA Positive     Urobilinogen, UA 2.0 E.U./dL    Narrative:      In absence of clinical symptoms, the presence of pyuria, bacteria, and/or nitrites on the urinalysis result does not correlate with infection.    Urinalysis, Microscopic Only - Straight Cath [[id number]]  (Abnormal) Collected: 01/28/25 2100    Specimen: Urine from Straight Cath Updated: 01/29/25 0234     RBC, UA Too Numerous to Count /HPF      WBC, UA 0-2 /HPF      Comment: Urine  culture not indicated.        Bacteria, UA None Seen /HPF      Squamous Epithelial Cells, UA 0-2 /HPF      Hyaline Casts, UA 0-2 /LPF      Methodology Automated Microscopy    Urine Culture - Urine, Straight Cath [[id number]] Collected: 01/28/25 2100    Specimen: Urine from Straight Cath Updated: 01/29/25 0233    Respiratory Panel PCR w/COVID-19(SARS-CoV-2) BLESSING/YAHAIRA/CANDICE/PAD/COR/CAROLANN In-House, NP Swab in UTM/VTM, 2 HR TAT - Swab, Nasopharynx [[id number]]  (Normal) Collected: 01/28/25 2101    Specimen: Swab from Nasopharynx Updated: 01/28/25 2229     ADENOVIRUS, PCR Not Detected     Coronavirus 229E Not Detected     Coronavirus HKU1 Not Detected     Coronavirus NL63 Not Detected     Coronavirus OC43 Not Detected     COVID19 Not Detected     Human Metapneumovirus Not Detected     Human Rhinovirus/Enterovirus Not Detected     Influenza A PCR Not Detected     Influenza B PCR Not Detected     Parainfluenza Virus 1 Not Detected     Parainfluenza Virus 2 Not Detected     Parainfluenza Virus 3 Not Detected     Parainfluenza Virus 4 Not Detected     RSV, PCR Not Detected     Bordetella pertussis pcr Not Detected     Bordetella parapertussis PCR Not Detected     Chlamydophila pneumoniae PCR Not Detected     Mycoplasma pneumo by PCR Not Detected    Narrative:      In the setting of a positive respiratory panel with a viral infection PLUS a negative procalcitonin without other underlying concern for bacterial infection, consider observing off antibiotics or discontinuation of antibiotics and continue supportive care. If the respiratory panel is positive for atypical bacterial infection (Bordetella pertussis, Chlamydophila pneumoniae, or Mycoplasma pneumoniae), consider antibiotic de-escalation to target atypical bacterial infection.    Comprehensive Metabolic Panel [[id number]]  (Abnormal) Collected: 01/28/25 2123    Specimen: Blood Updated: 01/28/25 2201     Glucose 90 mg/dL      BUN 24 mg/dL      Creatinine 0.86 mg/dL      Sodium  141 mmol/L      Potassium 4.3 mmol/L      Comment: Slight hemolysis detected by analyzer. Result may be falsely elevated.        Chloride 107 mmol/L      CO2 24.6 mmol/L      Calcium 9.4 mg/dL      Total Protein 5.9 g/dL      Albumin 3.8 g/dL      ALT (SGPT) 17 U/L      AST (SGOT) 23 U/L      Alkaline Phosphatase 62 U/L      Total Bilirubin 2.3 mg/dL      Globulin 2.1 gm/dL      A/G Ratio 1.8 g/dL      BUN/Creatinine Ratio 27.9     Anion Gap 9.4 mmol/L      eGFR 87.0 mL/min/1.73     Narrative:      GFR Categories in Chronic Kidney Disease (CKD)      GFR Category          GFR (mL/min/1.73)    Interpretation  G1                     90 or greater         Normal or high (1)  G2                      60-89                Mild decrease (1)  G3a                   45-59                Mild to moderate decrease  G3b                   30-44                Moderate to severe decrease  G4                    15-29                Severe decrease  G5                    14 or less           Kidney failure          (1)In the absence of evidence of kidney disease, neither GFR category G1 or G2 fulfill the criteria for CKD.    eGFR calculation 2021 CKD-EPI creatinine equation, which does not include race as a factor    Magnesium [[id number]]  (Normal) Collected: 01/28/25 2123    Specimen: Blood Updated: 01/28/25 2201     Magnesium 2.2 mg/dL     TSH Rfx On Abnormal To Free T4 [[id number]]  (Normal) Collected: 01/28/25 2123    Specimen: Blood Updated: 01/28/25 2200     TSH 2.700 uIU/mL     Valproic Acid Level, Total [[id number]]  (Abnormal) Collected: 01/28/25 2123    Specimen: Blood Updated: 01/28/25 2200     Valproic Acid 7.1 mcg/mL     Narrative:      Therapeutic Ranges for Valproic Acid    Epilepsy:       mcg/ml  Bipolar/Teresita  up to 125 mcg/ml      Digoxin Level [[id number]]  (Normal) Collected: 01/28/25 2123    Specimen: Blood Updated: 01/28/25 2200     Digoxin 1.14 ng/mL     CBC & Differential [[id number]]  (Abnormal) Collected:  01/28/25 2123    Specimen: Blood Updated: 01/28/25 2131    Narrative:      The following orders were created for panel order CBC & Differential.  Procedure                               Abnormality         Status                     ---------                               -----------         ------                     CBC Auto Differential[[id number]]        Abnormal            Final result                 Please view results for these tests on the individual orders.    CBC Auto Differential [[id number]]  (Abnormal) Collected: 01/28/25 2123    Specimen: Blood Updated: 01/28/25 2131     WBC 7.44 10*3/mm3      RBC 4.75 10*6/mm3      Hemoglobin 13.9 g/dL      Hematocrit 43.8 %      MCV 92.2 fL      MCH 29.3 pg      MCHC 31.7 g/dL      RDW 15.7 %      RDW-SD 53.0 fl      MPV 11.2 fL      Platelets 152 10*3/mm3      Neutrophil % 73.9 %      Lymphocyte % 15.3 %      Monocyte % 9.1 %      Eosinophil % 0.9 %      Basophil % 0.3 %      Immature Grans % 0.5 %      Neutrophils, Absolute 5.49 10*3/mm3      Lymphocytes, Absolute 1.14 10*3/mm3      Monocytes, Absolute 0.68 10*3/mm3      Eosinophils, Absolute 0.07 10*3/mm3      Basophils, Absolute 0.02 10*3/mm3      Immature Grans, Absolute 0.04 10*3/mm3      nRBC 0.0 /100 WBC     Extra Tubes [[id number]] Collected: 01/28/25 2123    Specimen: Blood, Venous Line Updated: 01/28/25 2131    Narrative:      The following orders were created for panel order Extra Tubes.  Procedure                               Abnormality         Status                     ---------                               -----------         ------                     Gold Top - SST[[id number]]                                   Final result               Light Blue Top[[id number]]                                   Final result                 Please view results for these tests on the individual orders.    Gold Top - SST [[id number]] Collected: 01/28/25 2123    Specimen: Blood Updated: 01/28/25 2131     Extra Tube Hold  for add-ons.     Comment: Auto resulted.       Light Blue Top [[id number]] Collected: 01/28/25 2123    Specimen: Blood Updated: 01/28/25 2131     Extra Tube Hold for add-ons.     Comment: Auto resulted                Imaging Results (Last 24 Hours)       Procedure Component Value Units Date/Time    XR Chest 1 View [[id number]] Collected: 01/29/25 0210     Updated: 01/29/25 0213    Narrative:      XR CHEST 1 VW    Date of Exam: 1/29/2025 1:01 AM EST    Indication: Altered mental status    Comparison: Chest radiograph 1/8/2025    Findings:  The heart is enlarged. Pulmonary vascular markings are normal. The lungs are clear.      Impression:      Impression:  Cardiomegaly. No active disease.          Electronically Signed: Roc Tanner MD    1/29/2025 2:11 AM EST    Workstation ID: [id number]    CT Abdomen Pelvis Without Contrast [[id number]] Collected: 01/29/25 0132     Updated: 01/29/25 0137    Narrative:      CT ABDOMEN PELVIS WO CONTRAST    Date of Exam: 1/29/2025 12:30 AM EST    Indication: Hematuria.    Comparison: CT abdomen pelvis 1/6/2025    Technique: Axial CT images were obtained of the abdomen and pelvis without the administration of contrast. Sagittal and coronal reconstructions were performed.  Automated exposure control and iterative reconstruction methods were used.      Findings:  Lung Bases:     There is mild bilateral basilar atelectasis. There is moderate coronary artery calcific atherosclerosis and mild cardiomegaly.    Liver:  Liver is normal in size and CT density. No focal lesions.    Biliary/Gallbladder:    The gallbladder is normal without evidence of radiopaque stones. The biliary tree is nondilated.    Spleen:  Spleen is normal in size and CT density.    Pancreas:    Pancreas is normal. There is no evidence of pancreatic mass or peripancreatic fluid.    Kidneys:    There are bilateral nonobstructing renal calculi. There is no hydronephrosis.    Adrenals:    Adrenal glands are  unremarkable.    Retroperitoneal/Lymph Nodes/Vasculature:    No retroperitoneal adenopathy is identified.    Gastrointestinal/Mesentery:    The bowel loops are non-dilated without wall thickening or mass. The appendix appears within normal limits. No evidence of obstruction. No free air. No mesenteric fluid collections identified.    Bladder:    The bladder is normal.    Genital:     Unremarkable          Bony Structures/soft tissues:     Visualized bony structures are consistent with the patient's age. The right rectus sheath hematoma seen on previous exam is slightly smaller.        Impression:      Impression:  1.Bilateral nonobstructing renal calculi. No acute abdominal or pelvic abnormality.  2.Slight decrease in size of the right rectus sheath hematoma.                Electronically Signed: Roc Tanner MD    1/29/2025 1:35 AM EST    Workstation ID: [id number]    CT Head Without Contrast [[id number]] Collected: 01/29/25 0106     Updated: 01/29/25 0110    Narrative:      CT HEAD WO CONTRAST    Date of Exam: 1/29/2025 12:30 AM EST    Indication: Altered mental status.    Comparison: CT head 1/8/2025    Technique: Axial CT images were obtained of the head without contrast administration.  Coronal reconstructions were performed.  Automated exposure control and iterative reconstruction methods were used. There is motion artifact which degrades the image   quality.    Findings:  There is mild diffuse generalized atrophy. There are low-attenuation areas in the periventricular white matter consistent with chronic microvascular ischemic change. There is no mass, mass effect or midline shift. There are no abnormal extra-axial fluid   collections or areas of acute hemorrhage. The paranasal sinuses are clear. The mastoid air cells are clear.      Impression:      Impression:  Atrophy and chronic microvascular ischemic change. No acute intracranial process.          Electronically Signed: Roc Tanner MD    1/29/2025 1:08 AM  EST    Workstation ID: [id number]              Assessment & Plan        This is a 79 y.o. male with:    Active and Resolved Problems  Active Hospital Problems    Diagnosis  POA    **Atrial fibrillation with RVR [I48.91]  Yes     Priority: High    Acute UTI (urinary tract infection) [N39.0]  Yes    Dementia with behavioral disturbance [F03.918]  Yes    Confusion [R41.0]  Yes      Resolved Hospital Problems   No resolved problems to display.       1.  Atrial fibrillation with rapid ventricular response will maintain potassium greater than 4 magnesium greater than 2.  Continue home digoxin and metoprolol.  In the interim have him on diltiazem drip and consulted cardiology.  He has had difficulty controlling his heart rate in the past.  2.  Dementia with behavioral disturbance/confusion patient is in [facility] rehab facility where after he developed delirium in his hospitalization in December has never fully recovered.  He was sedated in order to get imaging in the ER so I am not sure how far off his baseline he is.  Will continue his current outpatient medications while inpatient  3.  Acute urinary tract infection patient has a history of renal stones the urine was nitrate positive and a large amount of red blood cells went ahead and started ceftriaxone but I am not sure that this may  not be a true infection    VTE Prophylaxis:  Pharmacologic & mechanical VTE prophylaxis orders are present.        The patient desires to be as follows:    CODE STATUS:    Level Of Support Discussed With: Patient  Code Status (Patient has no pulse and is not breathing): CPR (Attempt to Resuscitate)  Medical Interventions (Patient has pulse or is breathing): Full Support      Admission Status:  I believe this patient meets inpatient status.    Expected Length of Stay: 2 days    PDMP and Medication Dispenses via Sidebar reviewed and consistent with patient reported medications.    I discussed the patient's findings and my recommendations  with patient and family.      Signature:     This document has been electronically signed by Hemant Deal MD on January 29, 2025 02:44 EST   [facility]

## 2025-01-30 LAB — BACTERIA SPEC AEROBE CULT: NO GROWTH

## 2025-01-30 NOTE — CONSULTS
Date of Hospital Visit: 25  Encounter Provider: Claus Lindquist MD  Place of Service: [facility] CARDIOLOGY  Patient Name: Mark Aguirre Jr.  :1945  [id number]  Referral Provider: Luis Staples MD    Chief complaint: A-fib with RVR    History of Present Illness: He is an 79-year-old gentleman that we have had difficulty controlling his A-fib for a while he had seen the arrhythmia team a couple of months ago there were plans being made for pacemaker implantation and ablation of his AV node.  However he had a fall and then has developed profound encephalopathy and it is felt he has some dementia also it has been a dramatic change in personality and the rate of decline of his overall health has been steep.  He just went to a [facility] psychiatric institution but was brought back because he had developed A-fib with RVR.  Currently he is tired in bed sleeping but occasionally arouses and gets agitated      Past Medical History:   Diagnosis Date    Acute kidney failure     POST-OP TOTAL HIP 2020    Anticoagulated     PRADAXA FOR A FIB TO HELD 3 DAYS PRIOR    Arthritis     OSTEO. RIGHT HIP    Atrial flutter     Bifascicular block     Cataract     LEFT AND RIGHT    Depression     History of colon polyps     Hypertension     Hypoxemia associated with sleep     Insomnia     Knee pain     STEPHENIE on CPAP 2010    Overnight polysomnogram.  Weight 163 pounds.  Severe STEPHENIE with AHI of 30.9 events per hour.  Low oxygen saturation 72% and sleep-related hypoxia present for 30 minutes    PAF (paroxysmal atrial fibrillation)     Right hip pain     Slow to wake up after anesthesia        Past Surgical History:   Procedure Laterality Date    CARDIAC ABLATION      CARDIAC CATHETERIZATION      COLONOSCOPY N/A 2018    Procedure: COLONOSCOPY INTO CECUM WITH COLD BX POLYPECTOMY ;  Surgeon: Ross Stearns MD;  Location: [facility] ENDOSCOPY;  Service:     COLONOSCOPY N/A 2/3/2021    Procedure:  COLONOSCOPY TOCECUM WITH COLD BX POLYPECTOMY AND HOT SNARE POLYPECTOMY;  Surgeon: Ross Stearns MD;  Location: [facility] ENDOSCOPY;  Service: General;  Laterality: N/A;  PERSONAL HX OF POLYPS, FAMILY HX OF COLON CANCER  --POLYPS (X3), DIVERTICULOSIS    COLONOSCOPY N/A 3/20/2024    Procedure: COLONOSCOPY TO CECUM WITH COLD BX POLYPECTOMIES;  Surgeon: Ross Stearns MD;  Location: [facility] ENDOSCOPY;  Service: General;  Laterality: N/A;  HX POLYPS/POLYPS (3)    HERNIA REPAIR      INGUINAL HERNIA? SIDE    TOTAL HIP ARTHROPLASTY Right 6/5/2020    Procedure: TOTAL HIP ARTHROPLASTY;  Surgeon: Travis Hamlin MD;  Location: [facility] MAIN OR;  Service: Orthopedics;  Laterality: Right;       Medications Prior to Admission   Medication Sig Dispense Refill Last Dose/Taking    apixaban (ELIQUIS) 5 MG tablet tablet Take 1 tablet by mouth Every 12 (Twelve) Hours. 180 tablet 3 Past Week    atorvastatin (LIPITOR) 10 MG tablet Take 1 tablet by mouth Daily.   Past Week    cefTRIAXone 1,000 mg in sodium chloride 0.9 % 100 mL IVPB Infuse 1,000 mg into a venous catheter Daily for 6 doses. Indications: Urinary Tract Infection   1/29/2025 Morning    clonazePAM (KlonoPIN) 0.5 MG tablet Take 1 tablet by mouth 2 (Two) Times a Day.   Past Week    Cyanocobalamin ER 1000 MCG tablet controlled-release Take 1,000 mcg by mouth Daily.   Past Week    digoxin (LANOXIN) 125 MCG tablet Take 1 tablet by mouth Daily. 30 tablet 11 Past Week    Divalproex Sodium (DEPAKOTE SPRINKLE) 125 MG capsule Take 2 capsules by mouth 3 times a day.   Past Week    lactated ringers infusion Infuse 75 mL/hr into a venous catheter Continuous. 1000 mL 0 1/29/2025    megestrol (MEGACE) 40 MG/ML suspension Take 10 mL by mouth Daily. 300 mL 0 Past Week    metoprolol tartrate (LOPRESSOR) 100 MG tablet Take 1 tablet by mouth Every 12 (Twelve) Hours. 60 tablet 0 Past Week    OLANZapine (zyPREXA) 7.5 MG tablet Take 1 tablet by mouth 2 (Two) Times a Day.   Past Week    tamsulosin  (FLOMAX) 0.4 MG capsule 24 hr capsule Take 1 capsule by mouth Daily.   Past Week       Current Meds  Scheduled Meds:atorvastatin, 10 mg, Oral, Nightly  cefTRIAXone (ROCEPHIN) 1,000 mg in sodium chloride 0.9 % 100 mL MBP, 1,000 mg, Intravenous, Q24H  clonazePAM, 0.5 mg, Oral, BID  digoxin, 125 mcg, Oral, Daily  Divalproex Sodium, 250 mg, Oral, TID  megestrol, 400 mg, Oral, Daily  metoprolol tartrate, 100 mg, Oral, Q12H  OLANZapine, 7.5 mg, Oral, BID  senna-docusate sodium, 2 tablet, Oral, BID  sodium chloride, 10 mL, Intravenous, Q12H  sodium chloride, 10 mL, Intravenous, Q12H  tamsulosin, 0.4 mg, Oral, Daily  vitamin B-12, 1,000 mcg, Oral, Daily      Continuous Infusions:   PRN Meds:.  acetaminophen **OR** acetaminophen **OR** acetaminophen    senna-docusate sodium **AND** polyethylene glycol **AND** bisacodyl **AND** bisacodyl    nitroglycerin    OLANZapine    sodium chloride    sodium chloride    sodium chloride    sodium chloride    Allergies as of 01/29/2025    (No Known Allergies)       Social History     Socioeconomic History    Marital status:    Tobacco Use    Smoking status: Never    Smokeless tobacco: Current     Types: Snuff, Chew    Tobacco comments:     2 weekly chew   Vaping Use    Vaping status: Never Used   Substance and Sexual Activity    Alcohol use: Never    Drug use: Never    Sexual activity: Defer       Family History   Problem Relation Age of Onset    Breast cancer Mother     Colon cancer Father     Diabetes Brother     No Known Problems Maternal Grandmother     No Known Problems Maternal Grandfather     No Known Problems Paternal Grandmother     Stroke Paternal Grandfather     Heart disease Brother     Malig Hyperthermia Neg Hx        REVIEW OF SYSTEMS:   ROS was performed and is negative except as outlined in HPI     REVIEW OF SYSTEMS:   CONSTITUTIONAL: No weight loss, fever, chills, weakness or fatigue.   HEENT: Eyes: No visual loss, blurred vision, double vision or yellow sclerae.  Ears, Nose, Throat: No hearing loss, sneezing, congestion, runny nose or sore throat.   SKIN: No rash or itching.     RESPIRATORY: No shortness of breath, hemoptysis, cough or sputum.   GASTROINTESTINAL: No anorexia, nausea, vomiting or diarrhea. No abdominal pain, bright red blood per rectum or melena.  GENITOURINARY: No burning on urination, hematuria or increased frequency.  NEUROLOGICAL: No headache, dizziness, syncope, paralysis, ataxia, numbness or tingling in the extremities. No change in bowel or bladder control.   MUSCULOSKELETAL: No muscle, back pain, joint pain or stiffness.   HEMATOLOGIC: No anemia, bleeding or bruising.   LYMPHATICS: No enlarged nodes. No history of splenectomy.   PSYCHIATRIC: No history of depression, anxiety, hallucinations.   ENDOCRINOLOGIC: No reports of sweating, cold or heat intolerance. No polyuria or polydipsia.       Objective:   Temp:  [97.4 °F (36.3 °C)-99 °F (37.2 °C)] 99 °F (37.2 °C)  Heart Rate:  [51-99] 96  Resp:  [16-18] 16  BP: ()/() 111/71  There is no height or weight on file to calculate BMI.    Vitals:    01/30/25 1357   BP: 111/71   Pulse: 96   Resp: 16   Temp: 99 °F (37.2 °C)   SpO2: 96%       Head:    Normocephalic, without obvious abnormality, atraumatic   Eyes:             Ears:     Throat:    Neck:    Lungs:     Breath sounds are equal and clear to auscultation    Heart:    Normal S1 and S2, iRRR, No M/G/R   Abdomen:     Normal bowel sounds, no masses, no organomegaly, soft        non-tender, non-distended, no guarding   Extremities:   Moves all extremities well, no edema, no cyanosis, no redness   Pulses:    Skin:  Psychiatric:              I personally viewed and interpreted the patient's EKG/Telemetry data    Assessment:  Active Hospital Problems    Diagnosis  POA    **Atrial fibrillation and flutter [I48.91, I48.92]  Yes      Resolved Hospital Problems   No resolved problems to display.       Plan: Well this is an incredibly sad case and almost  hard to believe.  You know this is going to be a recurrent problem I think and at this point I do not think there is any desire to put a pacemaker in but controlling his heart rate is going to be difficult we have asked the arrhythmia team to come back by and see him and help us with this complex A-fib case.  I have talked this over with Dr. Bre Lindquist MD  01/30/25  14:48 EST.

## 2025-01-30 NOTE — PROGRESS NOTES
Subjective: Patient slept well last night.  Nurse called at midnight to notify me heart rate was staying down and Cardizem drip was stopped due to some drop in blood pressure.  Patient also placed on 2 L of oxygen because of sleep apnea.  Daughter is at bedside this morning.      Objective:  Vitals:    01/30/25 0430 01/30/25 0445 01/30/25 0500 01/30/25 0520   BP: 94/82 103/54 90/55 111/61   BP Location:       Patient Position:       Pulse: 92 69 76 80   Resp:       Temp:       TempSrc:       SpO2: 92% 99% 97% 100%     General: No acute distress; sleeping soundly and arouses to tactile stimulation but not talkative this morning  Heart: Irregularly irregular  Lungs: Clear  Abdomen: Soft nontender nondistended  Extremity: No edema    Recent Results (from the past 24 hours)   Comprehensive Metabolic Panel    Collection Time: 01/30/25  3:52 AM    Specimen: Blood   Result Value Ref Range    Glucose 92 65 - 99 mg/dL    BUN 16 8 - 23 mg/dL    Creatinine 0.74 (L) 0.76 - 1.27 mg/dL    Sodium 138 136 - 145 mmol/L    Potassium 3.9 3.5 - 5.2 mmol/L    Chloride 108 (H) 98 - 107 mmol/L    CO2 22.6 22.0 - 29.0 mmol/L    Calcium 8.6 8.6 - 10.5 mg/dL    Total Protein 5.4 (L) 6.0 - 8.5 g/dL    Albumin 2.8 (L) 3.5 - 5.2 g/dL    ALT (SGPT) 15 1 - 41 U/L    AST (SGOT) 14 1 - 40 U/L    Alkaline Phosphatase 56 39 - 117 U/L    Total Bilirubin 2.0 (H) 0.0 - 1.2 mg/dL    Globulin 2.6 gm/dL    A/G Ratio 1.1 g/dL    BUN/Creatinine Ratio 21.6 7.0 - 25.0    Anion Gap 7.4 5.0 - 15.0 mmol/L    eGFR 91.0 >60.0 mL/min/1.73   CBC Auto Differential    Collection Time: 01/30/25  3:52 AM    Specimen: Blood   Result Value Ref Range    WBC 7.68 3.40 - 10.80 10*3/mm3    RBC 4.54 4.14 - 5.80 10*6/mm3    Hemoglobin 13.4 13.0 - 17.7 g/dL    Hematocrit 41.1 37.5 - 51.0 %    MCV 90.5 79.0 - 97.0 fL    MCH 29.5 26.6 - 33.0 pg    MCHC 32.6 31.5 - 35.7 g/dL    RDW 15.2 12.3 - 15.4 %    RDW-SD 49.0 37.0 - 54.0 fl    MPV 11.6 6.0 - 12.0 fL    Platelets 141 140 - 450  10*3/mm3    Neutrophil % 77.1 (H) 42.7 - 76.0 %    Lymphocyte % 12.2 (L) 19.6 - 45.3 %    Monocyte % 8.6 5.0 - 12.0 %    Eosinophil % 1.2 0.3 - 6.2 %    Basophil % 0.5 0.0 - 1.5 %    Immature Grans % 0.4 0.0 - 0.5 %    Neutrophils, Absolute 5.92 1.70 - 7.00 10*3/mm3    Lymphocytes, Absolute 0.94 0.70 - 3.10 10*3/mm3    Monocytes, Absolute 0.66 0.10 - 0.90 10*3/mm3    Eosinophils, Absolute 0.09 0.00 - 0.40 10*3/mm3    Basophils, Absolute 0.04 0.00 - 0.20 10*3/mm3    Immature Grans, Absolute 0.03 0.00 - 0.05 10*3/mm3    nRBC 0.0 0.0 - 0.2 /100 WBC       Assessment/plan  1.  Atrial fibrillation-heart rate currently staying down.  He is off the Cardizem drip.  Cardiology to see today regarding whether any medication changes need to be made.  Patient had been transferred to the emergency room because of rapid A-fib at [facility].  It does not sound like he has missed any of his oral medicines while at the geropsych unit  2.  Hematuria-secondary to trauma from In-N-Out catheterization in the emergency room.  Eliquis currently on hold until clears.  Continuing bladder scanning to make sure no retention given he has had history of previous urinary obstruction.  He is currently on antibiotic for possible UTI.  Awaiting culture result  3.  Confusion-patient with underlying Alzheimer's type dementia that looks to have been unmasked last admission.  Has remained confused and at times agitated.  Currently is getting Zyprexa, Depakote and Klonopin.  Sleeping soundly currently.  I am and asked psychiatry to come back by and evaluate to see if any medication changes are necessary.  I would like to try and keep him off of IV infusions if possible so that we can hopefully keep him out of restraints.  Similarly, I would really like to avoid the need for urinary catheter as I think that would be very problematic.  Physical therapy is to get him up today.  Continue efforts to reorient him when he is awake.  4.  Sleep apnea-dropping  oxygen sats at night at times.  Sedation from his medicines is likely worsened his sleep apnea.  Wife is going to bring in his CPAP and hopefully we can get him to tolerate that at night now.  He was too agitated last admission.  5.  Malnutrition-report I got from the psychiatric unit was that he was eating much better.  Will see how he does today.  Albumin levels remaining low.  Continue the Megace and nutritional shakes

## 2025-01-30 NOTE — CASE MANAGEMENT/SOCIAL WORK
Case Management Discharge Note      Final Note: [facility]      Transportation Services  Ambulance: [facility]    Final Discharge Disposition Code: 02 - short term hospital for [facility]

## 2025-01-30 NOTE — H&P
[facility]   HISTORY AND PHYSICAL    Patient Name: Mark Aguirre Jr.  : 1945  MRN: [id number]  Primary Care Physician:  Luis Staples MD  Date of admission: 2025    Subjective   Subjective     Chief Complaint: confused    History of Present Illness  Patient is an 79-year-old male well-known to me. He was recently discharged from the hospital after a 3-week hospitalization. During that hospitalization his main issues were syncope attributed to mild dehydration and Rapid atrial fibrillation. He also developed an acute delirium/confusion that required evaluation with Neurology. Work up revealed CSF biomarkers consistent with underlying Alzheimer's disease and it was felt that his hospitalization unmasked his underlying dementia. He required psychiatric consultation for management of agitated behaviors and  was started on Zyprexa twice daily and then Depakote three times a day. He ultimately was discharged to a [facility] in [facility]. I had spoken with the care team at the psychiatric hospital on Monday and patient was settling in well. He remained confused but agitation was decreasing. They had increased his Depakote to three times daily.          However on the evening of 2025 he was noted to have erratic heart rate and was sent to [facility] emergency room in [facility]. In the emergency room his heart rate was elevated into the 140s. He had increased agitation and was combative with nurses and required restraints and I am Zyprexa. He had a CT scan of the head that showed no acute changes. Blood work showed no significant abnormalities. He was started on a diltiazem drip.          He had an In-N-Out catheter placed  to get a urine sample in the emergency room. Urinalysis showed two numerous to count red cells but he did have nitrite and leukocyte esterase positive and was started on Rocephin for possible urinary tract infection. Microscopic did  not show any white cells or bacteria. He developed grossing Materia and was seen by urology. Urology felt that the hematuria was related to Trauma from the in and out catheter but that patient was urinating well. They suggested q6 hour bladder scans to make sure he was not retaining. They suggested catheter placement if post void residual was greater than 350.          Family requested transfer back to [facility] for ongoing care since his care team is located here.     Review of Systems     Personal History     Past Medical History:   Diagnosis Date    Acute kidney failure     POST-OP TOTAL HIP 2020    Anticoagulated     PRADAXA FOR A FIB TO HELD 3 DAYS PRIOR    Arthritis     OSTEO. RIGHT HIP    Atrial flutter     Bifascicular block     Cataract     LEFT AND RIGHT    Depression     History of colon polyps     Hypertension     Hypoxemia associated with sleep     Insomnia     Knee pain     STEPHENIE on CPAP 05/27/2010    Overnight polysomnogram.  Weight 163 pounds.  Severe STEPHENIE with AHI of 30.9 events per hour.  Low oxygen saturation 72% and sleep-related hypoxia present for 30 minutes    PAF (paroxysmal atrial fibrillation)     Right hip pain     Slow to wake up after anesthesia        Past Surgical History:   Procedure Laterality Date    CARDIAC ABLATION      CARDIAC CATHETERIZATION      COLONOSCOPY N/A 2/21/2018    Procedure: COLONOSCOPY INTO CECUM WITH COLD BX POLYPECTOMY ;  Surgeon: Ross Stearns MD;  Location: [facility] ENDOSCOPY;  Service:     COLONOSCOPY N/A 2/3/2021    Procedure: COLONOSCOPY TOCECUM WITH COLD BX POLYPECTOMY AND HOT SNARE POLYPECTOMY;  Surgeon: Ross Stearns MD;  Location: [facility] ENDOSCOPY;  Service: General;  Laterality: N/A;  PERSONAL HX OF POLYPS, FAMILY HX OF COLON CANCER  --POLYPS (X3), DIVERTICULOSIS    COLONOSCOPY N/A 3/20/2024    Procedure: COLONOSCOPY TO CECUM WITH COLD BX POLYPECTOMIES;  Surgeon: Ross Stearns MD;  Location: [facility] ENDOSCOPY;  Service: General;  Laterality:  N/A;  HX POLYPS/POLYPS (3)    HERNIA REPAIR      INGUINAL HERNIA? SIDE    TOTAL HIP ARTHROPLASTY Right 6/5/2020    Procedure: TOTAL HIP ARTHROPLASTY;  Surgeon: Travis Hamlin MD;  Location: [facility];  Service: Orthopedics;  Laterality: Right;       Family History: family history includes Breast cancer in his mother; Colon cancer in his father; Diabetes in his brother; Heart disease in his brother; No Known Problems in his maternal grandfather, maternal grandmother, and paternal grandmother; Stroke in his paternal grandfather. Otherwise pertinent FHx was reviewed and not pertinent to current issue.    Social History:  reports that he has never smoked. His smokeless tobacco use includes snuff and chew. He reports that he does not drink alcohol and does not use drugs.    Home Medications:  Cyanocobalamin ER, Divalproex Sodium, OLANZapine, apixaban, atorvastatin, (cefTRIAXone 1,000 mg in sodium chloride 0.9 % 100 mL IVPB), clonazePAM, digoxin, lactated ringers, megestrol, metoprolol tartrate, and tamsulosin    Allergies:  No Known Allergies    Objective    Objective     Vitals:   Temp:  [97.9 °F (36.6 °C)-98.7 °F (37.1 °C)] 98.7 °F (37.1 °C)  Heart Rate:  [] 93  Resp:  [15-19] 18  BP: ()/(50-78) 117/74  Flow (L/min) (Oxygen Therapy):  [2] 2    Physical Exam    Gen: Alert, confused, recognizes me. No distress  HEENT: NCAT  Heart: irregularly irregular  Lungs: CTa  Abd: soft NTND  Ext; No edema  Neuro- confused, oriented to person.  CN 2-12 grossly intact    Recent Results (from the past 24 hours)   ECG 12 Lead Electrolyte Imbalance    Collection Time: 01/28/25  8:28 PM   Result Value Ref Range    QT Interval 396 ms    QTC Interval 503 ms   Urinalysis With Culture If Indicated - Straight Cath    Collection Time: 01/28/25  9:00 PM    Specimen: Straight Cath; Urine   Result Value Ref Range    Color, UA Dark Yellow (A) Yellow, Straw    Appearance, UA Clear Clear    pH, UA 5.5 5.0 - 8.0    Specific Gravity,  UA 1.025 1.005 - 1.030    Glucose, UA Negative Negative    Ketones, UA 15 mg/dL (1+) (A) Negative    Bilirubin, UA Small (1+) (A) Negative    Blood, UA Moderate (2+) (A) Negative    Protein, UA Trace (A) Negative    Leuk Esterase, UA Trace (A) Negative    Nitrite, UA Positive (A) Negative    Urobilinogen, UA 2.0 E.U./dL (A) 0.2 - 1.0 E.U./dL   Urinalysis, Microscopic Only - Straight Cath    Collection Time: 01/28/25  9:00 PM    Specimen: Straight Cath; Urine   Result Value Ref Range    RBC, UA Too Numerous to Count (A) None Seen, 0-2 /HPF    WBC, UA 0-2 None Seen, 0-2 /HPF    Bacteria, UA None Seen None Seen /HPF    Squamous Epithelial Cells, UA 0-2 None Seen, 0-2 /HPF    Hyaline Casts, UA 0-2 None Seen /LPF    Methodology Automated Microscopy    Respiratory Panel PCR w/COVID-19(SARS-CoV-2) BLESSING/YAHAIRA/CANDICE/PAD/COR/CAROLANN In-House, NP Swab in UT/VTM, 2 HR TAT - Swab, Nasopharynx    Collection Time: 01/28/25  9:01 PM    Specimen: Nasopharynx; Swab   Result Value Ref Range    ADENOVIRUS, PCR Not Detected Not Detected    Coronavirus 229E Not Detected Not Detected    Coronavirus HKU1 Not Detected Not Detected    Coronavirus NL63 Not Detected Not Detected    Coronavirus OC43 Not Detected Not Detected    COVID19 Not Detected Not Detected - Ref. Range    Human Metapneumovirus Not Detected Not Detected    Human Rhinovirus/Enterovirus Not Detected Not Detected    Influenza A PCR Not Detected Not Detected    Influenza B PCR Not Detected Not Detected    Parainfluenza Virus 1 Not Detected Not Detected    Parainfluenza Virus 2 Not Detected Not Detected    Parainfluenza Virus 3 Not Detected Not Detected    Parainfluenza Virus 4 Not Detected Not Detected    RSV, PCR Not Detected Not Detected    Bordetella pertussis pcr Not Detected Not Detected    Bordetella parapertussis PCR Not Detected Not Detected    Chlamydophila pneumoniae PCR Not Detected Not Detected    Mycoplasma pneumo by PCR Not Detected Not Detected   Comprehensive Metabolic  Panel    Collection Time: 01/28/25  9:23 PM    Specimen: Blood   Result Value Ref Range    Glucose 90 65 - 99 mg/dL    BUN 24 (H) 8 - 23 mg/dL    Creatinine 0.86 0.76 - 1.27 mg/dL    Sodium 141 136 - 145 mmol/L    Potassium 4.3 3.5 - 5.2 mmol/L    Chloride 107 98 - 107 mmol/L    CO2 24.6 22.0 - 29.0 mmol/L    Calcium 9.4 8.6 - 10.5 mg/dL    Total Protein 5.9 (L) 6.0 - 8.5 g/dL    Albumin 3.8 3.5 - 5.2 g/dL    ALT (SGPT) 17 1 - 41 U/L    AST (SGOT) 23 1 - 40 U/L    Alkaline Phosphatase 62 39 - 117 U/L    Total Bilirubin 2.3 (H) 0.0 - 1.2 mg/dL    Globulin 2.1 gm/dL    A/G Ratio 1.8 g/dL    BUN/Creatinine Ratio 27.9 (H) 7.0 - 25.0    Anion Gap 9.4 5.0 - 15.0 mmol/L    eGFR 87.0 >60.0 mL/min/1.73   Magnesium    Collection Time: 01/28/25  9:23 PM    Specimen: Blood   Result Value Ref Range    Magnesium 2.2 1.6 - 2.4 mg/dL   TSH Rfx On Abnormal To Free T4    Collection Time: 01/28/25  9:23 PM    Specimen: Blood   Result Value Ref Range    TSH 2.700 0.270 - 4.200 uIU/mL   Valproic Acid Level, Total    Collection Time: 01/28/25  9:23 PM    Specimen: Blood   Result Value Ref Range    Valproic Acid 7.1 (L) 50.0 - 125.0 mcg/mL   Digoxin Level    Collection Time: 01/28/25  9:23 PM    Specimen: Blood   Result Value Ref Range    Digoxin 1.14 0.60 - 1.20 ng/mL   CBC Auto Differential    Collection Time: 01/28/25  9:23 PM    Specimen: Blood   Result Value Ref Range    WBC 7.44 3.40 - 10.80 10*3/mm3    RBC 4.75 4.14 - 5.80 10*6/mm3    Hemoglobin 13.9 13.0 - 17.7 g/dL    Hematocrit 43.8 37.5 - 51.0 %    MCV 92.2 79.0 - 97.0 fL    MCH 29.3 26.6 - 33.0 pg    MCHC 31.7 31.5 - 35.7 g/dL    RDW 15.7 (H) 12.3 - 15.4 %    RDW-SD 53.0 37.0 - 54.0 fl    MPV 11.2 6.0 - 12.0 fL    Platelets 152 140 - 450 10*3/mm3    Neutrophil % 73.9 42.7 - 76.0 %    Lymphocyte % 15.3 (L) 19.6 - 45.3 %    Monocyte % 9.1 5.0 - 12.0 %    Eosinophil % 0.9 0.3 - 6.2 %    Basophil % 0.3 0.0 - 1.5 %    Immature Grans % 0.5 0.0 - 0.5 %    Neutrophils, Absolute 5.49  1.70 - 7.00 10*3/mm3    Lymphocytes, Absolute 1.14 0.70 - 3.10 10*3/mm3    Monocytes, Absolute 0.68 0.10 - 0.90 10*3/mm3    Eosinophils, Absolute 0.07 0.00 - 0.40 10*3/mm3    Basophils, Absolute 0.02 0.00 - 0.20 10*3/mm3    Immature Grans, Absolute 0.04 0.00 - 0.05 10*3/mm3    nRBC 0.0 0.0 - 0.2 /100 WBC   Gold Top - SST    Collection Time: 01/28/25  9:23 PM   Result Value Ref Range    Extra Tube Hold for add-ons.    Light Blue Top    Collection Time: 01/28/25  9:23 PM   Result Value Ref Range    Extra Tube Hold for add-ons.      Imaging Results (Last 48 Hours)       ** No results found for the last 48 hours. **             Result Review    Result Review:  I have personally reviewed the results from the time of this admission to 1/29/2025 19:06 EST and agree with these findings:  [x]  Laboratory list / accordion  []  Microbiology  [x]  Radiology  [x]  EKG/Telemetry   []  Cardiology/Vascular   []  Pathology  []  Old records  []  Other:  Most notable findings include:   1/29/25 CT abdomen  IMPRESSION:  Impression:  1.Bilateral nonobstructing renal calculi. No acute abdominal or pelvic abnormality.  2.Slight decrease in size of the right rectus sheath hematoma.    1/29/25 CT head  MPRESSION:  Impression:  Atrophy and chronic microvascular ischemic change. No acute intracranial process.       Assessment & Plan   Assessment / Plan     Brief Patient Summary:  Mark Aguirre Jr. is a 79 y.o. male who has had rapid afib, hematuria, Delerium/dementia with agitation, and malnutrition    A/P   1. Atrial fibrillation with rapid ventricular rate-currently has been on Cardizem drip.  We’ll continue that for now.  Cardiology to see here for plans.  Would like to get him off drip as soon as possible so we can hopefully get/keep him out of reastraints.    2. Gross hematuria-CT scan the abdomen pelvis done at the other facility and seen by urology.  Looks to be traumatic from in and out catheterization done in the emergency room at  [facility].  Continue with q.6 hours bladder scans.  Would try to avoid catheterization given confusion and the likelihood of increasing agitation and possible further traumatization. Anticoagulation temporarily on hold. Continue tamsulosin.  He has been on Rocephin for nitrite +, LE + urinalysis. Micro showed only red cells.  I will await culture results.    3. Confusion-workup  has included neurology consultation, MRI, CSF studies.  Looks to have underlying Alzheimer’s type dementia with acute confusion probably aggravated by hospitalization, A. fib etc.  We’ll continue his Zyprexa, Depakote and Klonopin.   Once he is stable from medical standpoint will need to look at possible placement back to psychiatric versus memory unit. As much as possible, I would like to keep him off need for being connected to IV infusions, catheters etc to avoid worsening agitation and prevent injury.    4. Right rectus sheath hematoma-this occurred during previous hospitalization. He had repeat CT scan of the abdomen pelvis done at [facility].  This showed slight decrease in size.     5. Malnutrition-continue to encourage oral intake.  When I spoke with the care team at the psychiatric facility they noted that his appetite had been fairly good and he was eating at least 50% of most meals.  Wife reported he ate fairly well.  We’ll continue with nutritional shakes and encouragement of oral intake.  Has been continued on Megace For appetite stimulation.       VTE Prophylaxis:  No VTE prophylaxis order currently exists.        CODE STATUS:       Admission Status:  I believe this patient meets inpatient status.    Luis Staples MD

## 2025-01-30 NOTE — CASE MANAGEMENT/SOCIAL WORK
Discharge Planning Assessment  [facility]     Patient Name: Mark Aguirre Jr.  MRN: [id number]  Today's Date: 1/30/2025    Admit Date: 1/29/2025    Plan: Return to [facility] vs SNF.  Currently in [facility] (start 1/29 1949). PT eval pending.   Discharge Needs Assessment       Row Name 01/30/25 1337       Living Environment    People in Home spouse    Current Living Arrangements home    Potentially Unsafe Housing Conditions none    In the past 12 months has the electric, gas, oil, or water company threatened to shut off services in your home? No    Primary Care Provided by spouse/significant other;self    Provides Primary Care For no one, unable/limited ability to care for self    Family Caregiver if Needed spouse;child(joan), adult    Quality of Family Relationships supportive;involved    Able to Return to Prior Arrangements yes       Resource/Environmental Concerns    Resource/Environmental Concerns none    Transportation Concerns none       Transportation Needs    In the past 12 months, has lack of transportation kept you from medical appointments or from getting medications? no    In the past 12 months, has lack of transportation kept you from meetings, work, or from getting things needed for daily living? No       Food Insecurity    Within the past 12 months, you worried that your food would run out before you got the money to buy more. Never true    Within the past 12 months, the food you bought just didn't last and you didn't have money to get more. Never true       Transition Planning    Patient/Family Anticipates Transition to other (see comments)    Patient/Family Anticipated Services at Transition skilled nursing;mental health services;home health care    Transportation Anticipated other (see comments)       Discharge Needs Assessment    Readmission Within the Last 30 Days previous discharge plan unsuccessful    Current Outpatient/Agency/Support Group psychiatric facility  Had been at  [facility]    Equipment Currently Used at Home cpap;scales;bp cuff;pulse ox;shower chair    Concerns to be Addressed care coordination/care conferences;discharge planning    Do you want help finding or keeping work or a job? I do not need or want help    Do you want help with school or training? For example, starting or completing job training or getting a high school diploma, GED or equivalent No    Anticipated Changes Related to Illness inability to care for self    Equipment Needed After Discharge none    Outpatient/Agency/Support Group Needs skilled nursing facility;psychiatric facility    Discharge Facility/Level of Care Needs nursing facility, skilled;psychiatric facility;home with home health    Provided Post Acute Provider List? Yes    Post Acute Provider List Nursing Home;Home Health    Delivered To Patient;Support Person    Method of Delivery In person    Offered/Gave Vendor List yes    Current Discharge Risk cognitively impaired;psychiatric illness                   Discharge Plan       Row Name 01/30/25 1356       Plan    Plan Return to [facility] vs SNF.  Currently in [facility] (start 1/29 1949). PT eval pending.    Patient/Family in Agreement with Plan yes    Plan Comments Met with pt and son in law at bedside. Pt is in bed with eyes closed with bilateral wrist restraints on.  Explained roll of . Face sheet and pharmacy verified. Pt [place] There are 2 steps to enter home. Home DME includes a CPAP, scales, B/P cuff, Pulse Ox, and shower chair.  Pt is normally independent with ADLs. Pt has never been to Rehab. He has used [facility] in the past. Pt's PCP is Luis Staples MD. Enrolled in Meds to Bed. Prior to pt's admission on 12/28/24 pt was driving.  Pt was admitted to [facility] 12/2824 to 1/23/25 then D/C to [facility]. On 1/29/25 [facility] sent pt to [facility] ED for elevated HR and increased confusion. On  1/29/25 pt was transferred to [facility] as a direct admit to [facility]. Urology consulted for hematuria. Per Urology, pt has acute UTI and started pt on IV Rocephin with urine culture pending. Will follow to see pt progress and best plan for D/C once more appropriate. Reinaldo Orozco RN-BC                  Continued Care and Services - Admitted Since 1/29/2025    No active coordination exists for this encounter.       Expected Discharge Date and Time       Expected Discharge Date Expected Discharge Time    Feb 3, 2025            Demographic Summary       Row Name 01/30/25 1337       General Information    Admission Type inpatient    Required Notices Provided Important Message from Medicare    Reason for Consult care coordination/care conference;discharge planning    Preferred Language English                   Functional Status       Row Name 01/30/25 1331       Functional Status    Usual Activity Tolerance good    Current Activity Tolerance fair       Functional Status, IADL    Medications completely dependent    Meal Preparation completely dependent    Housekeeping completely dependent    Laundry completely dependent    Shopping completely dependent    If for any reason you need help with day-to-day activities such as bathing, preparing meals, shopping, managing finances, etc., do you get the help you need? Patient unable to answer    IADL Comments Prior to hospitalization 12/28/24 pt was independent and driving       Mental Status    General Appearance WDL X       Mental Status Summary    Recent Changes in Mental Status/Cognitive Functioning mental status       Employment/    Employment Status retired                            Reinaldo Orozco RN

## 2025-01-30 NOTE — CONSULTS
IDENTIFYING INFORMATION: The patient is an 79-year-old white male very well-known to me from previous protracted consultation during a recent stay at this facility.  He is seen related to history of dementia and behavioral disturbance.    CHIEF COMPLAINT: None given next field chart, patient does not awaken for interview and family is not at bedside.    INFORMANT: Chart    RELIABILITY: Good    HISTORY OF PRESENT ILLNESS: The patient is an 79-year-old white male well-known to me from previous consultation during a 3-week stay earlier this month.  The patient had been discharged to a geropsychiatric facility once his atrial fibrillation had been controlled.  He apparently had been doing well there but was noted to have erratic heart rate on the evening of January 28 and was sent to [facility].  His heart rate was elevated in the 140s and he was increasingly agitated and combative.  He has since been readmitted to this facility.  He is currently prescribed psychotropic medications include Depakote and Zyprexa.  When seen today he is sleeping soundly with wrist restraints in place.  For more complete history of present illness please refer to previous dictated notes    PAST PSYCHIATRIC HISTORY: Reviewed no changes    PAST MEDICAL HISTORY: In addition to previously documented medical issues, the patient is currently being treated for urinary tract infection.    MEDICATIONS:   Current Facility-Administered Medications   Medication Dose Route Frequency Provider Last Rate Last Admin    acetaminophen (TYLENOL) tablet 650 mg  650 mg Oral Q4H PRN Luis Staples MD        Or    acetaminophen (TYLENOL) 160 MG/5ML oral solution 650 mg  650 mg Oral Q4H PRN Luis Staples MD        Or    acetaminophen (TYLENOL) suppository 650 mg  650 mg Rectal Q4H PRN Luis Staples MD        atorvastatin (LIPITOR) tablet 10 mg  10 mg Oral Nightly Luis Staples MD   10 mg at 01/29/25 2144    sennosides-docusate  (PERICOLACE) 8.6-50 MG per tablet 2 tablet  2 tablet Oral BID Luis Staples MD   2 tablet at 01/29/25 2141    And    polyethylene glycol (MIRALAX) packet 17 g  17 g Oral Daily PRN Luis Staples MD        And    bisacodyl (DULCOLAX) EC tablet 5 mg  5 mg Oral Daily PRN Luis Staples MD        And    bisacodyl (DULCOLAX) suppository 10 mg  10 mg Rectal Daily PRN Luis Staples MD        cefTRIAXone (ROCEPHIN) 1,000 mg in sodium chloride 0.9 % 100 mL MBP  1,000 mg Intravenous Q24H Luis Staples  mL/hr at 01/30/25 0431 1,000 mg at 01/30/25 0431    clonazePAM (KlonoPIN) tablet 0.5 mg  0.5 mg Oral BID Luis Staples MD   0.5 mg at 01/29/25 2144    digoxin (LANOXIN) tablet 125 mcg  125 mcg Oral Daily Luis Staples MD   125 mcg at 01/29/25 2137    Divalproex Sodium (DEPAKOTE SPRINKLE) capsule 250 mg  250 mg Oral TID Luis Staples MD   250 mg at 01/29/25 2148    megestrol (MEGACE) 40 MG/ML suspension 400 mg  400 mg Oral Daily Luis Staples MD   400 mg at 01/29/25 2217    metoprolol tartrate (LOPRESSOR) tablet 100 mg  100 mg Oral Q12H Luis Staples MD   100 mg at 01/29/25 2147    nitroglycerin (NITROSTAT) SL tablet 0.4 mg  0.4 mg Sublingual Q5 Min PRN Luis Staples MD        OLANZapine (zyPREXA) injection 5 mg  5 mg Intramuscular Q8H PRN Luis Staples MD        OLANZapine (zyPREXA) tablet 7.5 mg  7.5 mg Oral BID Luis Staples MD   7.5 mg at 01/29/25 2141    sodium chloride 0.9 % flush 10 mL  10 mL Intravenous Q12H Luis Staples MD   10 mL at 01/30/25 0830    sodium chloride 0.9 % flush 10 mL  10 mL Intravenous PRN Luis Staples MD        sodium chloride 0.9 % flush 10 mL  10 mL Intravenous Q12H Luis Staples MD   10 mL at 01/30/25 0829    sodium chloride 0.9 % flush 10 mL  10 mL Intravenous PRN Luis Staples MD        sodium chloride 0.9 % infusion 40 mL  40 mL Intravenous PRN Luis Staples MD        sodium chloride 0.9 % infusion 40 mL  40 mL  Intravenous PRN Luis Staples MD        tamsulosin (FLOMAX) 24 hr capsule 0.4 mg  0.4 mg Oral Daily Luis Staples MD   0.4 mg at 01/29/25 2141    vitamin B-12 (CYANOCOBALAMIN) tablet 1,000 mcg  1,000 mcg Oral Daily Luis Staples MD   1,000 mcg at 01/29/25 2136         ALLERGIES: None    FAMILY HISTORY: Noncontributory    SOCIAL HISTORY: The patient had been living with his wife prior to his hospitalization.    MENTAL STATUS EXAM: The patient is a soundly sleeping white male dressed in hospital garb with soft wrist restraints in place.  He cannot be awakened for interview.    ASSETS/LIABILITIES: Supportive family/advancing health issues    DIAGNOSTIC IMPRESSION: Metabolic encephalopathy secondary to urinary tract infection and other medical issues superimposed upon pre-existing primary dementia Alzheimer's type, medical problems as noted previously    PLAN: At this point, I would recommend continuation of previously effective medications but we will leave orders for as needed olanzapine should any breakthrough agitation take place.  I will continue to follow with you.

## 2025-01-30 NOTE — CONSULTS
Electrophysiology Consult Note      Patient Name: Mark Aguirre Jr.  Age/Sex: 79 y.o. male  : 1945  MRN: [id number]    Date of Admission: 2025  Day of Service: 25       Chief Complaint: Tachycardia     HPI:   Mark Aguirre Jr. is a 79 y.o. male who presented to [facility] from nursing home due to elevated heart rates into the 140-200 range per nursing staff. This was accompanied with worsening mental clarity and confusion. They noted weakness and lightheadedness. In the ER, he was in AF with HR in the 100s. He was monitored and became increasingly agitated and HR started to increase into the 120s.     He was started on Iv cardizem and transferred to our facility for further evaluation. Overnight he was in the 60-70s with Hrs while on IV cardizem. He is in restrains and we will stop the IV ggt to get him out of restraints.     This morning, while on cardizem ggt overnight his HR has been in the 60-70s.     He just had extensive hospitalization for 3 weeks 2024 to 2025 after he had had a fall and hit his head while walking to restroom with weakness. He was treated for a head laceration and recuts sheath hematoma and had extensive delirium work up with MRI/LP showing possible Alzheimer disease. We saw him this admission for difficult to control AF with RVR due to intermittent inability to take tablets and progressive tachy-liz syndrome.     He has a history of PAF s/p PVI ablation x2. Unknown when the PVI ablations were, over 8 years ago.     He was admitted on 24 for initiation of dofetilide and CV. His metoprolol was stopped due to low HR after the CV. Dofetilide was decreased to 250mcg BID due to borderline QTC prolongation.      ECG on 11/15/2024 showed prolong QTC. His dofetilide was decreased again to 125mcg BID.       He called the office 2024 for an ECG. He was in AFIB and QTC at that time was prolonged 563. Dofetilide was stopped. Digoxin 125mcg qd was  started for rate control.     We had plans for AVN ablation and single lead PPM placement that was delayed due to his persistent delirium.     This morning, I saw him at the bedside with Dr. Armando.     He continues to rest in bed and is pleasantly confused.  He is intermittent episodes of atrial fibrillation with RVR with rates into the 130s.  We had a discussion with his wife at the bedside and we will plan to pursue a AV node ablation and leadless pacemaker implantation tomorrow.  This was also discussed with his attending Dr. Staples     PMH: Persistent AFIB s/p ablation x2, aortic stenosis, HTN, STEPHENIE on CPAP, PVCs, Dementia    Temp:  [97.4 °F (36.3 °C)-98.7 °F (37.1 °C)] 97.4 °F (36.3 °C)  Heart Rate:  [] 80  Resp:  [18] 18  BP: ()/() 111/61     PHYSICAL EXAM    General: 79 y.o. male No acute distress, laying in bed. Alert and Oriented.   Neck: No JVD or carotid bruit  Lungs: Clear to ausculation bilaterally, symmetric  Heart: irregular rhythm  with no overt murmurs, rubs or gallops. Normal S1 and S2.   Abdomen: soft, non-tender  Extremities: No lower extremity edema or cyanosis.   Neuo: no lateralizing defects.        Telemetry/EK25: Atrial fibrillation with rates into the 70-80s             I personally viewed and interpreted the patient's EKG/Telemetry data.    Previous testing:   - Echo 2024: LVEF 48.8%. LA moderate dilation. Moderate AS aortic valve area 1.8cm2. RSVP 48mmHg.       Lab Review:   Results from last 7 days   Lab Units 25  0352 25  2123   SODIUM mmol/L 138 141   POTASSIUM mmol/L 3.9 4.3   CHLORIDE mmol/L 108* 107   CO2 mmol/L 22.6 24.6   BUN mg/dL 16 24*   CREATININE mg/dL 0.74* 0.86   GLUCOSE mg/dL 92 90   CALCIUM mg/dL 8.6 9.4     Results from last 7 days   Lab Units 25  0352 25  2123   WBC 10*3/mm3 7.68 7.44   HEMOGLOBIN g/dL 13.4 13.9   HEMATOCRIT % 41.1 43.8   PLATELETS 10*3/mm3 141 152             Results from last 7 days   Lab  Units 01/28/25 2123   TSH uIU/mL 2.700           Current Medications:   Scheduled Meds:atorvastatin, 10 mg, Oral, Nightly  cefTRIAXone (ROCEPHIN) 1,000 mg in sodium chloride 0.9 % 100 mL MBP, 1,000 mg, Intravenous, Q24H  clonazePAM, 0.5 mg, Oral, BID  digoxin, 125 mcg, Oral, Daily  Divalproex Sodium, 250 mg, Oral, TID  megestrol, 400 mg, Oral, Daily  metoprolol tartrate, 100 mg, Oral, Q12H  OLANZapine, 7.5 mg, Oral, BID  senna-docusate sodium, 2 tablet, Oral, BID  sodium chloride, 10 mL, Intravenous, Q12H  sodium chloride, 10 mL, Intravenous, Q12H  tamsulosin, 0.4 mg, Oral, Daily  vitamin B-12, 1,000 mcg, Oral, Daily          Assessment /Plan:  Persistent atrial fibrillation with intermittent RVR- noted by his facility to have Hrs into the 130s prompting them to send him to the ER  He has been rate controlled overnight while on IV cardizem ggt. Discontinue cardizem ggt  Resume home lopressor 100 mg BID and continue digoinx 125 mcg daily. Dig level at 1.17  As he continues to have uncontrolled heart rates we will plan on a leadless Micra pacemaker implantation on 1/31/2025 with Dr. Armando.  Following placement of the pacemaker will have an AV node ablation at the same time  Risk and benefits discussed with the patient his wife in detail.  They are willing to proceed.  Will keep him n.p.o. after midnight  Case will be in the afternoon.  Dementia-he had been doing well at his facility but is now here again due to episodes of elevated heart rate.  PCP is following and we will continue to monitor.  He is on minimal medication currently  PVCs-standing and asymptomatic.  Will need to monitor them after coming off rate control therapy      Cameron Denny PA-C  01/30/25  07:51 EST

## 2025-01-30 NOTE — PLAN OF CARE
Goal Outcome Evaluation:  Plan of Care Reviewed With: patient           Outcome Evaluation: Pt transferred from [facility], currently resides at [facility]. Arrived to unit with Cardizem drip infusing, HR managed <120, placed on hold 1/29 2330. HR remains afib. UTI, Hematuria, Pt voiding during the night, incontinence. Provider recommends no catheter use, bladderscan returned 252mL. Cardiology consult planned 1/30. Currently in restraints to promote safety due to agitation, confusion, and pulling at IV lines. Rocephin initiated. Sleep apnea during sleep, 2L nc. Spouse to bring CPAP in the morning, possibly will use if Pt is not agitated. Safety maintained. Plan of care ongoing.

## 2025-01-30 NOTE — CASE MANAGEMENT/SOCIAL WORK
Case Management Readmission Assessment Note    Case Management Readmission Assessment (all recorded)       Readmission Interview       Row Name 01/30/25 1326             Readmission Indications    Is the patient and/or family able to complete the readmission assessment questions? Yes      Is this hospitalization related to the prior hospital diagnosis? Yes        Row Name 01/30/25 1326             Recommendation for rehospitalization    Did you speak with your physician prior to coming to the hospital --  Pt was at [facility] 12/28 to 1/23/25 then D/C to [facility].  On 1/29/25 [facility] sent pt to [facility] ED. Pt was then transferred to [facility] as a direct admit.        Row Name 01/30/25 1326             Index discharge location/services    What services were arranged at discharge? Other (comment)  [facility]        Row Name 01/29/25 2331             Advance Directives (For Healthcare)    Pre-existing AND/MOST/POLST Order No      Advance Directive Status Patient has advance directive, copy in chart      Type of Advance Directive Health care directive/Living will      Have you reviewed your Advance Directive and is it valid for this stay? Not applicable
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
## 2025-01-31 NOTE — PLAN OF CARE
Goal Outcome Evaluation:  Plan of Care Reviewed With: patient           Outcome Evaluation: Vital signs stable. Pt refused evening medications. Bladder scan returned 89mL. NPO at midnight 1/31 for planned pacemaker procedure. 2L nc during the night. Soft wrist restraints maintained to promote Pt safety and prevent injury. Safety maintained. Plan of care ongoing.

## 2025-01-31 NOTE — PLAN OF CARE
Goal Outcome Evaluation:  Plan of Care Reviewed With: patient        Progress: improving  Outcome Evaluation: Lethargic most of day, unable to take food or pills until supper time when he was more alert.  BP in am 88/52, received 250 ml NS bolus.   Remains in a fib, with -130s this evening still after receiving metoprolol & dig p.o. (am doses were given in late afternoon after he was alert).  Cardiology was called @ 1838 and order for 1x IV metoprolol (given).  Restraints d/c this afternoon.  Family at bedside.  Speech sometimes more clear but mostly garbled.  To have PPM & ablation tomorrow, wife signed consent.  NPO at midnight. Urine dark brown today (no bright red blood).

## 2025-01-31 NOTE — PLAN OF CARE
Goal Outcome Evaluation:  Plan of Care Reviewed With: patient, spouse              Patient is an 79 y.o. male admitted to [facility] for afib on 1/29/2025. Plan for pacemaker later today. PMHx includes dementia. Patient is ind at baseline without use of AD and lives with his spouse with 2 ANAHI. He was admit from a facility though. She reports he was at home in Dec 2023. Today, patient performed bed mobility with mod-maxA, required min-modAx2 for STS, and took some sidesteps along EOB requiring minAx2.  Cognitive, strength, activity tolerance deficits noted. Patient may benefit from skilled PT services to address functional deficits and improve level of independence prior to discharge. Anticipate return to facility upon DC.      Anticipated Discharge Disposition (PT):  (return to facility)

## 2025-01-31 NOTE — PROGRESS NOTES
Subjective: Became agitated again last night.  Required restraints.  Refused evening medicines.  Restless during the night.  Family is at bedside today.  Plan as per cardiology is for AV carlos a ablation with pacemaker placement later today.    Objective:  Vitals:    01/30/25 2309 01/31/25 0019 01/31/25 0400 01/31/25 0420   BP:  101/81  119/74   BP Location:  Left arm  Left arm   Patient Position:  Lying  Lying   Pulse:  82     Resp:  18  18   Temp:  98.4 °F (36.9 °C)  98.8 °F (37.1 °C)   TempSrc:  Oral  Tympanic   SpO2: 98%  99%    General: No acute distress, arouses to voice  Heart: Irregular irregular  Lungs: Clear to auscultation  Abdomen: Soft nontender  Extremities: No edema    No results found for this or any previous visit (from the past 24 hours).    Assessment plan  1.  Atrial fibrillation-off of Cardizem drip.  Going for AV carlos a ablation and pacemaker today.  Anticoagulation temporarily on hold due to hematuria.  2.  Hematuria-spoke with nursing and still having urine suggestive of hematuria.  Will hold Eliquis 1 more day and then hopefully resume tomorrow.  Postvoid residual has been normal.  3.  Dementia with agitation-patient missed evening medicines.  I am going to ask pharmacy to see him about adjusting times of meds as he sundown's at night so giving him medications at 9 PM is can be very difficult.  Once he has pacemaker in place we can DC his monitor and then hopefully move him over to [place] which will be quieter setting to help with agitation  4.  Nutrition-wife reports ate a good dinner last night.  Currently n.p.o. for procedure later today.

## 2025-01-31 NOTE — THERAPY EVALUATION
Patient Name: Mark Aguirre Jr.  : 1945    MRN: [id number]                              Today's Date: 2025       Admit Date: 2025    Visit Dx:     ICD-10-CM ICD-9-CM   1. Atrial fibrillation and flutter  I48.91 427.31    I48.92 427.32   2. Longstanding persistent atrial fibrillation  I48.11 427.31   3. Encounter for examination for normal comparison and control in clinical research program  Z00.6 V70.7     Patient Active Problem List   Diagnosis    Atrial fibrillation    Bifascicular block    STEPHENIE on auto CPAP    Family history of colon cancer    History of colon polyps    Essential hypertension    Hip pain, right    OA (osteoarthritis) of hip    Acute renal failure    Brief psychotic disorder    Family history of colonic polyps    Nonrheumatic aortic valve stenosis    PVC (premature ventricular contraction)    Syncope and collapse    History of adenomatous polyp of colon    Persistent atrial fibrillation    Hypoxemia associated with sleep    Confusion    Severe protein-calorie malnutrition    Atrial fibrillation with RVR    Acute UTI (urinary tract infection)    Dementia with behavioral disturbance    Atrial fibrillation and flutter     Past Medical History:   Diagnosis Date    Acute kidney failure     POST-OP TOTAL HIP 2020    Anticoagulated     PRADAXA FOR A FIB TO HELD 3 DAYS PRIOR    Arthritis     OSTEO. RIGHT HIP    Atrial flutter     Bifascicular block     Cataract     LEFT AND RIGHT    Depression     History of colon polyps     Hypertension     Hypoxemia associated with sleep     Insomnia     Knee pain     STEPHENIE on CPAP 2010    Overnight polysomnogram.  Weight 163 pounds.  Severe STEPHENIE with AHI of 30.9 events per hour.  Low oxygen saturation 72% and sleep-related hypoxia present for 30 minutes    PAF (paroxysmal atrial fibrillation)     Right hip pain     Slow to wake up after anesthesia      Past Surgical History:   Procedure Laterality Date    CARDIAC ABLATION      CARDIAC  CATHETERIZATION      COLONOSCOPY N/A 2/21/2018    Procedure: COLONOSCOPY INTO CECUM WITH COLD BX POLYPECTOMY ;  Surgeon: Ross Stearns MD;  Location:  [facility];  Service:     COLONOSCOPY N/A 2/3/2021    Procedure: COLONOSCOPY TOCECUM WITH COLD BX POLYPECTOMY AND HOT SNARE POLYPECTOMY;  Surgeon: Ross Stearns MD;  Location:  [facility];  Service: General;  Laterality: N/A;  PERSONAL HX OF POLYPS, FAMILY HX OF COLON CANCER  --POLYPS (X3), DIVERTICULOSIS    COLONOSCOPY N/A 3/20/2024    Procedure: COLONOSCOPY TO CECUM WITH COLD BX POLYPECTOMIES;  Surgeon: Ross Stearns MD;  Location:  [facility];  Service: General;  Laterality: N/A;  HX POLYPS/POLYPS (3)    HERNIA REPAIR      INGUINAL HERNIA? SIDE    TOTAL HIP ARTHROPLASTY Right 6/5/2020    Procedure: TOTAL HIP ARTHROPLASTY;  Surgeon: Travis Hamlin MD;  Location: [facility];  Service: Orthopedics;  Laterality: Right;      General Information       Row Name 01/31/25 0919          Physical Therapy Time and Intention    Document Type evaluation  -CB     Mode of Treatment individual therapy;physical therapy  -CB       Row Name 01/31/25 0919          General Information    Patient Profile Reviewed yes  -CB     Prior Level of Function --  ambulatory without AD per spouse at bedside  -CB     Existing Precautions/Restrictions fall  -CB     Barriers to Rehab cognitive status;medically complex  -CB       Row Name 01/31/25 0919          Living Environment    People in Home spouse  -CB       Row Name 01/31/25 0919          Home Main Entrance    Number of Stairs, Main Entrance two  -CB     Stair Railings, Main Entrance railing on left side (ascending)  -CB       Row Name 01/31/25 0919          Cognition    Orientation Status (Cognition) unable/difficult to assess  -CB       Row Name 01/31/25 0919          Safety Issues/Impairments Affecting Functional Mobility    Safety Issues Affecting Function (Mobility) insight into deficits/self-awareness;ability  to follow commands;problem-solving  -CB     Impairments Affecting Function (Mobility) cognition;endurance/activity tolerance;strength;balance  -CB               User Key  (r) = Recorded By, (t) = Taken By, (c) = Cosigned By      Initials Name Provider Type    Morelia Vega PT Physical Therapist                   Mobility       Row Name 01/31/25 0920          Bed Mobility    Bed Mobility supine-sit;sit-supine  -CB     Supine-Sit [place] (Bed Mobility) moderate assist (50% patient effort);maximum assist (25% patient effort);verbal cues  -CB     Sit-Supine [place] (Bed Mobility) moderate assist (50% patient effort);maximum assist (25% patient effort);verbal cues  -CB     Assistive Device (Bed Mobility) head of bed elevated;leg   -CB       Row Name 01/31/25 0920          Sit-Stand Transfer    Sit-Stand [place] (Transfers) minimum assist (75% patient effort);moderate assist (50% patient effort);2 person assist  -CB     Assistive Device (Sit-Stand Transfers) --  B HHA  -CB       Row Name 01/31/25 0920          Gait/Stairs (Locomotion)    [place] Level (Gait) minimum assist (75% patient effort);2 person assist  -CB     Assistive Device (Gait) --  B HHA  -CB     Distance in Feet (Gait) --  sidesteps along EOB with VC and visual cues  -CB               User Key  (r) = Recorded By, (t) = Taken By, (c) = Cosigned By      Initials Name Provider Type    Morelia Vega PT Physical Therapist                   Obj/Interventions       Row Name 01/31/25 0921          Range of Motion Comprehensive    General Range of Motion bilateral lower extremity ROM WFL  -CB       Row Name 01/31/25 0921          Strength Comprehensive (MMT)    General Manual Muscle Testing (MMT) Assessment lower extremity strength deficits identified  -CB       Row Name 01/31/25 0921          Balance    Balance Assessment standing static balance;standing dynamic balance;sitting static balance  -CB     Static Sitting Balance  standby assist  -CB     Position, Sitting Balance sitting edge of bed  -CB     Static Standing Balance minimal assist;2-person assist  -CB     Dynamic Standing Balance minimal assist;2-person assist  -CB     Position/Device Used, Standing Balance supported  -CB     Balance Interventions sitting;standing;sit to stand;supported;static;dynamic;minimal challenge  -CB       Row Name 01/31/25 0921          Sensory Assessment (Somatosensory)    Sensory Assessment (Somatosensory) sensation intact  -CB               User Key  (r) = Recorded By, (t) = Taken By, (c) = Cosigned By      Initials Name Provider Type    CB Morelia Land, PT Physical Therapist                   Goals/Plan       Row Name 01/31/25 0922          Bed Mobility Goal 1 (PT)    Activity/Assistive Device (Bed Mobility Goal 1, PT) bed mobility activities, all  -CB     [place] Level/Cues Needed (Bed Mobility Goal 1, PT) standby assist  -CB     Time Frame (Bed Mobility Goal 1, PT) long term goal (LTG);1 week  -CB       Row Name 01/31/25 0922          Transfer Goal 1 (PT)    Activity/Assistive Device (Transfer Goal 1, PT) sit-to-stand/stand-to-sit;bed-to-chair/chair-to-bed  -CB     [place] Level/Cues Needed (Transfer Goal 1, PT) contact guard required  -CB     Time Frame (Transfer Goal 1, PT) long term goal (LTG);1 week  -CB       Row Name 01/31/25 0922          Gait Training Goal 1 (PT)    Activity/Assistive Device (Gait Training Goal 1, PT) gait (walking locomotion)  -CB     [place] Level (Gait Training Goal 1, PT) contact guard required  -CB     Distance (Gait Training Goal 1, PT) 150ft  -CB     Time Frame (Gait Training Goal 1, PT) long term goal (LTG);1 week  -CB       Row Name 01/31/25 0922          Therapy Assessment/Plan (PT)    Planned Therapy Interventions (PT) balance training;bed mobility training;gait training;home exercise program;patient/family education;strengthening;transfer training  -CB               User Key  (r) = Recorded  By, (t) = Taken By, (c) = Cosigned By      Initials Name Provider Type    Morelia Vega, PT Physical Therapist                   Clinical Impression       Row Name 01/31/25 0921          Pain    Pretreatment Pain Rating 0/10 - no pain  -CB     Posttreatment Pain Rating 0/10 - no pain  -CB       Row Name 01/31/25 0921          Plan of Care Review    Plan of Care Reviewed With patient;spouse  -CB     Outcome Evaluation Patient is an 79 y.o. male admitted to [facility] for afib on 1/29/2025. Plan for pacemaker later today. PMHx includes dementia. Patient is ind at baseline without use of AD and lives with his spouse with 2 ANAHI. He was admit from a facility though. She reports he was at home in Dec 2023. Today, patient performed bed mobility with mod-maxA, required min-modAx2 for STS, and took some sidesteps along EOB requiring minAx2.  Cognitive, strength, activity tolerance deficits noted. Patient may benefit from skilled PT services to address functional deficits and improve level of independence prior to discharge. Anticipate return to facility upon DC.  -CB       Row Name 01/31/25 0921          Therapy Assessment/Plan (PT)    Rehab Potential (PT) good  -CB     Criteria for Skilled Interventions Met (PT) yes  -CB     Therapy Frequency (PT) 5 times/wk  -CB       Row Name 01/31/25 0921          Positioning and Restraints    Pre-Treatment Position in bed  -CB     Post Treatment Position bed  -CB     In Bed notified nsg;fowlers;call light within reach;encouraged to call for assist;exit alarm on;with family/caregiver  -CB               User Key  (r) = Recorded By, (t) = Taken By, (c) = Cosigned By      Initials Name Provider Type    CB Morelia Land, PT Physical Therapist                   Outcome Measures       Row Name 01/31/25 0922          How much help from another person do you currently need...    Turning from your back to your side while in flat bed without using bedrails? 3  -CB     Moving from lying on back to  sitting on the side of a flat bed without bedrails? 2  -CB     Moving to and from a bed to a chair (including a wheelchair)? 2  -CB     Standing up from a chair using your arms (e.g., wheelchair, bedside chair)? 2  -CB     Climbing 3-5 steps with a railing? 1  -CB     To walk in hospital room? 1  -CB     AM-PAC 6 Clicks Score (PT) 11  -CB     Highest Level of Mobility Goal 4 --> Transfer to chair/commode  -CB       Row Name 01/31/25 0922          Functional Assessment    Outcome Measure Options AM-PAC 6 Clicks Basic Mobility (PT)  -CB               User Key  (r) = Recorded By, (t) = Taken By, (c) = Cosigned By      Initials Name Provider Type    CB Moerlia Land PT Physical Therapist                                 Physical Therapy Education       Title: PT OT SLP Therapies (In Progress)       Topic: Physical Therapy (In Progress)       Point: Mobility training (In Progress)       Learning Progress Summary            Patient Acceptance, E,TB,D, NL,NR by CB at 1/31/2025 0922                      Point: Home exercise program (Not Started)       Learner Progress:  Not documented in this visit.              Point: Body mechanics (Not Started)       Learner Progress:  Not documented in this visit.              Point: Precautions (Not Started)       Learner Progress:  Not documented in this visit.                              User Key       Initials Effective Dates Name Provider Type Discipline     10/22/21 -  Morelia Land PT Physical Therapist PT                  PT Recommendation and Plan  Planned Therapy Interventions (PT): balance training, bed mobility training, gait training, home exercise program, patient/family education, strengthening, transfer training  Outcome Evaluation: Patient is an 79 y.o. male admitted to [facility] for afib on 1/29/2025. Plan for pacemaker later today. PMHx includes dementia. Patient is ind at baseline without use of AD and lives with his spouse with 2 ANAHI. He was admit from a facility  though. She reports he was at home in Dec 2023. Today, patient performed bed mobility with mod-maxA, required min-modAx2 for STS, and took some sidesteps along EOB requiring minAx2.  Cognitive, strength, activity tolerance deficits noted. Patient may benefit from skilled PT services to address functional deficits and improve level of independence prior to discharge. Anticipate return to facility upon DC.     Time Calculation:         PT Charges       Row Name 01/31/25 0925             Time Calculation    Start Time 0859  -CB      Stop Time 0912  -CB      Time Calculation (min) 13 min  -CB      PT Received On 01/31/25  -CB      PT - Next Appointment 02/03/25  -CB      PT Goal Re-Cert Due Date 02/07/25  -CB         Time Calculation- PT    Total Timed Code Minutes- PT 8 minute(s)  -CB         Timed Charges    32725 - PT Therapeutic Activity Minutes 8  -CB         Total Minutes    Timed Charges Total Minutes 8  -CB       Total Minutes 8  -CB                User Key  (r) = Recorded By, (t) = Taken By, (c) = Cosigned By      Initials Name Provider Type    CB Morelia Land, PT Physical Therapist                  Therapy Charges for Today       Code Description Service Date Service Provider Modifiers Qty    50171631584 HC PT THERAPEUTIC ACT EA 15 MIN 1/31/2025 Morelia Land, PT GP 1    10241223358 HC PT EVAL MOD COMPLEXITY 3 1/31/2025 Morelia Land, PT GP 1            PT G-Codes  Outcome Measure Options: AM-PAC 6 Clicks Basic Mobility (PT)  AM-PAC 6 Clicks Score (PT): 11  PT Discharge Summary  Anticipated Discharge Disposition (PT):  (return to facility)    Morelia Land PT  1/31/2025

## 2025-01-31 NOTE — PLAN OF CARE
Goal Outcome Evaluation:  Plan of Care Reviewed With: patient, spouse        Progress: no change  Outcome Evaluation: Calmer this am after being agitated and yelling out with night RN requiring restraints again.   Restraints were d/c this am - pt was calm and drowsy.  Able to give meds with sips of water.  To have pacemaker and ablation today. Wife at bedside, attentive to patient.  Incontinent urine, still brown (was apparently bright red a few days ago after catheterization).  Remains very confused.

## 2025-01-31 NOTE — PROGRESS NOTES
Mark Aguirre Jr.  1945 79 y.o.  [id number]      Patient Care Team:  Lius Staples MD as PCP - General (Internal Medicine)  Luis Staples MD as PCP - Internal Medicine  Rian Johns MD as Consulting Physician (Sleep Medicine)    CC: A-fib with RVR    Interval History: Some continued encephalopathy comfortable lying flat      Objective   Vital Signs  Temp:  [97.9 °F (36.6 °C)-99 °F (37.2 °C)] 98.6 °F (37 °C)  Heart Rate:  [] 112  Resp:  [16-18] 18  BP: ()/(71-87) 140/73    Intake/Output Summary (Last 24 hours) at 1/31/2025 0949  Last data filed at 1/31/2025 0842  Gross per 24 hour   Intake 450 ml   Output 75 ml   Net 375 ml         Physical Exam:   General Appearance:     Lungs:       Heart:     HEENT:     Abdomen:      Extremities:      Medication Review:      atorvastatin, 10 mg, Oral, Daily  clonazePAM, 0.5 mg, Oral, BID AC  digoxin, 125 mcg, Oral, Daily  Divalproex Sodium, 250 mg, Oral, TID - Nitrates  megestrol, 400 mg, Oral, Daily  metoprolol tartrate, 100 mg, Oral, BID AC  OLANZapine, 7.5 mg, Oral, BID AC  senna-docusate sodium, 2 tablet, Oral, BID AC  sodium chloride, 10 mL, Intravenous, Q12H  sodium chloride, 10 mL, Intravenous, Q12H  tamsulosin, 0.4 mg, Oral, Daily  vitamin B-12, 1,000 mcg, Oral, Daily             I reviewed the patient's new clinical results.  I personally viewed and interpreted the patient's EKG/Telemetry data    Assessment/Plan  Active Hospital Problems    Diagnosis  POA    **Atrial fibrillation and flutter [I48.91, I48.92]  Yes      Resolved Hospital Problems   No resolved problems to display.       Difficult to control A-fib with RVR he is going to go for an AV node ablation and a Micra pacemaker which I think is fantastic I really do not see a way out of this short of this and he actually looks a little bit better to me mentally than he did yesterday    Claus Lindquist MD  01/31/25  09:49 EST

## 2025-02-01 NOTE — PLAN OF CARE
Goal Outcome Evaluation:           Progress: declining  Outcome Evaluation: Received from cath lab right groin with figure 8 suture with stopcock in place. Patient very drowsy will open eye with frequent rubbing chest, apnea noted frequently, called RT to set up patient home CPAP, RT stated to watch and if unable to arouse in hour call rapid response. Patient continued with apnea and was had frothy secreations from mouth, suctioned and called rapid response. Patient arouses easier when aroused at present but continues to sleep after. Dr. Titus notified. Report to night RN. Continue POC

## 2025-02-01 NOTE — SIGNIFICANT NOTE
Patient Name:  Mark Aguirre Jr.  YOB: 1945  MRN:  [id number]  Admit Date:  1/29/2025    Visit Diagnoses:     ICD-10-CM ICD-9-CM   1. Atrial fibrillation and flutter  I48.91 427.31    I48.92 427.32   2. Longstanding persistent atrial fibrillation  I48.11 427.31   3. Encounter for examination for normal comparison and control in clinical research program  Z00.6 V70.7   4. Persistent atrial fibrillation  I48.19 427.31       Reason For Rapid: drowsiness after sedation for procedure. Arouses with repeated stimulation, not oriented. On home bipap. VSS.     RN Communicated With: Dr. Titus notified, no new orders      Rapid Outcome: remain on unit.     Communication From Rapid Team: Noreen MILTON to update family.     Most Recent Vital Signs  Temp:  [97.2 °F (36.2 °C)-98.8 °F (37.1 °C)] 97.2 °F (36.2 °C)  Heart Rate:  [] 80  Resp:  [11-23] 12  BP: ()/(44-88) 95/70  SpO2:  [95 %-100 %] 99 %  on  Flow (L/min) (Oxygen Therapy):  [2] 2;   Device (Oxygen Therapy): room air    Labs:  Results from last 7 days   Lab Units 01/28/25  2101   COVID19  Not Detected     Glucose   Date/Time Value Ref Range Status   01/31/2025 1839 81 70 - 130 mg/dL Final     Site   Date Value Ref Range Status   01/31/2025 Left Radial  Final     Gabe's Test   Date Value Ref Range Status   01/31/2025 Positive  Final     pH, Arterial   Date Value Ref Range Status   01/31/2025 7.400 7.350 - 7.450 pH units Final     pCO2, Arterial   Date Value Ref Range Status   01/31/2025 34.4 (L) 35.0 - 45.0 mm Hg Final     pO2, Arterial   Date Value Ref Range Status   01/31/2025 104.8 (H) 80.0 - 100.0 mm Hg Final     HCO3, Arterial   Date Value Ref Range Status   01/31/2025 21.3 (L) 22.0 - 28.0 mmol/L Final     Base Excess, Arterial   Date Value Ref Range Status   01/31/2025 -2.8 (L) 0.0 - 2.0 mmol/L Final     Comment:     Serial Number: [id number]:  [id number]     O2 Saturation, Arterial   Date Value Ref Range Status   01/31/2025 98.1 92.0 - 98.5 %  "Final     CO2 Content   Date Value Ref Range Status   01/31/2025 22.4 (L) 23 - 27 mmol/L Final     Barometric Pressure for Blood Gas   Date Value Ref Range Status   01/31/2025 746.0000 mmHg Final     Modality   Date Value Ref Range Status   01/31/2025 CPAP  Final     Results from last 7 days   Lab Units 01/30/25  0352 01/28/25  2123   WBC 10*3/mm3 7.68 7.44   HEMOGLOBIN g/dL 13.4 13.9   PLATELETS 10*3/mm3 141 152     Results from last 7 days   Lab Units 01/31/25  1124 01/30/25  0352 01/28/25  2123   SODIUM mmol/L  --  138 141   POTASSIUM mmol/L 4.0 3.9 4.3   CHLORIDE mmol/L  --  108* 107   CO2 mmol/L  --  22.6 24.6   BUN mg/dL  --  16 24*   CREATININE mg/dL  --  0.74* 0.86   GLUCOSE mg/dL  --  92 90   ALBUMIN g/dL  --  2.8* 3.8   BILIRUBIN mg/dL  --  2.0* 2.3*   ALK PHOS U/L  --  56 62   AST (SGOT) U/L  --  14 23   ALT (SGPT) U/L  --  15 17   Estimated Creatinine Clearance: 101 mL/min (A) (by C-G formula based on SCr of 0.74 mg/dL (L)).  Results from last 7 days   Lab Units 01/28/25  2123   DIGOXIN LVL ng/mL 1.14         Results from last 7 days   Lab Units 01/31/25  1859   PH, ARTERIAL pH units 7.400   PO2 ART mm Hg 104.8*   PCO2, ARTERIAL mm Hg 34.4*   HCO3 ART mmol/L 21.3*   O2 SATURATION ART % 98.1   MODALITY  CPAP   No results found for: \"STREPPNEUAG\", \"LEGANTIGENUR\"    Results from last 7 days   Lab Units 01/28/25  2101   ADENOVIRUS DETECTION BY PCR  Not Detected   CORONAVIRUS 229E  Not Detected   CORONAVIRUS HKU1  Not Detected   CORONAVIRUS NL63  Not Detected   CORONAVIRUS OC43  Not Detected   HUMAN METAPNEUMOVIRUS  Not Detected   HUMAN RHINOVIRUS/ENTEROVIRUS  Not Detected   INFLUENZA B PCR  Not Detected   PARAINFLUENZA 1  Not Detected   PARAINFLUENZA VIRUS 2  Not Detected   PARAINFLUENZA VIRUS 3  Not Detected   PARAINFLUENZA VIRUS 4  Not Detected   BORDETELLA PERTUSSIS PCR  Not Detected   CHLAMYDOPHILA PNEUMONIAE PCR  Not Detected   MYCOPLAMA PNEUMO PCR  Not Detected   INFLUENZA A PCR  Not Detected   RSV, " PCR  Not Detected       NIH Stroke Scale:                                                         Please refer to full rapid documentation on summary page under Index / Code Timeline

## 2025-02-01 NOTE — PROGRESS NOTES
The patient is resting comfortably when seen today.  Notably, no restraint devices are currently necessary.  Staff reports no management issues.

## 2025-02-01 NOTE — PLAN OF CARE
Goal Outcome Evaluation:           Progress: no change  Outcome Evaluation: VSS, Alert to self. No new pain reported or issues at this time, very sleepy during shift. Able to wake for medications and assessments, poor appetite. Purewick in place at this time. New dressing to right groin site. Wife at bedside. Will continue with POC.

## 2025-02-01 NOTE — PLAN OF CARE
Goal Outcome Evaluation:              Outcome Evaluation: Pt alert to self. Awake intermittently throughout shift. CPAP/RA this shift. Vpaced. No complaints of pain. Purewick in place. Right groin site c/d/i. Wife at bedside.

## 2025-02-01 NOTE — PROGRESS NOTES
Mark Aguirre Jr.  1945 79 y.o.  [id number]      Patient Care Team:  Luis Staples MD as PCP - General (Internal Medicine)  Luis Staples MD as PCP - Internal Medicine  Rian Johns MD as Consulting Physician (Sleep Medicine)    CC: CHRISTINA-fib with RVR    Interval History: Asleep, not interactive      Objective   Vital Signs  Temp:  [97.2 °F (36.2 °C)-98.5 °F (36.9 °C)] 97.5 °F (36.4 °C)  Heart Rate:  [80-98] 88  Resp:  [11-23] 18  BP: ()/(44-88) 133/63    Intake/Output Summary (Last 24 hours) at 2/1/2025 1135  Last data filed at 2/1/2025 1018  Gross per 24 hour   Intake 370 ml   Output 250 ml   Net 120 ml         Physical Exam:   GEN asleep  Head: normocephalic, atruamatic  Eyes: Normal conjunctiva and sclera, no icterus, PERRL  Neck: No JVD, normal carotid pulsation without bruits  Lungs: clear to auscultation bilaterally, normal rate and effort  Heart: RRR, no murmurs or gallops. Normal s1 and S2.   Abdomen: Soft, nontender, normal bowel sounds  Extremities: No edema or marked deformities. Normal strength.   Skin: No rash, no cyanosis.   Pscyhciatric: Alert and Oriented x 3  Back: No Kypohsis or scoliosis.       atorvastatin, 10 mg, Oral, Daily  clonazePAM, 0.5 mg, Oral, BID AC  Divalproex Sodium, 250 mg, Oral, TID - Nitrates  megestrol, 400 mg, Oral, Daily  OLANZapine, 7.5 mg, Oral, BID AC  senna-docusate sodium, 2 tablet, Oral, BID AC  sodium chloride, 10 mL, Intravenous, Q12H  sodium chloride, 10 mL, Intravenous, Q12H  tamsulosin, 0.4 mg, Oral, Daily  vitamin B-12, 1,000 mcg, Oral, Daily             I reviewed the patient's new clinical results.  I personally viewed and interpreted the patient's EKG/Telemetry data    Assessment/Plan  Active Hospital Problems    Diagnosis  POA    **Atrial fibrillation and flutter [I48.91, I48.92]  Yes    Persistent atrial fibrillation [I48.19]  Unknown      Resolved Hospital Problems   No resolved problems to display.     Postop day 1 Micra single-chamber  pacemaker implant.  Groin site is clean dry without hematoma.  2.  Atrial flutter and fib with RVR: Status post AV node ablation.  The patient is paced.  Device was interrogated functioning normally.  May go to [facility] per primary service.  It does not appear that he has been restarted on his anticoagulation, defer to primary cardiologist on this.    Ophelia Vargas MD  02/01/25  11:35 EST

## 2025-02-01 NOTE — PROGRESS NOTES
Subjective: Events noted.  Patient had AV carlos a ablation and pacemaker placed yesterday.  Moved to CVU postprocedure and was lethargic.  Rapid response was called because of inability to arouse easily and concerns for respiratory.  Patient was placed on CPAP.  Wife is currently at bedside.  She spent the night.  He is taken off the CPAP but is continue to monitor maintain his oxygen sats and is currently on room air.  Objective:  Vitals:    01/31/25 1946 01/31/25 2030 01/31/25 2330 02/01/25 0401   BP: 126/73 113/77 109/66 128/60   BP Location:  Left arm Left arm Right arm   Patient Position: Lying Lying Lying Lying   Pulse: 81 98 92 94   Resp:  16 18 18   Temp: 98.5 °F (36.9 °C) 98.5 °F (36.9 °C) 97.9 °F (36.6 °C) 98 °F (36.7 °C)   TempSrc: Oral Oral Oral Oral   SpO2: 97%  97% 98%     General: No acute distress, arouses to voice  Heart: Irregular irregular  Lungs: Clear to auscultation  Abdomen: Soft nontender  Extremities: No edema     Recent Results (from the past 24 hours)   ECG 12 Lead Rhythm Change    Collection Time: 01/31/25 10:06 AM   Result Value Ref Range    QT Interval 393 ms    QTC Interval 462 ms   Potassium    Collection Time: 01/31/25 11:24 AM    Specimen: Blood   Result Value Ref Range    Potassium 4.0 3.5 - 5.2 mmol/L   Magnesium    Collection Time: 01/31/25 11:24 AM    Specimen: Blood   Result Value Ref Range    Magnesium 2.2 1.6 - 2.4 mg/dL   ECG 12 Lead Rhythm Change    Collection Time: 01/31/25  5:42 PM   Result Value Ref Range    QT Interval 438 ms    QTC Interval 505 ms   POC Glucose Once    Collection Time: 01/31/25  6:39 PM    Specimen: Blood   Result Value Ref Range    Glucose 81 70 - 130 mg/dL   Blood Gas, Arterial -    Collection Time: 01/31/25  6:59 PM    Specimen: Arterial Blood   Result Value Ref Range    Site Left Radial     Gabe's Test Positive     pH, Arterial 7.400 7.350 - 7.450 pH units    pCO2, Arterial 34.4 (L) 35.0 - 45.0 mm Hg    pO2, Arterial 104.8 (H) 80.0 - 100.0 mm Hg     HCO3, Arterial 21.3 (L) 22.0 - 28.0 mmol/L    Base Excess, Arterial -2.8 (L) 0.0 - 2.0 mmol/L    O2 Saturation, Arterial 98.1 92.0 - 98.5 %    CO2 Content 22.4 (L) 23 - 27 mmol/L    Barometric Pressure for Blood Gas 746.0000 mmHg    Modality CPAP     Flow Rate 4.0000 lpm    Rate 17 Breaths/minute    Hemodilution No     Device Comment home cpap unit of 12    ECG 12 Lead Rhythm Change    Collection Time: 02/01/25  4:52 AM   Result Value Ref Range    QT Interval 415 ms    QTC Interval 519 ms       A/P  Atrial fibrillation-had AV carlos a ablation and pacemaker placed yesterday.  Now with paced rhythm.  Metoprolol and digoxin discontinued.  Cardiology following but likely to sign off once he has been monitored.  When it is okay to take him off the monitor I would probably try and move him to [facility]   Hematuria-looks like urine is starting to clear.  His anticoagulation has been on hold.  I am to recheck a urine  Dementia with agitation-has been calmer this admission.  Did not need any restraints overnight.  Wife would like to avoid going back to [facility] but understands that he probably could not be managed at home.  Has started to consider memory units.  We will have to see how he does as far as agitation over the weekend  Nutrition-has been doing better.  Was n.p.o. yet much of the day yesterday and then was sedated coming back from his procedure so we will see how he is doing.

## 2025-02-02 NOTE — PROGRESS NOTES
The patient is seen with family present today.  He is again sleeping, but family reports that they feel as though he may be trying to make up for some sleep which she had missed previously.  No restraint devices are in place.  His valproic acid level on admission was surprisingly low at 7.1.  We may need to recheck this.  No other changes at this time.

## 2025-02-02 NOTE — PROGRESS NOTES
Subjective: Wife is at bedside.  Patient is resting quietly.  She reports he had a good night and slept well.  He was awake this morning and watch some of the replay of the [employer] versus [employer] basketball game from last night.  He was able to remember that  chidi was now at [facility].  Wife feels like overall he is doing better.  Cardiology saw him today and is okay with him transferring to [facility] when bed is available.    Objective:  Vitals:    02/01/25 0759 02/01/25 2041 02/01/25 2045 02/02/25 0434   BP: 133/63 (!) 151/112 147/54 160/95   BP Location: Right arm Right arm Right arm Right arm   Patient Position: Lying Lying Lying Lying   Pulse: 88 89  88   Resp: 18 18  18   Temp: 97.5 °F (36.4 °C) 98.3 °F (36.8 °C)  98.3 °F (36.8 °C)   TempSrc: Oral Oral  Oral   SpO2: (!) 82% 97%  96%     General: No acute distress, arouses to tactile stimuli but goes back to sleep  Heart: Regular  Lungs: Clear  Abdomen: Soft nontender nondistended  Extremities: No edema    Recent Results (from the past 24 hours)   Urinalysis With Microscopic If Indicated (No Culture) - Urine, Clean Catch    Collection Time: 02/01/25  1:28 PM    Specimen: Urine, Clean Catch   Result Value Ref Range    Color, UA Dark Yellow (A) Yellow, Straw    Appearance, UA Cloudy (A) Clear    pH, UA 6.5 5.0 - 8.0    Specific Gravity, UA 1.023 1.005 - 1.030    Glucose, UA Negative Negative    Ketones, UA 40 mg/dL (2+) (A) Negative    Bilirubin, UA Negative Negative    Blood, UA Moderate (2+) (A) Negative    Protein, UA 30 mg/dL (1+) (A) Negative    Leuk Esterase, UA Trace (A) Negative    Nitrite, UA Negative Negative    Urobilinogen, UA 4.0 E.U./dL (A) 0.2 - 1.0 E.U./dL   Urinalysis, Microscopic Only - Urine, Clean Catch    Collection Time: 02/01/25  1:28 PM    Specimen: Urine, Clean Catch   Result Value Ref Range    RBC, UA 11-20 (A) None Seen, 0-2 /HPF    WBC, UA 0-2 None Seen, 0-2 /HPF    Bacteria, UA None Seen None Seen /HPF    Squamous  Epithelial Cells, UA None Seen None Seen, 0-2 /HPF    Hyaline Casts, UA None Seen None Seen /LPF    Methodology Automated Microscopy    POC Glucose Once    Collection Time: 02/01/25  8:35 PM    Specimen: Blood   Result Value Ref Range    Glucose 92 70 - 130 mg/dL     A/p  [id number] day #2 from AV node ablation and pacemaker placement.  Looks to be paced on monitor.  Cardiology okay with taking him off monitor and moving him to [facility] as bed available.  To resume Eliquis later today as urine looks to be clearing from hematuria.  Dementia with agitation-has been much calmer on current medicines.  Sleeping quite a bit but wife is noticing he is even a bit more oriented.  I think getting him over to [facility] with a quieter room can be helpful.  Will talk to care coordinator tomorrow about next steps.  Family would like to try and avoid repeat geropsych admission if possible and now that his behaviors are much better controlled it may be possible to look at subacute rehab or memory care depending on how he participates with therapy etc.  Hypertension-now that he is off the metoprolol we may need to adjust blood pressure medicines but I am going to keep an eye on that for now  Hematuria-urinalysis yesterday was back to just microscopic hematuria which is not uncommon for him.  I think we can cautiously placing back on Eliquis after cardiology is okay with that and they suggested waiting until about 3 PM today.  They did call me with a bladder scan slightly over 350 last night and I asked them not to place a catheter but to recheck.  Nutrition-wife reports he ate basically a full breakfast this morning.

## 2025-02-02 NOTE — PROGRESS NOTES
[facility]  Cardiology Progress Note    Patient Identification:  Name: Mark Aguirre Jr.  Age: 79 y.o.  Sex: male  :  1945  MRN: [id number]                 Follow Up / Chief Complaint: Following for A-fib with RVR    Interval History: This patient and problem is new to this provider. Resting in bed.  No chest pain or shortness of breath.         Objective:    Past Medical History:  Past Medical History:   Diagnosis Date    Acute kidney failure     POST-OP TOTAL HIP 2020    Anticoagulated     PRADAXA FOR A FIB TO HELD 3 DAYS PRIOR    Arthritis     OSTEO. RIGHT HIP    Atrial flutter     Bifascicular block     Cataract     LEFT AND RIGHT    Depression     History of colon polyps     Hypertension     Hypoxemia associated with sleep     Insomnia     Knee pain     STEPHENIE on CPAP 2010    Overnight polysomnogram.  Weight 163 pounds.  Severe STEPHENIE with AHI of 30.9 events per hour.  Low oxygen saturation 72% and sleep-related hypoxia present for 30 minutes    PAF (paroxysmal atrial fibrillation)     Right hip pain     Slow to wake up after anesthesia      Past Surgical History:  Past Surgical History:   Procedure Laterality Date    CARDIAC ABLATION      CARDIAC CATHETERIZATION      COLONOSCOPY N/A 2018    Procedure: COLONOSCOPY INTO CECUM WITH COLD BX POLYPECTOMY ;  Surgeon: Ross Stearns MD;  Location: [facility] ENDOSCOPY;  Service:     COLONOSCOPY N/A 2/3/2021    Procedure: COLONOSCOPY TOCECUM WITH COLD BX POLYPECTOMY AND HOT SNARE POLYPECTOMY;  Surgeon: Ross Stearns MD;  Location: [facility] ENDOSCOPY;  Service: General;  Laterality: N/A;  PERSONAL HX OF POLYPS, FAMILY HX OF COLON CANCER  --POLYPS (X3), DIVERTICULOSIS    COLONOSCOPY N/A 3/20/2024    Procedure: COLONOSCOPY TO CECUM WITH COLD BX POLYPECTOMIES;  Surgeon: Ross Stearns MD;  Location: [facility] ENDOSCOPY;  Service: General;  Laterality: N/A;  HX POLYPS/POLYPS (3)    HERNIA REPAIR      INGUINAL HERNIA? SIDE    TOTAL HIP  ARTHROPLASTY Right 6/5/2020    Procedure: TOTAL HIP ARTHROPLASTY;  Surgeon: Travis Hamlin MD;  Location: [facility];  Service: Orthopedics;  Laterality: Right;        Social History:   Social History     Tobacco Use    Smoking status: Never    Smokeless tobacco: Current     Types: Snuff, Chew    Tobacco comments:     2 weekly chew   Substance Use Topics    Alcohol use: Never      Family History:  Family History   Problem Relation Age of Onset    Breast cancer Mother     Colon cancer Father     Diabetes Brother     No Known Problems Maternal Grandmother     No Known Problems Maternal Grandfather     No Known Problems Paternal Grandmother     Stroke Paternal Grandfather     Heart disease Brother     Malig Hyperthermia Neg Hx           Allergies:  No Known Allergies  Scheduled Meds:  apixaban, 5 mg, Q12H  atorvastatin, 10 mg, Daily  clonazePAM, 0.5 mg, BID AC  Divalproex Sodium, 250 mg, TID - Nitrates  megestrol, 400 mg, Daily  OLANZapine, 7.5 mg, BID AC  senna-docusate sodium, 2 tablet, BID AC  sodium chloride, 10 mL, Q12H  sodium chloride, 10 mL, Q12H  tamsulosin, 0.4 mg, Daily  vitamin B-12, 1,000 mcg, Daily            INTAKE AND OUTPUT:    Intake/Output Summary (Last 24 hours) at 2/2/2025 0904  Last data filed at 2/2/2025 0434  Gross per 24 hour   Intake 240 ml   Output 1000 ml   Net -760 ml       Review of Systems:   GI: No nausea or vomiting  Cardiac: Chest pain  Pulmonary: No shortness of breath    Constitutional:  Temp:  [98.3 °F (36.8 °C)] 98.3 °F (36.8 °C)  Heart Rate:  [88-89] 88  Resp:  [18] 18  BP: (147-160)/() 160/95          Physical Exam:  General:  Alert, cooperative, appears in no acute distress  Respiratory: Clear to auscultation.  Normal respiratory effort and rate.  Cardiovascular: S1S2 Regular rate and rhythm. No murmur, rub or gallop.   Gastrointestinal: soft, non tender. Bowel sounds present.   Extremities: REES x4. No pretibial pitting edema. Adequate musculoskeletal strength.    Neuro: AAO x3 CN II-XII grossly intact        Cardiographics      ECG:     Echocardiogram: 9/5/24  Interpretation Summary         Left ventricular systolic function is low normal. Calculated left ventricular EF = 48.8%    The left ventricular cavity is borderline dilated.    Left ventricular diastolic function is consistent with (grade II w/high LAP) pseudonormalization.    The left atrial cavity is mild to moderately dilated.    Moderate aortic valve stenosis is present. Aortic valve area is 1.8 cm2.    Peak velocity of the flow distal to the aortic valve is 328 cm/s. Aortic valve maximum pressure gradient is 43 mmHg. Aortic valve mean pressure gradient is 24 mmHg. Aortic valve dimensionless index is 0.4 .    Estimated right ventricular systolic pressure from tricuspid regurgitation is moderately elevated (45-55 mmHg). Calculated right ventricular systolic pressure from tricuspid regurgitation is 48 mmHg.    Mild to moderate aortic valve regurgitation is present.    Mild to moderate mitral valve regurgitation is present with an eccentric jet noted.     Lab Review       Results from last 7 days   Lab Units 01/31/25  1124   MAGNESIUM mg/dL 2.2     Results from last 7 days   Lab Units 01/31/25  1124 01/30/25  0352   SODIUM mmol/L  --  138   POTASSIUM mmol/L 4.0 3.9   BUN mg/dL  --  16   CREATININE mg/dL  --  0.74*   CALCIUM mg/dL  --  8.6     @[id number]@  Results from last 7 days   Lab Units 01/30/25  0352 01/28/25  2123   WBC 10*3/mm3 7.68 7.44   HEMOGLOBIN g/dL 13.4 13.9   HEMATOCRIT % 41.1 43.8   PLATELETS 10*3/mm3 141 152             Assessment:  Atrial fibrillation and flutter  Persistent atrial fib    Plan:  -Postop day 2 Micra single-chamber pacemaker implant. Nurse states yesterday afternoon the site was oozing.  roin site remains c/d and without hematoma.  -Atrial flutter and fib with RVR: Status post AV node ablation.  Remains paced on the monitor.  Interrogation and device functioning normally.  -ok to  "transfer to [facility].     )2/2/2025  KEISHA Carlin/Transcription:   \"Dictated utilizing Dragon dictation\".     "

## 2025-02-03 NOTE — THERAPY TREATMENT NOTE
Patient Name: Mark Aguirre Jr.  : 1945    MRN: [id number]                              Today's Date: 2/3/2025       Admit Date: 2025    Visit Dx:     ICD-10-CM ICD-9-CM   1. Atrial fibrillation and flutter  I48.91 427.31    I48.92 427.32   2. Longstanding persistent atrial fibrillation  I48.11 427.31   3. Encounter for examination for normal comparison and control in clinical research program  Z00.6 V70.7   4. Persistent atrial fibrillation  I48.19 427.31     Patient Active Problem List   Diagnosis    Atrial fibrillation    Bifascicular block    STEPHENIE on auto CPAP    Family history of colon cancer    History of colon polyps    Essential hypertension    Hip pain, right    OA (osteoarthritis) of hip    Acute renal failure    Brief psychotic disorder    Family history of colonic polyps    Nonrheumatic aortic valve stenosis    PVC (premature ventricular contraction)    Syncope and collapse    History of adenomatous polyp of colon    Persistent atrial fibrillation    Hypoxemia associated with sleep    Confusion    Severe protein-calorie malnutrition    Atrial fibrillation with RVR    Acute UTI (urinary tract infection)    Dementia with behavioral disturbance    Atrial fibrillation and flutter     Past Medical History:   Diagnosis Date    Acute kidney failure     POST-OP TOTAL HIP 2020    Anticoagulated     PRADAXA FOR A FIB TO HELD 3 DAYS PRIOR    Arthritis     OSTEO. RIGHT HIP    Atrial flutter     Bifascicular block     Cataract     LEFT AND RIGHT    Depression     History of colon polyps     Hypertension     Hypoxemia associated with sleep     Insomnia     Knee pain     STEPHENIE on CPAP 2010    Overnight polysomnogram.  Weight 163 pounds.  Severe STEPHENIE with AHI of 30.9 events per hour.  Low oxygen saturation 72% and sleep-related hypoxia present for 30 minutes    PAF (paroxysmal atrial fibrillation)     Right hip pain     Slow to wake up after anesthesia      Past Surgical History:   Procedure Laterality  Date    CARDIAC ABLATION      CARDIAC CATHETERIZATION      COLONOSCOPY N/A 2/21/2018    Procedure: COLONOSCOPY INTO CECUM WITH COLD BX POLYPECTOMY ;  Surgeon: Ross Stearns MD;  Location: [facility] ENDOSCOPY;  Service:     COLONOSCOPY N/A 2/3/2021    Procedure: COLONOSCOPY TOCECUM WITH COLD BX POLYPECTOMY AND HOT SNARE POLYPECTOMY;  Surgeon: Ross Stearns MD;  [facility];  Service: General;  Laterality: N/A;  PERSONAL HX OF POLYPS, FAMILY HX OF COLON CANCER  --POLYPS (X3), DIVERTICULOSIS    COLONOSCOPY N/A 3/20/2024    Procedure: COLONOSCOPY TO CECUM WITH COLD BX POLYPECTOMIES;  Surgeon: Ross Stearns MD;  [facility];  Service: General;  Laterality: N/A;  HX POLYPS/POLYPS (3)    HERNIA REPAIR      INGUINAL HERNIA? SIDE    TOTAL HIP ARTHROPLASTY Right 6/5/2020    Procedure: TOTAL HIP ARTHROPLASTY;  Surgeon: Travis Hamlin MD;  Location: [facility] MAIN OR;  Service: Orthopedics;  Laterality: Right;      General Information       Row Name 02/03/25 1002          Physical Therapy Time and Intention    Document Type therapy note (daily note)  -EJ     Mode of Treatment physical therapy  -EJ       Row Name 02/03/25 1002          General Information    Existing Precautions/Restrictions fall  -EJ     Barriers to Rehab cognitive status  -EJ               User Key  (r) = Recorded By, (t) = Taken By, (c) = Cosigned By      Initials Name Provider Type    EJ Aleena Bermudez, PT Physical Therapist                   Mobility       Row Name 02/03/25 1004          Bed Mobility    Supine-Sit [place] (Bed Mobility) verbal cues;nonverbal cues (demo/gesture);moderate assist (50% patient effort);2 person assist  -EJ     Assistive Device (Bed Mobility) head of bed elevated;bed rails  -EJ     Comment, (Bed Mobility) verbal cues for sequencing and encouragement, increased time required to initiate movement,  posterior lean in sitting  -EJ       Row Name 02/03/25 1004          Sit-Stand Transfer     Sit-Stand [place] (Transfers) verbal cues;nonverbal cues (demo/gesture);minimum assist (75% patient effort);2 person assist  -EJ     Comment, (Sit-Stand Transfer) HHA x 2  -EJ       Row Name 02/03/25 1004          Gait/Stairs (Locomotion)    [place] Level (Gait) verbal cues;nonverbal cues (demo/gesture);minimum assist (75% patient effort);2 person assist  -EJ     Assistive Device (Gait) --  HHA x 2  -EJ     Distance in Feet (Gait) 3  -EJ     Deviations/Abnormal Patterns (Gait) miki decreased;stride length decreased  -EJ     Bilateral Gait Deviations heel strike decreased  -EJ     Comment, (Gait/Stairs) several small steps from bed to chair, one bout of unsteadiness requiring min A x 2 to correct  -EJ               User Key  (r) = Recorded By, (t) = Taken By, (c) = Cosigned By      Initials Name Provider Type    Aleena Heck, PT Physical Therapist                   Obj/Interventions       Row Name 02/03/25 1007          Balance    Static Sitting Balance contact guard;moderate assist;minimal assist  -EJ     Position, Sitting Balance sitting edge of bed  -EJ     Comment, Balance posterior lean in sitting w cues to correct, varying levels of assist required  -EJ               User Key  (r) = Recorded By, (t) = Taken By, (c) = Cosigned By      Initials Name Provider Type    Aleena Heck, PT Physical Therapist                   Goals/Plan    No documentation.                  Clinical Impression       Row Name 02/03/25 1008          Pain    Pretreatment Pain Rating 0/10 - no pain  -EJ     Posttreatment Pain Rating 0/10 - no pain  -EJ       Row Name 02/03/25 1008          Plan of Care Review    Plan of Care Reviewed With patient;spouse  -EJ     Outcome Evaluation Pt seen for PT this am. He is cooperative, but does require encouragement and cues to participate and initiate movement. Pt required mod A x 2 to transition to EOB. He demos posterior lean in sitting w varyling levels of assist  needed from CGA up to mod A. Pt was able to satnd w HHA/min A x 2. He took several steps from bed to chair. He had one bout of unsteadiness requiring min A x 2 to correct. Pt agreeable to stay up in recliner. Spouse present and chair alarm in place. Will continue to progress activity as tolerated.  -EJ       Row Name 02/03/25 1008          Positioning and Restraints    Pre-Treatment Position in bed  -EJ     Post Treatment Position chair  -EJ     In Chair notified nsg;reclined;call light within reach;encouraged to call for assist;exit alarm on;with family/caregiver  -EJ               User Key  (r) = Recorded By, (t) = Taken By, (c) = Cosigned By      Initials Name Provider Type    Aleena Heck, PT Physical Therapist                   Outcome Measures       Row Name 02/03/25 1014 02/03/25 0846       How much help from another person do you currently need...    Turning from your back to your side while in flat bed without using bedrails? 3  -EJ 3  -SF    Moving from lying on back to sitting on the side of a flat bed without bedrails? 2  -EJ 3  -SF    Moving to and from a bed to a chair (including a wheelchair)? 3  -EJ 3  -SF    Standing up from a chair using your arms (e.g., wheelchair, bedside chair)? 3  -EJ 2  -SF    Climbing 3-5 steps with a railing? 2  -EJ 2  -SF    To walk in hospital room? 2  -EJ 2  -SF    AM-PAC 6 Clicks Score (PT) 15  -EJ 15  -SF    Highest Level of Mobility Goal 4 --> Transfer to chair/commode  -EJ 4 --> Transfer to chair/commode  -SF              User Key  (r) = Recorded By, (t) = Taken By, (c) = Cosigned By      Initials Name Provider Type    Aleena Heck, PT Physical Therapist    Gilda Dykes RN Registered Nurse                                 Physical Therapy Education       Title: PT OT SLP Therapies (In Progress)       Topic: Physical Therapy (In Progress)       Point: Mobility training (In Progress)       Learning Progress Summary            Patient Acceptance,  E,TB,D, NL,NR by  at 1/31/2025 0922   Family Acceptance, E, VU by  at 2/2/2025 1454                      Point: Home exercise program (Not Started)       Learner Progress:  Not documented in this visit.              Point: Body mechanics (Not Started)       Learner Progress:  Not documented in this visit.              Point: Precautions (Not Started)       Learner Progress:  Not documented in this visit.                              User Key       Initials Effective Dates Name Provider Type Discipline     10/22/21 -  Morelia Land, PT Physical Therapist PT     08/25/23 -  Jeri Sifuentes, RN Registered Nurse Nurse                  PT Recommendation and Plan     Outcome Evaluation: Pt seen for PT this am. He is cooperative, but does require encouragement and cues to participate and initiate movement. Pt required mod A x 2 to transition to EOB. He demos posterior lean in sitting w varyling levels of assist needed from CGA up to mod A. Pt was able to satnd w HHA/min A x 2. He took several steps from bed to chair. He had one bout of unsteadiness requiring min A x 2 to correct. Pt agreeable to stay up in recliner. Spouse present and chair alarm in place. Will continue to progress activity as tolerated.     Time Calculation:         PT Charges       Row Name 02/03/25 1014             Time Calculation    Start Time 0924  -EJ      Stop Time 0938  -EJ      Time Calculation (min) 14 min  -EJ      PT Received On 02/03/25  -EJ      PT - Next Appointment 02/04/25  -EJ                User Key  (r) = Recorded By, (t) = Taken By, (c) = Cosigned By      Initials Name Provider Type    EJ Aleena Bermudez, PT Physical Therapist                  Therapy Charges for Today       Code Description Service Date Service Provider Modifiers Qty    43993926034 HC PT THERAPEUTIC ACT EA 15 MIN 2/3/2025 Aleena Bermudez, PT GP 1    88956088096 HC PT THER SUPP EA 15 MIN 2/3/2025 Aleena Bermudez, PT GP 1            PT G-Codes  Outcome  Measure Options: AM-PAC 6 Clicks Basic Mobility (PT)  AM-[facility] 6 Clicks Score (PT): 15       Aleena Bermudez, PT  2/3/2025

## 2025-02-03 NOTE — PROGRESS NOTES
Nutrition Services    Patient Name:  Mark Aguirre Jr.  YOB: 1945  MRN: [id number]  Admit Date:  1/29/2025  Assessment Date:  02/03/25    Summary: Nutrition assessment triggered by MST score of 2 per nurse admission screen    79 y.o. male admitted with Afib and flutter.  Confusion.  Recent discharge after 3 week hospitalization.  Hematuria.  Slept a lot during the day yesterday, sleeping most of my visit today.    Spoke with wife at bedside.  She reports patient with decreased appetite over past week.  She says prior to that he would typically eat 2-3 meals/day.  She reports weight loss since December - 25 lb (11.1%).  0-50% at meals per chart PO data.  Wife says he is drinking some of the Boost, but prefers chocolate - will change orders to TID and chocolate.    Patient meets ASPEN/AND criteria for nutrition diagnosis of severe malnutrition of chronic illness based on: nutrition focused physical exam, weight loss, poor PO intake.    Plans/Recommendations:  Increasing Boost to TID and changing flavor to chocolate.  Continue to encourage PO intake.    RD to continue to follow.    CLINICAL NUTRITION ASSESSMENT      Reason for Assessment MST score 2+     Diagnosis/Problem   Afib and flutter   Medical/Surgical History Past Medical History:   Diagnosis Date    Acute kidney failure     POST-OP TOTAL HIP 2020    Anticoagulated     PRADAXA FOR A FIB TO HELD 3 DAYS PRIOR    Arthritis     OSTEO. RIGHT HIP    Atrial flutter     Bifascicular block     Cataract     LEFT AND RIGHT    Depression     History of colon polyps     Hypertension     Hypoxemia associated with sleep     Insomnia     Knee pain     STEPHENIE on CPAP 05/27/2010    Overnight polysomnogram.  Weight 163 pounds.  Severe STEPHENIE with AHI of 30.9 events per hour.  Low oxygen saturation 72% and sleep-related hypoxia present for 30 minutes    PAF (paroxysmal atrial fibrillation)     Right hip pain     Slow to wake up after anesthesia        Past Surgical  History:   Procedure Laterality Date    CARDIAC ABLATION      CARDIAC CATHETERIZATION      CARDIAC ELECTROPHYSIOLOGY PROCEDURE N/A 1/31/2025    Procedure: AV node ablation;  Surgeon: Hemant Armando MD;  Location:  [facility];  Service: Cardiovascular;  Laterality: N/A;    COLONOSCOPY N/A 2/21/2018    Procedure: COLONOSCOPY INTO CECUM WITH COLD BX POLYPECTOMY ;  Surgeon: Ross Stearns MD;  [facility];  Service:     COLONOSCOPY N/A 2/3/2021    Procedure: COLONOSCOPY TOCECUM WITH COLD BX POLYPECTOMY AND HOT SNARE POLYPECTOMY;  Surgeon: Ross Stearns MD;  [facility];  Service: General;  Laterality: N/A;  PERSONAL HX OF POLYPS, FAMILY HX OF COLON CANCER  --POLYPS (X3), DIVERTICULOSIS    COLONOSCOPY N/A 3/20/2024    Procedure: COLONOSCOPY TO CECUM WITH COLD BX POLYPECTOMIES;  Surgeon: Ross Stearns MD;  [facility];  Service: General;  Laterality: N/A;  HX POLYPS/POLYPS (3)    HERNIA REPAIR      INGUINAL HERNIA? SIDE    PACEMAKER IMPLANTATION N/A 1/31/2025    Procedure: Implant or Replacement of Single Chamber Leadless Pacemaker, RV Only MICRA;  Surgeon: Hemant Armando MD;  Location: [facility] CATH INVASIVE LOCATION;  Service: Cardiovascular;  Laterality: N/A;    TOTAL HIP ARTHROPLASTY Right 6/5/2020    Procedure: TOTAL HIP ARTHROPLASTY;  Surgeon: Travis Hamlin MD;  Location: [facility] MAIN OR;  Service: Orthopedics;  Laterality: Right;        Anthropometrics        Current Height  Current Weight  BMI kg/m2       There is no height or weight on file to calculate BMI.   Adjusted BMI (if applicable)    BMI Category Overweight (25 - 29.9)   Ideal Body Weight (IBW) 184 lb (83.6 kg)   Usual Body Weight (UBW) 226 lb   Weight Trend Loss, Amount/Timeframe: 25 lb (11.1%) since December   Weight History Wt Readings from Last 30 Encounters:   01/29/25 0349 91.2 kg (201 lb 1 oz)   01/21/25 1500 89.7 kg (197 lb 12 oz)   01/14/25 0900 91.1 kg (200 lb 13.8 oz)    01/08/25 1100 95.6 kg (210 lb 12.2 oz)   12/29/24 0818 110 kg (241 lb 8 oz)   12/17/24 0739 103 kg (226 lb)   12/13/24 0930 103 kg (228 lb)   10/07/24 1123 104 kg (229 lb)   09/05/24 0803 104 kg (229 lb 4.5 oz)   08/21/24 1104 102 kg (224 lb)   03/20/24 1019 99.9 kg (220 lb 4.8 oz)   03/19/24 0912 102 kg (224 lb)   02/27/24 0957 102 kg (225 lb)   12/04/23 1030 101 kg (223 lb)   11/29/23 0831 102 kg (224 lb 12.8 oz)   07/31/23 1000 103 kg (226 lb 6.4 oz)   07/05/23 1519 102 kg (224 lb)   07/04/23 1736 102 kg (224 lb)   07/04/23 1654 102 kg (224 lb)   02/23/23 0746 99.3 kg (219 lb)   12/02/22 1003 103 kg (226 lb)   11/30/22 0900 103 kg (227 lb)   08/31/22 0700 103 kg (226 lb 6.4 oz)   02/23/22 0941 105 kg (231 lb)   10/20/21 1248 105 kg (231 lb 7.7 oz)   10/06/21 0850 105 kg (230 lb 6.4 oz)   09/01/21 0700 105 kg (232 lb)   06/24/21 0943 105 kg (231 lb)   02/03/21 0802 102 kg (224 lb)   02/02/21 1218 103 kg (228 lb)   09/02/20 1413 106 kg (233 lb 9.6 oz)   08/21/20 1323 107 kg (235 lb)   07/30/20 0829 107 kg (235 lb)   07/07/20 1113 103 kg (226 lb)   06/16/20 0349 111 kg (243 lb 11.2 oz)   06/15/20 2026 121 kg (266 lb 8 oz)   06/05/20 0911 108 kg (238 lb 8.6 oz)   06/04/20 1103 107 kg (235 lb 12.8 oz)      --  Estimated/Assessed Needs        Current Weight          Energy Requirements    Weight for Calculation 201 lb (91.2 kg)   Method for Estimation  25-30 kcal/kg   EST Needs (kcal/day) 6332-7495       Protein Requirements    Weight for Calculation 201 lb (91.2 kg)   EST Protein Needs (g/kg) 1.2 - 1.5 gm/kg   EST Daily Needs (g/day) 109-137       Fluid Requirements     Method for Estimation 1 mL/kcal    EST Needs (mL/day)      Labs       Pertinent Labs    Results from last 7 days   Lab Units 02/03/25  0234 01/31/25  1124 01/30/25  0352 01/28/25  2123   SODIUM mmol/L 136  --  138 141   POTASSIUM mmol/L 3.9 4.0 3.9 4.3   CHLORIDE mmol/L 108*  --  108* 107   CO2 mmol/L 22.8  --  22.6 24.6   BUN mg/dL 14  --  16 24*  "  CREATININE mg/dL 0.74*  --  0.74* 0.86   CALCIUM mg/dL 8.6  --  8.6 9.4   BILIRUBIN mg/dL  --   --  2.0* 2.3*   ALK PHOS U/L  --   --  56 62   ALT (SGPT) U/L  --   --  15 17   AST (SGOT) U/L  --   --  14 23   GLUCOSE mg/dL 88  --  92 90     Results from last 7 days   Lab Units 02/03/25  0234 01/31/25  1124 01/30/25  0352 01/28/25  2123   MAGNESIUM mg/dL  --  2.2  --  2.2   HEMOGLOBIN g/dL 14.0  --  13.4 13.9   HEMATOCRIT % 41.8  --  41.1 43.8   WBC 10*3/mm3 7.22  --  7.68 7.44   ALBUMIN g/dL  --   --  2.8* 3.8     Results from last 7 days   Lab Units 02/03/25  0234 01/30/25  0352 01/28/25 2123   PLATELETS 10*3/mm3 124* 141 152     COVID19   Date Value Ref Range Status   01/28/2025 Not Detected Not Detected - Ref. Range Final     No results found for: \"HGBA1C\"       Medications           Scheduled Medications apixaban, 5 mg, Oral, Q12H  atorvastatin, 10 mg, Oral, Daily  Divalproex Sodium, 250 mg, Oral, TID - Nitrates  megestrol, 400 mg, Oral, Daily  OLANZapine, 7.5 mg, Oral, BID AC  senna-docusate sodium, 2 tablet, Oral, BID AC  sodium chloride, 10 mL, Intravenous, Q12H  sodium chloride, 10 mL, Intravenous, Q12H  tamsulosin, 0.4 mg, Oral, Daily  vitamin B-12, 1,000 mcg, Oral, Daily       Infusions     PRN Medications   acetaminophen **OR** acetaminophen **OR** acetaminophen    senna-docusate sodium **AND** polyethylene glycol **AND** bisacodyl **AND** bisacodyl    OLANZapine    sodium chloride    sodium chloride    sodium chloride    sodium chloride     Physical Findings          General Findings appeared asleep, confused, disoriented, loss of muscle mass, loss of subcutaneous fat, room air   Oral/Mouth Cavity dental appliance, tooth or teeth missing   Edema  generalized, lower extremity , 1+ (trace), 2+ (mild)   Gastrointestinal fecal incontinence, normoactive, last bowel movement: 2/1   Skin  bruising, other: puncture   Tubes/Drains/Lines none   NFPE See Malnutrition Severity Assessment, Date Completed: 2/3 "   --  Malnutrition Severity Assessment      Patient meets criteria for : Severe Malnutrition  Malnutrition Type (Last 8 Hours)       Malnutrition Severity Assessment       Row Name 02/03/25 1652       Malnutrition Severity Assessment    Malnutrition Type Chronic Disease - Related Malnutrition      Row Name 02/03/25 1652       Insufficient Energy Intake     Insufficient Energy Intake Findings Severe    Insufficient Energy Intake  <75% of est. energy requirement for > or equal to 1 month      Row Name 02/03/25 1652       Unintentional Weight Loss     Unintentional Weight Loss Findings Severe    Unintentional Weight Loss  Weight loss greater than 7.5% in three months      Row Name 02/03/25 1652       Muscle Loss    Loss of Muscle Mass Findings Moderate    [place] Region Moderate - slight depression    Clavicle Bone Region Moderate - some protrusion in females, visible in males    Dorsal Hand Region Moderate - slight depression    Patellar Region Moderate - patella more prominent, less muscle definition around patella      Row Name 02/03/25 1652       Fat Loss    Subcutaneous Fat Loss Findings Moderate    Orbital Region  Severe - pronounced hollowness/depression, dark circles, loose saggy skin    Upper Arm Region Moderate - some fat tissue, not ample    Thoracic & Lumbar Region Moderate - ribs visible with mild depressions, iliac crest somewhat prominent      Row Name 02/03/25 1652       Criteria Met (Must meet criteria for severity in at least 2 of these categories: M Wasting, Fat Loss, Fluid, Secondary Signs, Wt. Status, Intake)    Patient meets criteria for  Severe Malnutrition                     Current Nutrition Orders & Evaluation of Intake       Oral Nutrition     Food Allergies NKFA   Current PO Diet Diet: Regular/House; Fluid Consistency: Thin (IDDSI 0)   Supplement Boost Plus, BID   PO Evaluation     % PO Intake 0-50%    Factors Affecting Intake: altered mental status, decreased appetite   --  PES STATEMENT /  NUTRITION DIAGNOSIS      Nutrition Dx Problem  Problem: Malnutrition (severe)  Etiology: Medical Diagnosis - Alzheimer's and Factors Affecting Nutrition - AMS, decreased appetite    Signs/Symptoms: Report of Minimal PO Intake, NFPE Results, Unintended Weight Change, and Report/Observation     NUTRITION INTERVENTION / PLAN OF CARE      Intervention Goal(s) Maintain nutrition status, Reduce/improve symptoms, Meet estimated needs, Disease management/therapy, Tolerate PO , Increase intake, and No significant weight loss         RD Intervention/Action Encourage intake, Continue to monitor, Care plan reviewed, and Recommend/order: ONS   --      Prescription/Orders:       PO Diet       Supplements Increase Boost to TID and change to chocolate      Enteral Nutrition       Parenteral Nutrition    New Prescription Ordered? Yes   --      Monitor/Evaluation Per protocol, PO intake, Supplement intake, Pertinent labs, Weight, Symptoms, POC/GOC, Swallow function   Discharge Plan/Needs Pending clinical course   --    RD to follow per protocol.      Electronically signed by:  Kate Piper RD  02/03/25 16:49 EST

## 2025-02-03 NOTE — NURSING NOTE
Patient's family did not want me to wake up patient to give him medication.  She requested we give it to him after he wakes up

## 2025-02-03 NOTE — CASE MANAGEMENT/SOCIAL WORK
Continued Stay Note  [facility]     Patient Name: Mark Aguirre Jr.  MRN: [id number]  Today's Date: 2/3/2025    Admit Date: 1/29/2025    Plan: Undetermined. Wife advised deepika is looking into memory care facilities; May need SNF prior to this   Discharge Plan       Row Name 02/03/25 1546       Plan    Plan Undetermined. Wife advised deepika is looking into memory care facilities; May need SNF prior to this    Plan Comments Pt is now in room 2204/CVU.  CCP spoke with patient spouse/Alexandra Aguirre at bedside.  Pt in recliner with confusion. Spouse reports she wants to avoid Pt returning to [facility].  Spouse stated their daughter Samaria is out visiting memory care facilities today.  CCP explained he would likely need sub-acute rehab prior to going to a memory care beings he will have to be able to ambulate to and from dining room etc...  CCP gave spouse the SNF rehab choice list within a 40 mile radius of their home in [place], [place].  Spouse is going to review list with daughter and get back with CCP.  CCP will f/u tomorrow............Casi OGDEN/DUGLAS                   Discharge Codes    No documentation.                 Expected Discharge Date and Time       Expected Discharge Date Expected Discharge Time    Feb 4, 2025               Casi Santos RN

## 2025-02-03 NOTE — PROGRESS NOTES
Mark Aguirre Jr.  1945 79 y.o.  [id number]      Patient Care Team:  Luis Staples MD as PCP - General (Internal Medicine)  Luis Staples MD as PCP - Internal Medicine  Rian Johns MD as Consulting Physician (Sleep Medicine)    CC: A-fib with RVR, status post AV node ablation and Micra pacemaker    Interval History: No distress    Objective   Vital Signs  Temp:  [97.1 °F (36.2 °C)-98.2 °F (36.8 °C)] 97.1 °F (36.2 °C)  Heart Rate:  [88] 88  Resp:  [16-19] 16  BP: (114-145)/(66-97) 114/97    Intake/Output Summary (Last 24 hours) at 2/3/2025 1233  Last data filed at 2/3/2025 0348  Gross per 24 hour   Intake --   Output 1800 ml   Net -1800 ml         Physical Exam:   General Appearance:  Not oriented is awake   Lungs:       Heart:     HEENT:     Abdomen:      Extremities:      Medication Review:      apixaban, 5 mg, Oral, Q12H  atorvastatin, 10 mg, Oral, Daily  clonazePAM, 0.5 mg, Oral, BID AC  Divalproex Sodium, 250 mg, Oral, TID - Nitrates  megestrol, 400 mg, Oral, Daily  OLANZapine, 7.5 mg, Oral, BID AC  senna-docusate sodium, 2 tablet, Oral, BID AC  sodium chloride, 10 mL, Intravenous, Q12H  sodium chloride, 10 mL, Intravenous, Q12H  tamsulosin, 0.4 mg, Oral, Daily  vitamin B-12, 1,000 mcg, Oral, Daily             I reviewed the patient's new clinical results.  I personally viewed and interpreted the patient's EKG/Telemetry data    Assessment/Plan  Active Hospital Problems    Diagnosis  POA    **Atrial fibrillation and flutter [I48.91, I48.92]  Yes    Persistent atrial fibrillation [I48.19]  Unknown      Resolved Hospital Problems   No resolved problems to display.       Well strictly from a cardiac standpoint he is doing great.  His AV node was ablated he has a Micra pacemaker we did not have any complications with that.  His access site looks fine.    His overall picture is incredibly sad.  We will see him as needed.  I am wondering if he still needs the atorvastatin for primary  prevention    Claus Lindquist MD  02/03/25  12:33 EST

## 2025-02-03 NOTE — PLAN OF CARE
Goal Outcome Evaluation:  Plan of Care Reviewed With: patient, spouse           Outcome Evaluation: Pt seen for PT this am. He is cooperative, but does require encouragement and cues to participate and initiate movement. Pt required mod A x 2 to transition to EOB. He demos posterior lean in sitting w varyling levels of assist needed from CGA up to mod A. Pt was able to satnd w HHA/min A x 2. He took several steps from bed to chair. He had one bout of unsteadiness requiring min A x 2 to correct. Pt agreeable to stay up in recliner. Spouse present and chair alarm in place. Will continue to progress activity as tolerated.

## 2025-02-03 NOTE — DISCHARGE PLACEMENT REQUEST
"Harjeet Aguirre Jr. \"Ernst\" (79 y.o. Male)       Date of Birth   1945    Social Security Number       Address   [address] [place] [address]    Home Phone   [phone]    MRN   [id number]       Mandaen   Taoist    Marital Status                               Admission Date   1/29/25    Admission Type   Urgent    Admitting Provider   Luis Staples MD    Attending Provider   Luis Staples MD    Department, Room/Bed   [facility] CARDIOVASC UNIT, 2204/1       Discharge Date       Discharge Disposition       Discharge Destination                                 Attending Provider: Luis Staples MD    Allergies: No Known Allergies    Isolation: None   Infection: None   Code Status: CPR    Ht: 185.4 cm (73\")   Wt: 91.2 kg (201 lb 1 oz)    Admission Cmt: None   Principal Problem: Atrial fibrillation and flutter [I48.91,I48.92]                   Active Insurance as of 1/29/2025       Primary Coverage       Payor Plan Insurance Group Employer/Plan Group    MEDICARE MEDICARE A & B        Payor Plan Address Payor Plan Phone Number Payor Plan Fax Number Effective Dates    [address] [phone]  8/1/2008 - None Entered    [facility] [id number]         Subscriber Name Subscriber Birth Date Member ID       HARJEET AGUIRRE JR. 1945 [id number]               Secondary Coverage       Payor Plan Insurance Group Employer/Plan Group    [facility] SUPP [id number]       Payor Plan Address Payor Plan Phone Number Payor Plan Fax Number Effective Dates    [address]   8/1/2008 - None Entered    [facility] [id number]         Subscriber Name Subscriber Birth Date Member ID       HARJEET AGUIRRE JR. 1945 [id number]                     Emergency Contacts        (Rel.) Home Phone Work Phone Mobile Phone    Alexandra warren (Spouse) [phone] -- [phone]    Samaria Velasquez (Daughter) -- -- [phone]    CARLITOS WARREN (Son) -- -- [phone] "

## 2025-02-03 NOTE — PROGRESS NOTES
Subjective: Wife and daughter at bedside.  Patient slept well overnight.  Slept a lot during the day as well yesterday.  Has not been vocalizing any complaints.  Did eat a good breakfast yesterday but not much afterwards.  Would wake up a little bit to talk to family but then fall right back to sleep.    Objective:  Vitals:    02/02/25 1414 02/02/25 1953 02/03/25 0017 02/03/25 0349   BP:  145/66 121/68 139/79   BP Location:  Right arm Right arm Right arm   Patient Position:  Lying Lying Lying   Pulse: 88   88   Resp: 16 16 16 16   Temp:  97.3 °F (36.3 °C) 98 °F (36.7 °C) 98.2 °F (36.8 °C)   TempSrc:  Oral Oral Oral   SpO2:         General: No acute distress, arouses to strong voice or tactile stimuli but falls right back to sleep  Heart: Regular with occasional extra beats  Lungs: Clear  Abdomen: Soft  Extremities: No edema      Recent Results (from the past 24 hours)   Basic Metabolic Panel    Collection Time: 02/03/25  2:34 AM    Specimen: Blood   Result Value Ref Range    Glucose 88 65 - 99 mg/dL    BUN 14 8 - 23 mg/dL    Creatinine 0.74 (L) 0.76 - 1.27 mg/dL    Sodium 136 136 - 145 mmol/L    Potassium 3.9 3.5 - 5.2 mmol/L    Chloride 108 (H) 98 - 107 mmol/L    CO2 22.8 22.0 - 29.0 mmol/L    Calcium 8.6 8.6 - 10.5 mg/dL    BUN/Creatinine Ratio 18.9 7.0 - 25.0    Anion Gap 5.2 5.0 - 15.0 mmol/L    eGFR 91.0 >60.0 mL/min/1.73   CBC Auto Differential    Collection Time: 02/03/25  2:34 AM    Specimen: Blood   Result Value Ref Range    WBC 7.22 3.40 - 10.80 10*3/mm3    RBC 4.70 4.14 - 5.80 10*6/mm3    Hemoglobin 14.0 13.0 - 17.7 g/dL    Hematocrit 41.8 37.5 - 51.0 %    MCV 88.9 79.0 - 97.0 fL    MCH 29.8 26.6 - 33.0 pg    MCHC 33.5 31.5 - 35.7 g/dL    RDW 15.7 (H) 12.3 - 15.4 %    RDW-SD 50.7 37.0 - 54.0 fl    MPV 11.1 6.0 - 12.0 fL    Platelets 124 (L) 140 - 450 10*3/mm3    Neutrophil % 74.8 42.7 - 76.0 %    Lymphocyte % 13.7 (L) 19.6 - 45.3 %    Monocyte % 8.9 5.0 - 12.0 %    Eosinophil % 1.8 0.3 - 6.2 %    Basophil  % 0.4 0.0 - 1.5 %    Immature Grans % 0.4 0.0 - 0.5 %    Neutrophils, Absolute 5.40 1.70 - 7.00 10*3/mm3    Lymphocytes, Absolute 0.99 0.70 - 3.10 10*3/mm3    Monocytes, Absolute 0.64 0.10 - 0.90 10*3/mm3    Eosinophils, Absolute 0.13 0.00 - 0.40 10*3/mm3    Basophils, Absolute 0.03 0.00 - 0.20 10*3/mm3    Immature Grans, Absolute 0.03 0.00 - 0.05 10*3/mm3       Assessment plan  1.  A-fib-now has AV carlos a ablation with pacemaker and heart rate staying in good range.  He is off the monitor.  He can resume anticoagulation.  Hoping to move to a quieter room today  2.  Dementia with agitation-has been sleepy the last couple of days.  Will add Depakote level to labs drawn this morning.  Will discuss with psychiatry to see if any adjustment in meds is necessary.  Family would like to avoid repeat admission to psychiatric facility if behavior allows.  Would consider memory unit versus subacute rehab if he qualified.  Will have to see how he does  3.  Hematuria-bladder scans staying largely less than 300.  Urine is cleared and should be okay to start back on Eliquis as it has been on hold.  I asked the nurse to go ahead and resume today.  4.  Nutrition-ate well yesterday morning but not much since.  Will see how he does easily awakens.

## 2025-02-03 NOTE — PROGRESS NOTES
The patient remains quite groggy.  Today's valproic acid level is low at 26.3 so I am doubtful that this is the culprit relating to his drowsiness, but we will look to recheck a level in a couple of days.  Otherwise there have been no management issues and family reports that his appetite is improved somewhat.

## 2025-02-04 NOTE — PLAN OF CARE
Goal Outcome Evaluation:  Plan of Care Reviewed With: patient, child        Progress: no change  Outcome Evaluation: S/P leadless PM with AV node ablation, confused and combative, placed in restraints last night, family at bedside,  no telemetry monitoring, per family request no undue stimulation at night, family refuses bladder scan at night, meds in pudding, continue plan of care.
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
## 2025-02-04 NOTE — CASE MANAGEMENT/SOCIAL WORK
Continued Stay Note  [facility]     Patient Name: Mark Aguirre Jr.  MRN: [id number]  Today's Date: 2/4/2025    Admit Date: 1/29/2025    Plan: SNF evals sent and pending   Discharge Plan       Row Name 02/04/25 0841       Plan    Plan SNF evals sent and pending    Plan Comments [facility] returned call to patient daughter/Samaria Velasquez ([phone]) this morning.  Samaria Velasquez is now the first/main contact for patient as patient spouse/Alexandra Aguirre is quite busy and tired staying at patient bedside most of the day.  Mrs. Aguirre gave [facility] permission to speak with Samaria about patient discharge plan and she isn in control of arranging discharge accommodations.  [facility] received Maddie first three choices for sub-acute rehab (SNF).  1. [facility] in [place], [place], 2. [place] [facility] & 3. [facility].  Samaria advised the families plan is for patient to go to rehab and get stronger and take him home with family and caregivers assist as needed.  [facility] emailed Samaria a list of the In-Home Caregivers at [email or web address].  [facility] has sent rehab referrals to [facility] and [facility] and they are pending.............Casi ROOT/DUGLAS                   Discharge Codes    No documentation.                 Expected Discharge Date and Time       Expected Discharge Date Expected Discharge Time    Feb 7, 2025               Casi Santos RN
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
## 2025-02-04 NOTE — DISCHARGE PLACEMENT REQUEST
"Harjeet Aguirre Jr. \"Ernst\" (79 y.o. Male)       Date of Birth   1945    Social Security Number       Address   [address] [place] [address]    Home Phone   [phone]    MRN   [id number]       Anabaptism   Mormonism    Marital Status                               Admission Date   1/29/25    Admission Type   Urgent    Admitting Provider   Luis Staples MD    Attending Provider   Luis Staples MD    Department, Room/Bed   [facility] CARDIOVASC UNIT, 2204/1       Discharge Date       Discharge Disposition       Discharge Destination                                 Attending Provider: Luis Staples MD    Allergies: No Known Allergies    Isolation: None   Infection: None   Code Status: No CPR    Ht: 185.4 cm (73\")   Wt: 91.2 kg (201 lb 1 oz)    Admission Cmt: None   Principal Problem: Atrial fibrillation and flutter [I48.91,I48.92]                   Active Insurance as of 1/29/2025       Primary Coverage       Payor Plan Insurance Group Employer/Plan Group    MEDICARE MEDICARE A & B        Payor Plan Address Payor Plan Phone Number Payor Plan Fax Number Effective Dates    [address] [phone]  8/1/2008 - None Entered    [facility] [id number]         Subscriber Name Subscriber Birth Date Member ID       HARJEET AGUIRRE JR. 1945 [id number]               Secondary Coverage       Payor Plan Insurance Group Employer/Plan Group    [facility] SUPP [id number]       Payor Plan Address Payor Plan Phone Number Payor Plan Fax Number Effective Dates    [address]   8/1/2008 - None Entered    [facility] [id number]         Subscriber Name Subscriber Birth Date Member ID       HARJEET AGUIRRE JR. 1945 [id number]                     Emergency Contacts        (Rel.) Home Phone Work Phone Mobile Phone    Samaria Velasquez (Daughter) -- -- [phone]    CarlaAlexandra (Spouse) [phone] -- [phone]    CARLITOS WARREN (Son) -- -- [phone] "

## 2025-02-04 NOTE — NURSING NOTE
Pt transferring to 4N report given to Omer MILTON. Left voicemail for Samaria about transfer. Wife is at bedside with patient.

## 2025-02-05 NOTE — PLAN OF CARE
Goal Outcome Evaluation:  Plan of Care Reviewed With: patient, child           Outcome Evaluation: Pt was agreeable with PT today, able to sit edge of bed and stand 4 x at bedside, needed frequent cues and hands on assist, pt very impulsive, stood well but not able to coordinate taking any steps, PT will cont to follow

## 2025-02-05 NOTE — PROGRESS NOTES
Discharge Planning Assessment  [facility]     Patient Name: Mark Aguirre Jr.  MRN: [id number]  Today's Date: 2/5/2025    Admit Date: 1/29/2025    Plan: SNF evals sent and pending   Discharge Needs Assessment    No documentation.                  Discharge Plan       Row Name 02/05/25 5138       Plan    Plan Comments Per the patient's daughter she has spoken to [facility] and they are aware when are Dad gets discharged she would prefer him to go there. She states [facility] at this time and they will continue to follow. KHLOE Burgess Rn, CCP      Row Name 02/05/25 1403       Plan    Plan Comments The patient transferred from [address] [place] [address] on 2/4/25 @ 20:57. [facility] 12/28 to 1/23 then to [facility].  Wife does not want him to return there.  Dementia w/ agitation  2/4 SNF referrals sent for [place], [facility] & [place].  Has home CPAP. Samaria the daughter called [phone] and she is going to tour [facility] today. Tried to call her back and left her a secure voice message. KHLOE Burgess RN, CCP.                  Continued Care and Services - Admitted Since 1/29/2025       Destination       Service Provider Request Status Services Address Phone Fax Patient Preferred    [facility] Pending - Request Sent -- [address] [facility] [id number] [phone] --    [facility] Pending - Request Sent -- [address] [facility] [address] [phone] --    [facility] Declined  Behavior issues or concerns -- [address] [facility] [address] [phone] --                  Expected Discharge Date and Time       Expected Discharge Date Expected Discharge Time    Feb 8, 2025            Demographic Summary    No documentation.                  Functional Status    No documentation.                  Psychosocial    No  documentation.                  Abuse/Neglect    No documentation.                  Legal    No documentation.                  Substance Abuse    No documentation.                  Patient Forms    No documentation.                     Danette Burgess RN

## 2025-02-05 NOTE — PLAN OF CARE
Goal Outcome Evaluation:   Patient has not met criteria to DC restraints. Safety, absence of skin injury maintained.

## 2025-02-05 NOTE — PROGRESS NOTES
Discharge Planning Assessment  [facility]     Patient Name: Mark Aguirre Jr.  MRN: [id number]  Today's Date: 2/5/2025    Admit Date: 1/29/2025    Plan: SNF evals sent and pending   Discharge Needs Assessment    No documentation.                  Discharge Plan       Row Name 02/05/25 1402       Plan    Plan Comments The patient transferred from [address] [place] [address] on 2/4/25 @ 20:57. [place] 12/28 to 1/23 then to [facility].  Wife does not want him to return there.  Dementia w/ agitation  2/4 SNF referrals sent for [place] [place] & [place].  Has home CPAP. Samaria the daughter called [phone] and she is going to tour [place] [facility] today. Tried to call her back and left her a secure voice message. KHLOE Burgess RN, CCP.                  Continued Care and Services - Admitted Since 1/29/2025       Destination       Service Provider Request Status Services Address Phone Fax Patient Preferred    [facility] Pending - Request Sent -- [address] [facility] [id number] [phone] --    [facility] Pending - Request Sent -- [address] [facility] [address] [phone] --    [facility] Declined  Behavior issues or concerns -- [address] [facility] [address] [phone] --                  Expected Discharge Date and Time       Expected Discharge Date Expected Discharge Time    Feb 7, 2025            Demographic Summary    No documentation.                  Functional Status    No documentation.                  Psychosocial    No documentation.                  Abuse/Neglect    No documentation.                  Legal    No documentation.                  Substance Abuse    No documentation.                  Patient Forms    No documentation.                     Danette Burgess, KARINE

## 2025-02-05 NOTE — PLAN OF CARE
Goal Outcome Evaluation:   Patient in bed, not up today.  Remains very confused. Sleeping often.  Wife at bedside.  Has not met criteria to DC restraints; safety and absence of skin injury maintained. Still on room air. Wife reports patient ate some of his dinner.  Able to take meds whole/crushed in pudding.  Estimated discharge date: 3 days.

## 2025-02-05 NOTE — PLAN OF CARE
Problem: Adult Inpatient Plan of Care  Goal: Plan of Care Review  Outcome: Progressing  Flowsheets (Taken 2/5/2025 0628)  Progress: no change  Outcome Evaluation: Patient was transferred from [address] [place] [address] last night. Patient is confused, agitated and combative. Spouse at bedside. Bilateral wrist restraints in place. VSS. RA. Bladder scanned x 2 this shift. Takes meds whole in pudding. Continue with plan of care.  Plan of Care Reviewed With:   patient   spouse   Goal Outcome Evaluation:  Plan of Care Reviewed With: patient, spouse        Progress: no change  Outcome Evaluation: Patient was transferred from [address] [place] [address] last night. Patient is confused, agitated and combative. Spouse at bedside. Bilateral wrist restraints in place. VSS. RA. Bladder scanned x 2 this shift. Takes meds whole in pudding. Continue with plan of care.

## 2025-02-05 NOTE — PLAN OF CARE
Goal Outcome Evaluation:                 Patient still pending discharge placement. Spouse at bedside. Patient currently alert and oriented x1. Restraints are maintained. Plan for patient to have bedside sitter this evening.

## 2025-02-05 NOTE — THERAPY TREATMENT NOTE
Patient Name: Mark Aguirre Jr.  : 1945    MRN: [id number]                              Today's Date: 2025       Admit Date: 2025    Visit Dx:     ICD-10-CM ICD-9-CM   1. Atrial fibrillation and flutter  I48.91 427.31    I48.92 427.32   2. Longstanding persistent atrial fibrillation  I48.11 427.31   3. Encounter for examination for normal comparison and control in clinical research program  Z00.6 V70.7   4. Persistent atrial fibrillation  I48.19 427.31     Patient Active Problem List   Diagnosis    Atrial fibrillation    Bifascicular block    STEPHENIE on auto CPAP    Family history of colon cancer    History of colon polyps    Essential hypertension    Hip pain, right    OA (osteoarthritis) of hip    Acute renal failure    Brief psychotic disorder    Family history of colonic polyps    Nonrheumatic aortic valve stenosis    PVC (premature ventricular contraction)    Syncope and collapse    History of adenomatous polyp of colon    Persistent atrial fibrillation    Hypoxemia associated with sleep    Confusion    Severe protein-calorie malnutrition    Atrial fibrillation with RVR    Acute UTI (urinary tract infection)    Dementia with behavioral disturbance    Atrial fibrillation and flutter     Past Medical History:   Diagnosis Date    Acute kidney failure     POST-OP TOTAL HIP 2020    Anticoagulated     PRADAXA FOR A FIB TO HELD 3 DAYS PRIOR    Arthritis     OSTEO. RIGHT HIP    Atrial flutter     Bifascicular block     Cataract     LEFT AND RIGHT    Depression     History of colon polyps     Hypertension     Hypoxemia associated with sleep     Insomnia     Knee pain     STEPHENIE on CPAP 2010    Overnight polysomnogram.  Weight 163 pounds.  Severe STEPHENIE with AHI of 30.9 events per hour.  Low oxygen saturation 72% and sleep-related hypoxia present for 30 minutes    PAF (paroxysmal atrial fibrillation)     Right hip pain     Slow to wake up after anesthesia      Past Surgical History:   Procedure Laterality  Date    CARDIAC ABLATION      CARDIAC CATHETERIZATION      CARDIAC ELECTROPHYSIOLOGY PROCEDURE N/A 1/31/2025    Procedure: AV node ablation;  Surgeon: Hemant Armando MD;  Location:  [facility];  Service: Cardiovascular;  Laterality: N/A;    COLONOSCOPY N/A 2/21/2018    Procedure: COLONOSCOPY INTO CECUM WITH COLD BX POLYPECTOMY ;  Surgeon: Ross Stearns MD;  [facility];  Service:     COLONOSCOPY N/A 2/3/2021    Procedure: COLONOSCOPY TOCECUM WITH COLD BX POLYPECTOMY AND HOT SNARE POLYPECTOMY;  Surgeon: Ross Stearns MD;  Location: [facility] ENDOSCOPY;  Service: General;  Laterality: N/A;  PERSONAL HX OF POLYPS, FAMILY HX OF COLON CANCER  --POLYPS (X3), DIVERTICULOSIS    COLONOSCOPY N/A 3/20/2024    Procedure: COLONOSCOPY TO CECUM WITH COLD BX POLYPECTOMIES;  Surgeon: Ross Stearns MD;  Location: [facility] ENDOSCOPY;  Service: General;  Laterality: N/A;  HX POLYPS/POLYPS (3)    HERNIA REPAIR      INGUINAL HERNIA? SIDE    PACEMAKER IMPLANTATION N/A 1/31/2025    Procedure: Implant or Replacement of Single Chamber Leadless Pacemaker, RV Only MICRA;  Surgeon: Hemant Armando MD;  Location: [facility] CATH INVASIVE LOCATION;  Service: Cardiovascular;  Laterality: N/A;    TOTAL HIP ARTHROPLASTY Right 6/5/2020    Procedure: TOTAL HIP ARTHROPLASTY;  Surgeon: Travis Hamlin MD;  Location: [facility] MAIN OR;  Service: Orthopedics;  Laterality: Right;      General Information       Row Name 02/05/25 1712          Physical Therapy Time and Intention    Document Type therapy note (daily note)  -PC     Mode of Treatment physical therapy  -PC       Row Name 02/05/25 1712          General Information    Existing Precautions/Restrictions fall  -PC       Row Name 02/05/25 1712          Cognition    Orientation Status (Cognition) disoriented to;place;situation;time;oriented to;person  -PC       Row Name 02/05/25 1712          Safety Issues/Impairments Affecting Functional Mobility    Impairments  Affecting Function (Mobility) cognition;endurance/activity tolerance;strength;balance  -PC               User Key  (r) = Recorded By, (t) = Taken By, (c) = Cosigned By      Initials Name Provider Type    PC Emy Morales, PT Physical Therapist                   Mobility       Row Name 02/05/25 1713          Bed Mobility    Supine-Sit [place] (Bed Mobility) moderate assist (50% patient effort);2 person assist  -PC     Sit-Supine [place] (Bed Mobility) moderate assist (50% patient effort);2 person assist  -PC       Row Name 02/05/25 1713          Sit-Stand Transfer    Sit-Stand [place] (Transfers) moderate assist (50% patient effort);2 person assist  -PC     Comment, (Sit-Stand Transfer) HHA x 2  -PC       Row Name 02/05/25 1713          Gait/Stairs (Locomotion)    Comment, (Gait/Stairs) attempted sidesteps, pt unable  -PC               User Key  (r) = Recorded By, (t) = Taken By, (c) = Cosigned By      Initials Name Provider Type    Emy Castillo, PT Physical Therapist                   Obj/Interventions       Row Name 02/05/25 1714          Balance    Static Sitting Balance contact guard;moderate assist  -PC     Position, Sitting Balance sitting edge of bed  -PC     Static Standing Balance minimal assist;moderate assist  -PC     Dynamic Standing Balance moderate assist  -PC     Position/Device Used, Standing Balance supported  -PC     Balance Interventions sitting;standing;sit to stand;supported;dynamic;minimal challenge;moderate challenge  -PC     Comment, Balance varying level of assist due to impaired cognition and pt very impulsive  -PC               User Key  (r) = Recorded By, (t) = Taken By, (c) = Cosigned By      Initials Name Provider Type    Emy Castillo, PT Physical Therapist                   Goals/Plan    No documentation.                  Clinical Impression       Row Name 02/05/25 1718          Pain    Pre/Posttreatment Pain Comment when returning to supine pt c/o back  pain, relieved with positioning  -PC       Row Name 02/05/25 1718          Plan of Care Review    Plan of Care Reviewed With patient;child  -PC     Outcome Evaluation Pt was agreeable with PT today, able to sit edge of bed and stand 4 x at bedside, needed frequent cues and hands on assist, pt very impulsive, stood well but not able to coordinate taking any steps, PT will cont to follow  -PC       Row Name 02/05/25 1718          Positioning and Restraints    Pre-Treatment Position in bed  -PC     Post Treatment Position bed  -PC     In Bed supine;call light within reach;encouraged to call for assist;exit alarm on;with family/caregiver  -PC               User Key  (r) = Recorded By, (t) = Taken By, (c) = Cosigned By      Initials Name Provider Type    PC Emy Morales, PT Physical Therapist                   Outcome Measures       Row Name 02/05/25 1720 02/05/25 1119       How much help from another person do you currently need...    Turning from your back to your side while in flat bed without using bedrails? 3  -PC 3  -KA    Moving from lying on back to sitting on the side of a flat bed without bedrails? 2  -PC 2  -KA    Moving to and from a bed to a chair (including a wheelchair)? 2  -PC 3  -KA    Standing up from a chair using your arms (e.g., wheelchair, bedside chair)? 3  -PC 3  -KA    Climbing 3-5 steps with a railing? 1  -PC 2  -KA    To walk in hospital room? 1  -PC 2  -KA    AM-PAC 6 Clicks Score (PT) 12  -PC 15  -KA    Highest Level of Mobility Goal 4 --> Transfer to chair/commode  -PC 4 --> Transfer to chair/commode  -KA              User Key  (r) = Recorded By, (t) = Taken By, (c) = Cosigned By      Initials Name Provider Type    Emy Castillo, PT Physical Therapist    Tiny Alejandro, RN Registered Nurse                                 Physical Therapy Education       Title: PT OT SLP Therapies (In Progress)       Topic: Physical Therapy (In Progress)       Point: Mobility training (In  Progress)       Learning Progress Summary            Patient Acceptance, E,D, NR,NL by  at 2/5/2025 1720    Acceptance, E,TB,D, NL,NR by  at 1/31/2025 0922   Family Acceptance, E, VU by  at 2/2/2025 1455                      Point: Home exercise program (Not Started)       Learner Progress:  Not documented in this visit.              Point: Body mechanics (In Progress)       Learning Progress Summary            Patient Acceptance, E,D, NR,NL by PC at 2/5/2025 1720                      Point: Precautions (In Progress)       Learning Progress Summary            Patient Acceptance, E,D, NR,NL by  at 2/5/2025 1720                                      User Key       Initials Effective Dates Name Provider Type Discipline     06/16/21 -  Emy Morales, PT Physical Therapist PT    CB 10/22/21 -  Morelia Land, PT Physical Therapist PT     08/25/23 -  Jeri Sifuenets, RN Registered Nurse Nurse                  PT Recommendation and Plan     Outcome Evaluation: Pt was agreeable with PT today, able to sit edge of bed and stand 4 x at bedside, needed frequent cues and hands on assist, pt very impulsive, stood well but not able to coordinate taking any steps, PT will cont to follow     Time Calculation:         PT Charges       Row Name 02/05/25 1720             Time Calculation    Start Time 1527  -PC      Stop Time 1550  -PC      Time Calculation (min) 23 min  -PC      PT Received On 02/05/25  -PC      PT - Next Appointment 02/06/25  -                User Key  (r) = Recorded By, (t) = Taken By, (c) = Cosigned By      Initials Name Provider Type     Emy Morales, PT Physical Therapist                  Therapy Charges for Today       Code Description Service Date Service Provider Modifiers Qty    87143958466 HC PT THER PROC EA 15 MIN 2/5/2025 Emy Morales, PT GP 2            PT G-Codes  Outcome Measure Options: AM-PAC 6 Clicks Basic Mobility (PT)  AM-PAC 6 Clicks Score (PT): 12       Emy Morales  PT  2/5/2025

## 2025-02-05 NOTE — PROGRESS NOTES
Subjective: Agitated upon moving from [address] [place] yesterday and did require dose of IM Zyprexa.  Required soft restraints due to combativeness.  Slept well overnight.  Wife plans on staying each night.    Objective:  Vitals:    02/04/25 1903 02/04/25 1930 02/05/25 0010 02/05/25 0431   BP:  116/65 128/66 150/76   BP Location:  Left arm Right arm Left arm   Patient Position:  Lying Lying Lying   Pulse: 84 85 70 77   Resp:  18 18 18   Temp:  98.6 °F (37 °C) 97.9 °F (36.6 °C) 97 °F (36.1 °C)   TempSrc:  Oral Axillary Axillary   SpO2: 97% 97% 92% 98%     General: No acute distress  Heart: Regular with ectopic  Lungs clear  Abdomen: Soft  Extremities: No edema    Assessment plan  1.  Dementia with agitation-required restraints last night and a dose of IM Zyprexa.  Did well overnight otherwise.  Plan to work on getting him out of restraints today.  Wife plans to stay with him overnight but I think he do better with a sitter as well during the daytime if family is not present.  Spoke with nurses about trying to get him out of restraints today.  Psychiatry following for medication management.  Plan to keep him off Klonopin as it was over sedating.  2.  A-fib-heart rate staying down since AV carlos a ablation and pacemaker.  Cardiology signed off.  I think we can drop off the middle of the night vital signs some changing him to every 8 hour vitals at this point.  He is back on Eliquis  3.  Hematuria-looks to be clearing.  4.  Malnutrition-has not eaten well the last couple of days.  Hopefully as we get him settled this will .  He is getting nutritional shakes.  He remains on Megace.

## 2025-02-05 NOTE — PROGRESS NOTES
The patient is back in restraints and is continuing periods of agitation.  His speech is completely nonsensical though he is awake when seen today.  I have spoken with his son, who is present at bedside today regarding the fact that we may be seeing the beginnings of the patient's end-of-life.  He has apparently not eaten in 2 days.  If we see no improvement in the next couple of days    Or, more to the point, if deterioration continues, I believe a palliative consult should be strongly considered.

## 2025-02-06 PROBLEM — G31.1 SENILE DEGENERATION OF BRAIN: Status: ACTIVE | Noted: 2025-01-01

## 2025-02-06 NOTE — PROGRESS NOTES
Subjective: More awake yesterday but still talking nonsensical almost all the time.  Has been out of restraints but has remained confused the whole time.  Very little to eat despite wife/family encouraging p.o. intake.    Objective:  Vitals:    02/05/25 1243 02/05/25 1613 02/05/25 2111 02/06/25 0100   BP: 120/74 118/83 134/82 138/83   BP Location: Right arm Right arm Right arm Right arm   Patient Position: Lying Lying Lying Lying   Pulse: 84 64 61 78   Resp: 26 18 18 18   Temp: 97.5 °F (36.4 °C) 97.7 °F (36.5 °C) 97.4 °F (36.3 °C) 97.7 °F (36.5 °C)   TempSrc: Oral Axillary Axillary Axillary   SpO2: 98% 98% 95% 98%     General: No acute distress  Heart: Regular with ectopics  Lungs: Clear to auscultation  Abdomen: Soft nontender  Extremities: No edema    Assessment and plan  1.  Dementia with agitation -Hope has been that we would see improvement as he had an episode of delirium on a previous hospitalization that cleared.  However, at this point there is nothing metabolic to correct.  His heart rate is now controlled.  Despite all that he is not improving and I do not expect to see significant improvement.  Spoke with wife at length and she has also come to this realization.  We discussed a palliative care consult and changing goals of care to comfort measures.  Patient's daughter, Samaria, was not present today but wife is concerned she may not be quite ready for that.  I suggested we set up palliative care consult and make sure the daughters at the meeting to present options and I am happy to talk to Samaria as well.  Wife is convinced, as am I from knowing him for several years, that he would not want to be in the state.

## 2025-02-06 NOTE — CONSULTS
Purpose of the visit was to evaluate for: goals of care/advanced care planning and support for patient/family. Spoke with MD and RN as well as family and discussed palliative care, goals of care, and care options.      Assessment:  Patient is palliative care appropriate given (list diagnosis/symptoms):  79 year old male with dementia and agitation. Poor performance status, unable to take PO. Requiring sitter for safety needs due to agitation. Patient is restless, family trying to calm him down. PPS 10%    Cultural and spiritual needs have been assessed. No needs identified.     Recommendations/Plan: Change code status to comfort measures only, .gear all treatment options toward symptom management. Consult [facility] for planned inpatient admission.     Other Comments: Spoke with patient's family including wife and daughter at bedside. We discussed goals of care and treatment options, family is ready for comfort only. We discussed medications used to alleviate suffering and [facility] consult. Answered all questions and provided support, advised of ongoing availability. Updated Dr. Staples and Dr. Cee who will assume care tomorrow.

## 2025-02-06 NOTE — PROGRESS NOTES
The patient's deterioration continues.  He is refusing medications and is becoming agitated with hands-on care.  Family will meet with the palliative team later today.  I believe he is exhibiting signs of terminal agitation.

## 2025-02-06 NOTE — PROGRESS NOTES
Case Management Discharge Note      Final Note: admitted to a [facility] scattered bed on 2/6/25. KHLOE dyer RN, CCP    Provided Post Acute Provider List?: Yes  Post Acute Provider List: Nursing Home, Home Health  Delivered To: Patient, Support Person  Method of Delivery: In person    Selected Continued Care - Admitted Since 1/29/2025       Destination Coordination complete.      Service Provider Services Address Phone Fax Patient Preferred    [facility] [address], [place] [id number] [phone] --              Durable Medical Equipment    No services have been selected for the patient.                Dialysis/Infusion    No services have been selected for the patient.                Home Medical Care    No services have been selected for the patient.                Therapy    No services have been selected for the patient.                Community Resources    No services have been selected for the patient.                Community & DME    No services have been selected for the patient.                         Final Discharge Disposition Code: 51 - [facility]

## 2025-02-06 NOTE — PLAN OF CARE
Goal Outcome Evaluation:  Plan of Care Reviewed With: patient, spouse, child        Progress: declining  Outcome Evaluation: PPS 30%. Pt evaluated and admitted to a [facility] scattered bed. Pt has required several doses of comfort medications IV r/t anxiety, restlessness and pain. Pt is now resting comfortably. Wife and family at bedside, very supportive. Pt unable to take p.o medications, bedbound. MInimally responsive. Several p.o medications have been d/c'd. Palliative order set initiated. Nsg to cont with plan of care.

## 2025-02-06 NOTE — NURSING NOTE
Pt pocketing meds yesterday. Nsg crushed meds and tried to administer in pudding. Pt refused to open his mouth and got agitated when pushed. Wife at bedside and aware.

## 2025-02-06 NOTE — SIGNIFICANT NOTE
02/06/25 1311   OTHER   Discipline physical therapist   Rehab Time/Intention   Session Not Performed unable to treat, medical status change  (pt is transitioning to palliative care, PT will sign off)

## 2025-02-06 NOTE — PROGRESS NOTES
Discharge Planning Assessment  [facility]     Patient Name: Mark Aguirre Jr.  MRN: [id number]  Today's Date: 2/6/2025    Admit Date: 1/29/2025    Plan: [facility] scattered bed on 2/6/25. KHLOE burgess RN, CCP.   Discharge Needs Assessment    No documentation.                  Discharge Plan       Row Name 02/06/25 1704       Plan    Plan [facility] scattered bed on 2/6/25. KHLOE burgess RN, CCP.    Plan Comments Johanna/Jossy/KARINE met with the family and got consents for HSB completed today. CCP will continue to follow for any needs. KHLOE Burgess Rn, CCP.    Final Discharge Disposition Code 51 - hospice medical facility    Final Note admitted to a [facility] scattered bed on 2/6/25. KHLOE burgess RN, CCP                  Continued Care and Services - Admitted Since 1/29/2025       Destination Coordination complete.      Service Provider Request Status Services Address Phone Fax Patient Preferred    [facility]  [facility] [address] [facility] [id number] [phone] --    [facility] Pending - Request Sent -- [address] [facility] [id number] [phone] --    [facility] Pending - Request Sent -- [address] [facility] [id number] [phone] --    [facility] Declined  Behavior issues or concerns -- [address] [facility] [address] [phone] --                  Expected Discharge Date and Time       Expected Discharge Date Expected Discharge Time    Feb 8, 2025            Demographic Summary    No documentation.                  Functional Status    No documentation.                  Psychosocial    No documentation.                  Abuse/Neglect    No documentation.                  Legal    No documentation.                  Substance Abuse    No documentation.                  Patient Forms    No documentation.                      Danette Burgess RN

## 2025-02-06 NOTE — PROGRESS NOTES
"Nutrition Services    Patient Name:  Mark Aguirre Jr.  YOB: 1945  MRN: [id number]  Admit Date:  1/29/2025Nutrition Services    Patient Name: Mark Aguirre Jr.  YOB: 1945  MRN: [id number]  Admission date: 1/29/2025    PROGRESS NOTE      Encounter Information: Follow up       PO Diet: Diet: Regular/House; Fluid Consistency: Thin (IDDSI 0)   PO Supplements: Boost TID    PO Intake:  0%       Current nutrition support: Regular diet   Nutrition support review: Minimal oral intakes. Pt transitioning to palliative care        Labs (reviewed below): Reviewed         GI Function:  BM 2/5, fecal incontinence        Nutrition Intervention Updates: RD to sign off       Results from last 7 days   Lab Units 02/03/25  0234 01/31/25  1124   SODIUM mmol/L 136  --    POTASSIUM mmol/L 3.9 4.0   CHLORIDE mmol/L 108*  --    CO2 mmol/L 22.8  --    BUN mg/dL 14  --    CREATININE mg/dL 0.74*  --    CALCIUM mg/dL 8.6  --    GLUCOSE mg/dL 88  --      Results from last 7 days   Lab Units 02/03/25  0234 01/31/25  1124   MAGNESIUM mg/dL  --  2.2   HEMOGLOBIN g/dL 14.0  --    HEMATOCRIT % 41.8  --      COVID19   Date Value Ref Range Status   01/28/2025 Not Detected Not Detected - Ref. Range Final     No results found for: \"HGBA1C\"    Wt Readings from Last 10 Encounters:   01/29/25 0349 91.2 kg (201 lb 1 oz)   01/21/25 1500 89.7 kg (197 lb 12 oz)   01/14/25 0900 91.1 kg (200 lb 13.8 oz)   01/08/25 1100 95.6 kg (210 lb 12.2 oz)   12/29/24 0818 110 kg (241 lb 8 oz)   12/17/24 0739 103 kg (226 lb)   12/13/24 0930 103 kg (228 lb)   10/07/24 1123 104 kg (229 lb)   09/05/24 0803 104 kg (229 lb 4.5 oz)   08/21/24 1104 102 kg (224 lb)   03/20/24 1019 99.9 kg (220 lb 4.8 oz)   03/19/24 0912 102 kg (224 lb)   02/27/24 0957 102 kg (225 lb)   12/04/23 1030 101 kg (223 lb)       RD to follow up per protocol.    Electronically signed by:  Jerica Frances RD  02/06/25 14:33 EST  "

## 2025-02-06 NOTE — CONSULTS
HSB admit 2/6/25  [facility] ID: [id number]    Primary diagnosis: ICD 10: G31.1    Pt is needing symptom management for pain and restlessness.    Met with pt family and provided explanation of services and consents signed. Written material provided.    Thank you for the referral.    Johanna Young RN  [facility]  [phone]

## 2025-02-07 NOTE — PROGRESS NOTES
[facility] Visit Report    Mark Aguirre Jr.  [id number]  2/7/2025    Admission R/T [facility] Dx: yes    Reason for [facility] Admission: senile degeneration of the brain    Review of Visit: Patient is a 79 y.o. 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 Admitted to [facility] for [facility] for symptom management of pain, dyspnea, restlessness. No active isolations     PPS: 10%    VS:   Temp: 98.7 °F  Heart Rate: 66  Resp: 16  BP: 111/70  O2: 95% on room air    Medications in 24 hours:  -IV Valium 10mg PRN x3  -IV Morphine 2mg PRN x2  -IV Morphine 4mg PRN x1    Recommendations:  Continue to monitor for signs of decline and provide comfort measures. Contact [facility] at [phone] with any questions or concerns and at TOD.     Assessment:  Patient is bed bound, oral care only, minimally responsive to pain, PPS 10%. Respirations are shallow and unlabored with open mouth breathing and periods of apnea. Lung sounds diminished throughout, on room air. Abdomen is soft with hypoactive bowel sounds, no PO intake. Extremities are warm to touch, peripheral pulses palpable. Right foot slightly cooler than the left. Chauhan catheter to bedside drainage with diminished urine output, 200mL documented in 24 hours. Sitter was discontinued during RN visit.     Collaboration:  Spoke with pts family at the bedside with condition update and support provided. Training provided regarding assessment findings, disease progression, symptom management and Scattered Bed Team roles. Family v/u of pts condition and report that their questions were thoroughly answered yesterday and they understand what to expect. Referred family to [facility] Care Guide page 19 for reinforcement of training between RN visits and encouraged to call 24/7 with any questions or concerns. Collaborated with [facility] RNJj and Danette XAVIER about pts condition and recommendations.    Disposition:  Patient  meets GIP criteria, requiring frequent administration and continued titration of parenteral medications to achieve and maintain symptom management. Patient requiring increasing symptom management and frequent RN assessment. If patient were to stabilize he would either require LTC placement due to increased daily care needs or return home with family and [facility] support depending on level of care needs at that time. Will continue [facility] RN visits to monitor for changes, assess needs and provide support.       Gilda Negron RN  [facility]  Scattered Bed Team

## 2025-02-07 NOTE — DISCHARGE SUMMARY
Date of Discharge:  2/7/2025    Discharge Diagnosis:   1.  Dementia with terminal agitation  2.  Atrial fibrillation status post AV carlos a ablation and pacemaker  3.  Malnutrition    Presenting Problem/History of Present Illness  Active Hospital Problems    Diagnosis  POA    **Atrial fibrillation and flutter [I48.91, I48.92]  Yes    Persistent atrial fibrillation [I48.19]  Unknown      Resolved Hospital Problems   No resolved problems to display.          Hospital Course  Patient is a 79 y.o. male presented with rapid atrial fibrillation after being transferred from [facility].  He had been previously admitted at [facility] for a fall and rapid A-fib with syncope back the end of December 2024.  He had a prolonged hospitalization at that time that was complicated by delirium with agitation as well as atrial fibrillation with rapid ventricular rate.  He ultimately had CSF studies confirming underlying Alzheimer's disease.  He had been discharged to a geropsych unit in [place] but was transferred back to the hospital because of his atrial fibrillation with rapid ventricular rate.  On this admission he was admitted and again seen by cardiology as well as psychiatry.  He underwent an AV carlos a ablation with pacemaker placement in order to achieve control of his ventricular rate.  He tolerated that procedure well but continued to have significant problems with agitation.  As we tried to get him awake and participating with therapy in order to get him transferred to subacute rehab he had progressive agitation.  He had very poor p.o. intake and was taking in very little.  After long discussions with the family and the realization that his mental status was not going to improve it was recommended that we pursue palliative care.  Family met with the palliative care team and he is being discharged and then readmitted under palliative care    Procedures Performed    Procedure(s):  Implant or Replacement of  Single Chamber Leadless Pacemaker, RV Only MICRA  AV node ablation  3D Mapping EP  -------------------       Consults:   Consults       Date and Time Order Name Status Description    1/30/2025  7:21 AM Inpatient Psychiatrist Consult Completed     1/29/2025  7:20 PM Inpatient Cardiology Consult Completed     1/29/2025 11:08 AM Inpatient Urology Consult Completed     1/29/2025  2:20 AM Inpatient Cardiology Consult Completed     1/8/2025  7:51 AM Inpatient Neurology Consult General Completed     12/31/2024  9:32 PM Inpatient Psychiatrist Consult Completed     12/30/2024 11:54 AM Inpatient Neurology Consult General Completed             Pertinent Test Results:     Condition on Discharge: Stable    Vital Signs  Temp:  [96.1 °F (35.6 °C)-98 °F (36.7 °C)] 98 °F (36.7 °C)  Heart Rate:  [80] 80  Resp:  [20] 20  BP: (120-133)/(67-73) 120/70    Physical Exam:  General: Alert but remains confused and fidgeting in bed  Heart: Regular   Lungs: Clear to auscultation  Abdomen: Soft nontender nondistended  Extremities: No clubbing cyanosis or edema    Discharge Disposition  Hospice/Medical Facility ([facility])    Discharge Medications  Patient is current meds being discharged and will be put on palliative care comfort meds    Discharge Diet: Regular diet as tolerated    Activity at Discharge: Bedrest    Follow-up Appointments  Future Appointments   Date Time Provider Department Center   2/27/2025 10:00 AM Kosta Javier MD [facility]   3/14/2025  9:00 AM Hemant Armando MD [id number] None   8/27/2025 10:20 AM Claus Lindquist MD [id number]         Test Results Pending at Discharge       Luis Staples MD  02/07/25  08:02 EST    Time: 20 minutes

## 2025-02-07 NOTE — PROGRESS NOTES
SB team SW met with patient and family to explain role of SB team SW and assess for needs. Patient was lying in his hospital bed, unresponsive with no signs of pain or discomfort. Patient's wife and three of their adult grandchildren were at the bedside. Patient is currently GIP at [facility] and we are managing his pain. SW sat with family to provide supportive presence.  Patient is currently a 10% PPS and is unresponsive, therefore SW unable to complete PHQ9. Patient has five adult children that are all involved in patient's care. Patient was living at home with his wife prior to this hospital stay. Family would like for patient to remain in a SB for EOL care due to his imminent prognosis.  SW educated on bereavement services provided by [facility] and family voiced understanding. Discussed  planning and family has selected [facility]  Home . SW will visit on a weekly and PRN basis to offer EOL support and assist with needs.

## 2025-02-07 NOTE — PROGRESS NOTES
Social note -non billing  Grandson at bedside.  Family has decided to go palliative care yesterday for terminal agitation related to his dementia. He has required medications overnight to help with symptom management. Currently awake and confused.  I will continue to follow along as he has been a long time patient and I know the family well.

## 2025-02-07 NOTE — PLAN OF CARE
Problem: Adult Inpatient Plan of Care  Goal: Optimal Comfort and Wellbeing  Outcome: Progressing  Intervention: Monitor Pain and Promote Comfort  Recent Flowsheet Documentation  Taken 2/6/2025 2100 by Leatha Angel RN  Pain Management Interventions: pain medication given  Intervention: Provide Person-Centered Care  Recent Flowsheet Documentation  Taken 2/6/2025 2100 by Leatha Angel RN  Trust Relationship/Rapport:   care explained   choices provided   emotional support provided   empathic listening provided   thoughts/feelings acknowledged   Goal Outcome Evaluation:           Progress: no change  Outcome Evaluation: pt on comfort measures, required 2 doses of comfort medications, pt resting comfortably with sitter at bedside.

## 2025-02-07 NOTE — H&P
Name: Mark Aguirre Jr. ADMIT: 2025   : 1945  PCP: Luis Staples MD    MRN: [id number] LOS: 1 days   AGE/SEX: 79 y.o. male  ROOM: [facility]     Chief complaint: Agitation and restlessness    Subjective   Patient is a 79 y.o. male who presents to [facility] with the above chief complaint.  He initially presented to [facility] on .  At that time he was admitted with confusion.  He recently been in the hospital after a 3-week hospitalization and during that time he had A-fib RVR dehydration and also developed significant delirium and confusion.  Workup was consistent with dementia and medications were adjusted with psychiatry assistance.  He was discharged to psychiatric hospital for further medication adjustment and titration.  He was noted to have fluctuating heart rates and was taken to the emergency room and was found to be tachycardic with heart rate in the 140s.  He was combative and agitated at the time and was admitted to the hospital on a diltiazem drip.  He was seen by cardiology and psychiatry and underwent an AV carlos a ablation and pacemaker placement.  That procedure was tolerated relatively well but continued to have significant issues with agitation.  He was unable to really participate in therapy or participate in his ongoing care.  His oral intake significantly declined.  It became increasingly likely that the likelihood that he would return to his prior level of function was increasingly low.  The patient and his family met with the palliative care team and with his primary care physician and elected to pursue palliative measures.  He was transferred to our palliative care unit and initiated on comfort medications.  He was evaluated by hospice on  and was admitted to a scatter bed that evening.  He is seen today at the bedside with his grandson.  He is resting well presently.  Per nursing staff is requiring medications and does get  agitated at times.  He still pretty restless and tachypneic when he gets worked up.  Currently receiving morphine and Valium with care    History of Present Illness    Past Medical History:   Diagnosis Date    Acute kidney failure     POST-OP TOTAL HIP 2020    Anticoagulated     PRADAXA FOR A FIB TO HELD 3 DAYS PRIOR    Arthritis     OSTEO. RIGHT HIP    Atrial flutter     Bifascicular block     Cataract     LEFT AND RIGHT    Depression     History of colon polyps     Hypertension     Hypoxemia associated with sleep     Insomnia     Knee pain     STEPHENIE on CPAP 05/27/2010    Overnight polysomnogram.  Weight 163 pounds.  Severe STEPHENIE with AHI of 30.9 events per hour.  Low oxygen saturation 72% and sleep-related hypoxia present for 30 minutes    PAF (paroxysmal atrial fibrillation)     Right hip pain     Slow to wake up after anesthesia      Past Surgical History:   Procedure Laterality Date    CARDIAC ABLATION      CARDIAC CATHETERIZATION      CARDIAC ELECTROPHYSIOLOGY PROCEDURE N/A 1/31/2025    Procedure: AV node ablation;  Surgeon: Hemant Armando MD;  Location: [facility] CATH INVASIVE LOCATION;  Service: Cardiovascular;  Laterality: N/A;    COLONOSCOPY N/A 2/21/2018    Procedure: COLONOSCOPY INTO CECUM WITH COLD BX POLYPECTOMY ;  Surgeon: Ross Stearns MD;  Location: [facility] ENDOSCOPY;  Service:     COLONOSCOPY N/A 2/3/2021    Procedure: COLONOSCOPY TOCECUM WITH COLD BX POLYPECTOMY AND HOT SNARE POLYPECTOMY;  Surgeon: Ross Stearns MD;  Location: [facility] ENDOSCOPY;  Service: General;  Laterality: N/A;  PERSONAL HX OF POLYPS, FAMILY HX OF COLON CANCER  --POLYPS (X3), DIVERTICULOSIS    COLONOSCOPY N/A 3/20/2024    Procedure: COLONOSCOPY TO CECUM WITH COLD BX POLYPECTOMIES;  Surgeon: Ross Stearns MD;  Location: [facility] ENDOSCOPY;  Service: General;  Laterality: N/A;  HX POLYPS/POLYPS (3)    HERNIA REPAIR      INGUINAL HERNIA? SIDE    PACEMAKER IMPLANTATION N/A 1/31/2025    Procedure: Implant or Replacement of Single  Chamber Leadless Pacemaker, RV Only MICRA;  Surgeon: Hemant Armando MD;  Location: [facility] CATH INVASIVE LOCATION;  Service: Cardiovascular;  Laterality: N/A;    TOTAL HIP ARTHROPLASTY Right 2020    Procedure: TOTAL HIP ARTHROPLASTY;  Surgeon: Travis Hamlin MD;  Location: [facility] MAIN OR;  Service: Orthopedics;  Laterality: Right;     Family History   Problem Relation Age of Onset    Breast cancer Mother     Colon cancer Father     Diabetes Brother     No Known Problems Maternal Grandmother     No Known Problems Maternal Grandfather     No Known Problems Paternal Grandmother     Stroke Paternal Grandfather     Heart disease Brother     Malig Hyperthermia Neg Hx      Social History     Tobacco Use    Smoking status: Never    Smokeless tobacco: Current     Types: Snuff, Chew    Tobacco comments:     2 weekly chew   Vaping Use    Vaping status: Never Used   Substance Use Topics    Alcohol use: Never    Drug use: Never     Medications Prior to Admission   Medication Sig Dispense Refill Last Dose/Taking    apixaban (ELIQUIS) 5 MG tablet tablet Take 1 tablet by mouth Every 12 (Twelve) Hours. 180 tablet 3     atorvastatin (LIPITOR) 10 MG tablet Take 1 tablet by mouth Daily.       [] cefTRIAXone 1,000 mg in sodium chloride 0.9 % 100 mL IVPB Infuse 1,000 mg into a venous catheter Daily for 6 doses. Indications: Urinary Tract Infection       clonazePAM (KlonoPIN) 0.5 MG tablet Take 1 tablet by mouth 2 (Two) Times a Day.       Cyanocobalamin ER 1000 MCG tablet controlled-release Take 1,000 mcg by mouth Daily.       digoxin (LANOXIN) 125 MCG tablet Take 1 tablet by mouth Daily. 30 tablet 11     Divalproex Sodium (DEPAKOTE SPRINKLE) 125 MG capsule Take 2 capsules by mouth 3 times a day.       lactated ringers infusion Infuse 75 mL/hr into a venous catheter Continuous. 1000 mL 0     megestrol (MEGACE) 40 MG/ML suspension Take 10 mL by mouth Daily. 300 mL 0     metoprolol tartrate (LOPRESSOR) 100 MG tablet Take 1 tablet  "by mouth Every 12 (Twelve) Hours. 60 tablet 0     OLANZapine (zyPREXA) 7.5 MG tablet Take 1 tablet by mouth 2 (Two) Times a Day.       tamsulosin (FLOMAX) 0.4 MG capsule 24 hr capsule Take 1 capsule by mouth Daily.        Allergies:  Patient has no known allergies.    Review of Systems     Objective    Vital Signs  Temp:  [96.1 °F (35.6 °C)-98 °F (36.7 °C)] 98 °F (36.7 °C)  Heart Rate:  [80] 80  Resp:  [20] 20  BP: (120-133)/(67-73) 120/70  SpO2:  [95 %-98 %] 95 %  on   ;   Device (Oxygen Therapy): room air  There is no height or weight on file to calculate BMI.    Physical Exam    Results Review:   I reviewed the patient's new clinical results.  Results from last 7 days   Lab Units 02/03/25  0234   WBC 10*3/mm3 7.22   HEMOGLOBIN g/dL 14.0   PLATELETS 10*3/mm3 124*     Results from last 7 days   Lab Units 02/03/25  0234   SODIUM mmol/L 136   POTASSIUM mmol/L 3.9   CHLORIDE mmol/L 108*   CO2 mmol/L 22.8   BUN mg/dL 14   CREATININE mg/dL 0.74*   GLUCOSE mg/dL 88   Estimated Creatinine Clearance: 101 mL/min (A) (by C-G formula based on SCr of 0.74 mg/dL (L)).        Invalid input(s): \"LDLCALC\"  Results from last 7 days   Lab Units 02/01/25  1328   NITRITE UA  Negative   WBC UA /HPF 0-2   BACTERIA UA /HPF None Seen   SQUAM EPITHEL UA /HPF None Seen     No orders to display     Assessment & Plan       Senile degeneration of brain      Assessment & Plan  This is an 79-year-old gentleman that has a history of Alzheimer's dementia, cardiac arrhythmia, hypertension, obstructive sleep apnea, and other medical issues who presented with ongoing mental status fluctuations likely related to ongoing delirium and decline from underlying dementia.  He also had significant medical complications including A-fib RVR and urinary tract infection.  Despite treatment of his medical issues his mentation never really recovered.  He was transition to the palliative care unit and after discussions with family life-sustaining therapy has been " withdrawn and he will be provided with comfort medications here on the palliative care unit.  -Currently receiving morphine and Valium for cancer.  Will continue with these for now.  -Chauhan catheter in place for urinary retention and mobility restriction.  -Discussed with family at the bedside.  Current PPS is 10%.  Likely hours to days.    I discussed the patients findings and my recommendations with patient, family, and nursing staff.          Robbin Cee MD  [facility]  02/07/25  12:51 EST

## 2025-02-07 NOTE — PLAN OF CARE
Goal Outcome Evaluation:  Plan of Care Reviewed With: spouse, family        Progress: declining  Outcome Evaluation: Pt responsive to pain. Premedicating w/4mg morphine, 10mg Valium. Family at bedside throughout day. F/C placed today. Sitter D/C'd. Pt appears comfortable and in no acute pain/distress at this time.

## 2025-02-07 NOTE — PROGRESS NOTES
Pt was unresponsive and appeared comfortable.  Pt's wife Alexandra and brother in law were present.  Pt and wife attend  Spiritism and wife describes  as having strong jessica and is well loved. Alexandra expressed sadness yet peace and acceptance with pt's prognosis and POC.   facilitated life review as she affirmed her love for pt and  provided prayer of closure and blessing- wife expressed gratitude for  support and prayer.

## 2025-02-08 NOTE — PLAN OF CARE
Goal Outcome Evaluation:  Plan of Care Reviewed With: spouse, family   Patient is unresponsive. 4mg of morphine and 10 mg of valium given prior to turns. Patient appears comfortable at this time. Will continue to provide comfort care.

## 2025-02-08 NOTE — PLAN OF CARE
Goal Outcome Evaluation:  Plan of Care Reviewed With: patient, spouse, family        Progress: declining  Outcome Evaluation: PPS 10%. Pt non responsive, except to pain. Family at bedside, very supportive of patient. No p.o intake. Bedbound. Premed with Diazepam 10mg and Morphine 4mg q4hrs. F/C to bsd, urine dark yellow. IV to lft ac, positional, flushed and saline locked. No new orders today. Nsg to cont with plan of care.

## 2025-02-08 NOTE — PROGRESS NOTES
Palliative Care/Hospice Follow Up Note       LOS: 2 days   Patient Care Team:  Luis Staples MD as PCP - General (Internal Medicine)  Luis Staples MD as PCP - Internal Medicine  Rian Johns MD as Consulting Physician (Sleep Medicine)    Chief Complaint:  agitation and restlessness    Interval History:   Much less restless this morning no agitation overnight he is doing well with Valium and morphine.    Palliative Performance Scale  Palliative Performance Scale Score: 10%  [place] Symptom Assessment System Revised  Pain Score: 2   ESAS Tiredness Score: 9  ESAS Nausea Score: 1  ESAS Depression Score: unable to assess  ESAS Anxiety Score: 6  ESAS Drowsiness Score: 9  ESAS Lack of Appetite Score: 9  ESAS Wellbeing Score: 2  ESAS Dyspnea Score: 2  ESAS Other Problem Score: Best possible response  ESAS Source of Information: healthcare professional caregiver  ESAS Intervention: medicated/see MAR  ESAS Intervention Response: tolerated      Review of Systems: Review of systems could not be obtained due to patient sedation status.       Vital Signs  Temp:  [98.7 °F (37.1 °C)-99.2 °F (37.3 °C)] 99.2 °F (37.3 °C)  Heart Rate:  [64-66] 64  Resp:  [16-20] 20  BP: (111-117)/(56-70) 117/56  Device (Oxygen Therapy): room air SpO2:  [92 %-97 %] 92 %    Physical Exam:  General Appearance:    Not awake and in no acute distress     Throat:   No oral lesions, no thrush, oral mucosa moist     Neck:   No adenopathy, supple, trachea midline   Lungs:     Clear to auscultation, respirations regular, even and not labored      Heart:    Regular rhythm and normal rate   Abdomen:     Occasional bowel sounds, no masses, no organomegaly, soft and non-tender, non-distended     Extremities:   No edema, no cyanosis     Pulses:   Radial pulses palpable and equal bilaterally          Results Review:     I reviewed the patient's new clinical results.    Medication Reviewed.          acetaminophen **OR** acetaminophen **OR**  acetaminophen    bisacodyl    LORazepam **OR** diazePAM **OR** diazePAM **OR** LORazepam **OR** LORazepam    LORazepam **OR** diazePAM **OR** diazePAM **OR** LORazepam **OR** LORazepam    LORazepam **OR** diazePAM **OR** diazePAM **OR** LORazepam **OR** LORazepam    diphenoxylate-atropine    glycopyrrolate **OR** glycopyrrolate **OR** glycopyrrolate **OR** glycopyrrolate    Morphine **OR** morphine **OR** HYDROmorphone    Morphine **OR** morphine **OR** HYDROmorphone    Morphine **OR** morphine **OR** HYDROmorphone    ketorolac    OLANZapine    Polyvinyl Alcohol-Povidone PF    sodium chloride    sodium chloride    sodium chloride    sodium chloride      Assessment & Plan       Senile degeneration of brain    This is an 79-year-old gentleman that has a history of Alzheimer's dementia, cardiac arrhythmia, hypertension, obstructive sleep apnea, and other medical issues who presented with ongoing mental status fluctuations likely related to ongoing delirium and decline from underlying dementia.  He also had significant medical complications including A-fib RVR and urinary tract infection.  Despite treatment of his medical issues his mentation never really recovered.  He was transition to the palliative care unit and after discussions with family life-sustaining therapy has been withdrawn and he will be provided with comfort medications here on the palliative care unit.  -Currently receiving morphine and Valium for restlessness and agitation.  Will continue with these for now.  -Chauhan catheter in place for urinary retention and mobility restriction.  -Discussed with family at the bedside.  Current PPS is 10%.  Likely hours to days.      Plan for disposition: SHAHEEN Cee MD  02/08/25  12:33 EST

## 2025-02-09 NOTE — NURSING NOTE
Review of visit: patient is an 80 YO M with a primary diagnosis of SDOB. Admitted to [facility] for GIP for mgmt. of pain, SOA, anxiety & EOL & symptom mgmt. care.   Isolation: NA  PPS: 10%  Medications in last 24 hours: IV Valium 10mg x6, IV Morphine 4mg x5  Recommendations:   Continue to monitor for signs of decline & provide comfort measures. Contact [facility] with any questions/concerns & at TOD.   Assessment:   Patient is lying in bed tilted to R side, patient is responsive only to pain, on RA, RR even mildly labored, lung sounds coarse with mild audible congestion (new onset this afternoon per staff) BS hypo, pedals palpable, no edema, skin is hot to touch & slightly pale/dusky, mottling starting on soles of feet, FC in place with tea colored urine noted in bag. Patient is on “premedicate for turns schedule” SRN reports current medication regimen effective but patient becomes uncomfortable/symptomatic if patient gets much past the 4 hour window but is comfortable if schedule is kept. Medicated at time of visit & resting comfortably.  Collaboration:   No family at bedside reached out via phone to provide condition update, no answer, left VM. Updated staff RN Tiny, recommendations provided as appropriate.   Disposition:  Patient meets GIP criteria d/t frequent administration & continued titration of IV meds to achieve/maintain symptom mgmt.. Patient appears unstable for transport, requiring increasing symptom mgmt. & frequent nursing interventions. If patient stabilizes & needs to transition to a lower level of care, placement may be needed due to increased daily care needs. Will continue [facility] RN visits to monitor for changes, assess needs & provide support.   Please reach out with any questions/concerns. Thank you for allowing us the opportunity to participate in patient's care.     Paula Diggs RN, BSN  Scatter Bed Team  [facility]

## 2025-02-09 NOTE — NURSING NOTE
Patient was non responsive, bedbound, having shallow respirations and increased secretions. Robinul, Morphine, and valium given PRN x1. Pt turned by PCT. RN walked in for 2200 check at 2230. RN noticed pt was not breathing, so she and another RN later assessed breathing/heart sounds and fund none. Pt family contacted. Transport to [facility].  PIV removed. CPAP machine and wedding ring sent home with family.

## 2025-02-09 NOTE — DISCHARGE SUMMARY
Discharge As      Date of Admisssion:  2025  Date of Death:  2025  Time of Death:  10:40 PM    Patient Care Team:  Luis Staples MD as PCP - General (Internal Medicine)  Luis Staples MD as PCP - Internal Medicine  Rian Johns MD as Consulting Physician (Sleep Medicine)    Final Diagnosis:   Dementia unspecified type  Atrial Fibrillation  Protein Calorie malnutrition  Hypertention        Presenting Problem/History of Present Illness  Senile degeneration of brain [G31.1]      Hospital Course  Patient is a 79 y.o. male who presents to [facility] with the above chief complaint.  He initially presented to [facility] on .  At that time he was admitted with confusion.  He recently been in the hospital after a 3-week hospitalization and during that time he had A-fib RVR dehydration and also developed significant delirium and confusion.  Workup was consistent with dementia and medications were adjusted with psychiatry assistance.  He was discharged to psychiatric hospital for further medication adjustment and titration.  He was noted to have fluctuating heart rates and was taken to the emergency room and was found to be tachycardic with heart rate in the 140s.  He was combative and agitated at the time and was admitted to the hospital on a diltiazem drip.  He was seen by cardiology and psychiatry and underwent an AV carlos a ablation and pacemaker placement.  That procedure was tolerated relatively well but continued to have significant issues with agitation.  He was unable to really participate in therapy or participate in his ongoing care.  His oral intake significantly declined.  It became increasingly likely that the likelihood that he would return to his prior level of function was increasingly low.  The patient and his family met with the palliative care team and with his primary care physician and elected to pursue palliative measures.  He was  transferred to our palliative care unit and initiated on comfort medications.  He was evaluated by hospice on February 6 and was admitted to a scatter bed that evening. He rested comfortably with use of Morphine and Valium and passed peacefully on 2/8/25 at 2240        Robbin Cee MD  02/09/25  05:49 EST

## 2025-02-11 NOTE — PROGRESS NOTES
Enter Query Response Below      Query Response: malnutrition             If applicable, please update the problem list.

## 2025-02-11 NOTE — PROGRESS NOTES
Case Management Discharge Note      Final Note: The patient  on 25 @ 22:40. BDieudonne Burgess RN, CCP.         Selected Continued Care - Discharged on 2025 Admission date: 2025 - Discharge disposition:       Destination Coordination complete.      Service Provider Services Address Phone Fax Patient Preferred    [facility] [address], [facility] [address] [phone] --              Durable Medical Equipment    No services have been selected for the patient.                Dialysis/Infusion    No services have been selected for the patient.                Home Medical Care    No services have been selected for the patient.                Therapy    No services have been selected for the patient.                Community Resources    No services have been selected for the patient.                Community & DME    No services have been selected for the patient.                    Selected Continued Care - Prior Encounters Includes continued care and service providers with selected services from prior encounters from 2024 to 2025      Discharged on 2025 Admission date: 2025 - Discharge disposition: Hospice/Medical Facility (DC - External)      Destination       Service Provider Services Address Phone Fax Patient Preferred    [facility] [address], [facility] [address] [phone] --                               Final Discharge Disposition Code: 20 -

## (undated) DEVICE — SINGLE-USE BIOPSY FORCEPS: Brand: RADIAL JAW 4

## (undated) DEVICE — GLV SURG SENSICARE W/ALOE PF LF 8 STRL

## (undated) DEVICE — THE TORRENT IRRIGATION SCOPE CONNECTOR IS USED WITH THE TORRENT IRRIGATION TUBING TO PROVIDE IRRIGATION FLUIDS SUCH AS STERILE WATER DURING GASTROINTESTINAL ENDOSCOPIC PROCEDURES WHEN USED IN CONJUNCTION WITH AN IRRIGATION PUMP (OR ELECTROSURGICAL UNIT).: Brand: TORRENT

## (undated) DEVICE — LN SMPL CO2 SHTRM SD STREAM W/M LUER

## (undated) DEVICE — KT ORCA ORCAPOD DISP STRL

## (undated) DEVICE — SOL NACL 0.9PCT 100ML SGL

## (undated) DEVICE — TRAP FLD MINIVAC MEGADYNE 100ML

## (undated) DEVICE — Device: Brand: DEFENDO AIR/WATER/SUCTION AND BIOPSY VALVE

## (undated) DEVICE — SHEATH INTRO MICRA HC 23F 55.7CM

## (undated) DEVICE — DRSNG GZ PETROLTM XEROFORM CURAD 1X8IN STRL

## (undated) DEVICE — SPNG GZ WOVN 4X4IN 12PLY 10/BX STRL

## (undated) DEVICE — CANN O2 ETCO2 FITS ALL CONN CO2 SMPL A/ 7IN DISP LF

## (undated) DEVICE — SENSR O2 OXIMAX FNGR A/ 18IN NONSTR

## (undated) DEVICE — SUT VIC 1 CT1 36IN J947H

## (undated) DEVICE — SUT PDS 1 CT1 36IN Z347H

## (undated) DEVICE — MAT FLR ABSORBENT LG 4FT 10 2.5FT

## (undated) DEVICE — PINNACLE INTRODUCER SHEATH: Brand: PINNACLE

## (undated) DEVICE — LOU EP: Brand: MEDLINE INDUSTRIES, INC.

## (undated) DEVICE — ADAPT CLN BIOGUARD AIR/H2O DISP

## (undated) DEVICE — Device

## (undated) DEVICE — TUBING, SUCTION, 1/4" X 10', STRAIGHT: Brand: MEDLINE

## (undated) DEVICE — Device: Brand: EZ STEER NAV

## (undated) DEVICE — PENCL E/S ULTRAVAC TELESCP NOSE HOLSTR 10FT

## (undated) DEVICE — PK HIP TOTL 40

## (undated) DEVICE — PREMIUM WET SKIN PREP TRAY: Brand: MEDLINE INDUSTRIES, INC.

## (undated) DEVICE — PREP SOL POVIDONE/IODINE BT 4OZ

## (undated) DEVICE — ADHS SKIN DERMABOND TOP ADVANCED

## (undated) DEVICE — CANN NASL CO2 TRULINK W/O2 A/

## (undated) DEVICE — SNAR POLYP SENSATION STDOVL 27 240 BX40

## (undated) DEVICE — 3M™ IOBAN™ 2 ANTIMICROBIAL INCISE DRAPE 6650EZ: Brand: IOBAN™ 2

## (undated) DEVICE — DIL COONS/TPR .038IN 22F 20CM

## (undated) DEVICE — SYR LUERLOK 30CC

## (undated) DEVICE — APPL DURAPREP IODOPHOR APL 26ML

## (undated) DEVICE — Device: Brand: REFERENCE PATCH CARTO 3

## (undated) DEVICE — THE SINGLE USE ETRAP – POLYP TRAP IS USED FOR SUCTION RETRIEVAL OF ENDOSCOPICALLY REMOVED POLYPS.: Brand: ETRAP

## (undated) DEVICE — GW AMPLATZ SUPERSTIFF 3MM J/TP .035IN 145CM

## (undated) DEVICE — GLV SURG SENSICARE PI MIC PF SZ7 LF STRL

## (undated) DEVICE — GLV SURG SENSICARE W/ALOE PF LF 7.5 STRL

## (undated) DEVICE — GLV SURG PREMIERPRO ORTHO LTX PF SZ7.5 BRN

## (undated) DEVICE — ANTIBACTERIAL UNDYED BRAIDED (POLYGLACTIN 910), SYNTHETIC ABSORBABLE SUTURE: Brand: COATED VICRYL

## (undated) DEVICE — PREF.GUIDING SHEATH W/MULT.CRV: Brand: PREFACE

## (undated) DEVICE — 3M™ IOBAN™ 2 ANTIMICROBIAL INCISE DRAPE 6640EZ: Brand: IOBAN™ 2

## (undated) DEVICE — SUT VIC 0 CT1 36IN J946H

## (undated) DEVICE — GLV SURG SENSICARE PI PF LF 7 GRN STRL

## (undated) DEVICE — ST INTRO PERFORMER W/GW J/TP .038IN 12F

## (undated) DEVICE — HANDPIECE SET WITH COAXIAL HIGH FLOW TIP AND SUCTION TUBE: Brand: INTERPULSE